# Patient Record
Sex: MALE | Race: WHITE | Employment: OTHER | URBAN - METROPOLITAN AREA
[De-identification: names, ages, dates, MRNs, and addresses within clinical notes are randomized per-mention and may not be internally consistent; named-entity substitution may affect disease eponyms.]

---

## 2019-05-28 ENCOUNTER — HOSPITAL ENCOUNTER (OUTPATIENT)
Age: 74
Setting detail: SPECIMEN
Discharge: HOME | End: 2019-05-28

## 2019-05-28 LAB
INR BLD: 1.9
PROTHROMBIN TIME: 21.5 SEC (ref 11.8–14.6)

## 2019-05-28 PROCEDURE — 85610 PROTHROMBIN TIME: CPT

## 2019-06-04 ENCOUNTER — HOSPITAL ENCOUNTER (OUTPATIENT)
Age: 74
Setting detail: SPECIMEN
Discharge: HOME | End: 2019-06-04

## 2019-06-04 LAB
HCT VFR BLD CALC: 27.8 % (ref 41–53)
HEMOGLOBIN: 9.2 G/DL (ref 13.5–17.5)
INR BLD: 2.2
PROTHROMBIN TIME: 24.4 SEC (ref 11.8–14.6)

## 2019-06-04 PROCEDURE — 85018 HEMOGLOBIN: CPT

## 2019-06-04 PROCEDURE — 85014 HEMATOCRIT: CPT

## 2019-06-04 PROCEDURE — 85610 PROTHROMBIN TIME: CPT

## 2019-07-30 ENCOUNTER — HOSPITAL ENCOUNTER (OUTPATIENT)
Age: 74
Setting detail: SPECIMEN
Discharge: HOME | End: 2019-07-30

## 2019-07-30 LAB
INR BLD: 1.5
PROTHROMBIN TIME: 18.3 SEC (ref 11.8–14.6)

## 2019-07-30 PROCEDURE — 85610 PROTHROMBIN TIME: CPT

## 2019-09-05 ENCOUNTER — HOSPITAL ENCOUNTER (OUTPATIENT)
Age: 74
Setting detail: SPECIMEN
Discharge: HOME | End: 2019-09-05
Payer: MEDICARE

## 2019-09-05 LAB
INR BLD: 1.8
PROTHROMBIN TIME: 18.4 SEC (ref 9.4–12.6)

## 2019-09-05 PROCEDURE — 85610 PROTHROMBIN TIME: CPT

## 2019-09-11 ENCOUNTER — HOSPITAL ENCOUNTER (OUTPATIENT)
Age: 74
Setting detail: SPECIMEN
Discharge: HOME | End: 2019-09-11
Payer: MEDICARE

## 2019-09-11 LAB
INR BLD: 3.1
PROTHROMBIN TIME: 32.5 SEC (ref 11.8–14.6)

## 2019-09-11 PROCEDURE — 85610 PROTHROMBIN TIME: CPT

## 2019-09-18 ENCOUNTER — HOSPITAL ENCOUNTER (OUTPATIENT)
Age: 74
Setting detail: SPECIMEN
Discharge: HOME | End: 2019-09-18
Payer: MEDICARE

## 2019-09-18 LAB
INR BLD: 2.1
PROTHROMBIN TIME: 20.6 SEC (ref 9.4–12.6)

## 2019-09-18 PROCEDURE — 85610 PROTHROMBIN TIME: CPT

## 2019-09-25 ENCOUNTER — HOSPITAL ENCOUNTER (OUTPATIENT)
Age: 74
Setting detail: SPECIMEN
Discharge: HOME | End: 2019-09-25
Payer: MEDICARE

## 2019-09-25 LAB
INR BLD: 2.4
PROTHROMBIN TIME: 24.3 SEC (ref 9.4–12.6)

## 2019-09-25 PROCEDURE — 85610 PROTHROMBIN TIME: CPT

## 2019-10-02 ENCOUNTER — HOSPITAL ENCOUNTER (OUTPATIENT)
Age: 74
Setting detail: SPECIMEN
Discharge: HOME | End: 2019-10-02
Payer: MEDICARE

## 2019-10-02 LAB
INR BLD: 2.7
PROTHROMBIN TIME: 26.5 SEC (ref 9.4–12.6)

## 2019-10-02 PROCEDURE — 85610 PROTHROMBIN TIME: CPT

## 2019-10-09 ENCOUNTER — HOSPITAL ENCOUNTER (OUTPATIENT)
Age: 74
Setting detail: SPECIMEN
Discharge: HOME | End: 2019-10-09
Payer: MEDICARE

## 2019-10-09 LAB
INR BLD: 2.8
PROTHROMBIN TIME: 30 SEC (ref 11.8–14.6)

## 2019-10-09 PROCEDURE — 85610 PROTHROMBIN TIME: CPT

## 2020-01-09 ENCOUNTER — HOSPITAL ENCOUNTER (EMERGENCY)
Facility: CLINIC | Age: 75
Discharge: ANOTHER HOSPITAL | End: 2020-01-09
Attending: EMERGENCY MEDICINE
Payer: MEDICARE

## 2020-01-09 ENCOUNTER — APPOINTMENT (OUTPATIENT)
Dept: GENERAL RADIOLOGY | Facility: CLINIC | Age: 75
End: 2020-01-09
Payer: MEDICARE

## 2020-01-09 VITALS
SYSTOLIC BLOOD PRESSURE: 112 MMHG | TEMPERATURE: 97.6 F | WEIGHT: 120 LBS | HEART RATE: 60 BPM | RESPIRATION RATE: 16 BRPM | DIASTOLIC BLOOD PRESSURE: 54 MMHG | BODY MASS INDEX: 16.25 KG/M2 | HEIGHT: 72 IN | OXYGEN SATURATION: 98 %

## 2020-01-09 LAB
ANION GAP SERPL CALCULATED.3IONS-SCNC: 11 MMOL/L (ref 9–17)
BUN BLDV-MCNC: 11 MG/DL (ref 8–23)
BUN/CREAT BLD: ABNORMAL (ref 9–20)
CALCIUM SERPL-MCNC: 9.6 MG/DL (ref 8.6–10.4)
CHLORIDE BLD-SCNC: 104 MMOL/L (ref 98–107)
CO2: 26 MMOL/L (ref 20–31)
CREAT SERPL-MCNC: 0.9 MG/DL (ref 0.7–1.2)
GFR AFRICAN AMERICAN: >60 ML/MIN
GFR NON-AFRICAN AMERICAN: >60 ML/MIN
GFR SERPL CREATININE-BSD FRML MDRD: ABNORMAL ML/MIN/{1.73_M2}
GFR SERPL CREATININE-BSD FRML MDRD: ABNORMAL ML/MIN/{1.73_M2}
GLUCOSE BLD-MCNC: 117 MG/DL (ref 70–99)
HCT VFR BLD CALC: 30.2 % (ref 41–53)
HEMOGLOBIN: 9.6 G/DL (ref 13.5–17.5)
INR BLD: 2.9
MCH RBC QN AUTO: 30.2 PG (ref 26–34)
MCHC RBC AUTO-ENTMCNC: 31.7 G/DL (ref 31–37)
MCV RBC AUTO: 95.3 FL (ref 80–100)
NRBC AUTOMATED: ABNORMAL PER 100 WBC
PARTIAL THROMBOPLASTIN TIME: 36.8 SEC (ref 21.3–31.3)
PDW BLD-RTO: 18.4 % (ref 12.5–15.4)
PLATELET # BLD: 268 K/UL (ref 140–450)
PMV BLD AUTO: 7.3 FL (ref 6–12)
POTASSIUM SERPL-SCNC: 4.4 MMOL/L (ref 3.7–5.3)
PROTHROMBIN TIME: 28.6 SEC (ref 9.4–12.6)
RBC # BLD: 3.17 M/UL (ref 4.5–5.9)
SODIUM BLD-SCNC: 141 MMOL/L (ref 135–144)
WBC # BLD: 4.3 K/UL (ref 3.5–11)

## 2020-01-09 PROCEDURE — 6360000002 HC RX W HCPCS: Performed by: EMERGENCY MEDICINE

## 2020-01-09 PROCEDURE — 85027 COMPLETE CBC AUTOMATED: CPT

## 2020-01-09 PROCEDURE — 73030 X-RAY EXAM OF SHOULDER: CPT

## 2020-01-09 PROCEDURE — 80048 BASIC METABOLIC PNL TOTAL CA: CPT

## 2020-01-09 PROCEDURE — 36415 COLL VENOUS BLD VENIPUNCTURE: CPT

## 2020-01-09 PROCEDURE — 96374 THER/PROPH/DIAG INJ IV PUSH: CPT

## 2020-01-09 PROCEDURE — 85610 PROTHROMBIN TIME: CPT

## 2020-01-09 PROCEDURE — 85730 THROMBOPLASTIN TIME PARTIAL: CPT

## 2020-01-09 PROCEDURE — 96375 TX/PRO/DX INJ NEW DRUG ADDON: CPT

## 2020-01-09 PROCEDURE — 99285 EMERGENCY DEPT VISIT HI MDM: CPT

## 2020-01-09 RX ORDER — ONDANSETRON 2 MG/ML
4 INJECTION INTRAMUSCULAR; INTRAVENOUS ONCE
Status: COMPLETED | OUTPATIENT
Start: 2020-01-09 | End: 2020-01-09

## 2020-01-09 RX ORDER — WARFARIN SODIUM 5 MG/1
TABLET ORAL
COMMUNITY

## 2020-01-09 RX ORDER — TAMSULOSIN HYDROCHLORIDE 0.4 MG/1
CAPSULE ORAL
COMMUNITY

## 2020-01-09 RX ORDER — ALBUTEROL SULFATE 90 UG/1
AEROSOL, METERED RESPIRATORY (INHALATION)
COMMUNITY

## 2020-01-09 RX ORDER — CLOPIDOGREL BISULFATE 75 MG/1
TABLET ORAL
COMMUNITY

## 2020-01-09 RX ORDER — ALBUTEROL SULFATE 1.25 MG/3ML
1 SOLUTION RESPIRATORY (INHALATION)
COMMUNITY

## 2020-01-09 RX ORDER — MORPHINE SULFATE 4 MG/ML
4 INJECTION, SOLUTION INTRAMUSCULAR; INTRAVENOUS ONCE
Status: COMPLETED | OUTPATIENT
Start: 2020-01-09 | End: 2020-01-09

## 2020-01-09 RX ORDER — ATORVASTATIN CALCIUM 40 MG/1
TABLET, FILM COATED ORAL
COMMUNITY

## 2020-01-09 RX ORDER — METOPROLOL TARTRATE 50 MG/1
50 TABLET, FILM COATED ORAL
COMMUNITY
Start: 2019-09-04

## 2020-01-09 RX ORDER — OXYCODONE HYDROCHLORIDE 10 MG/1
TABLET ORAL
COMMUNITY

## 2020-01-09 RX ADMIN — HYDROMORPHONE HYDROCHLORIDE 0.5 MG: 1 INJECTION, SOLUTION INTRAMUSCULAR; INTRAVENOUS; SUBCUTANEOUS at 10:29

## 2020-01-09 RX ADMIN — ONDANSETRON 4 MG: 2 INJECTION INTRAMUSCULAR; INTRAVENOUS at 08:41

## 2020-01-09 RX ADMIN — MORPHINE SULFATE 4 MG: 4 INJECTION INTRAVENOUS at 08:41

## 2020-01-09 ASSESSMENT — PAIN SCALES - GENERAL
PAINLEVEL_OUTOF10: 10
PAINLEVEL_OUTOF10: 9
PAINLEVEL_OUTOF10: 10
PAINLEVEL_OUTOF10: 8
PAINLEVEL_OUTOF10: 8

## 2020-01-09 ASSESSMENT — PAIN DESCRIPTION - ONSET: ONSET: SUDDEN

## 2020-01-09 ASSESSMENT — PAIN DESCRIPTION - PAIN TYPE
TYPE: ACUTE PAIN

## 2020-01-09 ASSESSMENT — PAIN DESCRIPTION - PROGRESSION
CLINICAL_PROGRESSION: RAPIDLY WORSENING
CLINICAL_PROGRESSION: GRADUALLY IMPROVING
CLINICAL_PROGRESSION: NOT CHANGED

## 2020-01-09 ASSESSMENT — PAIN DESCRIPTION - FREQUENCY: FREQUENCY: CONTINUOUS

## 2020-01-09 ASSESSMENT — PAIN - FUNCTIONAL ASSESSMENT
PAIN_FUNCTIONAL_ASSESSMENT: PREVENTS OR INTERFERES WITH MANY ACTIVE NOT PASSIVE ACTIVITIES

## 2020-01-09 ASSESSMENT — PAIN DESCRIPTION - LOCATION: LOCATION: SHOULDER

## 2020-01-09 ASSESSMENT — PAIN DESCRIPTION - ORIENTATION: ORIENTATION: LEFT

## 2020-01-09 ASSESSMENT — PAIN DESCRIPTION - DESCRIPTORS: DESCRIPTORS: ACHING;SHARP

## 2020-01-09 NOTE — ED PROVIDER NOTES
his fingers. Skin:     General: Skin is warm and dry. Capillary Refill: Capillary refill takes less than 2 seconds. Findings: Bruising present. No rash. Comments: Left anterior shoulder   Neurological:      General: No focal deficit present. Mental Status: He is alert. Mental status is at baseline. Sensory: No sensory deficit. Motor: No weakness. Comments: Speaking normally. No facial asymmetry. Moving all 4 extremities. No gait testing   Psychiatric:         Mood and Affect: Mood normal.         EMERGENCY DEPARTMENT COURSE and DIFFERENTIAL DIAGNOSIS/MDM:   Vitals:    Vitals:    01/09/20 0833 01/09/20 0935 01/09/20 1030 01/09/20 1044   BP: (!) 163/68 (!) 130/55 103/60 (!) 117/50   Pulse: 66 58 60    Resp: 18 15 16    Temp: 97.6 °F (36.4 °C)      TempSrc: Oral      SpO2: 98% 97% 95% 96%   Weight: 54.4 kg (120 lb)      Height: 6' (1.829 m)          Patient presents to the emergency department with a complaint described above. Vitals show hypertension but are otherwise unremarkable. He is neurovascularly intact in left upper extremity but I am concerned for humeral fracture. I have ordered pain medication and basic labs as he is on Coumadin and will need coagulation studies as well, I also ordered a left shoulder x-ray.       DIAGNOSTIC RESULTS     LABS:  Labs Reviewed   CBC - Abnormal; Notable for the following components:       Result Value    RBC 3.17 (*)     Hemoglobin 9.6 (*)     Hematocrit 30.2 (*)     RDW 18.4 (*)     All other components within normal limits   BASIC METABOLIC PANEL - Abnormal; Notable for the following components:    Glucose 117 (*)     All other components within normal limits   APTT - Abnormal; Notable for the following components:    PTT 36.8 (*)     All other components within normal limits   PROTIME-INR - Abnormal; Notable for the following components:    Protime 28.6 (*)     All other components within normal limits       All other labs were within normal range or not returned as of this dictation. RADIOLOGY:  XR SHOULDER LEFT (MIN 2 VIEWS)   Final Result   Comminuted fracture of the proximal humerus involving the humeral neck and   head. No dislocation. Small effusion is noted. ED Course as of Jan 09 1103   Thu Jan 09, 2020   8094 Patient found to have a comminuted fracture of the left humeral neck and head. No displacement or dislocation. I informed the patient of the results, he will clearly need to be admitted for evaluation by orthopedics, possible surgical fixation and because he only has 1 leg and now has 1 broken shoulder he is going to have severe difficulty in ambulation. I recommended admission to [facility], however, patient requested [facility].  We contacted paramedic [facility] and [facility] not currently accepting any admissions. Family then requesting [facility].  We will contact orthopedics and hospitalist at [facility] as access center states they are accepting admissions    [TS]      ED Course User Index  [TS] Sailaja Mix DO     Spoke to orthopedic surgeon and hospitalist who is accepting patient for admission. PROCEDURES:  Unless otherwise noted below, none     Procedures    FINAL IMPRESSION      1. Closed fracture of proximal end of left humerus, unspecified fracture morphology, initial encounter    2. Unable to ambulate          DISPOSITION/PLAN   DISPOSITION Decision To Transfer 01/09/2020 09:33:30 AM      PATIENT REFERRED TO:  No follow-up provider specified.     DISCHARGE MEDICATIONS:  New Prescriptions    No medications on file          (Please note that portions of this note were completed with a voice recognition program.  Efforts were made to edit the dictations but occasionally words are mis-transcribed.)    Sailaja Mix DO (electronically signed)  Board Certified Emergency Physician          Sailaja Mix DO  01/09/20 9327

## 2020-01-09 NOTE — ED NOTES
Pt wishes to go to [facility] p[facility] through [facility].      Osmani Ortiz RN  01/09/20 0486

## 2020-01-09 NOTE — ED NOTES
Pt presents to the ED via squad for a c/c of a fall and left shoulder pain and injury. Pt states that he had a right AKA surgery approx one month ago and he was getting up this morning to use the restroom when he states he \"got tangled up in his walker\" and fell onto his left shoulder. Pt states he felt immediate pain. Pt states he is unable to move the shoulder without pain. Some bruising and mild deformity noted to left front of shoulder. No open areas. Pt denies hitting his head or any LOC. Pt denies chest pain, SOB, nausea, vomiting, diarrhea, fevers, or chills. Pt pulled over to cot via sheet lift. Pt resting in bed in NAD, skin pink warm and dry, respirations even and unlabored and call light within reach.      Skyla Chan RN  01/09/20 3659

## 2020-02-12 ENCOUNTER — HOSPITAL ENCOUNTER (OUTPATIENT)
Age: 75
Setting detail: SPECIMEN
Discharge: HOME | End: 2020-02-12
Payer: MEDICARE

## 2020-02-12 LAB
INR BLD: 3.2
PROTHROMBIN TIME: 32.6 SEC (ref 9.4–12.6)

## 2020-02-12 PROCEDURE — 85610 PROTHROMBIN TIME: CPT

## 2024-03-14 ENCOUNTER — TELEPHONE (OUTPATIENT)
Dept: PHARMACY | Age: 79
End: 2024-03-14

## 2024-03-14 NOTE — TELEPHONE ENCOUNTER
[facility] Medication Management Clinic Note  Spoke with Kelsey Velarde, RN care manager with [facility] (calling on behalf of Lilia Mendoza CNP). She sent and we received a new referral for anticoagulation management (warfarin) for this patient. Patient previously managed by [facility], looks like discharged from their practice due to non-compliance. Patient's insurance now prefers [email or web address] and patient is in process of switching providers - primary care, cardio, etc.     Attempted to reach patient to schedule initial INR check appointment. (Of note patient's INR was 1.4 yesterday, per RN patient had stopped taking it after having an elevated INR recently) Of note patient also hospitalized 3/5-3/7 for cellulitis. Voicemail full, could not leave message. Will attempt again or await callback from patient.    Terese Duarte, PharmD  [facility]  3/14/2024 2:16 PM

## 2024-03-18 ENCOUNTER — TELEPHONE (OUTPATIENT)
Dept: PHARMACY | Age: 79
End: 2024-03-18

## 2024-03-18 NOTE — TELEPHONE ENCOUNTER
[facility] Medication Management Clinic Note  Again tried to call patient in regard to referral for anticoagulation management/warfarin. No answer, voicemail full, could not leave message. Also called Kelsey Velarde, RN care manager with [facility], who I spoke with 3/14/24, to let her know I have not yet successfully gotten ahold of this patient to schedule anticoagulation visit.    Terese Duarte, PharmD  [facility]  3/18/2024 11:09 AM

## 2024-03-22 ENCOUNTER — TELEPHONE (OUTPATIENT)
Dept: PHARMACY | Age: 79
End: 2024-03-22

## 2024-03-22 NOTE — TELEPHONE ENCOUNTER
[facility] Medication Management Clinic Note  Again tried to call patient in regard to referral for anticoagulation management/warfarin. No answer, voicemail full, could not leave message. 3rd attempt.    Terese Duarte, PharmD  [facility]  3/22/2024 2:46 PM

## 2024-03-25 ENCOUNTER — TELEPHONE (OUTPATIENT)
Dept: PHARMACY | Age: 79
End: 2024-03-25

## 2024-03-25 NOTE — TELEPHONE ENCOUNTER
[facility] Medication Management Clinic Note  Again tried to call patient in regard to referral for anticoagulation management/warfarin. No answer, voicemail full, could not leave message. 4th attempt.    Terese Duarte, PharmD  [facility]  3/25/2024 10:42 AM

## 2024-04-03 ENCOUNTER — APPOINTMENT (OUTPATIENT)
Dept: GENERAL RADIOLOGY | Age: 79
DRG: 377 | End: 2024-04-03
Payer: MEDICARE

## 2024-04-03 ENCOUNTER — HOSPITAL ENCOUNTER (INPATIENT)
Age: 79
LOS: 38 days | Discharge: ANOTHER HOSPITAL | DRG: 377 | End: 2024-05-11
Attending: EMERGENCY MEDICINE | Admitting: INTERNAL MEDICINE
Payer: MEDICARE

## 2024-04-03 ENCOUNTER — HOSPITAL ENCOUNTER (EMERGENCY)
Dept: VASCULAR LAB | Age: 79
Discharge: HOME | DRG: 377 | End: 2024-04-05
Attending: EMERGENCY MEDICINE
Payer: MEDICARE

## 2024-04-03 DIAGNOSIS — J98.11 ATELECTASIS OF LEFT LUNG: ICD-10-CM

## 2024-04-03 DIAGNOSIS — K92.2 GASTROINTESTINAL HEMORRHAGE, UNSPECIFIED GASTROINTESTINAL HEMORRHAGE TYPE: ICD-10-CM

## 2024-04-03 DIAGNOSIS — I73.9 CLAUDICATION (HCC): ICD-10-CM

## 2024-04-03 DIAGNOSIS — N17.9 AKI (ACUTE KIDNEY INJURY) (HCC): ICD-10-CM

## 2024-04-03 DIAGNOSIS — I48.91 ATRIAL FIB/FLUTTER, TRANSIENT (HCC): ICD-10-CM

## 2024-04-03 DIAGNOSIS — I74.9 EMBOLISM AND THROMBOSIS OF ARTERY (HCC): ICD-10-CM

## 2024-04-03 DIAGNOSIS — R10.11 RIGHT UPPER QUADRANT ABDOMINAL PAIN: ICD-10-CM

## 2024-04-03 DIAGNOSIS — K92.1 MELENA: ICD-10-CM

## 2024-04-03 DIAGNOSIS — K81.0 ACUTE CHOLECYSTITIS: ICD-10-CM

## 2024-04-03 DIAGNOSIS — J98.11 ATELECTASIS: ICD-10-CM

## 2024-04-03 DIAGNOSIS — L03.116 LEFT LEG CELLULITIS: Primary | ICD-10-CM

## 2024-04-03 DIAGNOSIS — R31.0 GROSS HEMATURIA: ICD-10-CM

## 2024-04-03 DIAGNOSIS — D64.9 ANEMIA, UNSPECIFIED TYPE: ICD-10-CM

## 2024-04-03 DIAGNOSIS — I48.92 ATRIAL FIB/FLUTTER, TRANSIENT (HCC): ICD-10-CM

## 2024-04-03 PROBLEM — R23.4 SKIN ESCHAR: Status: ACTIVE | Noted: 2024-03-06

## 2024-04-03 PROBLEM — J44.1 ACUTE EXACERBATION OF CHRONIC OBSTRUCTIVE AIRWAYS DISEASE (HCC): Status: ACTIVE | Noted: 2024-03-05

## 2024-04-03 PROBLEM — Z89.612 HISTORY OF ABOVE-KNEE AMPUTATION OF BOTH LOWER EXTREMITIES (HCC): Status: ACTIVE | Noted: 2024-03-05

## 2024-04-03 PROBLEM — I70.212 INTERMITTENT CLAUDICATION OF LEFT LOWER EXTREMITY DUE TO ATHEROSCLEROSIS (HCC): Status: ACTIVE | Noted: 2019-02-14

## 2024-04-03 PROBLEM — Z89.611 HISTORY OF ABOVE-KNEE AMPUTATION OF BOTH LOWER EXTREMITIES (HCC): Status: ACTIVE | Noted: 2024-03-05

## 2024-04-03 PROBLEM — I10 PRIMARY HYPERTENSION: Chronic | Status: ACTIVE | Noted: 2019-02-14

## 2024-04-03 PROBLEM — M81.0 AGE-RELATED OSTEOPOROSIS WITHOUT CURRENT PATHOLOGICAL FRACTURE: Status: ACTIVE | Noted: 2020-01-09

## 2024-04-03 PROBLEM — I25.10 ARTERIOSCLEROSIS OF CORONARY ARTERY: Status: ACTIVE | Noted: 2024-03-05

## 2024-04-03 PROBLEM — S42.302A CLOSED FRACTURE OF LEFT HUMERUS: Status: ACTIVE | Noted: 2020-01-09

## 2024-04-03 PROBLEM — Z95.828 HISTORY OF ARTERIAL BYPASS OF LOWER EXTREMITY: Status: ACTIVE | Noted: 2019-11-08

## 2024-04-03 PROBLEM — G62.9 NEUROPATHY: Status: ACTIVE | Noted: 2019-01-17

## 2024-04-03 PROBLEM — T14.8XXA WOUND INFECTION: Status: ACTIVE | Noted: 2024-03-05

## 2024-04-03 PROBLEM — Z87.891 FORMER SMOKER: Status: ACTIVE | Noted: 2020-10-12

## 2024-04-03 PROBLEM — R35.0 FREQUENCY OF URINATION: Status: ACTIVE | Noted: 2019-01-17

## 2024-04-03 PROBLEM — C34.90 ADENOCARCINOMA OF LUNG (HCC): Status: ACTIVE | Noted: 2024-04-03

## 2024-04-03 PROBLEM — L08.9 WOUND INFECTION: Status: ACTIVE | Noted: 2024-03-05

## 2024-04-03 PROBLEM — R52 PERSISTENT WOUND PAIN: Status: ACTIVE | Noted: 2024-04-03

## 2024-04-03 PROBLEM — E78.5 DYSLIPIDEMIA: Status: ACTIVE | Noted: 2019-03-26

## 2024-04-03 PROBLEM — S42.213A CLOSED FRACTURE OF SURGICAL NECK OF HUMERUS: Status: ACTIVE | Noted: 2020-01-09

## 2024-04-03 PROBLEM — Z89.611 S/P AKA (ABOVE KNEE AMPUTATION) UNILATERAL, RIGHT (HCC): Status: ACTIVE | Noted: 2024-03-05

## 2024-04-03 PROBLEM — I65.22 STENOSIS OF LEFT CAROTID ARTERY: Chronic | Status: ACTIVE | Noted: 2019-02-12

## 2024-04-03 PROBLEM — C34.90 MALIGNANT NEOPLASM OF LUNG (HCC): Status: ACTIVE | Noted: 2019-04-24

## 2024-04-03 PROBLEM — L97.529 ULCER OF LEFT FOOT (HCC): Status: ACTIVE | Noted: 2024-03-06

## 2024-04-03 LAB
ALBUMIN SERPL-MCNC: 3.6 G/DL (ref 3.5–5.2)
ALP SERPL-CCNC: 115 U/L (ref 40–129)
ALT SERPL-CCNC: 9 U/L (ref 5–41)
ANION GAP SERPL CALCULATED.3IONS-SCNC: 14 MMOL/L (ref 9–17)
AST SERPL-CCNC: 19 U/L
BASOPHILS # BLD: 0.05 K/UL (ref 0–0.2)
BASOPHILS NFR BLD: 1 % (ref 0–2)
BILIRUB SERPL-MCNC: 0.1 MG/DL (ref 0.3–1.2)
BUN SERPL-MCNC: 25 MG/DL (ref 8–23)
BUN/CREAT SERPL: 14 (ref 9–20)
CALCIUM SERPL-MCNC: 8.3 MG/DL (ref 8.6–10.4)
CHLORIDE SERPL-SCNC: 98 MMOL/L (ref 98–107)
CO2 SERPL-SCNC: 16 MMOL/L (ref 20–31)
CREAT SERPL-MCNC: 1.8 MG/DL (ref 0.7–1.2)
CRP SERPL HS-MCNC: 7.8 MG/L (ref 0–5)
DATE, STOOL #1: ABNORMAL
EOSINOPHIL # BLD: 0.09 K/UL (ref 0–0.44)
EOSINOPHILS RELATIVE PERCENT: 2 % (ref 1–4)
ERYTHROCYTE [DISTWIDTH] IN BLOOD BY AUTOMATED COUNT: 20.3 % (ref 11.8–14.4)
GFR SERPL CREATININE-BSD FRML MDRD: 38 ML/MIN/1.73M2
GLUCOSE SERPL-MCNC: 83 MG/DL (ref 70–99)
HCT VFR BLD AUTO: 20.6 % (ref 40.7–50.3)
HCT VFR BLD AUTO: 22 % (ref 40.7–50.3)
HEMOCCULT SP1 STL QL: POSITIVE
HGB BLD-MCNC: 6.2 G/DL (ref 13–17)
HGB BLD-MCNC: 6.6 G/DL (ref 13–17)
IMM GRANULOCYTES # BLD AUTO: 0 K/UL (ref 0–0.3)
IMM GRANULOCYTES NFR BLD: 0 %
IRON SATN MFR SERPL: 18 % (ref 20–55)
IRON SERPL-MCNC: 58 UG/DL (ref 61–157)
LYMPHOCYTES NFR BLD: 0.72 K/UL (ref 1.1–3.7)
LYMPHOCYTES RELATIVE PERCENT: 16 % (ref 24–43)
MCH RBC QN AUTO: 30.3 PG (ref 25.2–33.5)
MCHC RBC AUTO-ENTMCNC: 30 G/DL (ref 28.4–34.8)
MCV RBC AUTO: 100.9 FL (ref 82.6–102.9)
MONOCYTES NFR BLD: 0.5 K/UL (ref 0.1–1.2)
MONOCYTES NFR BLD: 11 % (ref 3–12)
MORPHOLOGY: ABNORMAL
NEUTROPHILS NFR BLD: 70 % (ref 36–65)
NEUTS SEG NFR BLD: 3.14 K/UL (ref 1.5–8.1)
NRBC BLD-RTO: 0 PER 100 WBC
PLATELET # BLD AUTO: 267 K/UL (ref 138–453)
PMV BLD AUTO: 9.4 FL (ref 8.1–13.5)
POTASSIUM SERPL-SCNC: 4 MMOL/L (ref 3.7–5.3)
PROT SERPL-MCNC: 6.6 G/DL (ref 6.4–8.3)
RBC # BLD AUTO: 2.18 M/UL (ref 4.21–5.77)
SODIUM SERPL-SCNC: 128 MMOL/L (ref 135–144)
TIBC SERPL-MCNC: 326 UG/DL (ref 250–450)
TIME, STOOL #1: 1742
UNSATURATED IRON BINDING CAPACITY: 268 UG/DL (ref 112–347)
WBC OTHER # BLD: 4.5 K/UL (ref 3.5–11.3)

## 2024-04-03 PROCEDURE — 99222 1ST HOSP IP/OBS MODERATE 55: CPT | Performed by: INTERNAL MEDICINE

## 2024-04-03 PROCEDURE — 2580000003 HC RX 258: Performed by: EMERGENCY MEDICINE

## 2024-04-03 PROCEDURE — 83550 IRON BINDING TEST: CPT

## 2024-04-03 PROCEDURE — 93971 EXTREMITY STUDY: CPT

## 2024-04-03 PROCEDURE — 86920 COMPATIBILITY TEST SPIN: CPT

## 2024-04-03 PROCEDURE — 96366 THER/PROPH/DIAG IV INF ADDON: CPT

## 2024-04-03 PROCEDURE — 2060000000 HC ICU INTERMEDIATE R&B

## 2024-04-03 PROCEDURE — 6360000002 HC RX W HCPCS: Performed by: EMERGENCY MEDICINE

## 2024-04-03 PROCEDURE — 85014 HEMATOCRIT: CPT

## 2024-04-03 PROCEDURE — 73630 X-RAY EXAM OF FOOT: CPT

## 2024-04-03 PROCEDURE — 99285 EMERGENCY DEPT VISIT HI MDM: CPT

## 2024-04-03 PROCEDURE — 87040 BLOOD CULTURE FOR BACTERIA: CPT

## 2024-04-03 PROCEDURE — 86850 RBC ANTIBODY SCREEN: CPT

## 2024-04-03 PROCEDURE — C9113 INJ PANTOPRAZOLE SODIUM, VIA: HCPCS | Performed by: NURSE PRACTITIONER

## 2024-04-03 PROCEDURE — C9113 INJ PANTOPRAZOLE SODIUM, VIA: HCPCS | Performed by: EMERGENCY MEDICINE

## 2024-04-03 PROCEDURE — 80053 COMPREHEN METABOLIC PANEL: CPT

## 2024-04-03 PROCEDURE — 6370000000 HC RX 637 (ALT 250 FOR IP): Performed by: NURSE PRACTITIONER

## 2024-04-03 PROCEDURE — 86140 C-REACTIVE PROTEIN: CPT

## 2024-04-03 PROCEDURE — 86900 BLOOD TYPING SEROLOGIC ABO: CPT

## 2024-04-03 PROCEDURE — 82272 OCCULT BLD FECES 1-3 TESTS: CPT

## 2024-04-03 PROCEDURE — 86901 BLOOD TYPING SEROLOGIC RH(D): CPT

## 2024-04-03 PROCEDURE — 2580000003 HC RX 258: Performed by: NURSE PRACTITIONER

## 2024-04-03 PROCEDURE — 85025 COMPLETE CBC W/AUTO DIFF WBC: CPT

## 2024-04-03 PROCEDURE — 6370000000 HC RX 637 (ALT 250 FOR IP)

## 2024-04-03 PROCEDURE — 6360000002 HC RX W HCPCS: Performed by: NURSE PRACTITIONER

## 2024-04-03 PROCEDURE — 36415 COLL VENOUS BLD VENIPUNCTURE: CPT

## 2024-04-03 PROCEDURE — 85018 HEMOGLOBIN: CPT

## 2024-04-03 PROCEDURE — 96365 THER/PROPH/DIAG IV INF INIT: CPT

## 2024-04-03 PROCEDURE — 83605 ASSAY OF LACTIC ACID: CPT

## 2024-04-03 PROCEDURE — 96375 TX/PRO/DX INJ NEW DRUG ADDON: CPT

## 2024-04-03 PROCEDURE — 83540 ASSAY OF IRON: CPT

## 2024-04-03 RX ORDER — ONDANSETRON 4 MG/1
4 TABLET, ORALLY DISINTEGRATING ORAL EVERY 8 HOURS PRN
Status: DISCONTINUED | OUTPATIENT
Start: 2024-04-03 | End: 2024-05-11 | Stop reason: HOSPADM

## 2024-04-03 RX ORDER — DEXTROSE MONOHYDRATE 100 MG/ML
INJECTION, SOLUTION INTRAVENOUS CONTINUOUS PRN
Status: DISCONTINUED | OUTPATIENT
Start: 2024-04-03 | End: 2024-05-11 | Stop reason: HOSPADM

## 2024-04-03 RX ORDER — 0.9 % SODIUM CHLORIDE 0.9 %
500 INTRAVENOUS SOLUTION INTRAVENOUS ONCE
Status: COMPLETED | OUTPATIENT
Start: 2024-04-03 | End: 2024-04-03

## 2024-04-03 RX ORDER — OXYCODONE HYDROCHLORIDE 5 MG/1
10 TABLET ORAL DAILY
Status: DISCONTINUED | OUTPATIENT
Start: 2024-04-04 | End: 2024-04-03

## 2024-04-03 RX ORDER — ATORVASTATIN CALCIUM 40 MG/1
40 TABLET, FILM COATED ORAL NIGHTLY
Status: DISCONTINUED | OUTPATIENT
Start: 2024-04-03 | End: 2024-05-11 | Stop reason: HOSPADM

## 2024-04-03 RX ORDER — SODIUM CHLORIDE 0.9 % (FLUSH) 0.9 %
5-40 SYRINGE (ML) INJECTION EVERY 12 HOURS SCHEDULED
Status: DISCONTINUED | OUTPATIENT
Start: 2024-04-03 | End: 2024-05-11 | Stop reason: HOSPADM

## 2024-04-03 RX ORDER — HYDROMORPHONE HYDROCHLORIDE 1 MG/ML
1 INJECTION, SOLUTION INTRAMUSCULAR; INTRAVENOUS; SUBCUTANEOUS EVERY 4 HOURS PRN
Status: DISCONTINUED | OUTPATIENT
Start: 2024-04-03 | End: 2024-04-05

## 2024-04-03 RX ORDER — ONDANSETRON 2 MG/ML
4 INJECTION INTRAMUSCULAR; INTRAVENOUS EVERY 6 HOURS PRN
Status: DISCONTINUED | OUTPATIENT
Start: 2024-04-03 | End: 2024-05-11 | Stop reason: HOSPADM

## 2024-04-03 RX ORDER — METOPROLOL TARTRATE 50 MG/1
50 TABLET, FILM COATED ORAL 2 TIMES DAILY
Status: DISCONTINUED | OUTPATIENT
Start: 2024-04-03 | End: 2024-04-13

## 2024-04-03 RX ORDER — SODIUM CHLORIDE 0.9 % (FLUSH) 0.9 %
5-40 SYRINGE (ML) INJECTION PRN
Status: DISCONTINUED | OUTPATIENT
Start: 2024-04-03 | End: 2024-05-11 | Stop reason: HOSPADM

## 2024-04-03 RX ORDER — ALBUTEROL SULFATE 1.25 MG/3ML
1 SOLUTION RESPIRATORY (INHALATION) 4 TIMES DAILY PRN
Status: DISCONTINUED | OUTPATIENT
Start: 2024-04-03 | End: 2024-04-04

## 2024-04-03 RX ORDER — SODIUM CHLORIDE 9 MG/ML
INJECTION, SOLUTION INTRAVENOUS PRN
Status: DISCONTINUED | OUTPATIENT
Start: 2024-04-03 | End: 2024-05-11 | Stop reason: HOSPADM

## 2024-04-03 RX ORDER — LANOLIN ALCOHOL/MO/W.PET/CERES
1000 CREAM (GRAM) TOPICAL DAILY
Status: DISCONTINUED | OUTPATIENT
Start: 2024-04-04 | End: 2024-05-11 | Stop reason: HOSPADM

## 2024-04-03 RX ORDER — FENTANYL CITRATE 0.05 MG/ML
50 INJECTION, SOLUTION INTRAMUSCULAR; INTRAVENOUS ONCE
Status: COMPLETED | OUTPATIENT
Start: 2024-04-03 | End: 2024-04-03

## 2024-04-03 RX ORDER — SODIUM CHLORIDE 9 MG/ML
INJECTION, SOLUTION INTRAVENOUS CONTINUOUS
Status: DISCONTINUED | OUTPATIENT
Start: 2024-04-03 | End: 2024-04-04

## 2024-04-03 RX ORDER — ACETAMINOPHEN 650 MG/1
650 SUPPOSITORY RECTAL EVERY 6 HOURS PRN
Status: DISCONTINUED | OUTPATIENT
Start: 2024-04-03 | End: 2024-05-11 | Stop reason: HOSPADM

## 2024-04-03 RX ORDER — GABAPENTIN 300 MG/1
300 CAPSULE ORAL 3 TIMES DAILY
Status: ON HOLD | COMMUNITY
End: 2024-04-22 | Stop reason: HOSPADM

## 2024-04-03 RX ORDER — TAMSULOSIN HYDROCHLORIDE 0.4 MG/1
0.4 CAPSULE ORAL DAILY
Status: DISCONTINUED | OUTPATIENT
Start: 2024-04-04 | End: 2024-05-11 | Stop reason: HOSPADM

## 2024-04-03 RX ORDER — FENTANYL CITRATE 0.05 MG/ML
25 INJECTION, SOLUTION INTRAMUSCULAR; INTRAVENOUS ONCE
Status: COMPLETED | OUTPATIENT
Start: 2024-04-03 | End: 2024-04-03

## 2024-04-03 RX ORDER — ACETAMINOPHEN 325 MG/1
650 TABLET ORAL EVERY 6 HOURS PRN
Status: DISCONTINUED | OUTPATIENT
Start: 2024-04-03 | End: 2024-05-11 | Stop reason: HOSPADM

## 2024-04-03 RX ADMIN — METOPROLOL TARTRATE 50 MG: 50 TABLET, FILM COATED ORAL at 21:48

## 2024-04-03 RX ADMIN — PANTOPRAZOLE SODIUM 8 MG/HR: 40 INJECTION, POWDER, FOR SOLUTION INTRAVENOUS at 21:49

## 2024-04-03 RX ADMIN — SODIUM CHLORIDE 500 ML: 9 INJECTION, SOLUTION INTRAVENOUS at 16:30

## 2024-04-03 RX ADMIN — VANCOMYCIN HYDROCHLORIDE 2000 MG: 5 INJECTION, POWDER, LYOPHILIZED, FOR SOLUTION INTRAVENOUS at 16:29

## 2024-04-03 RX ADMIN — FENTANYL CITRATE 50 MCG: 0.05 INJECTION, SOLUTION INTRAMUSCULAR; INTRAVENOUS at 16:31

## 2024-04-03 RX ADMIN — SODIUM CHLORIDE 500 ML: 0.9 INJECTION, SOLUTION INTRAVENOUS at 18:46

## 2024-04-03 RX ADMIN — SODIUM CHLORIDE, PRESERVATIVE FREE 10 ML: 5 INJECTION INTRAVENOUS at 21:49

## 2024-04-03 RX ADMIN — ATORVASTATIN CALCIUM 40 MG: 40 TABLET, FILM COATED ORAL at 21:48

## 2024-04-03 RX ADMIN — SODIUM CHLORIDE: 9 INJECTION, SOLUTION INTRAVENOUS at 21:49

## 2024-04-03 RX ADMIN — FENTANYL CITRATE 25 MCG: 0.05 INJECTION, SOLUTION INTRAMUSCULAR; INTRAVENOUS at 22:15

## 2024-04-03 RX ADMIN — SODIUM CHLORIDE, PRESERVATIVE FREE 80 MG: 5 INJECTION INTRAVENOUS at 19:01

## 2024-04-03 ASSESSMENT — PAIN SCALES - GENERAL
PAINLEVEL_OUTOF10: 8
PAINLEVEL_OUTOF10: 4
PAINLEVEL_OUTOF10: 8
PAINLEVEL_OUTOF10: 7

## 2024-04-03 ASSESSMENT — PAIN - FUNCTIONAL ASSESSMENT: PAIN_FUNCTIONAL_ASSESSMENT: ACTIVITIES ARE NOT PREVENTED

## 2024-04-03 ASSESSMENT — PAIN DESCRIPTION - LOCATION: LOCATION: FOOT

## 2024-04-03 ASSESSMENT — PAIN DESCRIPTION - DESCRIPTORS: DESCRIPTORS: ACHING;BURNING

## 2024-04-03 ASSESSMENT — PAIN DESCRIPTION - ORIENTATION: ORIENTATION: LEFT

## 2024-04-03 NOTE — CONSULTS
Pharmacy dosing of initial vancomycin ordered by ED provider.    Wt Readings from Last 1 Encounters:   04/03/24 68 kg (150 lb)       2000 mg ordered x 1.    Pharmacy is waiting to see if vancomycin therapy is continued or stopped/changed by the admitting physician.

## 2024-04-03 NOTE — ED NOTES
ED to inpatient nurses report     Chief Complaint   Patient presents with    Leg Pain     Left leg, open wounds and swollen        Present to ED from home. Pt has above the knee right leg amputation and noticed two wounds on his left foot and was concerned. Pt reports he has also noticed darker stools. Pt reports he sometimes gets \"lightening bolts of pain\" in left foot.   LOC: alert and orientated to name, place, date  Vital signs   Vitals:    04/03/24 1605 04/03/24 1610 04/03/24 1715 04/03/24 1720   BP:   (!) 120/54    Pulse: 97  91 90   Resp: 19  15 14   Temp:  98.8 °F (37.1 °C)     SpO2: 100%  98% 99%   Weight:  68 kg (150 lb)        Oxygen Baseline room air    Current needs required n/a   SEPSIS: no  [no] Lactate X 2 ordered (Yes or No)  [yes Antibiotics given (Yes or No)  [yes] IV Fluids ordered (Yes or No)             [no] 2nd IV completed (Yes or No)  [yes Hourly Vital Signs (Validated)  [no] Outstanding Orders:     LDAs:   Peripheral IV 04/03/24 Distal;Right;Anterior Cephalic (Active)     Mobility: Requires assistance * 2  Fall Risk:    Pending ED orders: see orders  Present condition: stable  Code Status: full  Consults: PHARMACY TO DOSE VANCOMYCIN  IP CONSULT TO INTERNAL MEDICINE  IP CONSULT TO GI  [x]  Hospitalist  Completed  [] yes [] no Who:   []  Medicine  Completed  [] yes [] No Who:   []  Cardiology  Completed  [] yes [] No Who:   [x]  GI   Completed  [] yes [] No Who:   []  Neurology  Completed  [] yes [] No Who:   []  Nephrology Completed  [] yes [] No Who:    []  Vascular  Completed  [] yes [] No Who:   []  Ortho  Completed  [] yes [] No Who:     []  Surgery  Completed  [] yes [] No Who:    []  Urology  Completed  [] yes [] No Who:    []  CT Surgery Completed  [] yes [] No Who:   []  Podiatry  Completed  [] yes [] No Who:    []  Other    Completed  [] yes [] No Who:  Interventions: IV, labs  Important Events: see notes        Electronically signed by Juany Sheppard RN on 4/3/2024 at 6:34 PM

## 2024-04-03 NOTE — ED NOTES
Pt presenting to the ED with complaints of left leg pain. Pt reports that this pain has been going on for the last month. Pt reports he has had sores on his left leg from an injury from his bed for around 1 month as well. Pt si A&Ox4.

## 2024-04-04 ENCOUNTER — APPOINTMENT (OUTPATIENT)
Age: 79
DRG: 377 | End: 2024-04-04
Payer: MEDICARE

## 2024-04-04 ENCOUNTER — APPOINTMENT (OUTPATIENT)
Dept: GENERAL RADIOLOGY | Age: 79
DRG: 377 | End: 2024-04-04
Payer: MEDICARE

## 2024-04-04 ENCOUNTER — TELEPHONE (OUTPATIENT)
Dept: PHARMACY | Age: 79
End: 2024-04-04

## 2024-04-04 ENCOUNTER — APPOINTMENT (OUTPATIENT)
Dept: VASCULAR LAB | Age: 79
DRG: 377 | End: 2024-04-04
Payer: MEDICARE

## 2024-04-04 LAB
ALBUMIN SERPL-MCNC: 3.2 G/DL (ref 3.5–5.2)
ALP SERPL-CCNC: 111 U/L (ref 40–129)
ALT SERPL-CCNC: 8 U/L (ref 5–41)
ANION GAP SERPL CALCULATED.3IONS-SCNC: 9 MMOL/L (ref 9–17)
AST SERPL-CCNC: 27 U/L
BILIRUB SERPL-MCNC: 0.4 MG/DL (ref 0.3–1.2)
BUN SERPL-MCNC: 23 MG/DL (ref 8–23)
BUN/CREAT SERPL: 15 (ref 9–20)
CALCIUM SERPL-MCNC: 8.2 MG/DL (ref 8.6–10.4)
CHLORIDE SERPL-SCNC: 111 MMOL/L (ref 98–107)
CO2 SERPL-SCNC: 20 MMOL/L (ref 20–31)
CREAT SERPL-MCNC: 1.5 MG/DL (ref 0.7–1.2)
GFR SERPL CREATININE-BSD FRML MDRD: 47 ML/MIN/1.73M2
GLUCOSE SERPL-MCNC: 86 MG/DL (ref 70–99)
HCT VFR BLD AUTO: 21.8 % (ref 40.7–50.3)
HCT VFR BLD AUTO: 25.7 % (ref 40.7–50.3)
HCT VFR BLD AUTO: 25.9 % (ref 40.7–50.3)
HCT VFR BLD AUTO: 27.1 % (ref 40.7–50.3)
HGB BLD-MCNC: 6.7 G/DL (ref 13–17)
HGB BLD-MCNC: 8 G/DL (ref 13–17)
HGB BLD-MCNC: 8.1 G/DL (ref 13–17)
HGB BLD-MCNC: 8.4 G/DL (ref 13–17)
INR PPP: 4.8
LACTATE BLDV-SCNC: 0.8 MMOL/L (ref 0.5–1.9)
PARTIAL THROMBOPLASTIN TIME: 66.9 SEC (ref 23.9–33.8)
POTASSIUM SERPL-SCNC: 4.8 MMOL/L (ref 3.7–5.3)
PROT SERPL-MCNC: 5.9 G/DL (ref 6.4–8.3)
PROTHROMBIN TIME: 43.7 SEC (ref 11.5–14.2)
SODIUM SERPL-SCNC: 140 MMOL/L (ref 135–144)

## 2024-04-04 PROCEDURE — 6370000000 HC RX 637 (ALT 250 FOR IP): Performed by: NURSE PRACTITIONER

## 2024-04-04 PROCEDURE — C9113 INJ PANTOPRAZOLE SODIUM, VIA: HCPCS | Performed by: NURSE PRACTITIONER

## 2024-04-04 PROCEDURE — 6370000000 HC RX 637 (ALT 250 FOR IP)

## 2024-04-04 PROCEDURE — 30233N1 TRANSFUSION OF NONAUTOLOGOUS RED BLOOD CELLS INTO PERIPHERAL VEIN, PERCUTANEOUS APPROACH: ICD-10-PCS | Performed by: INTERNAL MEDICINE

## 2024-04-04 PROCEDURE — 85018 HEMOGLOBIN: CPT

## 2024-04-04 PROCEDURE — 36415 COLL VENOUS BLD VENIPUNCTURE: CPT

## 2024-04-04 PROCEDURE — 2060000000 HC ICU INTERMEDIATE R&B

## 2024-04-04 PROCEDURE — 2580000003 HC RX 258: Performed by: INTERNAL MEDICINE

## 2024-04-04 PROCEDURE — 73552 X-RAY EXAM OF FEMUR 2/>: CPT

## 2024-04-04 PROCEDURE — 99232 SBSQ HOSP IP/OBS MODERATE 35: CPT | Performed by: STUDENT IN AN ORGANIZED HEALTH CARE EDUCATION/TRAINING PROGRAM

## 2024-04-04 PROCEDURE — P9016 RBC LEUKOCYTES REDUCED: HCPCS

## 2024-04-04 PROCEDURE — 6360000002 HC RX W HCPCS: Performed by: NURSE PRACTITIONER

## 2024-04-04 PROCEDURE — 36430 TRANSFUSION BLD/BLD COMPNT: CPT

## 2024-04-04 PROCEDURE — 6370000000 HC RX 637 (ALT 250 FOR IP): Performed by: INTERNAL MEDICINE

## 2024-04-04 PROCEDURE — 85730 THROMBOPLASTIN TIME PARTIAL: CPT

## 2024-04-04 PROCEDURE — 2580000003 HC RX 258: Performed by: NURSE PRACTITIONER

## 2024-04-04 PROCEDURE — 85014 HEMATOCRIT: CPT

## 2024-04-04 PROCEDURE — 6360000002 HC RX W HCPCS: Performed by: INTERNAL MEDICINE

## 2024-04-04 PROCEDURE — 80053 COMPREHEN METABOLIC PANEL: CPT

## 2024-04-04 PROCEDURE — 93971 EXTREMITY STUDY: CPT | Performed by: STUDENT IN AN ORGANIZED HEALTH CARE EDUCATION/TRAINING PROGRAM

## 2024-04-04 PROCEDURE — 85610 PROTHROMBIN TIME: CPT

## 2024-04-04 PROCEDURE — 0W993ZZ DRAINAGE OF RIGHT PLEURAL CAVITY, PERCUTANEOUS APPROACH: ICD-10-PCS | Performed by: INTERNAL MEDICINE

## 2024-04-04 PROCEDURE — 93306 TTE W/DOPPLER COMPLETE: CPT

## 2024-04-04 RX ORDER — SODIUM CHLORIDE 9 MG/ML
INJECTION, SOLUTION INTRAVENOUS PRN
Status: DISCONTINUED | OUTPATIENT
Start: 2024-04-04 | End: 2024-04-06

## 2024-04-04 RX ORDER — ASPIRIN 81 MG/1
81 TABLET ORAL DAILY
Status: ON HOLD | COMMUNITY

## 2024-04-04 RX ORDER — SODIUM CHLORIDE 9 MG/ML
INJECTION, SOLUTION INTRAVENOUS ONCE
Status: COMPLETED | OUTPATIENT
Start: 2024-04-04 | End: 2024-04-04

## 2024-04-04 RX ORDER — PREGABALIN 100 MG/1
100 CAPSULE ORAL 2 TIMES DAILY
Status: DISCONTINUED | OUTPATIENT
Start: 2024-04-04 | End: 2024-04-07

## 2024-04-04 RX ORDER — ALBUTEROL SULFATE 90 UG/1
2 AEROSOL, METERED RESPIRATORY (INHALATION) EVERY 4 HOURS PRN
Status: DISCONTINUED | OUTPATIENT
Start: 2024-04-04 | End: 2024-04-15

## 2024-04-04 RX ORDER — METOPROLOL SUCCINATE 100 MG/1
100 TABLET, EXTENDED RELEASE ORAL DAILY
Status: ON HOLD | COMMUNITY
End: 2024-04-22

## 2024-04-04 RX ORDER — SODIUM CHLORIDE 9 MG/ML
INJECTION, SOLUTION INTRAVENOUS CONTINUOUS
Status: DISCONTINUED | OUTPATIENT
Start: 2024-04-04 | End: 2024-04-05

## 2024-04-04 RX ORDER — FLUOXETINE 10 MG/1
10 CAPSULE ORAL DAILY
Status: ON HOLD | COMMUNITY
End: 2024-04-05

## 2024-04-04 RX ADMIN — SODIUM CHLORIDE 3000 MG: 900 INJECTION INTRAVENOUS at 08:32

## 2024-04-04 RX ADMIN — SODIUM CHLORIDE, PRESERVATIVE FREE 10 ML: 5 INJECTION INTRAVENOUS at 20:54

## 2024-04-04 RX ADMIN — SODIUM CHLORIDE: 9 INJECTION, SOLUTION INTRAVENOUS at 01:27

## 2024-04-04 RX ADMIN — PREGABALIN 100 MG: 100 CAPSULE ORAL at 08:24

## 2024-04-04 RX ADMIN — CHOLECALCIFEROL TAB 125 MCG (5000 UNIT) 5000 UNITS: 125 TAB at 08:24

## 2024-04-04 RX ADMIN — HYDROMORPHONE HYDROCHLORIDE 1 MG: 1 INJECTION, SOLUTION INTRAMUSCULAR; INTRAVENOUS; SUBCUTANEOUS at 20:53

## 2024-04-04 RX ADMIN — HYDROMORPHONE HYDROCHLORIDE 1 MG: 1 INJECTION, SOLUTION INTRAMUSCULAR; INTRAVENOUS; SUBCUTANEOUS at 06:10

## 2024-04-04 RX ADMIN — METOPROLOL TARTRATE 50 MG: 50 TABLET, FILM COATED ORAL at 08:24

## 2024-04-04 RX ADMIN — HYDROMORPHONE HYDROCHLORIDE 1 MG: 1 INJECTION, SOLUTION INTRAMUSCULAR; INTRAVENOUS; SUBCUTANEOUS at 15:32

## 2024-04-04 RX ADMIN — POLYETHYLENE GLYCOL-3350 AND ELECTROLYTES 4000 ML: 236; 6.74; 5.86; 2.97; 22.74 POWDER, FOR SOLUTION ORAL at 18:02

## 2024-04-04 RX ADMIN — PANTOPRAZOLE SODIUM 8 MG/HR: 40 INJECTION, POWDER, FOR SOLUTION INTRAVENOUS at 14:54

## 2024-04-04 RX ADMIN — PREGABALIN 100 MG: 100 CAPSULE ORAL at 20:54

## 2024-04-04 RX ADMIN — SODIUM CHLORIDE: 9 INJECTION, SOLUTION INTRAVENOUS at 05:24

## 2024-04-04 RX ADMIN — ATORVASTATIN CALCIUM 40 MG: 40 TABLET, FILM COATED ORAL at 20:54

## 2024-04-04 RX ADMIN — SODIUM CHLORIDE 3000 MG: 900 INJECTION INTRAVENOUS at 14:53

## 2024-04-04 RX ADMIN — CYANOCOBALAMIN TAB 1000 MCG 1000 MCG: 1000 TAB at 08:24

## 2024-04-04 RX ADMIN — PANTOPRAZOLE SODIUM 8 MG/HR: 40 INJECTION, POWDER, FOR SOLUTION INTRAVENOUS at 04:58

## 2024-04-04 RX ADMIN — HYDROMORPHONE HYDROCHLORIDE 1 MG: 1 INJECTION, SOLUTION INTRAMUSCULAR; INTRAVENOUS; SUBCUTANEOUS at 01:23

## 2024-04-04 RX ADMIN — SODIUM CHLORIDE 3000 MG: 900 INJECTION INTRAVENOUS at 01:44

## 2024-04-04 RX ADMIN — SODIUM CHLORIDE, PRESERVATIVE FREE 10 ML: 5 INJECTION INTRAVENOUS at 08:25

## 2024-04-04 RX ADMIN — SODIUM CHLORIDE 3000 MG: 900 INJECTION INTRAVENOUS at 20:58

## 2024-04-04 ASSESSMENT — PAIN SCALES - GENERAL
PAINLEVEL_OUTOF10: 10
PAINLEVEL_OUTOF10: 10
PAINLEVEL_OUTOF10: 2
PAINLEVEL_OUTOF10: 8
PAINLEVEL_OUTOF10: 5
PAINLEVEL_OUTOF10: 8
PAINLEVEL_OUTOF10: 7
PAINLEVEL_OUTOF10: 10

## 2024-04-04 ASSESSMENT — PAIN DESCRIPTION - DESCRIPTORS
DESCRIPTORS: ACHING;BURNING
DESCRIPTORS: ACHING
DESCRIPTORS: ACHING
DESCRIPTORS: ACHING;BURNING
DESCRIPTORS: ACHING;PINS AND NEEDLES

## 2024-04-04 ASSESSMENT — PAIN - FUNCTIONAL ASSESSMENT
PAIN_FUNCTIONAL_ASSESSMENT: PREVENTS OR INTERFERES SOME ACTIVE ACTIVITIES AND ADLS
PAIN_FUNCTIONAL_ASSESSMENT: ACTIVITIES ARE NOT PREVENTED

## 2024-04-04 ASSESSMENT — PAIN DESCRIPTION - LOCATION
LOCATION: FOOT

## 2024-04-04 ASSESSMENT — PAIN DESCRIPTION - ORIENTATION
ORIENTATION: LEFT

## 2024-04-04 ASSESSMENT — PAIN DESCRIPTION - FREQUENCY: FREQUENCY: CONTINUOUS

## 2024-04-04 ASSESSMENT — PAIN DESCRIPTION - ONSET: ONSET: ON-GOING

## 2024-04-04 NOTE — ACP (ADVANCE CARE PLANNING)
Advance Care Planning     Advance Care Planning Activator (Inpatient)  Conversation Note      Date of ACP Conversation: 4/4/2024     Conversation Conducted with: Patient with Decision Making Capacity    ACP Activator: GONZALES ARAUZ RN          Health Care Decision Maker:     Current Designated Health Care Decision Maker:     Primary Decision Maker: Rita Villagran - [facility] - [phone]  Click here to complete Healthcare Decision Makers including section of the Healthcare Decision Maker Relationship (ie \"Primary\")  Today we discussed ACP. Needs medical POA paperwork brought in as his long time girlfriend is his POA.     Care Preferences    Ventilation:  \"If you were in your present state of health and suddenly became very ill and were unable to breathe on your own, what would your preference be about the use of a ventilator (breathing machine) if it were available to you?\"      Would the patient desire the use of ventilator (breathing machine)?: yes    \"If your health worsens and it becomes clear that your chance of recovery is unlikely, what would your preference be about the use of a ventilator (breathing machine) if it were available to you?\"     Would the patient desire the use of ventilator (breathing machine)?: No      Resuscitation  \"CPR works best to restart the heart when there is a sudden event, like a heart attack, in someone who is otherwise healthy. Unfortunately, CPR does not typically restart the heart for people who have serious health conditions or who are very sick.\"    \"In the event your heart stopped as a result of an underlying serious health condition, would you want attempts to be made to restart your heart (answer \"yes\" for attempt to resuscitate) or would you prefer a natural death (answer \"no\" for do not attempt to resuscitate)?\" yes       [] Yes   [] No   Educated Patient / Decision Maker regarding differences between Advance Directives and portable DNR orders.    Length of ACP

## 2024-04-04 NOTE — PROGRESS NOTES
Pt admitted to room 1010 from ED. Admission documentation complete, vitals taken and telemetry placed. Pt oriented to room and unit routine, including hourly rounding. Call light in reach and safety maintained. Will continue to provide support and education.

## 2024-04-04 NOTE — CONSENT

## 2024-04-04 NOTE — PROGRESS NOTES
Pt's hgb was 6.2. Writer received an order for 2 units PRBCs to be transfused.  Consent verified.  Vitals taken within 30 minutes prior to transfusion.  Blood reached vein at 0120  Writer stayed with pt for first 15 mins of transfusion.  Vitals taken after first 15 mins of transfusion.  No reactions noted.

## 2024-04-04 NOTE — CARE COORDINATION
Case Management Assessment  Initial Evaluation    Date/Time of Evaluation: 4/4/2024 12:39 PM  Assessment Completed by: GONZALES ARAUZ RN    If patient is discharged prior to next notation, then this note serves as note for discharge by case management.    Patient Name: Jorge Luis Banda                   YOB: 1945  Diagnosis: Melena [K92.1]  GI bleed [K92.2]  UMM (acute kidney injury) (HCC) [N17.9]  Left leg cellulitis [L03.116]  Anemia, unspecified type [D64.9]                   Date / Time: 4/3/2024  3:27 PM    Patient Admission Status: Inpatient   Readmission Risk (Low < 19, Mod (19-27), High > 27): Readmission Risk Score: 20.9    Current PCP: No primary care provider on file.  PCP verified by CM? Yes    Chart Reviewed: Yes      History Provided by: Patient  Patient Orientation: Alert and Oriented    Patient Cognition: Alert    Hospitalization in the last 30 days (Readmission):  No    If yes, Readmission Assessment in  Navigator will be completed.    Advance Directives:      Code Status: Full Code   Patient's Primary Decision Maker is: Legal Next of Kin    Primary Decision Maker: Rita Villagran - Other - [phone]    Discharge Planning:    Patient lives with: Spouse/Significant Other Type of Home: Apartment ([address])  Primary Care Giver: Self  Patient Support Systems include: Spouse/Significant Other   Current Financial resources: Medicare, Medicaid  Current community resources: None  Current services prior to admission: Durable Medical Equipment            Current DME: Shower Chair, Walker, Wheelchair, Home Aerosol            Type of Home Care services:  None    ADLS  Prior functional level: Assistance with the following:, Mobility, Shopping, Housework, Cooking  Current functional level: Assistance with the following:, Housework, Shopping, Mobility, Cooking    PT AM-PAC:   /24  OT AM-PAC:   /24    Family can provide assistance at DC: Yes  Would you like Case Management to discuss the  with dr castaneda for vascular surgery on 4/23 1115    Patient admitted with GI bleed and INR of 4.8. . He also has wound to left lower leg and vascular consulted for PAD.  Will need to watch for possible revascularization surgery.    The Plan for Transition of Care is related to the following treatment goals of Melena [K92.1]  GI bleed [K92.2]  UMM (acute kidney injury) (HCC) [N17.9]  Left leg cellulitis [L03.116]  Anemia, unspecified type [D64.9]    IF APPLICABLE: The Patient and/or patient representative Jorge Luis and his family were provided with a choice of provider and agrees with the discharge plan. Freedom of choice list with basic dialogue that supports the patient's individualized plan of care/goals and shares the quality data associated with the providers was provided to: Patient   Patient Representative Name:       The Patient and/or Patient Representative Agree with the Discharge Plan? Yes    GONZALES ARAUZ RN  Case Management Department

## 2024-04-04 NOTE — CONSULTS
VASCULAR SURGERY   CONSULT        Name: Jorge Luis Banda  MRN: [id number]     Acct: [id number]  Room: [address] [place] [address]    Admit Date: 4/3/2024  PCP: Lucila Machado APRN - [facility]    Physician Requesting Consult:  Dr. Pascual    Reason for Consult: PAD/left foot ulcer/scab    Chief Complaint:     Chief Complaint   Patient presents with    Leg Pain     Left leg, open wounds and swollen           History Obtained From:     patient, electronic medical record and nursing    History of Present Illness:      Jorge Luis Banda is a  78 y.o.  male who presents with Leg Pain (Left leg, open wounds and swollen/)  Patient presents with a GI bleed.  Patient states that he has been having dark tarry stools and is currently on a Protonix drip.  Per the patient he previously underwent partial gastrectomy.  Hemoglobin on admission was 6.7.  Patient is on Plavix and Coumadin and is being followed by Dr. Ledbetter.  He previously underwent an axillobifemoral bypass and previous above-knee amputation on the right.  Currently has a dry scab on the dorsum of the left foot.  He denies amaurosis fugax or lateralizing symptoms.  He denies any history of aortic aneurysm.    Past Medical History:     No past medical history on file.     Past Surgical History:     Past Surgical History:   Procedure Laterality Date    ABOVE KNEE AMPUTATION Right 01/01/2020        Medications Prior to Admission:       Prior to Admission medications    Medication Sig Start Date End Date Taking? Authorizing Provider   gabapentin (NEURONTIN) 300 MG capsule Take 1 capsule by mouth 3 times daily.   Yes Ada Morris MD   Cholecalciferol 1.25 MG (04555 UT) TABS Take 5,000 Units by mouth    Ada Morris MD   metoprolol tartrate (LOPRESSOR) 50 MG tablet Take 50 mg by mouth 9/4/19   Ada Morris MD   tamsulosin (FLOMAX) 0.4 MG capsule tamsulosin 0.4 mg capsule    Ada Morris MD   Cyanocobalamin (VITAMIN B12) 1000 MCG TBCR

## 2024-04-04 NOTE — TELEPHONE ENCOUNTER
[facility] Medication Management Clinic Note  Spoke with Linsey, care coordinator at [facility]. Patient admitted for bleed and has supratherapeutic INR. Discussed that ever since I received referral for warfarin management I have called patient and gotten a voicemail that is full. She verified I am calling the correct number. She set up appointment for initial visit tentatively for 4/9 if patient is discharged from the hospital at this point. She also questioned if patient could use Eliquis instead of warfarin - I have no history with this patient, he was referred to me by NP Lilia Kelley with [facility] for diagnosis of a fib.    Terese Duarte, PharmD  [facility]  4/4/2024 1:17 PM     Patient Seen in: [facility]      History   Patient presents with:  Cough    Stated Complaint: cough    HPI    Patient is a healthy 1year-old female who presents to immediate care with a nonproductive cough for the last several weeks.   Denies Ear: Tympanic membrane normal.      Left Ear: Tympanic membrane normal.      Nose: Nose normal.      Mouth/Throat:      Mouth: Mucous membranes are moist.   Eyes:      Extraocular Movements: Extraocular movements intact.       Pupils: Pupils are equal, Verneta Baptise

## 2024-04-04 NOTE — PLAN OF CARE
Pt is A&O x4 and has been resting in bed. Pt is admitted for GI bleed. Pt's hgb was 6.2. 2 units PRBCs ordered. 1 unit PRBCs transfused. Writer spoke with blood bank. Pt will have his post transfusion H&H at 0600. Per lab, pt will need to have H&H checked before second unit is transfused. Pt has Q6 H&H blood draws. Pt has a protonix gtt running and NS running at 75 mL/hr. Pt has an above the knee RLE amputation. Pt has a wound on his left foot. Pt is receiving Unasyn Q6 hours. Pt is NPO. Pt will have vascular duplex studies and echo today. Pt will also have xray of femur today. Pt can have Dilaudid Q4 hours PRN for pain. Bed is locked in the lowest position and call light is in reach.    Problem: Safety - Adult  Goal: Free from fall injury  Outcome: Progressing     Problem: Discharge Planning  Goal: Discharge to home or other facility with appropriate resources  Outcome: Progressing     Problem: Pain  Goal: Verbalizes/displays adequate comfort level or baseline comfort level  Outcome: Progressing     Problem: Skin/Tissue Integrity - Adult  Goal: Incisions, wounds, or drain sites healing without S/S of infection  Outcome: Progressing     Problem: Gastrointestinal - Adult  Goal: Minimal or absence of nausea and vomiting  Outcome: Progressing     Problem: Gastrointestinal - Adult  Goal: Maintains or returns to baseline bowel function  Outcome: Progressing     Problem: Infection - Adult  Goal: Absence of infection at discharge  Outcome: Progressing     Problem: Hematologic - Adult  Goal: Maintains hematologic stability  Outcome: Progressing

## 2024-04-04 NOTE — PROGRESS NOTES
[facility]  Office: [phone]  Chinmay Ramos DO, Jose Atkinson DO, Guevara Ramirez DO, Pranay Cooper DO, Sumeet Eller MD, Karen Carvajal MD, Sony Crawford MD, Poonam Platt MD,  Stephan San MD, Marcello Daniels MD, Theodore Pascual MD,  Juve Mahmood DO, Matias Mckeon MD, Hero Tavarez MD, Valentin Ramos DO, Jazzy Spence MD,  Trino Handley DO, Flavia Leonardo MD, Vanessa Soriano MD, Pricilla Akers MD, Casimiro Knight MD,  Zeke Maciel MD, Guera Cervantes MD, Kianna Martin MD, Mirlande Worrell MD, Ney Garcia MD, Savannah Dominguez MD, Ed Emerson DO, Jaylen West DO, Comfort Carcamo MD,  Siva Ashley MD, Shirley Waterhouse, CNP,  Carolyn Pelaez CNP, Scotty Ge, CNP,  Wandy Trivedi, DNP, Malissa Gaston, CNP, Lana Penaloza, CNP, Susan Haro, CNP, Sherrell Mendoza, CNP, Suzy Morales, PA-C, Dalila Lange PA-C, Sada Matt, CNP, Crystal Womack, CNP, Sheng Reddy, CNP, Arielle Guy, CNP, Linsey Perez, CNP, Anju Cho, CNS, Hoa Donahue, CNP, Cassidy Ferrer CNP, Tracy Schwab, CNP         [facility]   IN-PATIENT SERVICE   [facility]    Progress Note    4/4/2024    3:10 PM    Name:   Jorge Luis Banda  MRN:     [id number]     Acct:      [id number]   Room:   [facility]/1010-02   Day:  1  Admit Date:  4/3/2024  3:27 PM    PCP:   Lucila Machado APRN - CNP  Code Status:  Full Code    Subjective:     C/C:   Chief Complaint   Patient presents with    Leg Pain     Left leg, open wounds and swollen       Interval History Status: not changed.     Patient seen and examined at bedside.  Still having some intermittent leg pain.  Otherwise feeling okay.  Hemoglobin overall stable now at 8.1.    Brief History:     78-year-old male past medical history of adenocarcinoma of the lung, hypertension, peripheral vascular disease status post right AKA, left lower extremity cellulitis and peripheral vascular disease presents with Lower extremity pain also found to be  recommended.       Physical Examination:        Physical Exam  Constitutional:       Appearance: He is not ill-appearing or diaphoretic.   HENT:      Head: Normocephalic.      Right Ear: External ear normal.      Left Ear: External ear normal.      Nose: No congestion.      Mouth/Throat:      Mouth: Mucous membranes are moist.   Eyes:      Pupils: Pupils are equal, round, and reactive to light.   Cardiovascular:      Rate and Rhythm: Normal rate and regular rhythm.      Heart sounds: Murmur heard.   Pulmonary:      Comments: Diminished on the right side  Abdominal:      General: There is no distension.      Palpations: Abdomen is soft.      Tenderness: There is no abdominal tenderness.   Musculoskeletal:         General: Tenderness present.      Cervical back: Neck supple.      Left lower leg: Edema present.      Comments: Right AKA, left foot cellulitis, multiple lesions noted   Skin:     General: Skin is dry.      Capillary Refill: Capillary refill takes 2 to 3 seconds.      Coloration: Skin is pale.      Findings: Erythema present.   Neurological:      Mental Status: He is alert and oriented to person, place, and time.   Psychiatric:         Mood and Affect: Mood normal.         Behavior: Behavior normal.           Assessment:        Hospital Problems             Last Modified POA    * (Principal) GI bleed 4/3/2024 Yes    History of arterial bypass of lower extremity 4/3/2024 Yes    S/P AKA (above knee amputation) unilateral, right (HCC) 4/3/2024 Yes    Primary hypertension (Chronic) 4/3/2024 Yes    Peripheral arterial disease (HCC) 4/3/2024 Yes    Neuropathy 4/3/2024 Yes    Malignant neoplasm of lung (HCC) 4/4/2024 Yes    Overview Signed 4/3/2024  6:44 PM by Malissa Gaston APRN - NP     Added automatically from request for surgery [id number]         Intermittent claudication of left lower extremity due to atherosclerosis (HCC) 4/4/2024 Yes    Embolism and thrombosis of artery (HCC) 4/4/2024 Yes       Plan:

## 2024-04-04 NOTE — H&P
[facility]  Office: [phone]  Chinmay Ramos DO, Jose Atkinson DO, Guevara Ramirez DO, Pranay Cooper DO, Sumeet Eller MD, Karen Carvajal MD, Sony Crawford MD, Poonam Platt MD,  Stephan San MD, Marcello Daniels MD, Theodore Pascual MD,  Juve Mahmood DO, Matias Mckeon MD, Hero Tavarez MD, Valentin Ramos DO, Jazzy Spence MD,  Trino Handley DO, Flavia Leonardo MD, Vanessa Soriano MD, Pricilla Akers MD, Casimiro Knight MD,  Zeke Maciel MD, Guera Cervantes MD, Kianna Martin MD, Mirlande Worrell MD, Ney Garcia MD, Savannah Dominguez MD, Ed Emerson DO, Jaylen West DO, Comfort Carcamo MD,  Siva Ashley MD, Shirley Waterhouse, CNP,  Carolyn Pelaez CNP, Scotty Ge, CNP,  Wandy Trivedi, DNP, Malissa Gaston, CNP, Lana Penaloza, CNP, Susan Haro, CNP, Sherrell Mendoza, CNP, Suzy Morales, PA-C, Dalila Lange PA-C, Sada Matt, CNP, Crystal Womack, CNP, Sheng Reddy, CNP, Arielle Guy, CNP, Linsey Perez, CNP, Anju Cho, CNS, [place] Don[place], CNP, Cassidy Ferrer CNP, Tracy Schwab, CNP         [facility]   IN-PATIENT SERVICE   [facility]    HISTORY AND PHYSICAL EXAMINATION            Date:   4/3/2024  Patient name:  Jorge Luis Banda  Date of admission:  4/3/2024  3:27 PM  MRN:   [id number]  Account:  [id number]  YOB: 1945  PCP:    No primary care provider on file.  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 He reports forfoot has had necrotic regions unchanged but past month, noticing erythemia, pain, worse with  Comment: Consider spironolactone if not improving. Pt opts to begin with topicals Render Risk Assessment In Note?: no Detail Level: Simple

## 2024-04-04 NOTE — PROGRESS NOTES
[x] There are one or more home medications that need clarification before Medication Reconciliation can be completed. The Med List Status has been marked as In Progress.     To assist with Home Medication Reconciliation the following actions have been taken:    [x] Pharmacy medication reconciliation service requested. (Note: This can be done by sending a Perfect Serve message to The [facility] Rec Pharmacist or by phoning [phone].)

## 2024-04-04 NOTE — PLAN OF CARE
Blood transfusion completed, pt appeared to tolerate well. Pt asking for wheel chair. Explained PT/OT would evaluate. Explained precautions d/t high PT/INR and low Hgb. Pt voices understanding but states he can not use a bed pan. Bedside commode placed in pt room. Pt one assist onto BSC and had large BM, but became very SOB with exertion, O2 sat remaining 92-94%. Pt able to recover well. Pt educated on colon prep and prep started. Bed locked and in low position with alarm on. Call light in reach. Safety maintained.     Problem: Safety - Adult  Goal: Free from fall injury  4/4/2024 1036 by Janine Garcia, RN  Outcome: Progressing     Problem: Pain  Goal: Verbalizes/displays adequate comfort level or baseline comfort level  4/4/2024 1036 by Janine Garcia, RN  Outcome: Progressing     Problem: Gastrointestinal - Adult  Goal: Minimal or absence of nausea and vomiting  4/4/2024 1036 by Janine Garcia, RN  Outcome: Progressing     Problem: Hematologic - Adult  Goal: Maintains hematologic stability  4/4/2024 1036 by Janine Garcia, RN  Outcome: Progressing

## 2024-04-04 NOTE — PROGRESS NOTES
Pt's hgb after 1 unit PRBCs was 6.7. Unit 2 of 2 units of PRBCs to be transfused.  Consent verified.  Vitals taken within 30 minutes prior to transfusion.  Blood reached vein at 0621.  Writer stayed with pt for first 15 mins of transfusion.  Vitals taken after first 15 mins of transfusion.  No reactions noted.

## 2024-04-04 NOTE — PROGRESS NOTES
Transitions of Care Pharmacy Service   Medication Review    The patient's list of current home medications has been reviewed.     Source(s) of information: Patient, [employer], [facility]    Based on information provided by the above source(s), I have updated the patient's home med list as described below.     Please review the ACTION REQUESTED section of this note below for any discrepancies on current hospital orders.      I changed or updated the following medications on the patient's home medication list:  Removed Albuterol nebulizer and HFA  Vit B12  Vit D  Lopressor  Oxy IR  Flomax  Plavix     Added Aspirin  Gabapentin  Prozac  Toprol XL     Adjusted   Warfarin       PROVIDER ACTION REQUESTED  Medications that need to be addressed by a physician/nurse practitioner:    Action Requested     Pt was not taking Vit B12 or Vit D    Flomax - pt wasn't using, but probably should be    Lopressor - home med is Toprol     Prozac - Please review home medication and order as appropriate.            Please feel free to call me with any questions about this encounter. Thank you.    Kvng Vargas RPH   Transitions of Care Pharmacy Service  Phone:  [phone]  Fax: [phone]      Electronically signed by Kvng Vargas RP on 4/4/2024 at 2:54 PM         Medications Prior to Admission:   Aspirin 81mg EC, take 1 tablet by mouth once daily  FLUoxetine (PROZAC) 10 MG capsule, Take 1 capsule by mouth daily  metoprolol succinate (TOPROL XL) 100 MG extended release tablet, Take 1 tablet by mouth daily  gabapentin (NEURONTIN) 300 MG capsule, Take 1 capsule by mouth 3 times daily.  warfarin (COUMADIN) 5 MG tablet, Take 0.5-1 tablets by mouth See Admin Instructions Indications: 5mg on Mon and Fri, 2.5mg all other days  atorvastatin (LIPITOR) 40 MG tablet, atorvastatin 40 mg tablet

## 2024-04-04 NOTE — ED PROVIDER NOTES
EMERGENCY DEPARTMENT ENCOUNTER    Pt Name: Jorge Luis Banda  MRN: [id number]  Birthdate 1945  Date of evaluation: 4/3/24  CHIEF COMPLAINT       Chief Complaint   Patient presents with    Leg Pain     Left leg, open wounds and swollen       HISTORY OF PRESENT ILLNESS   HPI  78-year-old male with a history of peripheral vascular disease, right above-the-knee amputation presents to the ED for multiple wounds on his left foot that have been poorly healing for a month, several days of worsening left lower leg redness, pain, swelling.  Patient states that about a month ago he had 2 minor skin tears on his foot after scraping on something, 1 was dorsal and one was lateral.  Since then these wounds have not been healing well, he has had them evaluated at wound care.  Over the past several days his left foot is getting more swollen, painful, red, there is occasional drainage from the wounds.  Patient also notes some dark stools for the past several days, he has been taking ibuprofen for the foot pain.  Patient does have a remote history of ulcers that required open abdominal surgery.  Patient denies fever/chills, chest pain, shortness of breath or any other acute complaints.    REVIEW OF SYSTEMS     Review of Systems   All other systems reviewed and are negative.    PASTMEDICAL HISTORY   No past medical history on file.  SURGICAL HISTORY       Past Surgical History:   Procedure Laterality Date    ABOVE KNEE AMPUTATION Right 01/01/2020     CURRENT MEDICATIONS       Current Discharge Medication List        CONTINUE these medications which have NOT CHANGED    Details   Cholecalciferol 1.25 MG (92444 UT) TABS Take 5,000 Units by mouth      metoprolol tartrate (LOPRESSOR) 50 MG tablet Take 50 mg by mouth      tamsulosin (FLOMAX) 0.4 MG capsule tamsulosin 0.4 mg capsule      Cyanocobalamin (VITAMIN B12) 1000 MCG TBCR Vitamin B-12  1,000 mcg tablet   Take by oral route.      clopidogrel (PLAVIX) 75 MG tablet clopidogrel 75 mg

## 2024-04-04 NOTE — CONSULTS
GASTROENTEROLOGY CONSULT       REASON FOR CONSULT:  gi bleed    REQUESTING PHYSICIAN:  Theodore Pascual MD    HISTORY OF PRESENT ILLNESS:    Mr. 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 He reports forfoot has had necrotic regions unchanged but past month, noticing erythemia, pain, worse with leg elevation and better when he hangs foot over bed or sits. He noticed small red skin breaks, non itchy but weeps occasionally. He reports intermittent swleling of left leg, calf pain, he says today his leg is warm but often freezing cold. He reports lung Ca hx in remission after lung lobe resection, hasn't had any check up since but says more SOB with exertion. No longer smoking, denies worsening baseline cough. Denies cp. He reports noticing dark stools while on blood thinners. Denies cp, pleuritic cp. Fever, chills. Says he's never been evaluated for arterial occlusion of lower limb on left. On arrival hgb low, requiring transfusion for which he consented for and received 2 units.   Has been seeing black/dark stools x one month, no past EGD or colonoscopy.   Denies chronic upper or lower GI symptoms .  INR 4.8, coumadin held.     MEDICAL HISTORY:   No past medical history on file.    SURGICAL HISTORY:  Past Surgical History:   Procedure Laterality Date    ABOVE KNEE AMPUTATION Right 01/01/2020       MEDS:  Prior to Admission medications    Medication Sig Start Date End Date Taking? Authorizing Provider   gabapentin (NEURONTIN) 300 MG capsule Take 1 capsule by mouth 3 times daily.   Yes Ada Morris MD   Cholecalciferol 1.25 MG (50002 UT) TABS Take 5,000 Units by mouth    Ada Morris MD   metoprolol tartrate (LOPRESSOR) 50 MG tablet Take 50 mg by mouth 9/4/19   Ada Morris MD   tamsulosin (FLOMAX) 0.4 MG capsule tamsulosin 0.4 mg capsule    Provider

## 2024-04-05 ENCOUNTER — APPOINTMENT (OUTPATIENT)
Dept: VASCULAR LAB | Age: 79
DRG: 377 | End: 2024-04-05
Payer: MEDICARE

## 2024-04-05 ENCOUNTER — APPOINTMENT (OUTPATIENT)
Dept: GENERAL RADIOLOGY | Age: 79
DRG: 377 | End: 2024-04-05
Payer: MEDICARE

## 2024-04-05 LAB
ANION GAP SERPL CALCULATED.3IONS-SCNC: 12 MMOL/L (ref 9–17)
BASOPHILS # BLD: 0 K/UL (ref 0–0.2)
BASOPHILS NFR BLD: 0 %
BUN SERPL-MCNC: 19 MG/DL (ref 8–23)
BUN/CREAT SERPL: 14 (ref 9–20)
CALCIUM SERPL-MCNC: 8.2 MG/DL (ref 8.6–10.4)
CHLORIDE SERPL-SCNC: 112 MMOL/L (ref 98–107)
CO2 SERPL-SCNC: 19 MMOL/L (ref 20–31)
CREAT SERPL-MCNC: 1.4 MG/DL (ref 0.7–1.2)
ECHO AO ROOT DIAM: 3.6 CM
ECHO AV PEAK GRADIENT: 4 MMHG
ECHO AV PEAK VELOCITY: 1 M/S
ECHO EST RA PRESSURE: 3 MMHG
ECHO LA AREA 4C: 16 CM2
ECHO LA DIAMETER: 3.7 CM
ECHO LA MAJOR AXIS: 5.1 CM
ECHO LA TO AORTIC ROOT RATIO: 1.03
ECHO LA VOL MOD A4C: 40 ML (ref 18–58)
ECHO LV E' LATERAL VELOCITY: 6 CM/S
ECHO LV E' SEPTAL VELOCITY: 4 CM/S
ECHO LV FRACTIONAL SHORTENING: 29 % (ref 28–44)
ECHO LV INTERNAL DIMENSION DIASTOLIC: 4.5 CM (ref 4.2–5.9)
ECHO LV INTERNAL DIMENSION SYSTOLIC: 3.2 CM
ECHO LV IVSD: 0.8 CM (ref 0.6–1)
ECHO LV MASS 2D: 123.1 G (ref 88–224)
ECHO LV POSTERIOR WALL DIASTOLIC: 0.9 CM (ref 0.6–1)
ECHO LV RELATIVE WALL THICKNESS RATIO: 0.4
ECHO MV A VELOCITY: 0.69 M/S
ECHO MV E DECELERATION TIME (DT): 130 MS
ECHO MV E VELOCITY: 0.79 M/S
ECHO MV E/A RATIO: 1.14
ECHO MV E/E' LATERAL: 13.17
ECHO MV E/E' RATIO (AVERAGED): 16.46
ECHO RIGHT VENTRICULAR SYSTOLIC PRESSURE (RVSP): 57 MMHG
ECHO TV REGURGITANT MAX VELOCITY: 3.69 M/S
ECHO TV REGURGITANT PEAK GRADIENT: 54 MMHG
EOSINOPHIL # BLD: 0.19 K/UL (ref 0–0.4)
EOSINOPHILS RELATIVE PERCENT: 4 % (ref 1–4)
ERYTHROCYTE [DISTWIDTH] IN BLOOD BY AUTOMATED COUNT: 21.5 % (ref 11.8–14.4)
GFR SERPL CREATININE-BSD FRML MDRD: 51 ML/MIN/1.73M2
GLUCOSE SERPL-MCNC: 91 MG/DL (ref 70–99)
HCT VFR BLD AUTO: 24.6 % (ref 40.7–50.3)
HCT VFR BLD AUTO: 26.2 % (ref 40.7–50.3)
HCT VFR BLD AUTO: 26.4 % (ref 40.7–50.3)
HGB BLD-MCNC: 7.6 G/DL (ref 13–17)
HGB BLD-MCNC: 8.1 G/DL (ref 13–17)
HGB BLD-MCNC: 8.1 G/DL (ref 13–17)
IMM GRANULOCYTES # BLD AUTO: 0 K/UL (ref 0–0.3)
IMM GRANULOCYTES NFR BLD: 0 %
INR PPP: 5.2
LYMPHOCYTES NFR BLD: 0.56 K/UL (ref 1–4.8)
LYMPHOCYTES RELATIVE PERCENT: 12 % (ref 24–44)
MCH RBC QN AUTO: 29.3 PG (ref 25.2–33.5)
MCHC RBC AUTO-ENTMCNC: 30.7 G/DL (ref 28.4–34.8)
MCV RBC AUTO: 95.7 FL (ref 82.6–102.9)
MONOCYTES NFR BLD: 0.56 K/UL (ref 0.2–0.8)
MONOCYTES NFR BLD: 12 % (ref 1–7)
MORPHOLOGY: ABNORMAL
MORPHOLOGY: ABNORMAL
NEUTROPHILS NFR BLD: 72 % (ref 36–66)
NEUTS SEG NFR BLD: 3.39 K/UL (ref 1.8–7.7)
NRBC BLD-RTO: 0 PER 100 WBC
PLATELET # BLD AUTO: 187 K/UL (ref 138–453)
PMV BLD AUTO: 9.4 FL (ref 8.1–13.5)
POTASSIUM SERPL-SCNC: 4.3 MMOL/L (ref 3.7–5.3)
PROTHROMBIN TIME: 46.7 SEC (ref 11.5–14.2)
RBC # BLD AUTO: 2.76 M/UL (ref 4.21–5.77)
SODIUM SERPL-SCNC: 143 MMOL/L (ref 135–144)
VAS LEFT ABI: 0.54
VAS LEFT ANKLE BP: 68 MMHG
VAS LEFT CALF PRESSURE: 66 MMHG
VAS LEFT DORSALIS PEDIS BP: 68 MMHG
VAS LEFT HIGH THIGH PRESSURE: 113 MMHG
VAS LEFT LOW THIGH PRESSURE: 65 MMHG
VAS LEFT PTA BP: 54 MMHG
VAS RIGHT ARM BP: 125 MMHG
WBC OTHER # BLD: 4.7 K/UL (ref 3.5–11.3)

## 2024-04-05 PROCEDURE — C9113 INJ PANTOPRAZOLE SODIUM, VIA: HCPCS | Performed by: NURSE PRACTITIONER

## 2024-04-05 PROCEDURE — 6370000000 HC RX 637 (ALT 250 FOR IP): Performed by: NURSE PRACTITIONER

## 2024-04-05 PROCEDURE — 2580000003 HC RX 258: Performed by: INTERNAL MEDICINE

## 2024-04-05 PROCEDURE — 94761 N-INVAS EAR/PLS OXIMETRY MLT: CPT

## 2024-04-05 PROCEDURE — 2580000003 HC RX 258: Performed by: NURSE PRACTITIONER

## 2024-04-05 PROCEDURE — 85025 COMPLETE CBC W/AUTO DIFF WBC: CPT

## 2024-04-05 PROCEDURE — 6360000002 HC RX W HCPCS: Performed by: INTERNAL MEDICINE

## 2024-04-05 PROCEDURE — 85610 PROTHROMBIN TIME: CPT

## 2024-04-05 PROCEDURE — 85018 HEMOGLOBIN: CPT

## 2024-04-05 PROCEDURE — 80048 BASIC METABOLIC PNL TOTAL CA: CPT

## 2024-04-05 PROCEDURE — 6360000002 HC RX W HCPCS: Performed by: STUDENT IN AN ORGANIZED HEALTH CARE EDUCATION/TRAINING PROGRAM

## 2024-04-05 PROCEDURE — 97535 SELF CARE MNGMENT TRAINING: CPT

## 2024-04-05 PROCEDURE — 36415 COLL VENOUS BLD VENIPUNCTURE: CPT

## 2024-04-05 PROCEDURE — 94640 AIRWAY INHALATION TREATMENT: CPT

## 2024-04-05 PROCEDURE — 2060000000 HC ICU INTERMEDIATE R&B

## 2024-04-05 PROCEDURE — 99232 SBSQ HOSP IP/OBS MODERATE 35: CPT | Performed by: STUDENT IN AN ORGANIZED HEALTH CARE EDUCATION/TRAINING PROGRAM

## 2024-04-05 PROCEDURE — 97530 THERAPEUTIC ACTIVITIES: CPT

## 2024-04-05 PROCEDURE — 71045 X-RAY EXAM CHEST 1 VIEW: CPT

## 2024-04-05 PROCEDURE — A4216 STERILE WATER/SALINE, 10 ML: HCPCS | Performed by: STUDENT IN AN ORGANIZED HEALTH CARE EDUCATION/TRAINING PROGRAM

## 2024-04-05 PROCEDURE — 6370000000 HC RX 637 (ALT 250 FOR IP): Performed by: INTERNAL MEDICINE

## 2024-04-05 PROCEDURE — C9113 INJ PANTOPRAZOLE SODIUM, VIA: HCPCS | Performed by: STUDENT IN AN ORGANIZED HEALTH CARE EDUCATION/TRAINING PROGRAM

## 2024-04-05 PROCEDURE — 6370000000 HC RX 637 (ALT 250 FOR IP)

## 2024-04-05 PROCEDURE — 97166 OT EVAL MOD COMPLEX 45 MIN: CPT

## 2024-04-05 PROCEDURE — 2700000000 HC OXYGEN THERAPY PER DAY

## 2024-04-05 PROCEDURE — 51798 US URINE CAPACITY MEASURE: CPT

## 2024-04-05 PROCEDURE — 6360000002 HC RX W HCPCS: Performed by: NURSE PRACTITIONER

## 2024-04-05 PROCEDURE — 93923 UPR/LXTR ART STDY 3+ LVLS: CPT | Performed by: SURGERY

## 2024-04-05 PROCEDURE — 2580000003 HC RX 258: Performed by: STUDENT IN AN ORGANIZED HEALTH CARE EDUCATION/TRAINING PROGRAM

## 2024-04-05 PROCEDURE — 85014 HEMATOCRIT: CPT

## 2024-04-05 PROCEDURE — 93923 UPR/LXTR ART STDY 3+ LVLS: CPT

## 2024-04-05 RX ORDER — ALBUTEROL SULFATE 90 UG/1
2 AEROSOL, METERED RESPIRATORY (INHALATION) 3 TIMES DAILY
Status: DISCONTINUED | OUTPATIENT
Start: 2024-04-05 | End: 2024-04-06

## 2024-04-05 RX ORDER — FENTANYL CITRATE 0.05 MG/ML
50 INJECTION, SOLUTION INTRAMUSCULAR; INTRAVENOUS
Status: DISCONTINUED | OUTPATIENT
Start: 2024-04-05 | End: 2024-04-07

## 2024-04-05 RX ADMIN — HYDROMORPHONE HYDROCHLORIDE 1 MG: 1 INJECTION, SOLUTION INTRAMUSCULAR; INTRAVENOUS; SUBCUTANEOUS at 05:28

## 2024-04-05 RX ADMIN — HYDROMORPHONE HYDROCHLORIDE 1 MG: 1 INJECTION, SOLUTION INTRAMUSCULAR; INTRAVENOUS; SUBCUTANEOUS at 10:53

## 2024-04-05 RX ADMIN — SODIUM CHLORIDE, PRESERVATIVE FREE 40 MG: 5 INJECTION INTRAVENOUS at 20:30

## 2024-04-05 RX ADMIN — SODIUM CHLORIDE 3000 MG: 900 INJECTION INTRAVENOUS at 20:30

## 2024-04-05 RX ADMIN — PREGABALIN 100 MG: 100 CAPSULE ORAL at 08:16

## 2024-04-05 RX ADMIN — TAMSULOSIN HYDROCHLORIDE 0.4 MG: 0.4 CAPSULE ORAL at 08:16

## 2024-04-05 RX ADMIN — ATORVASTATIN CALCIUM 40 MG: 40 TABLET, FILM COATED ORAL at 20:26

## 2024-04-05 RX ADMIN — SODIUM CHLORIDE 3000 MG: 900 INJECTION INTRAVENOUS at 01:34

## 2024-04-05 RX ADMIN — PANTOPRAZOLE SODIUM 8 MG/HR: 40 INJECTION, POWDER, FOR SOLUTION INTRAVENOUS at 01:35

## 2024-04-05 RX ADMIN — METOPROLOL TARTRATE 50 MG: 50 TABLET, FILM COATED ORAL at 08:16

## 2024-04-05 RX ADMIN — CHOLECALCIFEROL TAB 125 MCG (5000 UNIT) 5000 UNITS: 125 TAB at 08:16

## 2024-04-05 RX ADMIN — SODIUM CHLORIDE 3000 MG: 900 INJECTION INTRAVENOUS at 08:19

## 2024-04-05 RX ADMIN — SODIUM CHLORIDE 3000 MG: 900 INJECTION INTRAVENOUS at 14:07

## 2024-04-05 RX ADMIN — SODIUM CHLORIDE, PRESERVATIVE FREE 10 ML: 5 INJECTION INTRAVENOUS at 08:16

## 2024-04-05 RX ADMIN — PANTOPRAZOLE SODIUM 8 MG/HR: 40 INJECTION, POWDER, FOR SOLUTION INTRAVENOUS at 11:15

## 2024-04-05 RX ADMIN — SODIUM CHLORIDE: 9 INJECTION, SOLUTION INTRAVENOUS at 10:52

## 2024-04-05 RX ADMIN — FENTANYL CITRATE 50 MCG: 0.05 INJECTION, SOLUTION INTRAMUSCULAR; INTRAVENOUS at 21:24

## 2024-04-05 RX ADMIN — PREGABALIN 100 MG: 100 CAPSULE ORAL at 20:30

## 2024-04-05 RX ADMIN — ALBUTEROL SULFATE 2 PUFF: 90 AEROSOL, METERED RESPIRATORY (INHALATION) at 14:36

## 2024-04-05 RX ADMIN — CYANOCOBALAMIN TAB 1000 MCG 1000 MCG: 1000 TAB at 08:16

## 2024-04-05 RX ADMIN — ONDANSETRON 4 MG: 2 INJECTION INTRAMUSCULAR; INTRAVENOUS at 01:13

## 2024-04-05 ASSESSMENT — PAIN SCALES - GENERAL
PAINLEVEL_OUTOF10: 9
PAINLEVEL_OUTOF10: 7
PAINLEVEL_OUTOF10: 7
PAINLEVEL_OUTOF10: 3
PAINLEVEL_OUTOF10: 7
PAINLEVEL_OUTOF10: 0
PAINLEVEL_OUTOF10: 4

## 2024-04-05 ASSESSMENT — PAIN DESCRIPTION - LOCATION
LOCATION: FOOT
LOCATION: LEG
LOCATION: FOOT
LOCATION: LEG

## 2024-04-05 ASSESSMENT — PAIN DESCRIPTION - DESCRIPTORS
DESCRIPTORS: ACHING;DISCOMFORT;THROBBING
DESCRIPTORS: ACHING
DESCRIPTORS: STABBING;THROBBING

## 2024-04-05 ASSESSMENT — PAIN DESCRIPTION - ORIENTATION
ORIENTATION: LEFT

## 2024-04-05 ASSESSMENT — PAIN SCALES - WONG BAKER: WONGBAKER_NUMERICALRESPONSE: NO HURT

## 2024-04-05 ASSESSMENT — PAIN - FUNCTIONAL ASSESSMENT: PAIN_FUNCTIONAL_ASSESSMENT: PREVENTS OR INTERFERES SOME ACTIVE ACTIVITIES AND ADLS

## 2024-04-05 NOTE — PROGRESS NOTES
NED Salinas at bedside.  Reviewed home meds.  Clarified that patient has run out of his Metoprolol 2 weeks ago d/t no having a PCP.  He does not take Vitamin B or D, Flomax or Plavix . Reviewed with Dr. Pascual.

## 2024-04-05 NOTE — PROGRESS NOTES
MARIIA Trujillo from Dr. Alcantar .  Updated on day's events, labs.  Pt will f/u with Dr. Ledbetter and may resume Plavix and Coumadin when approved by GI.

## 2024-04-05 NOTE — PROGRESS NOTES
VASCULAR SURGERY  PROGRESS NOTE      4/5/2024 11:34 AM  Subjective:   Admit Date: 4/3/2024  PCP: Lucila Machado APRN - CNP    Chief Complaint   Patient presents with    Leg Pain     Left leg, open wounds and swollen       Interval History: No complaints.  INR elevated at 5.2 and patient refused bowel prep.    Diet: ADULT DIET; Easy to Chew    Medications:   Scheduled Meds:   ampicillin-sulbactam  3,000 mg IntraVENous Q6H    pregabalin  100 mg Oral BID    atorvastatin  40 mg Oral Nightly    vitamin D3  5,000 Units Oral Daily    vitamin B-12  1,000 mcg Oral Daily    tamsulosin  0.4 mg Oral Daily    sodium chloride flush  5-40 mL IntraVENous 2 times per day    metoprolol tartrate  50 mg Oral BID     Continuous Infusions:   sodium chloride      sodium chloride 75 mL/hr at 04/05/24 1052    pantoprazole 8 mg/hr (04/05/24 1115)    sodium chloride      dextrose           Labs:   CBC:   Recent Labs     04/03/24  1540 04/03/24  2226 04/05/24  0458 04/05/24  0720 04/05/24  1048   WBC 4.5  --   --  4.7  --    HGB 6.6*   < > 7.6* 8.1* 8.1*     --   --  187  --     < > = values in this interval not displayed.     BMP:    Recent Labs     04/03/24  1540 04/04/24  0454 04/05/24  0720   * 140 143   K 4.0 4.8 4.3   CL 98 111* 112*   CO2 16* 20 19*   BUN 25* 23 19   CREATININE 1.8* 1.5* 1.4*   GLUCOSE 83 86 91     Hepatic:   Recent Labs     04/03/24  1540 04/04/24  0454   AST 19 27   ALT 9 8   BILITOT 0.1* 0.4   ALKPHOS 115 111     Troponin: Invalid input(s): \"TROPONIN\"  BNP: No results for input(s): \"BNP\" in the last 72 hours.  Lipids: No results for input(s): \"CHOL\", \"HDL\" in the last 72 hours.    Invalid input(s): \"LDLCALCU\"  INR:   Recent Labs     04/04/24  0454 04/05/24  0720   INR 4.8* 5.2*       Objective:   Vitals: BP (!) 116/57   Pulse 88   Temp 97.5 °F (36.4 °C) (Oral)   Resp 16   Ht 1.829 m (6')   Wt 68 kg (150 lb)   SpO2 90%   BMI 20.34 kg/m²   General appearance: alert, cooperative and no

## 2024-04-05 NOTE — PROGRESS NOTES
Physical Therapy  DATE: 2024    NAME: Jorge Luis Banda  MRN: [id number]   : 1945    Patient not seen this date for Physical Therapy due to:      [] Cancel by RN or physician due to:    [] Hemodialysis    [x] Critical Lab Value Level     INR 5.2    [] Blood transfusion in progress    [] Acute or unstable cardiovascular status   _MAP < 55 or more than >115  _HR < 40 or > 130    [] Acute or unstable pulmonary status   -FiO2 > 60%   _RR < 5 or >40    _O2 sats < 85%    [] Strict Bedrest    [] Off Unit for surgery or procedure    [] Off Unit for testing       [] Pending imaging to R/O fracture    [] Refusal by Patient      [] Other      [] PT being discontinued at this time. Patient independent. No further needs.     [] PT being discontinued at this time as the patient has been transferred to hospice care. No further needs.      Giselle Barrios, PT

## 2024-04-05 NOTE — PLAN OF CARE
Problem: Hematologic - Adult  Goal: Maintains hematologic stability  4/5/2024 1029 by Anita Cheatham, RN  Outcome: Progressing    Pt's INR at 5.2 today and he also had refused to complete bowel prep.  Hgb this am 8.1, improved from previous 7.6.  Has had no BMs today. GI to follow. Will continue to monitor.     Pt also states that he no longer has a PCP d/t change in insurance.  Unsure of his medications but states partner Rita will be in today and can clarify history.   4/5/2024 0537 by Julia Go, RN  Outcome: Progressing

## 2024-04-05 NOTE — RT PROTOCOL NOTE
RT Inhaler-Nebulizer Bronchodilator Protocol Note    There is a bronchodilator order in the chart from a provider indicating to follow the RT Bronchodilator Protocol and there is an “Initiate RT Inhaler-Nebulizer Bronchodilator Protocol” order as well (see protocol at bottom of note).    CXR Findings:  No results found.    The findings from the last RT Protocol Assessment were as follows:   History Pulmonary Disease: Smoker 15 pack years or more  Respiratory Pattern: Regular pattern and RR 12-20 bpm  Breath Sounds: Inspiratory and expiratory or bilateral wheezing and/or rhonchi  Cough: Strong, productive  Indication for Bronchodilator Therapy: On home bronchodilators  Bronchodilator Assessment Score: 8    Aerosolized bronchodilator medication orders have been revised according to the RT Inhaler-Nebulizer Bronchodilator Protocol below.    Respiratory Therapist to perform RT Therapy Protocol Assessment initially then follow the protocol.  Repeat RT Therapy Protocol Assessment PRN for score 0-3 or on second treatment, BID, and PRN for scores above 3.    No Indications - adjust the frequency to every 6 hours PRN wheezing or bronchospasm, if no treatments needed after 48 hours then discontinue using Per Protocol order mode.     If indication present, adjust the RT bronchodilator orders based on the Bronchodilator Assessment Score as indicated below.  Use Inhaler orders unless patient has one or more of the following: on home nebulizer, not able to hold breath for 10 seconds, is not alert and oriented, cannot activate and use MDI correctly, or respiratory rate 25 breaths per minute or more, then use the equivalent nebulizer order(s) with same Frequency and PRN reasons based on the score.  If a patient is on this medication at home then do not decrease Frequency below that used at home.    0-3 - enter or revise RT bronchodilator order(s) to equivalent RT Bronchodilator order with Frequency of every 4 hours PRN for wheezing  or increased work of breathing using Per Protocol order mode.        4-6 - enter or revise RT Bronchodilator order(s) to two equivalent RT bronchodilator orders with one order with BID Frequency and one order with Frequency of every 4 hours PRN wheezing or increased work of breathing using Per Protocol order mode.        7-10 - enter or revise RT Bronchodilator order(s) to two equivalent RT bronchodilator orders with one order with TID Frequency and one order with Frequency of every 4 hours PRN wheezing or increased work of breathing using Per Protocol order mode.       11-13 - enter or revise RT Bronchodilator order(s) to one equivalent RT bronchodilator order with QID Frequency and an Albuterol order with Frequency of every 4 hours PRN wheezing or increased work of breathing using Per Protocol order mode.      Greater than 13 - enter or revise RT Bronchodilator order(s) to one equivalent RT bronchodilator order with every 4 hours Frequency and an Albuterol order with Frequency of every 2 hours PRN wheezing or increased work of breathing using Per Protocol order mode.     RT to enter RT Home Evaluation for COPD & MDI Assessment order using Per Protocol order mode.    Electronically signed by BLAZE KYLE RCP on 4/5/2024 at 2:38 PM

## 2024-04-05 NOTE — PROGRESS NOTES
Occupational Therapy  Facility/Department: [facility] PROGRESSIVE CARE  Occupational Therapy Initial Assessment    Name: Jorge Luis Banda  : 1945  MRN: [id number]  Date of Service: 2024    ANIL Vail reports patient is medically stable for therapy treatment this date.    Chart reviewed prior to treatment and patient is agreeable for therapy.  All lines intact and patient positioned comfortably at end of treatment.  All patient needs addressed prior to ending therapy session.       Pt currently functioning below baseline.  Recommend daily inpatient skilled therapy at time of discharge to maximize long term outcomes and prevent re-admission. Please refer to AM-PAC score for current level of function.     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 He reports forfoot has had necrotic regions unchanged but past month, noticing erythemia, pain, worse with leg elevation and better when he hangs foot over bed or sits. He noticed small red skin breaks, non itchy but weeps occasionally. He reports intermittent swleling of left leg, calf pain, he says today his leg is warm but often freezing cold. He reports lung Ca hx in remission after lung lobe resection, hasn't had any check up since but says more SOB with exertion. No longer smoking, denies worsening baseline cough. Denies cp. He reports noticing dark stools while on blood thinners. Denies cp, pleuritic cp. Fever, chills. Says he's never been evaluated for arterial occlusion of lower limb on left. On arrival hgb low, requiring transfusion for which he consented for. Denies abdominal pain, etoh use.     Patient Diagnosis(es): The primary encounter diagnosis was Left leg cellulitis.  High guard (SBA to sit at EOB; pt tolerated ~60 seconds before needing to return to supine)  Standing:  (N/T)    Transfer Training  Transfer Training: No (N/T; not safe or appropriate to attempt this date)     AROM: Within functional limits (BUE AROM observed functionally during session)  Strength: Generally decreased, functional (MMT not formally assessed this session; BUE strength observed functionally and appeared to be generally decreased)  Coordination: Generally decreased, functional  Tone: Normal  Sensation:  (Sensation intact to light touch at time of eval)    ADL  Feeding: Setup  Grooming: Minimal assistance;Setup (Longsitting in bed or supported seated position)  UE Bathing: Moderate assistance;Setup  LE Bathing: Maximum assistance;Dependent/Total;Setup  UE Dressing: Moderate assistance  LE Dressing: Maximum assistance;Dependent/Total  LE Dressing Skilled Clinical Factors: To doff soiled brief & to don clean brief while supine in bed  Toileting: Dependent/Total  Toileting Skilled Clinical Factors: Pt incontinent of bowels during session; Pt required total assist for clothing mgnt & for personal hygiene from supine position; Able to roll L<>R in bed w/ SBA    Functional Mobility: Unable to assess (Pt uses a w/c at baseline)    Additional Comments: Pt's ADL performance limited this session by Poor tolerance for functional activity, generalized weakness, pain in LLE, fatigue, and respiratory status. SpO2 near end of session, w/ pt saturating at ~86% while on RA - RN present in room at this time/aware.              Vision  Vision: Within Functional Limits  Hearing  Hearing: Exceptions to WFL  Hearing Exceptions: Hard of hearing/hearing concerns    Cognition  Overall Cognitive Status: Exceptions  Arousal/Alertness: Delayed responses to stimuli  Following Commands: Follows one step commands with repetition;Follows one step commands with increased time  Attention Span: Attends with cues to redirect;Appears

## 2024-04-05 NOTE — PLAN OF CARE
Pt resting in bed comfortably this shift. Refusing bowel prep, GI notified, stated we will do nothing and they will see him this morning. Orders for NPO starting at 0800. Complaints of foot pain, see MAR. Bed locked in lowest position with alarm on, call light in reach, safety maintained.     Problem: Safety - Adult  Goal: Free from fall injury  Outcome: Progressing     Problem: Discharge Planning  Goal: Discharge to home or other facility with appropriate resources  Outcome: Progressing     Problem: Pain  Goal: Verbalizes/displays adequate comfort level or baseline comfort level  Outcome: Progressing     Problem: Skin/Tissue Integrity - Adult  Goal: Incisions, wounds, or drain sites healing without S/S of infection  Outcome: Progressing     Problem: Gastrointestinal - Adult  Goal: Minimal or absence of nausea and vomiting  Outcome: Progressing     Problem: Gastrointestinal - Adult  Goal: Maintains or returns to baseline bowel function  Outcome: Progressing     Problem: Infection - Adult  Goal: Absence of infection at discharge  Outcome: Progressing     Problem: Hematologic - Adult  Goal: Maintains hematologic stability  Outcome: Progressing     Problem: Respiratory - Adult  Goal: Achieves optimal ventilation and oxygenation  Outcome: Progressing     Problem: Skin/Tissue Integrity  Goal: Absence of new skin breakdown  Description: 1.  Monitor for areas of redness and/or skin breakdown  2.  Assess vascular access sites hourly  3.  Every 4-6 hours minimum:  Change oxygen saturation probe site  4.  Every 4-6 hours:  If on nasal continuous positive airway pressure, respiratory therapy assess nares and determine need for appliance change or resting period.  Outcome: Progressing

## 2024-04-05 NOTE — PROGRESS NOTES
Pt had increased congestion and coughed up several small amts blood tinged phlegm. Upper congestion continues and spirometer obtained and reviewed with patient.  He does understand use but very weak performance.  Physical therapy to bedside and pt became very SOB with minimal activity in bed.  Sp02 dropped to low 80's with upper exp. wheezes although lungs sound very diminished.  Placed on 2L  with improvement to 90 Sp02.  Respiratory notified. Very difficult to obtain consistent Sp02 readings.

## 2024-04-05 NOTE — PROGRESS NOTES
[facility]  Office: [phone]  Chinmay Ramos DO, Jose Atkinson DO, Guevara Ramirez DO, Pranay Cooper DO, Suemet Eller MD, Karen Carvajal MD, Sony Crawford MD, Poonam Platt MD,  Stephan San MD, Marcello Daniels MD, Theodore Pascual MD,  Juve Mahmood DO, Matias Mckeon MD, Hero Tavarez MD, Valentin Ramos DO, Jazzy Spence MD,  Trino Handley DO, Flavia Leonardo MD, Vanessa Soriano MD, Pricilla Akers MD, Casimiro Knight MD,  Zeke Maciel MD, Guera Cervantes MD, Kianna Martin MD, Mirlande Worrell MD, Ney Garcia MD, Savannah Dominguez MD, Ed Emerson DO, Jaylen West DO, Comfort Carcamo MD,  Siva Ashley MD, Shirley Waterhouse, CNP,  Carolyn Pelaez CNP, Scotty Ge, CNP,  Wandy Trivedi, DNP, Malissa Gaston, CNP, Lana Penaloza, CNP, Susan Haro, CNP, Sherrell Mendoza, CNP, Suzy Morales, PA-C, Dalila Lange PA-C, Sada Matt, CNP, Crystal Womack, CNP, Sheng Reddy, CNP, Arielle Guy, CNP, Linsey Perez, CNP, Anju Cho, CNS, Hoa Donahue, CNP, Cassidy Ferrer CNP, Tracy Schwab, CNP         [facility]   IN-PATIENT SERVICE   [facility]    Progress Note    4/5/2024    4:38 PM    Name:   Jorge Luis Banda  MRN:     [id number]     Acct:      [id number]   Room:   [facility] Day:  2  Admit Date:  4/3/2024  3:27 PM    PCP:   Lucila Machado APRN - CNP  Code Status:  Full Code    Subjective:     C/C:   Chief Complaint   Patient presents with    Leg Pain     Left leg, open wounds and swollen       Interval History Status: not changed.     Patient seen and examined at bedside.  Sdoing well. Endoscopy was cancelled. INR remains elevated despite not taking filiberto comadin in 2 days.    Brief History:     78-year-old male past medical history of adenocarcinoma of the lung, hypertension, peripheral vascular disease status post right AKA, left lower extremity cellulitis and peripheral vascular disease presents with Lower extremity pain also found to be

## 2024-04-05 NOTE — PROGRESS NOTES
Gastroenterology Progress Note      Patient:   Jorge Luis Banda   :    1945   Facility:   [facility]   Date:     2024  Consultant:   MARIA DE JESUS NUNES      Subjective:     Patient seen and examined, minimal attempt to drink prep, especially this am.   No abdominal pain, no acute issues.   INR over 5  Vascular input noted.       Objective:   Vital Signs:  /73   Pulse 82   Temp 98.1 °F (36.7 °C) (Oral)   Resp 16   Ht 1.829 m (6')   Wt 68 kg (150 lb)   SpO2 95%   BMI 20.34 kg/m²      Physical Exam:   General appearance: Alert, NAD  Lungs: CTA bilaterally    Heart:S1S2 RRR  Abdomen: Soft, NT, ND +BS  Skin:  No jaundice, no stigmata of chronic liver disease.    Lab and Imaging Review   CBC:   Lab Results   Component Value Date/Time    WBC 4.7 2024 07:20 AM    RBC 2.76 2024 07:20 AM    HGB 8.1 2024 10:48 AM    HCT 26.2 2024 10:48 AM    MCV 95.7 2024 07:20 AM    MCH 29.3 2024 07:20 AM    MCHC 30.7 2024 07:20 AM    RDW 21.5 2024 07:20 AM     2024 07:20 AM    MPV 9.4 2024 07:20 AM     Platelets:    Lab Results   Component Value Date/Time     2024 07:20 AM     Hemoglobin/Hematocrit:    Lab Results   Component Value Date/Time    HGB 8.1 2024 10:48 AM    HCT 26.2 2024 10:48 AM     CMP:    Lab Results   Component Value Date/Time     2024 07:20 AM    K 4.3 2024 07:20 AM     2024 07:20 AM    CO2 19 2024 07:20 AM    BUN 19 2024 07:20 AM    CREATININE 1.4 2024 07:20 AM    GFRAA >60 2020 09:00 AM    LABGLOM 51 2024 07:20 AM    GLUCOSE 91 2024 07:20 AM    PROT 5.9 2024 04:54 AM    LABALBU 3.2 2024 04:54 AM    CALCIUM 8.2 2024 07:20 AM    BILITOT 0.4 2024 04:54 AM    ALKPHOS 111 2024 04:54 AM    AST 27 2024 04:54 AM    ALT 8 2024 04:54 AM     BMP:    Lab Results   Component Value Date/Time      04/05/2024 07:20 AM    K 4.3 04/05/2024 07:20 AM     04/05/2024 07:20 AM    CO2 19 04/05/2024 07:20 AM    BUN 19 04/05/2024 07:20 AM    LABALBU 3.2 04/04/2024 04:54 AM    CREATININE 1.4 04/05/2024 07:20 AM    CALCIUM 8.2 04/05/2024 07:20 AM    GFRAA >60 01/09/2020 09:00 AM    LABGLOM 51 04/05/2024 07:20 AM    GLUCOSE 91 04/05/2024 07:20 AM     Sodium:    Lab Results   Component Value Date/Time     04/05/2024 07:20 AM     Potassium:    Lab Results   Component Value Date/Time    K 4.3 04/05/2024 07:20 AM     BUN/Creatinine:    Lab Results   Component Value Date/Time    BUN 19 04/05/2024 07:20 AM    CREATININE 1.4 04/05/2024 07:20 AM     Hepatic Function Panel:    Lab Results   Component Value Date/Time    ALKPHOS 111 04/04/2024 04:54 AM    ALT 8 04/04/2024 04:54 AM    AST 27 04/04/2024 04:54 AM    PROT 5.9 04/04/2024 04:54 AM    BILITOT 0.4 04/04/2024 04:54 AM    LABALBU 3.2 04/04/2024 04:54 AM     PT/INR:    Lab Results   Component Value Date/Time    PROTIME 46.7 04/05/2024 07:20 AM    INR 5.2 04/05/2024 07:20 AM         Assessment:   77 yo male with macrocytic anemia requiring blood transfusion this admission. Dark stools x one month on Coumadin, INR suprahepatic.   No past EGD/colonoscopy.   About 50 years ago had partial gastric resection due to ulcer.      R/o benign, vascular, malignant etiologies of gi blood loss of both the upper and lower gi tract.   Upper source more favored.   He is refusing prep.   INR over 5 today.   Azotemia improving.     Plan:   Continue on PPI gtt, ok for full liquid diet.  EGD once INR favorable, prefer 2.0 or less.      This plan was formulated in collaboration with MARIA DE JESUS Devi  1:37 PM  4/5/2024

## 2024-04-05 NOTE — PROGRESS NOTES
AM assessment completed.  Reviewed plan of care with patient.  He states willing to comply with testing but unable to tolerate drinking the prep.  GI informed.  TC from ZORAIDA Gregory NP, GI. INR noted.  No plan for prep at this time. To resume diet. Pt informed.,   Reviewed need for repositioning with patient; verb understanding.  Left foot propped with pillow. No breakdown noted.

## 2024-04-06 LAB
ANION GAP SERPL CALCULATED.3IONS-SCNC: 8 MMOL/L (ref 9–17)
BASOPHILS # BLD: 0 K/UL (ref 0–0.2)
BASOPHILS NFR BLD: 0 % (ref 0–2)
BUN SERPL-MCNC: 19 MG/DL (ref 8–23)
BUN/CREAT SERPL: 13 (ref 9–20)
CALCIUM SERPL-MCNC: 8.2 MG/DL (ref 8.6–10.4)
CHLORIDE SERPL-SCNC: 111 MMOL/L (ref 98–107)
CO2 SERPL-SCNC: 21 MMOL/L (ref 20–31)
CREAT SERPL-MCNC: 1.5 MG/DL (ref 0.7–1.2)
EOSINOPHIL # BLD: 0 K/UL (ref 0–0.44)
EOSINOPHILS RELATIVE PERCENT: 0 % (ref 1–4)
ERYTHROCYTE [DISTWIDTH] IN BLOOD BY AUTOMATED COUNT: 20.9 % (ref 11.8–14.4)
GFR SERPL CREATININE-BSD FRML MDRD: 47 ML/MIN/1.73M2
GLUCOSE SERPL-MCNC: 107 MG/DL (ref 70–99)
HCT VFR BLD AUTO: 22.7 % (ref 40.7–50.3)
HCT VFR BLD AUTO: 24.3 % (ref 40.7–50.3)
HCT VFR BLD AUTO: 24.7 % (ref 40.7–50.3)
HGB BLD-MCNC: 7.1 G/DL (ref 13–17)
HGB BLD-MCNC: 7.6 G/DL (ref 13–17)
HGB BLD-MCNC: 7.7 G/DL (ref 13–17)
IMM GRANULOCYTES # BLD AUTO: 0.06 K/UL (ref 0–0.3)
IMM GRANULOCYTES NFR BLD: 1 %
INR PPP: 6.9
LYMPHOCYTES NFR BLD: 0.5 K/UL (ref 1.1–3.7)
LYMPHOCYTES RELATIVE PERCENT: 8 % (ref 24–43)
MCH RBC QN AUTO: 29.7 PG (ref 25.2–33.5)
MCHC RBC AUTO-ENTMCNC: 31.3 G/DL (ref 28.4–34.8)
MCV RBC AUTO: 95 FL (ref 82.6–102.9)
MONOCYTES NFR BLD: 0.82 K/UL (ref 0.1–1.2)
MONOCYTES NFR BLD: 13 % (ref 3–12)
MORPHOLOGY: ABNORMAL
NEUTROPHILS NFR BLD: 78 % (ref 36–65)
NEUTS SEG NFR BLD: 4.92 K/UL (ref 1.5–8.1)
NRBC BLD-RTO: 0 PER 100 WBC
PLATELET # BLD AUTO: 164 K/UL (ref 138–453)
PMV BLD AUTO: 9.7 FL (ref 8.1–13.5)
POTASSIUM SERPL-SCNC: 4.3 MMOL/L (ref 3.7–5.3)
PROTHROMBIN TIME: 57.8 SEC (ref 11.5–14.2)
RBC # BLD AUTO: 2.39 M/UL (ref 4.21–5.77)
SODIUM SERPL-SCNC: 140 MMOL/L (ref 135–144)
WBC OTHER # BLD: 6.3 K/UL (ref 3.5–11.3)

## 2024-04-06 PROCEDURE — 2700000000 HC OXYGEN THERAPY PER DAY

## 2024-04-06 PROCEDURE — 97530 THERAPEUTIC ACTIVITIES: CPT

## 2024-04-06 PROCEDURE — 97162 PT EVAL MOD COMPLEX 30 MIN: CPT

## 2024-04-06 PROCEDURE — 6360000002 HC RX W HCPCS: Performed by: NURSE PRACTITIONER

## 2024-04-06 PROCEDURE — 99232 SBSQ HOSP IP/OBS MODERATE 35: CPT | Performed by: INTERNAL MEDICINE

## 2024-04-06 PROCEDURE — 2060000000 HC ICU INTERMEDIATE R&B

## 2024-04-06 PROCEDURE — 6370000000 HC RX 637 (ALT 250 FOR IP): Performed by: INTERNAL MEDICINE

## 2024-04-06 PROCEDURE — 2580000003 HC RX 258: Performed by: STUDENT IN AN ORGANIZED HEALTH CARE EDUCATION/TRAINING PROGRAM

## 2024-04-06 PROCEDURE — A4216 STERILE WATER/SALINE, 10 ML: HCPCS | Performed by: STUDENT IN AN ORGANIZED HEALTH CARE EDUCATION/TRAINING PROGRAM

## 2024-04-06 PROCEDURE — 2580000003 HC RX 258: Performed by: NURSE PRACTITIONER

## 2024-04-06 PROCEDURE — 6370000000 HC RX 637 (ALT 250 FOR IP): Performed by: NURSE PRACTITIONER

## 2024-04-06 PROCEDURE — 36415 COLL VENOUS BLD VENIPUNCTURE: CPT

## 2024-04-06 PROCEDURE — 94640 AIRWAY INHALATION TREATMENT: CPT

## 2024-04-06 PROCEDURE — 85014 HEMATOCRIT: CPT

## 2024-04-06 PROCEDURE — C9113 INJ PANTOPRAZOLE SODIUM, VIA: HCPCS | Performed by: STUDENT IN AN ORGANIZED HEALTH CARE EDUCATION/TRAINING PROGRAM

## 2024-04-06 PROCEDURE — 2580000003 HC RX 258: Performed by: INTERNAL MEDICINE

## 2024-04-06 PROCEDURE — 51702 INSERT TEMP BLADDER CATH: CPT

## 2024-04-06 PROCEDURE — 6360000002 HC RX W HCPCS: Performed by: INTERNAL MEDICINE

## 2024-04-06 PROCEDURE — 6360000002 HC RX W HCPCS: Performed by: STUDENT IN AN ORGANIZED HEALTH CARE EDUCATION/TRAINING PROGRAM

## 2024-04-06 PROCEDURE — 85025 COMPLETE CBC W/AUTO DIFF WBC: CPT

## 2024-04-06 PROCEDURE — 85610 PROTHROMBIN TIME: CPT

## 2024-04-06 PROCEDURE — 85018 HEMOGLOBIN: CPT

## 2024-04-06 PROCEDURE — 80048 BASIC METABOLIC PNL TOTAL CA: CPT

## 2024-04-06 PROCEDURE — 94761 N-INVAS EAR/PLS OXIMETRY MLT: CPT

## 2024-04-06 RX ORDER — ALBUTEROL SULFATE 90 UG/1
2 AEROSOL, METERED RESPIRATORY (INHALATION) 2 TIMES DAILY
Status: DISCONTINUED | OUTPATIENT
Start: 2024-04-06 | End: 2024-04-07

## 2024-04-06 RX ADMIN — FENTANYL CITRATE 50 MCG: 0.05 INJECTION, SOLUTION INTRAMUSCULAR; INTRAVENOUS at 20:38

## 2024-04-06 RX ADMIN — ALBUTEROL SULFATE 2 PUFF: 90 AEROSOL, METERED RESPIRATORY (INHALATION) at 20:30

## 2024-04-06 RX ADMIN — SODIUM CHLORIDE 3000 MG: 900 INJECTION INTRAVENOUS at 20:18

## 2024-04-06 RX ADMIN — SODIUM CHLORIDE, PRESERVATIVE FREE 10 ML: 5 INJECTION INTRAVENOUS at 09:46

## 2024-04-06 RX ADMIN — SODIUM CHLORIDE 3000 MG: 900 INJECTION INTRAVENOUS at 02:57

## 2024-04-06 RX ADMIN — CHOLECALCIFEROL TAB 125 MCG (5000 UNIT) 5000 UNITS: 125 TAB at 09:46

## 2024-04-06 RX ADMIN — PHYTONADIONE 10 MG: 10 INJECTION, EMULSION INTRAMUSCULAR; INTRAVENOUS; SUBCUTANEOUS at 09:52

## 2024-04-06 RX ADMIN — PREGABALIN 100 MG: 100 CAPSULE ORAL at 07:26

## 2024-04-06 RX ADMIN — SODIUM CHLORIDE, PRESERVATIVE FREE 40 MG: 5 INJECTION INTRAVENOUS at 09:47

## 2024-04-06 RX ADMIN — FENTANYL CITRATE 50 MCG: 0.05 INJECTION, SOLUTION INTRAMUSCULAR; INTRAVENOUS at 03:01

## 2024-04-06 RX ADMIN — ALBUTEROL SULFATE 2 PUFF: 90 AEROSOL, METERED RESPIRATORY (INHALATION) at 08:30

## 2024-04-06 RX ADMIN — ATORVASTATIN CALCIUM 40 MG: 40 TABLET, FILM COATED ORAL at 20:18

## 2024-04-06 RX ADMIN — FENTANYL CITRATE 50 MCG: 0.05 INJECTION, SOLUTION INTRAMUSCULAR; INTRAVENOUS at 09:57

## 2024-04-06 RX ADMIN — SODIUM CHLORIDE, PRESERVATIVE FREE 40 MG: 5 INJECTION INTRAVENOUS at 20:20

## 2024-04-06 RX ADMIN — TAMSULOSIN HYDROCHLORIDE 0.4 MG: 0.4 CAPSULE ORAL at 15:00

## 2024-04-06 RX ADMIN — CYANOCOBALAMIN TAB 1000 MCG 1000 MCG: 1000 TAB at 09:46

## 2024-04-06 RX ADMIN — SODIUM CHLORIDE 3000 MG: 900 INJECTION INTRAVENOUS at 09:49

## 2024-04-06 RX ADMIN — SODIUM CHLORIDE 3000 MG: 900 INJECTION INTRAVENOUS at 13:38

## 2024-04-06 RX ADMIN — PREGABALIN 100 MG: 100 CAPSULE ORAL at 20:18

## 2024-04-06 ASSESSMENT — PAIN - FUNCTIONAL ASSESSMENT
PAIN_FUNCTIONAL_ASSESSMENT: PREVENTS OR INTERFERES WITH MANY ACTIVE NOT PASSIVE ACTIVITIES
PAIN_FUNCTIONAL_ASSESSMENT: PREVENTS OR INTERFERES WITH MANY ACTIVE NOT PASSIVE ACTIVITIES
PAIN_FUNCTIONAL_ASSESSMENT: PREVENTS OR INTERFERES SOME ACTIVE ACTIVITIES AND ADLS

## 2024-04-06 ASSESSMENT — PAIN DESCRIPTION - LOCATION
LOCATION: LEG

## 2024-04-06 ASSESSMENT — PAIN DESCRIPTION - ORIENTATION
ORIENTATION: LEFT

## 2024-04-06 ASSESSMENT — PAIN SCALES - GENERAL
PAINLEVEL_OUTOF10: 9
PAINLEVEL_OUTOF10: 10
PAINLEVEL_OUTOF10: 0
PAINLEVEL_OUTOF10: 4
PAINLEVEL_OUTOF10: 10
PAINLEVEL_OUTOF10: 7

## 2024-04-06 ASSESSMENT — PAIN DESCRIPTION - DESCRIPTORS
DESCRIPTORS: ACHING;THROBBING

## 2024-04-06 ASSESSMENT — PAIN DESCRIPTION - FREQUENCY: FREQUENCY: CONTINUOUS

## 2024-04-06 ASSESSMENT — PAIN DESCRIPTION - ONSET: ONSET: ON-GOING

## 2024-04-06 NOTE — PLAN OF CARE
Pt currently lying in bed A&OX4. Productive cough ongoing with small amounts of blood tinged phlegm. Encouraged pt to continue using IS at bed side. Pt performed poorly. Pt currently on 2L NC. SPO2 in upper 90s. Vitals have remained stable. Pt had one unmeasurable urine occurrence and saturated his brief. Bladder scan this morning showed 291 but pt was unable to void. Writer notified on call NP. On call NP stated if unable to void in next hour, bladder scan needs to be redone and assessed. Pt INR resulted as critical value: 6.9. Writer notified on call NP. New order for H&H placed.    Standard safety precautions in place. Call light within reach. Bed in locked and lowest position. Care ongoing.     Problem: Safety - Adult  Goal: Free from fall injury  Outcome: Progressing  Flowsheets (Taken 4/5/2024 2239)  Free From Fall Injury: Instruct family/caregiver on patient safety     Problem: Discharge Planning  Goal: Discharge to home or other facility with appropriate resources  Outcome: Progressing     Problem: Pain  Goal: Verbalizes/displays adequate comfort level or baseline comfort level  Outcome: Progressing     Problem: Skin/Tissue Integrity - Adult  Goal: Incisions, wounds, or drain sites healing without S/S of infection  Outcome: Progressing  Flowsheets (Taken 4/5/2024 2000)  Incisions, Wounds, or Drain Sites Healing Without Sign and Symptoms of Infection: Implement wound care per orders     Problem: Gastrointestinal - Adult  Goal: Minimal or absence of nausea and vomiting  Outcome: Progressing     Problem: Respiratory - Adult  Goal: Achieves optimal ventilation and oxygenation  4/6/2024 0424 by Tone Perales RN  Outcome: Progressing     Problem: Skin/Tissue Integrity  Goal: Absence of new skin breakdown  Description: 1.  Monitor for areas of redness and/or skin breakdown  2.  Assess vascular access sites hourly  3.  Every 4-6 hours minimum:  Change oxygen saturation probe site  4.  Every 4-6 hours:  If on nasal

## 2024-04-06 NOTE — PLAN OF CARE
Problem: Respiratory - Adult  Goal: Achieves optimal ventilation and oxygenation  4/6/2024 0537 by Danilo Castañeda, NADJA  Outcome: Progressing     Problem: Respiratory - Adult  Goal: Able to breathe comfortably  Description: Able to breathe comfortably  Outcome: Progressing     Problem: Respiratory - Adult  Goal: Clear lung sounds  Outcome: Progressing

## 2024-04-06 NOTE — PLAN OF CARE
Problem: Genitourinary - Adult  Goal: Urinary catheter remains patent  Outcome: Progressing     Problem: Genitourinary - Adult  Goal: Absence of urinary retention  Outcome: Progressing     Problem: Skin/Tissue Integrity  Goal: Absence of new skin breakdown  Description: 1.  Monitor for areas of redness and/or skin breakdown  2.  Assess vascular access sites hourly  3.  Every 4-6 hours minimum:  Change oxygen saturation probe site  4.  Every 4-6 hours:  If on nasal continuous positive airway pressure, respiratory therapy assess nares and determine need for appliance change or resting period.  4/6/2024 1806 by Dia Donaldson, RN  Outcome: Progressing     Problem: Respiratory - Adult  Goal: Achieves optimal ventilation and oxygenation  4/6/2024 1806 by Dia Donaldson, RN  Outcome: Progressing

## 2024-04-06 NOTE — RT PROTOCOL NOTE
RT Inhaler-Nebulizer Bronchodilator Protocol Note    There is a bronchodilator order in the chart from a provider indicating to follow the RT Bronchodilator Protocol and there is an “Initiate RT Inhaler-Nebulizer Bronchodilator Protocol” order as well (see protocol at bottom of note).    CXR Findings:  XR CHEST PORTABLE    Result Date: 4/5/2024  Airspace consolidation at the left lung base which could represent atelectasis, pneumonia or aspiration.       The findings from the last RT Protocol Assessment were as follows:   History Pulmonary Disease: Smoker 15 pack years or more  Respiratory Pattern: Regular pattern and RR 12-20 bpm  Breath Sounds: Intermittent or unilateral wheezes  Cough: Strong, productive  Indication for Bronchodilator Therapy: On home bronchodilators  Bronchodilator Assessment Score: 6    Aerosolized bronchodilator medication orders have been revised according to the RT Inhaler-Nebulizer Bronchodilator Protocol below.    Respiratory Therapist to perform RT Therapy Protocol Assessment initially then follow the protocol.  Repeat RT Therapy Protocol Assessment PRN for score 0-3 or on second treatment, BID, and PRN for scores above 3.    No Indications - adjust the frequency to every 6 hours PRN wheezing or bronchospasm, if no treatments needed after 48 hours then discontinue using Per Protocol order mode.     If indication present, adjust the RT bronchodilator orders based on the Bronchodilator Assessment Score as indicated below.  Use Inhaler orders unless patient has one or more of the following: on home nebulizer, not able to hold breath for 10 seconds, is not alert and oriented, cannot activate and use MDI correctly, or respiratory rate 25 breaths per minute or more, then use the equivalent nebulizer order(s) with same Frequency and PRN reasons based on the score.  If a patient is on this medication at home then do not decrease Frequency below that used at home.    0-3 - enter or revise RT

## 2024-04-06 NOTE — PROGRESS NOTES
Pt has noted voided this shift, bladder scanned, 406 ml noted in bladder, Dr Mahmood notified, requested that patient be assisted to commode to help facilitate emptying baldder

## 2024-04-06 NOTE — PLAN OF CARE
Problem: Respiratory - Adult  Goal: Achieves optimal ventilation and oxygenation  4/6/2024 0834 by Clifton Thomas RCP  Outcome: Progressing     Problem: Respiratory - Adult  Goal: Able to breathe comfortably  Description: Able to breathe comfortably  4/6/2024 0834 by Clifton Thomas, NADJA  Outcome: Progressing     Problem: Respiratory - Adult  Goal: Clear lung sounds  4/6/2024 0834 by Clifton Thomas, AYANP  Outcome: Progressing

## 2024-04-06 NOTE — PROGRESS NOTES
Physical Therapy  Facility/Department: [facility] CARE  Physical Therapy Initial Assessment    Name: Jorge Luis Banda  : 1945  MRN: [id number]  Date of Service: 2024    Discharge Recommendations:  Patient would benefit from continued therapy after discharge. Due to recent hospitalization and medical condition, pt would benefit from additional intermittent skilled therapy at time of discharge.  Please refer to the AM-PAC score for current functional status.        HPI (per chart): 78-year-old male past medical history of adenocarcinoma of the lung, hypertension, peripheral vascular disease status post right AKA, left lower extremity cellulitis and peripheral vascular disease presents with Lower extremity pain also found to be severely anemic with concerns for GI bleeding.  Patient transfused 2 units RBCs.  Normal on Coumadin and Plavix at home for peripheral vascular disease.  Patient being followed by GI and vascular surgery.  Gastroenterology recommended EGD however this was delayed due to elevated INR.       Patient Diagnosis(es): The primary encounter diagnosis was Left leg cellulitis. Diagnoses of Anemia, unspecified type, Melena, UMM (acute kidney injury) (HCC), Gastrointestinal hemorrhage, unspecified gastrointestinal hemorrhage type, Atrial fib/flutter, transient (HCC), Embolism and thrombosis of artery (HCC), and Claudication (HCC) were also pertinent to this visit.  Past Medical History:  has no past medical history on file.  Past Surgical History:  has a past surgical history that includes above knee amputation (Right, 2020).    Assessment   Body Structures, Functions, Activity Limitations Requiring Skilled Therapeutic Intervention: Decreased functional mobility ;Decreased strength;Decreased balance;Decreased posture  Assessment: Patient typically indep in his home from w/c level. Patient currently CGA for transfers. Patient will benefit from skilled PT to improve transfers and

## 2024-04-06 NOTE — PROGRESS NOTES
GASTROENTEROLOGY NOTE       Patient:   Jorge Luis Banda   :    1945   Facility:   [facility]  Date:     2024  Consultant:   Vladimir Hoffmann MD, DO      SUBJECTIVE:    78 y.o. male admitted 4/3/2024 with Melena [K92.1]  GI bleed [K92.2]  UMM (acute kidney injury) (HCC) [N17.9]  Left leg cellulitis [L03.116]  Anemia, unspecified type [D64.9].      Patient reports loose/liquid stool yesterday, black in color.  Appetite poor.  No abdominal pain.  Noted elevated INR (worse) off coumadin.          OBJECTIVE:   Vital Signs:  /65   Pulse 78   Temp 98.1 °F (36.7 °C) (Oral)   Resp 19   Ht 1.829 m (6')   Wt 70.1 kg (154 lb 9.6 oz)   SpO2 100%   BMI 20.97 kg/m²      Physical Exam:   General appearance: Alert, NAD  Lungs: CTA bilaterally, unlabored pattern  Heart: S1S2, RRR without murmur, clicks, gallops.  Abdomen: Soft, NT, ND +BS, no masses  Skin/Musculoskeletal:  No jaundice. No clubbing, cyanosis, or edema.    Rt BKA.      Lab and Imaging Review   Recent Labs     24  1540 24  2226 24  0454 24  1425 24  1644 24  2308 24  0458 24  0720 24  1048 24  0458   WBC 4.5  --   --   --   --   --   --  4.7  --  6.3   HGB 6.6*   < > 6.7*   < > 8.4* 8.0* 7.6* 8.1* 8.1* 7.1*   .9  --   --   --   --   --   --  95.7  --  95.0     --   --   --   --   --   --  187  --  164   INR  --   --  4.8*  --   --   --   --  5.2*  --  6.9*   *  --  140  --   --   --   --  143  --  140   K 4.0  --  4.8  --   --   --   --  4.3  --  4.3   CL 98  --  111*  --   --   --   --  112*  --  111*   CO2 16*  --  20  --   --   --   --  19*  --  21   BUN 25*  --  23  --   --   --   --  19  --  19   CREATININE 1.8*  --  1.5*  --   --   --   --  1.4*  --  1.5*   GLUCOSE 83  --  86  --   --   --   --  91  --  107*   CALCIUM 8.3*  --  8.2*  --   --   --   --  8.2*  --  8.2*   PROT 6.6  --  5.9*  --   --   --   --   --   --   --    LABALBU 3.6  --  3.2*

## 2024-04-06 NOTE — PROGRESS NOTES
Patient was retaining urine again today, bladder scan 445 ml. Urology was consulted, orders received and 16F maldonado catheter placed. Good urine return, patient tolerated well

## 2024-04-06 NOTE — PROGRESS NOTES
[facility]  Office: [phone]  Chinmay Ramos DO, Jose Atkinson DO, Guevara Ramirez DO, Pranay Cooper DO, Sumeet Eller MD, Karen Carvajal MD, Sony Crawford MD, Poonam Platt MD,  Stephan San MD, Marcello Daniels MD, Theodore Pascual MD,  Juve Mahmood DO, Matias Mckeon MD, Hero Tavarez MD, Valentin Ramos DO, Jazzy Spence MD,  Trino Handley DO, Flavia Leonardo MD, Vanessa Soriano MD, Pricilla Akers MD, Casimiro Knight MD,  Zeke Maciel MD, Guera Cervantes MD, Kianna Martin MD, Mirlande Worrell MD, Ney Garcia MD, Savannah Dominguez MD, Ed Emerson DO, Jaylen West DO, Comfort Carcamo MD,  Siva Ashley MD, Shirley Waterhouse, CNP,  Carolyn Pelaez CNP, Scotty Ge, CNP,  Wandy Trivedi, AMY, Malissa Gaston, CNP, Lana Penaloza, CNP, Susan Haro CNP, Sherrell Mendoza CNP, Suzy Morales, PA-C, Dalila Lange PA-C, Sada Matt, CNP, Crystal Womack, CNP, Sheng Reddy, CNP, Arielle Guy, CNP, Linsey Perez CNP, Anju Cho, CNS, Hoa Donahue, CNP, Cassidy Ferrer CNP, Tracy Schwab, CNP         [facility]   IN-PATIENT SERVICE   [facility]    Progress Note    4/6/2024    7:50 AM    Name:   Jorge Luis Banda  MRN:     [id number]     Acct:      [id number]   Room:   1010/1010-02   Day:  3  Admit Date:  4/3/2024  3:27 PM    PCP:   Lucila Machado APRN - CNP  Code Status:  Full Code    Subjective:     Patient had 1 episode of loose/liquid stool yesterday described as black.  He has noticed decreased appetite.  Denies any abdominal pain.  His INR has been progressively getting worse.  He also had 40 cc of urinary retention per RN.  He denies any distention of his bladder      Brief History:     78-year-old male past medical history of adenocarcinoma of the lung, hypertension, peripheral vascular disease status post right AKA, left lower extremity cellulitis and peripheral vascular disease presents with Lower extremity pain also found to be severely  place and time and normal affect  Lungs: Clear to auscultation on the left, slightly diminished on the right, normal effort  Heart:  regular rate and rhythm, no murmur  Abdomen:  soft, nontender, nondistended, normal bowel sounds, no masses, hepatomegaly, splenomegaly  Extremities: Right AKA noted.  Left foot cellulitis with lesions noted.  Skin:  no gross lesions, rashes, induration    Assessment:     Hospital Problems             Last Modified POA    * (Principal) GI bleed 4/3/2024 Yes    History of arterial bypass of lower extremity 4/3/2024 Yes    S/P AKA (above knee amputation) unilateral, right (HCC) 4/3/2024 Yes    Primary hypertension (Chronic) 4/3/2024 Yes    Peripheral arterial disease (HCC) 4/3/2024 Yes    Neuropathy 4/3/2024 Yes    Malignant neoplasm of lung (HCC) 4/4/2024 Yes    Overview Signed 4/3/2024  6:44 PM by Malissa Gaston APRN - NP     Added automatically from request for surgery [id number]         Intermittent claudication of left lower extremity due to atherosclerosis (HCC) 4/4/2024 Yes    Embolism and thrombosis of artery (HCC) 4/4/2024 Yes       Plan:     Microcytic anemia, dark stools concern for GIB  Gi consulted, recommend continuing PPI drip.  Planning for EGD once INR more favorable.  Transfuse as needed to keep hgb >7  Supratherapeutic INR  4.8->5.2->6.9.  Will give 1 dose of IV vitamin K  Dietary consult  Urinary retention  Get patient up to void to commode if possible.  She constipation.  Try to avoid Dotson catheter/straight cathing unless needed given elevated INR and patient's risk of bleeding.  Restart Flomax.  Lower extremity cellulitis: Continue Unasyn  Peripheral artery disease, right AKA, dry left foot eschar: Was seen by vascular surgery recommend outpatient follow-up with Dr. Ledbetter  Continue Flomax-blood pressure close to baseline.  Also urinary tension.  Primary hypertension: Hold home BB due to soft pressures/concern for GIB    Medical Decision Making: Medium      Mohammad

## 2024-04-07 PROBLEM — K92.1 MELENA: Status: ACTIVE | Noted: 2024-04-03

## 2024-04-07 LAB
ABO/RH: NORMAL
ANION GAP SERPL CALCULATED.3IONS-SCNC: 10 MMOL/L (ref 9–17)
ANTIBODY SCREEN: NEGATIVE
ARM BAND NUMBER: NORMAL
BLOOD BANK DISPENSE STATUS: NORMAL
BLOOD BANK SAMPLE EXPIRATION: NORMAL
BPU ID: NORMAL
BUN SERPL-MCNC: 14 MG/DL (ref 8–23)
BUN/CREAT SERPL: 13 (ref 9–20)
CALCIUM SERPL-MCNC: 8.4 MG/DL (ref 8.6–10.4)
CHLORIDE SERPL-SCNC: 107 MMOL/L (ref 98–107)
CO2 SERPL-SCNC: 21 MMOL/L (ref 20–31)
COMPONENT: NORMAL
CREAT SERPL-MCNC: 1.1 MG/DL (ref 0.7–1.2)
CROSSMATCH RESULT: NORMAL
GFR SERPL CREATININE-BSD FRML MDRD: 68 ML/MIN/1.73M2
GLUCOSE SERPL-MCNC: 95 MG/DL (ref 70–99)
HCT VFR BLD AUTO: 22.6 % (ref 40.7–50.3)
HCT VFR BLD AUTO: 22.9 % (ref 40.7–50.3)
HCT VFR BLD AUTO: 25.6 % (ref 40.7–50.3)
HGB BLD-MCNC: 7.2 G/DL (ref 13–17)
HGB BLD-MCNC: 7.2 G/DL (ref 13–17)
HGB BLD-MCNC: 7.8 G/DL (ref 13–17)
INR PPP: 1.3
MAGNESIUM SERPL-MCNC: 1.7 MG/DL (ref 1.6–2.6)
POTASSIUM SERPL-SCNC: 3.3 MMOL/L (ref 3.7–5.3)
PROTHROMBIN TIME: 16.1 SEC (ref 11.5–14.2)
SODIUM SERPL-SCNC: 138 MMOL/L (ref 135–144)
TRANSFUSION STATUS: NORMAL
UNIT DIVISION: 0

## 2024-04-07 PROCEDURE — C9113 INJ PANTOPRAZOLE SODIUM, VIA: HCPCS | Performed by: STUDENT IN AN ORGANIZED HEALTH CARE EDUCATION/TRAINING PROGRAM

## 2024-04-07 PROCEDURE — 6360000002 HC RX W HCPCS: Performed by: INTERNAL MEDICINE

## 2024-04-07 PROCEDURE — 36415 COLL VENOUS BLD VENIPUNCTURE: CPT

## 2024-04-07 PROCEDURE — 2700000000 HC OXYGEN THERAPY PER DAY

## 2024-04-07 PROCEDURE — 6370000000 HC RX 637 (ALT 250 FOR IP): Performed by: INTERNAL MEDICINE

## 2024-04-07 PROCEDURE — 2580000003 HC RX 258: Performed by: INTERNAL MEDICINE

## 2024-04-07 PROCEDURE — 99232 SBSQ HOSP IP/OBS MODERATE 35: CPT | Performed by: INTERNAL MEDICINE

## 2024-04-07 PROCEDURE — 83735 ASSAY OF MAGNESIUM: CPT

## 2024-04-07 PROCEDURE — 94761 N-INVAS EAR/PLS OXIMETRY MLT: CPT

## 2024-04-07 PROCEDURE — 6360000002 HC RX W HCPCS: Performed by: STUDENT IN AN ORGANIZED HEALTH CARE EDUCATION/TRAINING PROGRAM

## 2024-04-07 PROCEDURE — 93005 ELECTROCARDIOGRAM TRACING: CPT | Performed by: INTERNAL MEDICINE

## 2024-04-07 PROCEDURE — 2060000000 HC ICU INTERMEDIATE R&B

## 2024-04-07 PROCEDURE — 80048 BASIC METABOLIC PNL TOTAL CA: CPT

## 2024-04-07 PROCEDURE — 6370000000 HC RX 637 (ALT 250 FOR IP): Performed by: NURSE PRACTITIONER

## 2024-04-07 PROCEDURE — A4216 STERILE WATER/SALINE, 10 ML: HCPCS | Performed by: STUDENT IN AN ORGANIZED HEALTH CARE EDUCATION/TRAINING PROGRAM

## 2024-04-07 PROCEDURE — 2580000003 HC RX 258: Performed by: NURSE PRACTITIONER

## 2024-04-07 PROCEDURE — 6370000000 HC RX 637 (ALT 250 FOR IP)

## 2024-04-07 PROCEDURE — 85014 HEMATOCRIT: CPT

## 2024-04-07 PROCEDURE — 85018 HEMOGLOBIN: CPT

## 2024-04-07 PROCEDURE — 85610 PROTHROMBIN TIME: CPT

## 2024-04-07 PROCEDURE — 94640 AIRWAY INHALATION TREATMENT: CPT

## 2024-04-07 PROCEDURE — 2580000003 HC RX 258: Performed by: STUDENT IN AN ORGANIZED HEALTH CARE EDUCATION/TRAINING PROGRAM

## 2024-04-07 RX ORDER — PREGABALIN 100 MG/1
300 CAPSULE ORAL 3 TIMES DAILY
Status: DISCONTINUED | OUTPATIENT
Start: 2024-04-07 | End: 2024-04-07

## 2024-04-07 RX ORDER — GUAIFENESIN 600 MG/1
600 TABLET, EXTENDED RELEASE ORAL 2 TIMES DAILY
Status: DISCONTINUED | OUTPATIENT
Start: 2024-04-07 | End: 2024-05-04

## 2024-04-07 RX ORDER — GABAPENTIN 300 MG/1
300 CAPSULE ORAL 3 TIMES DAILY
Status: DISCONTINUED | OUTPATIENT
Start: 2024-04-07 | End: 2024-04-15

## 2024-04-07 RX ORDER — POTASSIUM CHLORIDE 20 MEQ/1
40 TABLET, EXTENDED RELEASE ORAL PRN
Status: DISCONTINUED | OUTPATIENT
Start: 2024-04-07 | End: 2024-05-11 | Stop reason: HOSPADM

## 2024-04-07 RX ORDER — SODIUM CHLORIDE 9 MG/ML
INJECTION, SOLUTION INTRAVENOUS CONTINUOUS
Status: ACTIVE | OUTPATIENT
Start: 2024-04-07 | End: 2024-04-07

## 2024-04-07 RX ORDER — POTASSIUM CHLORIDE 7.45 MG/ML
10 INJECTION INTRAVENOUS PRN
Status: DISCONTINUED | OUTPATIENT
Start: 2024-04-07 | End: 2024-05-11 | Stop reason: HOSPADM

## 2024-04-07 RX ORDER — ALBUTEROL SULFATE 90 UG/1
2 AEROSOL, METERED RESPIRATORY (INHALATION) 3 TIMES DAILY
Status: DISCONTINUED | OUTPATIENT
Start: 2024-04-07 | End: 2024-04-13

## 2024-04-07 RX ORDER — UREA 10 %
325 LOTION (ML) TOPICAL
Status: DISCONTINUED | OUTPATIENT
Start: 2024-04-08 | End: 2024-05-11 | Stop reason: HOSPADM

## 2024-04-07 RX ORDER — SODIUM CHLORIDE, SODIUM LACTATE, POTASSIUM CHLORIDE, AND CALCIUM CHLORIDE .6; .31; .03; .02 G/100ML; G/100ML; G/100ML; G/100ML
250 INJECTION, SOLUTION INTRAVENOUS ONCE
Status: COMPLETED | OUTPATIENT
Start: 2024-04-07 | End: 2024-04-07

## 2024-04-07 RX ADMIN — SODIUM CHLORIDE: 9 INJECTION, SOLUTION INTRAVENOUS at 08:56

## 2024-04-07 RX ADMIN — SODIUM CHLORIDE, PRESERVATIVE FREE 10 ML: 5 INJECTION INTRAVENOUS at 09:13

## 2024-04-07 RX ADMIN — GABAPENTIN 300 MG: 300 CAPSULE ORAL at 21:11

## 2024-04-07 RX ADMIN — SODIUM CHLORIDE 3000 MG: 900 INJECTION INTRAVENOUS at 21:06

## 2024-04-07 RX ADMIN — CYANOCOBALAMIN TAB 1000 MCG 1000 MCG: 1000 TAB at 08:55

## 2024-04-07 RX ADMIN — ATORVASTATIN CALCIUM 40 MG: 40 TABLET, FILM COATED ORAL at 21:11

## 2024-04-07 RX ADMIN — ALBUTEROL SULFATE 2 PUFF: 90 AEROSOL, METERED RESPIRATORY (INHALATION) at 08:18

## 2024-04-07 RX ADMIN — SODIUM CHLORIDE, PRESERVATIVE FREE 40 MG: 5 INJECTION INTRAVENOUS at 21:10

## 2024-04-07 RX ADMIN — TAMSULOSIN HYDROCHLORIDE 0.4 MG: 0.4 CAPSULE ORAL at 08:55

## 2024-04-07 RX ADMIN — SODIUM CHLORIDE, PRESERVATIVE FREE 40 MG: 5 INJECTION INTRAVENOUS at 09:00

## 2024-04-07 RX ADMIN — POTASSIUM CHLORIDE 40 MEQ: 1500 TABLET, EXTENDED RELEASE ORAL at 10:19

## 2024-04-07 RX ADMIN — SODIUM CHLORIDE 3000 MG: 900 INJECTION INTRAVENOUS at 09:10

## 2024-04-07 RX ADMIN — SODIUM CHLORIDE, POTASSIUM CHLORIDE, SODIUM LACTATE AND CALCIUM CHLORIDE 250 ML: 600; 310; 30; 20 INJECTION, SOLUTION INTRAVENOUS at 15:48

## 2024-04-07 RX ADMIN — CHOLECALCIFEROL TAB 125 MCG (5000 UNIT) 5000 UNITS: 125 TAB at 08:55

## 2024-04-07 RX ADMIN — METOPROLOL TARTRATE 50 MG: 50 TABLET, FILM COATED ORAL at 08:55

## 2024-04-07 RX ADMIN — PREGABALIN 100 MG: 100 CAPSULE ORAL at 08:55

## 2024-04-07 RX ADMIN — METOPROLOL TARTRATE 50 MG: 50 TABLET, FILM COATED ORAL at 21:11

## 2024-04-07 RX ADMIN — GUAIFENESIN 600 MG: 600 TABLET ORAL at 10:40

## 2024-04-07 RX ADMIN — GABAPENTIN 300 MG: 300 CAPSULE ORAL at 14:45

## 2024-04-07 RX ADMIN — SODIUM CHLORIDE 3000 MG: 900 INJECTION INTRAVENOUS at 14:44

## 2024-04-07 RX ADMIN — GUAIFENESIN 600 MG: 600 TABLET ORAL at 21:11

## 2024-04-07 RX ADMIN — SODIUM CHLORIDE 3000 MG: 900 INJECTION INTRAVENOUS at 02:06

## 2024-04-07 RX ADMIN — ALBUTEROL SULFATE 2 PUFF: 90 AEROSOL, METERED RESPIRATORY (INHALATION) at 14:05

## 2024-04-07 RX ADMIN — ALBUTEROL SULFATE 2 PUFF: 90 AEROSOL, METERED RESPIRATORY (INHALATION) at 21:24

## 2024-04-07 ASSESSMENT — PAIN SCALES - WONG BAKER: WONGBAKER_NUMERICALRESPONSE: HURTS A LITTLE BIT

## 2024-04-07 ASSESSMENT — PAIN DESCRIPTION - ORIENTATION: ORIENTATION: LEFT

## 2024-04-07 ASSESSMENT — PAIN DESCRIPTION - ONSET: ONSET: ON-GOING

## 2024-04-07 ASSESSMENT — PAIN DESCRIPTION - DESCRIPTORS: DESCRIPTORS: ACHING;TENDER

## 2024-04-07 ASSESSMENT — PAIN - FUNCTIONAL ASSESSMENT: PAIN_FUNCTIONAL_ASSESSMENT: PREVENTS OR INTERFERES SOME ACTIVE ACTIVITIES AND ADLS

## 2024-04-07 ASSESSMENT — PAIN DESCRIPTION - LOCATION: LOCATION: LEG;FOOT

## 2024-04-07 ASSESSMENT — PAIN DESCRIPTION - FREQUENCY: FREQUENCY: CONTINUOUS

## 2024-04-07 NOTE — RT PROTOCOL NOTE
RT Inhaler-Nebulizer Bronchodilator Protocol Note    There is a bronchodilator order in the chart from a provider indicating to follow the RT Bronchodilator Protocol and there is an “Initiate RT Inhaler-Nebulizer Bronchodilator Protocol” order as well (see protocol at bottom of note).    CXR Findings:  XR CHEST PORTABLE    Result Date: 4/5/2024  Airspace consolidation at the left lung base which could represent atelectasis, pneumonia or aspiration.       The findings from the last RT Protocol Assessment were as follows:   History Pulmonary Disease: Smoker 15 pack years or more  Respiratory Pattern: Dyspnea on exertion or RR 21-25 bpm  Breath Sounds: Inspiratory and expiratory or bilateral wheezing and/or rhonchi  Cough: Strong, productive  Indication for Bronchodilator Therapy: On home bronchodilators  Bronchodilator Assessment Score: 10    Aerosolized bronchodilator medication orders have been revised according to the RT Inhaler-Nebulizer Bronchodilator Protocol below.    Respiratory Therapist to perform RT Therapy Protocol Assessment initially then follow the protocol.  Repeat RT Therapy Protocol Assessment PRN for score 0-3 or on second treatment, BID, and PRN for scores above 3.    No Indications - adjust the frequency to every 6 hours PRN wheezing or bronchospasm, if no treatments needed after 48 hours then discontinue using Per Protocol order mode.     If indication present, adjust the RT bronchodilator orders based on the Bronchodilator Assessment Score as indicated below.  Use Inhaler orders unless patient has one or more of the following: on home nebulizer, not able to hold breath for 10 seconds, is not alert and oriented, cannot activate and use MDI correctly, or respiratory rate 25 breaths per minute or more, then use the equivalent nebulizer order(s) with same Frequency and PRN reasons based on the score.  If a patient is on this medication at home then do not decrease Frequency below that used at  home.    0-3 - enter or revise RT bronchodilator order(s) to equivalent RT Bronchodilator order with Frequency of every 4 hours PRN for wheezing or increased work of breathing using Per Protocol order mode.        4-6 - enter or revise RT Bronchodilator order(s) to two equivalent RT bronchodilator orders with one order with BID Frequency and one order with Frequency of every 4 hours PRN wheezing or increased work of breathing using Per Protocol order mode.        7-10 - enter or revise RT Bronchodilator order(s) to two equivalent RT bronchodilator orders with one order with TID Frequency and one order with Frequency of every 4 hours PRN wheezing or increased work of breathing using Per Protocol order mode.       11-13 - enter or revise RT Bronchodilator order(s) to one equivalent RT bronchodilator order with QID Frequency and an Albuterol order with Frequency of every 4 hours PRN wheezing or increased work of breathing using Per Protocol order mode.      Greater than 13 - enter or revise RT Bronchodilator order(s) to one equivalent RT bronchodilator order with every 4 hours Frequency and an Albuterol order with Frequency of every 2 hours PRN wheezing or increased work of breathing using Per Protocol order mode.     RT to enter RT Home Evaluation for COPD & MDI Assessment order using Per Protocol order mode.    Electronically signed by Shana Wood RCP on 4/7/2024 at 8:21 AM

## 2024-04-07 NOTE — PROGRESS NOTES
GASTROENTEROLOGY NOTE       Patient:   Jorge Luis Banda   :    1945   Facility:   [facility]  Date:     2024  Consultant:   Vladimir Hoffmann MD, DO      SUBJECTIVE:    78 y.o. male admitted 4/3/2024 with Melena [K92.1]  GI bleed [K92.2]  UMM (acute kidney injury) (HCC) [N17.9]  Left leg cellulitis [L03.116]  Anemia, unspecified type [D64.9].      Patient still with melena, loose stools last night and middle of night.  None this morning.  INR reversed as noted.  Received vitamin K.  No abdominal pain and wishes for some food.  No nausea.  Hb stable.          OBJECTIVE:   Vital Signs:  /77   Pulse (!) 125   Temp 97.5 °F (36.4 °C) (Oral)   Resp 15   Ht 1.829 m (6')   Wt 71 kg (156 lb 8 oz)   SpO2 99%   BMI 21.23 kg/m²      Physical Exam:   General appearance: Alert, NAD  Lungs: CTA bilaterally, unlabored pattern  Heart: S1S2, tachy without murmur, clicks, gallops.  Abdomen: Soft, NT, ND +BS, no masses  Skin/Musculoskeletal:  No jaundice. No clubbing, cyanosis, or edema.  ROM normal.      Lab and Imaging Review   Recent Labs     24  0720 24  1048 24  0458 24  1514 24  2107 24  0103 24  0706 24  0819   WBC 4.7  --  6.3  --   --   --   --   --    HGB 8.1* 8.1* 7.1* 7.7* 7.6* 7.2* 7.8*  --    MCV 95.7  --  95.0  --   --   --   --   --      --  164  --   --   --   --   --    INR 5.2*  --  6.9*  --   --   --   --  1.3     --  140  --   --   --  138  --    K 4.3  --  4.3  --   --   --  3.3*  --    *  --  111*  --   --   --  107  --    CO2 19*  --  21  --   --   --  21  --    BUN 19  --  19  --   --   --  14  --    CREATININE 1.4*  --  1.5*  --   --   --  1.1  --    GLUCOSE 91  --  107*  --   --   --  95  --    CALCIUM 8.2*  --  8.2*  --   --   --  8.4*  --    MG  --   --   --   --   --   --  1.7  --      Recent Labs     24  0720 24  0458 24  0819   INR 5.2* 6.9* 1.3   PROTIME 46.7* 57.8* 16.1*

## 2024-04-07 NOTE — PROGRESS NOTES
194.44 ml       Labs:  Hematology:  Recent Labs     04/05/24  0720 04/05/24  1048 04/06/24  0458 04/06/24  1514 04/06/24  2107 04/07/24  0103 04/07/24  0706 04/07/24  0819   WBC 4.7  --  6.3  --   --   --   --   --    RBC 2.76*  --  2.39*  --   --   --   --   --    HGB 8.1*   < > 7.1*   < > 7.6* 7.2* 7.8*  --    HCT 26.4*   < > 22.7*   < > 24.3* 22.6* 25.6*  --    MCV 95.7  --  95.0  --   --   --   --   --    MCH 29.3  --  29.7  --   --   --   --   --    MCHC 30.7  --  31.3  --   --   --   --   --    RDW 21.5*  --  20.9*  --   --   --   --   --      --  164  --   --   --   --   --    MPV 9.4  --  9.7  --   --   --   --   --    INR 5.2*  --  6.9*  --   --   --   --  1.3    < > = values in this interval not displayed.     Chemistry:  Recent Labs     04/05/24  0720 04/06/24  0458 04/07/24  0706    140 138   K 4.3 4.3 3.3*   * 111* 107   CO2 19* 21 21   GLUCOSE 91 107* 95   BUN 19 19 14   CREATININE 1.4* 1.5* 1.1   MG  --   --  1.7   ANIONGAP 12 8* 10   LABGLOM 51* 47* 68   CALCIUM 8.2* 8.2* 8.4*     No results for input(s): \"PROT\", \"LABALBU\", \"LABA1C\", \"W1JVDVV\", \"T7QLEIV\", \"FT4\", \"TSH\", \"AST\", \"ALT\", \"LDH\", \"GGT\", \"ALKPHOS\", \"LABGGT\", \"BILITOT\", \"BILIDIR\", \"AMMONIA\", \"AMYLASE\", \"LIPASE\", \"LACTATE\", \"CHOL\", \"HDL\", \"LDLCHOLESTEROL\", \"CHOLHDLRATIO\", \"TRIG\", \"VLDL\", \"OSB71PC\", \"PHENYTOIN\", \"PHENYF\", \"URICACID\", \"POCGLU\" in the last 72 hours.    ABG:No results found for: \"POCPH\", \"PHART\", \"PH\", \"POCPCO2\", \"YQC6HIV\", \"PCO2\", \"POCPO2\", \"PO2ART\", \"PO2\", \"POCHCO3\", \"AJU1YHT\", \"HCO3\", \"NBEA\", \"PBEA\", \"BEART\", \"BE\", \"THGBART\", \"THB\", \"REH5NEU\", \"WXYH2UOB\", \"C2KHNGWL\", \"O2SAT\", \"FIO2\"  Lab Results   Component Value Date/Time    SPECIAL LAC 10 ML 04/03/2024 04:09 PM     Lab Results   Component Value Date/Time    CULTURE NO GROWTH 3 DAYS 04/03/2024 04:09 PM       Radiology:  XR CHEST PORTABLE    Result Date: 4/5/2024  Airspace consolidation at the left lung base which could represent atelectasis, pneumonia or  supplementation  Give 250 cc bolus and reevaluate   Atrial fib with rvr  Coumadin on hold due to concern for GIB  Restart home BB  Supra therapeutic INR  4.8->5.2->6.9.    Received 1 dose of vitamin K on 4/6/2024, repeat INR from this morning pending  Dietary consult  Has poor nutrition, will try Eliquis on d/c if covered . May not be a great candidate for coumadin   Hypokalemia  Replace potassium  Urinary retention  Continue home Flomax.  Dotson catheter inserted yesterday evening.  Urology consulted  Lower extremity cellulitis: Continue Unasyn for 5 days  Peripheral artery disease, right AKA, neuropathy, dry left foot eschar: Was seen by vascular surgery recommend outpatient follow-up with Dr. Ledbetter  Restart home gabapentin.   Primary hypertension: continue bb given afib    Medical Decision Making: Nicolás Mahmood DO  4/7/2024  1:38 PM

## 2024-04-07 NOTE — PLAN OF CARE
Problem: Respiratory - Adult  Goal: Achieves optimal ventilation and oxygenation  4/7/2024 0818 by Shana Wood RCP  Outcome: Progressing     Problem: Respiratory - Adult  Goal: Able to breathe comfortably  Description: Able to breathe comfortably  4/7/2024 0818 by Shana Wood RCP  Outcome: Progressing     Problem: Respiratory - Adult  Goal: Clear lung sounds  4/7/2024 0818 by Shana Wood RCP  Outcome: Progressing

## 2024-04-07 NOTE — FLOWSHEET NOTE
04/07/24 1312   Treatment Team Notification   Reason for Communication   (eliquis)   Name of Team Member Notified Kaylen discharge planner   Treatment Team Role Other (Comment)  (discharge planner)   Method of Communication Call     Spoke with discharge planner, informed of consultation for Eliquis

## 2024-04-07 NOTE — PLAN OF CARE
Jorge Luis is resting well at this time with no s/s of pain. Gave PRN Fentanyl earlier in shift to good effect. He has no s/s of bleeding and Q6 H&H remains >7 at this time. He has Dotson draining clear, yellow urine. He has supplemental O2 at 1L/min via nc effective to maintain saturation in mid to high 90s. Pt is afebrile on ABT therapy for wound infection with no s/s of adverse reaction noted. He had visitor at bedside at beginning of shift and pt seemed to enjoy the visit. Pt has call light in reach and is using appropriately; safety maintained.    Problem: Pain  Goal: Verbalizes/displays adequate comfort level or baseline comfort level  Outcome: Progressing  Flowsheets (Taken 4/6/2024 1956)  Verbalizes/displays adequate comfort level or baseline comfort level:   Encourage patient to monitor pain and request assistance   Assess pain using appropriate pain scale   Administer analgesics based on type and severity of pain and evaluate response     Problem: Gastrointestinal - Adult  Goal: Minimal or absence of nausea and vomiting  Outcome: Progressing  Flowsheets (Taken 4/6/2024 2020)  Minimal or absence of nausea and vomiting: Administer ordered antiemetic medications as needed     Problem: Infection - Adult  Goal: Absence of infection at discharge  Outcome: Progressing  Flowsheets (Taken 4/6/2024 2020)  Absence of infection at discharge:   Assess and monitor for signs and symptoms of infection   Monitor lab/diagnostic results   Monitor all insertion sites i.e., indwelling lines, tubes and drains   Administer medications as ordered     Problem: Hematologic - Adult  Goal: Maintains hematologic stability  4/7/2024 0325 by Melissa Parrish RN  Flowsheets (Taken 4/7/2024 0325)  Maintains hematologic stability:   Monitor labs for bleeding or clotting disorders   Assess for signs and symptoms of bleeding or hemorrhage     Problem: Respiratory - Adult  Goal: Achieves optimal ventilation and oxygenation  Outcome:  Progressing  Flowsheets (Taken 4/6/2024 2020 by Melissa Parrish, RN)  Achieves optimal ventilation and oxygenation:   Assess for changes in respiratory status   Assess for changes in mentation and behavior   Oxygen supplementation based on oxygen saturation or arterial blood gases   Position to facilitate oxygenation and minimize respiratory effort   Respiratory therapy support as indicated     Problem: Genitourinary - Adult  Goal: Urinary catheter remains patent  4/7/2024 0323 by Melissa Parrish, RN  Outcome: Progressing  Flowsheets (Taken 4/6/2024 2020)  Urinary catheter remains patent: Assess patency of urinary catheter

## 2024-04-07 NOTE — PLAN OF CARE
Problem: Respiratory - Adult  Goal: Achieves optimal ventilation and oxygenation  4/6/2024 2033 by Mindy Holly RCP  Outcome: Progressing     Problem: Respiratory - Adult  Goal: Able to breathe comfortably  Description: Able to breathe comfortably  4/6/2024 2033 by Mindy Holly RCP  Outcome: Progressing     Problem: Respiratory - Adult  Goal: Clear lung sounds  4/6/2024 2033 by Mindy Holly RCP  Outcome: Progressing

## 2024-04-08 ENCOUNTER — ANESTHESIA (OUTPATIENT)
Dept: OPERATING ROOM | Age: 79
End: 2024-04-08
Payer: MEDICARE

## 2024-04-08 ENCOUNTER — ANESTHESIA EVENT (OUTPATIENT)
Dept: OPERATING ROOM | Age: 79
End: 2024-04-08
Payer: MEDICARE

## 2024-04-08 LAB
ANION GAP SERPL CALCULATED.3IONS-SCNC: 8 MMOL/L (ref 9–17)
BUN SERPL-MCNC: 13 MG/DL (ref 8–23)
BUN/CREAT SERPL: 11 (ref 9–20)
CALCIUM SERPL-MCNC: 8.2 MG/DL (ref 8.6–10.4)
CHLORIDE SERPL-SCNC: 111 MMOL/L (ref 98–107)
CO2 SERPL-SCNC: 23 MMOL/L (ref 20–31)
CREAT SERPL-MCNC: 1.2 MG/DL (ref 0.7–1.2)
EKG Q-T INTERVAL: 338 MS
EKG QRS DURATION: 84 MS
EKG QTC CALCULATION (BAZETT): 479 MS
EKG R AXIS: 35 DEGREES
EKG T AXIS: 60 DEGREES
EKG VENTRICULAR RATE: 121 BPM
GFR SERPL CREATININE-BSD FRML MDRD: 61 ML/MIN/1.73M2
GLUCOSE SERPL-MCNC: 100 MG/DL (ref 70–99)
HCT VFR BLD AUTO: 22.8 % (ref 40.7–50.3)
HCT VFR BLD AUTO: 24.2 % (ref 40.7–50.3)
HGB BLD-MCNC: 7.1 G/DL (ref 13–17)
HGB BLD-MCNC: 7.3 G/DL (ref 13–17)
INR PPP: 1.3
MICROORGANISM SPEC CULT: NORMAL
MICROORGANISM SPEC CULT: NORMAL
POTASSIUM SERPL-SCNC: 3.9 MMOL/L (ref 3.7–5.3)
PROTHROMBIN TIME: 16.1 SEC (ref 11.5–14.2)
SERVICE CMNT-IMP: NORMAL
SERVICE CMNT-IMP: NORMAL
SODIUM SERPL-SCNC: 142 MMOL/L (ref 135–144)
SPECIMEN DESCRIPTION: NORMAL
SPECIMEN DESCRIPTION: NORMAL

## 2024-04-08 PROCEDURE — 6360000002 HC RX W HCPCS: Performed by: NURSE ANESTHETIST, CERTIFIED REGISTERED

## 2024-04-08 PROCEDURE — 85018 HEMOGLOBIN: CPT

## 2024-04-08 PROCEDURE — C9113 INJ PANTOPRAZOLE SODIUM, VIA: HCPCS | Performed by: STUDENT IN AN ORGANIZED HEALTH CARE EDUCATION/TRAINING PROGRAM

## 2024-04-08 PROCEDURE — 6370000000 HC RX 637 (ALT 250 FOR IP): Performed by: INTERNAL MEDICINE

## 2024-04-08 PROCEDURE — 6370000000 HC RX 637 (ALT 250 FOR IP): Performed by: NURSE PRACTITIONER

## 2024-04-08 PROCEDURE — 6360000002 HC RX W HCPCS: Performed by: STUDENT IN AN ORGANIZED HEALTH CARE EDUCATION/TRAINING PROGRAM

## 2024-04-08 PROCEDURE — 0DB68ZX EXCISION OF STOMACH, VIA NATURAL OR ARTIFICIAL OPENING ENDOSCOPIC, DIAGNOSTIC: ICD-10-PCS | Performed by: INTERNAL MEDICINE

## 2024-04-08 PROCEDURE — 2580000003 HC RX 258: Performed by: INTERNAL MEDICINE

## 2024-04-08 PROCEDURE — 0DB88ZX EXCISION OF SMALL INTESTINE, VIA NATURAL OR ARTIFICIAL OPENING ENDOSCOPIC, DIAGNOSTIC: ICD-10-PCS | Performed by: INTERNAL MEDICINE

## 2024-04-08 PROCEDURE — 2580000003 HC RX 258: Performed by: STUDENT IN AN ORGANIZED HEALTH CARE EDUCATION/TRAINING PROGRAM

## 2024-04-08 PROCEDURE — 7100000000 HC PACU RECOVERY - FIRST 15 MIN: Performed by: INTERNAL MEDICINE

## 2024-04-08 PROCEDURE — 7100000001 HC PACU RECOVERY - ADDTL 15 MIN: Performed by: INTERNAL MEDICINE

## 2024-04-08 PROCEDURE — A4216 STERILE WATER/SALINE, 10 ML: HCPCS | Performed by: INTERNAL MEDICINE

## 2024-04-08 PROCEDURE — 85014 HEMATOCRIT: CPT

## 2024-04-08 PROCEDURE — 2580000003 HC RX 258: Performed by: NURSE PRACTITIONER

## 2024-04-08 PROCEDURE — 6360000002 HC RX W HCPCS: Performed by: INTERNAL MEDICINE

## 2024-04-08 PROCEDURE — A4216 STERILE WATER/SALINE, 10 ML: HCPCS | Performed by: STUDENT IN AN ORGANIZED HEALTH CARE EDUCATION/TRAINING PROGRAM

## 2024-04-08 PROCEDURE — 3700000001 HC ADD 15 MINUTES (ANESTHESIA): Performed by: INTERNAL MEDICINE

## 2024-04-08 PROCEDURE — 85610 PROTHROMBIN TIME: CPT

## 2024-04-08 PROCEDURE — C9113 INJ PANTOPRAZOLE SODIUM, VIA: HCPCS | Performed by: INTERNAL MEDICINE

## 2024-04-08 PROCEDURE — 3700000000 HC ANESTHESIA ATTENDED CARE: Performed by: INTERNAL MEDICINE

## 2024-04-08 PROCEDURE — 2709999900 HC NON-CHARGEABLE SUPPLY: Performed by: INTERNAL MEDICINE

## 2024-04-08 PROCEDURE — 93010 ELECTROCARDIOGRAM REPORT: CPT | Performed by: INTERNAL MEDICINE

## 2024-04-08 PROCEDURE — 88305 TISSUE EXAM BY PATHOLOGIST: CPT

## 2024-04-08 PROCEDURE — 94640 AIRWAY INHALATION TREATMENT: CPT

## 2024-04-08 PROCEDURE — 80048 BASIC METABOLIC PNL TOTAL CA: CPT

## 2024-04-08 PROCEDURE — 94761 N-INVAS EAR/PLS OXIMETRY MLT: CPT

## 2024-04-08 PROCEDURE — 36415 COLL VENOUS BLD VENIPUNCTURE: CPT

## 2024-04-08 PROCEDURE — 3609012400 HC EGD TRANSORAL BIOPSY SINGLE/MULTIPLE: Performed by: INTERNAL MEDICINE

## 2024-04-08 PROCEDURE — 6370000000 HC RX 637 (ALT 250 FOR IP)

## 2024-04-08 PROCEDURE — 1200000000 HC SEMI PRIVATE

## 2024-04-08 PROCEDURE — 88342 IMHCHEM/IMCYTCHM 1ST ANTB: CPT

## 2024-04-08 PROCEDURE — 2500000003 HC RX 250 WO HCPCS: Performed by: NURSE ANESTHETIST, CERTIFIED REGISTERED

## 2024-04-08 PROCEDURE — 99232 SBSQ HOSP IP/OBS MODERATE 35: CPT | Performed by: INTERNAL MEDICINE

## 2024-04-08 PROCEDURE — 2700000000 HC OXYGEN THERAPY PER DAY

## 2024-04-08 RX ORDER — IPRATROPIUM BROMIDE AND ALBUTEROL SULFATE 2.5; .5 MG/3ML; MG/3ML
SOLUTION RESPIRATORY (INHALATION)
Status: DISPENSED
Start: 2024-04-08 | End: 2024-04-09

## 2024-04-08 RX ORDER — IPRATROPIUM BROMIDE AND ALBUTEROL SULFATE 2.5; .5 MG/3ML; MG/3ML
1 SOLUTION RESPIRATORY (INHALATION)
Status: DISCONTINUED | OUTPATIENT
Start: 2024-04-08 | End: 2024-04-08

## 2024-04-08 RX ORDER — LIDOCAINE HYDROCHLORIDE 20 MG/ML
INJECTION, SOLUTION INFILTRATION; PERINEURAL PRN
Status: DISCONTINUED | OUTPATIENT
Start: 2024-04-08 | End: 2024-04-08 | Stop reason: SDUPTHER

## 2024-04-08 RX ORDER — SODIUM CHLORIDE 0.9 % (FLUSH) 0.9 %
5-40 SYRINGE (ML) INJECTION PRN
Status: DISCONTINUED | OUTPATIENT
Start: 2024-04-08 | End: 2024-04-08 | Stop reason: HOSPADM

## 2024-04-08 RX ORDER — HYDROCODONE BITARTRATE AND ACETAMINOPHEN 5; 325 MG/1; MG/1
1 TABLET ORAL ONCE
Status: COMPLETED | OUTPATIENT
Start: 2024-04-08 | End: 2024-04-08

## 2024-04-08 RX ORDER — ONDANSETRON 2 MG/ML
4 INJECTION INTRAMUSCULAR; INTRAVENOUS
Status: DISCONTINUED | OUTPATIENT
Start: 2024-04-08 | End: 2024-04-08 | Stop reason: HOSPADM

## 2024-04-08 RX ORDER — SODIUM CHLORIDE 0.9 % (FLUSH) 0.9 %
5-40 SYRINGE (ML) INJECTION EVERY 12 HOURS SCHEDULED
Status: DISCONTINUED | OUTPATIENT
Start: 2024-04-08 | End: 2024-04-08 | Stop reason: HOSPADM

## 2024-04-08 RX ORDER — NALOXONE HYDROCHLORIDE 0.4 MG/ML
INJECTION, SOLUTION INTRAMUSCULAR; INTRAVENOUS; SUBCUTANEOUS PRN
Status: DISCONTINUED | OUTPATIENT
Start: 2024-04-08 | End: 2024-04-08 | Stop reason: HOSPADM

## 2024-04-08 RX ORDER — SODIUM CHLORIDE 9 MG/ML
INJECTION, SOLUTION INTRAVENOUS PRN
Status: DISCONTINUED | OUTPATIENT
Start: 2024-04-08 | End: 2024-04-08 | Stop reason: HOSPADM

## 2024-04-08 RX ORDER — PROPOFOL 10 MG/ML
INJECTION, EMULSION INTRAVENOUS PRN
Status: DISCONTINUED | OUTPATIENT
Start: 2024-04-08 | End: 2024-04-08 | Stop reason: SDUPTHER

## 2024-04-08 RX ADMIN — ALBUTEROL SULFATE 2 PUFF: 90 AEROSOL, METERED RESPIRATORY (INHALATION) at 14:10

## 2024-04-08 RX ADMIN — LIDOCAINE HYDROCHLORIDE 50 MG: 20 INJECTION, SOLUTION INFILTRATION; PERINEURAL at 12:37

## 2024-04-08 RX ADMIN — TAMSULOSIN HYDROCHLORIDE 0.4 MG: 0.4 CAPSULE ORAL at 08:36

## 2024-04-08 RX ADMIN — PROPOFOL 30 MG: 10 INJECTION, EMULSION INTRAVENOUS at 12:37

## 2024-04-08 RX ADMIN — CHOLECALCIFEROL TAB 125 MCG (5000 UNIT) 5000 UNITS: 125 TAB at 08:36

## 2024-04-08 RX ADMIN — ATORVASTATIN CALCIUM 40 MG: 40 TABLET, FILM COATED ORAL at 20:30

## 2024-04-08 RX ADMIN — GABAPENTIN 300 MG: 300 CAPSULE ORAL at 08:36

## 2024-04-08 RX ADMIN — METOPROLOL TARTRATE 50 MG: 50 TABLET, FILM COATED ORAL at 08:44

## 2024-04-08 RX ADMIN — SODIUM CHLORIDE, PRESERVATIVE FREE 40 MG: 5 INJECTION INTRAVENOUS at 20:30

## 2024-04-08 RX ADMIN — SODIUM CHLORIDE, PRESERVATIVE FREE 40 MG: 5 INJECTION INTRAVENOUS at 08:36

## 2024-04-08 RX ADMIN — GABAPENTIN 300 MG: 300 CAPSULE ORAL at 20:30

## 2024-04-08 RX ADMIN — SODIUM CHLORIDE 3000 MG: 900 INJECTION INTRAVENOUS at 08:51

## 2024-04-08 RX ADMIN — CYANOCOBALAMIN TAB 1000 MCG 1000 MCG: 1000 TAB at 08:36

## 2024-04-08 RX ADMIN — ALBUTEROL SULFATE 2 PUFF: 90 AEROSOL, METERED RESPIRATORY (INHALATION) at 08:04

## 2024-04-08 RX ADMIN — PROPOFOL 30 MG: 10 INJECTION, EMULSION INTRAVENOUS at 12:43

## 2024-04-08 RX ADMIN — HYDROCODONE BITARTRATE AND ACETAMINOPHEN 1 TABLET: 5; 325 TABLET ORAL at 22:44

## 2024-04-08 RX ADMIN — GUAIFENESIN 600 MG: 600 TABLET ORAL at 20:30

## 2024-04-08 RX ADMIN — SODIUM CHLORIDE, PRESERVATIVE FREE 10 ML: 5 INJECTION INTRAVENOUS at 20:31

## 2024-04-08 RX ADMIN — ALBUTEROL SULFATE 2 PUFF: 90 AEROSOL, METERED RESPIRATORY (INHALATION) at 03:24

## 2024-04-08 RX ADMIN — SODIUM CHLORIDE: 9 INJECTION, SOLUTION INTRAVENOUS at 08:49

## 2024-04-08 RX ADMIN — SODIUM CHLORIDE 3000 MG: 900 INJECTION INTRAVENOUS at 02:53

## 2024-04-08 RX ADMIN — ALBUTEROL SULFATE 2 PUFF: 90 AEROSOL, METERED RESPIRATORY (INHALATION) at 20:42

## 2024-04-08 RX ADMIN — GUAIFENESIN 600 MG: 600 TABLET ORAL at 08:36

## 2024-04-08 RX ADMIN — FERROUS SULFATE TAB EC 325 MG (65 MG FE EQUIVALENT) 325 MG: 325 (65 FE) TABLET DELAYED RESPONSE at 08:36

## 2024-04-08 RX ADMIN — METOPROLOL TARTRATE 50 MG: 50 TABLET, FILM COATED ORAL at 20:31

## 2024-04-08 RX ADMIN — GABAPENTIN 300 MG: 300 CAPSULE ORAL at 13:58

## 2024-04-08 ASSESSMENT — PAIN DESCRIPTION - LOCATION: LOCATION: FOOT

## 2024-04-08 ASSESSMENT — PAIN SCALES - GENERAL
PAINLEVEL_OUTOF10: 2
PAINLEVEL_OUTOF10: 9

## 2024-04-08 ASSESSMENT — PAIN SCALES - WONG BAKER
WONGBAKER_NUMERICALRESPONSE: NO HURT
WONGBAKER_NUMERICALRESPONSE: NO HURT

## 2024-04-08 ASSESSMENT — PAIN DESCRIPTION - ORIENTATION: ORIENTATION: LEFT

## 2024-04-08 ASSESSMENT — PAIN - FUNCTIONAL ASSESSMENT: PAIN_FUNCTIONAL_ASSESSMENT: NONE - DENIES PAIN

## 2024-04-08 ASSESSMENT — PAIN DESCRIPTION - DESCRIPTORS: DESCRIPTORS: ACHING

## 2024-04-08 NOTE — CARE COORDINATION
Pt had EGD today - gastric and small bowel polyp.    Requesting [facility] for [facility].  Referral called to [facility] and they can accept.  DAISY initiated.

## 2024-04-08 NOTE — ANESTHESIA POSTPROCEDURE EVALUATION
Department of Anesthesiology  Postprocedure Note    Patient: Jorge Luis Banda  MRN: [id number]  YOB: 1945  Date of evaluation: 4/8/2024    Procedure Summary       Date: 04/08/24 Room / Location: [address]    Anesthesia Start: 1230 Anesthesia Stop: 1254    Procedure: ESOPHAGOGASTRODUODENOSCOPY BIOPSY Diagnosis:       Anemia, unspecified type      (Anemia, unspecified type [D64.9])    Surgeons: Vladimir Hoffmann MD Responsible Provider: Juan Jacinto MD    Anesthesia Type: general, MAC, TIVA ASA Status: 4            Anesthesia Type: No value filed.    Galindo Phase I: Galindo Score: 10    Galindo Phase II:      Anesthesia Post Evaluation    Patient location during evaluation: PACU  Patient participation: complete - patient participated  Level of consciousness: awake  Airway patency: patent  Nausea & Vomiting: no nausea  Cardiovascular status: blood pressure returned to baseline  Respiratory status: acceptable  Hydration status: euvolemic  Comments: Multimodal analgesia pain management as indicated by procedure  Multimodal analgesia pain management approach  Pain management: adequate    No notable events documented.

## 2024-04-08 NOTE — OP NOTE
EGD NOTE      Patient:   Jorge Luis Banda    :    1945    Facility:   [facility]  Referring/PCP: Lucila Machado APRN - CNP    Procedure:   Esophagogastroduodenoscopy   Date:     2024   Endoscopist:  Vladimir Hoffmann D.O.  Assistant:                  None    Preoperative Diagnosis:     Melena  Anemia    Postoperative Diagnosis:    Gastric and small bowel polyp    Anesthesia:  MAC    Complications: None    Description of Procedure:  Informed consent was obtained after explanation of the procedure including indications, description of the procedure,  benefits and possible risks and complications of the procedure, and alternatives. Questions were answered.  The patient's history was reviewed and a directed physical examination was performed prior to the procedure.    Patient was monitored throughout the procedure with pulse oximetry and periodic assessment of vital signs. Patient was sedated as noted above. With the patient in the left lateral decubitus position, the Olympus videoendoscope was placed in the patient's mouth and under direct visualization passed into the esophagus.  Visualization of the esophagus, stomach, and duodenum was performed during both introduction and withdrawal of the endoscope and retroflexed view of the proximal stomach was obtained. The scope was passed to the 2nd portion of the duodenum.  The patient tolerated the procedure well and was taken to the recovery area in good condition.    EBL: none  Specimen:  Polyps  Implants: None      Findings::   Esophagus: normal.   Stomach: Small sessile polyp in body, biopsied.  Surgically altered anatomy.  No bleeding stigmata.  Moderate amount of clumped up food in proximal body.  Small bowel: Medium sized polyp proximal small bowel, biopsied.  Questionable prominent fold.    Recommendations:   -Patient needs colonoscopy but refusing still as he cannot and will not pursue prep again.  VCE can be offered in the outpatient setting.  Will

## 2024-04-08 NOTE — PLAN OF CARE
Problem: Respiratory - Adult  Goal: Achieves optimal ventilation and oxygenation  4/8/2024 0906 by Makayla Bonilla RCP  Outcome: Progressing  4/8/2024 0440 by Melissa Parrish RN  Outcome: Progressing  Flowsheets (Taken 4/7/2024 2000)  Achieves optimal ventilation and oxygenation:   Assess for changes in respiratory status   Assess for changes in mentation and behavior   Position to facilitate oxygenation and minimize respiratory effort   Oxygen supplementation based on oxygen saturation or arterial blood gases   Respiratory therapy support as indicated  Goal: Able to breathe comfortably  Description: Able to breathe comfortably  Outcome: Progressing  Goal: Clear lung sounds  Outcome: Progressing

## 2024-04-08 NOTE — PROGRESS NOTES
Comprehensive Nutrition Assessment    Type and Reason for Visit:  Consult (Oral nutrition supplements)    Nutrition Recommendations/Plan:   Regular diet  Start Ensure Plus High Protein 2x/day  Monitor p.o intakes, diet tolerance, GI function and labs     Malnutrition Assessment:  Malnutrition Status:  At risk for malnutrition (Comment) (04/08/24 0188)        Nutrition Assessment:    Nutrition consult received for oral nutrition supplements. Patient admission is related to melena, GI bleed and UMM. Patient had dark stools for several days prior to admission. Patient has been transfused two units of PRBC for low hemoglobin. Current hemoglobin is 7.1 (L). Patient was NPO this morning for EGD which found gastric and small bowel polyps (4/8). Diet advanced to Regular. Ensure Plus High Protein 2x/day started. Monitor p.o intakes, GI function and labs.    Nutrition Related Findings:    Edema: +1 pitting LLE. Active bowel sounds, dark stools. Transfused 2 units PRBC since admission. Hgb: 7.1 (L) (4/8) Right AKA. Wound Type: Deep Tissue Injury (left foot traumatic)       Current Nutrition Intake & Therapies:    Average Meal Intake: 51-75%  Average Supplements Intake: Unable to assess  ADULT DIET; Regular  ADULT ORAL NUTRITION SUPPLEMENT; Breakfast, Dinner; Standard High Calorie/High Protein Oral Supplement    Anthropometric Measures:  Height: 182.9 cm (6')  Ideal Body Weight (IBW): 178 lbs (81 kg)       Current Body Weight: 74.2 kg (163 lb 9.3 oz), 91.9 % IBW. Weight Source: Bed Scale  Current BMI (kg/m2): 22.2        Weight Adjustment For: Amputation  Total Adjusted Percentage (Calculated): 10.1  Adjusted Ideal Body Weight (lbs) (Calculated): 160 lbs  Adjusted Ideal Body Weight (kg) (Calculated): 72.73 kg  Adjusted % Ideal Body Weight (Calculated): 102.2  Adjusted BMI (kg/m2) (Calculated): 24.4  BMI Categories: Normal Weight (BMI 22.0 to 24.9) age over 65    Estimated Daily Nutrient Needs:  Energy Requirements Based On:

## 2024-04-08 NOTE — PROGRESS NOTES
Ry Traylor MD   Urology Progress Note            Subjective: Follow-up urinary retention bladder outlet obstruction    Patient Vitals for the past 24 hrs:   BP Temp Temp src Pulse Resp SpO2 Weight   04/08/24 0434 -- -- -- -- -- -- 74.2 kg (163 lb 9.6 oz)   04/08/24 0420 108/66 97.7 °F (36.5 °C) Oral 84 20 99 % --   04/08/24 0324 -- -- -- -- -- 96 % --   04/08/24 0012 115/72 98.6 °F (37 °C) Oral 83 18 95 % --   04/07/24 2124 -- -- -- -- -- 100 % --   04/07/24 2016 114/66 98.1 °F (36.7 °C) Oral 86 18 100 % --   04/07/24 1613 106/60 98.6 °F (37 °C) Oral 74 17 100 % --   04/07/24 1407 -- -- -- -- -- 98 % --   04/07/24 1157 (!) 97/58 98.1 °F (36.7 °C) Oral 89 14 100 % --   04/07/24 0820 -- -- -- -- -- 99 % --   04/07/24 0742 125/77 97.5 °F (36.4 °C) Oral (!) 125 15 100 % --   04/07/24 0538 -- -- -- -- -- -- 71 kg (156 lb 8 oz)       Intake/Output Summary (Last 24 hours) at 4/8/2024 0449  Last data filed at 4/7/2024 1932  Gross per 24 hour   Intake 1544.44 ml   Output 350 ml   Net 1194.44 ml       Recent Labs     04/05/24  0720 04/05/24  1048 04/06/24  0458 04/06/24  1514 04/07/24  0103 04/07/24  0706 04/07/24  1954   WBC 4.7  --  6.3  --   --   --   --    HGB 8.1*   < > 7.1*   < > 7.2* 7.8* 7.2*   HCT 26.4*   < > 22.7*   < > 22.6* 25.6* 22.9*   MCV 95.7  --  95.0  --   --   --   --      --  164  --   --   --   --     < > = values in this interval not displayed.     Recent Labs     04/05/24  0720 04/06/24  0458 04/07/24  0706    140 138   K 4.3 4.3 3.3*   * 111* 107   CO2 19* 21 21   BUN 19 19 14   CREATININE 1.4* 1.5* 1.1       No results for input(s): \"COLORU\", \"PHUR\", \"LABCAST\", \"WBCUA\", \"RBCUA\", \"MUCUS\", \"TRICHOMONAS\", \"YEAST\", \"BACTERIA\", \"CLARITYU\", \"SPECGRAV\", \"LEUKOCYTESUR\", \"UROBILINOGEN\", \"BILIRUBINUR\", \"BLOODU\" in the last 72 hours.    Invalid input(s): \"NITRATE\", \"GLUCOSEUKETONESUAMORPHOUS\"    Additional Lab/culture results:    Physical Exam: Patient alert  not in acute distress, he had a Dotson catheter inserted yesterday for a postvoid residual of 450 mL, patient tolerating the catheter  The patient was admitted on April 3 with left leg pain open wounds in the leg and swelling GI consulted for dark stools, and we were consulted for management of urinary retention presently with indwelling Dotson catheter.  Patient in bed not in acute distress, tolerating catheter well urine dark kyara  The patient has had intermittent episodes of bladder outlet obstructive symptoms  He has not been evaluated by urology in the past  Interval Imaging Findings:  Renal ultrasound pending  Impression:    Patient Active Problem List   Diagnosis    Skin eschar    Stenosis of left carotid artery    History of arterial bypass of lower extremity    S/P AKA (above knee amputation) unilateral, right (HCC)    Primary hypertension    Persistent wound pain    Peripheral arterial disease (HCC)    Ulcer of left foot (HCC)    Neuropathy    Malignant neoplasm of lung (HCC)    Intermittent claudication of left lower extremity due to atherosclerosis (HCC)    Frequency of urination    Former smoker    Embolism and thrombosis of artery (HCC)    Dyslipidemia    Closed fracture of left humerus    Closed fracture of surgical neck of humerus    Acute exacerbation of chronic obstructive airways disease (HCC)    Cellulitis of left foot    Arteriosclerosis of coronary artery    Age-related osteoporosis without current pathological fracture    Adenocarcinoma of lung (HCC)    Wound infection    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)    Melena       Plan: Maintain indwelling Dotson    Ry Traylor MD  4:49 AM 4/8/2024

## 2024-04-08 NOTE — PLAN OF CARE
Jorge Luis has been resting well this shift with no overt s/s or c/o increased pain and has not requested PRN intervention when discussing pain. He has wound to L foot ZINA with no drainage noted; some swelling noted to extremity and skin is pink. Pt is afebrile on ABT therapy for wound infection with no s/s of adverse reaction noted. He has supplemental O2 at 2L/min; he woke with c/o SOB at 0320, and PRN breathing tx given to good effect. Pt has call light in reach and is using appropriately; safety maintained.    Problem: Pain  Goal: Verbalizes/displays adequate comfort level or baseline comfort level  Outcome: Progressing  Flowsheets (Taken 4/7/2024 2016)  Verbalizes/displays adequate comfort level or baseline comfort level:   Encourage patient to monitor pain and request assistance   Assess pain using appropriate pain scale   Administer analgesics based on type and severity of pain and evaluate response     Problem: Skin/Tissue Integrity - Adult  Goal: Incisions, wounds, or drain sites healing without S/S of infection  Outcome: Progressing  Flowsheets (Taken 4/7/2024 2000)  Incisions, Wounds, or Drain Sites Healing Without Sign and Symptoms of Infection: TWICE DAILY: Assess and document skin integrity     Problem: Infection - Adult  Goal: Absence of infection at discharge  Outcome: Progressing  Flowsheets (Taken 4/7/2024 2000)  Absence of infection at discharge:   Assess and monitor for signs and symptoms of infection   Monitor lab/diagnostic results   Monitor all insertion sites i.e., indwelling lines, tubes and drains   Administer medications as ordered     Problem: Respiratory - Adult  Goal: Achieves optimal ventilation and oxygenation  Outcome: Progressing  Flowsheets (Taken 4/7/2024 2000)  Achieves optimal ventilation and oxygenation:   Assess for changes in respiratory status   Assess for changes in mentation and behavior   Position to facilitate oxygenation and minimize respiratory effort   Oxygen

## 2024-04-08 NOTE — RT PROTOCOL NOTE
RT Inhaler-Nebulizer Bronchodilator Protocol Note    There is a bronchodilator order in the chart from a provider indicating to follow the RT Bronchodilator Protocol and there is an “Initiate RT Inhaler-Nebulizer Bronchodilator Protocol” order as well (see protocol at bottom of note).    CXR Findings:  No results found.    The findings from the last RT Protocol Assessment were as follows:   History Pulmonary Disease: Smoker 15 pack years or more  Respiratory Pattern: Dyspnea on exertion or RR 21-25 bpm  Breath Sounds: Inspiratory and expiratory or bilateral wheezing and/or rhonchi  Cough: Strong, productive  Indication for Bronchodilator Therapy: On home bronchodilators  Bronchodilator Assessment Score: 10    Aerosolized bronchodilator medication orders have been revised according to the RT Inhaler-Nebulizer Bronchodilator Protocol below.    Respiratory Therapist to perform RT Therapy Protocol Assessment initially then follow the protocol.  Repeat RT Therapy Protocol Assessment PRN for score 0-3 or on second treatment, BID, and PRN for scores above 3.    No Indications - adjust the frequency to every 6 hours PRN wheezing or bronchospasm, if no treatments needed after 48 hours then discontinue using Per Protocol order mode.     If indication present, adjust the RT bronchodilator orders based on the Bronchodilator Assessment Score as indicated below.  Use Inhaler orders unless patient has one or more of the following: on home nebulizer, not able to hold breath for 10 seconds, is not alert and oriented, cannot activate and use MDI correctly, or respiratory rate 25 breaths per minute or more, then use the equivalent nebulizer order(s) with same Frequency and PRN reasons based on the score.  If a patient is on this medication at home then do not decrease Frequency below that used at home.    0-3 - enter or revise RT bronchodilator order(s) to equivalent RT Bronchodilator order with Frequency of every 4 hours PRN for  wheezing or increased work of breathing using Per Protocol order mode.        4-6 - enter or revise RT Bronchodilator order(s) to two equivalent RT bronchodilator orders with one order with BID Frequency and one order with Frequency of every 4 hours PRN wheezing or increased work of breathing using Per Protocol order mode.        7-10 - enter or revise RT Bronchodilator order(s) to two equivalent RT bronchodilator orders with one order with TID Frequency and one order with Frequency of every 4 hours PRN wheezing or increased work of breathing using Per Protocol order mode.       11-13 - enter or revise RT Bronchodilator order(s) to one equivalent RT bronchodilator order with QID Frequency and an Albuterol order with Frequency of every 4 hours PRN wheezing or increased work of breathing using Per Protocol order mode.      Greater than 13 - enter or revise RT Bronchodilator order(s) to one equivalent RT bronchodilator order with every 4 hours Frequency and an Albuterol order with Frequency of every 2 hours PRN wheezing or increased work of breathing using Per Protocol order mode.     RT to enter RT Home Evaluation for COPD & MDI Assessment order using Per Protocol order mode.    Electronically signed by eddie lou RCP on 4/8/2024 at 9:10 AM

## 2024-04-08 NOTE — ANESTHESIA PRE PROCEDURE
Department of Anesthesiology  Preprocedure Note       Name:  Jorge Luis Banda   Age:  78 y.o.  :  1945                                          MRN:  [id number]         Date:  2024      Surgeon: Surgeon(s):  Vladimir Hoffmann MD    Procedure: Procedure(s):  ESOPHAGOGASTRODUODENOSCOPY    Medications prior to admission:   Prior to Admission medications    Medication Sig Start Date End Date Taking? Authorizing Provider   metoprolol succinate (TOPROL XL) 100 MG extended release tablet Take 1 tablet by mouth daily   Yes ProviderAda MD   aspirin 81 MG EC tablet Take 1 tablet by mouth daily   Yes Provider, MD Ada   gabapentin (NEURONTIN) 300 MG capsule Take 1 capsule by mouth 3 times daily.   Yes ProviderAda MD   warfarin (COUMADIN) 5 MG tablet Take 0.5-1 tablets by mouth See Admin Instructions Indications: 5mg on Mon and Fri, 2.5mg all other days    Ada Morris MD   atorvastatin (LIPITOR) 40 MG tablet atorvastatin 40 mg tablet    ProviderAda MD       Current medications:    Current Facility-Administered Medications   Medication Dose Route Frequency Provider Last Rate Last Admin   • albuterol sulfate HFA (PROVENTIL;VENTOLIN;PROAIR) 108 (90 Base) MCG/ACT inhaler 2 puff  2 puff Inhalation TID Pranay Cooper, DO   2 puff at 24 0804   • potassium chloride (KLOR-CON M) extended release tablet 40 mEq  40 mEq Oral PRN Raquel Mahmoodd I, DO   40 mEq at 24 1019    Or   • potassium bicarb-citric acid (EFFER-K) effervescent tablet 40 mEq  40 mEq Oral PRN Héctoreh, Mohammad I, DO        Or   • potassium chloride 10 mEq/100 mL IVPB (Peripheral Line)  10 mEq IntraVENous PRN Héctoreh, Mohammad I, DO       • guaiFENesin (MUCINEX) extended release tablet 600 mg  600 mg Oral BID Ela, Raqueld I, DO   600 mg at 24 0836   • ampicillin-sulbactam (UNASYN) 3,000 mg in sodium chloride 0.9 % 100 mL IVPB (mini-bag)  3,000 mg IntraVENous Q6H Raquel Mahmoodd I,

## 2024-04-08 NOTE — PLAN OF CARE
Problem: Respiratory - Adult  Goal: Achieves optimal ventilation and oxygenation  4/8/2024 0909 by Makayla Bonilla RCP  Outcome: Progressing  4/8/2024 0906 by Makayla Bonilla RCP  Outcome: Progressing  4/8/2024 0440 by Melissa Parrish, RN  Outcome: Progressing  Flowsheets (Taken 4/7/2024 2000)  Achieves optimal ventilation and oxygenation:   Assess for changes in respiratory status   Assess for changes in mentation and behavior   Position to facilitate oxygenation and minimize respiratory effort   Oxygen supplementation based on oxygen saturation or arterial blood gases   Respiratory therapy support as indicated  Goal: Able to breathe comfortably  Description: Able to breathe comfortably  4/8/2024 0909 by Makayla Bonilla RCP  Outcome: Progressing  4/8/2024 0906 by Makayla Bonilla RCP  Outcome: Progressing  Goal: Clear lung sounds  4/8/2024 0909 by Makayla Bonilla RCP  Outcome: Progressing  4/8/2024 0906 by Makayla Bonilla RCP  Outcome: Progressing

## 2024-04-08 NOTE — CONSULTS
in sodium chloride (PF) 0.9 % 10 mL injection, 40 mg, IntraVENous, Q12H, Theodore Pascual MD, 40 mg at 04/07/24 2110    albuterol sulfate HFA (PROVENTIL;VENTOLIN;PROAIR) 108 (90 Base) MCG/ACT inhaler 2 puff, 2 puff, Inhalation, Q4H PRN, Blood, Pranay P, DO, 2 puff at 04/05/24 1436    atorvastatin (LIPITOR) tablet 40 mg, 40 mg, Oral, Nightly, Malissa Gaston, POONAM - NP, 40 mg at 04/07/24 2111    vitamin D3 (CHOLECALCIFEROL) tablet 5,000 Units, 5,000 Units, Oral, Daily, Malissa Gaston APRN - NP, 5,000 Units at 04/07/24 0855    vitamin B-12 (CYANOCOBALAMIN) tablet 1,000 mcg, 1,000 mcg, Oral, Daily, Malissa Gaston APRN - NP, 1,000 mcg at 04/07/24 0855    tamsulosin (FLOMAX) capsule 0.4 mg, 0.4 mg, Oral, Daily, Malissa Gaston APRN - NP, 0.4 mg at 04/07/24 0855    sodium chloride flush 0.9 % injection 5-40 mL, 5-40 mL, IntraVENous, 2 times per day, Malissa Gaston APRN - NP, 10 mL at 04/07/24 0913    sodium chloride flush 0.9 % injection 5-40 mL, 5-40 mL, IntraVENous, PRN, Malissa Gaston APRN - NP    0.9 % sodium chloride infusion, , IntraVENous, PRN, Malissa Gaston APRN - NP    ondansetron (ZOFRAN-ODT) disintegrating tablet 4 mg, 4 mg, Oral, Q8H PRN **OR** ondansetron (ZOFRAN) injection 4 mg, 4 mg, IntraVENous, Q6H PRN, Malissa Gaston APRN - NP, 4 mg at 04/05/24 0113    acetaminophen (TYLENOL) tablet 650 mg, 650 mg, Oral, Q6H PRN **OR** acetaminophen (TYLENOL) suppository 650 mg, 650 mg, Rectal, Q6H PRN, Malissa Gaston APRN - NP    metoprolol tartrate (LOPRESSOR) tablet 50 mg, 50 mg, Oral, BID, Suzy Morales PA, 50 mg at 04/07/24 2111    glucose chewable tablet 16 g, 4 tablet, Oral, PRN, Pallavi Lopez MD    dextrose bolus 10% 125 mL, 125 mL, IntraVENous, PRN **OR** dextrose bolus 10% 250 mL, 250 mL, IntraVENous, PRN, Pallavi Lopez MD    glucagon injection 1 mg, 1 mg, SubCUTAneous, PRN, Pallavi Lopez MD    dextrose 10 % infusion, , IntraVENous, Continuous PRN, Pallavi Lopez MD    Allergies:  No Known  diastolic dysfunction with increased LAP.   Aortic Valve: Trileaflet valve. Mildly calcified cusp.   Mitral Valve: Mildly calcified leaflet. Mild regurgitation.   Tricuspid Valve: Moderate regurgitation. Moderately elevated RVSP, consistent with moderate pulmonary hypertension.   Image quality is good.     XR FEMUR RIGHT (MIN 2 VIEWS)    Result Date: 4/4/2024  EXAMINATION: 2 XRAY VIEWS OF THE RIGHT FEMUR 4/4/2024 8:39 am COMPARISON: None. HISTORY: ORDERING SYSTEM PROVIDED HISTORY: pain ,bleeding, concern for soft tissue pathology TECHNOLOGIST PROVIDED HISTORY: pain ,bleeding, concern for soft tissue pathology Reason for Exam: Pain, bleeding, concern for soft tissue pathology FINDINGS: Status post above the knee amputation of the right lower extremity at the level of the distal femoral diaphysis.  Evidence of remote ORIF of the right femoral neck.  Right femoral artery stent is noted.  Atherosclerotic calcifications of the right femoral artery. No acute fracture or dislocation.  Postsurgical changes of the soft tissue stump.  No definite evidence of soft tissue gas.     Status post above the knee amputation of the right lower extremity to the level of the distal femoral diaphysis.  No soft tissue gas. No cortical destruction or acute osseous abnormalities.     Vascular duplex lower extremity venous left    Result Date: 4/4/2024    No evidence of deep vein or superficial vein thrombosis in the left lower extremity. Vessels demonstrate normal compressibility, color filling, and phasic and spontaneous flow.     XR FOOT LEFT (MIN 3 VIEWS)    Result Date: 4/3/2024  EXAMINATION: THREE XRAY VIEWS OF THE LEFT FOOT 4/3/2024 2:40 pm COMPARISON: None. HISTORY: ORDERING SYSTEM PROVIDED HISTORY: c/f L foot infection TECHNOLOGIST PROVIDED HISTORY: c/f L foot infection Reason for Exam: Anterior and lateral foot wounds with swelling FINDINGS: The joints are normal in alignment.  There are no fractures.  No lytic or blastic lesions

## 2024-04-08 NOTE — PROGRESS NOTES
Physical Therapy  DATE: 2024    NAME: Jorge Luis Banda  MRN: [id number]   : 1945    Patient not seen this date for Physical Therapy due to:      [] Cancel by RN or physician due to:    [] Hemodialysis    [] Critical Lab Value Level     [] Blood transfusion in progress    [] Acute or unstable cardiovascular status   _MAP < 55 or more than >115  _HR < 40 or > 130    [] Acute or unstable pulmonary status   -FiO2 > 60%   _RR < 5 or >40    _O2 sats < 85%    [] Strict Bedrest    [x] Off Unit for surgery or procedure: EGD    [] Off Unit for testing       [] Pending imaging to R/O fracture    [] Refusal by Patient      [] Other      [] PT being discontinued at this time. Patient independent. No further needs.     [] PT being discontinued at this time as the patient has been transferred to hospice care. No further needs.      Brianna Briceno, PTA

## 2024-04-08 NOTE — DISCHARGE INSTR - COC
Continuity of Care Form    Patient Name: Jorge Luis Banda   :  1945  MRN:  [id number]    Admit date:  4/3/2024  Discharge date:      Code Status Order: Full Code   Advance Directives:     Admitting Physician:  Juve HUDSON DO  PCP: Lucila Machado, APRN - CNP    Discharging Nurse: NATALEE  Discharging Hospital Unit/Room#: 1010/1010-02  Discharging Unit Phone Number: [phone]    Emergency Contact:   Extended Emergency Contact Information  Primary Emergency Contact: Rita Villagran  Home Phone: [phone]  Mobile Phone: [phone]  Relation: Other  Preferred language: English   needed? No    Past Surgical History:  Past Surgical History:   Procedure Laterality Date    ABOVE KNEE AMPUTATION Right 2020       Immunization History:     There is no immunization history on file for this patient.    Active Problems:  Patient Active Problem List   Diagnosis Code    Skin eschar R23.4    Stenosis of left carotid artery I65.22    History of arterial bypass of lower extremity Z95.828    S/P AKA (above knee amputation) unilateral, right ([facility]) Z89.611    Primary hypertension I10    Persistent wound pain R52    Peripheral arterial disease ([facility]) I73.9    Ulcer of left foot ([facility]) L97.529    Neuropathy G62.9    Malignant neoplasm of lung ([facility]) C34.90    Intermittent claudication of left lower extremity due to atherosclerosis ([facility]) I70.212    Frequency of urination R35.0    Former smoker Z87.891    Embolism and thrombosis of artery ([facility]) I74.9    Dyslipidemia E78.5    Closed fracture of left humerus S42.302A    Closed fracture of surgical neck of humerus S42.213A    Acute exacerbation of chronic obstructive airways disease ([facility]) J44.1    Cellulitis of left foot L03.116    Arteriosclerosis of coronary artery I25.10    Age-related osteoporosis without current pathological fracture M81.0    Adenocarcinoma of lung ([facility]) C34.90    Wound infection T14.8XXA, L08.9    History of above-knee amputation of

## 2024-04-08 NOTE — PLAN OF CARE
Patient Aox4, sleeping most of the day  EGD performed, see GI note  CT chest ordered d/t red sputum, will be tested this evening  Dotson remains in place, 800mL clear yellow output this shift  Receiving IV abx  Wound to left foot open to air. Remains dry, pink, swollen, and warm to the touch. No pain meds needed this shift  VSS, call light within reach, safety maintained        Problem: Safety - Adult  Goal: Free from fall injury  4/8/2024 1819 by Reyes, Brittnie, RN  Outcome: Progressing     Problem: Discharge Planning  Goal: Discharge to home or other facility with appropriate resources  Outcome: Progressing     Problem: Pain  Goal: Verbalizes/displays adequate comfort level or baseline comfort level  4/8/2024 1819 by Reyes, Brittnie, RN  Outcome: Progressing     Problem: Skin/Tissue Integrity - Adult  Goal: Incisions, wounds, or drain sites healing without S/S of infection  4/8/2024 1819 by Reyes, Brittnie, RN  Outcome: Progressing     Problem: Gastrointestinal - Adult  Goal: Minimal or absence of nausea and vomiting  Outcome: Progressing     Problem: Gastrointestinal - Adult  Goal: Maintains or returns to baseline bowel function  Outcome: Progressing     Problem: Infection - Adult  Goal: Absence of infection at discharge  4/8/2024 1819 by Reyes, Brittnie, RN  Outcome: Progressing     Problem: Hematologic - Adult  Goal: Maintains hematologic stability  Outcome: Progressing     Problem: Genitourinary - Adult  Goal: Absence of urinary retention  Outcome: Progressing     Problem: Genitourinary - Adult  Goal: Urinary catheter remains patent  4/8/2024 1819 by Reyes, Brittnie, RN  Outcome: Progressing     Problem: Respiratory - Adult  Goal: Achieves optimal ventilation and oxygenation  4/8/2024 1819 by Reyes, Brittnie, RN  Outcome: Progressing     Problem: Skin/Tissue Integrity  Goal: Absence of new skin breakdown  Description: 1.  Monitor for areas of redness and/or skin breakdown  2.  Assess vascular access sites  hourly  3.  Every 4-6 hours minimum:  Change oxygen saturation probe site  4.  Every 4-6 hours:  If on nasal continuous positive airway pressure, respiratory therapy assess nares and determine need for appliance change or resting period.  Outcome: Progressing     Problem: ABCDS Injury Assessment  Goal: Absence of physical injury  Outcome: Progressing     Problem: Nutrition Deficit:  Goal: Optimize nutritional status  Outcome: Progressing

## 2024-04-09 ENCOUNTER — TELEPHONE (OUTPATIENT)
Dept: PHARMACY | Age: 79
End: 2024-04-09

## 2024-04-09 ENCOUNTER — APPOINTMENT (OUTPATIENT)
Dept: CT IMAGING | Age: 79
DRG: 377 | End: 2024-04-09
Payer: MEDICARE

## 2024-04-09 LAB
ANION GAP SERPL CALCULATED.3IONS-SCNC: 7 MMOL/L (ref 9–17)
BUN SERPL-MCNC: 12 MG/DL (ref 8–23)
BUN/CREAT SERPL: 11 (ref 9–20)
CALCIUM SERPL-MCNC: 8.1 MG/DL (ref 8.6–10.4)
CHLORIDE SERPL-SCNC: 111 MMOL/L (ref 98–107)
CO2 SERPL-SCNC: 23 MMOL/L (ref 20–31)
CREAT SERPL-MCNC: 1.1 MG/DL (ref 0.7–1.2)
GFR SERPL CREATININE-BSD FRML MDRD: 68 ML/MIN/1.73M2
GLUCOSE SERPL-MCNC: 115 MG/DL (ref 70–99)
HCT VFR BLD AUTO: 22.9 % (ref 40.7–50.3)
HCT VFR BLD AUTO: 24.5 % (ref 40.7–50.3)
HGB BLD-MCNC: 7.1 G/DL (ref 13–17)
HGB BLD-MCNC: 7.6 G/DL (ref 13–17)
INR PPP: 1.2
POTASSIUM SERPL-SCNC: 3.7 MMOL/L (ref 3.7–5.3)
PROTHROMBIN TIME: 15.2 SEC (ref 11.5–14.2)
SODIUM SERPL-SCNC: 141 MMOL/L (ref 135–144)

## 2024-04-09 PROCEDURE — 6370000000 HC RX 637 (ALT 250 FOR IP): Performed by: INTERNAL MEDICINE

## 2024-04-09 PROCEDURE — 70450 CT HEAD/BRAIN W/O DYE: CPT

## 2024-04-09 PROCEDURE — 94761 N-INVAS EAR/PLS OXIMETRY MLT: CPT

## 2024-04-09 PROCEDURE — 6360000002 HC RX W HCPCS: Performed by: INTERNAL MEDICINE

## 2024-04-09 PROCEDURE — 94640 AIRWAY INHALATION TREATMENT: CPT

## 2024-04-09 PROCEDURE — 85014 HEMATOCRIT: CPT

## 2024-04-09 PROCEDURE — 99232 SBSQ HOSP IP/OBS MODERATE 35: CPT | Performed by: INTERNAL MEDICINE

## 2024-04-09 PROCEDURE — 1200000000 HC SEMI PRIVATE

## 2024-04-09 PROCEDURE — 85610 PROTHROMBIN TIME: CPT

## 2024-04-09 PROCEDURE — 80048 BASIC METABOLIC PNL TOTAL CA: CPT

## 2024-04-09 PROCEDURE — 2580000003 HC RX 258: Performed by: INTERNAL MEDICINE

## 2024-04-09 PROCEDURE — C9113 INJ PANTOPRAZOLE SODIUM, VIA: HCPCS | Performed by: INTERNAL MEDICINE

## 2024-04-09 PROCEDURE — 71260 CT THORAX DX C+: CPT

## 2024-04-09 PROCEDURE — A4216 STERILE WATER/SALINE, 10 ML: HCPCS | Performed by: INTERNAL MEDICINE

## 2024-04-09 PROCEDURE — 85018 HEMOGLOBIN: CPT

## 2024-04-09 PROCEDURE — 6360000004 HC RX CONTRAST MEDICATION: Performed by: INTERNAL MEDICINE

## 2024-04-09 PROCEDURE — 2700000000 HC OXYGEN THERAPY PER DAY

## 2024-04-09 PROCEDURE — 36415 COLL VENOUS BLD VENIPUNCTURE: CPT

## 2024-04-09 RX ORDER — SODIUM CHLORIDE 0.9 % (FLUSH) 0.9 %
10 SYRINGE (ML) INJECTION PRN
Status: DISCONTINUED | OUTPATIENT
Start: 2024-04-09 | End: 2024-04-26

## 2024-04-09 RX ORDER — 0.9 % SODIUM CHLORIDE 0.9 %
100 INTRAVENOUS SOLUTION INTRAVENOUS ONCE
Status: DISCONTINUED | OUTPATIENT
Start: 2024-04-09 | End: 2024-05-11 | Stop reason: HOSPADM

## 2024-04-09 RX ADMIN — SODIUM CHLORIDE, PRESERVATIVE FREE 10 ML: 5 INJECTION INTRAVENOUS at 10:12

## 2024-04-09 RX ADMIN — ALBUTEROL SULFATE 2 PUFF: 90 AEROSOL, METERED RESPIRATORY (INHALATION) at 20:05

## 2024-04-09 RX ADMIN — CYANOCOBALAMIN TAB 1000 MCG 1000 MCG: 1000 TAB at 08:23

## 2024-04-09 RX ADMIN — METOPROLOL TARTRATE 50 MG: 50 TABLET, FILM COATED ORAL at 08:23

## 2024-04-09 RX ADMIN — FERROUS SULFATE TAB EC 325 MG (65 MG FE EQUIVALENT) 325 MG: 325 (65 FE) TABLET DELAYED RESPONSE at 08:23

## 2024-04-09 RX ADMIN — ALBUTEROL SULFATE 2 PUFF: 90 AEROSOL, METERED RESPIRATORY (INHALATION) at 14:34

## 2024-04-09 RX ADMIN — GUAIFENESIN 600 MG: 600 TABLET ORAL at 08:23

## 2024-04-09 RX ADMIN — ATORVASTATIN CALCIUM 40 MG: 40 TABLET, FILM COATED ORAL at 20:12

## 2024-04-09 RX ADMIN — GUAIFENESIN 600 MG: 600 TABLET ORAL at 20:12

## 2024-04-09 RX ADMIN — GABAPENTIN 300 MG: 300 CAPSULE ORAL at 14:39

## 2024-04-09 RX ADMIN — TAMSULOSIN HYDROCHLORIDE 0.4 MG: 0.4 CAPSULE ORAL at 08:22

## 2024-04-09 RX ADMIN — GABAPENTIN 300 MG: 300 CAPSULE ORAL at 20:12

## 2024-04-09 RX ADMIN — ALBUTEROL SULFATE 2 PUFF: 90 AEROSOL, METERED RESPIRATORY (INHALATION) at 08:07

## 2024-04-09 RX ADMIN — SODIUM CHLORIDE, PRESERVATIVE FREE 40 MG: 5 INJECTION INTRAVENOUS at 20:12

## 2024-04-09 RX ADMIN — GABAPENTIN 300 MG: 300 CAPSULE ORAL at 08:22

## 2024-04-09 RX ADMIN — SODIUM CHLORIDE, PRESERVATIVE FREE 10 ML: 5 INJECTION INTRAVENOUS at 20:14

## 2024-04-09 RX ADMIN — SODIUM CHLORIDE, PRESERVATIVE FREE 40 MG: 5 INJECTION INTRAVENOUS at 08:22

## 2024-04-09 RX ADMIN — IOPAMIDOL 75 ML: 755 INJECTION, SOLUTION INTRAVENOUS at 10:11

## 2024-04-09 RX ADMIN — CHOLECALCIFEROL TAB 125 MCG (5000 UNIT) 5000 UNITS: 125 TAB at 08:22

## 2024-04-09 RX ADMIN — Medication 100 ML: at 10:12

## 2024-04-09 ASSESSMENT — PAIN SCALES - GENERAL
PAINLEVEL_OUTOF10: 0
PAINLEVEL_OUTOF10: 4
PAINLEVEL_OUTOF10: 7

## 2024-04-09 NOTE — PLAN OF CARE
Pt continues to have red sputum, CT moved to morning.   Pt incontinent in brief X1, BM looked dark brown.   Pt asked for pain medication for Left foot pain, Norco given.  Pt maldonado in place for retention and draining well.   Bed locked and in lowest position, call light within reach, safety maintained     Problem: Safety - Adult  Goal: Free from fall injury  4/9/2024 0245 by Cuca Infante RN  Outcome: Progressing     Problem: Discharge Planning  Goal: Discharge to home or other facility with appropriate resources  4/9/2024 0245 by Cuca Infante RN  Outcome: Progressing  Flowsheets (Taken 4/8/2024 2000)  Discharge to home or other facility with appropriate resources: Identify barriers to discharge with patient and caregiver     Problem: Pain  Goal: Verbalizes/displays adequate comfort level or baseline comfort level  4/9/2024 0245 by Cuca Infante RN  Outcome: Progressing     Problem: Skin/Tissue Integrity - Adult  Goal: Incisions, wounds, or drain sites healing without S/S of infection  4/9/2024 0245 by Cuca Infante RN  Outcome: Progressing  Flowsheets (Taken 4/8/2024 2000)  Incisions, Wounds, or Drain Sites Healing Without Sign and Symptoms of Infection: ADMISSION and DAILY: Assess and document risk factors for pressure ulcer development     Problem: Gastrointestinal - Adult  Goal: Minimal or absence of nausea and vomiting  4/9/2024 0245 by Cuca Infante RN  Outcome: Progressing  Flowsheets (Taken 4/8/2024 2000)  Minimal or absence of nausea and vomiting: Administer IV fluids as ordered to ensure adequate hydration     Problem: Gastrointestinal - Adult  Goal: Maintains or returns to baseline bowel function  4/9/2024 0245 by Cuca Infante RN  Outcome: Progressing  Flowsheets (Taken 4/8/2024 2000)  Maintains or returns to baseline bowel function: Assess bowel function     Problem: Infection - Adult  Goal: Absence of infection at discharge  4/9/2024 0245 by Naresh

## 2024-04-09 NOTE — PROGRESS NOTES
Physical Therapy  DATE: 2024    NAME: Jorge Luis Banda  MRN: [id number]   : 1945    Patient not seen this date for Physical Therapy due to:      [] Cancel by RN or physician due to:    [] Hemodialysis    [] Critical Lab Value Level     [] Blood transfusion in progress    [] Acute or unstable cardiovascular status   _MAP < 55 or more than >115  _HR < 40 or > 130    [] Acute or unstable pulmonary status   -FiO2 > 60%   _RR < 5 or >40    _O2 sats < 85%    [] Strict Bedrest    [] Off Unit for surgery or procedure    [x] Off Unit for testing       CT this am.  Will check back as able. ANIL Benson notified.      [] Pending imaging to R/O fracture    [] Refusal by Patient      [] Other      [] PT being discontinued at this time. Patient independent. No further needs.     [] PT being discontinued at this time as the patient has been transferred to hospice care. No further needs.      Giselle Barrios, PT

## 2024-04-09 NOTE — RT PROTOCOL NOTE
wheezing or increased work of breathing using Per Protocol order mode.        4-6 - enter or revise RT Bronchodilator order(s) to two equivalent RT bronchodilator orders with one order with BID Frequency and one order with Frequency of every 4 hours PRN wheezing or increased work of breathing using Per Protocol order mode.        7-10 - enter or revise RT Bronchodilator order(s) to two equivalent RT bronchodilator orders with one order with TID Frequency and one order with Frequency of every 4 hours PRN wheezing or increased work of breathing using Per Protocol order mode.       11-13 - enter or revise RT Bronchodilator order(s) to one equivalent RT bronchodilator order with QID Frequency and an Albuterol order with Frequency of every 4 hours PRN wheezing or increased work of breathing using Per Protocol order mode.      Greater than 13 - enter or revise RT Bronchodilator order(s) to one equivalent RT bronchodilator order with every 4 hours Frequency and an Albuterol order with Frequency of every 2 hours PRN wheezing or increased work of breathing using Per Protocol order mode.     RT to enter RT Home Evaluation for COPD & MDI Assessment order using Per Protocol order mode.    Electronically signed by SHIRAZ VERAS RCP on 4/8/2024 at 8:46 PM

## 2024-04-09 NOTE — PROGRESS NOTES
Patient up to the bedside commode for a bowel movement. While assisting patient back to bed patient became very tachypnic with audible wheezes. Oxygen dropped to 88% on 3L but recovered to mid 90s after several minutes with the bed raised to 90 degrees. Patient also complained of a \"crushing\" sensation in his chest which he states is not new for him but worsening. Patient then complained that his left arm was numb and weak and he was unable to lift it. Attending notified. After several minutes sensation began to return to left arm but patient still complained of tingling. Left hand  are weak compared to right. CT head ordered, patient taken down to CT. Awaiting return

## 2024-04-09 NOTE — TELEPHONE ENCOUNTER
[facility] Note  Spoke with patient's significant other, Rita, regarding warfarin management. Patient currently admitted to [facility], I had tried to call Rita multiple times in the past and never got ahold of her, spoke with a care coordinator at [facility] last week and patient scheduled for anticoagulation visit today however patient still admitted to [facility]. Rita to call clinic when patient gets discharged and a f/u INR appointment will be scheduled. I have not yet met this patient.    Terese Duarte, PharmD  [facility]  4/9/2024 11:55 AM

## 2024-04-09 NOTE — PROGRESS NOTES
Ry Traylor MD   Urology Progress Note            Subjective: Follow-up urinary retention bladder outlet obstruction enlarged prostate    Patient Vitals for the past 24 hrs:   BP Temp Temp src Pulse Resp SpO2 Height Weight   04/09/24 0548 -- -- -- -- -- -- -- 74.5 kg (164 lb 4.8 oz)   04/09/24 0424 -- -- -- 76 18 99 % -- --   04/09/24 0404 120/67 99.1 °F (37.3 °C) Oral 78 18 100 % -- --   04/09/24 0054 112/63 98.2 °F (36.8 °C) Oral 75 18 100 % -- --   04/08/24 2314 -- -- -- -- 18 -- -- --   04/08/24 2042 -- -- -- -- -- 99 % -- --   04/08/24 2017 124/66 97.7 °F (36.5 °C) Oral 86 18 100 % -- --   04/08/24 1802 -- -- -- -- -- -- 1.829 m (6') --   04/08/24 1523 (!) 100/57 97.5 °F (36.4 °C) Oral 86 18 100 % -- --   04/08/24 1412 -- -- -- -- -- 97 % -- --   04/08/24 1352 (!) 113/59 97.7 °F (36.5 °C) Oral 82 18 98 % -- --   04/08/24 1330 -- 97.7 °F (36.5 °C) Temporal 79 15 -- -- --   04/08/24 1315 (!) 120/58 -- -- 77 16 95 % -- --   04/08/24 1300 (!) 116/51 -- -- 76 19 93 % -- --   04/08/24 1251 122/68 97.9 °F (36.6 °C) Temporal 77 (!) 37 97 % -- --   04/08/24 1143 116/68 98.1 °F (36.7 °C) Oral 78 16 100 % -- --   04/08/24 0844 116/69 -- -- 77 -- -- -- --   04/08/24 0805 -- -- -- -- -- 97 % -- --   04/08/24 0742 111/66 97.9 °F (36.6 °C) Oral 81 16 97 % -- --       Intake/Output Summary (Last 24 hours) at 4/9/2024 0712  Last data filed at 4/8/2024 2314  Gross per 24 hour   Intake 15.11 ml   Output 1550 ml   Net -1534.89 ml       Recent Labs     04/08/24  0507 04/08/24  1825 04/09/24  0456   HGB 7.1* 7.3* 7.1*   HCT 22.8* 24.2* 22.9*     Recent Labs     04/07/24  0706 04/08/24  0507 04/09/24  0456    142 141   K 3.3* 3.9 3.7    111* 111*   CO2 21 23 23   BUN 14 13 12   CREATININE 1.1 1.2 1.1       No results for input(s): \"COLORU\", \"PHUR\", \"LABCAST\", \"WBCUA\", \"RBCUA\", \"MUCUS\", \"TRICHOMONAS\", \"YEAST\", \"BACTERIA\", \"CLARITYU\", \"SPECGRAV\", \"LEUKOCYTESUR\", \"UROBILINOGEN\",

## 2024-04-09 NOTE — PLAN OF CARE
Problem: Respiratory - Adult  Goal: Able to breathe comfortably  Description: Able to breathe comfortably  4/8/2024 2048 by Hoa Thornton RCP  Outcome: Progressing     Problem: Respiratory - Adult  Goal: Clear lung sounds  4/8/2024 2048 by Hoa Thornton RCP  Outcome: Progressing     Problem: Respiratory - Adult  Goal: Adequate oxygenation  Description: Adequate oxygenation  Outcome: Progressing      PROVIDE ADEQUATE OXYGENATION WITH ACCEPTABLE SP02/ABG'S    [x]  IDENTIFY APPROPRIATE OXYGEN THERAPY  [x]   MONITOR SP02/ABG'S AS NEEDED   [x]   PATIENT EDUCATION AS NEEDED        BRONCHOSPASM/BRONCHOCONSTRICTION    IMPROVE  AERATION/BREATHSOUNDS  ADMINISTER BRONCHODILATOR THERAPY AS APPROPRIATE  ASSESS BREATH SOUNDS  INITIATE AEROSOL PROTOCOL IF ORDERED TO DO SO  PATIENT EDUCATION AS NEEDED

## 2024-04-09 NOTE — PROGRESS NOTES
Occupational Therapy  DATE: 2024    NAME: Jorge Luis Banda  MRN: [id number]   : 1945    Patient not seen this date for Occupational Therapy due to:      [] Cancel by RN or physician due to:    [] Hemodialysis    [] Critical Lab Value Level     [] Blood transfusion in progress    [] Acute or unstable cardiovascular status   _MAP < 55 or more than >115  _HR < 40 or > 130    [] Acute or unstable pulmonary status   -FiO2 > 60%   _RR < 5 or >40    _O2 sats < 85%    [] Strict Bedrest    [x] Off Unit for imaging: RN reporting pt leaving unit shortly for CT scan. OT will continue to follow/ck back today as time allows.     [] Off Unit for testing       [] Pending imaging to R/O fracture    [] Refusal by Patient      [] Other      [] OT being discontinued at this time. Patient independent. No further needs.     [] OT being discontinued at this time as the patient has been transferred to hospice care. No further needs.      Abbi Mathur, OT

## 2024-04-09 NOTE — CARE COORDINATION
DC Planning    Spoke with pt at bedside, appears frail, 3l nc-does not wear at home-may need snf. Pt has been to [facility] in past. Pt does have a prosthetic but relays does not really much any more and gets self around per wc.

## 2024-04-09 NOTE — RT PROTOCOL NOTE
RT Inhaler-Nebulizer Bronchodilator Protocol Note    There is a bronchodilator order in the chart from a provider indicating to follow the RT Bronchodilator Protocol and there is an “Initiate RT Inhaler-Nebulizer Bronchodilator Protocol” order as well (see protocol at bottom of note).    CXR Findings:  No results found.    The findings from the last RT Protocol Assessment were as follows:   History Pulmonary Disease: Chronic pulmonary disease  Respiratory Pattern: Mild dyspnea at rest, irregular pattern, or RR 21-25 bpm  Breath Sounds: Slightly diminished and/or crackles  Cough: Strong, spontaneous, non-productive  Indication for Bronchodilator Therapy: Decreased or absent breath sounds  Bronchodilator Assessment Score: 8    Aerosolized bronchodilator medication orders have been revised according to the RT Inhaler-Nebulizer Bronchodilator Protocol below.    Respiratory Therapist to perform RT Therapy Protocol Assessment initially then follow the protocol.  Repeat RT Therapy Protocol Assessment PRN for score 0-3 or on second treatment, BID, and PRN for scores above 3.    No Indications - adjust the frequency to every 6 hours PRN wheezing or bronchospasm, if no treatments needed after 48 hours then discontinue using Per Protocol order mode.     If indication present, adjust the RT bronchodilator orders based on the Bronchodilator Assessment Score as indicated below.  Use Inhaler orders unless patient has one or more of the following: on home nebulizer, not able to hold breath for 10 seconds, is not alert and oriented, cannot activate and use MDI correctly, or respiratory rate 25 breaths per minute or more, then use the equivalent nebulizer order(s) with same Frequency and PRN reasons based on the score.  If a patient is on this medication at home then do not decrease Frequency below that used at home.    0-3 - enter or revise RT bronchodilator order(s) to equivalent RT Bronchodilator order with Frequency of every 4

## 2024-04-09 NOTE — PROGRESS NOTES
[facility]  Office: [phone]  Chinmay Ramos DO, Jose Atkinson DO, Guevara Ramirez DO, Pranay Cooper DO, Sumeet Eller MD, Karen Carvajal MD, Sony Crawford MD, Poonam Platt MD,  Stephan San MD, Marcello Daniels MD, Theodore Pascual MD,  Juve Mahmood DO, Matias Mckeon MD, Hero Tavarez MD, Valentin Ramos DO, Jazzy Spence MD,  Trino Handley DO, Flavia Leonardo MD, Vanessa Soriano MD, Pricilla Akers MD, Casimiro Knight MD,  Zeke Maciel MD, Guera Cervantes MD, Kianna Martin MD, Mirlande Worrell MD, Ney Garcia MD, Savannah Dominguez MD, Ed Emerson DO, Jaylen West DO, Comfort Carcamo MD,  Siva Ashley MD, Shirley Waterhouse, CNP,  Carolyn Pelaez CNP, Scotty Ge, CNP,  Wandy Trivedi, DNP, Malissa Gaston, CNP, Lana Penaloza, CNP, Susan Haro CNP, Sherrell Mendoza, CNP, Suzy Morales, PA-C, Dalila Lange PA-C, Sada Matt, CNP, Crystal Womack, CNP, Sheng Reddy, CNP, Arielle Guy, CNP, Linsey Perez, CNP, Anju Cho, CNS, Hoa Donahue, CNP, Cassidy Ferrer CNP, Tracy Schwab, CNP         [facility]   IN-PATIENT SERVICE   [facility]    Progress Note    4/9/2024    9:45 AM    Name:   Jorge Luis Banda  MRN:     [id number]     Acct:      [id number]   Room:   [facility] Day:  6  Admit Date:  4/3/2024  3:27 PM    PCP:   Lucila Machado APRN - CNP  Code Status:  Full Code    Subjective:     C/C:   Chief Complaint   Patient presents with    Leg Pain     Left leg, open wounds and swollen       Interval History Status: improved.     Patient is resting in bed, denies any further leg pain.  Denies any chest pain, shortness of breath, nausea or vomiting, fevers chills or acute complaints.  Had episode of left lower extremity numbness swelling transition back to bed.    Brief History:     Per prior documentation  \"78-year-old male past medical history of adenocarcinoma of the lung, hypertension, peripheral vascular disease status post right AKA,  SPECIAL LAC 10 ML 04/03/2024 04:09 PM     Lab Results   Component Value Date/Time    CULTURE NO GROWTH 5 DAYS 04/03/2024 04:09 PM       Radiology:  XR CHEST PORTABLE    Result Date: 4/5/2024  Airspace consolidation at the left lung base which could represent atelectasis, pneumonia or aspiration.     XR FEMUR RIGHT (MIN 2 VIEWS)    Result Date: 4/4/2024  Status post above the knee amputation of the right lower extremity to the level of the distal femoral diaphysis.  No soft tissue gas. No cortical destruction or acute osseous abnormalities.     XR FOOT LEFT (MIN 3 VIEWS)    Result Date: 4/3/2024  No radiographic evidence for osteomyelitis. If there is further clinical concern, MRI is recommended.       Physical Examination:        General appearance:  alert, cooperative and no distress  Mental Status:  oriented to person, place and time and normal affect  Lungs:  clear to auscultation bilaterally, normal effort  Heart:  regular rate and rhythm, no murmur  Abdomen:  soft, nontender, nondistended, normal bowel sounds, no masses, hepatomegaly, splenomegaly  Extremities:  no edema, redness, tenderness in the calves, right AKA  Skin:  no gross lesions, rashes, induration    Assessment:        Hospital Problems             Last Modified POA    * (Principal) Melena 4/7/2024 Yes    History of arterial bypass of lower extremity 4/3/2024 Yes    S/P AKA (above knee amputation) unilateral, right (HCC) 4/3/2024 Yes    Primary hypertension (Chronic) 4/3/2024 Yes    Peripheral arterial disease (HCC) 4/3/2024 Yes    Neuropathy 4/3/2024 Yes    Malignant neoplasm of lung (HCC) 4/4/2024 Yes    Overview Signed 4/3/2024  6:44 PM by Malissa Gaston APRN - NP     Added automatically from request for surgery [id number]         Intermittent claudication of left lower extremity due to atherosclerosis (HCC) 4/4/2024 Yes    Embolism and thrombosis of artery (HCC) 4/4/2024 Yes       Plan:        EGD findings noted, refusing colonoscopy.  Outpatient

## 2024-04-09 NOTE — PLAN OF CARE
Patient remains on 3L nasal cannula. Continues to wheeze audibally with minimal to no exertion. CT head and chest performed today, see chart for results. Patient had two large dark brown bowel movements today. No s/s bleeding in stool. Patient has coughed up some dark red sputum. Dotson removed this morning, pt urinated 400mL into urinal and had one unmeasured occurrence in his brief. Call light within reach, safety maintained        Problem: Safety - Adult  Goal: Free from fall injury  Outcome: Progressing     Problem: Pain  Goal: Verbalizes/displays adequate comfort level or baseline comfort level  Outcome: Progressing     Problem: Skin/Tissue Integrity - Adult  Goal: Incisions, wounds, or drain sites healing without S/S of infection  Outcome: Progressing     Problem: Gastrointestinal - Adult  Goal: Minimal or absence of nausea and vomiting  Outcome: Progressing  Goal: Maintains or returns to baseline bowel function  Outcome: Progressing     Problem: Infection - Adult  Goal: Absence of infection at discharge  Outcome: Progressing     Problem: Hematologic - Adult  Goal: Maintains hematologic stability  Outcome: Progressing     Problem: Genitourinary - Adult  Goal: Absence of urinary retention  Outcome: Progressing  Goal: Urinary catheter remains patent  Outcome: Progressing     Problem: Respiratory - Adult  Goal: Able to breathe comfortably  Description: Able to breathe comfortably  4/9/2024 0843 by Makayla Bonilla RCP  Outcome: Progressing  Goal: Clear lung sounds  4/9/2024 0843 by Makayla Bonilla RCP  Outcome: Progressing  Goal: Adequate oxygenation  Description: Adequate oxygenation  4/9/2024 0843 by Makayla Bonilla RCP  Outcome: Progressing     Problem: Skin/Tissue Integrity  Goal: Absence of new skin breakdown  Description: 1.  Monitor for areas of redness and/or skin breakdown  2.  Assess vascular access sites hourly  3.  Every 4-6 hours minimum:  Change oxygen saturation probe site  4.

## 2024-04-09 NOTE — PLAN OF CARE
Problem: Respiratory - Adult  Goal: Able to breathe comfortably  Description: Able to breathe comfortably  4/9/2024 0843 by Makayla Bonilla RCP  Outcome: Progressing  4/8/2024 2048 by Hoa Thornton RCP  Outcome: Progressing  Goal: Clear lung sounds  4/9/2024 0843 by Makayla Bonilla RCP  Outcome: Progressing  4/8/2024 2048 by Hoa Thornton RCP  Outcome: Progressing  Goal: Adequate oxygenation  Description: Adequate oxygenation  4/9/2024 0843 by Makayla Bonilla RCP  Outcome: Progressing  4/8/2024 2048 by Hoa Thornton RCP  Outcome: Progressing

## 2024-04-10 ENCOUNTER — APPOINTMENT (OUTPATIENT)
Dept: CT IMAGING | Age: 79
DRG: 377 | End: 2024-04-10
Payer: MEDICARE

## 2024-04-10 ENCOUNTER — TELEPHONE (OUTPATIENT)
Dept: FAMILY MEDICINE CLINIC | Age: 79
End: 2024-04-10

## 2024-04-10 PROBLEM — Z85.118 HX OF CANCER OF LUNG: Status: ACTIVE | Noted: 2024-04-10

## 2024-04-10 PROBLEM — D50.0 IRON DEFICIENCY ANEMIA DUE TO CHRONIC BLOOD LOSS: Status: ACTIVE | Noted: 2024-04-10

## 2024-04-10 PROBLEM — E27.8 ADRENAL MASS (HCC): Status: ACTIVE | Noted: 2024-04-10

## 2024-04-10 PROBLEM — I73.9 CLAUDICATION (HCC): Status: ACTIVE | Noted: 2024-04-10

## 2024-04-10 LAB
HCT VFR BLD AUTO: 23.8 % (ref 40.7–50.3)
HGB BLD-MCNC: 7.3 G/DL (ref 13–17)
INR PPP: 1.2
PROTHROMBIN TIME: 15.5 SEC (ref 11.5–14.2)
PSA SERPL-MCNC: 3 NG/ML (ref 0–4)

## 2024-04-10 PROCEDURE — 97530 THERAPEUTIC ACTIVITIES: CPT

## 2024-04-10 PROCEDURE — 85014 HEMATOCRIT: CPT

## 2024-04-10 PROCEDURE — 51798 US URINE CAPACITY MEASURE: CPT

## 2024-04-10 PROCEDURE — 2580000003 HC RX 258: Performed by: INTERNAL MEDICINE

## 2024-04-10 PROCEDURE — 85610 PROTHROMBIN TIME: CPT

## 2024-04-10 PROCEDURE — 94761 N-INVAS EAR/PLS OXIMETRY MLT: CPT

## 2024-04-10 PROCEDURE — 6360000004 HC RX CONTRAST MEDICATION: Performed by: INTERNAL MEDICINE

## 2024-04-10 PROCEDURE — 85018 HEMOGLOBIN: CPT

## 2024-04-10 PROCEDURE — 74170 CT ABD WO CNTRST FLWD CNTRST: CPT

## 2024-04-10 PROCEDURE — A4216 STERILE WATER/SALINE, 10 ML: HCPCS | Performed by: INTERNAL MEDICINE

## 2024-04-10 PROCEDURE — 99232 SBSQ HOSP IP/OBS MODERATE 35: CPT | Performed by: INTERNAL MEDICINE

## 2024-04-10 PROCEDURE — 84153 ASSAY OF PSA TOTAL: CPT

## 2024-04-10 PROCEDURE — 6360000002 HC RX W HCPCS: Performed by: INTERNAL MEDICINE

## 2024-04-10 PROCEDURE — 2700000000 HC OXYGEN THERAPY PER DAY

## 2024-04-10 PROCEDURE — 36415 COLL VENOUS BLD VENIPUNCTURE: CPT

## 2024-04-10 PROCEDURE — 1200000000 HC SEMI PRIVATE

## 2024-04-10 PROCEDURE — 6370000000 HC RX 637 (ALT 250 FOR IP): Performed by: INTERNAL MEDICINE

## 2024-04-10 PROCEDURE — 94640 AIRWAY INHALATION TREATMENT: CPT

## 2024-04-10 PROCEDURE — 99223 1ST HOSP IP/OBS HIGH 75: CPT | Performed by: INTERNAL MEDICINE

## 2024-04-10 PROCEDURE — C9113 INJ PANTOPRAZOLE SODIUM, VIA: HCPCS | Performed by: INTERNAL MEDICINE

## 2024-04-10 RX ORDER — SODIUM CHLORIDE 0.9 % (FLUSH) 0.9 %
10 SYRINGE (ML) INJECTION PRN
Status: DISCONTINUED | OUTPATIENT
Start: 2024-04-10 | End: 2024-04-26

## 2024-04-10 RX ORDER — 0.9 % SODIUM CHLORIDE 0.9 %
80 INTRAVENOUS SOLUTION INTRAVENOUS ONCE
Status: COMPLETED | OUTPATIENT
Start: 2024-04-10 | End: 2024-04-10

## 2024-04-10 RX ADMIN — SODIUM CHLORIDE 80 ML: 9 INJECTION, SOLUTION INTRAVENOUS at 17:53

## 2024-04-10 RX ADMIN — GUAIFENESIN 600 MG: 600 TABLET ORAL at 09:14

## 2024-04-10 RX ADMIN — ALBUTEROL SULFATE 2 PUFF: 90 AEROSOL, METERED RESPIRATORY (INHALATION) at 08:04

## 2024-04-10 RX ADMIN — FERROUS SULFATE TAB EC 325 MG (65 MG FE EQUIVALENT) 325 MG: 325 (65 FE) TABLET DELAYED RESPONSE at 09:14

## 2024-04-10 RX ADMIN — GABAPENTIN 300 MG: 300 CAPSULE ORAL at 13:49

## 2024-04-10 RX ADMIN — GUAIFENESIN 600 MG: 600 TABLET ORAL at 20:15

## 2024-04-10 RX ADMIN — GABAPENTIN 300 MG: 300 CAPSULE ORAL at 09:14

## 2024-04-10 RX ADMIN — SODIUM CHLORIDE, PRESERVATIVE FREE 10 ML: 5 INJECTION INTRAVENOUS at 20:16

## 2024-04-10 RX ADMIN — CYANOCOBALAMIN TAB 1000 MCG 1000 MCG: 1000 TAB at 09:14

## 2024-04-10 RX ADMIN — ALBUTEROL SULFATE 2 PUFF: 90 AEROSOL, METERED RESPIRATORY (INHALATION) at 21:19

## 2024-04-10 RX ADMIN — METOPROLOL TARTRATE 50 MG: 50 TABLET, FILM COATED ORAL at 20:15

## 2024-04-10 RX ADMIN — METOPROLOL TARTRATE 50 MG: 50 TABLET, FILM COATED ORAL at 09:14

## 2024-04-10 RX ADMIN — IOPAMIDOL 75 ML: 755 INJECTION, SOLUTION INTRAVENOUS at 17:53

## 2024-04-10 RX ADMIN — SODIUM CHLORIDE, PRESERVATIVE FREE 40 MG: 5 INJECTION INTRAVENOUS at 20:16

## 2024-04-10 RX ADMIN — IRON SUCROSE 200 MG: 20 INJECTION, SOLUTION INTRAVENOUS at 20:18

## 2024-04-10 RX ADMIN — SODIUM CHLORIDE, PRESERVATIVE FREE 10 ML: 5 INJECTION INTRAVENOUS at 17:53

## 2024-04-10 RX ADMIN — ATORVASTATIN CALCIUM 40 MG: 40 TABLET, FILM COATED ORAL at 20:15

## 2024-04-10 RX ADMIN — GABAPENTIN 300 MG: 300 CAPSULE ORAL at 20:15

## 2024-04-10 RX ADMIN — TAMSULOSIN HYDROCHLORIDE 0.4 MG: 0.4 CAPSULE ORAL at 09:14

## 2024-04-10 RX ADMIN — CHOLECALCIFEROL TAB 125 MCG (5000 UNIT) 5000 UNITS: 125 TAB at 09:14

## 2024-04-10 RX ADMIN — ALBUTEROL SULFATE 2 PUFF: 90 AEROSOL, METERED RESPIRATORY (INHALATION) at 14:39

## 2024-04-10 RX ADMIN — SODIUM CHLORIDE, PRESERVATIVE FREE 40 MG: 5 INJECTION INTRAVENOUS at 09:15

## 2024-04-10 RX ADMIN — SODIUM CHLORIDE, PRESERVATIVE FREE 10 ML: 5 INJECTION INTRAVENOUS at 09:15

## 2024-04-10 NOTE — CONSULTS
Today's Date: 4/10/2024  Patient Name: Jorge Luis Banda  Date of admission: 4/3/2024  3:27 PM  Patient's age: 78 y.o., 1945  Admission Dx: Melena [K92.1]  GI bleed [K92.2]  UMM (acute kidney injury) (HCC) [N17.9]  Left leg cellulitis [L03.116]  Anemia, unspecified type [D64.9]    Reason for Consult: management recommendations  Requesting Physician: Guevara Ramirez DO    CHIEF COMPLAINT: GI bleeding    History Obtained From:  patient    HISTORY OF PRESENT ILLNESS:      The patient is a 78 y.o.   male who is admitted to the hospital for anemia and suspicion for GI bleeding.  His hemoglobin was under 7 and received blood transfusion.  He has severe peripheral vascular disease on Plavix and Coumadin.  He underwent an EGD that showed gastritis and he refused colonoscopy.  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  From oncology perspective, the patient was diagnosed with stage I lung cancer in 2019 and underwent lung resection.  Never received any chemotherapy or radiation.  He has been in remission since then.  CT scan of the chest showed concerning changes in the liver and adrenal gland for metastatic disease.  Dedicated CT of the abdomen and pelvis is ordered and pending.    Past Medical History:   has a past medical history of Paroxysmal atrial fibrillation (HCC), Peripheral artery disease (HCC), and Primary hypertension.    Past Surgical History:   has a past surgical history that includes above knee amputation (Right, 01/01/2020) and Upper gastrointestinal endoscopy (N/A, 4/8/2024).     Medications:    Reviewed in Epic     Allergies:  Patient has no known allergies.    Social History:   reports that he quit smoking about 4 years ago. He has never used smokeless tobacco. He reports current alcohol use of about 3.0 standard drinks of alcohol per week. He reports that he does not use drugs.

## 2024-04-10 NOTE — CARE COORDINATION
EM Planning    Spoke with Leslie from [facility] pt has a new pcp appt on 4/22 with Lucila Machado CNP-they will not follow for c orders shakira pt is seen in office.

## 2024-04-10 NOTE — CARE COORDINATION
Social work:  [facility] is [facility].   Next choices are [facility], [facility] or [facility]. Referrals sent.   HENS started.

## 2024-04-10 NOTE — PLAN OF CARE
Pt remained free of falls as well as acute events throughout the shift.  Vital signs stable.   Pt on 2L O2 nasal cannula.   Vital signs stable.   Safety measures in place, call light within reach.   Problem: Safety - Adult  Goal: Free from fall injury  4/10/2024 1454 by Adiel Bryant RN  Outcome: Progressing     Problem: Discharge Planning  Goal: Discharge to home or other facility with appropriate resources  4/10/2024 1454 by Adiel Bryant RN  Outcome: Progressing     Problem: Pain  Goal: Verbalizes/displays adequate comfort level or baseline comfort level  4/10/2024 1454 by Adiel Bryant RN  Outcome: Progressing     Problem: Skin/Tissue Integrity - Adult  Goal: Incisions, wounds, or drain sites healing without S/S of infection  4/10/2024 1454 by Adiel Bryant RN  Outcome: Progressing     Problem: Gastrointestinal - Adult  Goal: Minimal or absence of nausea and vomiting  4/10/2024 1454 by Adiel Bryant RN  Outcome: Progressing     Problem: Gastrointestinal - Adult  Goal: Maintains or returns to baseline bowel function  4/10/2024 1454 by Adiel Bryant RN  Outcome: Progressing     Problem: Infection - Adult  Goal: Absence of infection at discharge  4/10/2024 1454 by Adiel Bryant RN  Outcome: Progressing     Problem: Hematologic - Adult  Goal: Maintains hematologic stability  4/10/2024 1454 by Adiel Bryant RN  Outcome: Progressing     Problem: Skin/Tissue Integrity  Goal: Absence of new skin breakdown  Description: 1.  Monitor for areas of redness and/or skin breakdown  2.  Assess vascular access sites hourly  3.  Every 4-6 hours minimum:  Change oxygen saturation probe site  4.  Every 4-6 hours:  If on nasal continuous positive airway pressure, respiratory therapy assess nares and determine need for appliance change or resting period.  4/10/2024 1454 by Adiel Bryant RN  Outcome: Progressing     Problem: ABCDS Injury Assessment  Goal: Absence of physical injury  4/10/2024 1454 by Adiel Bryant  RN  Outcome: Progressing     Problem: Genitourinary - Adult  Goal: Absence of urinary retention  4/10/2024 1454 by Adiel Bryant RN  Outcome: Progressing     Problem: Genitourinary - Adult  Goal: Urinary catheter remains patent  4/10/2024 1454 by Adiel Bryant RN  Outcome: Progressing     Problem: Nutrition Deficit:  Goal: Optimize nutritional status  4/10/2024 1454 by Adiel Bryant RN  Outcome: Progressing

## 2024-04-10 NOTE — PROGRESS NOTES
Primary hypertension.    Social History:   reports that he quit smoking about 4 years ago. He has never used smokeless tobacco. He reports current alcohol use of about 3.0 standard drinks of alcohol per week. He reports that he does not use drugs.     Family History: No family history on file.    Vitals:  /67   Pulse (!) 102   Temp 98.4 °F (36.9 °C) (Oral)   Resp 17   Ht 1.829 m (6')   Wt 73.3 kg (161 lb 9.6 oz)   SpO2 97%   BMI 21.92 kg/m²   Temp (24hrs), Av.5 °F (36.9 °C), Min:98.2 °F (36.8 °C), Max:99 °F (37.2 °C)    No results for input(s): \"POCGLU\" in the last 72 hours.    I/O (24Hr):    Intake/Output Summary (Last 24 hours) at 4/10/2024 0934  Last data filed at 4/10/2024 0514  Gross per 24 hour   Intake --   Output 600 ml   Net -600 ml       Labs:  Hematology:  Recent Labs     24  0507 24  1825 24  0456 24  1846 04/10/24  0521   HGB 7.1*   < > 7.1* 7.6* 7.3*   HCT 22.8*   < > 22.9* 24.5* 23.8*   INR 1.3  --  1.2  --  1.2    < > = values in this interval not displayed.     Chemistry:  Recent Labs     24  0507 24  0456    141   K 3.9 3.7   * 111*   CO2 23 23   GLUCOSE 100* 115*   BUN 13 12   CREATININE 1.2 1.1   ANIONGAP 8* 7*   LABGLOM 61 68   CALCIUM 8.2* 8.1*   No results for input(s): \"PROT\", \"LABALBU\", \"LABA1C\", \"C5XXHSE\", \"N5ENAAD\", \"FT4\", \"TSH\", \"AST\", \"ALT\", \"LDH\", \"GGT\", \"ALKPHOS\", \"LABGGT\", \"BILITOT\", \"BILIDIR\", \"AMMONIA\", \"AMYLASE\", \"LIPASE\", \"LACTATE\", \"CHOL\", \"HDL\", \"LDLCHOLESTEROL\", \"CHOLHDLRATIO\", \"TRIG\", \"VLDL\", \"SIS06MX\", \"PHENYTOIN\", \"PHENYF\", \"URICACID\", \"POCGLU\" in the last 72 hours.  ABG:No results found for: \"POCPH\", \"PHART\", \"PH\", \"POCPCO2\", \"ZEI7HGA\", \"PCO2\", \"POCPO2\", \"PO2ART\", \"PO2\", \"POCHCO3\", \"IOP7PJB\", \"HCO3\", \"NBEA\", \"PBEA\", \"BEART\", \"BE\", \"THGBART\", \"THB\", \"OEC5UCY\", \"ZVIO3DBZ\", \"T8EXXXBL\", \"O2SAT\", \"FIO2\"  Lab Results   Component Value Date/Time    SPECIAL LAC 10 ML 2024 04:09 PM     Lab Results   Component  GI  Discussed CT scan chest findings of new adrenal mass.  Will obtain CT scan abdomen.  He is followed with per medical oncology in the past with no longer in network, oncology consultation here  May need outpatient PET scan  Monitoring control blood pressure  PT and OT  GI and DVT prophylaxis  Resume Coumadin, pharmacy to dose  Discharge planning pending CT scan results and oncology recommendations    Guevara Ramirez DO  4/10/2024  9:34 AM

## 2024-04-10 NOTE — PROGRESS NOTES
Physical Therapy  Facility/Department: [facility] CARE  Physical Therapy Daily Treatment Note    Name: Jorge Luis Banda  : 1945  MRN: [id number]  Date of Service: 4/10/2024    Discharge Recommendations:  Patient would benefit from continued therapy after discharge    Pt currently functioning below baseline.  Recommend daily inpatient skilled therapy at time of discharge to maximize long term outcomes and prevent re-admission. Please refer to AM-PAC score for current level of function.        Patient Diagnosis(es): The primary encounter diagnosis was Left leg cellulitis. Diagnoses of Anemia, unspecified type, Melena, UMM (acute kidney injury) (HCC), Gastrointestinal hemorrhage, unspecified gastrointestinal hemorrhage type, Atrial fib/flutter, transient (HCC), Embolism and thrombosis of artery (HCC), and Claudication (HCC) were also pertinent to this visit.  Past Medical History:  has a past medical history of Paroxysmal atrial fibrillation (HCC), Peripheral artery disease (HCC), and Primary hypertension.  Past Surgical History:  has a past surgical history that includes above knee amputation (Right, 2020) and Upper gastrointestinal endoscopy (N/A, 2024).    Assessment   Body Structures, Functions, Activity Limitations Requiring Skilled Therapeutic Intervention: Decreased functional mobility ;Decreased strength;Decreased balance;Decreased posture  Assessment: Limited treatment due to patient refusal due to abdominal pain, increased assist needed with bed mobility. Recommend continued therapy following discharge.  Specific Instructions for Next Treatment: transfers, HEP  Activity Tolerance  Activity Tolerance: Patient limited by pain  Activity Tolerance Comments: patient  SOB and wheezing throughout session, SpO2 97-98% on 2L     Plan   Physical Therapy Plan  General Plan: 5-7 times per week  Specific Instructions for Next Treatment: transfers, HEP  Current Treatment Recommendations:  IADLs)  Meal Prep: Total  Laundry: Total  Vacuuming: Total  Cleaning: Total  Gardening: Total  Yard Work: Total  Driving: Total  Shopping: Total  : Total  Ambulation Assistance: Independent (Patient amb only short distances in house with prosthesis and RW, primarily functions from w/c level. Reports he does not get out much except for doctor appointment, diffiucult for his wife to push w/c up karely to get into apartment.)  Transfer Assistance: Independent (can stand with RW and prosthesis or sit pivot from w/c)  Active : No  Patient's  Info: Wife drives  Occupation: Retired  Type of Occupation: Drywall  Leisure & Hobbies: Watching TV  Additional Comments: Patient remains in his w/c most of the day when at home.  Vision/Hearing       Cognition         Objective   Temp: 98.4 °F (36.9 °C)  Pulse: (!) 102  Heart Rate Source: Monitor  Respirations: 17  SpO2: 97 %  O2 Device: Nasal cannula  BP: 116/67  MAP (Calculated): 83  BP Location: Right upper arm  BP Method: Automatic     Observation/Palpation  Observation: 10/10 abdominal pain, 7/10 L foot pain                       Bed mobility  Rolling to Left: Modified independent  Rolling to Right: Minimal assistance  Supine to Sit: Moderate assistance  Sit to Supine: Moderate assistance  Scooting: Minimal assistance  Bed Mobility Comments: assisted patient in long sitting in bed so RN could listen to patient's lungs, increased assist needed due to abdominal pain, patient could only tolerate long sitting for 15 seconds  Transfers  Comment: patient refused due to pain           Exercise Treatment: R Hip A/AROM ther ex x10, L ankle AP, heel slides, Hip abd, SLR, 1/2 bridge x10        OutComes Score                                                  AM-PAC - Mobility    AM-PAC Basic Mobility - Inpatient   How much help is needed turning from your back to your side while in a flat bed without using bedrails?: A Lot  How much help is needed moving from lying on

## 2024-04-10 NOTE — PLAN OF CARE
Problem: Respiratory - Adult  Goal: Able to breathe comfortably  Description: Able to breathe comfortably  4/9/2024 2008 by Danilo Castañeda RCP  Outcome: Progressing     Problem: Respiratory - Adult  Goal: Clear lung sounds  4/9/2024 2008 by Danilo Castañeda RCP  Outcome: Progressing     Problem: Respiratory - Adult  Goal: Adequate oxygenation  Description: Adequate oxygenation  4/9/2024 2008 by Danilo Castañeda RCP  Outcome: Progressing

## 2024-04-10 NOTE — PLAN OF CARE
Pt alert and oriented. VSS. Afib on tele. SpO2 high 90s on 2L NC; wheezing/dyspnea at rest present. Afebrile. No c/o pain. Pt receiving breathing tx/inhaler. Hgb stable. D/c planning in progress; HHC vs. SNF. Bed alarm on, call light within reach. Will continue to monitor.       Problem: Safety - Adult  Goal: Free from fall injury  4/10/2024 0357 by Aida Vuong RN  Outcome: Progressing  4/9/2024 1729 by Reyes, Brittnie, RN  Outcome: Progressing     Problem: Discharge Planning  Goal: Discharge to home or other facility with appropriate resources  4/10/2024 0357 by Aida Vuong RN  Outcome: Progressing  4/9/2024 1729 by Reyes, Brittnie, RN  Outcome: Progressing     Problem: Pain  Goal: Verbalizes/displays adequate comfort level or baseline comfort level  4/10/2024 0357 by Aida Vuong RN  Outcome: Progressing  4/9/2024 1729 by Reyes, Brittnie, RN  Outcome: Progressing     Problem: Skin/Tissue Integrity - Adult  Goal: Incisions, wounds, or drain sites healing without S/S of infection  4/10/2024 0357 by Aida Vuong RN  Outcome: Progressing  Flowsheets (Taken 4/10/2024 0130)  Incisions, Wounds, or Drain Sites Healing Without Sign and Symptoms of Infection: ADMISSION and DAILY: Assess and document risk factors for pressure ulcer development  4/9/2024 1729 by Reyes, Brittnie, RN  Outcome: Progressing     Problem: Gastrointestinal - Adult  Goal: Minimal or absence of nausea and vomiting  4/10/2024 0357 by Aida Vuong RN  Outcome: Progressing  4/9/2024 1729 by Reyes, Brittnie, RN  Outcome: Progressing  Goal: Maintains or returns to baseline bowel function  4/10/2024 0357 by Aida Vuong RN  Outcome: Progressing  4/9/2024 1729 by Reyes, Brittnie, RN  Outcome: Progressing     Problem: Infection - Adult  Goal: Absence of infection at discharge  4/10/2024 0357 by Aida Vuong RN  Outcome: Progressing  4/9/2024 1729 by Reyes, Brittnie, RN  Outcome: Progressing     Problem: Hematologic - Adult  Goal:

## 2024-04-10 NOTE — PROGRESS NOTES
Ry Traylor MD   Urology Progress Note            Subjective: Follow-up BPH urinary retention    Patient Vitals for the past 24 hrs:   BP Temp Temp src Pulse Resp SpO2   04/10/24 0508 -- -- -- 95 18 98 %   04/10/24 0445 111/70 99 °F (37.2 °C) Oral (!) 101 18 100 %   04/10/24 0300 -- -- -- -- -- 100 %   04/10/24 0027 115/66 98.6 °F (37 °C) Axillary 93 18 100 %   04/09/24 2008 -- -- -- 100 18 100 %   04/09/24 1958 107/69 98.2 °F (36.8 °C) Oral 96 18 100 %   04/09/24 1526 105/66 98.6 °F (37 °C) Oral 100 18 96 %   04/09/24 1205 115/78 98.2 °F (36.8 °C) Oral (!) 102 18 97 %   04/09/24 0826 121/68 98.6 °F (37 °C) Oral 89 16 97 %   04/09/24 0755 -- -- -- -- -- 99 %       Intake/Output Summary (Last 24 hours) at 4/10/2024 0620  Last data filed at 4/10/2024 0514  Gross per 24 hour   Intake --   Output 750 ml   Net -750 ml       Recent Labs     04/09/24  0456 04/09/24  1846 04/10/24  0521   HGB 7.1* 7.6* 7.3*   HCT 22.9* 24.5* 23.8*     Recent Labs     04/07/24  0706 04/08/24  0507 04/09/24  0456    142 141   K 3.3* 3.9 3.7    111* 111*   CO2 21 23 23   BUN 14 13 12   CREATININE 1.1 1.2 1.1       No results for input(s): \"COLORU\", \"PHUR\", \"LABCAST\", \"WBCUA\", \"RBCUA\", \"MUCUS\", \"TRICHOMONAS\", \"YEAST\", \"BACTERIA\", \"CLARITYU\", \"SPECGRAV\", \"LEUKOCYTESUR\", \"UROBILINOGEN\", \"BILIRUBINUR\", \"BLOODU\" in the last 72 hours.    Invalid input(s): \"NITRATE\", \"GLUCOSEUKETONESUAMORPHOUS\"    Additional Lab/culture results:    Physical Exam: Patient alert oriented not in acute distress oncology evaluation pending, the patient is voiding spontaneously, he needs urologic follow-up in 2 weeks at the office, discussed status with primary service     Interval Imaging Findings:    Impression:    Patient Active Problem List   Diagnosis    Skin eschar    Stenosis of left carotid artery    History of arterial bypass of lower extremity    S/P AKA (above knee amputation) unilateral, right (HCC)

## 2024-04-10 NOTE — TELEPHONE ENCOUNTER
Writer received a call from [facility] requesting that Lucila follow patient for home care.  Unfortunately, he has not yet established care with Lucila, therefore Lucila declined to follow until the patient is established as he patient on 4/22/24.  Caller verbalized understanding and asked for writer name and ended call.

## 2024-04-11 ENCOUNTER — APPOINTMENT (OUTPATIENT)
Dept: MRI IMAGING | Age: 79
DRG: 377 | End: 2024-04-11
Payer: MEDICARE

## 2024-04-11 PROBLEM — I48.11 LONGSTANDING PERSISTENT ATRIAL FIBRILLATION (HCC): Status: ACTIVE | Noted: 2024-04-11

## 2024-04-11 PROBLEM — J90 PLEURAL EFFUSION, BILATERAL: Status: ACTIVE | Noted: 2024-04-11

## 2024-04-11 LAB
ANION GAP SERPL CALCULATED.3IONS-SCNC: 10 MMOL/L (ref 9–17)
BASOPHILS # BLD: 0 K/UL (ref 0–0.2)
BASOPHILS NFR BLD: 0 % (ref 0–2)
BUN SERPL-MCNC: 16 MG/DL (ref 8–23)
BUN/CREAT SERPL: 15 (ref 9–20)
CALCIUM SERPL-MCNC: 8.3 MG/DL (ref 8.6–10.4)
CHLORIDE SERPL-SCNC: 104 MMOL/L (ref 98–107)
CO2 SERPL-SCNC: 23 MMOL/L (ref 20–31)
CREAT SERPL-MCNC: 1.1 MG/DL (ref 0.7–1.2)
EOSINOPHIL # BLD: 0 K/UL (ref 0–0.44)
EOSINOPHILS RELATIVE PERCENT: 0 % (ref 1–4)
ERYTHROCYTE [DISTWIDTH] IN BLOOD BY AUTOMATED COUNT: 18.5 % (ref 11.8–14.4)
GFR SERPL CREATININE-BSD FRML MDRD: 68 ML/MIN/1.73M2
GLUCOSE SERPL-MCNC: 103 MG/DL (ref 70–99)
HCT VFR BLD AUTO: 22.2 % (ref 40.7–50.3)
HGB BLD-MCNC: 7.1 G/DL (ref 13–17)
IMM GRANULOCYTES # BLD AUTO: 0.11 K/UL (ref 0–0.3)
IMM GRANULOCYTES NFR BLD: 1 %
LYMPHOCYTES NFR BLD: 0.32 K/UL (ref 1.1–3.7)
LYMPHOCYTES RELATIVE PERCENT: 3 % (ref 24–43)
MAGNESIUM SERPL-MCNC: 1.7 MG/DL (ref 1.6–2.6)
MCH RBC QN AUTO: 29.8 PG (ref 25.2–33.5)
MCHC RBC AUTO-ENTMCNC: 32 G/DL (ref 28.4–34.8)
MCV RBC AUTO: 93.3 FL (ref 82.6–102.9)
MONOCYTES NFR BLD: 1.06 K/UL (ref 0.1–1.2)
MONOCYTES NFR BLD: 10 % (ref 3–12)
NEUTROPHILS NFR BLD: 86 % (ref 36–65)
NEUTS SEG NFR BLD: 9.11 K/UL (ref 1.5–8.1)
NRBC BLD-RTO: 0 PER 100 WBC
PLATELET # BLD AUTO: 182 K/UL (ref 138–453)
PMV BLD AUTO: 11 FL (ref 8.1–13.5)
POTASSIUM SERPL-SCNC: 3.4 MMOL/L (ref 3.7–5.3)
PROCALCITONIN SERPL-MCNC: 1.95 NG/ML (ref 0–0.09)
RBC # BLD AUTO: 2.38 M/UL (ref 4.21–5.77)
SODIUM SERPL-SCNC: 137 MMOL/L (ref 135–144)
SURGICAL PATHOLOGY REPORT: NORMAL
WBC OTHER # BLD: 10.6 K/UL (ref 3.5–11.3)

## 2024-04-11 PROCEDURE — 6360000002 HC RX W HCPCS: Performed by: NURSE PRACTITIONER

## 2024-04-11 PROCEDURE — A4216 STERILE WATER/SALINE, 10 ML: HCPCS | Performed by: INTERNAL MEDICINE

## 2024-04-11 PROCEDURE — 87075 CULTR BACTERIA EXCEPT BLOOD: CPT

## 2024-04-11 PROCEDURE — 6370000000 HC RX 637 (ALT 250 FOR IP): Performed by: INTERNAL MEDICINE

## 2024-04-11 PROCEDURE — 2580000003 HC RX 258: Performed by: FAMILY MEDICINE

## 2024-04-11 PROCEDURE — 6360000002 HC RX W HCPCS: Performed by: FAMILY MEDICINE

## 2024-04-11 PROCEDURE — 2580000003 HC RX 258: Performed by: INTERNAL MEDICINE

## 2024-04-11 PROCEDURE — 2700000000 HC OXYGEN THERAPY PER DAY

## 2024-04-11 PROCEDURE — 83735 ASSAY OF MAGNESIUM: CPT

## 2024-04-11 PROCEDURE — 6360000002 HC RX W HCPCS: Performed by: INTERNAL MEDICINE

## 2024-04-11 PROCEDURE — 84145 PROCALCITONIN (PCT): CPT

## 2024-04-11 PROCEDURE — 85025 COMPLETE CBC W/AUTO DIFF WBC: CPT

## 2024-04-11 PROCEDURE — 1200000000 HC SEMI PRIVATE

## 2024-04-11 PROCEDURE — C9113 INJ PANTOPRAZOLE SODIUM, VIA: HCPCS | Performed by: INTERNAL MEDICINE

## 2024-04-11 PROCEDURE — 87205 SMEAR GRAM STAIN: CPT

## 2024-04-11 PROCEDURE — 94761 N-INVAS EAR/PLS OXIMETRY MLT: CPT

## 2024-04-11 PROCEDURE — 36415 COLL VENOUS BLD VENIPUNCTURE: CPT

## 2024-04-11 PROCEDURE — 99232 SBSQ HOSP IP/OBS MODERATE 35: CPT | Performed by: INTERNAL MEDICINE

## 2024-04-11 PROCEDURE — 6370000000 HC RX 637 (ALT 250 FOR IP): Performed by: NURSE PRACTITIONER

## 2024-04-11 PROCEDURE — 87070 CULTURE OTHR SPECIMN AEROBIC: CPT

## 2024-04-11 PROCEDURE — 99233 SBSQ HOSP IP/OBS HIGH 50: CPT | Performed by: FAMILY MEDICINE

## 2024-04-11 PROCEDURE — 70551 MRI BRAIN STEM W/O DYE: CPT

## 2024-04-11 PROCEDURE — 94640 AIRWAY INHALATION TREATMENT: CPT

## 2024-04-11 PROCEDURE — 80048 BASIC METABOLIC PNL TOTAL CA: CPT

## 2024-04-11 RX ORDER — FUROSEMIDE 10 MG/ML
20 INJECTION INTRAMUSCULAR; INTRAVENOUS 2 TIMES DAILY
Status: DISCONTINUED | OUTPATIENT
Start: 2024-04-11 | End: 2024-04-20

## 2024-04-11 RX ORDER — LEVOFLOXACIN 5 MG/ML
750 INJECTION, SOLUTION INTRAVENOUS EVERY 24 HOURS
Status: DISCONTINUED | OUTPATIENT
Start: 2024-04-11 | End: 2024-04-11

## 2024-04-11 RX ORDER — FUROSEMIDE 10 MG/ML
20 INJECTION INTRAMUSCULAR; INTRAVENOUS ONCE
Status: COMPLETED | OUTPATIENT
Start: 2024-04-11 | End: 2024-04-11

## 2024-04-11 RX ORDER — FUROSEMIDE 10 MG/ML
20 INJECTION INTRAMUSCULAR; INTRAVENOUS 2 TIMES DAILY
Status: CANCELLED | OUTPATIENT
Start: 2024-04-11

## 2024-04-11 RX ORDER — LANOLIN ALCOHOL/MO/W.PET/CERES
3 CREAM (GRAM) TOPICAL NIGHTLY PRN
Status: DISCONTINUED | OUTPATIENT
Start: 2024-04-11 | End: 2024-05-11 | Stop reason: HOSPADM

## 2024-04-11 RX ADMIN — CHOLECALCIFEROL TAB 125 MCG (5000 UNIT) 5000 UNITS: 125 TAB at 09:23

## 2024-04-11 RX ADMIN — GUAIFENESIN 600 MG: 600 TABLET ORAL at 21:01

## 2024-04-11 RX ADMIN — SODIUM CHLORIDE: 9 INJECTION, SOLUTION INTRAVENOUS at 15:51

## 2024-04-11 RX ADMIN — SODIUM CHLORIDE: 9 INJECTION, SOLUTION INTRAVENOUS at 13:38

## 2024-04-11 RX ADMIN — ALBUTEROL SULFATE 2 PUFF: 90 AEROSOL, METERED RESPIRATORY (INHALATION) at 08:43

## 2024-04-11 RX ADMIN — TAMSULOSIN HYDROCHLORIDE 0.4 MG: 0.4 CAPSULE ORAL at 09:23

## 2024-04-11 RX ADMIN — ALBUTEROL SULFATE 2 PUFF: 90 AEROSOL, METERED RESPIRATORY (INHALATION) at 20:53

## 2024-04-11 RX ADMIN — PIPERACILLIN AND TAZOBACTAM 4500 MG: 4; .5 INJECTION, POWDER, FOR SOLUTION INTRAVENOUS at 13:49

## 2024-04-11 RX ADMIN — GABAPENTIN 300 MG: 300 CAPSULE ORAL at 13:50

## 2024-04-11 RX ADMIN — CYANOCOBALAMIN TAB 1000 MCG 1000 MCG: 1000 TAB at 09:24

## 2024-04-11 RX ADMIN — GUAIFENESIN 600 MG: 600 TABLET ORAL at 09:23

## 2024-04-11 RX ADMIN — LEVOFLOXACIN 750 MG: 5 INJECTION, SOLUTION INTRAVENOUS at 13:41

## 2024-04-11 RX ADMIN — SODIUM CHLORIDE, PRESERVATIVE FREE 40 MG: 5 INJECTION INTRAVENOUS at 21:01

## 2024-04-11 RX ADMIN — SODIUM CHLORIDE: 9 INJECTION, SOLUTION INTRAVENOUS at 18:05

## 2024-04-11 RX ADMIN — METOPROLOL TARTRATE 50 MG: 50 TABLET, FILM COATED ORAL at 09:23

## 2024-04-11 RX ADMIN — PIPERACILLIN AND TAZOBACTAM 3375 MG: 3; .375 INJECTION, POWDER, LYOPHILIZED, FOR SOLUTION INTRAVENOUS at 18:06

## 2024-04-11 RX ADMIN — AZITHROMYCIN MONOHYDRATE 500 MG: 500 INJECTION, POWDER, LYOPHILIZED, FOR SOLUTION INTRAVENOUS at 16:34

## 2024-04-11 RX ADMIN — SODIUM CHLORIDE: 9 INJECTION, SOLUTION INTRAVENOUS at 13:44

## 2024-04-11 RX ADMIN — ALBUTEROL SULFATE 2 PUFF: 90 AEROSOL, METERED RESPIRATORY (INHALATION) at 14:27

## 2024-04-11 RX ADMIN — FUROSEMIDE 20 MG: 10 INJECTION, SOLUTION INTRAMUSCULAR; INTRAVENOUS at 17:56

## 2024-04-11 RX ADMIN — FERROUS SULFATE TAB EC 325 MG (65 MG FE EQUIVALENT) 325 MG: 325 (65 FE) TABLET DELAYED RESPONSE at 09:23

## 2024-04-11 RX ADMIN — SODIUM CHLORIDE: 9 INJECTION, SOLUTION INTRAVENOUS at 16:07

## 2024-04-11 RX ADMIN — GABAPENTIN 300 MG: 300 CAPSULE ORAL at 21:00

## 2024-04-11 RX ADMIN — GABAPENTIN 300 MG: 300 CAPSULE ORAL at 09:23

## 2024-04-11 RX ADMIN — FUROSEMIDE 20 MG: 10 INJECTION, SOLUTION INTRAMUSCULAR; INTRAVENOUS at 13:33

## 2024-04-11 RX ADMIN — SODIUM CHLORIDE, PRESERVATIVE FREE 10 ML: 5 INJECTION INTRAVENOUS at 09:24

## 2024-04-11 RX ADMIN — ATORVASTATIN CALCIUM 40 MG: 40 TABLET, FILM COATED ORAL at 21:01

## 2024-04-11 RX ADMIN — POTASSIUM CHLORIDE 40 MEQ: 1500 TABLET, EXTENDED RELEASE ORAL at 06:09

## 2024-04-11 RX ADMIN — IRON SUCROSE 200 MG: 20 INJECTION, SOLUTION INTRAVENOUS at 20:56

## 2024-04-11 RX ADMIN — Medication 3 MG: at 23:32

## 2024-04-11 RX ADMIN — SODIUM CHLORIDE, PRESERVATIVE FREE 40 MG: 5 INJECTION INTRAVENOUS at 09:24

## 2024-04-11 NOTE — PROGRESS NOTES
Today's Date: 4/11/2024  Patient Name: Jorge Luis Banda  Date of admission: 4/3/2024  3:27 PM  Patient's age: 78 y.o., 1945  Admission Dx: Melena [K92.1]  GI bleed [K92.2]  UMM (acute kidney injury) (HCC) [N17.9]  Left leg cellulitis [L03.116]  Anemia, unspecified type [D64.9]    Reason for Consult: management recommendations  Requesting Physician: Guevara Ramirez DO    CHIEF COMPLAINT: GI bleeding    History Obtained From:  patient  INTERVAL HISTORY:    Patient seen and examined  SOB stable  NO NV    HISTORY OF PRESENT ILLNESS:    The patient is a 78 y.o.   male who is admitted to the hospital for anemia and suspicion for GI bleeding.  His hemoglobin was under 7 and received blood transfusion.  He has severe peripheral vascular disease on Plavix and Coumadin.  He underwent an EGD that showed gastritis and he refused colonoscopy.  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  From oncology perspective, the patient was diagnosed with stage I lung cancer in 2019 and underwent lung resection.  Never received any chemotherapy or radiation.  He has been in remission since then.  CT scan of the chest showed concerning changes in the liver and adrenal gland for metastatic disease.  Dedicated CT of the abdomen and pelvis is ordered and pending.    Past Medical History:   has a past medical history of Paroxysmal atrial fibrillation (HCC), Peripheral artery disease (HCC), and Primary hypertension.    Past Surgical History:   has a past surgical history that includes above knee amputation (Right, 01/01/2020) and Upper gastrointestinal endoscopy (N/A, 4/8/2024).     Medications:    Reviewed in Epic     Allergies:  Patient has no known allergies.    Social History:   reports that he quit smoking about 4 years ago. He has never used smokeless tobacco. He reports current alcohol use of about 3.0 standard drinks of

## 2024-04-11 NOTE — PLAN OF CARE
Problem: Respiratory - Adult  Goal: Able to breathe comfortably  Description: Able to breathe comfortably  4/11/2024 0848 by Shana sIaac RCP  Outcome: Progressing     Problem: Respiratory - Adult  Goal: Clear lung sounds  4/11/2024 0848 by Shana Isaac RCP  Outcome: Progressing     Problem: Respiratory - Adult  Goal: Adequate oxygenation  Description: Adequate oxygenation  4/11/2024 0848 by Shana Isaac RCP  Outcome: Progressing

## 2024-04-11 NOTE — CARE COORDINATION
Social work: [facility] can accept.   Will confirm choice with family, patient not ready for dc yet.

## 2024-04-11 NOTE — PROGRESS NOTES
Occupational Therapy    DATE: 2024    NAME: Jorge Luis Banda  MRN: [id number]   : 1945    Patient not seen this date for Occupational Therapy due to:      [x] Cancel by RN or physician due to: Per RN Adiel, pt is not medically appropriate for OT re-assess at this time. Pt also going to MRI this afternoon.    [] Hemodialysis    [] Critical Lab Value Level     [] Blood transfusion in progress    [] Acute or unstable cardiovascular status   _MAP < 55 or more than >115  _HR < 40 or > 130    [] Acute or unstable pulmonary status   -FiO2 > 60%   _RR < 5 or >40    _O2 sats < 85%    [] Strict Bedrest    [] Off Unit for surgery or procedure    [] Off Unit for testing       [] Pending imaging to R/O fracture    [] Refusal by Patient      [] Intubated    [] Other      [] OT being discontinued at this time. Patient independent. No further needs.     [] OT being discontinued at this time as the patient has been transferred to hospice care. No further needs.    Laly Adams OTR/L

## 2024-04-11 NOTE — CARE COORDINATION
Discharge planning    Patient chart reviewed.  Appreciate CM notes.  assessment. Patient lives with significant other in an apt.. DME in the home: sc,rw, wc and neb.      Social work notes that patient has referrals to [facility] and [facility]. [facility] started.     Admitted with GI bleed and severe vascular disease. Oncology consulted for concerning findings on Ct scan with adrenal and liver mass. Patient has history of lung cancer. EGD done declines colonoscopy prefers capsule study as outpatient.     Will need to have follow up with [facility] medical management done after snf stay and placed on theresa.

## 2024-04-11 NOTE — PROGRESS NOTES
Physical Therapy  DATE: 2024    NAME: Jorge Luis Banda  MRN: [id number]   : 1945    Patient not seen this date for Physical Therapy due to:      [x] Cancel by Per ANIL Chun pt is not medically appropriate for PT re-assess at this time. Pt also going to MRI this afternoon.  PT continue to follow.    [] Hemodialysis    [] Critical Lab Value Level     [] Blood transfusion in progress    [] Acute or unstable cardiovascular status   _MAP < 55 or more than >115  _HR < 40 or > 130    [] Acute or unstable pulmonary status   -FiO2 > 60%   _RR < 5 or >40    _O2 sats < 85%    [] Strict Bedrest    [] Off Unit for surgery or procedure    [] Off Unit for testing       [] Pending imaging to R/O fracture    [] Refusal by Patient      [] Other      [] PT being discontinued at this time. Patient independent. No further needs.     [] PT being discontinued at this time as the patient has been transferred to hospice care. No further needs.      Giselle Barrios, PT

## 2024-04-11 NOTE — PROGRESS NOTES
Ry Traylor MD   Urology Progress Note            Subjective: Follow-up urinary retention indwelling Dotson, was removed    Patient Vitals for the past 24 hrs:   BP Temp Temp src Pulse Resp SpO2 Weight   04/11/24 0529 -- -- -- -- -- -- 73 kg (161 lb)   04/11/24 0448 (!) 120/59 98.4 °F (36.9 °C) Axillary 92 18 97 % --   04/11/24 0011 115/66 99 °F (37.2 °C) Oral 90 20 98 % --   04/10/24 2121 -- -- -- 95 18 97 % --   04/10/24 1957 128/68 99 °F (37.2 °C) Oral 100 22 98 % --   04/10/24 1620 111/63 98.2 °F (36.8 °C) Oral 92 16 100 % --   04/10/24 1209 133/64 98.4 °F (36.9 °C) Oral 90 15 100 % --   04/10/24 0824 116/67 -- -- (!) 102 -- -- --   04/10/24 0804 -- -- -- 90 17 97 % --   04/10/24 0716 109/67 98.4 °F (36.9 °C) Oral (!) 105 16 97 % --       Intake/Output Summary (Last 24 hours) at 4/11/2024 0658  Last data filed at 4/11/2024 0529  Gross per 24 hour   Intake 720 ml   Output 875 ml   Net -155 ml       Recent Labs     04/09/24  1846 04/10/24  0521 04/11/24  0456   WBC  --   --  10.6   HGB 7.6* 7.3* 7.1*   HCT 24.5* 23.8* 22.2*   MCV  --   --  93.3   PLT  --   --  182     Recent Labs     04/09/24  0456 04/11/24  0456    137   K 3.7 3.4*   * 104   CO2 23 23   BUN 12 16   CREATININE 1.1 1.1       No results for input(s): \"COLORU\", \"PHUR\", \"LABCAST\", \"WBCUA\", \"RBCUA\", \"MUCUS\", \"TRICHOMONAS\", \"YEAST\", \"BACTERIA\", \"CLARITYU\", \"SPECGRAV\", \"LEUKOCYTESUR\", \"UROBILINOGEN\", \"BILIRUBINUR\", \"BLOODU\" in the last 72 hours.    Invalid input(s): \"NITRATE\", \"GLUCOSEUKETONESUAMORPHOUS\"    Additional Lab/culture results:    Physical Exam: Patient had couple of bladder scans yesterday eventually was able to void spontaneously with postvoid residuals about 200 mL the patient was not feeling any abdominal discomfort or distention consequently we did not reinsert the catheter    Interval Imaging Findings:    Impression:    Patient Active Problem List   Diagnosis    Skin eschar    Stenosis of left

## 2024-04-11 NOTE — PLAN OF CARE
Pt remains free from falls this shift.   Pt received IV iron last night.  Hgb this morning 7.1.  K 3.4   :       40mEq of potassium administered.   Pt had 1 small BM overnight.  Problem: Safety - Adult  Goal: Free from fall injury  Outcome: Progressing     Problem: Genitourinary - Adult  Goal: Absence of urinary retention  Outcome: Progressing     Problem: Skin/Tissue Integrity  Goal: Absence of new skin breakdown  Description: 1.  Monitor for areas of redness and/or skin breakdown  2.  Assess vascular access sites hourly  3.  Every 4-6 hours minimum:  Change oxygen saturation probe site  4.  Every 4-6 hours:  If on nasal continuous positive airway pressure, respiratory therapy assess nares and determine need for appliance change or resting period.  Outcome: Progressing     Problem: Discharge Planning  Goal: Discharge to home or other facility with appropriate resources  Outcome: Progressing

## 2024-04-11 NOTE — CONSULTS
Pulmonary Medicine and Critical Care Consult        Patient - Jorge Luis Banda   MRN -  [id number]   [facility] # - [id number]   - 1945      Date of Admission -  4/3/2024  3:27 PM  Date of evaluation -  2024  Room - [facility] [address]   Hospital Day - 8  Consulting - Sony Crawford MD Primary Care Physician - Lucila Machado APRN - CNP     Reason for Consult    Bilateral loculated pleural effusions, hemoptysis    Assessment & Recommendations   Acute hypoxic respiratory insufficiency  Supplemental oxygen as needed to maintain oxygen saturation >90%  Incentive spirometer q1h while awake     Bilateral loculated pleural effusions/atelectasis  Lasix 20mg IVP x1  Repeat x-ray chest in a.m.  Does not require thoracentesis at this time    Acute anemia/Hemoptysis  Hemoptysis improving    History of stage 1 lung cancer   5 years ago with lung resection  No adjuvant therapy    Adrenal mass  Oncology on consult  Possible biopsy    Urinary retention  Urology following    Peripheral vascular disease  Off plavix  Status post R AKA    Discussed with Dr. Crawford  DVT prophylaxis with EPC cuffs  Will follow with you    HPI     Jorge Luis Banda is 78 y.o.,  male, admitted because of left leg pain. Patient has a past medical history of lung cancer, peripheral artery disease, hypertension, right AKA.     PMHx   Past Medical History      Diagnosis Date    Paroxysmal atrial fibrillation (HCC)     Peripheral artery disease (HCC)     Primary hypertension       Past Surgical History        Procedure Laterality Date    ABOVE KNEE AMPUTATION Right 2020    UPPER GASTROINTESTINAL ENDOSCOPY N/A 2024    ESOPHAGOGASTRODUODENOSCOPY BIOPSY performed by Vladimir Hoffmann MD at [facility] OR       [facility]    Current Medications    levofloxacin  750 mg IntraVENous Q24H    piperacillin-tazobactam  4,500 mg IntraVENous Once    And    piperacillin-tazobactam  3,375 mg IntraVENous Q8H    iron sucrose  200 mg IntraVENous Q24H    sodium chloride

## 2024-04-11 NOTE — PROGRESS NOTES
[facility]  Office: [phone]  Chinmay Ramos DO, Jose Atkinson DO, Guevara Ramirez DO, Pranay Cooper, DO, Sumeet Eller MD, Karen Carvajal MD, Sony Crawford MD, Poonam Platt MD,  Stephan San MD, Marcello Daniels MD, Theodore Pascual MD,  Juve Mahmood DO, Matias Mckeon MD, Hero Tavarez MD, Valentin Ramos DO, Jazzy Spence MD,  Trino Handley DO, Flavia Leonardo MD, Vanessa Soriano MD, Pricilla Akers MD, Casimiro Knight MD,  Zeke Maciel MD, Guera Cervantes MD, Kianna Martin MD, Mirlande Worrell MD, Ney Garcia MD, Savannah Dominguez MD, Ed Emerson DO, Jaylen West DO, Comfort Carcamo MD,  Siva Ashley MD, Shirley Waterhouse, CNP,  Carolyn Pelaez CNP, Scotty Ge, CNP,  Wandy Trivedi, DNP, Malissa Gaston, CNP, Lana Penaloza, CNP, Susan Haro, CNP, Sherrell Mendoza, CNP, Suzy Morales, PA-C, Dalila Lange PA-C, Sada Matt, CNP, Crystal Womack, CNP, Sheng Reddy, CNP, Arielle Guy, CNP, Linsey Perez, CNP, Anju Cho, CNS, Hoa Donahue, CNP, Cassidy Ferrer CNP, Tracy Schwab, CNP       [facility]      Daily Progress Note     Admit Date: 4/3/2024  Bed/Room No.  1010/1010-02  Admitting Physician : Sony Crawford MD  Code Status :Full Code  Hospital Day:  LOS: 8 days   Chief Complaint:     Chief Complaint   Patient presents with    Leg Pain     Left leg, open wounds and swollen       Principal Problem:    Melena  Active Problems:    History of arterial bypass of lower extremity    S/P AKA (above knee amputation) unilateral, right (HCC)    Primary hypertension    Peripheral arterial disease (HCC)    Neuropathy    Malignant neoplasm of lung (HCC)    Intermittent claudication of left lower extremity due to atherosclerosis (HCC)    Embolism and thrombosis of artery (HCC)    Left leg cellulitis    Iron deficiency anemia due to chronic blood loss    Hx of cancer of lung    Adrenal mass (HCC)    Claudication (HCC)  Resolved Problems:    * No resolved      No results found for: \"SPECGRAV\", \"PROTEINU\", \"RBCUA\", \"BLOODU\", \"BACTERIA\", \"NITRU\", \"WBCUA\", \"LEUKOCYTESUR\"    Imaging / Clinical Data :-   CT ABDOMEN W WO CONTRAST Additional Contrast? Radiologist Recommendation   Final Result   1. Highly suspicious appearing heterogeneous, lobulated mass primarily   centered at the right adrenal gland measuring 6.5 cm with indeterminate   washout characteristics.  Given history this is almost certainly a metastasis   though primary adrenal malignancy is in the differential.  The lesion appears   to invade the upper pole of the right kidney and liver.  Comparison with   prior imaging would be useful.   2. Suspected changes of acute calculus cholecystitis.   3. Few mildly enlarged upper abdominal and retroperitoneal lymph nodes,   indeterminate.         CT CHEST W CONTRAST   Final Result   1. Mild-to-moderate bilateral pleural effusions greater on the right with   areas of loculation. Underlying bibasilar atelectasis.   2. Biapical and bibasal fibrosis.   3. Partially visualize low-density mass seen involving the right adrenal   gland and portion of the right hepatic lobe most likely representing   malignancy.      RECOMMENDATIONS:   CT abdomen and pelvis for further evaluation         CT HEAD WO CONTRAST   Final Result   No acute intracranial abnormality within constraints of CT acquisition.         XR CHEST PORTABLE   Final Result   Airspace consolidation at the left lung base which could represent   atelectasis, pneumonia or aspiration.         Vascular lower arterial complete physiologic Doppler at rest   Final Result      XR FEMUR RIGHT (MIN 2 VIEWS)   Final Result   Status post above the knee amputation of the right lower extremity to the   level of the distal femoral diaphysis.  No soft tissue gas.      No cortical destruction or acute osseous abnormalities.         Vascular duplex lower extremity venous left   Final Result      XR FOOT LEFT (MIN 3 VIEWS)   Final Result

## 2024-04-11 NOTE — RT PROTOCOL NOTE
RT Inhaler-Nebulizer Bronchodilator Protocol Note    There is a bronchodilator order in the chart from a provider indicating to follow the RT Bronchodilator Protocol and there is an “Initiate RT Inhaler-Nebulizer Bronchodilator Protocol” order as well (see protocol at bottom of note).    CXR Findings:  No results found.    The findings from the last RT Protocol Assessment were as follows:   History Pulmonary Disease: Chronic pulmonary disease  Respiratory Pattern: Mild dyspnea at rest, irregular pattern, or RR 21-25 bpm  Breath Sounds: Slightly diminished and/or crackles  Cough: Strong, spontaneous, non-productive  Indication for Bronchodilator Therapy: Decreased or absent breath sounds  Bronchodilator Assessment Score: 8    Aerosolized bronchodilator medication orders have been revised according to the RT Inhaler-Nebulizer Bronchodilator Protocol below.    Respiratory Therapist to perform RT Therapy Protocol Assessment initially then follow the protocol.  Repeat RT Therapy Protocol Assessment PRN for score 0-3 or on second treatment, BID, and PRN for scores above 3.    No Indications - adjust the frequency to every 6 hours PRN wheezing or bronchospasm, if no treatments needed after 48 hours then discontinue using Per Protocol order mode.     If indication present, adjust the RT bronchodilator orders based on the Bronchodilator Assessment Score as indicated below.  Use Inhaler orders unless patient has one or more of the following: on home nebulizer, not able to hold breath for 10 seconds, is not alert and oriented, cannot activate and use MDI correctly, or respiratory rate 25 breaths per minute or more, then use the equivalent nebulizer order(s) with same Frequency and PRN reasons based on the score.  If a patient is on this medication at home then do not decrease Frequency below that used at home.    0-3 - enter or revise RT bronchodilator order(s) to equivalent RT Bronchodilator order with Frequency of every 4  hours PRN for wheezing or increased work of breathing using Per Protocol order mode.        4-6 - enter or revise RT Bronchodilator order(s) to two equivalent RT bronchodilator orders with one order with BID Frequency and one order with Frequency of every 4 hours PRN wheezing or increased work of breathing using Per Protocol order mode.        7-10 - enter or revise RT Bronchodilator order(s) to two equivalent RT bronchodilator orders with one order with TID Frequency and one order with Frequency of every 4 hours PRN wheezing or increased work of breathing using Per Protocol order mode.       11-13 - enter or revise RT Bronchodilator order(s) to one equivalent RT bronchodilator order with QID Frequency and an Albuterol order with Frequency of every 4 hours PRN wheezing or increased work of breathing using Per Protocol order mode.      Greater than 13 - enter or revise RT Bronchodilator order(s) to one equivalent RT bronchodilator order with every 4 hours Frequency and an Albuterol order with Frequency of every 2 hours PRN wheezing or increased work of breathing using Per Protocol order mode.     RT to enter RT Home Evaluation for COPD & MDI Assessment order using Per Protocol order mode.    Electronically signed by Shana Isaac RCP on 4/11/2024 at 8:48 AM

## 2024-04-11 NOTE — PLAN OF CARE
Problem: Respiratory - Adult  Goal: Able to breathe comfortably  Description: Able to breathe comfortably  Outcome: Progressing     Problem: Respiratory - Adult  Goal: Clear lung sounds  Outcome: Progressing     Problem: Respiratory - Adult  Goal: Adequate oxygenation  Description: Adequate oxygenation  Outcome: Progressing

## 2024-04-12 ENCOUNTER — APPOINTMENT (OUTPATIENT)
Dept: GENERAL RADIOLOGY | Age: 79
DRG: 377 | End: 2024-04-12
Payer: MEDICARE

## 2024-04-12 LAB
ANION GAP SERPL CALCULATED.3IONS-SCNC: 16 MMOL/L (ref 9–17)
ANTI-XA UNFRAC HEPARIN: 0.24 IU/L (ref 0.3–0.7)
ANTI-XA UNFRAC HEPARIN: <0.1 IU/L (ref 0.3–0.7)
BUN SERPL-MCNC: 20 MG/DL (ref 8–23)
BUN/CREAT SERPL: 15 (ref 9–20)
CALCIUM SERPL-MCNC: 8 MG/DL (ref 8.6–10.4)
CHLORIDE SERPL-SCNC: 100 MMOL/L (ref 98–107)
CO2 SERPL-SCNC: 21 MMOL/L (ref 20–31)
CREAT SERPL-MCNC: 1.3 MG/DL (ref 0.7–1.2)
ERYTHROCYTE [DISTWIDTH] IN BLOOD BY AUTOMATED COUNT: 18.4 % (ref 11.8–14.4)
ERYTHROCYTE [DISTWIDTH] IN BLOOD BY AUTOMATED COUNT: 18.4 % (ref 11.8–14.4)
GFR SERPL CREATININE-BSD FRML MDRD: 56 ML/MIN/1.73M2
GLUCOSE SERPL-MCNC: 94 MG/DL (ref 70–99)
HCT VFR BLD AUTO: 21.5 % (ref 40.7–50.3)
HCT VFR BLD AUTO: 21.9 % (ref 40.7–50.3)
HCT VFR BLD AUTO: 25.4 % (ref 40.7–50.3)
HGB BLD-MCNC: 6.8 G/DL (ref 13–17)
HGB BLD-MCNC: 6.9 G/DL (ref 13–17)
HGB BLD-MCNC: 8.2 G/DL (ref 13–17)
INR PPP: 1.4
MAGNESIUM SERPL-MCNC: 1.7 MG/DL (ref 1.6–2.6)
MCH RBC QN AUTO: 29.4 PG (ref 25.2–33.5)
MCH RBC QN AUTO: 29.6 PG (ref 25.2–33.5)
MCHC RBC AUTO-ENTMCNC: 31.5 G/DL (ref 28.4–34.8)
MCHC RBC AUTO-ENTMCNC: 31.6 G/DL (ref 28.4–34.8)
MCV RBC AUTO: 93.2 FL (ref 82.6–102.9)
MCV RBC AUTO: 93.5 FL (ref 82.6–102.9)
NRBC BLD-RTO: 0 PER 100 WBC
NRBC BLD-RTO: 0 PER 100 WBC
PARTIAL THROMBOPLASTIN TIME: 47.5 SEC (ref 23.9–33.8)
PLATELET # BLD AUTO: 187 K/UL (ref 138–453)
PLATELET # BLD AUTO: 195 K/UL (ref 138–453)
PMV BLD AUTO: 10.6 FL (ref 8.1–13.5)
PMV BLD AUTO: 11.3 FL (ref 8.1–13.5)
POTASSIUM SERPL-SCNC: 3.2 MMOL/L (ref 3.7–5.3)
POTASSIUM SERPL-SCNC: 3.3 MMOL/L (ref 3.7–5.3)
PROTHROMBIN TIME: 17.3 SEC (ref 11.5–14.2)
RBC # BLD AUTO: 2.3 M/UL (ref 4.21–5.77)
RBC # BLD AUTO: 2.35 M/UL (ref 4.21–5.77)
SODIUM SERPL-SCNC: 137 MMOL/L (ref 135–144)
WBC OTHER # BLD: 8.4 K/UL (ref 3.5–11.3)
WBC OTHER # BLD: 9.3 K/UL (ref 3.5–11.3)

## 2024-04-12 PROCEDURE — 86920 COMPATIBILITY TEST SPIN: CPT

## 2024-04-12 PROCEDURE — 99232 SBSQ HOSP IP/OBS MODERATE 35: CPT | Performed by: INTERNAL MEDICINE

## 2024-04-12 PROCEDURE — 2700000000 HC OXYGEN THERAPY PER DAY

## 2024-04-12 PROCEDURE — 80048 BASIC METABOLIC PNL TOTAL CA: CPT

## 2024-04-12 PROCEDURE — 85730 THROMBOPLASTIN TIME PARTIAL: CPT

## 2024-04-12 PROCEDURE — 2580000003 HC RX 258: Performed by: INTERNAL MEDICINE

## 2024-04-12 PROCEDURE — 86900 BLOOD TYPING SEROLOGIC ABO: CPT

## 2024-04-12 PROCEDURE — 6370000000 HC RX 637 (ALT 250 FOR IP): Performed by: FAMILY MEDICINE

## 2024-04-12 PROCEDURE — P9016 RBC LEUKOCYTES REDUCED: HCPCS

## 2024-04-12 PROCEDURE — 1200000000 HC SEMI PRIVATE

## 2024-04-12 PROCEDURE — 86850 RBC ANTIBODY SCREEN: CPT

## 2024-04-12 PROCEDURE — 94761 N-INVAS EAR/PLS OXIMETRY MLT: CPT

## 2024-04-12 PROCEDURE — 6370000000 HC RX 637 (ALT 250 FOR IP): Performed by: INTERNAL MEDICINE

## 2024-04-12 PROCEDURE — 2580000003 HC RX 258: Performed by: FAMILY MEDICINE

## 2024-04-12 PROCEDURE — 83735 ASSAY OF MAGNESIUM: CPT

## 2024-04-12 PROCEDURE — 85014 HEMATOCRIT: CPT

## 2024-04-12 PROCEDURE — 85610 PROTHROMBIN TIME: CPT

## 2024-04-12 PROCEDURE — 85027 COMPLETE CBC AUTOMATED: CPT

## 2024-04-12 PROCEDURE — 36430 TRANSFUSION BLD/BLD COMPNT: CPT

## 2024-04-12 PROCEDURE — 85018 HEMOGLOBIN: CPT

## 2024-04-12 PROCEDURE — 99213 OFFICE O/P EST LOW 20 MIN: CPT

## 2024-04-12 PROCEDURE — 86901 BLOOD TYPING SEROLOGIC RH(D): CPT

## 2024-04-12 PROCEDURE — 94640 AIRWAY INHALATION TREATMENT: CPT

## 2024-04-12 PROCEDURE — A4216 STERILE WATER/SALINE, 10 ML: HCPCS | Performed by: INTERNAL MEDICINE

## 2024-04-12 PROCEDURE — 71045 X-RAY EXAM CHEST 1 VIEW: CPT

## 2024-04-12 PROCEDURE — 6360000002 HC RX W HCPCS: Performed by: INTERNAL MEDICINE

## 2024-04-12 PROCEDURE — 85520 HEPARIN ASSAY: CPT

## 2024-04-12 PROCEDURE — C9113 INJ PANTOPRAZOLE SODIUM, VIA: HCPCS | Performed by: INTERNAL MEDICINE

## 2024-04-12 PROCEDURE — 99232 SBSQ HOSP IP/OBS MODERATE 35: CPT | Performed by: FAMILY MEDICINE

## 2024-04-12 PROCEDURE — 6360000002 HC RX W HCPCS: Performed by: FAMILY MEDICINE

## 2024-04-12 PROCEDURE — 36415 COLL VENOUS BLD VENIPUNCTURE: CPT

## 2024-04-12 RX ORDER — HEPARIN SODIUM 1000 [USP'U]/ML
4000 INJECTION, SOLUTION INTRAVENOUS; SUBCUTANEOUS ONCE
Status: COMPLETED | OUTPATIENT
Start: 2024-04-12 | End: 2024-04-12

## 2024-04-12 RX ORDER — SODIUM CHLORIDE 9 MG/ML
INJECTION, SOLUTION INTRAVENOUS PRN
Status: DISCONTINUED | OUTPATIENT
Start: 2024-04-12 | End: 2024-05-04

## 2024-04-12 RX ORDER — HEPARIN SODIUM 1000 [USP'U]/ML
2000 INJECTION, SOLUTION INTRAVENOUS; SUBCUTANEOUS PRN
Status: DISCONTINUED | OUTPATIENT
Start: 2024-04-12 | End: 2024-04-16

## 2024-04-12 RX ORDER — HEPARIN SODIUM 10000 [USP'U]/100ML
5-30 INJECTION, SOLUTION INTRAVENOUS CONTINUOUS
Status: DISCONTINUED | OUTPATIENT
Start: 2024-04-12 | End: 2024-04-16

## 2024-04-12 RX ORDER — HEPARIN SODIUM 1000 [USP'U]/ML
4000 INJECTION, SOLUTION INTRAVENOUS; SUBCUTANEOUS PRN
Status: DISCONTINUED | OUTPATIENT
Start: 2024-04-12 | End: 2024-04-16

## 2024-04-12 RX ORDER — ASPIRIN 81 MG/1
81 TABLET ORAL DAILY
Status: DISCONTINUED | OUTPATIENT
Start: 2024-04-12 | End: 2024-05-06

## 2024-04-12 RX ADMIN — FUROSEMIDE 20 MG: 10 INJECTION, SOLUTION INTRAMUSCULAR; INTRAVENOUS at 10:16

## 2024-04-12 RX ADMIN — GABAPENTIN 300 MG: 300 CAPSULE ORAL at 14:06

## 2024-04-12 RX ADMIN — PIPERACILLIN AND TAZOBACTAM 3375 MG: 3; .375 INJECTION, POWDER, LYOPHILIZED, FOR SOLUTION INTRAVENOUS at 10:35

## 2024-04-12 RX ADMIN — CYANOCOBALAMIN TAB 1000 MCG 1000 MCG: 1000 TAB at 10:17

## 2024-04-12 RX ADMIN — ATORVASTATIN CALCIUM 40 MG: 40 TABLET, FILM COATED ORAL at 21:12

## 2024-04-12 RX ADMIN — GUAIFENESIN 600 MG: 600 TABLET ORAL at 10:17

## 2024-04-12 RX ADMIN — METOPROLOL TARTRATE 50 MG: 50 TABLET, FILM COATED ORAL at 10:00

## 2024-04-12 RX ADMIN — ALBUTEROL SULFATE 2 PUFF: 90 AEROSOL, METERED RESPIRATORY (INHALATION) at 08:28

## 2024-04-12 RX ADMIN — HEPARIN SODIUM 12 UNITS/KG/HR: 10000 INJECTION, SOLUTION INTRAVENOUS at 14:12

## 2024-04-12 RX ADMIN — FUROSEMIDE 20 MG: 10 INJECTION, SOLUTION INTRAMUSCULAR; INTRAVENOUS at 17:29

## 2024-04-12 RX ADMIN — GABAPENTIN 300 MG: 300 CAPSULE ORAL at 21:12

## 2024-04-12 RX ADMIN — ASPIRIN 81 MG: 81 TABLET, COATED ORAL at 14:09

## 2024-04-12 RX ADMIN — HEPARIN SODIUM 4000 UNITS: 1000 INJECTION INTRAVENOUS; SUBCUTANEOUS at 14:05

## 2024-04-12 RX ADMIN — PIPERACILLIN AND TAZOBACTAM 3375 MG: 3; .375 INJECTION, POWDER, LYOPHILIZED, FOR SOLUTION INTRAVENOUS at 02:31

## 2024-04-12 RX ADMIN — HEPARIN SODIUM 2000 UNITS: 1000 INJECTION INTRAVENOUS; SUBCUTANEOUS at 21:22

## 2024-04-12 RX ADMIN — SODIUM CHLORIDE, PRESERVATIVE FREE 40 MG: 5 INJECTION INTRAVENOUS at 21:11

## 2024-04-12 RX ADMIN — SODIUM CHLORIDE, PRESERVATIVE FREE 40 MG: 5 INJECTION INTRAVENOUS at 10:17

## 2024-04-12 RX ADMIN — ALBUTEROL SULFATE 2 PUFF: 90 AEROSOL, METERED RESPIRATORY (INHALATION) at 14:29

## 2024-04-12 RX ADMIN — ALBUTEROL SULFATE 2 PUFF: 90 AEROSOL, METERED RESPIRATORY (INHALATION) at 20:50

## 2024-04-12 RX ADMIN — GUAIFENESIN 600 MG: 600 TABLET ORAL at 21:12

## 2024-04-12 RX ADMIN — SODIUM CHLORIDE, PRESERVATIVE FREE 10 ML: 5 INJECTION INTRAVENOUS at 10:18

## 2024-04-12 RX ADMIN — CHOLECALCIFEROL TAB 125 MCG (5000 UNIT) 5000 UNITS: 125 TAB at 10:17

## 2024-04-12 RX ADMIN — GABAPENTIN 300 MG: 300 CAPSULE ORAL at 10:17

## 2024-04-12 RX ADMIN — AZITHROMYCIN MONOHYDRATE 500 MG: 500 INJECTION, POWDER, LYOPHILIZED, FOR SOLUTION INTRAVENOUS at 17:47

## 2024-04-12 RX ADMIN — FERROUS SULFATE TAB EC 325 MG (65 MG FE EQUIVALENT) 325 MG: 325 (65 FE) TABLET DELAYED RESPONSE at 10:18

## 2024-04-12 RX ADMIN — POTASSIUM BICARBONATE 40 MEQ: 782 TABLET, EFFERVESCENT ORAL at 06:05

## 2024-04-12 RX ADMIN — PIPERACILLIN AND TAZOBACTAM 3375 MG: 3; .375 INJECTION, POWDER, LYOPHILIZED, FOR SOLUTION INTRAVENOUS at 17:35

## 2024-04-12 RX ADMIN — TAMSULOSIN HYDROCHLORIDE 0.4 MG: 0.4 CAPSULE ORAL at 10:17

## 2024-04-12 NOTE — PROGRESS NOTES
[facility]     DAILY PROGRESS NOTE      Patient:   Jorge Luis Banda   :    1945   Date:     2024  Consultant:   Sonny Read DO FACG    Subjective:     78 y.o. male admitted 4/3/2024 with Melena [K92.1]  GI bleed [K92.2]  UMM (acute kidney injury) (HCC) [N17.9]  Left leg cellulitis [L03.116]  Anemia, unspecified type [D64.9] and seen for anemia and melena.    Chart reviewed.  Patient presented with coagulopathy with melena.  Coagulopathy reversed.  He underwent EGD on Monday without clear etiology of bleeding.  Colonoscopy was recommended.  Patient could only take 1 to 2 glasses of prep and therefore procedure was aborted.  No further evaluation was completed.  The day after his upper endoscopy, Tuesday there was reported decreased use of left upper extremity and left lower extremity.  Patient continues to have some dark stool along with anemia requiring blood transfusion.  Heparin drip started today.  Patient continues to have no use of left upper extremity    Current Medications include:   Scheduled Meds:   heparin (porcine)  4,000 Units IntraVENous Once    aspirin  81 mg Oral Daily    piperacillin-tazobactam  3,375 mg IntraVENous Q8H    furosemide  20 mg IntraVENous BID    azithromycin  500 mg IntraVENous Q24H    sodium chloride  100 mL IntraVENous Once    albuterol sulfate HFA  2 puff Inhalation TID    guaiFENesin  600 mg Oral BID    gabapentin  300 mg Oral TID    ferrous sulfate  325 mg Oral Daily with breakfast    pantoprazole (PROTONIX) 40 mg in sodium chloride (PF) 0.9 % 10 mL injection  40 mg IntraVENous Q12H    atorvastatin  40 mg Oral Nightly    vitamin D3  5,000 Units Oral Daily    vitamin B-12  1,000 mcg Oral Daily    tamsulosin  0.4 mg Oral Daily    sodium chloride flush  5-40 mL IntraVENous 2 times per day    metoprolol tartrate  50 mg Oral BID     Continuous Infusions:   sodium chloride      heparin (PORCINE) Infusion      sodium chloride 5

## 2024-04-12 NOTE — PROGRESS NOTES
Occupational Therapy    DATE: 2024    NAME: Jorge Luis Banda  MRN: [id number]   : 1945    Patient not seen this date for Occupational Therapy due to:      [] Cancel by RN or physician:     [] Hemodialysis    [] Critical Lab Value Level     [x] Blood transfusion in progress, will check back as able    [] Acute or unstable cardiovascular status   _MAP < 55 or more than >115  _HR < 40 or > 130    [] Acute or unstable pulmonary status   -FiO2 > 60%   _RR < 5 or >40    _O2 sats < 85%    [] Strict Bedrest    [] Off Unit for surgery or procedure    [] Off Unit for testing       [] Pending imaging to R/O fracture    [] Refusal by Patient      [] Intubated    [] Other      [] OT being discontinued at this time. Patient independent. No further needs.     [] OT being discontinued at this time as the patient has been transferred to hospice care. No further needs.    Laly Adams OTR/L

## 2024-04-12 NOTE — PROGRESS NOTES
Pt is resting in bed comfortably with no complaints of pain. Left sided weakness is still present and pt is unable to utilize left extremity upon assessment. Full bed, and brief change. Stool is dark green in color and loose consistency. IV infiltrated on L forearm. New IV placed. IV venofer given. See MAR. Lopressor held d/t order parameters not being met from hypotension. DI score increased. On call NP notified. Pt complaining of not being able to get any sleep for past few nights. Writer reached out to on call NP. New order placed for melatonin and given. See MAR. K+ this morning resulted as 3.3, replaced with 40 mEq effer K. See Mar. Writer notified on call NP d/t no lab orders to recheck hgb. New orders placed, awaiting lab results.     Standard safety measures in place. Call light within reach. Bed in locked and lowest position. Safety maintained and care ongoing.

## 2024-04-12 NOTE — PLAN OF CARE
Problem: Respiratory - Adult  Goal: Able to breathe comfortably  Description: Able to breathe comfortably  4/11/2024 2056 by Madhav Waite RCP  Outcome: Progressing  4/11/2024 0848 by Shana Isaac RCP  Outcome: Progressing     Problem: Respiratory - Adult  Goal: Clear lung sounds  4/11/2024 2056 by Madhav Waite RCP  Outcome: Progressing  4/11/2024 0848 by Shana Isaac RCP  Outcome: Progressing     Problem: Respiratory - Adult  Goal: Adequate oxygenation  Description: Adequate oxygenation  4/11/2024 2056 by Madhav Waite RCP  Outcome: Progressing  4/11/2024 0848 by Shana Isaac RCP  Outcome: Progressing

## 2024-04-12 NOTE — PROGRESS NOTES
Physical Therapy  DATE: 2024    NAME: Jorge Luis Banda  MRN: [id number]   : 1945    Patient not seen this date for Physical Therapy due to:      [x] Cancel by ANIL Polk due to patient not medically appropriate this morning.  HgB 6.8 and RN reporting patient is having a blood transfusion this morning.  MRI complete  secondary to patient presenting with L side weakness. MRI indicating Acute infarcts within the anterior right CLARKE MCA watershed distribution extending peripherally towards the right posterior frontal lobe.  PT continue to follow.    [] Hemodialysis    [] Critical Lab Value Level     [] Blood transfusion in progress    [] Acute or unstable cardiovascular status   _MAP < 55 or more than >115  _HR < 40 or > 130    [] Acute or unstable pulmonary status   -FiO2 > 60%   _RR < 5 or >40    _O2 sats < 85%    [] Strict Bedrest    [] Off Unit for surgery or procedure    [] Off Unit for testing       [] Pending imaging to R/O fracture    [] Refusal by Patient      [] Other      [] PT being discontinued at this time. Patient independent. No further needs.     [] PT being discontinued at this time as the patient has been transferred to hospice care. No further needs.      Giselle Barrios, PT

## 2024-04-12 NOTE — PROGRESS NOTES
Wife Rita and brother at bedside, updated and questions answered, states she received a call from Dr Mcneil and is aware of patient's condition and plan

## 2024-04-12 NOTE — PROGRESS NOTES
Spoke to Dr Crawford regarding heparin drip order and planned biopsy for today. He said the heparin drip could be started after the patient gets back from the biopsy.

## 2024-04-12 NOTE — PLAN OF CARE
Problem: Respiratory - Adult  Goal: Able to breathe comfortably  Description: Able to breathe comfortably  4/12/2024 0313 by Mindy Holly RCP  Outcome: Progressing     Problem: Respiratory - Adult  Goal: Clear lung sounds  4/12/2024 0313 by Mindy Holly RCP  Outcome: Progressing     Problem: Respiratory - Adult  Goal: Adequate oxygenation  Description: Adequate oxygenation  4/12/2024 0313 by Mindy Holly RCP  Outcome: Progressing

## 2024-04-12 NOTE — PROGRESS NOTES
Comprehensive Nutrition Assessment    Type and Reason for Visit:  Reassess    Nutrition Recommendations/Plan:   Provide diet as ordered   Provide supplements as ordered   Monitor labs as they become available   Monitor skin integrity/weights     Malnutrition Assessment:  Malnutrition Status:  At risk for malnutrition (Comment) (04/08/24 1814)    Context:        Findings of the 6 clinical characteristics of malnutrition:  Energy Intake:     Weight Loss:        Body Fat Loss:        Muscle Mass Loss:       Fluid Accumulation:        Strength:       Nutrition Assessment:    Patient is resting in bed, has some confusion.  His nurse tells me that he had a CVA yesterday.  He denies chest pain. Mild occasional cough, mostly dry. Shortness of breath not much changed.  Plans for adrenal mass biopsy today. Patient admission related to melena, GI bleed and UMM. Had dark stools for several days prior to admission. Conitnue with a poor appetite 25% roughly, he does drink his supplement at times. Discharge planning to a SNF when he is stable. 4/12 weight was 158#, 4/7 weight was 156#. Continue supplement to aid in weight maintenance, monitor po intakes, weigths, labs, skin integrity.    Nutrition Related Findings:    BM 4/9 active sounds, noted right AKA, dark stools. Wound Type: Venous Stasis (to left foot, arterial wound to left foot.)       Current Nutrition Intake & Therapies:    Average Meal Intake: 1-25%  Average Supplements Intake: 26-50%  ADULT DIET; Regular  ADULT ORAL NUTRITION SUPPLEMENT; Breakfast, Dinner; Standard High Calorie/High Protein Oral Supplement    Anthropometric Measures:  Height: 182.9 cm (6')  Ideal Body Weight (IBW): 178 lbs (81 kg)       Current Body Weight: 74.2 kg (163 lb 9.3 oz), 91.9 % IBW. Weight Source: Bed Scale  Current BMI (kg/m2): 22.2        Weight Adjustment For: Amputation  Total Adjusted Percentage (Calculated): 10.1  Adjusted Ideal Body Weight (lbs) (Calculated): 160 lbs  Adjusted

## 2024-04-12 NOTE — CARE COORDINATION
Social work: Continue to follow for dc needs when medically appropriate. Monica Chavis and Zainab Novoa are following.

## 2024-04-12 NOTE — PROGRESS NOTES
[facility]  Office: [phone]  Chinmay Ramos DO, Jose Atkinson DO, Guevara Ramirez DO, Pranay Cooper, DO, Sumeet Eller MD, Karen Carvajal MD, Sony Crawford MD, Poonam Platt MD,  Stephan San MD, Marcello Daniels MD, Theodore Pascual MD,  Juve Mahmood DO, Matias Mckeon MD, Hero Tavarez MD, Valentin Ramos DO, Jazzy Spence MD,  Trino Handley DO, Flavia Leonardo MD, Vanessa Soriano MD, Pricilla Akers MD, Casimiro Knight MD,  Zeke Maciel MD, Guera Cervantes MD, Kianna Martin MD, Mirlande Worrell MD, Ney Garcia MD, Savannah Dominguez MD, Ed Emerson DO, Jaylen West DO, Comfort Carcamo MD,  Siva Ashley MD, Shirley Waterhouse, CNP,  Carolyn Pelaez CNP, Scotty eG, CNP,  Wandy Trivedi, DNP, Malissa Gaston, CNP, Lana Penaloza, CNP, Susan Haro, CNP, Sherrell Mendoza, CNP, Suzy Morales, PA-C, Dalila Lange PA-C, Sada Matt, CNP, Crystal Womack, CNP, Sheng Reddy, CNP, Arielle Guy, CNP, Linsey Perez, CNP, Anju Cho, CNS, Hoa Donahue, CNP, Cassidy Ferrer CNP, Tracy Schwab, CNP       [facility]      Daily Progress Note     Admit Date: 4/3/2024  Bed/Room No.  1010/1010-02  Admitting Physician : Sony Crawford MD  Code Status :Full Code  Hospital Day:  LOS: 9 days   Chief Complaint:     Chief Complaint   Patient presents with    Leg Pain     Left leg, open wounds and swollen       Principal Problem:    Melena  Active Problems:    History of arterial bypass of lower extremity    S/P AKA (above knee amputation) unilateral, right (HCC)    Primary hypertension    Peripheral arterial disease (HCC)    Neuropathy    Malignant neoplasm of lung (HCC)    Intermittent claudication of left lower extremity due to atherosclerosis (HCC)    Embolism and thrombosis of artery (HCC)    Left leg cellulitis    Iron deficiency anemia due to chronic blood loss    Hx of cancer of lung    Adrenal mass (HCC)    Claudication (HCC)    Pleural effusion, bilateral     history of stage I lung cancer in 2019  s/p resection. Will need biopsy to confirm .  PAF -   Anticoagulation was on  hold due to severe anemia and biopsy.  Start heparin infusion.  Loculated pleural effusion and bilateral pneumonia   IV antibiotics and pulmonary following.  Sputum culture pending..   Primary Hypertension  -well-controlled.  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  Aspirin 81 mg daily.  Neurology consulted.  Former Smoker   Quit in 2019.  Acute on chronic combined systolic and diastolic CHF  IV Lasix -   Monitor kidney function.  Moderate pulmonary hypertension   O2   Lasix     Overall prognosis poor.    I Called  primary contact listed Rita , she informed me that she is the wife .  I updated Feliberto about patient's condition and answered all questions.  I explained poor prognosis given low anemia, requirement for blood transfusions, new stroke, liver and suprarenal lesions, CHF.     Continue to monitor vitals , Intake / output ,  Cell count , HGB , Kidney function, oxygenation  as indicated .   Plan and updates discussed with patient ,  answers  explained to satisfaction.   Plan discussed with Staff  Felicitas MA     (Please note that portions of this note were completed with a voice recognition program. Efforts were made to edit the dictations but occasionally words are mis-transcribed.)      Sony Crawford MD  4/12/2024

## 2024-04-12 NOTE — RT PROTOCOL NOTE
RT Inhaler-Nebulizer Bronchodilator Protocol Note    There is a bronchodilator order in the chart from a provider indicating to follow the RT Bronchodilator Protocol and there is an “Initiate RT Inhaler-Nebulizer Bronchodilator Protocol” order as well (see protocol at bottom of note).    CXR Findings:  XR CHEST PORTABLE    Result Date: 4/12/2024  Mildly worsened bilateral airspace disease. Small bilateral pleural effusions.       The findings from the last RT Protocol Assessment were as follows:   History Pulmonary Disease: Chronic pulmonary disease  Respiratory Pattern: Mild dyspnea at rest, irregular pattern, or RR 21-25 bpm  Breath Sounds: Slightly diminished and/or crackles  Cough: Strong, spontaneous, non-productive  Indication for Bronchodilator Therapy: Decreased or absent breath sounds  Bronchodilator Assessment Score: 8    Aerosolized bronchodilator medication orders have been revised according to the RT Inhaler-Nebulizer Bronchodilator Protocol below.    Respiratory Therapist to perform RT Therapy Protocol Assessment initially then follow the protocol.  Repeat RT Therapy Protocol Assessment PRN for score 0-3 or on second treatment, BID, and PRN for scores above 3.    No Indications - adjust the frequency to every 6 hours PRN wheezing or bronchospasm, if no treatments needed after 48 hours then discontinue using Per Protocol order mode.     If indication present, adjust the RT bronchodilator orders based on the Bronchodilator Assessment Score as indicated below.  Use Inhaler orders unless patient has one or more of the following: on home nebulizer, not able to hold breath for 10 seconds, is not alert and oriented, cannot activate and use MDI correctly, or respiratory rate 25 breaths per minute or more, then use the equivalent nebulizer order(s) with same Frequency and PRN reasons based on the score.  If a patient is on this medication at home then do not decrease Frequency below that used at home.    0-3 -

## 2024-04-12 NOTE — PLAN OF CARE
Problem: Safety - Adult  Goal: Free from fall injury  4/12/2024 1835 by Felicitas Guardado, ANIL  Outcome: Adequate for Discharge  Alert/oriented, rt AKA and new CVA with lt sided residual, Patient remains free from falls and injuries, freq.Rounding, call light at reach, bed alarm armed, personal items within reach, bed in lowest position with wheels locked, no attempts to get out of bed unassisted, falling star program in place and patients safety maintained    Problem: Discharge Planning  Goal: Discharge to home or other facility with appropriate resources  4/12/2024 1835 by Felicitas Guardado, RN  Outcome: Progressing  Plan is for home with ome care when medically stable    Problem: Pain  Goal: Verbalizes/displays adequate comfort level or baseline comfort level  4/12/2024 1835 by Felicitas Guardado RN  Outcome: Progressing  Pain scale reviewed with patient, verbalized understanding, denies pain unless lt foot is \"touched\"will continue to monitor, educate and encourage patient to report pain at the earliest onset    Problem: Gastrointestinal - Adult  Goal: Minimal or absence of nausea and vomiting  4/12/2024 1835 by Felicitas Guardado, RN  Outcome: Progressing  Received 1 unit PRBCs for hbg for 6.8 hgb, 8.2 on post transfusion check, G.I re-consulted to offer input on anti-coags secondary to CVA    Problem: Infection - Adult  Goal: Absence of infection at discharge  4/12/2024 1835 by Felicitas Guardado, RN  Outcome: Progressing   IV antibiotics continue

## 2024-04-12 NOTE — PLAN OF CARE
Problem: Respiratory - Adult  Goal: Able to breathe comfortably  Description: Able to breathe comfortably  4/12/2024 1950 by Giselle Salas RCP  Outcome: Progressing     Problem: Respiratory - Adult  Goal: Clear lung sounds  4/12/2024 1950 by Giselle aSlas RCP  Outcome: Progressing     Problem: Respiratory - Adult  Goal: Adequate oxygenation  Description: Adequate oxygenation  4/12/2024 1950 by Giselle Salas RCP  Outcome: Progressing

## 2024-04-12 NOTE — PROGRESS NOTES
[facility]  Consult Note       NAME:  Jorge Luis Banda  MEDICAL RECORD NUMBER:  [id number]  AGE: 78 y.o.   GENDER: male  : 1945  TODAY'S DATE:  2024    Subjective:      Jorge Luis Banda is a 78 y.o. male with inpatient referral to [facility] for:  \"R foot wound\"      Wound Identification:  Wound Type: venous and arterial  Contributing Factors: venous stasis and arterial insufficiency    Wound History: consult received for right foot wound, patient denies formal wound care prior to hospital admission  Current Wound Care Treatment:  foam dressing     Patient Goal of Care:  [x] Wound Healing  [] Odor Control  [] Palliative Care  [] Pain Control   [] Other:         PAST MEDICAL HISTORY        Diagnosis Date    Paroxysmal atrial fibrillation (HCC)     Peripheral artery disease (HCC)     Primary hypertension        PAST SURGICAL HISTORY    Past Surgical History:   Procedure Laterality Date    ABOVE KNEE AMPUTATION Right 2020    UPPER GASTROINTESTINAL ENDOSCOPY N/A 2024    ESOPHAGOGASTRODUODENOSCOPY BIOPSY performed by Vladimir Hoffmann MD at [facility] OR       FAMILY HISTORY    No family history on file.    SOCIAL HISTORY    Social History     Tobacco Use    Smoking status: Former     Current packs/day: 0.00     Types: Cigarettes     Quit date: 2019     Years since quittin.9    Smokeless tobacco: Never   Vaping Use    Vaping Use: Never used   Substance Use Topics    Alcohol use: Yes     Alcohol/week: 3.0 standard drinks of alcohol     Types: 3 Cans of beer per week     Comment: every once in a while    Drug use: Never         ALLERGIES    No Known Allergies    HOME MEDICATIONS  Prior to Admission medications    Medication Sig Start Date End Date Taking? Authorizing Provider   metoprolol succinate (TOPROL XL) 100 MG extended release tablet Take 1 tablet by mouth daily   Yes Provider, MD Ada   aspirin 81 MG EC tablet Take 1 tablet by mouth  humerus S42.302A    Closed fracture of surgical neck of humerus S42.213A    Acute exacerbation of chronic obstructive airways disease (formerly [facility]) J44.1    Left leg cellulitis L03.116    Arteriosclerosis of coronary artery I25.10    Age-related osteoporosis without current pathological fracture M81.0    Adenocarcinoma of lung (formerly [facility]) C34.90    Wound infection T14.8XXA, L08.9    History of above-knee amputation of both lower extremities (formerly [facility]) Z89.611, Z89.612    Melena K92.1    Iron deficiency anemia due to chronic blood loss D50.0    Hx of cancer of lung Z85.118    Adrenal mass (formerly [facility]) E27.8    Claudication (formerly [facility]) I73.9    Pleural effusion, bilateral J90    Longstanding persistent atrial fibrillation (formerly [facility]) I48.11         Measurements:  Wound 04/04/24 Foot Left;Dorsal dry, scabbed (Active)   Wound Image   04/12/24 0925   Wound Etiology Arterial 04/12/24 0925   Dressing Status Other (Comment) 04/12/24 0925   Wound Cleansed Cleansed with saline 04/12/24 0925   Dressing/Treatment Open to air 04/12/24 0925   Wound Length (cm) 2.4 cm 04/12/24 0925   Wound Width (cm) 2.3 cm 04/12/24 0925   Wound Depth (cm) 0.1 cm 04/12/24 0925   Wound Surface Area (cm^2) 5.52 cm^2 04/12/24 0925   Wound Volume (cm^3) 0.552 cm^3 04/12/24 0925   Wound Assessment Devitalized tissue;Eschar dry 04/12/24 0925   Drainage Amount Scant (moist but unmeasurable) 04/12/24 0925   Drainage Description Serosanguinous 04/12/24 0925   Odor None 04/12/24 0925   Manasa-wound Assessment Intact 04/12/24 0925   Margins Attached edges 04/12/24 0925   Number of days: 8       Wound Foot Left;Lateral (Active)   Wound Image   04/12/24 0925   Wound Etiology Venous 04/12/24 0925   Dressing Status New dressing applied 04/12/24 0925   Wound Cleansed Cleansed with saline 04/12/24 0925   Dressing/Treatment Alginate;Foam 04/12/24 0925   Dressing Change Due 04/14/24 04/12/24 0925   Wound Length (cm) 2.1 cm 04/12/24 0925   Wound Width (cm) 1.8 cm 04/12/24 0925   Wound Depth (cm) 0.2 cm

## 2024-04-12 NOTE — PROGRESS NOTES
Unit #1 of 1 PRBCs checked  with RN x2 per protocol and initiated, no complaints voiced at this time

## 2024-04-12 NOTE — PLAN OF CARE
Problem: Respiratory - Adult  Goal: Able to breathe comfortably  Description: Able to breathe comfortably  4/12/2024 0831 by Jackie Cruz, NADJA  Outcome: Progressing  4/12/2024 0313 by Mindy Holly RCP  Outcome: Progressing  4/11/2024 2056 by Madhav Waite RCP  Outcome: Progressing  Goal: Clear lung sounds  4/12/2024 0831 by Jackie Cruz, RCP  Outcome: Progressing  4/12/2024 0313 by Mindy Holly RCP  Outcome: Progressing  4/11/2024 2056 by Madhav Waite RCP  Outcome: Progressing  Goal: Adequate oxygenation  Description: Adequate oxygenation  4/12/2024 0831 by Jackie Cruz, NADJA  Outcome: Progressing  4/12/2024 0313 by Mindy Holly RCP  Outcome: Progressing  4/11/2024 2056 by Madhav Waite RCP  Outcome: Progressing

## 2024-04-12 NOTE — PROGRESS NOTES
Ry Traylor MD   Urology Progress Note            Subjective: Follow-up right adrenal mass enlarged prostate    Patient Vitals for the past 24 hrs:   BP Temp Temp src Pulse Resp SpO2 Weight   04/12/24 0505 (!) 89/52 98.6 °F (37 °C) Oral (!) 114 24 90 % 72 kg (158 lb 11.7 oz)   04/11/24 2339 96/60 98.6 °F (37 °C) Oral (!) 109 20 96 % --   04/11/24 2055 -- -- -- 85 20 97 % --   04/11/24 2005 (!) 106/58 98.1 °F (36.7 °C) Oral 88 24 98 % --   04/11/24 1628 (!) 108/53 99 °F (37.2 °C) Oral 90 18 99 % --   04/11/24 1210 (!) 107/57 99 °F (37.2 °C) Oral 93 18 96 % --   04/11/24 0843 -- -- -- -- -- 95 % --   04/11/24 0740 (!) 111/55 97.7 °F (36.5 °C) Oral 91 18 96 % --       Intake/Output Summary (Last 24 hours) at 4/12/2024 0558  Last data filed at 4/12/2024 0505  Gross per 24 hour   Intake 480 ml   Output 1700 ml   Net -1220 ml       Recent Labs     04/09/24  1846 04/10/24  0521 04/11/24  0456   WBC  --   --  10.6   HGB 7.6* 7.3* 7.1*   HCT 24.5* 23.8* 22.2*   MCV  --   --  93.3   PLT  --   --  182     Recent Labs     04/11/24  0456 04/12/24  0500     --    K 3.4* 3.3*     --    CO2 23  --    BUN 16  --    CREATININE 1.1  --        No results for input(s): \"COLORU\", \"PHUR\", \"LABCAST\", \"WBCUA\", \"RBCUA\", \"MUCUS\", \"TRICHOMONAS\", \"YEAST\", \"BACTERIA\", \"CLARITYU\", \"SPECGRAV\", \"LEUKOCYTESUR\", \"UROBILINOGEN\", \"BILIRUBINUR\", \"BLOODU\" in the last 72 hours.    Invalid input(s): \"NITRATE\", \"GLUCOSEUKETONESUAMORPHOUS\"    Additional Lab/culture results:    Physical Exam: I have reviewed the most recent sequence of the events, MRI brain findings  At the present time no change from urology standpoint,  Interval Imaging Findings:    Impression:    Patient Active Problem List   Diagnosis    Skin eschar    Stenosis of left carotid artery    History of arterial bypass of lower extremity    S/P AKA (above knee amputation) unilateral, right (HCC)    Primary hypertension    Persistent wound pain     Peripheral arterial disease (HCC)    Ulcer of left foot (HCC)    Neuropathy    Malignant neoplasm of lung (HCC)    Intermittent claudication of left lower extremity due to atherosclerosis (HCC)    Frequency of urination    Former smoker    Embolism and thrombosis of artery (HCC)    Dyslipidemia    Closed fracture of left humerus    Closed fracture of surgical neck of humerus    Acute exacerbation of chronic obstructive airways disease (HCC)    Left leg cellulitis    Arteriosclerosis of coronary artery    Age-related osteoporosis without current pathological fracture    Adenocarcinoma of lung (HCC)    Wound infection    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)    Melena    Iron deficiency anemia due to chronic blood loss    Hx of cancer of lung    Adrenal mass (HCC)    Claudication (HCC)    Pleural effusion, bilateral    Longstanding persistent atrial fibrillation (HCC)       Plan: We will continue to monitor voiding symptoms and urinary retention we will await biopsy results if this is scheduled for the adrenal mass    Ry Traylor MD  5:58 AM 4/12/2024

## 2024-04-12 NOTE — PROGRESS NOTES
Pulmonary Critical Care Progress Note       Patient seen for the follow up of bilateral pleural effusions, hemoptysis     Subjective:  Patient is resting in bed, has some confusion.  His nurse tells me that he had a CVA yesterday.  He denies chest pain. Mild occasional cough, mostly dry. Shortness of breath not much changed.  Plans for adrenal mass biopsy today.    Examination:  Vitals: BP (!) 110/58   Pulse (!) 123   Temp 98.4 °F (36.9 °C) (Oral)   Resp 16   Ht 1.829 m (6')   Wt 72 kg (158 lb 11.7 oz)   SpO2 98%   BMI 21.53 kg/m²   General appearance: In no acute distress, alert and cooperative with exam  Neck: No JVD  Lungs: Bilateral rhonchi, moderate air exchange  Heart: regular rate and rhythm, S1, S2 normal, no gallop  Abdomen: Soft, non tender, + BS  Extremities: no cyanosis or clubbing. No significant edema, left upper and lower extremity weakness    LABs:  CBC:   Recent Labs     04/09/24  1846 04/10/24  0521 04/11/24  0456 04/12/24  0500 04/12/24  1017   WBC  --   --  10.6 8.4 9.3   HGB 7.6* 7.3* 7.1* 6.8* 6.9*   HCT 24.5* 23.8* 22.2* 21.5* 21.9*   PLT  --   --  182 187 195     BMP:   Recent Labs     04/11/24  0456 04/12/24  0500    137   K 3.4* 3.2*  3.3*   CO2 23 21   BUN 16 20   CREATININE 1.1 1.3*   LABGLOM 68 56*   GLUCOSE 103* 94     PT/INR:   Recent Labs     04/10/24  0521 04/12/24  1017   PROTIME 15.5* 17.3*   INR 1.2 1.4     APTT:  Recent Labs     04/12/24  1017   APTT 47.5*     Radiology:  X-ray chest 4/12/2024      Impression & Recommendations:  Acute hypoxic respiratory insufficiency  Supplemental oxygen as needed to maintain oxygen saturation >90%  Incentive spirometer q1h while awake      Bilateral loculated pleural effusions/atelectasis  Lasix 20mg IVP x1 4/12/24  Repeat x-ray chest in a.m.    Acute anemia/Hemoptysis  Hemoptysis improving  1 unit of packed red blood cells today for hemoglobin 6.9     History of stage 1 lung cancer   5 years ago with lung resection  No adjuvant

## 2024-04-12 NOTE — PLAN OF CARE
Problem: Safety - Adult  Goal: Free from fall injury  4/12/2024 0715 by Tone Perales RN  Outcome: Progressing     Problem: Discharge Planning  Goal: Discharge to home or other facility with appropriate resources  4/12/2024 0715 by Tone Perales RN  Outcome: Progressing     Problem: Pain  Goal: Verbalizes/displays adequate comfort level or baseline comfort level  4/12/2024 0715 by Tone Perales RN  Outcome: Progressing     Problem: Skin/Tissue Integrity - Adult  Goal: Incisions, wounds, or drain sites healing without S/S of infection  4/12/2024 0715 by Tone Perales RN  Outcome: Progressing  Flowsheets (Taken 4/11/2024 2128)  Incisions, Wounds, or Drain Sites Healing Without Sign and Symptoms of Infection: Implement wound care per orders     Problem: Gastrointestinal - Adult  Goal: Maintains or returns to baseline bowel function  4/12/2024 0715 by Tone Perales RN  Outcome: Progressing     Problem: ABCDS Injury Assessment  Goal: Absence of physical injury  4/12/2024 0715 by Tone Perales RN  Outcome: Progressing     Problem: Skin/Tissue Integrity  Goal: Absence of new skin breakdown  Description: 1.  Monitor for areas of redness and/or skin breakdown  2.  Assess vascular access sites hourly  3.  Every 4-6 hours minimum:  Change oxygen saturation probe site  4.  Every 4-6 hours:  If on nasal continuous positive airway pressure, respiratory therapy assess nares and determine need for appliance change or resting period.  4/12/2024 0715 by Tone Perales RN  Outcome: Progressing     Problem: Genitourinary - Adult  Goal: Urinary catheter remains patent  4/12/2024 0715 by Tone Perales RN  Outcome: Progressing

## 2024-04-12 NOTE — PROGRESS NOTES
Today's Date: 4/12/2024  Patient Name: Jorge Luis Banda  Date of admission: 4/3/2024  3:27 PM  Patient's age: 78 y.o., 1945  Admission Dx: Melena [K92.1]  GI bleed [K92.2]  UMM (acute kidney injury) (HCC) [N17.9]  Left leg cellulitis [L03.116]  Anemia, unspecified type [D64.9]    Reason for Consult: management recommendations  Requesting Physician: Guevara Ramirez DO    CHIEF COMPLAINT: GI bleeding    History Obtained From:  patient  INTERVAL HISTORY:    Patient seen and examined  Pt did not get the biopsy  Denies any bleeding symptoms  No nausea vomiting  HISTORY OF PRESENT ILLNESS:    The patient is a 78 y.o.   male who is admitted to the hospital for anemia and suspicion for GI bleeding.  His hemoglobin was under 7 and received blood transfusion.  He has severe peripheral vascular disease on Plavix and Coumadin.  He underwent an EGD that showed gastritis and he refused colonoscopy.  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  From oncology perspective, the patient was diagnosed with stage I lung cancer in 2019 and underwent lung resection.  Never received any chemotherapy or radiation.  He has been in remission since then.  CT scan of the chest showed concerning changes in the liver and adrenal gland for metastatic disease.  Dedicated CT of the abdomen and pelvis is ordered and pending.    Past Medical History:   has a past medical history of Paroxysmal atrial fibrillation (HCC), Peripheral artery disease (HCC), and Primary hypertension.    Past Surgical History:   has a past surgical history that includes above knee amputation (Right, 01/01/2020) and Upper gastrointestinal endoscopy (N/A, 4/8/2024).     Medications:    Reviewed in Epic     Allergies:  Patient has no known allergies.    Social History:   reports that he quit smoking about 4 years ago. He has never used smokeless tobacco. He  nodes, negative for tumor (0/2).     CANCER CASE SUMMARY         Procedure: Lobectomy   Specimen laterality: Left   Tumor site: Lower lobe   Tumor size: 2.8 cm   Tumor focality: Unifocal   Histologic type: Adenocarcinoma   Histologic grade: Moderately differentiated   Visceral pleura invasion: Not identified   Lymphovascular invasion: Not identified   Direct invasion of adjacent structures: No adjacent structures present   Margins: All margins are uninvolved by tumor. Margins examining include bronchial, vascular and parenchymal   Treatment effect: No known presurgical therapy   Regional Lymph Nodes --     Number of lymph nodes involved: 0      Specify anitra stations involved: NA     Number of lymph nodes examined: 29      Specify anitra stations examined: Stations 10, 9 and 7     TNM descriptors:   Primary Tumor (pT): pT1c   Regional Lymph Nodes (pN): pN0   IMAGING DATA:  CT chest   IMPRESSION:  1. Mild-to-moderate bilateral pleural effusions greater on the right with  areas of loculation. Underlying bibasilar atelectasis.  2. Biapical and bibasal fibrosis.  3. Partially visualize low-density mass seen involving the right adrenal  gland and portion of the right hepatic lobe most likely representing  malignancy.    Primary Problem  Melena    Active Hospital Problems    Diagnosis Date Noted    Pleural effusion, bilateral [J90] 04/11/2024    Longstanding persistent atrial fibrillation (HCC) [I48.11] 04/11/2024    Iron deficiency anemia due to chronic blood loss [D50.0] 04/10/2024    Hx of cancer of lung [Z85.118] 04/10/2024    Adrenal mass (HCC) [E27.8] 04/10/2024    Claudication (HCC) [I73.9] 04/10/2024    Melena [K92.1] 04/03/2024    Peripheral arterial disease (HCC) [I73.9] 04/03/2024    S/P AKA (above knee amputation) unilateral, right (HCC) [Z89.611] 03/05/2024    Embolism and thrombosis of artery (HCC) [I74.9] 03/05/2024    Left leg cellulitis [L03.116] 03/05/2024    History of arterial bypass of lower extremity

## 2024-04-12 NOTE — PROGRESS NOTES
Spoke with Sonia MA in IR, informed that patient would not received biopsy \"today\", family updated and heparin gtt initiated as ordered

## 2024-04-13 ENCOUNTER — APPOINTMENT (OUTPATIENT)
Dept: GENERAL RADIOLOGY | Age: 79
DRG: 377 | End: 2024-04-13
Payer: MEDICARE

## 2024-04-13 ENCOUNTER — APPOINTMENT (OUTPATIENT)
Dept: CT IMAGING | Age: 79
DRG: 377 | End: 2024-04-13
Payer: MEDICARE

## 2024-04-13 PROBLEM — D64.9 ANEMIA: Status: ACTIVE | Noted: 2024-04-13

## 2024-04-13 PROBLEM — I65.23 INTERNAL CAROTID ARTERY STENOSIS, BILATERAL: Status: ACTIVE | Noted: 2024-04-13

## 2024-04-13 PROBLEM — K92.2 GASTROINTESTINAL HEMORRHAGE: Status: ACTIVE | Noted: 2024-04-13

## 2024-04-13 PROBLEM — I63.231 ACUTE ISCHEMIC RIGHT INTERNAL CAROTID ARTERY (ICA) STROKE (HCC): Status: ACTIVE | Noted: 2024-04-13

## 2024-04-13 LAB
ABO/RH: NORMAL
ALBUMIN SERPL-MCNC: 2.8 G/DL (ref 3.5–5.2)
ALP SERPL-CCNC: 172 U/L (ref 40–129)
ALT SERPL-CCNC: 20 U/L (ref 5–41)
AMYLASE SERPL-CCNC: 173 U/L (ref 28–100)
ANION GAP SERPL CALCULATED.3IONS-SCNC: 14 MMOL/L (ref 9–17)
ANTI-XA UNFRAC HEPARIN: 0.28 IU/L (ref 0.3–0.7)
ANTI-XA UNFRAC HEPARIN: 0.45 IU/L (ref 0.3–0.7)
ANTI-XA UNFRAC HEPARIN: 0.54 IU/L (ref 0.3–0.7)
ANTIBODY SCREEN: NEGATIVE
ARM BAND NUMBER: NORMAL
AST SERPL-CCNC: 57 U/L
BASOPHILS # BLD: 0.16 K/UL (ref 0–0.2)
BASOPHILS NFR BLD: 1 %
BILIRUB DIRECT SERPL-MCNC: 0.3 MG/DL
BILIRUB INDIRECT SERPL-MCNC: 0.3 MG/DL (ref 0–1)
BILIRUB SERPL-MCNC: 0.6 MG/DL (ref 0.3–1.2)
BLOOD BANK DISPENSE STATUS: NORMAL
BLOOD BANK SAMPLE EXPIRATION: NORMAL
BPU ID: NORMAL
BUN SERPL-MCNC: 14 MG/DL (ref 8–23)
CALCIUM SERPL-MCNC: 8.1 MG/DL (ref 8.6–10.4)
CHLORIDE SERPL-SCNC: 93 MMOL/L (ref 98–107)
CHOLEST SERPL-MCNC: 83 MG/DL (ref 0–199)
CHOLESTEROL/HDL RATIO: 3
CO2 SERPL-SCNC: 27 MMOL/L (ref 20–31)
COMPONENT: NORMAL
CREAT SERPL-MCNC: 1.4 MG/DL (ref 0.7–1.2)
CROSSMATCH RESULT: NORMAL
EOSINOPHIL # BLD: 0 K/UL (ref 0–0.4)
EOSINOPHILS RELATIVE PERCENT: 0 % (ref 1–4)
ERYTHROCYTE [DISTWIDTH] IN BLOOD BY AUTOMATED COUNT: 17.6 % (ref 11.8–14.4)
EST. AVERAGE GLUCOSE BLD GHB EST-MCNC: 105 MG/DL
GFR SERPL CREATININE-BSD FRML MDRD: 51 ML/MIN/1.73M2
GLUCOSE SERPL-MCNC: 128 MG/DL (ref 70–99)
HBA1C MFR BLD: 5.3 % (ref 4–6)
HCT VFR BLD AUTO: 27.6 % (ref 40.7–50.3)
HCT VFR BLD AUTO: 27.6 % (ref 40.7–50.3)
HDLC SERPL-MCNC: 30 MG/DL
HGB BLD-MCNC: 8.9 G/DL (ref 13–17)
HGB BLD-MCNC: 8.9 G/DL (ref 13–17)
IMM GRANULOCYTES # BLD AUTO: 0 K/UL (ref 0–0.3)
IMM GRANULOCYTES NFR BLD: 0 %
LACTATE BLDV-SCNC: 1.5 MMOL/L (ref 0.5–2.2)
LDLC SERPL CALC-MCNC: 29 MG/DL (ref 0–100)
LIPASE SERPL-CCNC: 231 U/L (ref 13–60)
LYMPHOCYTES NFR BLD: 0.48 K/UL (ref 1–4.8)
LYMPHOCYTES RELATIVE PERCENT: 3 % (ref 24–44)
MAGNESIUM SERPL-MCNC: 1.6 MG/DL (ref 1.6–2.6)
MCH RBC QN AUTO: 29.6 PG (ref 25.2–33.5)
MCHC RBC AUTO-ENTMCNC: 32.2 G/DL (ref 28.4–34.8)
MCV RBC AUTO: 91.7 FL (ref 82.6–102.9)
MONOCYTES NFR BLD: 0.64 K/UL (ref 0.2–0.8)
MONOCYTES NFR BLD: 4 % (ref 1–7)
NEUTROPHILS NFR BLD: 92 % (ref 36–66)
NEUTS SEG NFR BLD: 14.72 K/UL (ref 1.8–7.7)
NRBC BLD-RTO: 0 PER 100 WBC
PLATELET # BLD AUTO: 284 K/UL (ref 138–453)
PMV BLD AUTO: 10.4 FL (ref 8.1–13.5)
POTASSIUM SERPL-SCNC: 2.6 MMOL/L (ref 3.7–5.3)
POTASSIUM SERPL-SCNC: 2.6 MMOL/L (ref 3.7–5.3)
PROT SERPL-MCNC: 6.4 G/DL (ref 6.4–8.3)
RBC # BLD AUTO: 3.01 M/UL (ref 4.21–5.77)
SODIUM SERPL-SCNC: 134 MMOL/L (ref 135–144)
TRANSFUSION STATUS: NORMAL
TRIGL SERPL-MCNC: 120 MG/DL
TROPONIN I SERPL HS-MCNC: 210 NG/L (ref 0–22)
UNIT DIVISION: 0
VLDLC SERPL CALC-MCNC: 24 MG/DL
WBC OTHER # BLD: 16 K/UL (ref 3.5–11.3)

## 2024-04-13 PROCEDURE — 99233 SBSQ HOSP IP/OBS HIGH 50: CPT | Performed by: PSYCHIATRY & NEUROLOGY

## 2024-04-13 PROCEDURE — 84484 ASSAY OF TROPONIN QUANT: CPT

## 2024-04-13 PROCEDURE — C9113 INJ PANTOPRAZOLE SODIUM, VIA: HCPCS | Performed by: INTERNAL MEDICINE

## 2024-04-13 PROCEDURE — 97530 THERAPEUTIC ACTIVITIES: CPT

## 2024-04-13 PROCEDURE — 6360000002 HC RX W HCPCS: Performed by: FAMILY MEDICINE

## 2024-04-13 PROCEDURE — 6370000000 HC RX 637 (ALT 250 FOR IP)

## 2024-04-13 PROCEDURE — 6370000000 HC RX 637 (ALT 250 FOR IP): Performed by: INTERNAL MEDICINE

## 2024-04-13 PROCEDURE — 99223 1ST HOSP IP/OBS HIGH 75: CPT | Performed by: STUDENT IN AN ORGANIZED HEALTH CARE EDUCATION/TRAINING PROGRAM

## 2024-04-13 PROCEDURE — 82248 BILIRUBIN DIRECT: CPT

## 2024-04-13 PROCEDURE — 83735 ASSAY OF MAGNESIUM: CPT

## 2024-04-13 PROCEDURE — 2580000003 HC RX 258: Performed by: FAMILY MEDICINE

## 2024-04-13 PROCEDURE — 99233 SBSQ HOSP IP/OBS HIGH 50: CPT | Performed by: FAMILY MEDICINE

## 2024-04-13 PROCEDURE — 6360000002 HC RX W HCPCS

## 2024-04-13 PROCEDURE — 82150 ASSAY OF AMYLASE: CPT

## 2024-04-13 PROCEDURE — 94761 N-INVAS EAR/PLS OXIMETRY MLT: CPT

## 2024-04-13 PROCEDURE — 84145 PROCALCITONIN (PCT): CPT

## 2024-04-13 PROCEDURE — 97168 OT RE-EVAL EST PLAN CARE: CPT

## 2024-04-13 PROCEDURE — 92523 SPEECH SOUND LANG COMPREHEN: CPT

## 2024-04-13 PROCEDURE — 71045 X-RAY EXAM CHEST 1 VIEW: CPT

## 2024-04-13 PROCEDURE — 6360000002 HC RX W HCPCS: Performed by: INTERNAL MEDICINE

## 2024-04-13 PROCEDURE — 85018 HEMOGLOBIN: CPT

## 2024-04-13 PROCEDURE — 2580000003 HC RX 258: Performed by: STUDENT IN AN ORGANIZED HEALTH CARE EDUCATION/TRAINING PROGRAM

## 2024-04-13 PROCEDURE — 97164 PT RE-EVAL EST PLAN CARE: CPT

## 2024-04-13 PROCEDURE — 6360000004 HC RX CONTRAST MEDICATION: Performed by: STUDENT IN AN ORGANIZED HEALTH CARE EDUCATION/TRAINING PROGRAM

## 2024-04-13 PROCEDURE — 6370000000 HC RX 637 (ALT 250 FOR IP): Performed by: NURSE PRACTITIONER

## 2024-04-13 PROCEDURE — 97167 OT EVAL HIGH COMPLEX 60 MIN: CPT

## 2024-04-13 PROCEDURE — 93005 ELECTROCARDIOGRAM TRACING: CPT | Performed by: EMERGENCY MEDICINE

## 2024-04-13 PROCEDURE — 2580000003 HC RX 258: Performed by: INTERNAL MEDICINE

## 2024-04-13 PROCEDURE — 2000000000 HC ICU R&B

## 2024-04-13 PROCEDURE — 93005 ELECTROCARDIOGRAM TRACING: CPT

## 2024-04-13 PROCEDURE — 85014 HEMATOCRIT: CPT

## 2024-04-13 PROCEDURE — 80053 COMPREHEN METABOLIC PANEL: CPT

## 2024-04-13 PROCEDURE — 99291 CRITICAL CARE FIRST HOUR: CPT

## 2024-04-13 PROCEDURE — 85520 HEPARIN ASSAY: CPT

## 2024-04-13 PROCEDURE — A4216 STERILE WATER/SALINE, 10 ML: HCPCS | Performed by: INTERNAL MEDICINE

## 2024-04-13 PROCEDURE — 70498 CT ANGIOGRAPHY NECK: CPT

## 2024-04-13 PROCEDURE — 6360000002 HC RX W HCPCS: Performed by: NURSE PRACTITIONER

## 2024-04-13 PROCEDURE — 84132 ASSAY OF SERUM POTASSIUM: CPT

## 2024-04-13 PROCEDURE — 80061 LIPID PANEL: CPT

## 2024-04-13 PROCEDURE — 93005 ELECTROCARDIOGRAM TRACING: CPT | Performed by: NURSE PRACTITIONER

## 2024-04-13 PROCEDURE — 97112 NEUROMUSCULAR REEDUCATION: CPT

## 2024-04-13 PROCEDURE — 94640 AIRWAY INHALATION TREATMENT: CPT

## 2024-04-13 PROCEDURE — 83605 ASSAY OF LACTIC ACID: CPT

## 2024-04-13 PROCEDURE — 83690 ASSAY OF LIPASE: CPT

## 2024-04-13 PROCEDURE — 99232 SBSQ HOSP IP/OBS MODERATE 35: CPT | Performed by: INTERNAL MEDICINE

## 2024-04-13 PROCEDURE — 85025 COMPLETE CBC W/AUTO DIFF WBC: CPT

## 2024-04-13 PROCEDURE — 2500000003 HC RX 250 WO HCPCS: Performed by: FAMILY MEDICINE

## 2024-04-13 PROCEDURE — 83036 HEMOGLOBIN GLYCOSYLATED A1C: CPT

## 2024-04-13 PROCEDURE — 6370000000 HC RX 637 (ALT 250 FOR IP): Performed by: FAMILY MEDICINE

## 2024-04-13 PROCEDURE — 2700000000 HC OXYGEN THERAPY PER DAY

## 2024-04-13 PROCEDURE — 36415 COLL VENOUS BLD VENIPUNCTURE: CPT

## 2024-04-13 RX ORDER — NOREPINEPHRINE BITARTRATE 0.06 MG/ML
1-100 INJECTION, SOLUTION INTRAVENOUS CONTINUOUS
Status: DISCONTINUED | OUTPATIENT
Start: 2024-04-13 | End: 2024-04-13

## 2024-04-13 RX ORDER — SODIUM CHLORIDE 0.9 % (FLUSH) 0.9 %
10 SYRINGE (ML) INJECTION PRN
Status: DISCONTINUED | OUTPATIENT
Start: 2024-04-13 | End: 2024-04-26

## 2024-04-13 RX ORDER — MORPHINE SULFATE 2 MG/ML
2 INJECTION, SOLUTION INTRAMUSCULAR; INTRAVENOUS ONCE
Status: COMPLETED | OUTPATIENT
Start: 2024-04-13 | End: 2024-04-13

## 2024-04-13 RX ORDER — MAGNESIUM SULFATE 1 G/100ML
1000 INJECTION INTRAVENOUS PRN
Status: DISCONTINUED | OUTPATIENT
Start: 2024-04-13 | End: 2024-05-11 | Stop reason: HOSPADM

## 2024-04-13 RX ORDER — ALBUTEROL SULFATE 90 UG/1
2 AEROSOL, METERED RESPIRATORY (INHALATION)
Status: DISCONTINUED | OUTPATIENT
Start: 2024-04-13 | End: 2024-04-13

## 2024-04-13 RX ORDER — MAGNESIUM SULFATE 1 G/100ML
1000 INJECTION INTRAVENOUS
Status: COMPLETED | OUTPATIENT
Start: 2024-04-14 | End: 2024-04-14

## 2024-04-13 RX ORDER — DIGOXIN 0.25 MG/ML
125 INJECTION INTRAMUSCULAR; INTRAVENOUS ONCE
Status: COMPLETED | OUTPATIENT
Start: 2024-04-13 | End: 2024-04-13

## 2024-04-13 RX ORDER — POTASSIUM CHLORIDE 20 MEQ/1
40 TABLET, EXTENDED RELEASE ORAL ONCE
Status: COMPLETED | OUTPATIENT
Start: 2024-04-13 | End: 2024-04-13

## 2024-04-13 RX ORDER — 0.9 % SODIUM CHLORIDE 0.9 %
80 INTRAVENOUS SOLUTION INTRAVENOUS ONCE
Status: DISCONTINUED | OUTPATIENT
Start: 2024-04-13 | End: 2024-05-11 | Stop reason: HOSPADM

## 2024-04-13 RX ORDER — ALBUTEROL SULFATE 90 UG/1
2 AEROSOL, METERED RESPIRATORY (INHALATION)
Status: DISCONTINUED | OUTPATIENT
Start: 2024-04-13 | End: 2024-04-15

## 2024-04-13 RX ADMIN — Medication 5 MCG/MIN: at 15:52

## 2024-04-13 RX ADMIN — SODIUM CHLORIDE, PRESERVATIVE FREE 40 MG: 5 INJECTION INTRAVENOUS at 21:47

## 2024-04-13 RX ADMIN — MAGNESIUM SULFATE HEPTAHYDRATE 1000 MG: 1 INJECTION, SOLUTION INTRAVENOUS at 23:33

## 2024-04-13 RX ADMIN — AZITHROMYCIN MONOHYDRATE 500 MG: 500 INJECTION, POWDER, LYOPHILIZED, FOR SOLUTION INTRAVENOUS at 16:16

## 2024-04-13 RX ADMIN — MORPHINE SULFATE 2 MG: 2 INJECTION, SOLUTION INTRAMUSCULAR; INTRAVENOUS at 23:34

## 2024-04-13 RX ADMIN — SODIUM CHLORIDE: 9 INJECTION, SOLUTION INTRAVENOUS at 23:23

## 2024-04-13 RX ADMIN — HEPARIN SODIUM 2000 UNITS: 1000 INJECTION INTRAVENOUS; SUBCUTANEOUS at 05:33

## 2024-04-13 RX ADMIN — SODIUM CHLORIDE, PRESERVATIVE FREE 10 ML: 5 INJECTION INTRAVENOUS at 21:49

## 2024-04-13 RX ADMIN — GUAIFENESIN 600 MG: 600 TABLET ORAL at 09:58

## 2024-04-13 RX ADMIN — FUROSEMIDE 20 MG: 10 INJECTION, SOLUTION INTRAMUSCULAR; INTRAVENOUS at 09:46

## 2024-04-13 RX ADMIN — HEPARIN SODIUM 16 UNITS/KG/HR: 10000 INJECTION, SOLUTION INTRAVENOUS at 15:22

## 2024-04-13 RX ADMIN — GABAPENTIN 300 MG: 300 CAPSULE ORAL at 09:58

## 2024-04-13 RX ADMIN — FUROSEMIDE 20 MG: 10 INJECTION, SOLUTION INTRAMUSCULAR; INTRAVENOUS at 18:48

## 2024-04-13 RX ADMIN — TAMSULOSIN HYDROCHLORIDE 0.4 MG: 0.4 CAPSULE ORAL at 09:59

## 2024-04-13 RX ADMIN — POTASSIUM CHLORIDE 40 MEQ: 1500 TABLET, EXTENDED RELEASE ORAL at 23:37

## 2024-04-13 RX ADMIN — SODIUM CHLORIDE, PRESERVATIVE FREE 10 ML: 5 INJECTION INTRAVENOUS at 10:05

## 2024-04-13 RX ADMIN — CHOLECALCIFEROL TAB 125 MCG (5000 UNIT) 5000 UNITS: 125 TAB at 09:58

## 2024-04-13 RX ADMIN — GABAPENTIN 300 MG: 300 CAPSULE ORAL at 15:19

## 2024-04-13 RX ADMIN — PIPERACILLIN AND TAZOBACTAM 3375 MG: 3; .375 INJECTION, POWDER, LYOPHILIZED, FOR SOLUTION INTRAVENOUS at 18:53

## 2024-04-13 RX ADMIN — FERROUS SULFATE TAB EC 325 MG (65 MG FE EQUIVALENT) 325 MG: 325 (65 FE) TABLET DELAYED RESPONSE at 09:59

## 2024-04-13 RX ADMIN — SODIUM CHLORIDE, PRESERVATIVE FREE 10 ML: 5 INJECTION INTRAVENOUS at 11:05

## 2024-04-13 RX ADMIN — ONDANSETRON 4 MG: 2 INJECTION INTRAMUSCULAR; INTRAVENOUS at 11:28

## 2024-04-13 RX ADMIN — ALBUTEROL SULFATE 2 PUFF: 90 AEROSOL, METERED RESPIRATORY (INHALATION) at 07:58

## 2024-04-13 RX ADMIN — SODIUM CHLORIDE, PRESERVATIVE FREE 40 MG: 5 INJECTION INTRAVENOUS at 09:47

## 2024-04-13 RX ADMIN — CYANOCOBALAMIN TAB 1000 MCG 1000 MCG: 1000 TAB at 09:58

## 2024-04-13 RX ADMIN — ATORVASTATIN CALCIUM 40 MG: 40 TABLET, FILM COATED ORAL at 21:47

## 2024-04-13 RX ADMIN — GUAIFENESIN 600 MG: 600 TABLET ORAL at 21:47

## 2024-04-13 RX ADMIN — PHENYLEPHRINE HYDROCHLORIDE 15 MCG/MIN: 50 INJECTION INTRAVENOUS at 21:25

## 2024-04-13 RX ADMIN — Medication 7 MCG/MIN: at 16:58

## 2024-04-13 RX ADMIN — POTASSIUM CHLORIDE 10 MEQ: 7.46 INJECTION, SOLUTION INTRAVENOUS at 23:30

## 2024-04-13 RX ADMIN — GABAPENTIN 300 MG: 300 CAPSULE ORAL at 21:47

## 2024-04-13 RX ADMIN — ALBUTEROL SULFATE 2 PUFF: 90 AEROSOL, METERED RESPIRATORY (INHALATION) at 20:00

## 2024-04-13 RX ADMIN — IOPAMIDOL 75 ML: 755 INJECTION, SOLUTION INTRAVENOUS at 11:05

## 2024-04-13 RX ADMIN — Medication 3 MG: at 21:47

## 2024-04-13 RX ADMIN — ASPIRIN 81 MG: 81 TABLET, COATED ORAL at 09:59

## 2024-04-13 RX ADMIN — Medication 80 ML: at 11:05

## 2024-04-13 RX ADMIN — DIGOXIN 125 MCG: 0.25 INJECTION INTRAMUSCULAR; INTRAVENOUS at 22:46

## 2024-04-13 RX ADMIN — PIPERACILLIN AND TAZOBACTAM 3375 MG: 3; .375 INJECTION, POWDER, LYOPHILIZED, FOR SOLUTION INTRAVENOUS at 10:01

## 2024-04-13 RX ADMIN — PIPERACILLIN AND TAZOBACTAM 3375 MG: 3; .375 INJECTION, POWDER, LYOPHILIZED, FOR SOLUTION INTRAVENOUS at 02:53

## 2024-04-13 ASSESSMENT — PAIN DESCRIPTION - DESCRIPTORS: DESCRIPTORS: ACHING;SHARP

## 2024-04-13 ASSESSMENT — PAIN DESCRIPTION - LOCATION: LOCATION: ABDOMEN;CHEST

## 2024-04-13 ASSESSMENT — PAIN DESCRIPTION - ORIENTATION: ORIENTATION: MID;LOWER

## 2024-04-13 ASSESSMENT — PAIN SCALES - GENERAL: PAINLEVEL_OUTOF10: 10

## 2024-04-13 NOTE — PROGRESS NOTES
Occupational Therapy  Facility/Department: [facility] PROGRESSIVE CARE  Occupational Therapy Re-assessment    Name: Jorge Luis Banda  : 1945  MRN: [id number]  Date of Service: 2024    RN reports patient is medically stable for therapy treatment this date.    Chart reviewed prior to treatment and patient is agreeable for therapy.  All lines intact and patient positioned comfortably at end of treatment.  All patient needs addressed prior to ending therapy session.      Discharge Recommendations:  Patient would benefit from continued therapy after discharge  Pt currently functioning below baseline.  Recommend daily inpatient skilled therapy at time of discharge to maximize long term outcomes and prevent re-admission. Please refer to AM-PAC score for current level of function.  OT Equipment Recommendations  Equipment Needed:  (CTA)    PER HPI: The patient is a 78 y.o.  Non- / non  male who Initially presented to [facility] 4/3/24 with Left leg pain and complicated wound healing with chronic Cellulitis for a month now. PMH significant for Paroxysmal A-fib, Peripheral artery disease, HTN. Patient is known vasculopath with poor circulation and complicated wound healing related this in fact s/p Right BKA already. [facility]. Patient is s/p bilateral common femoral endarterectomies, profundal plasty, right profunda stent placement and angioplasty and femorofemoral bypass and subsequently right femoral-popliteal bypass due to occlusion of SFA. Patient noted to have critical low hgb 6.6-->6.2 4/3/24 22:26 and s/p 2 units PRBC at that time improving to 8.4. Patient again dropping hgb 6.8 24 and received an additional unit PRBC for total of 3 units PRBC this admission. Patient is supposed to be on plavix for is Peripheral vascular disease and coumadin for Afib however these have had to be on hold given his critical bleeding and GI complications. Patient

## 2024-04-13 NOTE — PROGRESS NOTES
Pulmonary Critical Care Progress Note       Patient seen for the follow up of bilateral pleural effusions, hemoptysis     Subjective:  Patient is resting in bed, has some confusion.  Neurology is at the bedside. He denies chest pain. Mild occasional cough, mostly dry. Shortness of breath not much changed.     Examination:  Vitals: /64   Pulse 93   Temp 99.1 °F (37.3 °C) (Oral)   Resp 20   Ht 1.829 m (6')   Wt 72 kg (158 lb 11.7 oz)   SpO2 92%   BMI 21.53 kg/m²   General appearance: In no acute distress, alert and cooperative with exam  Neck: No JVD  Lungs: Bilateral rhonchi, moderate air exchange  Heart: regular rate and rhythm, S1, S2 normal, no gallop  Abdomen: Soft, non tender, + BS  Extremities: no cyanosis or clubbing. No significant edema, left upper and lower extremity weakness    LABs:  CBC:   Recent Labs     04/11/24  0456 04/12/24  0500 04/12/24  1017 04/12/24  1435   WBC 10.6 8.4 9.3  --    HGB 7.1* 6.8* 6.9* 8.2*   HCT 22.2* 21.5* 21.9* 25.4*    187 195  --        BMP:   Recent Labs     04/11/24  0456 04/12/24  0500    137   K 3.4* 3.2*  3.3*   CO2 23 21   BUN 16 20   CREATININE 1.1 1.3*   LABGLOM 68 56*   GLUCOSE 103* 94       PT/INR:   Recent Labs     04/12/24  1017   PROTIME 17.3*   INR 1.4       APTT:  Recent Labs     04/12/24  1017   APTT 47.5*       Radiology:  X-ray chest 4/13/24    X-ray chest 4/12/2024      Impression & Recommendations:  Acute hypoxic respiratory insufficiency  Supplemental oxygen as needed to maintain oxygen saturation >90%  Incentive spirometer q1h while awake      Bilateral loculated pleural effusions/atelectasis  Lasix 20mg IVP x1 4/12/24    Acute anemia/Hemoptysis  Hemoptysis improving  1 unit of packed red blood cells 4/12/24 for hemoglobin 6.9  Status post EGD with negative findings  Pending colonoscopy 4/15/2024     History of stage 1 lung cancer   5 years ago with lung resection  No adjuvant therapy     Acute CVA with left-sided

## 2024-04-13 NOTE — PROGRESS NOTES
Gastroenterology Progress Note      Patient:   Jorge Luis Banda   :    1945   Facility:   [facility]  Date:     2024  Consultant:   MARIA DE JESUS NUNES      Subjective:     Patient seen and examined.   No acute issues overnight, remains on heparin gtt, eating ok, still cannot use left arm.   SBP running 105-117, HR 90's no fevers.   No stools yet today. But stools previously continue to be described as dark.   Unit of blood was given into 24, hgb did respond appropriately.   Reports last 2 days some RLQ tenderness  CXR improved    Objective:   Vital Signs:  /64   Pulse 93   Temp 99.1 °F (37.3 °C) (Oral)   Resp 20   Ht 1.829 m (6')   Wt 72 kg (158 lb 11.7 oz)   SpO2 92%   BMI 21.53 kg/m²      Physical Exam:   General appearance: Alert, NAD  Lungs: CTA bilaterally    Heart:S1S2 RRR  Abdomen: Soft, ttp rlq, no peritoneal signs, ND +BS  Skin:  No jaundice, no stigmata of chronic liver disease.    Lab and Imaging Review   CBC:   Lab Results   Component Value Date/Time    WBC 9.3 2024 10:17 AM    RBC 2.35 2024 10:17 AM    HGB 8.2 2024 02:35 PM    HCT 25.4 2024 02:35 PM    MCV 93.2 2024 10:17 AM    MCH 29.4 2024 10:17 AM    MCHC 31.5 2024 10:17 AM    RDW 18.4 2024 10:17 AM     2024 10:17 AM    MPV 10.6 2024 10:17 AM     Platelets:    Lab Results   Component Value Date/Time     2024 10:17 AM     Hemoglobin/Hematocrit:    Lab Results   Component Value Date/Time    HGB 8.2 2024 02:35 PM    HCT 25.4 2024 02:35 PM     CMP:    Lab Results   Component Value Date/Time     2024 05:00 AM    K 3.3 2024 05:00 AM    K 3.2 2024 05:00 AM     2024 05:00 AM    CO2 21 2024 05:00 AM    BUN 20 2024 05:00 AM    CREATININE 1.3 2024 05:00 AM    GFRAA >60 2020 09:00 AM    LABGLOM 56 2024 05:00 AM    GLUCOSE 94 2024 05:00 AM    PROT

## 2024-04-13 NOTE — PROGRESS NOTES
[facility]  Office: [phone]  Chinmay Ramos DO, Jose Atkinson DO, Guevara Ramirez DO, Pranay Cooper, DO, Sumeet Eller MD, Karen Carvajal MD, Sony Crawford MD, Poonam Platt MD,  Stephan San MD, Marcello Daniels MD, Theodore Pascual MD,  Juve Mahmood DO, Matias Mckeon MD, Hero Tavarez MD, Valentin Ramos DO, Jazzy Spence MD,  Trino Handley DO, Flavia Leonardo MD, Vanessa Soriano MD, Pricilla Akers MD, Casimiro Knight MD,  Zeke Maciel MD, Guera Cervantes MD, Kianna Martin MD, Mirlande Worrell MD, Ney Garcia MD, Savannah Dominguez MD, Ed Emerson DO, Jaylen West DO, Comfort Carcamo MD,  Siva Ashley MD, Shirley Waterhouse, CNP,  Carolyn Pelaez CNP, Scotty Ge, CNP,  Wandy Trivedi, DNP, Malissa Gaston, CNP, Lana Penaloza, CNP, Susan Haro, CNP, Sherrell Mendoza, CNP, Suzy Morales, PA-C, Dalila Lange PA-C, Sada Matt, CNP, Crystal Womack, CNP, Sheng Reddy, CNP, Arielle Guy, CNP, Linsey Perez, CNP, Anju Cho, CNS, Hoa Donahue, CNP, Cassidy Ferrer CNP, Tracy Schwab, CNP       [facility]      Daily Progress Note     Admit Date: 4/3/2024  Bed/Room No.  1010/1010-02  Admitting Physician : Sony Crawford MD  Code Status :Full Code  Hospital Day:  LOS: 10 days   Chief Complaint:     Chief Complaint   Patient presents with    Leg Pain     Left leg, open wounds and swollen       Principal Problem:    Melena  Active Problems:    History of arterial bypass of lower extremity    S/P AKA (above knee amputation) unilateral, right (HCC)    Primary hypertension    Peripheral arterial disease (HCC)    Neuropathy    Malignant neoplasm of lung (HCC)    Intermittent claudication of left lower extremity due to atherosclerosis (HCC)    Embolism and thrombosis of artery (HCC)    Left leg cellulitis    Iron deficiency anemia due to chronic blood loss    Hx of cancer of lung    Adrenal mass (HCC)    Claudication (HCC)    Pleural effusion, bilateral     Conjunctivae normal.      Pupils: Pupils are equal, round, and reactive to light.   Neck:      Thyroid: No thyromegaly.      Vascular: No JVD.   Cardiovascular:      Rate and Rhythm: Normal rate and regular rhythm.      Pulses:           Dorsalis pedis pulses are 2+ on the right side and 2+ on the left side.      Heart sounds: Normal heart sounds. No murmur heard.  Pulmonary:      Effort: Pulmonary effort is normal.      Breath sounds: Normal breath sounds. No wheezing or rales.   Abdominal:      Palpations: Abdomen is soft. There is no mass.      Tenderness: There is no abdominal tenderness.   Musculoskeletal:      Cervical back: Full passive range of motion without pain and neck supple.      Right Lower Extremity: Right leg is amputated above knee.   Feet:      Comments: Left foot wound  Lymphadenopathy:      Head:      Right side of head: No submandibular adenopathy.      Left side of head: No submandibular adenopathy.      Cervical: No cervical adenopathy.   Skin:     General: Skin is warm.   Neurological:      Mental Status: He is alert and oriented to person, place, and time.      Motor: No tremor.      Comments: Left-sided weakness upper and lower extremity.  Not able to handgrip or move toes   Psychiatric:         Behavior: Behavior is cooperative.           Laboratory findings:    Recent Labs     04/11/24  0456 04/12/24  0500 04/12/24  1017 04/12/24  1435   WBC 10.6 8.4 9.3  --    HGB 7.1* 6.8* 6.9* 8.2*   HCT 22.2* 21.5* 21.9* 25.4*    187 195  --    INR  --   --  1.4  --        Recent Labs     04/10/24  1156 04/11/24  0456 04/12/24  0500   NA  --  137 137   K  --  3.4* 3.2*  3.3*   CL  --  104 100   CO2  --  23 21   GLUCOSE  --  103* 94   BUN  --  16 20   CREATININE  --  1.1 1.3*   MG  --  1.7 1.7   CALCIUM  --  8.3* 8.0*   PSA 3.00  --   --        No results for input(s): \"PROT\", \"LABALBU\", \"LABA1C\", \"P6UNINA\", \"G8MXGOT\", \"FT4\", \"TSH\", \"AST\", \"ALT\", \"LDH\", \"GGT\", \"ALKPHOS\", \"BILITOT\", \"BILIDIR\",

## 2024-04-13 NOTE — RT PROTOCOL NOTE

## 2024-04-13 NOTE — PROGRESS NOTES
End Of Shift Note  [facility]  Summary of shift: Patient arrived to unit around 3:45 PM and levophed was initiated. Per neuro patient needs to have a SBP of greater than 140 for adequate perfusion of the brain. We are still titrating up on the levophed to meet these parameters per ordered titration metrics. Patient is currently on 13 mcg/min of heparin and last BP was 128/74. Patient alarming for a-fib but p-waves were present so 12 lead EKG performed and it resulted with a sinus rhythm and PAC's. Patient currently on heparin and the last two anti Xa's were therapeutic so he will remain on the 16 units/kg/hr.     Vitals:    Vitals:    04/13/24 1820 04/13/24 1825 04/13/24 1830 04/13/24 1835   BP: 109/85 129/76 139/61 128/74   Pulse: 97 100 95 94   Resp: 23 24 21 19   Temp:       TempSrc:       SpO2: 99% 98% 96% 97%   Weight:       Height:            I&O:   Intake/Output Summary (Last 24 hours) at 4/13/2024 1854  Last data filed at 4/13/2024 1844  Gross per 24 hour   Intake 1306.78 ml   Output 800 ml   Net 506.78 ml       Resp Status: 3L NC     Ventilator Settings:     / / /FiO2 : 94 %    Critical Care IV infusions:   norepinephrine 11 mcg/min (04/13/24 1844)    sodium chloride      heparin (PORCINE) Infusion 16 Units/kg/hr (04/13/24 1844)    sodium chloride 5 mL/hr at 04/11/24 1805    dextrose          LDA:   Peripheral IV 04/11/24 Right Forearm (Active)   Number of days: 2       Peripheral IV 04/13/24 Left;Posterior;Proximal Forearm (Active)   Number of days: 0       Peripheral IV 04/13/24 Left;Posterior Forearm (Active)   Number of days: 0       Wound 04/04/24 Foot Left;Dorsal dry, scabbed (Active)   Number of days: 9       Wound Foot Left;Lateral (Active)   Number of days:

## 2024-04-13 NOTE — CONSULTS
Cardiovascular  Positive for claudication and PND    Gastrointestinal  Negative for abdominal pain, diarrhea, Positive for blood in stool    Musculoskeletal  Negative for joint pain, negative for myalgia    Neurology Negative for seizures, loss of consciousness   Skin  Multiple chronic wounds including left leg     Endo/heme/allergies  Negative for polydipsia, environmental allergy    Psychiatric/behavioral  Negative for suicidal ideation. Patient is not anxious        Physical Exam:   /64   Pulse 93   Temp 99.1 °F (37.3 °C) (Oral)   Resp 20   Ht 1.829 m (6')   Wt 72 kg (158 lb 11.7 oz)   SpO2 92%   BMI 21.53 kg/m²   Temp (24hrs), Av.3 °F (36.8 °C), Min:97.3 °F (36.3 °C), Max:99.1 °F (37.3 °C)    No results for input(s): \"POCGLU\" in the last 72 hours.    Intake/Output Summary (Last 24 hours) at 2024 1212  Last data filed at 2024 2357  Gross per 24 hour   Intake --   Output 1060 ml   Net -1060 ml         NEUROLOGIC EXAMINATION  GENERAL  Appears comfortable and in no distress   HEENT  NC/ AT   NECK  Supple and no bruits heard   MENTAL STATUS:  Alert, oriented, mild decreased short term memory, no confusion, subtle dysarthric speech, normal language, no hallucination or delusion   CRANIAL NERVES: II     -      Visual fields intact to confrontation  III,IV,VI -  EOMs full, no afferent defect, no OZ, no ptosis  V     -     Normal facial sensation  VII    -     mild decreased left NL fold   VIII   -     Intact hearing  IX,X -     Symmetrical palate  XI    -     Symmetrical shoulder shrug  XII   -     Midline tongue, no atrophy    MOTOR FUNCTION: RUE 5/5 RLE 4/5  LUE 0/5 LLE 4-/5    normal bulk, normal tone and no involuntary movements, no tremor   SENSORY FUNCTION:  Normal touch, normal pin, normal vibration, normal proprioception   CEREBELLAR FUNCTION:  Intact fine motor control over upper limbs   REFLEX FUNCTION:  Symmetric, no perverted reflex, no Babinski sign   STATION and GAIT  Unable  recommending Colonoscopy however patient refusing prep  -management per primary medicine and GI   -given concern for symptomatic hypoperfusion of right MCA/CLARKE recommend HGB strictly >7.5     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   -SBP goal 140-180  -Home plavix currently held  -Aspirin 81mg resumed 4/12/24   -home coumadin held this admission for acute GI bleeding   -Started on heparin gtt 4/12/24 monitor h&h closely with this patient does have A-fib and needs long term anticoagulation however with active GI bleeding this will be delicate balance     Peripheral vascular risk modification  -Lipid panel 4/13/24-total chol-83, HDL-30, LDL-29, Trig-120 on Lipitor 40 at home  -HBA1C pending  -Tobacco cessation counseling     History of lung cancer s/p resection concern for Liver and adrenal mass   -Heme/onc consulted and following  -management per primary medicine and oncology team       PT/OT/ST  PM&R         Consultations:   PHARMACY TO DOSE VANCOMYCIN  IP CONSULT TO INTERNAL MEDICINE  IP CONSULT TO GI  IP CONSULT TO VASCULAR SURGERY  IP CONSULT TO DIETITIAN  IP CONSULT TO UROLOGY  IP CONSULT TO CASE MANAGEMENT  IP CONSULT TO ONCOLOGY  IP CONSULT TO PULMONOLOGY  IP CONSULT TO NEUROLOGY  IP CONSULT TO GI      The plan was discussed with the patient, patient's family and the medical staff.     Patient is admitted as inpatient status because of co-morbidities listed above, severity of signs and symptoms as outlined, requirement for current medical therapies and most importantly because of direct risk to patient if care not provided in a hospital setting.    Edgar Angelo,

## 2024-04-13 NOTE — PLAN OF CARE
Problem: Safety - Adult  Goal: Free from fall injury  4/12/2024 2350 by Bindu Holly RN  Outcome: Progressing     Problem: Discharge Planning  Goal: Discharge to home or other facility with appropriate resources  4/12/2024 2350 by Bindu Holly RN  Outcome: Progressing  Flowsheets (Taken 4/12/2024 2000)  Discharge to home or other facility with appropriate resources:   Identify barriers to discharge with patient and caregiver   Arrange for needed discharge resources and transportation as appropriate   Identify discharge learning needs (meds, wound care, etc)     Problem: Pain  Goal: Verbalizes/displays adequate comfort level or baseline comfort level  4/12/2024 2350 by Bindu Holly RN  Outcome: Progressing     Problem: Skin/Tissue Integrity - Adult  Goal: Incisions, wounds, or drain sites healing without S/S of infection  Outcome: Progressing  Flowsheets (Taken 4/12/2024 2000)  Incisions, Wounds, or Drain Sites Healing Without Sign and Symptoms of Infection: Implement wound care per orders     Problem: Gastrointestinal - Adult  Goal: Minimal or absence of nausea and vomiting  4/12/2024 2350 by Bindu Holly RN  Outcome: Progressing  Flowsheets (Taken 4/12/2024 2000)  Minimal or absence of nausea and vomiting: Provide nonpharmacologic comfort measures as appropriate     Problem: Gastrointestinal - Adult  Goal: Maintains or returns to baseline bowel function  Outcome: Progressing  Flowsheets (Taken 4/12/2024 2000)  Maintains or returns to baseline bowel function: Assess bowel function     Problem: Infection - Adult  Goal: Absence of infection at discharge  4/12/2024 2350 by Bindu Holly RN  Outcome: Progressing  Flowsheets (Taken 4/12/2024 2000)  Absence of infection at discharge:   Assess and monitor for signs and symptoms of infection   Monitor lab/diagnostic results     Problem: Hematologic - Adult  Goal: Maintains hematologic stability  Outcome: Progressing  Flowsheets (Taken 4/12/2024 2000)  Maintains

## 2024-04-13 NOTE — PLAN OF CARE
Problem: Respiratory - Adult  Goal: Able to breathe comfortably  Description: Able to breathe comfortably  4/13/2024 0822 by Makayla Bonilla RCP  Outcome: Progressing  4/12/2024 1950 by Giselle Salas RCP  Outcome: Progressing  Goal: Clear lung sounds  4/13/2024 0822 by Makayla Bonilla RCP  Outcome: Progressing  4/12/2024 1950 by Giselle Salas RCP  Outcome: Progressing  Goal: Adequate oxygenation  Description: Adequate oxygenation  4/13/2024 0822 by Makayla Bonilla RCP  Outcome: Progressing  4/12/2024 1950 by Giselle Salas RCP  Outcome: Progressing

## 2024-04-13 NOTE — PROGRESS NOTES
Today's Date: 4/13/2024  Patient Name: Jorge Luis Banda  Date of admission: 4/3/2024  3:27 PM  Patient's age: 78 y.o., 1945  Admission Dx: Melena [K92.1]  GI bleed [K92.2]  UMM (acute kidney injury) (HCC) [N17.9]  Left leg cellulitis [L03.116]  Anemia, unspecified type [D64.9]    Reason for Consult: management recommendations  Requesting Physician: Sony Crawford MD    CHIEF COMPLAINT: GI bleeding    History Obtained From:  patient  INTERVAL HISTORY:    Patient seen and examined.  Currently in the cardiac ICU.  Currently stable.  No chest pain.  No respiratory distress.  Labs were reviewed.  CBC from yesterday showed white blood cells of 9.3.  Hemoglobin 6.9.  Platelets 195.  Status post blood transfusion.  Will monitor labs.  Denies any active bleeding.  No nausea or vomiting.  Plan for adrenal mass biopsy possibly on Monday.    HISTORY OF PRESENT ILLNESS:    The patient is a 78 y.o.   male who is admitted to the hospital for anemia and suspicion for GI bleeding.  His hemoglobin was under 7 and received blood transfusion.  He has severe peripheral vascular disease on Plavix and Coumadin.  He underwent an EGD that showed gastritis and he refused colonoscopy.  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  From oncology perspective, the patient was diagnosed with stage I lung cancer in 2019 and underwent lung resection.  Never received any chemotherapy or radiation.  He has been in remission since then.  CT scan of the chest showed concerning changes in the liver and adrenal gland for metastatic disease.  Dedicated CT of the abdomen and pelvis is ordered and pending.    Past Medical History:   has a past medical history of Paroxysmal atrial fibrillation (HCC), Peripheral artery disease (HCC), and Primary hypertension.    Past Surgical History:   has a past surgical history that includes above knee

## 2024-04-13 NOTE — PROGRESS NOTES
SLP ALL NOTES  Facility/Department: [facility]  Initial Speech/Language/Cognitive Assessment    NAME: Jorge Luis Banda  : 1945   MRN: [id number]  ADMISSION DATE: 4/3/2024      ADMITTING DIAGNOSIS: has Skin eschar; Stenosis of left carotid artery; History of arterial bypass of lower extremity; S/P AKA (above knee amputation) unilateral, right (HCC); Primary hypertension; Persistent wound pain; Peripheral arterial disease (HCC); Ulcer of left foot (HCC); Neuropathy; Malignant neoplasm of lung (HCC); Intermittent claudication of left lower extremity due to atherosclerosis (HCC); Frequency of urination; Former smoker; Embolism and thrombosis of artery (HCC); Dyslipidemia; Closed fracture of left humerus; Closed fracture of surgical neck of humerus; Acute exacerbation of chronic obstructive airways disease (HCC); Left leg cellulitis; Arteriosclerosis of coronary artery; Age-related osteoporosis without current pathological fracture; Adenocarcinoma of lung (HCC); Wound infection; 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      DATE ONSET: 4/3/24      Date of Eval: 2024   Evaluating Therapist: Emiliana Terry SLP      RECENT RESULTS  CT OF HEAD/MRI:   Initial CT 2024  No acute intracranial abnormality       MRI Brain 2024  Acute infarcts within the anterior right CLARKE MCA watershed distribution  extending peripherally towards the right posterior frontal lobe.  No  hemorrhagic transformation      Primary Complaint:   Jorge Luis Banda is an 79 yo man admitted 4/3/2024 with multiple poorly healing wounds on his left foot.     Pt initially dx

## 2024-04-13 NOTE — PROGRESS NOTES
Physical Therapy  Facility/Department: [facility] CARE  Physical Therapy Re-Assessment    Name: Jorge Luis Banda  : 1945  MRN: [id number]  Date of Service: 2024    Discharge Recommendations:  Patient would benefit from continued therapy after discharge    Pt currently functioning below baseline.  Recommend daily inpatient skilled therapy at time of discharge to maximize long term outcomes and prevent re-admission. Please refer to AM-PAC score for current level of function.        Patient Diagnosis(es): The primary encounter diagnosis was Left leg cellulitis. Diagnoses of Anemia, unspecified type, Melena, UMM (acute kidney injury) (HCC), Gastrointestinal hemorrhage, unspecified gastrointestinal hemorrhage type, Atrial fib/flutter, transient (HCC), Embolism and thrombosis of artery (HCC), and Claudication (HCC) were also pertinent to this visit.  Past Medical History:  has a past medical history of Paroxysmal atrial fibrillation (HCC), Peripheral artery disease (HCC), and Primary hypertension.  Past Surgical History:  has a past surgical history that includes above knee amputation (Right, 2020) and Upper gastrointestinal endoscopy (N/A, 2024).    Assessment   Body Structures, Functions, Activity Limitations Requiring Skilled Therapeutic Intervention: Decreased functional mobility ;Decreased strength;Decreased balance;Decreased posture  Assessment: Re-assess performed due to acute R CVA with L UE>LE weakness. Patient required Mod A for bed mobility and was educated on self ROM of L UE as well as ex for L LE. Unable to assess standing due to patient refusal but patient also does not have prosthesis at hospital. Patient will require further therapy following discharge.  Specific Instructions for Next Treatment: transfers, HEP  Activity Tolerance  Activity Tolerance: Patient limited by fatigue;Patient limited by endurance;Patient limited by pain;Other (comment)  Activity Tolerance Comments:  118/69  MAP (Calculated): 85  BP Location: Right upper arm     Observation/Palpation  Observation: complains of abdominal pain and L foot pain but did not give rating        AROM LLE (degrees)  LLE General AROM: Hip and knee WFL, ankle with limited ROM and pain  Strength LLE  Comment: able to move against gravity but increased effort needed, Hip 3-/5, knee 3/5, ankle 3-/5  Strength LUE  Comment: refer to OT assessment, not active movement observed, shoulder subluxation present           Bed mobility  Supine to Sit: Moderate assistance;2 Person assistance  Sit to Supine: Moderate assistance;2 Person assistance  Scooting: Moderate assistance  Bed Mobility Comments: sat EOB for 10 minutes, initially needed min A to maintain upright but eventually able to sit unsupported. Had patient practice repeated WB on L UE and needed Mod A to perform  Transfers  Comment: patient did not want to attempt and states his R LE prosthesis is not here  Ambulation  Comments: Not appropriate due to no prosthesis at hospital     Balance  Sitting - Static: Fair  Sitting - Dynamic: Fair;-  Exercise Treatment: weight shifting onto L UE, instructed in self ROM of L UE, ROM of L LE        OutComes Score                                                  AM-PAC - Mobility    AM-PAC Basic Mobility - Inpatient   How much help is needed turning from your back to your side while in a flat bed without using bedrails?: A Lot  How much help is needed moving from lying on your back to sitting on the side of a flat bed without using bedrails?: A Lot  How much help is needed moving to and from a bed to a chair?: A Lot  How much help is needed standing up from a chair using your arms?: Total  How much help is needed walking in hospital room?: Total  How much help is needed climbing 3-5 steps with a railing?: Total  AM-PAC Inpatient Mobility Raw Score : 9  AM-PAC Inpatient T-Scale Score : 30.55  Mobility Inpatient CMS 0-100% Score: 81.38  Mobility Inpatient

## 2024-04-13 NOTE — PLAN OF CARE
NeuroEndovascular Surgery Plan of Care:     The patient is a 78 y.o.  Non- / non  male who Initially presented to [facility] ER 4/3/24 with Left leg pain and complicated wound healing with chronic Cellulitis for a month now. PMH significant for Paroxysmal A-fib, Peripheral artery disease, HTN. Patient is known vasculopath with poor circulation and complicated wound healing related this in fact s/p Right BKA already. [facility]s [facility]. Patient is s/p bilateral common femoral endarterectomies, profundal plasty, right profunda stent placement and angioplasty and femorofemoral bypass and subsequently right femoral-popliteal bypass due to occlusion of SFA . Currently anemic with unclear source of bleeding. Patient underwent MRI brain 4/11/24 for concerns of left arm and leg weakness and did show flow failure right MCA/CLARKE ischemic stroke.   Got consulted for ICA stenosis evaluation.         MRI brain WO 4/11/24 15:21  IMPRESSION:  Acute infarcts within the anterior right CLARKE MCA watershed distribution  extending peripherally towards the right posterior frontal lobe.  No  hemorrhagic transformation.       CTA head and neck 4/13/24  IMPRESSION:  1. Redemonstration of known subacute infarct involving the cortex,  subcortical and deep white matter of the right frontal lobe are noted, better  appreciated on the recent MRI. There is no acute intracranial hemorrhage.  2. Severe stenoses at the origins of the internal carotid arteries  bilaterally measuring 90% on the right and 80% on the left based on NASCET  criteria.  3. Severe stenosis at the origin of the left vertebral artery with multifocal  moderate to severe stenoses of the V2 and V3 segments of the left vertebral  artery. The V4 segment of the left vertebral artery is occluded.  4. Moderate stenosis of the V4 segment of the right vertebral artery.  5. Moderate stenosis of the proximal left subclavian artery.  6. No

## 2024-04-13 NOTE — PROGRESS NOTES
Pt remained calm and cooperative throughout shift. Pt used urinal at bedside. Lopressor held tonight d/t soft BP 96/61. 105/60 on recheck. Pt remained on 3L NC tonight. Heparin gtt continues running, but titrated several times tonight. Rate changed from 12 to 14 units after bolus per protocol at 2125 d/t anti-xa of 0.24. Heparin gtt titrated to 16 units after another bolus at 0530. Anti-xa came back 0.28. Will continue with care plan

## 2024-04-13 NOTE — CARE COORDINATION
Social work: will send updates to Monica Chavis and Hector pt does not appear ready today for snf. Will need theresa and Rx when ready for snf.  Will need pT/OT NOTES if snf needed for precert. Wandy dowd

## 2024-04-13 NOTE — PROGRESS NOTES
Bedside swallow study completed by nurse, patient swallowed water and pudding with no complications.

## 2024-04-13 NOTE — PLAN OF CARE
Problem: Respiratory - Adult  Goal: Able to breathe comfortably  Description: Able to breathe comfortably  4/13/2024 1927 by Hoa Thornton RCP  Outcome: Progressing     Problem: Respiratory - Adult  Goal: Clear lung sounds  4/13/2024 1927 by Hoa Thornton RCP  Outcome: Progressing     Problem: Respiratory - Adult  Goal: Adequate oxygenation  Description: Adequate oxygenation  4/13/2024 1927 by Hoa Thornton RCP  Outcome: Progressing       BRONCHOSPASM/BRONCHOCONSTRICTION    IMPROVE  AERATION/BREATHSOUNDS  ADMINISTER BRONCHODILATOR THERAPY AS APPROPRIATE  ASSESS BREATH SOUNDS  INITIATE AEROSOL PROTOCOL IF ORDERED TO DO SO  PATIENT EDUCATION AS NEEDED      PROVIDE ADEQUATE OXYGENATION WITH ACCEPTABLE SP02/ABG'S    [x]  IDENTIFY APPROPRIATE OXYGEN THERAPY  [x]   MONITOR SP02/ABG'S AS NEEDED   [x]   PATIENT EDUCATION AS NEEDED

## 2024-04-14 ENCOUNTER — APPOINTMENT (OUTPATIENT)
Dept: GENERAL RADIOLOGY | Age: 79
DRG: 377 | End: 2024-04-14
Payer: MEDICARE

## 2024-04-14 ENCOUNTER — APPOINTMENT (OUTPATIENT)
Dept: CT IMAGING | Age: 79
DRG: 377 | End: 2024-04-14
Payer: MEDICARE

## 2024-04-14 PROBLEM — I21.4 NSTEMI (NON-ST ELEVATED MYOCARDIAL INFARCTION) (HCC): Status: ACTIVE | Noted: 2024-04-14

## 2024-04-14 PROBLEM — I20.0 UNSTABLE ANGINA (HCC): Status: ACTIVE | Noted: 2024-04-14

## 2024-04-14 PROBLEM — I48.91 ATRIAL FIBRILLATION WITH RAPID VENTRICULAR RESPONSE (HCC): Status: ACTIVE | Noted: 2024-04-14

## 2024-04-14 PROBLEM — I95.9 HYPOTENSION: Status: ACTIVE | Noted: 2024-04-14

## 2024-04-14 LAB
ANION GAP SERPL CALCULATED.3IONS-SCNC: 12 MMOL/L (ref 9–17)
ANTI-XA UNFRAC HEPARIN: 0.44 IU/L (ref 0.3–0.7)
BUN SERPL-MCNC: 13 MG/DL (ref 8–23)
BUN/CREAT SERPL: 11 (ref 9–20)
CALCIUM SERPL-MCNC: 8 MG/DL (ref 8.6–10.4)
CHLORIDE SERPL-SCNC: 94 MMOL/L (ref 98–107)
CO2 SERPL-SCNC: 26 MMOL/L (ref 20–31)
CREAT SERPL-MCNC: 1.2 MG/DL (ref 0.7–1.2)
ERYTHROCYTE [DISTWIDTH] IN BLOOD BY AUTOMATED COUNT: 17.3 % (ref 11.8–14.4)
GFR SERPL CREATININE-BSD FRML MDRD: 61 ML/MIN/1.73M2
GLUCOSE SERPL-MCNC: 118 MG/DL (ref 70–99)
HCT VFR BLD AUTO: 26.3 % (ref 40.7–50.3)
HGB BLD-MCNC: 8.4 G/DL (ref 13–17)
MCH RBC QN AUTO: 29.2 PG (ref 25.2–33.5)
MCHC RBC AUTO-ENTMCNC: 31.9 G/DL (ref 28.4–34.8)
MCV RBC AUTO: 91.3 FL (ref 82.6–102.9)
MYOGLOBIN SERPL-MCNC: 57 NG/ML (ref 28–72)
MYOGLOBIN SERPL-MCNC: 60 NG/ML (ref 28–72)
MYOGLOBIN SERPL-MCNC: 64 NG/ML (ref 28–72)
NRBC BLD-RTO: 0 PER 100 WBC
PLATELET # BLD AUTO: 309 K/UL (ref 138–453)
PMV BLD AUTO: 10.4 FL (ref 8.1–13.5)
POTASSIUM SERPL-SCNC: 3.7 MMOL/L (ref 3.7–5.3)
PROCALCITONIN SERPL-MCNC: 1.02 NG/ML (ref 0–0.09)
RBC # BLD AUTO: 2.88 M/UL (ref 4.21–5.77)
SODIUM SERPL-SCNC: 132 MMOL/L (ref 135–144)
TROPONIN I SERPL HS-MCNC: 206 NG/L (ref 0–22)
TROPONIN I SERPL HS-MCNC: 222 NG/L (ref 0–22)
TROPONIN I SERPL HS-MCNC: 224 NG/L (ref 0–22)
WBC OTHER # BLD: 15.7 K/UL (ref 3.5–11.3)

## 2024-04-14 PROCEDURE — 99232 SBSQ HOSP IP/OBS MODERATE 35: CPT | Performed by: FAMILY MEDICINE

## 2024-04-14 PROCEDURE — 2000000000 HC ICU R&B

## 2024-04-14 PROCEDURE — 6360000002 HC RX W HCPCS: Performed by: INTERNAL MEDICINE

## 2024-04-14 PROCEDURE — 2580000003 HC RX 258: Performed by: INTERNAL MEDICINE

## 2024-04-14 PROCEDURE — 03HY32Z INSERTION OF MONITORING DEVICE INTO UPPER ARTERY, PERCUTANEOUS APPROACH: ICD-10-PCS | Performed by: INTERNAL MEDICINE

## 2024-04-14 PROCEDURE — 6370000000 HC RX 637 (ALT 250 FOR IP): Performed by: INTERNAL MEDICINE

## 2024-04-14 PROCEDURE — 70450 CT HEAD/BRAIN W/O DYE: CPT

## 2024-04-14 PROCEDURE — C9113 INJ PANTOPRAZOLE SODIUM, VIA: HCPCS | Performed by: INTERNAL MEDICINE

## 2024-04-14 PROCEDURE — 36415 COLL VENOUS BLD VENIPUNCTURE: CPT

## 2024-04-14 PROCEDURE — 80048 BASIC METABOLIC PNL TOTAL CA: CPT

## 2024-04-14 PROCEDURE — 85027 COMPLETE CBC AUTOMATED: CPT

## 2024-04-14 PROCEDURE — 2700000000 HC OXYGEN THERAPY PER DAY

## 2024-04-14 PROCEDURE — 84484 ASSAY OF TROPONIN QUANT: CPT

## 2024-04-14 PROCEDURE — 99232 SBSQ HOSP IP/OBS MODERATE 35: CPT | Performed by: INTERNAL MEDICINE

## 2024-04-14 PROCEDURE — 6360000002 HC RX W HCPCS: Performed by: FAMILY MEDICINE

## 2024-04-14 PROCEDURE — 83874 ASSAY OF MYOGLOBIN: CPT

## 2024-04-14 PROCEDURE — 85520 HEPARIN ASSAY: CPT

## 2024-04-14 PROCEDURE — 6370000000 HC RX 637 (ALT 250 FOR IP): Performed by: FAMILY MEDICINE

## 2024-04-14 PROCEDURE — 71045 X-RAY EXAM CHEST 1 VIEW: CPT

## 2024-04-14 PROCEDURE — A4216 STERILE WATER/SALINE, 10 ML: HCPCS | Performed by: INTERNAL MEDICINE

## 2024-04-14 PROCEDURE — 2580000003 HC RX 258: Performed by: FAMILY MEDICINE

## 2024-04-14 PROCEDURE — 99232 SBSQ HOSP IP/OBS MODERATE 35: CPT | Performed by: STUDENT IN AN ORGANIZED HEALTH CARE EDUCATION/TRAINING PROGRAM

## 2024-04-14 PROCEDURE — 94640 AIRWAY INHALATION TREATMENT: CPT

## 2024-04-14 PROCEDURE — 6360000002 HC RX W HCPCS

## 2024-04-14 PROCEDURE — 94761 N-INVAS EAR/PLS OXIMETRY MLT: CPT

## 2024-04-14 PROCEDURE — 02HV33Z INSERTION OF INFUSION DEVICE INTO SUPERIOR VENA CAVA, PERCUTANEOUS APPROACH: ICD-10-PCS | Performed by: INTERNAL MEDICINE

## 2024-04-14 RX ORDER — MORPHINE SULFATE 2 MG/ML
2 INJECTION, SOLUTION INTRAMUSCULAR; INTRAVENOUS
Status: DISCONTINUED | OUTPATIENT
Start: 2024-04-14 | End: 2024-04-19

## 2024-04-14 RX ADMIN — SODIUM CHLORIDE, PRESERVATIVE FREE 10 ML: 5 INJECTION INTRAVENOUS at 19:55

## 2024-04-14 RX ADMIN — FUROSEMIDE 20 MG: 10 INJECTION, SOLUTION INTRAMUSCULAR; INTRAVENOUS at 18:20

## 2024-04-14 RX ADMIN — ALBUTEROL SULFATE 2 PUFF: 90 AEROSOL, METERED RESPIRATORY (INHALATION) at 18:18

## 2024-04-14 RX ADMIN — POTASSIUM CHLORIDE 10 MEQ: 7.46 INJECTION, SOLUTION INTRAVENOUS at 02:49

## 2024-04-14 RX ADMIN — CHOLECALCIFEROL TAB 125 MCG (5000 UNIT) 5000 UNITS: 125 TAB at 09:18

## 2024-04-14 RX ADMIN — FERROUS SULFATE TAB EC 325 MG (65 MG FE EQUIVALENT) 325 MG: 325 (65 FE) TABLET DELAYED RESPONSE at 09:18

## 2024-04-14 RX ADMIN — ALBUTEROL SULFATE 2 PUFF: 90 AEROSOL, METERED RESPIRATORY (INHALATION) at 15:10

## 2024-04-14 RX ADMIN — PIPERACILLIN AND TAZOBACTAM 3375 MG: 3; .375 INJECTION, POWDER, LYOPHILIZED, FOR SOLUTION INTRAVENOUS at 09:36

## 2024-04-14 RX ADMIN — TAMSULOSIN HYDROCHLORIDE 0.4 MG: 0.4 CAPSULE ORAL at 09:18

## 2024-04-14 RX ADMIN — GUAIFENESIN 600 MG: 600 TABLET ORAL at 09:18

## 2024-04-14 RX ADMIN — POTASSIUM CHLORIDE 10 MEQ: 7.46 INJECTION, SOLUTION INTRAVENOUS at 01:45

## 2024-04-14 RX ADMIN — EMPAGLIFLOZIN 10 MG: 10 TABLET, FILM COATED ORAL at 12:58

## 2024-04-14 RX ADMIN — PHENYLEPHRINE HYDROCHLORIDE 300 MCG/MIN: 50 INJECTION INTRAVENOUS at 07:33

## 2024-04-14 RX ADMIN — GUAIFENESIN 600 MG: 600 TABLET ORAL at 19:50

## 2024-04-14 RX ADMIN — PHENYLEPHRINE HYDROCHLORIDE 300 MCG/MIN: 50 INJECTION INTRAVENOUS at 01:56

## 2024-04-14 RX ADMIN — PHENYLEPHRINE HYDROCHLORIDE 300 MCG/MIN: 50 INJECTION INTRAVENOUS at 04:27

## 2024-04-14 RX ADMIN — POTASSIUM CHLORIDE 10 MEQ: 7.46 INJECTION, SOLUTION INTRAVENOUS at 04:56

## 2024-04-14 RX ADMIN — HEPARIN SODIUM 16 UNITS/KG/HR: 10000 INJECTION, SOLUTION INTRAVENOUS at 12:15

## 2024-04-14 RX ADMIN — CYANOCOBALAMIN TAB 1000 MCG 1000 MCG: 1000 TAB at 09:18

## 2024-04-14 RX ADMIN — GABAPENTIN 300 MG: 300 CAPSULE ORAL at 21:11

## 2024-04-14 RX ADMIN — PIPERACILLIN AND TAZOBACTAM 3375 MG: 3; .375 INJECTION, POWDER, LYOPHILIZED, FOR SOLUTION INTRAVENOUS at 02:27

## 2024-04-14 RX ADMIN — ALBUTEROL SULFATE 2 PUFF: 90 AEROSOL, METERED RESPIRATORY (INHALATION) at 07:27

## 2024-04-14 RX ADMIN — MAGNESIUM SULFATE HEPTAHYDRATE 1000 MG: 1 INJECTION, SOLUTION INTRAVENOUS at 01:43

## 2024-04-14 RX ADMIN — GABAPENTIN 300 MG: 300 CAPSULE ORAL at 14:38

## 2024-04-14 RX ADMIN — MORPHINE SULFATE 2 MG: 2 INJECTION, SOLUTION INTRAMUSCULAR; INTRAVENOUS at 02:42

## 2024-04-14 RX ADMIN — PIPERACILLIN AND TAZOBACTAM 3375 MG: 3; .375 INJECTION, POWDER, LYOPHILIZED, FOR SOLUTION INTRAVENOUS at 18:17

## 2024-04-14 RX ADMIN — MAGNESIUM SULFATE HEPTAHYDRATE 1000 MG: 1 INJECTION, SOLUTION INTRAVENOUS at 00:39

## 2024-04-14 RX ADMIN — PHENYLEPHRINE HYDROCHLORIDE 300 MCG/MIN: 50 INJECTION INTRAVENOUS at 10:35

## 2024-04-14 RX ADMIN — FUROSEMIDE 20 MG: 10 INJECTION, SOLUTION INTRAMUSCULAR; INTRAVENOUS at 09:18

## 2024-04-14 RX ADMIN — SODIUM CHLORIDE, PRESERVATIVE FREE 40 MG: 5 INJECTION INTRAVENOUS at 09:23

## 2024-04-14 RX ADMIN — PHENYLEPHRINE HYDROCHLORIDE 250 MCG/MIN: 50 INJECTION INTRAVENOUS at 13:47

## 2024-04-14 RX ADMIN — HEPARIN SODIUM 16 UNITS/KG/HR: 10000 INJECTION, SOLUTION INTRAVENOUS at 16:17

## 2024-04-14 RX ADMIN — POTASSIUM CHLORIDE 10 MEQ: 7.46 INJECTION, SOLUTION INTRAVENOUS at 03:51

## 2024-04-14 RX ADMIN — SODIUM CHLORIDE, PRESERVATIVE FREE 40 MG: 5 INJECTION INTRAVENOUS at 19:50

## 2024-04-14 RX ADMIN — PHENYLEPHRINE HYDROCHLORIDE 245 MCG/MIN: 50 INJECTION INTRAVENOUS at 20:59

## 2024-04-14 RX ADMIN — ASPIRIN 81 MG: 81 TABLET, COATED ORAL at 09:18

## 2024-04-14 RX ADMIN — ATORVASTATIN CALCIUM 40 MG: 40 TABLET, FILM COATED ORAL at 19:50

## 2024-04-14 RX ADMIN — PHENYLEPHRINE HYDROCHLORIDE 225 MCG/MIN: 50 INJECTION INTRAVENOUS at 17:07

## 2024-04-14 RX ADMIN — MAGNESIUM SULFATE HEPTAHYDRATE 1000 MG: 1 INJECTION, SOLUTION INTRAVENOUS at 02:45

## 2024-04-14 RX ADMIN — POTASSIUM CHLORIDE 10 MEQ: 7.46 INJECTION, SOLUTION INTRAVENOUS at 00:42

## 2024-04-14 RX ADMIN — GABAPENTIN 300 MG: 300 CAPSULE ORAL at 09:18

## 2024-04-14 ASSESSMENT — PAIN DESCRIPTION - DESCRIPTORS: DESCRIPTORS: SHARP

## 2024-04-14 ASSESSMENT — PAIN SCALES - GENERAL
PAINLEVEL_OUTOF10: 0
PAINLEVEL_OUTOF10: 8

## 2024-04-14 ASSESSMENT — PAIN DESCRIPTION - LOCATION: LOCATION: CHEST

## 2024-04-14 ASSESSMENT — PAIN DESCRIPTION - ORIENTATION: ORIENTATION: MID

## 2024-04-14 NOTE — PROGRESS NOTES
End Of Shift Note  [facility]  Summary of shift: Patient had eventful shift. Remains on Hermila gtt. Able to titrate down throughout shift. Arterial line placed. PICC placed. Heparin gtt continues. Full liquid diet started. Per GI, holding off on colonoscopy for now, with potential for procedure prior to discharge. See progress note for presentation of unequal pupils; stat CT done. No major change noted. Cardiology added Jardiance. Vascular will reevaluate tomorrow for potential endarterectomy.     Vitals:    Vitals:    04/14/24 1500 04/14/24 1512 04/14/24 1530 04/14/24 1600   BP:       Pulse: (!) 101 (!) 103 (!) 105 (!) 103   Resp: 18 19 21 22   Temp:    97.8 °F (36.6 °C)   TempSrc:    Temporal   SpO2: 100% 100% 95% 99%   Weight:       Height:            I&O:   Intake/Output Summary (Last 24 hours) at 4/14/2024 1737  Last data filed at 4/14/2024 1711  Gross per 24 hour   Intake 4364.37 ml   Output 2210 ml   Net 2154.37 ml       Resp Status: 1L    Ventilator Settings:     / / /FiO2 : 94 %    Critical Care IV infusions:   phenylephrine (HERMILA-SYNEPHRINE) 50 mg in sodium chloride 0.9 % 250 mL infusion 225 mcg/min (04/14/24 1707)    sodium chloride      heparin (PORCINE) Infusion 16 Units/kg/hr (04/14/24 1701)    sodium chloride Stopped (04/14/24 0350)    dextrose          LDA:   PICC Triple Lumen 04/14/24 Right Brachial (Active)   Number of days: 0       Peripheral IV 04/11/24 Right Forearm (Active)   Number of days: 3       Peripheral IV 04/13/24 Left;Posterior;Proximal Forearm (Active)   Number of days: 1       Wound 04/04/24 Foot Left;Dorsal dry, scabbed (Active)   Number of days: 10       Wound Foot Left;Lateral (Active)   Number of days:

## 2024-04-14 NOTE — PROGRESS NOTES
Pulmonary Critical Care Progress Note       Patient seen for the follow up of bilateral pleural effusions, hemoptysis     Subjective:  Patient transferred to ICU yesterday for pressors to maintain SBP >140. Patient went in to a-fib with RVR and levophed was changed to neosynephrine. He is going for a stat CT head. Patient is resting in bed, has some confusion.  Neurology is at the bedside. He denies chest pain. Mild occasional cough, mostly dry. Shortness of breath not much changed.     Examination:  Vitals: /71   Pulse (!) 106   Temp 98.2 °F (36.8 °C) (Temporal)   Resp 18   Ht 1.829 m (6')   Wt 70.4 kg (155 lb 3.3 oz)   SpO2 94%   BMI 21.05 kg/m²   General appearance: In no acute distress, alert and cooperative with exam  Neck: No JVD  Lungs: Bilateral rhonchi, moderate air exchange  Heart: irregular rate and rhythm, S1, S2 normal, no gallop  Abdomen: Soft, non tender, + BS  Extremities: no cyanosis or clubbing. No significant edema, left upper and lower extremity weakness    LABs:  CBC:   Recent Labs     04/12/24  0500 04/12/24  1017 04/12/24  1435 04/13/24 2231 04/14/24  0542   WBC 8.4 9.3  --  16.0* 15.7*   HGB 6.8* 6.9* 8.2* 8.9*  8.9* 8.4*   HCT 21.5* 21.9* 25.4* 27.6*  27.6* 26.3*    195  --  284 309       BMP:   Recent Labs     04/12/24  0500 04/13/24  2231 04/14/24  0542    134* 132*   K 3.2*  3.3* 2.6*  2.6* 3.7   CO2 21 27 26   BUN 20 14 13   CREATININE 1.3* 1.4* 1.2   LABGLOM 56* 51* 61   GLUCOSE 94 128* 118*       PT/INR:   Recent Labs     04/12/24  1017   PROTIME 17.3*   INR 1.4       APTT:  Recent Labs     04/12/24  1017   APTT 47.5*       Radiology:  X-ray chest 4/13/24    X-ray chest 4/12/2024      Impression & Recommendations:  Acute hypoxic respiratory insufficiency  Supplemental oxygen as needed to maintain oxygen saturation >90%  Incentive spirometer q1h while awake      Bilateral loculated pleural effusions/atelectasis  Lasix 20mg IVP x1 4/12/24    Acute  anemia/Hemoptysis  Hemoptysis improving  1 unit of packed red blood cells 4/12/24 for hemoglobin 6.9  Status post EGD with negative findings  Pending colonoscopy 4/15/2024     History of stage 1 lung cancer   5 years ago with lung resection  No adjuvant therapy     Acute CVA with left-sided weakness/hypotension  Heparin drip per primary team initiated  Neurology consult pending  Continue neosynephrine drip    Adrenal mass  Oncology on consult  Biopsy Monday     Urinary retention  Urology following     Peripheral vascular disease  Off plavix  Status post R AKA    DVT prophylaxis with EPC cuffs  Discussed with neurology.  Patient had repeat CT scan of the brain which shows no new changes.  At this time patient is requiring Mat-Synephrine drip at 300 mics.  Secondary to patient's significant vasculopathy I discussed the case with Dr. Carrera, vascular surgeon.  He recommended for left radial side for A-line access if needed.  Will get PICC line catheter placed for vasopressor infusion.  Patient lactic acid this morning was 1.5.  Will follow with you    Electronically signed by     Joe Mcneil MD on 4/14/2024 at 12:18 PM  Pulmonary Critical Care and Sleep Medicine,  [facility]  Office: [phone]

## 2024-04-14 NOTE — CONSULTS
VASCULAR SURGERY   CONSULT        Name: Jorge Luis Banda  MRN: [id number]     Acct: [id number]  Room: 2031/2031-01    Admit Date: 4/3/2024  PCP: Lucila Machado APRN - [id number]    Physician Requesting Consult:  Dr. Crawford    Reason for Consult: Severe bilateral carotid stenosis/right hemisphere CVA    Chief Complaint:     Chief Complaint   Patient presents with    Leg Pain     Left leg, open wounds and swollen           History Obtained From:     patient, electronic medical record and nursing    History of Present Illness:      Jorge Luis Banda is a  78 y.o.  male who presents with Leg Pain (Left leg, open wounds and swollen/)  Patient was previously seen for left foot ulcer.  He has an extensive vascular history with previous right axillobifemoral bypass by Dr. Ledbetter while in the hospital and he developed left-sided weakness with left hand paralysis.  MRI of the brain shows a right hemisphere CVA with a watershed distribution.  CTA of the neck demonstrates a severe 90% right internal carotid artery stenosis and 80% left internal carotid artery stenosis with a long segment of plaque involving the left common carotid.  He is currently admitted with a GI bleed and Plavix and Coumadin have been held.  Current hemoglobin is 8.4.  On questioning he is unable to move his left arm and has motor function present in the left leg however is weak.  Neurology input noted.    Patient has a history of lung carcinoma with previous resection.  Currently has a 6.5 cm right adrenal mass with an associated liver mass concerning for metastases.    Past Medical History:     Past Medical History:   Diagnosis Date    Paroxysmal atrial fibrillation (HCC)     Peripheral artery disease (HCC)     Primary hypertension         Past Surgical History:     Past Surgical History:   Procedure Laterality Date    ABOVE KNEE AMPUTATION Right 01/01/2020    UPPER GASTROINTESTINAL ENDOSCOPY N/A 4/8/2024    ESOPHAGOGASTRODUODENOSCOPY BIOPSY  137 134*  --   --   --  132*  --    K 3.2*  3.3* 2.6*  2.6*  --   --   --  3.7  --     93*  --   --   --  94*  --    CO2 21 27  --   --   --  26  --    GLUCOSE 94 128*  --   --   --  118*  --    BUN 20 14  --   --   --  13  --    CREATININE 1.3* 1.4*  --   --   --  1.2  --    MG 1.7 1.6  --   --   --   --   --    ANIONGAP 16 14  --   --   --  12  --    LABGLOM 56* 51*  --   --   --  61  --    CALCIUM 8.0* 8.1*  --   --   --  8.0*  --    TROPHS  --  210*   < >  --  224* 222* 206*   MYOGLOBIN  --   --   --   --  64 57 60   LACTA  --   --   --  1.5  --   --   --    PROCAL  --   --   --  1.02*  --   --   --     < > = values in this interval not displayed.     Recent Labs     04/13/24 0333 04/13/24  2231   PROT  --  6.4   LABALBU  --  2.8*     --    AST  --  57*   ALT  --  20   ALKPHOS  --  172*   BILITOT  --  0.6   BILIDIR  --  0.3*   AMYLASE  --  173*   LIPASE  --  231*   CHOL 83  --    HDL 30*  --    LDLCHOLESTEROL 29  --    CHOLHDLRATIO 3.0  --    TRIG 120  --    VLDL 24  --      Glucose:  Recent Labs     04/13/24  0333   LABA1C 5.3         Assessment:     Primary Problem  Melena  78-year-old male with 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 weakness  Severe 90% right internal carotid artery stenosis  Severe 80% left internal carotid artery stenosis with long segment of plaque involving the mid to distal common carotid  Severe peripheral arterial disease with patent right axillobifemoral bypass  Atrial fibrillation  Right AKA  6.5 cm right adrenal mass with possible liver mets    Active Hospital Problems    Diagnosis Date Noted    NSTEMI (non-ST elevated myocardial infarction) (HCC) [I21.4] 04/14/2024    Hypotension [I95.9] 04/14/2024    Atrial fibrillation with rapid ventricular response (HCC) [I48.91] 04/14/2024    Unstable angina ([facility]) [I20.0] 04/14/2024    Acute ischemic right internal carotid artery (ICA) stroke ([facility]) [I63.231] 04/13/2024    Internal carotid artery stenosis,

## 2024-04-14 NOTE — RT PROTOCOL NOTE
RT Inhaler-Nebulizer Bronchodilator Protocol Note    There is a bronchodilator order in the chart from a provider indicating to follow the RT Bronchodilator Protocol and there is an “Initiate RT Inhaler-Nebulizer Bronchodilator Protocol” order as well (see protocol at bottom of note).    CXR Findings:  XR CHEST PORTABLE    Result Date: 4/13/2024  Improved aeration of the lungs.  Bilateral pleuroparenchymal disease persists.       The findings from the last RT Protocol Assessment were as follows:   History Pulmonary Disease: Chronic pulmonary disease  Respiratory Pattern: Dyspnea on exertion or RR 21-25 bpm  Breath Sounds: Inspiratory and expiratory or bilateral wheezing and/or rhonchi  Cough: Strong, spontaneous, non-productive  Indication for Bronchodilator Therapy: Decreased or absent breath sounds  Bronchodilator Assessment Score: 10    Aerosolized bronchodilator medication orders have been revised according to the RT Inhaler-Nebulizer Bronchodilator Protocol below.    Respiratory Therapist to perform RT Therapy Protocol Assessment initially then follow the protocol.  Repeat RT Therapy Protocol Assessment PRN for score 0-3 or on second treatment, BID, and PRN for scores above 3.    No Indications - adjust the frequency to every 6 hours PRN wheezing or bronchospasm, if no treatments needed after 48 hours then discontinue using Per Protocol order mode.     If indication present, adjust the RT bronchodilator orders based on the Bronchodilator Assessment Score as indicated below.  Use Inhaler orders unless patient has one or more of the following: on home nebulizer, not able to hold breath for 10 seconds, is not alert and oriented, cannot activate and use MDI correctly, or respiratory rate 25 breaths per minute or more, then use the equivalent nebulizer order(s) with same Frequency and PRN reasons based on the score.  If a patient is on this medication at home then do not decrease Frequency below that used at  home.    0-3 - enter or revise RT bronchodilator order(s) to equivalent RT Bronchodilator order with Frequency of every 4 hours PRN for wheezing or increased work of breathing using Per Protocol order mode.        4-6 - enter or revise RT Bronchodilator order(s) to two equivalent RT bronchodilator orders with one order with BID Frequency and one order with Frequency of every 4 hours PRN wheezing or increased work of breathing using Per Protocol order mode.        7-10 - enter or revise RT Bronchodilator order(s) to two equivalent RT bronchodilator orders with one order with TID Frequency and one order with Frequency of every 4 hours PRN wheezing or increased work of breathing using Per Protocol order mode.       11-13 - enter or revise RT Bronchodilator order(s) to one equivalent RT bronchodilator order with QID Frequency and an Albuterol order with Frequency of every 4 hours PRN wheezing or increased work of breathing using Per Protocol order mode.      Greater than 13 - enter or revise RT Bronchodilator order(s) to one equivalent RT bronchodilator order with every 4 hours Frequency and an Albuterol order with Frequency of every 2 hours PRN wheezing or increased work of breathing using Per Protocol order mode.     RT to enter RT Home Evaluation for COPD & MDI Assessment order using Per Protocol order mode.    Electronically signed by ELZBIETA CAMPOS RCP on 4/14/2024 at 7:30 AM

## 2024-04-14 NOTE — PROGRESS NOTES
End Of Shift Note  [facility]    Patient transferred from progressive unit room 1010 after being started on levophed for pressure support to maintain adequate perfusion s/p CVA. Goal -180 per neurology. Levophed switched to neosynephrine due to afib RVR in 130s. Hermila maxed out at 300mcg and BP maintaining 100-120s. Heart rate and rhythm improved after switching to neosynephrine. Patient complained of intermittent, mid sternal chest pain described as sharp and lower, right sided abdominal pain. He is wearing 2LNC, alert and oriented x4, appetite slowly returning. Patient prefers vanilla pudding. Elevated troponins 210-224. K 2.6, replaced with 60mEq and given 4g magnesium. Fast patches applied.     See nurse handoff report in summary tab for approximate timeline of events during admission.     Plan:  Colonoscopy 4/15  TCAR procedure for right carotid stenosis  Re-consult to [facility]'s vascular team or transfer since Dr. Ledbetter does not come here  Adrenal mass biopsy 4/15  SBP goal 140-180 s/p CVA  Monitor electrolytes    Vitals:    Vitals:    04/14/24 0430 04/14/24 0445 04/14/24 0500 04/14/24 0700   BP: 114/73 122/82 123/78 117/71   Pulse: (!) 104 (!) 107 (!) 102 (!) 106   Resp: 16 17 17 18   Temp:       TempSrc:       SpO2: 98% 99% 98% 94%   Weight:       Height:            I&O:   Intake/Output Summary (Last 24 hours) at 4/14/2024 0749  Last data filed at 4/14/2024 0642  Gross per 24 hour   Intake 3339.88 ml   Output 1325 ml   Net 2014.88 ml       Resp Status: 1LNC    Critical Care IV infusions:   phenylephrine (HERMILA-SYNEPHRINE) 50 mg in sodium chloride 0.9 % 250 mL infusion 300 mcg/min (04/14/24 0733)    sodium chloride      heparin (PORCINE) Infusion 16 Units/kg/hr (04/14/24 0642)    sodium chloride Stopped (04/14/24 0350)    dextrose          LDA:   Peripheral IV 04/11/24 Right Forearm (Active)   Number of days: 2       Peripheral IV 04/13/24 Left;Posterior;Proximal Forearm (Active)   Number of days: 0

## 2024-04-14 NOTE — PROGRESS NOTES
Writer reached out to Dr. Angelo with Neurology per request of critical care and vascular. Verbal order from Neurology given for a SBP goal with a range of 120-160. Critical care and Vascular requested a tighter range be provided for SBP. See Misc Nursing order from neurology. Manage pressors with goal of MAP > 70.

## 2024-04-14 NOTE — PROCEDURES
Picc placement note:  Dynamic Access RN procedure    Consent signed and obtained by proceduralist, from patient and wife at bedside. See consent form in paper chart.    Prescribed IV Therapy = ICU care including vasopressors  Peripheral ultrasound assessment done. Plan for right brachial vein insertion.   CVR measurement = 20 % (Linear CVR is preferred to be less than 45%).  Product type: Bard 5 fr TL lumen Power PICC.  History/Labs/Allergies Reviewed  Placed By: Kaveh Beltran - RN (Dynamic Access)  Time out Performed using Two Identifiers  Lot # [id number]  Expiration date = 01/31/2025  Trimmed at 34 cm total  External catheter length 0 cm  Number of attempts 1  Special equipment used- Bard 3cg tip confirmation system, ultrasound, and micro-introducer (MST) technique   Catheter securement = adhesive 3M securement device  Dressing applied= Tegaderm CHG  Lidocaine administered intradermally conc.1%, approx 1 ml (Lidocaine Lot# - [id number] and Exp date - 01/01/2026 )        RN aware CXR needed prior to using picc. CXR ordered and awaiting report. Rn aware new iv tubing required.     PICC education:     [ x ] Discussed with patient/Family or POA prior to procedure.  Risks and Benefits along with reason for procedure were discussed and teaching was reinforced with an education handout on line  insertion. [facility] FAQ Catheter Associated Blood Stream Infections and [facility] [id number] REV. 7/13 Nursing and Booklet left at bedside or in chart. Patient (Family or POA) acknowledged understanding of information taught and agreed to procedure.      [  ] Was not discussed with patient/family or POA due to pts medical status at time of procedure. pts family or POA not available to discuss line education. [facility] FAQ Catheter Associated Blood Stream Infections and [facility] [phone] REV. 7/13 Nursing and Booklet left at bedside or in chart.

## 2024-04-14 NOTE — PROGRESS NOTES
Dr. Mcneil at bedside to place arterial line. Writer and 1 RN present. Patient tolerated procedure well.

## 2024-04-14 NOTE — CONSULTS
Reason for consult: \"Non-ST elevation myocardial infarction    History of present illness: The patient is an 78-year-old gentleman.  He was admitted to the hospital on April 3, 2024.  He presented with complaints primarily of leg pain on the left with an open wound.  Since he has been hospitalized he has been diagnosed with anemia, felt secondary to a GI bleed.  He underwent an EGD on April 8 that showed a small polyp.  At that time he was advised colonoscopy; however, that has not yet been performed.  In reviewing the notes, it appears that initially he had declined this as well as the necessary prep.  Additionally since he has been hospitalized he has been diagnosed with a stroke.  Currently he is resting in bed comfortably.  He denies any rest dyspnea, chest discomfort.  He does not report palpitations currently    Allergies: No known drug allergies    Medications: Aspirin 81 mg daily, atorvastatin 40 mg daily, ferrous sulfate, furosemide 20 mg IV twice daily, gabapentin 300 mg 3 times daily, Mucinex, pantoprazole 40 mg IV every 12 hours, Zosyn, tamsulosin 0.4 mg daily and heparin infusion.  He is also on a phenylephrine infusion    Prior to hospitalization the patient was also on fluoxetine, metoprolol succinate 100 mg daily, warfarin, and clopidogrel 75 mg daily.    Past medical history: Significant for longstanding persistent atrial fibrillation, peripheral arterial disease with prior right above knee amputation, hypertension, dyslipidemia, lung cancer    Physical examination  Vitals: Blood pressure 110/67, heart rate 94, respiratory to 18  Cardiovascular: S1, S2. Irregularly irregular.  Respiratory: Vesicular breath sound  Abdomen: Soft    Labs: Sodium 132, potassium 3.7, chloride 94, bicarb 26, BUN 11, creatinine 1.2.    Albumin 2.8, amylase 173, AST 57, ALT 20, lipase 231.    High-sensitivity cardiac troponin I has been 210, 224, 222 and 206 on serial measurements.    Total cholesterol 83, atrial

## 2024-04-14 NOTE — PLAN OF CARE
Problem: Safety - Adult  Goal: Free from fall injury  Outcome: Progressing  Flowsheets (Taken 4/14/2024 0235)  Free From Fall Injury:   Instruct family/caregiver on patient safety   Based on caregiver fall risk screen, instruct family/caregiver to ask for assistance with transferring infant if caregiver noted to have fall risk factors     Problem: Discharge Planning  Goal: Discharge to home or other facility with appropriate resources  Outcome: Progressing  Flowsheets (Taken 4/14/2024 0235)  Discharge to home or other facility with appropriate resources:   Identify barriers to discharge with patient and caregiver   Arrange for needed discharge resources and transportation as appropriate   Identify discharge learning needs (meds, wound care, etc)   Refer to discharge planning if patient needs post-hospital services based on physician order or complex needs related to functional status, cognitive ability or social support system     Problem: Pain  Goal: Verbalizes/displays adequate comfort level or baseline comfort level  Outcome: Progressing  Flowsheets (Taken 4/14/2024 0235)  Verbalizes/displays adequate comfort level or baseline comfort level:   Encourage patient to monitor pain and request assistance   Assess pain using appropriate pain scale   Administer analgesics based on type and severity of pain and evaluate response   Implement non-pharmacological measures as appropriate and evaluate response   Consider cultural and social influences on pain and pain management   Notify Licensed Independent Practitioner if interventions unsuccessful or patient reports new pain     Problem: Skin/Tissue Integrity - Adult  Goal: Incisions, wounds, or drain sites healing without S/S of infection  Outcome: Progressing  Flowsheets (Taken 4/14/2024 0235)  Incisions, Wounds, or Drain Sites Healing Without Sign and Symptoms of Infection: ADMISSION and DAILY: Assess and document risk factors for pressure ulcer development      Problem: Gastrointestinal - Adult  Goal: Minimal or absence of nausea and vomiting  Outcome: Progressing  Flowsheets (Taken 4/14/2024 0235)  Minimal or absence of nausea and vomiting:   Administer IV fluids as ordered to ensure adequate hydration   Administer ordered antiemetic medications as needed  Goal: Maintains or returns to baseline bowel function  Outcome: Progressing  Flowsheets (Taken 4/14/2024 0235)  Maintains or returns to baseline bowel function:   Assess bowel function   Encourage oral fluids to ensure adequate hydration     Problem: Infection - Adult  Goal: Absence of infection at discharge  Outcome: Progressing  Flowsheets (Taken 4/14/2024 0235)  Absence of infection at discharge:   Assess and monitor for signs and symptoms of infection   Monitor lab/diagnostic results   Monitor all insertion sites i.e., indwelling lines, tubes and drains   Administer medications as ordered     Problem: Hematologic - Adult  Goal: Maintains hematologic stability  Outcome: Progressing  Flowsheets (Taken 4/14/2024 0235)  Maintains hematologic stability: Assess for signs and symptoms of bleeding or hemorrhage     Problem: Respiratory - Adult  Goal: Able to breathe comfortably  Description: Able to breathe comfortably  4/13/2024 1927 by Hoa Thornton RCP  Outcome: Progressing  Goal: Clear lung sounds  4/13/2024 1927 by Hoa Thornton RCP  Outcome: Progressing  Goal: Adequate oxygenation  Description: Adequate oxygenation  4/13/2024 1927 by Hoa Thornton RCP  Outcome: Progressing     Problem: Skin/Tissue Integrity  Goal: Absence of new skin breakdown  Description: 1.  Monitor for areas of redness and/or skin breakdown  2.  Assess vascular access sites hourly  3.  Every 4-6 hours minimum:  Change oxygen saturation probe site  4.  Every 4-6 hours:  If on nasal continuous positive airway pressure, respiratory therapy assess nares and determine need for appliance change or resting period.  Outcome: Progressing

## 2024-04-14 NOTE — PROGRESS NOTES
[facility]  Office: [phone]  Chinmay Ramos DO, Jose Atkinson DO, Guevara Ramirez DO, Pranay Cooper, DO, Sumeet Eller MD, Karen Carvajal MD, Sony Crawford MD, Poonam Platt MD,  Stephan San MD, Marcello Daniels MD, Theodore Pascual MD,  Juve Mahmood DO, Matias Mckeon MD, Hero Tavarez MD, Valentin Ramos DO, Jazzy Spence MD,  Trino Handley DO, Flavia Leonardo MD, Vanessa Soriano MD, Pricilla Akers MD, Casimiro Knight MD,  Zeke Maciel MD, Guera Cervantes MD, Kianna Martin MD, Mirlande Worrell MD, Ney Garcia MD, Savannah Dominguez MD, Ed Emerson DO, Jaylen West DO, Comfort Carcamo MD,  Siva Ashley MD, Shirley Waterhouse, CNP,  Carolyn Pelaez CNP, Scotty Ge, CNP,  Wandy Trivedi, DNP, Malissa Gaston, CNP, Lana Penaloza, CNP, Susan Haro, CNP, Sherrell Mendoza, CNP, Suzy Morales, PA-C, Dalila Lange PA-C, Sada Matt, CNP, Crystal Womack, CNP, Sheng Reddy, CNP, Arielle Guy, CNP, Linsey Perez, CNP, Anju Cho, CNS, Hoa Donahue, CNP, Cassidy Ferrer CNP, Tracy Schwab, CNP       [facility]      Daily Progress Note     Admit Date: 4/3/2024  Bed/Room No.  2031/2031-01  Admitting Physician : Sony Crawford MD  Code Status :Full Code  Hospital Day:  LOS: 11 days   Chief Complaint:     Chief Complaint   Patient presents with    Leg Pain     Left leg, open wounds and swollen       Principal Problem:    Melena  Active Problems:    History of arterial bypass of lower extremity    S/P AKA (above knee amputation) unilateral, right (HCC)    Primary hypertension    Peripheral arterial disease (HCC)    Neuropathy    Malignant neoplasm of lung (HCC)    Intermittent claudication of left lower extremity due to atherosclerosis (HCC)    Embolism and thrombosis of artery (HCC)    Left leg cellulitis    Iron deficiency anemia due to chronic blood loss    Hx of cancer of lung    Adrenal mass (HCC)    Claudication (HCC)    Pleural effusion, bilateral     extremity to the   level of the distal femoral diaphysis.  No soft tissue gas.      No cortical destruction or acute osseous abnormalities.         Vascular duplex lower extremity venous left   Final Result      XR FOOT LEFT (MIN 3 VIEWS)   Final Result   No radiographic evidence for osteomyelitis. If there is further clinical   concern, MRI is recommended.         CT GUIDED NEEDLE PLACEMENT    (Results Pending)   IR BIOPSY ABDOMINAL/RETROPERITONEAL MASS PERCUTANEOUS    (Results Pending)      Echo 4/5/24    Left Ventricle: Mildly reduced left ventricular systolic function with a visually estimated EF of 45 - 50%. Left ventricle size is normal. Normal wall thickness. Severe hypokinesis of the apex. Grade II diastolic dysfunction with increased LAP.    Aortic Valve: Trileaflet valve. Mildly calcified cusp.    Mitral Valve: Mildly calcified leaflet. Mild regurgitation.    Tricuspid Valve: Moderate regurgitation. Moderately elevated RVSP, consistent with moderate pulmonary hypertension.      Clinical Course : unchanged  Assessment and Plan  :        Severe anemia  S/p PRBC transfusion -2 units on 4/3/2024 and 1 unit on 4/12/2024.  Coumadin and Plavix stopped  No source of bleeding identified on EGD.  Unable to tolerate bowel prep for colonoscopy.  Plan for nasogastric tube placement for bowel prep and colonoscopy when clinical condition stable..  Heparin infusion for now, patient needs long-term anticoagulation, plan to start NOAC after biopsy.  Oncology on board.  Severe peripheral vascular disease  S/p multiple bypass surgeries.  Follows Dr. Ledbetter at [facility].  Anticoagulation and antiplatelet medication were held due to severe anemia.  Outpatient follow with primary vascular surgery.  Right adrenal and liver lesions  Concerning for metastasis.  Known history of stage I lung cancer in 2019  s/p resection. Will need biopsy to confirm .  Oncology following.  PAF -   Oral Anticoagulation was on  hold due to

## 2024-04-14 NOTE — PROGRESS NOTES
[facility]     DAILY PROGRESS NOTE      Patient:   Jorge Luis Banda   :    1945   Date:     2024  Consultant:   Sonny Read DO FACG    Subjective:     78 y.o. male admitted 4/3/2024 with Melena [K92.1]  GI bleed [K92.2]  UMM (acute kidney injury) (HCC) [N17.9]  Left leg cellulitis [L03.116]  Anemia, unspecified type [D64.9] and seen for anemia and bleeding.  Events noted overnight.  Patient developed tachycardia and chest pain.  He was transferred to ICU.  Levophed discontinued.  He is currently maxed out on Mat-Synephrine.  He denies any further chest pain.  No shortness of breath.  He remains on heparin drip.  No blood per rectum.  Last bowel movement yesterday.  Hemoglobin remained stable despite all of this.  He is currently on a full liquid diet.  He continues to have no function of left upper extremity.  He does not have much of an appetite.  He is tolerating full liquids.  He does complain occasional abdominal discomfort, nothing serious    Current Medications include:   Scheduled Meds:   sodium chloride  80 mL IntraVENous Once    [Held by provider] metoprolol tartrate  25 mg Oral BID    albuterol sulfate HFA  2 puff Inhalation TID RT    aspirin  81 mg Oral Daily    piperacillin-tazobactam  3,375 mg IntraVENous Q8H    furosemide  20 mg IntraVENous BID    sodium chloride  100 mL IntraVENous Once    guaiFENesin  600 mg Oral BID    gabapentin  300 mg Oral TID    ferrous sulfate  325 mg Oral Daily with breakfast    pantoprazole (PROTONIX) 40 mg in sodium chloride (PF) 0.9 % 10 mL injection  40 mg IntraVENous Q12H    atorvastatin  40 mg Oral Nightly    vitamin D3  5,000 Units Oral Daily    vitamin B-12  1,000 mcg Oral Daily    tamsulosin  0.4 mg Oral Daily    sodium chloride flush  5-40 mL IntraVENous 2 times per day     Continuous Infusions:   phenylephrine (MAT-SYNEPHRINE) 50 mg in sodium chloride 0.9 % 250 mL infusion 300 mcg/min (24 0733)

## 2024-04-14 NOTE — PROGRESS NOTES
[facility] Neurology   IN-PATIENT SERVICE   [facility]    Progress note             Date:   4/14/2024  Patient name:  Jorge Luis Banda  Date of admission:  4/3/2024  3:27 PM  MRN:   [id number]  Account:  [id number]  YOB: 1945  PCP:    Lucila Machado APRN - CNP  Room:   2031/2031-01  Code Status:    Full Code    Chief Complaint:     Chief Complaint   Patient presents with    Leg Pain     Left leg, open wounds and swollen         Interval hx:   The Patient was seen and examined at bedside  Patient on max dose Mat-synephrine overnight with cuff pressures of , HR  afebrile tmax 98.2. HR limiting factor and given multiple other comorbid issues decision was not to escalate Pressor support any further to not provoke STEMI or renal failure overnight. Today discussed with primary and pulm critical care our concerns for patients ability to tolerate augmented hypertension and probable decline if placed on multiple pressers and agree with this. Plan for ART and central line by Pulm critical care and will aim for SBP goal of 110-160 with MAP >70 to perfuse his critical right carotid. Repeat CT head confirms evolving right ICA territory watershed stroke. Left pupil asymmetric this AM 4mm not responsive to light and right pupil 2mm reactive and hence stat CT head WO was completed.     CT head WO 4/14/24-->surprisingly right ICA territory watershed not much progressed patient does have bilateral watershed hypodensity overall however mostly stable CT head WO as compared to recent MRI   IMPRESSION:  No new acute intracranial abnormality.  Unchanged right frontal infarcts.      Brief History of Present Illness:   The patient is a 78 y.o.  Non- / non  male who Initially presented to [facility] ER 4/3/24 with Left leg pain and complicated wound healing with chronic Cellulitis for a month now. PMH significant for Paroxysmal A-fib, Peripheral artery disease, HTN. Patient  4/12/24 s/p 1 unit PRBC  Subacute progressive left sided hemiparesis with flow limiting critical right ICA stenosis 90%  Bilateral proximal ICA atherosclerosis with flow limitation Left 80% and right 90%  Severe peripheral vascular disease s/p multiple  lower extremity interventions and right BKA  History of lung cancer s/p resection with new adrenal and liver masses  Long standing A-fib on coumadin at home   Primary Problem  Melena    Active Hospital Problems    Diagnosis Date Noted    NSTEMI (non-ST elevated myocardial infarction) (HCC) [I21.4] 04/14/2024    Hypotension [I95.9] 04/14/2024    Atrial fibrillation with rapid ventricular response (HCC) [I48.91] 04/14/2024    Unstable angina (HCC) [I20.0] 04/14/2024    Acute ischemic right internal carotid artery (ICA) stroke (HCC) [I63.231] 04/13/2024    Internal carotid artery stenosis, bilateral [I65.23] 04/13/2024    Anemia [D64.9] 04/13/2024    Gastrointestinal hemorrhage [K92.2] 04/13/2024    Pleural effusion, bilateral [J90] 04/11/2024    Longstanding persistent atrial fibrillation (HCC) [I48.11] 04/11/2024    Iron deficiency anemia due to chronic blood loss [D50.0] 04/10/2024    Hx of cancer of lung [Z85.118] 04/10/2024    Adrenal mass (HCC) [E27.8] 04/10/2024    Claudication (HCC) [I73.9] 04/10/2024    Melena [K92.1] 04/03/2024    Peripheral arterial disease (HCC) [I73.9] 04/03/2024    S/P AKA (above knee amputation) unilateral, right (HCC) [Z89.611] 03/05/2024    Embolism and thrombosis of artery (HCC) [I74.9] 03/05/2024    Left leg cellulitis [L03.116] 03/05/2024    History of arterial bypass of lower extremity [Z95.828] 11/08/2019    Malignant neoplasm of lung (HCC) [C34.90] 04/24/2019    Primary hypertension [I10] 02/14/2019    Intermittent claudication of left lower extremity due to atherosclerosis (HCC) [I70.212] 02/14/2019    Neuropathy [G62.9] 01/17/2019       Plan:   GI bleeding   -critical anemia both 4/3/24 6.2 S/P 2 units PRBC and 4/12 6.8 s/p 1

## 2024-04-14 NOTE — PROGRESS NOTES
Today's Date: 4/14/2024  Patient Name: Jorge Luis Banda  Date of admission: 4/3/2024  3:27 PM  Patient's age: 78 y.o., 1945  Admission Dx: Melena [K92.1]  GI bleed [K92.2]  UMM (acute kidney injury) (HCC) [N17.9]  Left leg cellulitis [L03.116]  Anemia, unspecified type [D64.9]    Reason for Consult: management recommendations  Requesting Physician: Sony Crawford MD    CHIEF COMPLAINT: GI bleeding    History Obtained From:  patient  INTERVAL HISTORY:    Patient seen and examined.  Currently in the cardiac ICU.  Currently stable.  No chest pain.  No respiratory distress.  Labs were reviewed.  CBC from yesterday showed white blood cells of 9.3.  Hemoglobin 6.9.  Platelets 195.  Status post blood transfusion.  Will monitor labs.  Denies any active bleeding.  No nausea or vomiting.  Plan for adrenal mass biopsy possibly on Monday.    HISTORY OF PRESENT ILLNESS:    The patient is a 78 y.o.   male who is admitted to the hospital for anemia and suspicion for GI bleeding.  His hemoglobin was under 7 and received blood transfusion.  He has severe peripheral vascular disease on Plavix and Coumadin.  He underwent an EGD that showed gastritis and he refused colonoscopy.  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  From oncology perspective, the patient was diagnosed with stage I lung cancer in 2019 and underwent lung resection.  Never received any chemotherapy or radiation.  He has been in remission since then.  CT scan of the chest showed concerning changes in the liver and adrenal gland for metastatic disease.  Dedicated CT of the abdomen and pelvis is ordered and pending.    Past Medical History:   has a past medical history of Paroxysmal atrial fibrillation (HCC), Peripheral artery disease (HCC), and Primary hypertension.    Past Surgical History:   has a past surgical history that includes above knee

## 2024-04-14 NOTE — PROGRESS NOTES
R sided pupil pinpoint, L side pupil 4. Dr. Crawford and Dr. Angelo assessed patient at bedside. Taking patient for stat CT

## 2024-04-14 NOTE — PLAN OF CARE
Problem: Safety - Adult  Goal: Free from fall injury  Outcome: Progressing  Flowsheets (Taken 4/14/2024 0800)  Free From Fall Injury:   Instruct family/caregiver on patient safety   Based on caregiver fall risk screen, instruct family/caregiver to ask for assistance with transferring infant if caregiver noted to have fall risk factors     Problem: Discharge Planning  Goal: Discharge to home or other facility with appropriate resources  Outcome: Progressing     Problem: Pain  Goal: Verbalizes/displays adequate comfort level or baseline comfort level  Outcome: Progressing     Problem: Skin/Tissue Integrity - Adult  Goal: Incisions, wounds, or drain sites healing without S/S of infection  Outcome: Progressing  Flowsheets (Taken 4/14/2024 0800)  Incisions, Wounds, or Drain Sites Healing Without Sign and Symptoms of Infection: Implement wound care per orders     Problem: Gastrointestinal - Adult  Goal: Minimal or absence of nausea and vomiting  Outcome: Progressing     Problem: Gastrointestinal - Adult  Goal: Maintains or returns to baseline bowel function  Outcome: Progressing     Problem: Infection - Adult  Goal: Absence of infection at discharge  Outcome: Progressing     Problem: Hematologic - Adult  Goal: Maintains hematologic stability  Outcome: Progressing     Problem: Skin/Tissue Integrity  Goal: Absence of new skin breakdown  Description: 1.  Monitor for areas of redness and/or skin breakdown  2.  Assess vascular access sites hourly  3.  Every 4-6 hours minimum:  Change oxygen saturation probe site  4.  Every 4-6 hours:  If on nasal continuous positive airway pressure, respiratory therapy assess nares and determine need for appliance change or resting period.  Outcome: Progressing     Problem: ABCDS Injury Assessment  Goal: Absence of physical injury  Outcome: Progressing     Problem: Genitourinary - Adult  Goal: Absence of urinary retention  Outcome: Progressing     Problem: Genitourinary - Adult  Goal:

## 2024-04-14 NOTE — PROCEDURES
Procedure note-arterial line catheter Placement  Joe Mcneil MD       Procedure: Insertion of Arterial Catheter    Indications: Hypotension/shock    Procedure Details   Under sterile conditions the skin above the right wrist was prepped with betadine and covered with a sterile drape. Local anesthesia was applied to the skin and subcutaneous tissues. A 20-gauge needle was inserted into the artery. A guide wire was then passed easily through the catheter which was then advanced with no resistance. Good pulsatile blood returned. The catheter was sutured into place. Good waveform noted on the monitor.    Findings:  There were no changes to vital signs. Patient did tolerate procedure well.    Total time of procedure 20 minutes.    Electronically signed by     Joe Mcneil MD on 4/14/2024 at 12:45 PM  Pulmonary Critical Care and Sleep Medicine,  [facility]  Cell: [phone]  Office: [phone]

## 2024-04-14 NOTE — SIGNIFICANT EVENT
[facility]  Office: [phone]  Chinmay Ramos DO, Jose Atkinsno DO, Guevara Ramirez DO, Pranay Cooper, DO, Sumeet Eller MD, Karen Carvajal MD, Sony Crawford MD, Poonam Platt MD,  Stephan San MD, Marcello Daniels MD, Theodore Pascual MD,  Juve Mahmood DO, Matias Mckeon MD, Hero Tavarez MD, Valentin Ramos DO, Jazzy Spence MD,  Trino Handley DO, Flavia Leonardo MD, Vanessa Soriano MD, Pricilla Akers MD, Casimiro Knight MD,  Zeke Maciel MD, Guera Cervantes MD, Kianna Martin MD, Mirlande Worrell MD, Ney Garcia MD, Savannah Dominguez MD, Ed Emerson DO, Jaylen West DO, Comfort Carcamo MD,  Siva Ashley MD, Shirley Waterhouse, CNP,  Carolyn Pelaez CNP, Scotty Ge, CNP,  Wandy Trivedi, DNP, Malissa Gaston, CNP, Lana Penaloza, CNP, Linsey Perez CNP, Susan Haro, CNP, Sherrell Mendoza, CNP, Suzy Morales, PA-C, Dalila Lange PA-C, Sada Matt, CNP, Crystal Womack, CNP, Arielle Guy, CNP, Anju Cho, CNS, Hoa Donahue, CNP, Cassidy Ferrer, CNP, Tracy Schwab, CNP         [facility]   IN-PATIENT SERVICE   [facility]    Second Visit Note  For more detailed information please refer to the progress note of the day      4/13/2024    11:22 PM    Name:   Jorge Luis Banda  MRN:     [id number]     Acct:      [id number]   Room:   2031/2031-01  IP Day:  10  Admit Date:  4/3/2024  3:27 PM    PCP:   Lucila Machado APRN - CNP  Code Status:  Full Code      Pt vitals were reviewed   New labs were reviewed   Patient was seen    I was called to bedside to assess the patient for concerns of hypotension and tachycardia while on Levophed drip. Upon arrival to bedside, noted patient was in atrial fibrillation with elevated rates as high as 140's and mutually complained of chest pain and intermittent abdominal pain. An EKG was obtained consistent with atrial fibrillation with elevated rates, troponin was also obtained which returned at 210. He was provided 2 mg of

## 2024-04-14 NOTE — PLAN OF CARE
Problem: Respiratory - Adult  Goal: Able to breathe comfortably  Description: Able to breathe comfortably  4/14/2024 0729 by Shana Motley RCP  Outcome: Progressing  4/13/2024 1927 by Hoa Thornton RCP  Outcome: Progressing  Goal: Clear lung sounds  4/14/2024 0729 by Shana Motley RCP  Outcome: Progressing  4/13/2024 1927 by Hoa Thornton RCP  Outcome: Progressing  Goal: Adequate oxygenation  Description: Adequate oxygenation  4/14/2024 0729 by Shana Motley RCP  Outcome: Progressing  4/13/2024 1927 by Hoa Thornton RCP  Outcome: Progressing

## 2024-04-15 LAB
ANION GAP SERPL CALCULATED.3IONS-SCNC: 11 MMOL/L (ref 9–17)
ANTI-XA UNFRAC HEPARIN: 0.29 IU/L (ref 0.3–0.7)
ANTI-XA UNFRAC HEPARIN: 0.29 IU/L (ref 0.3–0.7)
ANTI-XA UNFRAC HEPARIN: 0.43 IU/L (ref 0.3–0.7)
BNP SERPL-MCNC: ABNORMAL PG/ML
BUN SERPL-MCNC: 9 MG/DL (ref 8–23)
BUN/CREAT SERPL: 8 (ref 9–20)
CALCIUM SERPL-MCNC: 7.8 MG/DL (ref 8.6–10.4)
CHLORIDE SERPL-SCNC: 95 MMOL/L (ref 98–107)
CO2 SERPL-SCNC: 28 MMOL/L (ref 20–31)
CREAT SERPL-MCNC: 1.2 MG/DL (ref 0.7–1.2)
EKG ATRIAL RATE: 97 BPM
EKG P AXIS: 97 DEGREES
EKG P-R INTERVAL: 192 MS
EKG Q-T INTERVAL: 324 MS
EKG Q-T INTERVAL: 332 MS
EKG Q-T INTERVAL: 368 MS
EKG QRS DURATION: 74 MS
EKG QRS DURATION: 76 MS
EKG QRS DURATION: 80 MS
EKG QTC CALCULATION (BAZETT): 460 MS
EKG QTC CALCULATION (BAZETT): 467 MS
EKG QTC CALCULATION (BAZETT): 488 MS
EKG R AXIS: 14 DEGREES
EKG R AXIS: 30 DEGREES
EKG R AXIS: 63 DEGREES
EKG T AXIS: 51 DEGREES
EKG T AXIS: 9 DEGREES
EKG VENTRICULAR RATE: 121 BPM
EKG VENTRICULAR RATE: 130 BPM
EKG VENTRICULAR RATE: 97 BPM
ERYTHROCYTE [DISTWIDTH] IN BLOOD BY AUTOMATED COUNT: 17.7 % (ref 11.8–14.4)
GFR SERPL CREATININE-BSD FRML MDRD: 61 ML/MIN/1.73M2
GLUCOSE SERPL-MCNC: 104 MG/DL (ref 70–99)
HCT VFR BLD AUTO: 25.2 % (ref 40.7–50.3)
HGB BLD-MCNC: 8.2 G/DL (ref 13–17)
MAGNESIUM SERPL-MCNC: 2.1 MG/DL (ref 1.6–2.6)
MCH RBC QN AUTO: 29.7 PG (ref 25.2–33.5)
MCHC RBC AUTO-ENTMCNC: 32.5 G/DL (ref 28.4–34.8)
MCV RBC AUTO: 91.3 FL (ref 82.6–102.9)
NRBC BLD-RTO: 0 PER 100 WBC
PLATELET # BLD AUTO: 288 K/UL (ref 138–453)
PMV BLD AUTO: 10.2 FL (ref 8.1–13.5)
POTASSIUM SERPL-SCNC: 3.2 MMOL/L (ref 3.7–5.3)
RBC # BLD AUTO: 2.76 M/UL (ref 4.21–5.77)
SODIUM SERPL-SCNC: 134 MMOL/L (ref 135–144)
WBC OTHER # BLD: 16 K/UL (ref 3.5–11.3)

## 2024-04-15 PROCEDURE — 85520 HEPARIN ASSAY: CPT

## 2024-04-15 PROCEDURE — 2700000000 HC OXYGEN THERAPY PER DAY

## 2024-04-15 PROCEDURE — 83735 ASSAY OF MAGNESIUM: CPT

## 2024-04-15 PROCEDURE — 2580000003 HC RX 258: Performed by: INTERNAL MEDICINE

## 2024-04-15 PROCEDURE — 6360000002 HC RX W HCPCS: Performed by: INTERNAL MEDICINE

## 2024-04-15 PROCEDURE — 94761 N-INVAS EAR/PLS OXIMETRY MLT: CPT

## 2024-04-15 PROCEDURE — 85027 COMPLETE CBC AUTOMATED: CPT

## 2024-04-15 PROCEDURE — 6370000000 HC RX 637 (ALT 250 FOR IP): Performed by: INTERNAL MEDICINE

## 2024-04-15 PROCEDURE — 6370000000 HC RX 637 (ALT 250 FOR IP): Performed by: FAMILY MEDICINE

## 2024-04-15 PROCEDURE — 6370000000 HC RX 637 (ALT 250 FOR IP): Performed by: NURSE PRACTITIONER

## 2024-04-15 PROCEDURE — 6360000002 HC RX W HCPCS: Performed by: FAMILY MEDICINE

## 2024-04-15 PROCEDURE — 80048 BASIC METABOLIC PNL TOTAL CA: CPT

## 2024-04-15 PROCEDURE — 83880 ASSAY OF NATRIURETIC PEPTIDE: CPT

## 2024-04-15 PROCEDURE — 99232 SBSQ HOSP IP/OBS MODERATE 35: CPT | Performed by: FAMILY MEDICINE

## 2024-04-15 PROCEDURE — C9113 INJ PANTOPRAZOLE SODIUM, VIA: HCPCS | Performed by: INTERNAL MEDICINE

## 2024-04-15 PROCEDURE — 2580000003 HC RX 258: Performed by: FAMILY MEDICINE

## 2024-04-15 PROCEDURE — 6360000002 HC RX W HCPCS

## 2024-04-15 PROCEDURE — 99232 SBSQ HOSP IP/OBS MODERATE 35: CPT | Performed by: INTERNAL MEDICINE

## 2024-04-15 PROCEDURE — 94640 AIRWAY INHALATION TREATMENT: CPT

## 2024-04-15 PROCEDURE — A4216 STERILE WATER/SALINE, 10 ML: HCPCS | Performed by: INTERNAL MEDICINE

## 2024-04-15 PROCEDURE — 99232 SBSQ HOSP IP/OBS MODERATE 35: CPT | Performed by: PSYCHIATRY & NEUROLOGY

## 2024-04-15 PROCEDURE — 2000000000 HC ICU R&B

## 2024-04-15 RX ORDER — MIDODRINE HYDROCHLORIDE 2.5 MG/1
2.5 TABLET ORAL
Status: DISCONTINUED | OUTPATIENT
Start: 2024-04-15 | End: 2024-04-16

## 2024-04-15 RX ORDER — PANTOPRAZOLE SODIUM 40 MG/1
40 TABLET, DELAYED RELEASE ORAL
Status: DISCONTINUED | OUTPATIENT
Start: 2024-04-15 | End: 2024-05-04

## 2024-04-15 RX ORDER — ALBUTEROL SULFATE 90 UG/1
2 AEROSOL, METERED RESPIRATORY (INHALATION) EVERY 4 HOURS PRN
Status: DISCONTINUED | OUTPATIENT
Start: 2024-04-15 | End: 2024-04-22

## 2024-04-15 RX ORDER — GABAPENTIN 100 MG/1
100 CAPSULE ORAL 3 TIMES DAILY
Status: DISCONTINUED | OUTPATIENT
Start: 2024-04-15 | End: 2024-04-21

## 2024-04-15 RX ORDER — NOREPINEPHRINE BITARTRATE 0.06 MG/ML
1-100 INJECTION, SOLUTION INTRAVENOUS CONTINUOUS
Status: DISCONTINUED | OUTPATIENT
Start: 2024-04-15 | End: 2024-04-15

## 2024-04-15 RX ADMIN — TAMSULOSIN HYDROCHLORIDE 0.4 MG: 0.4 CAPSULE ORAL at 08:35

## 2024-04-15 RX ADMIN — GABAPENTIN 100 MG: 100 CAPSULE ORAL at 20:38

## 2024-04-15 RX ADMIN — PIPERACILLIN AND TAZOBACTAM 3375 MG: 3; .375 INJECTION, POWDER, LYOPHILIZED, FOR SOLUTION INTRAVENOUS at 08:38

## 2024-04-15 RX ADMIN — HEPARIN SODIUM 2000 UNITS: 1000 INJECTION INTRAVENOUS; SUBCUTANEOUS at 20:38

## 2024-04-15 RX ADMIN — GABAPENTIN 100 MG: 100 CAPSULE ORAL at 16:43

## 2024-04-15 RX ADMIN — ONDANSETRON 4 MG: 2 INJECTION INTRAMUSCULAR; INTRAVENOUS at 17:14

## 2024-04-15 RX ADMIN — POTASSIUM BICARBONATE 40 MEQ: 782 TABLET, EFFERVESCENT ORAL at 06:25

## 2024-04-15 RX ADMIN — GUAIFENESIN 600 MG: 600 TABLET ORAL at 20:38

## 2024-04-15 RX ADMIN — SODIUM CHLORIDE, PRESERVATIVE FREE 10 ML: 5 INJECTION INTRAVENOUS at 20:38

## 2024-04-15 RX ADMIN — ALBUTEROL SULFATE 2 PUFF: 90 AEROSOL, METERED RESPIRATORY (INHALATION) at 07:31

## 2024-04-15 RX ADMIN — MIDODRINE HYDROCHLORIDE 2.5 MG: 2.5 TABLET ORAL at 12:14

## 2024-04-15 RX ADMIN — EMPAGLIFLOZIN 10 MG: 10 TABLET, FILM COATED ORAL at 08:35

## 2024-04-15 RX ADMIN — SODIUM CHLORIDE, PRESERVATIVE FREE 10 ML: 5 INJECTION INTRAVENOUS at 08:36

## 2024-04-15 RX ADMIN — HEPARIN SODIUM 18 UNITS/KG/HR: 10000 INJECTION, SOLUTION INTRAVENOUS at 09:32

## 2024-04-15 RX ADMIN — PHENYLEPHRINE HYDROCHLORIDE 245 MCG/MIN: 50 INJECTION INTRAVENOUS at 00:53

## 2024-04-15 RX ADMIN — MORPHINE SULFATE 2 MG: 2 INJECTION, SOLUTION INTRAMUSCULAR; INTRAVENOUS at 04:44

## 2024-04-15 RX ADMIN — PHENYLEPHRINE HYDROCHLORIDE 290 MCG/MIN: 50 INJECTION INTRAVENOUS at 17:11

## 2024-04-15 RX ADMIN — ATORVASTATIN CALCIUM 40 MG: 40 TABLET, FILM COATED ORAL at 20:38

## 2024-04-15 RX ADMIN — GABAPENTIN 300 MG: 300 CAPSULE ORAL at 08:35

## 2024-04-15 RX ADMIN — PIPERACILLIN AND TAZOBACTAM 3375 MG: 3; .375 INJECTION, POWDER, LYOPHILIZED, FOR SOLUTION INTRAVENOUS at 17:55

## 2024-04-15 RX ADMIN — MIDODRINE HYDROCHLORIDE 2.5 MG: 2.5 TABLET ORAL at 16:43

## 2024-04-15 RX ADMIN — PHENYLEPHRINE HYDROCHLORIDE 300 MCG/MIN: 50 INJECTION INTRAVENOUS at 10:38

## 2024-04-15 RX ADMIN — CYANOCOBALAMIN TAB 1000 MCG 1000 MCG: 1000 TAB at 08:35

## 2024-04-15 RX ADMIN — CHOLECALCIFEROL TAB 125 MCG (5000 UNIT) 5000 UNITS: 125 TAB at 08:35

## 2024-04-15 RX ADMIN — SODIUM CHLORIDE, PRESERVATIVE FREE 40 MG: 5 INJECTION INTRAVENOUS at 08:36

## 2024-04-15 RX ADMIN — GUAIFENESIN 600 MG: 600 TABLET ORAL at 08:34

## 2024-04-15 RX ADMIN — HEPARIN SODIUM 2000 UNITS: 1000 INJECTION INTRAVENOUS; SUBCUTANEOUS at 07:02

## 2024-04-15 RX ADMIN — Medication 3 MG: at 20:37

## 2024-04-15 RX ADMIN — FERROUS SULFATE TAB EC 325 MG (65 MG FE EQUIVALENT) 325 MG: 325 (65 FE) TABLET DELAYED RESPONSE at 08:35

## 2024-04-15 RX ADMIN — ACETAMINOPHEN 650 MG: 325 TABLET ORAL at 20:37

## 2024-04-15 RX ADMIN — PHENYLEPHRINE HYDROCHLORIDE 245 MCG/MIN: 50 INJECTION INTRAVENOUS at 04:42

## 2024-04-15 RX ADMIN — PHENYLEPHRINE HYDROCHLORIDE 290 MCG/MIN: 50 INJECTION INTRAVENOUS at 22:50

## 2024-04-15 RX ADMIN — MORPHINE SULFATE 2 MG: 2 INJECTION, SOLUTION INTRAMUSCULAR; INTRAVENOUS at 20:42

## 2024-04-15 RX ADMIN — PIPERACILLIN AND TAZOBACTAM 3375 MG: 3; .375 INJECTION, POWDER, LYOPHILIZED, FOR SOLUTION INTRAVENOUS at 02:23

## 2024-04-15 RX ADMIN — ASPIRIN 81 MG: 81 TABLET, COATED ORAL at 08:35

## 2024-04-15 ASSESSMENT — PAIN DESCRIPTION - ONSET: ONSET: ON-GOING

## 2024-04-15 ASSESSMENT — PAIN SCALES - GENERAL
PAINLEVEL_OUTOF10: 4
PAINLEVEL_OUTOF10: 7
PAINLEVEL_OUTOF10: 4

## 2024-04-15 ASSESSMENT — PAIN DESCRIPTION - LOCATION
LOCATION: CHEST

## 2024-04-15 ASSESSMENT — PAIN DESCRIPTION - DESCRIPTORS
DESCRIPTORS: DISCOMFORT
DESCRIPTORS: HEAVINESS;PRESSURE

## 2024-04-15 ASSESSMENT — PAIN SCALES - WONG BAKER
WONGBAKER_NUMERICALRESPONSE: HURTS A LITTLE BIT
WONGBAKER_NUMERICALRESPONSE: HURTS A LITTLE BIT

## 2024-04-15 ASSESSMENT — PAIN DESCRIPTION - PAIN TYPE
TYPE: ACUTE PAIN
TYPE: ACUTE PAIN

## 2024-04-15 ASSESSMENT — PAIN DESCRIPTION - FREQUENCY
FREQUENCY: INTERMITTENT
FREQUENCY: CONTINUOUS

## 2024-04-15 ASSESSMENT — PAIN DESCRIPTION - ORIENTATION: ORIENTATION: MID;RIGHT;LEFT

## 2024-04-15 NOTE — PROGRESS NOTES
Physical Therapy  DATE: 4/15/2024    NAME: Jorge Luis Banda  MRN: [id number]   : 1945    Patient not seen this date for Physical Therapy due to:      [x] Cancel by ANIL Silva secondary to patient \"not medically appropriate\".  PT continue to follow.    [] Hemodialysis    [] Critical Lab Value Level     [] Blood transfusion in progress    [] Acute or unstable cardiovascular status   _MAP < 55 or more than >115  _HR < 40 or > 130    [] Acute or unstable pulmonary status   -FiO2 > 60%   _RR < 5 or >40    _O2 sats < 85%    [] Strict Bedrest    [] Off Unit for surgery or procedure    [] Off Unit for testing       [] Pending imaging to R/O fracture    [] Refusal by Patient      [] Other      [] PT being discontinued at this time. Patient independent. No further needs.     [] PT being discontinued at this time as the patient has been transferred to hospice care. No further needs.      Giselle Barrios, PT

## 2024-04-15 NOTE — PROGRESS NOTES
Pulmonary Critical Care Progress Note       Patient seen for the follow up of bilateral pleural effusions, hemoptysis     Subjective:  Patient has been on Mat-Synephrine at 300 mcg's per minute.  He is also on heparin drip.  He had decreased blood pressure but Levophed did not have to be started.  He went in to a-fib with RVR before and levophed was changed to neosynephrine.  He is on 2 L oxygen.  No shortness of breath he denies chest pain. Mild occasional cough, mostly dry.     Examination:  Vitals: BP (!) 117/52   Pulse 99   Temp 97.7 °F (36.5 °C) (Temporal)   Resp 26   Ht 1.829 m (6')   Wt 72.9 kg (160 lb 11.5 oz)   SpO2 98%   BMI 21.80 kg/m²   General appearance: In no acute distress, alert and cooperative with exam  Neck: No JVD  Lungs: Decreased breath sound no crackles or wheeze  Heart: irregular rate and rhythm, S1, S2 normal, no gallop  Abdomen: Soft, non tender, + BS  Extremities: no cyanosis or clubbing. No significant edema, left upper and lower extremity weakness    LABs:  CBC:   Recent Labs     04/12/24  1435 04/13/24  2231 04/14/24  0542 04/15/24  0434   WBC  --  16.0* 15.7* 16.0*   HGB 8.2* 8.9*  8.9* 8.4* 8.2*   HCT 25.4* 27.6*  27.6* 26.3* 25.2*   PLT  --  284 309 288       BMP:   Recent Labs     04/13/24  2231 04/14/24  0542 04/15/24  0434   * 132* 134*   K 2.6*  2.6* 3.7 3.2*   CO2 27 26 28   BUN 14 13 9   CREATININE 1.4* 1.2 1.2   LABGLOM 51* 61 61   GLUCOSE 128* 118* 104*           Radiology:  Chest x-ray 4/14  Mildly progressive bilateral infiltrates when compared to the prior exam           X-ray chest 4/13/24    X-ray chest 4/12/2024      Impression & Recommendations:  Acute hypoxic respiratory insufficiency  Supplemental oxygen as needed to maintain oxygen saturation >90%  Incentive spirometer q1h while awake      Bilateral loculated pleural effusions/atelectasis  Lasix 20mg IVP x1 4/12/24  Check BNP  On Zosyn/follow-up chest Xray    Hypotension requiring pressor   Wean off

## 2024-04-15 NOTE — CARE COORDINATION
Discharge planning    Monica Chavis and Zainab Novoa following. Patient not ready. Needs to be off aspirin before he can have a biopsy for adrenal mass. GI, Vascular, hemoc are all following.  Lives with sig other. ZAIDA. Has w/c, sc, neb, 4WW.

## 2024-04-15 NOTE — PROGRESS NOTES
Progress Note    Patient Name:  Jorge Luis Banda    :  1945  4/15/2024 10:19 AM      SUBJECTIVE       Mr. Banda  has chest pain on inspiration and coughing, shortness of breath, palpitations, nausea or vomiting.    No acute events overnight.       OBJECTIVE     Vital signs:    /60   Pulse (!) 108   Temp 97.7 °F (36.5 °C) (Temporal)   Resp 20   Ht 1.829 m (6')   Wt 72.9 kg (160 lb 11.5 oz)   SpO2 98%   BMI 21.80 kg/m²  2 L/min      Admit Weight:  68 kg (150 lb)    Last 3 weights:  Wt Readings from Last 3 Encounters:   04/15/24 72.9 kg (160 lb 11.5 oz)   20 54.4 kg (120 lb)       BMI: Body mass index is 21.8 kg/m².    Input/Output:       Intake/Output Summary (Last 24 hours) at 4/15/2024 1019  Last data filed at 4/15/2024 0626  Gross per 24 hour   Intake 2071.91 ml   Output 3350 ml   Net -1278.09 ml         Exam:     General appearance: awake and alert moves all ext   Lungs: no rhonchi, no wheezes, no rales  Heart: S1 and S2 no murmur  Abdomen: positive bowel sounds, no bruits, no masses  Extremities: warm and dry, no cyanosis, no clubbing        Laboratory Studies:     CBC:   Recent Labs     2442 04/15/24  0434   WBC 16.0* 15.7* 16.0*   HGB 8.9*  8.9* 8.4* 8.2*   HCT 27.6*  27.6* 26.3* 25.2*   MCV 91.7 91.3 91.3    309 288     BMP:   Recent Labs     24  0542 04/15/24  0434   * 132* 134*   K 2.6*  2.6* 3.7 3.2*   CL 93* 94* 95*   CO2 27 26 28   BUN 14 13 9   CREATININE 1.4* 1.2 1.2     PT/INR: No results for input(s): \"PROTIME\", \"INR\" in the last 72 hours.  APTT: No results for input(s): \"APTT\" in the last 72 hours.  MAG:   Recent Labs     24  2231 04/15/24  0434   MG 1.6 2.1     D Dimer: No results for input(s): \"DDIMER\" in the last 72 hours.  Troponin  No results for input(s): \"TROPONINI\" in the last 72 hours.      No results for input(s): \"TROPONINT\" in the last 72 hours.   BNP No results for input(s): \"BNP\" in  the last 72 hours.          No results for input(s): \"PROBNP\" in the last 72 hours.      Pulse Ox: SpO2  Av.7 %  Min: 85 %  Max: 100 %  Supplemental O2: O2 Flow Rate (L/min): 2 L/min     Current Meds:    empagliflozin  10 mg Oral Daily    sodium chloride  80 mL IntraVENous Once    [Held by provider] metoprolol tartrate  25 mg Oral BID    albuterol sulfate HFA  2 puff Inhalation TID RT    aspirin  81 mg Oral Daily    piperacillin-tazobactam  3,375 mg IntraVENous Q8H    [Held by provider] furosemide  20 mg IntraVENous BID    sodium chloride  100 mL IntraVENous Once    guaiFENesin  600 mg Oral BID    gabapentin  300 mg Oral TID    ferrous sulfate  325 mg Oral Daily with breakfast    pantoprazole (PROTONIX) 40 mg in sodium chloride (PF) 0.9 % 10 mL injection  40 mg IntraVENous Q12H    atorvastatin  40 mg Oral Nightly    vitamin D3  5,000 Units Oral Daily    vitamin B-12  1,000 mcg Oral Daily    tamsulosin  0.4 mg Oral Daily    sodium chloride flush  5-40 mL IntraVENous 2 times per day     Continuous Infusions:    phenylephrine 290 mcg/min (04/15/24 3076)    norepinephrine      sodium chloride      heparin (PORCINE) Infusion 18 Units/kg/hr (04/15/24 9090)    sodium chloride Stopped (24 5622)    dextrose              ASSESSMENT       NSTEMI (non-ST elevated myocardial infarction)   -chest pain with inspiration and cough  -most likely type 2 event d/t acute anemia  -trending down    Longstanding persistent atrial fibrillation  -rate controlled   -on heparin gtt  -will need transition to oral when OK with all services    Acute diastolic congestive heart failure  -compensated    CVA    Anemia  -Hgb 8.2 today    PAD    Melena    Hyponatremia  -potassium 3.2 today  -replace per sliding scale      PLAN     Continue supportive care.  Continue to optimize electrolytes keeping potassium greater than 4 and magnesium greater than 2.    Troponin trending down.  Continues on heparin gtt, will need transition to oral AC when

## 2024-04-15 NOTE — RT PROTOCOL NOTE
RT Inhaler-Nebulizer Bronchodilator Protocol Note    There is a bronchodilator order in the chart from a provider indicating to follow the RT Bronchodilator Protocol and there is an “Initiate RT Inhaler-Nebulizer Bronchodilator Protocol” order as well (see protocol at bottom of note).    CXR Findings:  XR CHEST PORTABLE    Result Date: 4/14/2024  Mildly progressive bilateral infiltrates when compared to the prior exam       The findings from the last RT Protocol Assessment were as follows:   History Pulmonary Disease: Chronic pulmonary disease  Respiratory Pattern: Dyspnea on exertion or RR 21-25 bpm  Breath Sounds: Intermittent or unilateral wheezes  Cough: Strong, spontaneous, non-productive  Indication for Bronchodilator Therapy: Decreased or absent breath sounds  Bronchodilator Assessment Score: 8    Aerosolized bronchodilator medication orders have been revised according to the RT Inhaler-Nebulizer Bronchodilator Protocol below.    Respiratory Therapist to perform RT Therapy Protocol Assessment initially then follow the protocol.  Repeat RT Therapy Protocol Assessment PRN for score 0-3 or on second treatment, BID, and PRN for scores above 3.    No Indications - adjust the frequency to every 6 hours PRN wheezing or bronchospasm, if no treatments needed after 48 hours then discontinue using Per Protocol order mode.     If indication present, adjust the RT bronchodilator orders based on the Bronchodilator Assessment Score as indicated below.  Use Inhaler orders unless patient has one or more of the following: on home nebulizer, not able to hold breath for 10 seconds, is not alert and oriented, cannot activate and use MDI correctly, or respiratory rate 25 breaths per minute or more, then use the equivalent nebulizer order(s) with same Frequency and PRN reasons based on the score.  If a patient is on this medication at home then do not decrease Frequency below that used at home.    0-3 - enter or revise RT

## 2024-04-15 NOTE — FLOWSHEET NOTE
Patient on heparin gtt and aspirin. IR recommendation is to hold aspirin for 5 days and if heparin gtt still needed should be off 4- 6 hours prior. To biopsy.

## 2024-04-15 NOTE — PROGRESS NOTES
90%  Bilateral proximal ICA atherosclerosis with flow limitation Left 80% and right 90%  Severe peripheral vascular disease s/p multiple  lower extremity interventions and right BKA  History of lung cancer s/p resection with new adrenal and liver masses  Long standing A-fib on coumadin at home   Primary Problem  Melena    Active Hospital Problems    Diagnosis Date Noted    NSTEMI (non-ST elevated myocardial infarction) (HCC) [I21.4] 04/14/2024    Hypotension [I95.9] 04/14/2024    Atrial fibrillation with rapid ventricular response (HCC) [I48.91] 04/14/2024    Unstable angina (HCC) [I20.0] 04/14/2024    Acute ischemic right internal carotid artery (ICA) stroke (HCC) [I63.231] 04/13/2024    Internal carotid artery stenosis, bilateral [I65.23] 04/13/2024    Anemia [D64.9] 04/13/2024    Gastrointestinal hemorrhage [K92.2] 04/13/2024    Pleural effusion, bilateral [J90] 04/11/2024    Longstanding persistent atrial fibrillation (HCC) [I48.11] 04/11/2024    Iron deficiency anemia due to chronic blood loss [D50.0] 04/10/2024    Hx of cancer of lung [Z85.118] 04/10/2024    Adrenal mass (HCC) [E27.8] 04/10/2024    Claudication (HCC) [I73.9] 04/10/2024    Melena [K92.1] 04/03/2024    Peripheral arterial disease (HCC) [I73.9] 04/03/2024    S/P AKA (above knee amputation) unilateral, right (HCC) [Z89.611] 03/05/2024    Embolism and thrombosis of artery (HCC) [I74.9] 03/05/2024    Left leg cellulitis [L03.116] 03/05/2024    History of arterial bypass of lower extremity [Z95.828] 11/08/2019    Malignant neoplasm of lung (HCC) [C34.90] 04/24/2019    Primary hypertension [I10] 02/14/2019    Intermittent claudication of left lower extremity due to atherosclerosis (HCC) [I70.212] 02/14/2019    Neuropathy [G62.9] 01/17/2019       Plan:   GI bleeding   -critical anemia both 4/3/24 6.2 S/P 2 units PRBC and 4/12 6.8 s/p 1 unit PRBC  -GI following closely underwent EGD 4/8 however no source of bleeding found  -GI recommending  patient if care not provided in a hospital setting.    Tabby Lynn DO  4/15/2024  11:19 AM    Copy sent to Lucila Montenegro APRN - CNP

## 2024-04-15 NOTE — PLAN OF CARE
Problem: Safety - Adult  Goal: Free from fall injury  4/14/2024 2109 by Tami Ascencio RN  Outcome: Progressing  Flowsheets (Taken 4/14/2024 2109)  Free From Fall Injury: Instruct family/caregiver on patient safety     Problem: Pain  Goal: Verbalizes/displays adequate comfort level or baseline comfort level  4/14/2024 2109 by Tami Ascencio RN  Outcome: Progressing  Flowsheets (Taken 4/14/2024 2109)  Verbalizes/displays adequate comfort level or baseline comfort level: Assess pain using appropriate pain scale     Problem: Gastrointestinal - Adult  Goal: Minimal or absence of nausea and vomiting  4/14/2024 2109 by Tami Ascencio RN  Outcome: Progressing  Flowsheets (Taken 4/14/2024 2109)  Minimal or absence of nausea and vomiting: Advance diet as tolerated, if ordered     Problem: Hematologic - Adult  Goal: Maintains hematologic stability  4/14/2024 2109 by Tami Ascencio RN  Outcome: Progressing  Flowsheets (Taken 4/14/2024 2109)  Maintains hematologic stability: Assess for signs and symptoms of bleeding or hemorrhage     Problem: Genitourinary - Adult  Goal: Absence of urinary retention  4/14/2024 2109 by Tami Ascencio RN  Outcome: Progressing  Flowsheets (Taken 4/14/2024 2109)  Absence of urinary retention:   Assess patient’s ability to void and empty bladder   Monitor intake/output and perform bladder scan as needed     Problem: Chronic Conditions and Co-morbidities  Goal: Patient's chronic conditions and co-morbidity symptoms are monitored and maintained or improved  4/14/2024 2109 by Tami Ascencio RN  Outcome: Progressing  Flowsheets (Taken 4/14/2024 2109)  Care Plan - Patient's Chronic Conditions and Co-Morbidity Symptoms are Monitored and Maintained or Improved:   Monitor and assess patient's chronic conditions and comorbid symptoms for stability, deterioration, or improvement   Collaborate with multidisciplinary team to address chronic and comorbid conditions and prevent exacerbation or deterioration   Update

## 2024-04-15 NOTE — PROGRESS NOTES
[facility]  Office: [phone]  Chinmay Ramos DO, Jose Atkinson DO, Guevara Ramirez DO, Pranay Cooper, DO, Sumeet Eller MD, Karen Carvajal MD, Sony Crawford MD, Poonam Platt MD,  Stephan San MD, Marcello Daniels MD, Theodore Pascual MD,  Juve Mahmood DO, Matias Mckeon MD, Hero Tavarez MD, Valentin Ramos DO, Jazzy Spence MD,  Trino Handley DO, Flavia Leonardo MD, Vanessa Soriano MD, Pricilla Akers MD, Casimiro Knight MD,  Zeke Maciel MD, Guera Cervantes MD, Kianna Martin MD, Mirlande Worrell MD, Ney Garcia MD, Savannah Dominguez MD, Ed Emerson DO, Jaylen West DO, Comfort Carcamo MD,  Siva Ashley MD, Shirley Waterhouse, CNP,  Carolyn Pelaez CNP, Scotty Ge, CNP,  Wandy Trivedi, DNP, Malissa Gaston, CNP, Lana Penaloza, CNP, Susan Haro, CNP, Sherrell Mendoza, CNP, Suzy Morales, PA-C, Dalila Lange PA-C, Sada Matt, CNP, Crystal Womack, CNP, Sheng Reddy, CNP, Arielle Guy, CNP, Linsey Perez, CNP, Anju Cho, CNS, Hoa Donahue, CNP, Cassidy Ferrer CNP, Tracy Schwab, CNP       [facility]      Daily Progress Note     Admit Date: 4/3/2024  Bed/Room No.  2031/2031-01  Admitting Physician : Sony Crawford MD  Code Status :Full Code  Hospital Day:  LOS: 12 days   Chief Complaint:     Chief Complaint   Patient presents with    Leg Pain     Left leg, open wounds and swollen       Principal Problem:    Melena  Active Problems:    History of arterial bypass of lower extremity    S/P AKA (above knee amputation) unilateral, right (HCC)    Primary hypertension    Peripheral arterial disease (HCC)    Neuropathy    Malignant neoplasm of lung (HCC)    Intermittent claudication of left lower extremity due to atherosclerosis (HCC)    Embolism and thrombosis of artery (HCC)    Left leg cellulitis    Iron deficiency anemia due to chronic blood loss    Hx of cancer of lung    Adrenal mass (HCC)    Claudication (HCC)    Pleural effusion, bilateral

## 2024-04-15 NOTE — PROGRESS NOTES
Today's Date: 4/15/2024  Patient Name: Jorge Luis Banda  Date of admission: 4/3/2024  3:27 PM  Patient's age: 78 y.o., 1945  Admission Dx: Melena [K92.1]  GI bleed [K92.2]  UMM (acute kidney injury) (HCC) [N17.9]  Left leg cellulitis [L03.116]  Anemia, unspecified type [D64.9]    Reason for Consult: management recommendations  Requesting Physician: Sony Crawford MD    CHIEF COMPLAINT: GI bleeding    History Obtained From:  patient  INTERVAL HISTORY:    Patient seen and examined.  Currently in the cardiac ICU.  Currently stable.  No chest pain.  No respiratory distress.  Labs were reviewed.  CBC from yesterday showed white blood cells of 9.3.  Hemoglobin 6.9.  Platelets 195.  Status post blood transfusion.  Will monitor labs.  Denies any active bleeding.  No nausea or vomiting.  Plan for adrenal mass biopsy.    HISTORY OF PRESENT ILLNESS:    The patient is a 78 y.o.   male who is admitted to the hospital for anemia and suspicion for GI bleeding.  His hemoglobin was under 7 and received blood transfusion.  He has severe peripheral vascular disease on Plavix and Coumadin.  He underwent an EGD that showed gastritis and he refused colonoscopy.  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  From oncology perspective, the patient was diagnosed with stage I lung cancer in 2019 and underwent lung resection.  Never received any chemotherapy or radiation.  He has been in remission since then.  CT scan of the chest showed concerning changes in the liver and adrenal gland for metastatic disease.  Dedicated CT of the abdomen and pelvis is ordered and pending.    Past Medical History:   has a past medical history of Paroxysmal atrial fibrillation (HCC), Peripheral artery disease (HCC), and Primary hypertension.    Past Surgical History:   has a past surgical history that includes above knee amputation (Right,         Sections of 1 lymph node, negative for tumor (0/1).     3. #7 nodes, excision:        Sections of 2 lymph nodes, negative for tumor (0/2).     4. Parabronchial lymph node, excision:        Sections of 2 lymph nodes, negative for tumor (0/2).     CANCER CASE SUMMARY         Procedure: Lobectomy   Specimen laterality: Left   Tumor site: Lower lobe   Tumor size: 2.8 cm   Tumor focality: Unifocal   Histologic type: Adenocarcinoma   Histologic grade: Moderately differentiated   Visceral pleura invasion: Not identified   Lymphovascular invasion: Not identified   Direct invasion of adjacent structures: No adjacent structures present   Margins: All margins are uninvolved by tumor. Margins examining include bronchial, vascular and parenchymal   Treatment effect: No known presurgical therapy   Regional Lymph Nodes --     Number of lymph nodes involved: 0      Specify anitra stations involved: NA     Number of lymph nodes examined: 29      Specify anitra stations examined: Stations 10, 9 and 7     TNM descriptors:   Primary Tumor (pT): pT1c   Regional Lymph Nodes (pN): pN0   IMAGING DATA:  CT chest   IMPRESSION:  1. Mild-to-moderate bilateral pleural effusions greater on the right with  areas of loculation. Underlying bibasilar atelectasis.  2. Biapical and bibasal fibrosis.  3. Partially visualize low-density mass seen involving the right adrenal  gland and portion of the right hepatic lobe most likely representing  malignancy.    Primary Problem  Melena    Active Hospital Problems    Diagnosis Date Noted    NSTEMI (non-ST elevated myocardial infarction) (HCC) [I21.4] 04/14/2024    Hypotension [I95.9] 04/14/2024    Atrial fibrillation with rapid ventricular response (HCC) [I48.91] 04/14/2024    Unstable angina (HCC) [I20.0] 04/14/2024    Acute ischemic right internal carotid artery (ICA) stroke (HCC) [I63.231] 04/13/2024    Internal carotid artery stenosis, bilateral [I65.23] 04/13/2024    Anemia [D64.9] 04/13/2024

## 2024-04-15 NOTE — PROGRESS NOTES
Occupational Therapy  DATE: 4/15/2024    NAME: Jorge Luis Banda  MRN: [id number]   : 1945    Patient not seen this date for Occupational Therapy due to:      [x] Cancel by RN or physician due to: Cancel per nursing d/t not medically appropriate. OT will cont to follow.    [] Hemodialysis    [] Critical Lab Value Level     [] Blood transfusion in progress    [] Acute or unstable cardiovascular status   _MAP < 55 or more than >115  _HR < 40 or > 130    [] Acute or unstable pulmonary status   -FiO2 > 60%   _RR < 5 or >40    _O2 sats < 85%    [] Strict Bedrest    [] Off Unit for surgery or procedure    [] Off Unit for testing       [] Pending imaging to R/O fracture    [] Refusal by Patient      [] Other      [] OT being discontinued at this time. Patient independent. No further needs.     [] OT being discontinued at this time as the patient has been transferred to hospice care. No further needs.      BRUCE FINE, ZINA

## 2024-04-15 NOTE — PROGRESS NOTES
Ry Traylor MD   Urology Progress Note            Subjective: Follow-up urinary retention enlarged prostate    Patient Vitals for the past 24 hrs:   BP Temp Temp src Pulse Resp SpO2 Weight   04/15/24 0545 -- -- -- (!) 113 21 96 % --   04/15/24 0530 -- -- -- (!) 113 19 95 % --   04/15/24 0515 -- -- -- 100 27 98 % --   04/15/24 0514 -- -- -- -- 20 -- --   04/15/24 0500 -- -- -- (!) 114 22 97 % --   04/15/24 0456 -- -- -- (!) 115 22 97 % --   04/15/24 0453 -- -- -- (!) 106 23 96 % --   04/15/24 0445 -- -- -- (!) 114 25 96 % --   04/15/24 0444 -- -- -- -- 22 -- --   04/15/24 0430 -- -- -- (!) 110 21 98 % --   04/15/24 0415 -- -- -- (!) 109 22 97 % --   04/15/24 0400 -- 98 °F (36.7 °C) Temporal (!) 105 28 98 % 72.9 kg (160 lb 11.5 oz)   04/15/24 0345 -- -- -- (!) 108 20 98 % --   04/15/24 0330 -- -- -- (!) 106 21 98 % --   04/15/24 0315 -- -- -- (!) 105 18 98 % --   04/15/24 0300 -- -- -- (!) 105 17 98 % --   04/15/24 0245 -- -- -- (!) 102 22 99 % --   04/15/24 0230 -- -- -- 100 20 98 % --   04/15/24 0215 -- -- -- 98 21 100 % --   04/15/24 0200 -- -- -- (!) 107 20 99 % --   04/15/24 0145 -- -- -- (!) 108 20 97 % --   04/15/24 0130 -- -- -- (!) 104 20 97 % --   04/15/24 0115 -- -- -- (!) 113 20 95 % --   04/15/24 0100 -- -- -- (!) 112 21 97 % --   04/15/24 0045 -- -- -- (!) 112 23 99 % --   04/15/24 0030 -- -- -- 100 17 98 % --   04/15/24 0015 -- -- -- (!) 110 20 96 % --   04/15/24 0000 -- 97.1 °F (36.2 °C) Temporal 99 20 98 % --   04/14/24 2345 -- -- -- 97 21 98 % --   04/14/24 2330 -- -- -- (!) 103 20 98 % --   04/14/24 2315 -- -- -- (!) 101 21 99 % --   04/14/24 2300 -- -- -- (!) 104 20 98 % --   04/14/24 2245 -- -- -- (!) 109 19 100 % --   04/14/24 2230 -- -- -- (!) 103 20 98 % --   04/14/24 2215 -- -- -- (!) 112 19 95 % --   04/14/24 2200 -- -- -- (!) 102 20 99 % --   04/14/24 2145 -- -- -- (!) 110 19 99 % --   04/14/24 2130 -- -- -- (!) 110 20 99 % --   04/14/24 2115 -- -- -- (!) 104  20 99 % --   04/14/24 2100 -- -- -- (!) 104 20 98 % --   04/14/24 2045 -- -- -- (!) 105 19 99 % --   04/14/24 2030 -- -- -- (!) 107 20 97 % --   04/14/24 2015 -- -- -- (!) 111 20 97 % --   04/14/24 2000 -- 98.4 °F (36.9 °C) Temporal (!) 104 20 97 % --   04/14/24 1954 -- -- -- (!) 101 19 -- --   04/14/24 1823 -- -- -- (!) 104 23 -- --   04/14/24 1820 (!) 147/54 -- -- (!) 106 22 (!) 85 % --   04/14/24 1800 -- -- -- (!) 115 22 98 % --   04/14/24 1600 -- 97.8 °F (36.6 °C) Temporal (!) 103 22 99 % --   04/14/24 1530 -- -- -- (!) 105 21 95 % --   04/14/24 1512 -- -- -- (!) 103 19 100 % --   04/14/24 1500 -- -- -- (!) 101 18 100 % --   04/14/24 1400 -- -- -- (!) 102 19 100 % --   04/14/24 1300 109/75 -- -- 98 15 -- --   04/14/24 1230 -- -- -- 97 19 99 % --   04/14/24 1200 120/70 97.9 °F (36.6 °C) Temporal 95 18 100 % --   04/14/24 1145 119/69 -- -- 94 19 100 % --   04/14/24 1130 110/67 -- -- 96 22 100 % --   04/14/24 1045 126/68 -- -- 95 20 97 % --   04/14/24 1030 131/70 -- -- 91 19 97 % --   04/14/24 1015 (!) 114/55 -- -- 98 19 95 % --   04/14/24 1000 111/60 -- -- 98 23 96 % --   04/14/24 0945 (!) 125/59 -- -- 96 20 96 % --   04/14/24 0940 -- -- -- 99 19 95 % --   04/14/24 0930 (!) 118/57 -- -- 98 25 96 % --   04/14/24 0918 123/67 -- -- 95 19 95 % --   04/14/24 0915 123/67 -- -- 100 26 96 % --   04/14/24 0900 126/63 -- -- 99 17 96 % --   04/14/24 0845 109/66 -- -- 97 20 96 % --   04/14/24 0830 115/67 -- -- (!) 102 23 94 % --   04/14/24 0815 127/64 -- -- 97 19 95 % --   04/14/24 0800 128/76 98.2 °F (36.8 °C) Temporal 99 19 96 % --   04/14/24 0745 127/63 -- -- 99 18 95 % --   04/14/24 0730 118/72 -- -- (!) 103 25 93 % --   04/14/24 0715 102/65 -- -- 99 21 92 % --   04/14/24 0700 117/71 -- -- (!) 106 18 94 % --   04/14/24 0645 113/75 -- -- (!) 105 17 92 % --   04/14/24 0630 113/69 -- -- (!) 107 18 95 % --   04/14/24 0615 116/77 -- -- 100 17 95 % --       Intake/Output Summary (Last 24 hours) at 4/15/2024 0610  Last data

## 2024-04-15 NOTE — PROGRESS NOTES
VASCULAR SURGERY  PROGRESS NOTE      4/15/2024 6:16 PM  Subjective:   Admit Date: 4/3/2024  PCP: Lucila Machado APRN - CNP    Chief Complaint   Patient presents with    Leg Pain     Left leg, open wounds and swollen       Interval History: No complaints.  Continues to have left arm paraplegia with decreased function in the left leg.  Aspirin being held for adrenal biopsy.    Diet: ADULT ORAL NUTRITION SUPPLEMENT; Breakfast, Lunch, Dinner; Standard High Calorie/High Protein Oral Supplement  ADULT DIET; Regular; Low Fat/Low Chol/High Fiber/BRIDGET    Medications:   Scheduled Meds:   midodrine  2.5 mg Oral TID WC    pantoprazole  40 mg Oral BID AC    gabapentin  100 mg Oral TID    empagliflozin  10 mg Oral Daily    sodium chloride  80 mL IntraVENous Once    [Held by provider] metoprolol tartrate  25 mg Oral BID    [Held by provider] aspirin  81 mg Oral Daily    piperacillin-tazobactam  3,375 mg IntraVENous Q8H    [Held by provider] furosemide  20 mg IntraVENous BID    sodium chloride  100 mL IntraVENous Once    guaiFENesin  600 mg Oral BID    ferrous sulfate  325 mg Oral Daily with breakfast    atorvastatin  40 mg Oral Nightly    vitamin D3  5,000 Units Oral Daily    vitamin B-12  1,000 mcg Oral Daily    tamsulosin  0.4 mg Oral Daily    sodium chloride flush  5-40 mL IntraVENous 2 times per day     Continuous Infusions:   phenylephrine 280 mcg/min (04/15/24 1812)    sodium chloride      heparin (PORCINE) Infusion 18 Units/kg/hr (04/15/24 1756)    sodium chloride Stopped (04/14/24 0350)    dextrose           Labs:   CBC:   Recent Labs     04/13/24 2231 04/14/24  0542 04/15/24  0434   WBC 16.0* 15.7* 16.0*   HGB 8.9*  8.9* 8.4* 8.2*    309 288     BMP:    Recent Labs     04/13/24 2231 04/14/24  0542 04/15/24  0434   * 132* 134*   K 2.6*  2.6* 3.7 3.2*   CL 93* 94* 95*   CO2 27 26 28   BUN 14 13 9   CREATININE 1.4* 1.2 1.2   GLUCOSE 128* 118* 104*     Hepatic:   Recent Labs     04/13/24 2231   AST

## 2024-04-15 NOTE — PROGRESS NOTES
End Of Shift Note  [facility]  Summary of shift: pt had episode of sudden hypotension, map in 50's. Slowly started to climb back up. Receieved order for levophed, did not start, map is now at goal. Gave 1 prn dose of morphine for chest discomfort. K this am 3.2, giving replacement K effervescent. Pt slept intermitently throughout shift. Oncology, neuro and vasc to see pt today. Colonoscopy remains on hold until pt is of heparin. Plan is for pt to undergo endonectomy, vascular and neuro in communication for plan.      Vitals:    Vitals:    04/15/24 0500 04/15/24 0515 04/15/24 0530 04/15/24 0545   BP:       Pulse: (!) 114 100 (!) 113 (!) 113   Resp: 22 27 19 21   Temp:       TempSrc:       SpO2: 97% 98% 95% 96%   Weight:       Height:            I&O:   Intake/Output Summary (Last 24 hours) at 4/15/2024 0551  Last data filed at 4/15/2024 0437  Gross per 24 hour   Intake 2904.28 ml   Output 2960 ml   Net -55.72 ml       Resp Status: 1L nc    Ventilator Settings:     / / /FiO2 : 94 %    Critical Care IV infusions:   phenylephrine 290 mcg/min (04/15/24 0456)    norepinephrine      sodium chloride      heparin (PORCINE) Infusion 16 Units/kg/hr (04/15/24 0437)    sodium chloride Stopped (04/14/24 0350)    dextrose          LDA:   PICC Triple Lumen 04/14/24 Right Brachial (Active)   Number of days: 0       Peripheral IV 04/11/24 Right Forearm (Active)   Number of days: 3       Peripheral IV 04/13/24 Left;Posterior;Proximal Forearm (Active)   Number of days: 1       Wound 04/04/24 Foot Left;Dorsal dry, scabbed (Active)   Number of days: 10       Wound Foot Left;Lateral (Active)   Number of days:

## 2024-04-15 NOTE — PROGRESS NOTES
End Of Shift Note  [facility]  Summary of shift: Patient had eventful day. Patient HR remained in the low 100's. Patients BP did get slightly low this morning but came back up on it's own. Mat drip was weaned down to 285mcg/min and tolerating well with the added scheduled midodrine. Neurology would like to maintain permissive hypertension parameters to achieve MAP > 70 and SBP between 110-160. Patients heparin drip remains on but will need to be held 4-6 hours prior to biopsy and colonoscopy planned for Monday 4/22/24 per IR. Patients aspirin is also held until then, but patient did receive dose this morning at 0835 on 4/15/24. Patients appetite is very decreased, RN educated on importance of eating. Diet was advanced to regular diet. Patient did get slightly nauseous later in the day, patient think it is from pepsi, water was given and PRN zofran  which seemed to help manage. Patient was given CHG bath and left foot dressing was changed. Patient expressing no needs at this time.     Vitals:    Vitals:    04/15/24 0731 04/15/24 0800 04/15/24 1200 04/15/24 1600   BP:  133/60 (!) 117/52 (!) 132/56   Pulse: (!) 109 (!) 108 99 (!) 106   Resp: 20 20 26 21   Temp:  97.7 °F (36.5 °C) 97.7 °F (36.5 °C) 98 °F (36.7 °C)   TempSrc:  Temporal Temporal Temporal   SpO2: 97% 98% 98% 97%   Weight:       Height:            I&O:   Intake/Output Summary (Last 24 hours) at 4/15/2024 1801  Last data filed at 4/15/2024 1756  Gross per 24 hour   Intake 1879.18 ml   Output 3200 ml   Net -1320.82 ml       Resp Status: Patient on 2L NC and tolerating it well. Patient having occasional productive cough with thick dark yellow sputum.     Ventilator Settings:     / / /FiO2 : 94 %    Critical Care IV infusions:   phenylephrine 285 mcg/min (04/15/24 1757)    sodium chloride      heparin (PORCINE) Infusion 18 Units/kg/hr (04/15/24 1756)    sodium chloride Stopped (04/14/24 0350)    dextrose          LDA:   PICC Triple Lumen 04/14/24 Right  Brachial (Active)   Number of days: 1       Peripheral IV 04/11/24 Right Forearm (Active)   Number of days: 4       Peripheral IV 04/13/24 Left;Posterior;Proximal Forearm (Active)   Number of days: 2       Wound 04/04/24 Foot Left;Dorsal dry, scabbed (Active)   Number of days: 11       Wound Foot Left;Lateral (Active)   Number of days:

## 2024-04-15 NOTE — FLOWSHEET NOTE
04/15/24 0944   Treatment Team Notification   Reason for Communication Review case   Name of Team Member Notified Dr Lynn   Treatment Team Role Consulting Provider   Method of Communication Secure Message   Response Other (Comment)   Notification Time 0945     Asked MD if they still want the laine on for permissive hypertension. Patients BP was slightly low at 94/38 (57) as well. Notified that the attending MD is starting scheduled midodrine for patient. Asked if needed to meet parameter requirements if they wanted the levo started although patients HR was elevated at 115.     MD stated to give the midodrine and hold the levo. Continue with the same permissive hypertension parameters of -160 and MAP greater than 70.     RN also notified MD that patient is needing adrenal biopsy completed and per IR they said patient will need aspirin to be held for 3-5 days prior to the procedure as well as heparin being held 4-6hours prior.     Neurologist stated okay to holding the aspirin for now.

## 2024-04-15 NOTE — PROGRESS NOTES
GASTROENTEROLOGY NOTE       Patient:   Jorge Luis Banda   :    1945   Facility:   [facility]  Date:     4/15/2024  Consultant:   Vladimir Hoffmann MD, DO      SUBJECTIVE:    78 y.o. male admitted 4/3/2024 with Melena [K92.1]  GI bleed [K92.2]  UMM (acute kidney injury) (HCC) [N17.9]  Left leg cellulitis [L03.116]  Anemia, unspecified type [D64.9].      Patient has not had a BM this morning or over the night.    He states it was black yesterday still but less black in comparison; a bit grey.  No abdominal pain.  No N/V.  Tolerating full liquid diet without problem.          OBJECTIVE:   Vital Signs:  BP (!) 117/52   Pulse 99   Temp 97.7 °F (36.5 °C) (Temporal)   Resp 26   Ht 1.829 m (6')   Wt 72.9 kg (160 lb 11.5 oz)   SpO2 98%   BMI 21.80 kg/m²      Physical Exam:   General appearance: Alert, NAD  Lungs: CTA bilaterally, unlabored pattern  Heart: S1S2, RRR without murmur, clicks, gallops.  Abdomen: Soft, NT, ND +BS, no masses  Skin/Musculoskeletal:  No jaundice. No clubbing, cyanosis, or edema.  ROM normal.  Right BKA.  On phenylephrine IV.      Lab and Imaging Review   Recent Labs     24  1435 24  2231 24  0542 04/15/24  0434   WBC  --  16.0* 15.7* 16.0*   HGB 8.2* 8.9*  8.9* 8.4* 8.2*   MCV  --  91.7 91.3 91.3   PLT  --  284 309 288   NA  --  134* 132* 134*   K  --  2.6*  2.6* 3.7 3.2*   CL  --  93* 94* 95*   CO2  --  27 26 28   BUN  --  14 13 9   CREATININE  --  1.4* 1.2 1.2   GLUCOSE  --  128* 118* 104*   CALCIUM  --  8.1* 8.0* 7.8*   PROT  --  6.4  --   --    LABALBU  --  2.8*  --   --    AST  --  57*  --   --    ALT  --  20  --   --    ALKPHOS  --  172*  --   --    BILITOT  --  0.6  --   --    BILIDIR  --  0.3*  --   --    AMYLASE  --  173*  --   --    LIPASE  --  231*  --   --    MG  --  1.6  --  2.1     No results for input(s): \"INR\", \"PROTIME\" in the last 72 hours.      Impression:    Anemia / melena with unrevealing EGD.    CVA during interruption of

## 2024-04-15 NOTE — PROGRESS NOTES
Pt bp dropped suddenly on arterial line, titrated laine and messaged crit care Dr. Bui for second pressor order. Maxed Pt out on laine and awaited response. Messaged NP Carolyn suggs to come to bedside and in that time received order for levophed. NP at bedside, placed lasix on hold and Pt's bp slowly started to climb back up, held off on levophed d/t pt getting tachycardic on the levo the day previously. Levo is still in order as back up.

## 2024-04-15 NOTE — PLAN OF CARE
Problem: Safety - Adult  Goal: Free from fall injury  4/15/2024 0927 by Shira Watkins RN  Outcome: Progressing  Flowsheets (Taken 4/15/2024 0800)  Free From Fall Injury:   Instruct family/caregiver on patient safety   Based on caregiver fall risk screen, instruct family/caregiver to ask for assistance with transferring infant if caregiver noted to have fall risk factors  4/14/2024 2109 by Tami Ascencio RN  Outcome: Progressing  Flowsheets (Taken 4/14/2024 2109)  Free From Fall Injury: Instruct family/caregiver on patient safety     Problem: Discharge Planning  Goal: Discharge to home or other facility with appropriate resources  Outcome: Progressing  Flowsheets (Taken 4/15/2024 0800)  Discharge to home or other facility with appropriate resources: Identify barriers to discharge with patient and caregiver     Problem: Pain  Goal: Verbalizes/displays adequate comfort level or baseline comfort level  4/15/2024 0927 by Shira Watkins RN  Outcome: Progressing  4/14/2024 2109 by Tami Ascencio RN  Outcome: Progressing  Flowsheets (Taken 4/14/2024 2109)  Verbalizes/displays adequate comfort level or baseline comfort level: Assess pain using appropriate pain scale     Problem: Skin/Tissue Integrity - Adult  Goal: Incisions, wounds, or drain sites healing without S/S of infection  Outcome: Progressing  Flowsheets (Taken 4/15/2024 0800)  Incisions, Wounds, or Drain Sites Healing Without Sign and Symptoms of Infection: ADMISSION and DAILY: Assess and document risk factors for pressure ulcer development     Problem: Gastrointestinal - Adult  Goal: Minimal or absence of nausea and vomiting  4/15/2024 0927 by Shira Watkins RN  Outcome: Progressing  Flowsheets (Taken 4/15/2024 0800)  Minimal or absence of nausea and vomiting: Administer IV fluids as ordered to ensure adequate hydration  4/14/2024 2109 by Tami Ascencio RN  Outcome: Progressing  Flowsheets (Taken 4/14/2024 2109)  Minimal or absence of nausea and  (Taken 4/15/2024 0800)  Absence of urinary retention: Assess patient’s ability to void and empty bladder  4/14/2024 2109 by Tami Ascencio RN  Outcome: Progressing  Flowsheets (Taken 4/14/2024 2109)  Absence of urinary retention:   Assess patient’s ability to void and empty bladder   Monitor intake/output and perform bladder scan as needed  Goal: Urinary catheter remains patent  Outcome: Progressing     Problem: Nutrition Deficit:  Goal: Optimize nutritional status  Outcome: Progressing     Problem: Chronic Conditions and Co-morbidities  Goal: Patient's chronic conditions and co-morbidity symptoms are monitored and maintained or improved  4/15/2024 0927 by Shira Watkins RN  Outcome: Progressing  Flowsheets (Taken 4/15/2024 0800)  Care Plan - Patient's Chronic Conditions and Co-Morbidity Symptoms are Monitored and Maintained or Improved: Monitor and assess patient's chronic conditions and comorbid symptoms for stability, deterioration, or improvement  4/14/2024 2109 by Tami Ascencio RN  Outcome: Progressing  Flowsheets (Taken 4/14/2024 2109)  Care Plan - Patient's Chronic Conditions and Co-Morbidity Symptoms are Monitored and Maintained or Improved:   Monitor and assess patient's chronic conditions and comorbid symptoms for stability, deterioration, or improvement   Collaborate with multidisciplinary team to address chronic and comorbid conditions and prevent exacerbation or deterioration   Update acute care plan with appropriate goals if chronic or comorbid symptoms are exacerbated and prevent overall improvement and discharge

## 2024-04-15 NOTE — PLAN OF CARE
Problem: Respiratory - Adult  Goal: Able to breathe comfortably  Description: Able to breathe comfortably  Outcome: Progressing  Goal: Clear lung sounds  Outcome: Progressing  Goal: Adequate oxygenation  Description: Adequate oxygenation  Outcome: Progressing

## 2024-04-16 ENCOUNTER — APPOINTMENT (OUTPATIENT)
Dept: GENERAL RADIOLOGY | Age: 79
DRG: 377 | End: 2024-04-16
Payer: MEDICARE

## 2024-04-16 ENCOUNTER — APPOINTMENT (OUTPATIENT)
Dept: CT IMAGING | Age: 79
DRG: 377 | End: 2024-04-16
Payer: MEDICARE

## 2024-04-16 LAB
ANION GAP SERPL CALCULATED.3IONS-SCNC: 10 MMOL/L (ref 9–17)
ANTI-XA UNFRAC HEPARIN: 0.39 IU/L (ref 0.3–0.7)
ANTI-XA UNFRAC HEPARIN: 0.42 IU/L (ref 0.3–0.7)
BUN SERPL-MCNC: 9 MG/DL (ref 8–23)
BUN/CREAT SERPL: 8 (ref 9–20)
CALCIUM SERPL-MCNC: 7.8 MG/DL (ref 8.6–10.4)
CHLORIDE SERPL-SCNC: 97 MMOL/L (ref 98–107)
CO2 SERPL-SCNC: 26 MMOL/L (ref 20–31)
CREAT SERPL-MCNC: 1.1 MG/DL (ref 0.7–1.2)
ERYTHROCYTE [DISTWIDTH] IN BLOOD BY AUTOMATED COUNT: 17.5 % (ref 11.8–14.4)
GFR SERPL CREATININE-BSD FRML MDRD: 68 ML/MIN/1.73M2
GLUCOSE SERPL-MCNC: 89 MG/DL (ref 70–99)
HCT VFR BLD AUTO: 25.6 % (ref 40.7–50.3)
HGB BLD-MCNC: 8.1 G/DL (ref 13–17)
MCH RBC QN AUTO: 29.1 PG (ref 25.2–33.5)
MCHC RBC AUTO-ENTMCNC: 31.6 G/DL (ref 28.4–34.8)
MCV RBC AUTO: 92.1 FL (ref 82.6–102.9)
MICROORGANISM SPEC CULT: NORMAL
MICROORGANISM SPEC CULT: NORMAL
MICROORGANISM/AGENT SPEC: NORMAL
MICROORGANISM/AGENT SPEC: NORMAL
NRBC BLD-RTO: 0 PER 100 WBC
PLATELET # BLD AUTO: 289 K/UL (ref 138–453)
PMV BLD AUTO: 9.9 FL (ref 8.1–13.5)
POTASSIUM SERPL-SCNC: 3.7 MMOL/L (ref 3.7–5.3)
PROCALCITONIN SERPL-MCNC: 0.72 NG/ML (ref 0–0.09)
RBC # BLD AUTO: 2.78 M/UL (ref 4.21–5.77)
SODIUM SERPL-SCNC: 133 MMOL/L (ref 135–144)
SPECIMEN DESCRIPTION: NORMAL
WBC OTHER # BLD: 13.4 K/UL (ref 3.5–11.3)

## 2024-04-16 PROCEDURE — 97110 THERAPEUTIC EXERCISES: CPT

## 2024-04-16 PROCEDURE — 99232 SBSQ HOSP IP/OBS MODERATE 35: CPT | Performed by: FAMILY MEDICINE

## 2024-04-16 PROCEDURE — 2580000003 HC RX 258: Performed by: FAMILY MEDICINE

## 2024-04-16 PROCEDURE — 74018 RADEX ABDOMEN 1 VIEW: CPT

## 2024-04-16 PROCEDURE — 99232 SBSQ HOSP IP/OBS MODERATE 35: CPT | Performed by: INTERNAL MEDICINE

## 2024-04-16 PROCEDURE — 6360000002 HC RX W HCPCS: Performed by: INTERNAL MEDICINE

## 2024-04-16 PROCEDURE — 80048 BASIC METABOLIC PNL TOTAL CA: CPT

## 2024-04-16 PROCEDURE — 84145 PROCALCITONIN (PCT): CPT

## 2024-04-16 PROCEDURE — 97164 PT RE-EVAL EST PLAN CARE: CPT

## 2024-04-16 PROCEDURE — 2580000003 HC RX 258: Performed by: INTERNAL MEDICINE

## 2024-04-16 PROCEDURE — 74177 CT ABD & PELVIS W/CONTRAST: CPT

## 2024-04-16 PROCEDURE — 6360000004 HC RX CONTRAST MEDICATION: Performed by: FAMILY MEDICINE

## 2024-04-16 PROCEDURE — 71260 CT THORAX DX C+: CPT

## 2024-04-16 PROCEDURE — 97530 THERAPEUTIC ACTIVITIES: CPT

## 2024-04-16 PROCEDURE — 99232 SBSQ HOSP IP/OBS MODERATE 35: CPT | Performed by: PSYCHIATRY & NEUROLOGY

## 2024-04-16 PROCEDURE — 2000000000 HC ICU R&B

## 2024-04-16 PROCEDURE — 6370000000 HC RX 637 (ALT 250 FOR IP): Performed by: FAMILY MEDICINE

## 2024-04-16 PROCEDURE — 94761 N-INVAS EAR/PLS OXIMETRY MLT: CPT

## 2024-04-16 PROCEDURE — 6360000002 HC RX W HCPCS: Performed by: FAMILY MEDICINE

## 2024-04-16 PROCEDURE — 85027 COMPLETE CBC AUTOMATED: CPT

## 2024-04-16 PROCEDURE — 71045 X-RAY EXAM CHEST 1 VIEW: CPT

## 2024-04-16 PROCEDURE — 6370000000 HC RX 637 (ALT 250 FOR IP): Performed by: INTERNAL MEDICINE

## 2024-04-16 PROCEDURE — 51798 US URINE CAPACITY MEASURE: CPT

## 2024-04-16 PROCEDURE — 2700000000 HC OXYGEN THERAPY PER DAY

## 2024-04-16 PROCEDURE — 6360000002 HC RX W HCPCS

## 2024-04-16 PROCEDURE — 85520 HEPARIN ASSAY: CPT

## 2024-04-16 RX ORDER — 0.9 % SODIUM CHLORIDE 0.9 %
80 INTRAVENOUS SOLUTION INTRAVENOUS ONCE
Status: DISCONTINUED | OUTPATIENT
Start: 2024-04-16 | End: 2024-05-11 | Stop reason: HOSPADM

## 2024-04-16 RX ORDER — SODIUM CHLORIDE 0.9 % (FLUSH) 0.9 %
10 SYRINGE (ML) INJECTION PRN
Status: DISCONTINUED | OUTPATIENT
Start: 2024-04-16 | End: 2024-04-26

## 2024-04-16 RX ORDER — MIDODRINE HYDROCHLORIDE 5 MG/1
5 TABLET ORAL
Status: DISCONTINUED | OUTPATIENT
Start: 2024-04-16 | End: 2024-04-17

## 2024-04-16 RX ORDER — SODIUM CHLORIDE 9 MG/ML
INJECTION, SOLUTION INTRAVENOUS CONTINUOUS
Status: ACTIVE | OUTPATIENT
Start: 2024-04-16 | End: 2024-04-16

## 2024-04-16 RX ADMIN — PIPERACILLIN AND TAZOBACTAM 3375 MG: 3; .375 INJECTION, POWDER, LYOPHILIZED, FOR SOLUTION INTRAVENOUS at 18:59

## 2024-04-16 RX ADMIN — HEPARIN SODIUM 20 UNITS/KG/HR: 10000 INJECTION, SOLUTION INTRAVENOUS at 03:41

## 2024-04-16 RX ADMIN — MORPHINE SULFATE 2 MG: 2 INJECTION, SOLUTION INTRAMUSCULAR; INTRAVENOUS at 10:39

## 2024-04-16 RX ADMIN — CYANOCOBALAMIN TAB 1000 MCG 1000 MCG: 1000 TAB at 08:06

## 2024-04-16 RX ADMIN — EMPAGLIFLOZIN 10 MG: 10 TABLET, FILM COATED ORAL at 08:06

## 2024-04-16 RX ADMIN — ONDANSETRON 4 MG: 2 INJECTION INTRAMUSCULAR; INTRAVENOUS at 19:57

## 2024-04-16 RX ADMIN — GUAIFENESIN 600 MG: 600 TABLET ORAL at 19:42

## 2024-04-16 RX ADMIN — GUAIFENESIN 600 MG: 600 TABLET ORAL at 08:06

## 2024-04-16 RX ADMIN — ONDANSETRON 4 MG: 2 INJECTION INTRAMUSCULAR; INTRAVENOUS at 16:16

## 2024-04-16 RX ADMIN — MORPHINE SULFATE 2 MG: 2 INJECTION, SOLUTION INTRAMUSCULAR; INTRAVENOUS at 06:03

## 2024-04-16 RX ADMIN — CHOLECALCIFEROL TAB 125 MCG (5000 UNIT) 5000 UNITS: 125 TAB at 08:06

## 2024-04-16 RX ADMIN — SODIUM CHLORIDE: 9 INJECTION, SOLUTION INTRAVENOUS at 12:09

## 2024-04-16 RX ADMIN — MIDODRINE HYDROCHLORIDE 5 MG: 5 TABLET ORAL at 16:14

## 2024-04-16 RX ADMIN — FERROUS SULFATE TAB EC 325 MG (65 MG FE EQUIVALENT) 325 MG: 325 (65 FE) TABLET DELAYED RESPONSE at 08:06

## 2024-04-16 RX ADMIN — Medication 100 ML: at 14:42

## 2024-04-16 RX ADMIN — IOPAMIDOL 75 ML: 755 INJECTION, SOLUTION INTRAVENOUS at 14:41

## 2024-04-16 RX ADMIN — MORPHINE SULFATE 2 MG: 2 INJECTION, SOLUTION INTRAMUSCULAR; INTRAVENOUS at 00:20

## 2024-04-16 RX ADMIN — MIDODRINE HYDROCHLORIDE 5 MG: 5 TABLET ORAL at 12:15

## 2024-04-16 RX ADMIN — GABAPENTIN 100 MG: 100 CAPSULE ORAL at 08:06

## 2024-04-16 RX ADMIN — MORPHINE SULFATE 2 MG: 2 INJECTION, SOLUTION INTRAMUSCULAR; INTRAVENOUS at 14:03

## 2024-04-16 RX ADMIN — APIXABAN 5 MG: 5 TABLET, FILM COATED ORAL at 16:14

## 2024-04-16 RX ADMIN — PHENYLEPHRINE HYDROCHLORIDE 285 MCG/MIN: 50 INJECTION INTRAVENOUS at 05:02

## 2024-04-16 RX ADMIN — PHENYLEPHRINE HYDROCHLORIDE 235 MCG/MIN: 50 INJECTION INTRAVENOUS at 11:45

## 2024-04-16 RX ADMIN — ONDANSETRON 4 MG: 2 INJECTION INTRAMUSCULAR; INTRAVENOUS at 06:04

## 2024-04-16 RX ADMIN — MIDODRINE HYDROCHLORIDE 2.5 MG: 2.5 TABLET ORAL at 08:06

## 2024-04-16 RX ADMIN — PIPERACILLIN AND TAZOBACTAM 3375 MG: 3; .375 INJECTION, POWDER, LYOPHILIZED, FOR SOLUTION INTRAVENOUS at 11:11

## 2024-04-16 RX ADMIN — SODIUM CHLORIDE, PRESERVATIVE FREE 10 ML: 5 INJECTION INTRAVENOUS at 08:07

## 2024-04-16 RX ADMIN — TAMSULOSIN HYDROCHLORIDE 0.4 MG: 0.4 CAPSULE ORAL at 08:06

## 2024-04-16 RX ADMIN — PIPERACILLIN AND TAZOBACTAM 3375 MG: 3; .375 INJECTION, POWDER, LYOPHILIZED, FOR SOLUTION INTRAVENOUS at 02:42

## 2024-04-16 RX ADMIN — ATORVASTATIN CALCIUM 40 MG: 40 TABLET, FILM COATED ORAL at 19:42

## 2024-04-16 RX ADMIN — MORPHINE SULFATE 2 MG: 2 INJECTION, SOLUTION INTRAMUSCULAR; INTRAVENOUS at 03:43

## 2024-04-16 RX ADMIN — ONDANSETRON 4 MG: 2 INJECTION INTRAMUSCULAR; INTRAVENOUS at 10:39

## 2024-04-16 RX ADMIN — PANTOPRAZOLE SODIUM 40 MG: 40 TABLET, DELAYED RELEASE ORAL at 16:14

## 2024-04-16 RX ADMIN — SODIUM CHLORIDE, PRESERVATIVE FREE 10 ML: 5 INJECTION INTRAVENOUS at 14:41

## 2024-04-16 RX ADMIN — HYDROCORTISONE SODIUM SUCCINATE 100 MG: 100 INJECTION, POWDER, FOR SOLUTION INTRAMUSCULAR; INTRAVENOUS at 18:59

## 2024-04-16 RX ADMIN — GABAPENTIN 100 MG: 100 CAPSULE ORAL at 16:14

## 2024-04-16 ASSESSMENT — PAIN DESCRIPTION - ORIENTATION
ORIENTATION: LOWER;RIGHT
ORIENTATION: OTHER (COMMENT)
ORIENTATION: RIGHT;LEFT
ORIENTATION: LOWER;RIGHT
ORIENTATION: RIGHT;LOWER

## 2024-04-16 ASSESSMENT — PAIN SCALES - GENERAL
PAINLEVEL_OUTOF10: 7
PAINLEVEL_OUTOF10: 0
PAINLEVEL_OUTOF10: 3
PAINLEVEL_OUTOF10: 7
PAINLEVEL_OUTOF10: 10
PAINLEVEL_OUTOF10: 7
PAINLEVEL_OUTOF10: 6
PAINLEVEL_OUTOF10: 5
PAINLEVEL_OUTOF10: 10

## 2024-04-16 ASSESSMENT — PAIN DESCRIPTION - DESCRIPTORS
DESCRIPTORS: SHARP
DESCRIPTORS: HEAVINESS;PRESSURE
DESCRIPTORS: SHARP
DESCRIPTORS: HEAVINESS;PRESSURE
DESCRIPTORS: PRESSURE;SHARP
DESCRIPTORS: SHARP

## 2024-04-16 ASSESSMENT — PAIN DESCRIPTION - LOCATION
LOCATION: ABDOMEN
LOCATION: ABDOMEN
LOCATION: CHEST
LOCATION: CHEST
LOCATION: ABDOMEN
LOCATION: CHEST

## 2024-04-16 ASSESSMENT — PAIN DESCRIPTION - PAIN TYPE
TYPE: ACUTE PAIN
TYPE: ACUTE PAIN

## 2024-04-16 ASSESSMENT — PAIN DESCRIPTION - FREQUENCY: FREQUENCY: CONTINUOUS

## 2024-04-16 ASSESSMENT — PAIN DESCRIPTION - ONSET: ONSET: ON-GOING

## 2024-04-16 NOTE — PROGRESS NOTES
[facility]   IN-PATIENT SERVICE   [facility]    Progress note             Date:   4/16/2024  Patient name:  Jorge Luis Banda  Date of admission:  4/3/2024  3:27 PM  MRN:   [id number]  Account:  [id number]  YOB: 1945  PCP:    Lucila Machado APRN - CNP  Room:   2031/2031-01  Code Status:    Full Code    Chief Complaint:     Chief Complaint   Patient presents with    Leg Pain     Left leg, open wounds and swollen         Interval hx:   The Patient was seen and examined at bedside  Discussed with nursing staff. Blood pressure and HR are more stable. Doing well on midodrine. Weaning laine.   Has been having nausea and emesis.     CT head WO 4/14/24-->surprisingly right ICA territory watershed not much progressed patient does have bilateral watershed hypodensity overall however mostly stable CT head WO as compared to recent MRI   IMPRESSION:  No new acute intracranial abnormality.  Unchanged right frontal infarcts.      Brief History of Present Illness:   The patient is a 78 y.o.  Non- / non  male who Initially presented to [facility] ER 4/3/24 with Left leg pain and complicated wound healing with chronic Cellulitis for a month now. PMH significant for Paroxysmal A-fib, Peripheral artery disease, HTN. Patient is known vasculopath with poor circulation and complicated wound healing related this in fact s/p Right BKA already. [facility]s [facility]. Patient is s/p bilateral common femoral endarterectomies, profundal plasty, right profunda stent placement and angioplasty and femorofemoral bypass and subsequently right femoral-popliteal bypass due to occlusion of SFA. Patient noted to have critical low hgb 6.6-->6.2 4/3/24 22:26 and s/p 2 units PRBC at that time improving to 8.4. Patient again dropping hgb 6.8 4/12/24 and received an additional unit PRBC for total of 3 units PRBC this admission. Patient is supposed to be on plavix for is

## 2024-04-16 NOTE — PROGRESS NOTES
VASCULAR SURGERY  PROGRESS NOTE      4/16/2024 9:44 AM  Subjective:   Admit Date: 4/3/2024  PCP: Lucila Machado APRN - CNP    Chief Complaint   Patient presents with    Leg Pain     Left leg, open wounds and swollen       Interval History: No complaints.  Able to move the left leg better however remains paraplegic in the left arm.    Diet: ADULT ORAL NUTRITION SUPPLEMENT; Breakfast, Lunch, Dinner; Standard High Calorie/High Protein Oral Supplement  ADULT DIET; Regular; Low Fat/Low Chol/High Fiber/BRIDGET    Medications:   Scheduled Meds:   midodrine  2.5 mg Oral TID WC    pantoprazole  40 mg Oral BID AC    gabapentin  100 mg Oral TID    empagliflozin  10 mg Oral Daily    sodium chloride  80 mL IntraVENous Once    [Held by provider] metoprolol tartrate  25 mg Oral BID    [Held by provider] aspirin  81 mg Oral Daily    piperacillin-tazobactam  3,375 mg IntraVENous Q8H    [Held by provider] furosemide  20 mg IntraVENous BID    sodium chloride  100 mL IntraVENous Once    guaiFENesin  600 mg Oral BID    ferrous sulfate  325 mg Oral Daily with breakfast    atorvastatin  40 mg Oral Nightly    vitamin D3  5,000 Units Oral Daily    vitamin B-12  1,000 mcg Oral Daily    tamsulosin  0.4 mg Oral Daily    sodium chloride flush  5-40 mL IntraVENous 2 times per day     Continuous Infusions:   phenylephrine 285 mcg/min (04/16/24 0502)    sodium chloride      heparin (PORCINE) Infusion 20 Units/kg/hr (04/16/24 0341)    sodium chloride Stopped (04/14/24 0350)    dextrose           Labs:   CBC:   Recent Labs     04/14/24  0542 04/15/24  0434 04/16/24  0421   WBC 15.7* 16.0* 13.4*   HGB 8.4* 8.2* 8.1*    288 289       BMP:    Recent Labs     04/14/24  0542 04/15/24  0434 04/16/24  0421   * 134* 133*   K 3.7 3.2* 3.7   CL 94* 95* 97*   CO2 26 28 26   BUN 13 9 9   CREATININE 1.2 1.2 1.1   GLUCOSE 118* 104* 89       Hepatic:   Recent Labs     04/13/24  2231   AST 57*   ALT 20   BILITOT 0.6   ALKPHOS 172*       Troponin:  Embolism and thrombosis of artery (HCC)     Dyslipidemia     Closed fracture of left humerus     Closed fracture of surgical neck of humerus     Acute exacerbation of chronic obstructive airways disease (HCC)     Left leg cellulitis     Arteriosclerosis of coronary artery     Age-related osteoporosis without current pathological fracture     Adenocarcinoma of lung (HCC)     Wound infection     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)     Melena     Iron deficiency anemia due to chronic blood loss     Hx of cancer of lung     Adrenal mass (HCC)     Claudication (HCC)     Pleural effusion, bilateral     Longstanding persistent atrial fibrillation (HCC)     Acute ischemic right internal carotid artery (ICA) stroke (HCC)     Internal carotid artery stenosis, bilateral     Anemia     Gastrointestinal hemorrhage     NSTEMI (non-ST elevated myocardial infarction) (HCC)     Hypotension     Atrial fibrillation with rapid ventricular response (HCC)     Unstable angina (HCC)      Plan:   Continue IV heparin  Hold aspirin for adrenal biopsy  Continue GI workup  Permissive hypertension for CVA  With his multiple comorbidities and acute CVA, would favor waiting approximately 4-6 weeks to allow for recovery prior to CEA however if neurology feels he would benefit from early endarterectomy I am not opposed to that    Electronically signed by Sonny Alcantar MD on 4/16/2024 at 9:44 AM

## 2024-04-16 NOTE — PROGRESS NOTES
Pt has not urinated yet this shift, has been NPO d/t N/V. Notified Dr. Traylor during rounds, bladder scan ordered. Scan volume >750.Notifying Dr. Traylor for further orders at this time    Response: per Dr. Traylor will not intervene at this time as pt is not having any discomfort, pt states \"I will Pee when I have to Pee I dont feel like it right now\" Pt did void 100ml in urinal but there is >750ml on bladder scan. Will watch closely tomorrow per Dr. Traylor

## 2024-04-16 NOTE — PROGRESS NOTES
Progress Note    Patient Name:  Jorge Luis Banda    :  1945 9:26 AM      SUBJECTIVE       Mr. Banda    Continues to complain of bandlike chest pain that is worse with inspiration and cough.  Denies shortness of breath.      OBJECTIVE     Vital signs:    BP (!) 141/54   Pulse 100   Temp 97.8 °F (36.6 °C) (Temporal)   Resp 15   Ht 1.829 m (6')   Wt 75.7 kg (166 lb 14.2 oz)   SpO2 93%   BMI 22.63 kg/m²  2 L/min      Admit Weight:  68 kg (150 lb)    Last 3 weights:  Wt Readings from Last 3 Encounters:   24 75.7 kg (166 lb 14.2 oz)   20 54.4 kg (120 lb)       BMI: Body mass index is 22.63 kg/m².    Input/Output:       Intake/Output Summary (Last 24 hours) at 2024 0926  Last data filed at 2024 0600  Gross per 24 hour   Intake 1747.38 ml   Output 1425 ml   Net 322.38 ml         Exam:     General appearance: awake and alert moves all ext   Lungs: no rhonchi, no wheezes, no rales  Heart:   Irregularly irregular  Abdomen: positive bowel sounds, no bruits, no masses  Extremities: warm and dry, no cyanosis, no clubbing        Laboratory Studies:     CBC:   Recent Labs     24  0542 04/15/24  0434 24  0421   WBC 15.7* 16.0* 13.4*   HGB 8.4* 8.2* 8.1*   HCT 26.3* 25.2* 25.6*   MCV 91.3 91.3 92.1    288 289     BMP:   Recent Labs     24  0542 04/15/24  0434 24  042   * 134* 133*   K 3.7 3.2* 3.7   CL 94* 95* 97*   CO2 26 28 26   BUN 13 9 9   CREATININE 1.2 1.2 1.1     PT/INR: No results for input(s): \"PROTIME\", \"INR\" in the last 72 hours.  APTT: No results for input(s): \"APTT\" in the last 72 hours.  MAG:   Recent Labs     24  2231 04/15/24  0434   MG 1.6 2.1     D Dimer: No results for input(s): \"DDIMER\" in the last 72 hours.  Troponin  No results for input(s): \"TROPONINI\" in the last 72 hours.      No results for input(s): \"TROPONINT\" in the last 72 hours.   BNP No results for input(s): \"BNP\" in the last 72 hours.            Recent

## 2024-04-16 NOTE — PROGRESS NOTES
Pt taken down for CT Chest/Abd/Pelivis d/t abdominal pain and N/V. Vomit green bile appearance and stool like contents w pink frothy sputum. Pt tolerated scan well and transported back into room. Vitals stable.

## 2024-04-16 NOTE — PROGRESS NOTES
End Of Shift Note  [facility] [facility]  Summary of shift: Morphine administered every three hours for ongoing chest pain. Patient described the pain as across his chest, continuous, worsens with movement and breathing. He is also having pain in the lower abdomen. Neosynephrine at 285mcg/min for pressure support and increase perfusion to right brain after CVA. Patient is wanting to continue with treatment and may benefit from a palliative consult. Patient has been coughing up/vomiting white, thick sputum and solid pieces with green/bile liquid. Two emesis occurrences plus sputum production from coughing, approx 250ml output.    Urine output this shift: 550ml      Vitals:    Vitals:    04/16/24 0315 04/16/24 0343 04/16/24 0400 04/16/24 0413   BP:   (!) 133/56    Pulse: (!) 101  100    Resp: 16 16 16 18   Temp:   97.3 °F (36.3 °C)    TempSrc:   Temporal    SpO2: 96%  96%    Weight:       Height:            I&O:   Intake/Output Summary (Last 24 hours) at 4/16/2024 0729  Last data filed at 4/16/2024 0600  Gross per 24 hour   Intake 1747.38 ml   Output 1425 ml   Net 322.38 ml       Resp Status: 2LNC    Critical Care IV infusions:   phenylephrine 285 mcg/min (04/16/24 0502)    sodium chloride      heparin (PORCINE) Infusion 20 Units/kg/hr (04/16/24 0341)    sodium chloride Stopped (04/14/24 0350)    dextrose          LDA:   PICC Triple Lumen 04/14/24 Right Brachial (Active)   Number of days: 1       Peripheral IV 04/11/24 Right Forearm (Active)   Number of days: 4       Peripheral IV 04/13/24 Left;Posterior;Proximal Forearm (Active)   Number of days: 2       Arterial Line 04/14/24 Left Radial (Active)   Number of days: 2       Wound 04/04/24 Foot Left;Dorsal dry, scabbed (Active)   Number of days: 11       Wound Foot Left;Lateral (Active)   Number of days:

## 2024-04-16 NOTE — PLAN OF CARE
Problem: Safety - Adult  Goal: Free from fall injury  Outcome: Progressing     Problem: Discharge Planning  Goal: Discharge to home or other facility with appropriate resources  Outcome: Progressing     Problem: Pain  Goal: Verbalizes/displays adequate comfort level or baseline comfort level  Outcome: Progressing     Problem: Skin/Tissue Integrity - Adult  Goal: Incisions, wounds, or drain sites healing without S/S of infection  Outcome: Progressing     Problem: Gastrointestinal - Adult  Goal: Minimal or absence of nausea and vomiting  Outcome: Progressing  Goal: Maintains or returns to baseline bowel function  Outcome: Progressing     Problem: Infection - Adult  Goal: Absence of infection at discharge  Outcome: Progressing     Problem: Hematologic - Adult  Goal: Maintains hematologic stability  Outcome: Progressing     Problem: Skin/Tissue Integrity  Goal: Absence of new skin breakdown  Description: 1.  Monitor for areas of redness and/or skin breakdown  2.  Assess vascular access sites hourly  3.  Every 4-6 hours minimum:  Change oxygen saturation probe site  4.  Every 4-6 hours:  If on nasal continuous positive airway pressure, respiratory therapy assess nares and determine need for appliance change or resting period.  Outcome: Progressing     Problem: ABCDS Injury Assessment  Goal: Absence of physical injury  Outcome: Progressing     Problem: Genitourinary - Adult  Goal: Absence of urinary retention  Outcome: Progressing  Goal: Urinary catheter remains patent  Outcome: Progressing     Problem: Nutrition Deficit:  Goal: Optimize nutritional status  Outcome: Progressing     Problem: Chronic Conditions and Co-morbidities  Goal: Patient's chronic conditions and co-morbidity symptoms are monitored and maintained or improved  Outcome: Progressing

## 2024-04-16 NOTE — PLAN OF CARE
Problem: Safety - Adult  Goal: Free from fall injury  Outcome: Progressing  Flowsheets (Taken 4/15/2024 0800 by Shira Watkins, RN)  Free From Fall Injury:   Instruct family/caregiver on patient safety   Based on caregiver fall risk screen, instruct family/caregiver to ask for assistance with transferring infant if caregiver noted to have fall risk factors     Problem: Discharge Planning  Goal: Discharge to home or other facility with appropriate resources  Outcome: Progressing  Flowsheets (Taken 4/15/2024 0800 by Shira Watkins, RN)  Discharge to home or other facility with appropriate resources: Identify barriers to discharge with patient and caregiver     Problem: Pain  Goal: Verbalizes/displays adequate comfort level or baseline comfort level  Outcome: Progressing  Flowsheets (Taken 4/14/2024 2109 by Tami Ascencio RN)  Verbalizes/displays adequate comfort level or baseline comfort level: Assess pain using appropriate pain scale     Problem: Skin/Tissue Integrity - Adult  Goal: Incisions, wounds, or drain sites healing without S/S of infection  Outcome: Progressing  Flowsheets (Taken 4/15/2024 0800 by Shira Watkins, RN)  Incisions, Wounds, or Drain Sites Healing Without Sign and Symptoms of Infection: ADMISSION and DAILY: Assess and document risk factors for pressure ulcer development     Problem: Gastrointestinal - Adult  Goal: Minimal or absence of nausea and vomiting  Outcome: Progressing  Goal: Maintains or returns to baseline bowel function  Outcome: Progressing     Problem: Infection - Adult  Goal: Absence of infection at discharge  Outcome: Progressing     Problem: Hematologic - Adult  Goal: Maintains hematologic stability  Outcome: Progressing     Problem: Skin/Tissue Integrity  Goal: Absence of new skin breakdown  Description: 1.  Monitor for areas of redness and/or skin breakdown  2.  Assess vascular access sites hourly  3.  Every 4-6 hours minimum:  Change oxygen saturation probe site  4.

## 2024-04-16 NOTE — PROGRESS NOTES
UPDATE:  RN titrating Mat-synepherine down while maintaining BP >110 sys and MAP >70 per neurology. Currently running at 75mcg/min, was at 285mcg/min this morning. 100ml/hr NS infusing for 6hrs post CT scan. Heparin gtt will be turned off and switched to PO Eliquis and aspirin unheld per physician orders. Pt alert and oriented x4, no new neurological changes. LUE still completely Flaccid, LLE weak limited movement, pulses doppler, Lt facial droop. Lt pupil not round and is 4mm while Rt pupil round and 2mm, both sluggish to light upon assessment, provider aware. Pt states to have had an eye injury a long time ago and cataract surgery causing it to \"always be like that\". Pt also had loose BM that was green and black/dark brown in color. Pt had x3 episodes of Vomiting green bilious emesis w pink sputum, KUB and CT Chest/Abd/Pelvis completed. PRN Morphine and Zofran given when able, pt complains of sharp 10/10 lower Rt Abd pain when he moves, states if he doesn't move it does not hurt, also states to have continuous chest pain across, cardiology aware. Pt turned when requested, cream applied to redness in groin and coccyx. Will continue to monitor

## 2024-04-16 NOTE — PROGRESS NOTES
GASTROENTEROLOGY NOTE       Patient:   Jorge Luis Banda   :    1945   Facility:   [facility]  Date:     2024  Consultant:   Gertrudis Trevino, POONAM - CNP      SUBJECTIVE:    Patient complaining of diffuse abdominal pain, chest pain, and nausea.  Vomited x2 this morning.  Dark green in color with stool-like appearance.  No recent BMs.           OBJECTIVE:   Vital Signs:  BP (!) 141/54   Pulse 100   Temp 97.8 °F (36.6 °C) (Temporal)   Resp 15   Ht 1.829 m (6')   Wt 75.7 kg (166 lb 14.2 oz)   SpO2 93%   BMI 22.63 kg/m²      Physical Exam:   General appearance: Alert, NAD  Lungs: CTA bilaterally, unlabored pattern  Heart: S1S2, RRR without murmur, clicks, gallops.  Abdomen: Soft, diffuse tenderness, ND +BS, guarding noted  Skin/Musculoskeletal:  No jaundice. No clubbing, cyanosis, or edema.  ROM normal.      Lab and Imaging Review   Recent Labs     24  2231 24  0542 04/15/24  0434 24  0421   WBC 16.0* 15.7* 16.0* 13.4*   HGB 8.9*  8.9* 8.4* 8.2* 8.1*   MCV 91.7 91.3 91.3 92.1    309 288 289   * 132* 134* 133*   K 2.6*  2.6* 3.7 3.2* 3.7   CL 93* 94* 95* 97*   CO2 27 26 28 26   BUN 14 13 9 9   CREATININE 1.4* 1.2 1.2 1.1   GLUCOSE 128* 118* 104* 89   CALCIUM 8.1* 8.0* 7.8* 7.8*   PROT 6.4  --   --   --    AST 57*  --   --   --    ALT 20  --   --   --    ALKPHOS 172*  --   --   --    BILITOT 0.6  --   --   --    BILIDIR 0.3*  --   --   --    AMYLASE 173*  --   --   --    LIPASE 231*  --   --   --    MG 1.6  --  2.1  --      No results for input(s): \"INR\", \"PROTIME\" in the last 72 hours.      Impression:    Diffuse abdominal pain  Nausea, vomiting  Anemia  Melena  Unremarkable EGD this admission    Concern for obstruction.     PLAN:    Abdominal CT STAT  NPO for now.  Continue to monitor H&H and for signs of GI blood loss.  Eventual colonoscopy, off AC, to coincide with adrenal mass bx, if able.       Electronically signed by POONAM Alvarenga -  CNP on 4/16/2024 at 10:26 AM

## 2024-04-16 NOTE — PROGRESS NOTES
Today's Date: 4/16/2024  Patient Name: Jorge Luis Banda  Date of admission: 4/3/2024  3:27 PM  Patient's age: 78 y.o., 1945  Admission Dx: Melena [K92.1]  GI bleed [K92.2]  UMM (acute kidney injury) (HCC) [N17.9]  Left leg cellulitis [L03.116]  Anemia, unspecified type [D64.9]    Reason for Consult: management recommendations  Requesting Physician: Sony Crawford MD    CHIEF COMPLAINT: GI bleeding    History Obtained From:  patient  INTERVAL HISTORY:    Patient seen and examined.  Clinically no significant changes.  No chest pain.  No respiratory distress.  Labs were reviewed.    Denies any active bleeding.  No nausea or vomiting.      HISTORY OF PRESENT ILLNESS:    The patient is a 78 y.o.   male who is admitted to the hospital for anemia and suspicion for GI bleeding.  His hemoglobin was under 7 and received blood transfusion.  He has severe peripheral vascular disease on Plavix and Coumadin.  He underwent an EGD that showed gastritis and he refused colonoscopy.  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  From oncology perspective, the patient was diagnosed with stage I lung cancer in 2019 and underwent lung resection.  Never received any chemotherapy or radiation.  He has been in remission since then.  CT scan of the chest showed concerning changes in the liver and adrenal gland for metastatic disease.  Dedicated CT of the abdomen and pelvis is ordered and pending.    Past Medical History:   has a past medical history of Paroxysmal atrial fibrillation (HCC), Peripheral artery disease (HCC), and Primary hypertension.    Past Surgical History:   has a past surgical history that includes above knee amputation (Right, 01/01/2020) and Upper gastrointestinal endoscopy (N/A, 4/8/2024).     Medications:    Reviewed in Epic     Allergies:  Patient has no known allergies.    Social History:   reports that

## 2024-04-16 NOTE — PROGRESS NOTES
[facility]  Office: [phone]  Chinmay Ramos DO, Jose Atkinson DO, Guevara Ramirez DO, Pranay Cooper, DO, Sumeet Eller MD, Karen Carvajal MD, Sony Crawford MD, Poonam Platt MD,  Stephan San MD, Marcello Daniels MD, Theodore Pascual MD,  Juve Mahmood DO, Matias Mckeon MD, Hero Tavarez MD, Valentin Ramos DO, Jazzy Spence MD,  Trino Handley DO, Flavia Leonardo MD, Vanessa Soriano MD, Pricilla Akers MD, Casimiro Knight MD,  Zeke Maciel MD, Guera Cervantes MD, Kianna Martin MD, Mirlande Worrell MD, Ney Garcia MD, Savannah Dominguez MD, Ed Eemrson DO, Jaylen West DO, Comfort Carcamo MD,  Siva Ashley MD, Shirley Waterhouse, CNP,  Carolyn Pelaez CNP, Scotty Ge, CNP,  Wandy Trivedi, DNP, Malissa Gaston, CNP, Lana Penaloza, CNP, Susan Haro, CNP, Sherrell Mendoza, CNP, Suzy Morales, PA-C, Dalila Lange PA-C, Sada Matt, CNP, Crystal Womack, CNP, Sheng Reddy, CNP, Arielle Guy, CNP, Linsey Perez, CNP, Anju Cho, CNS, Hoa Donahue, CNP, Cassidy Ferrer CNP, Tracy Schwab, CNP       [facility]      Daily Progress Note     Admit Date: 4/3/2024  Bed/Room No.  2031/2031-01  Admitting Physician : Sony Crawford MD  Code Status :Full Code  Hospital Day:  LOS: 13 days   Chief Complaint:     Chief Complaint   Patient presents with    Leg Pain     Left leg, open wounds and swollen       Principal Problem:    Melena  Active Problems:    History of arterial bypass of lower extremity    S/P AKA (above knee amputation) unilateral, right (HCC)    Primary hypertension    Peripheral arterial disease (HCC)    Neuropathy    Malignant neoplasm of lung (HCC)    Intermittent claudication of left lower extremity due to atherosclerosis (HCC)    Embolism and thrombosis of artery (HCC)    Left leg cellulitis    Iron deficiency anemia due to chronic blood loss    Hx of cancer of lung    Adrenal mass (HCC)    Claudication (HCC)    Pleural effusion, bilateral       04/16/24 0900 -- -- -- 94 15 94 % --   04/16/24 0845 -- -- -- 100 15 93 % --   04/16/24 0830 -- -- -- (!) 105 16 93 % --   04/16/24 0815 -- -- -- (!) 105 20 95 % --   04/16/24 0800 -- -- -- 100 17 95 % --   04/16/24 0750 (!) 141/54 97.8 °F (36.6 °C) Temporal 95 16 97 % --   04/16/24 0745 -- -- -- 96 15 94 % --   04/16/24 0730 -- -- -- 99 14 97 % --   04/16/24 0715 -- -- -- 92 11 97 % --   04/16/24 0700 -- -- -- 89 15 96 % --   04/16/24 0645 -- -- -- 91 16 95 % --   04/16/24 0633 -- -- -- -- 18 -- --   04/16/24 0630 -- -- -- 97 17 96 % --   04/16/24 0615 -- -- -- 95 26 98 % --   04/16/24 0600 -- -- -- (!) 105 20 96 % --   04/16/24 0413 -- -- -- -- 18 -- --   04/16/24 0400 (!) 133/56 97.3 °F (36.3 °C) Temporal 100 16 96 % --   04/16/24 0343 -- -- -- -- 16 -- --   04/16/24 0315 -- -- -- (!) 101 16 96 % --   04/16/24 0300 -- -- -- (!) 103 20 96 % --   04/16/24 0248 -- -- -- (!) 108 18 95 % --   04/16/24 0245 -- -- -- (!) 105 18 95 % --   04/16/24 0230 -- -- -- (!) 105 20 95 % --   04/16/24 0215 -- -- -- 99 18 99 % --   04/16/24 0200 -- -- -- (!) 104 18 99 % --   04/16/24 0145 -- -- -- (!) 104 17 99 % --   04/16/24 0130 -- -- -- (!) 103 18 98 % --   04/16/24 0115 -- -- -- 99 17 99 % --   04/16/24 0100 -- -- -- (!) 104 20 100 % --   04/16/24 0050 -- -- -- -- 20 -- --   04/16/24 0045 -- -- -- 89 17 98 % --   04/16/24 0030 -- -- -- 97 21 97 % --   04/16/24 0020 -- -- -- -- 20 -- --   04/16/24 0015 -- -- -- 93 18 97 % --   04/16/24 0000 (!) 140/57 97.7 °F (36.5 °C) Temporal (!) 108 24 97 % 75.7 kg (166 lb 14.2 oz)   04/15/24 2345 -- -- -- (!) 109 20 97 % --   04/15/24 2330 -- -- -- (!) 107 21 97 % --   04/15/24 2315 -- -- -- (!) 106 23 97 % --   04/15/24 2300 -- -- -- (!) 105 19 97 % --   04/15/24 2245 -- -- -- (!) 101 19 97 % --   04/15/24 2230 -- -- -- (!) 104 21 97 % --   04/15/24 2215 -- -- -- (!) 109 18 96 % --   04/15/24 2200 -- -- -- (!) 107 21 96 % --   04/15/24 2145 -- -- -- (!) 103 23 97 % --   04/15/24 2130 --

## 2024-04-16 NOTE — PROGRESS NOTES
Ry Traylor MD   Urology Progress Note            Subjective: Follow-up enlarged prostate urinary retention    Patient Vitals for the past 24 hrs:   BP Temp Temp src Pulse Resp SpO2 Weight   04/16/24 1230 -- -- -- 99 23 -- --   04/16/24 1215 -- -- -- 92 15 -- --   04/16/24 1200 -- -- -- 92 13 95 % --   04/16/24 1151 109/85 97.7 °F (36.5 °C) Temporal 94 16 95 % --   04/16/24 1145 -- -- -- 98 24 94 % --   04/16/24 1130 -- -- -- 97 17 96 % --   04/16/24 1115 -- -- -- (!) 105 15 97 % --   04/16/24 1109 -- -- -- (!) 122 13 97 % --   04/16/24 1100 -- -- -- (!) 114 17 95 % --   04/16/24 1045 -- -- -- (!) 111 16 -- --   04/16/24 1030 -- -- -- (!) 109 15 -- --   04/16/24 1015 -- -- -- (!) 103 15 -- --   04/16/24 1000 -- -- -- (!) 112 16 -- --   04/16/24 0945 -- -- -- (!) 114 15 96 % --   04/16/24 0930 -- -- -- (!) 119 18 95 % --   04/16/24 0915 -- -- -- (!) 110 14 99 % --   04/16/24 0900 -- -- -- 94 15 94 % --   04/16/24 0845 -- -- -- 100 15 93 % --   04/16/24 0830 -- -- -- (!) 105 16 93 % --   04/16/24 0815 -- -- -- (!) 105 20 95 % --   04/16/24 0800 -- -- -- 100 17 95 % --   04/16/24 0750 (!) 141/54 97.8 °F (36.6 °C) Temporal 95 16 97 % --   04/16/24 0745 -- -- -- 96 15 94 % --   04/16/24 0730 -- -- -- 99 14 97 % --   04/16/24 0715 -- -- -- 92 11 97 % --   04/16/24 0700 -- -- -- 89 15 96 % --   04/16/24 0645 -- -- -- 91 16 95 % --   04/16/24 0633 -- -- -- -- 18 -- --   04/16/24 0630 -- -- -- 97 17 96 % --   04/16/24 0615 -- -- -- 95 26 98 % --   04/16/24 0600 -- -- -- (!) 105 20 96 % --   04/16/24 0413 -- -- -- -- 18 -- --   04/16/24 0400 (!) 133/56 97.3 °F (36.3 °C) Temporal 100 16 96 % --   04/16/24 0343 -- -- -- -- 16 -- --   04/16/24 0315 -- -- -- (!) 101 16 96 % --   04/16/24 0300 -- -- -- (!) 103 20 96 % --   04/16/24 0248 -- -- -- (!) 108 18 95 % --   04/16/24 0245 -- -- -- (!) 105 18 95 % --   04/16/24 0230 -- -- -- (!) 105 20 95 % --   04/16/24 0215 -- -- -- 99 18 99 % --  Summary (Last 24 hours) at 4/16/2024 1352  Last data filed at 4/16/2024 0600  Gross per 24 hour   Intake 1747.38 ml   Output 1425 ml   Net 322.38 ml       Recent Labs     04/14/24 0542 04/15/24  0434 04/16/24  0421   WBC 15.7* 16.0* 13.4*   HGB 8.4* 8.2* 8.1*   HCT 26.3* 25.2* 25.6*   MCV 91.3 91.3 92.1    288 289     Recent Labs     04/14/24 0542 04/15/24  0434 04/16/24  0421   * 134* 133*   K 3.7 3.2* 3.7   CL 94* 95* 97*   CO2 26 28 26   BUN 13 9 9   CREATININE 1.2 1.2 1.1       No results for input(s): \"COLORU\", \"PHUR\", \"LABCAST\", \"WBCUA\", \"RBCUA\", \"MUCUS\", \"TRICHOMONAS\", \"YEAST\", \"BACTERIA\", \"CLARITYU\", \"SPECGRAV\", \"LEUKOCYTESUR\", \"UROBILINOGEN\", \"BILIRUBINUR\", \"BLOODU\" in the last 72 hours.    Invalid input(s): \"NITRATE\", \"GLUCOSEUKETONESUAMORPHOUS\"    Additional Lab/culture results:    Physical Exam: We performed a bladder scan the patient has 700 mL residual, he is voiding 100 mL at a time, he has some incontinence, he is not complaining of pain or distention and the serum creatinine is at 1.1 patient is very hesitant to accept the catheter    Interval Imaging Findings:    Impression:    Patient Active Problem List   Diagnosis    Skin eschar    Stenosis of left carotid artery    History of arterial bypass of lower extremity    S/P AKA (above knee amputation) unilateral, right (HCC)    Primary hypertension    Persistent wound pain    Peripheral arterial disease (HCC)    Ulcer of left foot (HCC)    Neuropathy    Malignant neoplasm of lung (HCC)    Intermittent claudication of left lower extremity due to atherosclerosis (HCC)    Frequency of urination    Former smoker    Embolism and thrombosis of artery (HCC)    Dyslipidemia    Closed fracture of left humerus    Closed fracture of surgical neck of humerus    Acute exacerbation of chronic obstructive airways disease (HCC)    Left leg cellulitis    Arteriosclerosis of coronary artery    Age-related osteoporosis without current pathological

## 2024-04-16 NOTE — FLOWSHEET NOTE
04/16/24 0750   Pain Assessment   Pain Assessment 0-10  (Pt receives Q3hr Morphine PRN, currently not due to give, will speak to provider on further evaluation of pain control)   Pain Level 5   Pain Location Other (Comment);Abdomen;Chest  (pt states he \"always hurts all over\" but does state to have pressure pain across chest, and sharp pain in lower Abd.)   Pain Orientation Other (Comment)  (Across chest, Lower Abdomen)   Pain Descriptors Pressure;Sharp   Functional Pain Assessment Prevents or interferes with many active not passive activities   Pain Type Acute pain   Pain Frequency Continuous   Pain Onset On-going   Non-Pharmaceutical Pain Intervention(s) Repositioned;Rest

## 2024-04-16 NOTE — PROGRESS NOTES
Pulmonary Critical Care Progress Note       Patient seen for the follow up of bilateral pleural effusions, hemoptysis     Subjective:  Patient has diffuse body aches chest pain abdominal pain.  Receiving pain medication.  Still on oxygen at 2 L per he has been on Mat-Synephrine at 260 mcg's per minute.  He is also on heparin drip.  He previous went in to a-fib with RVR before and levophed was changed to neosynephrine.   Examination:  Vitals: /85   Pulse 100   Temp 97.7 °F (36.5 °C) (Temporal)   Resp 24   Ht 1.829 m (6')   Wt 75.7 kg (166 lb 14.2 oz)   SpO2 94%   BMI 22.63 kg/m²   General appearance: In no acute distress, alert and cooperative with exam cachectic  Neck: No JVD  Lungs: Decreased breath sound no crackles or wheeze  Heart: irregular rate and rhythm, S1, S2 normal, no gallop  Abdomen: Soft, non tender, + BS  Extremities: no cyanosis or clubbing. No significant edema, left upper and lower extremity weakness right above-knee amputation    LABs:  CBC:   Recent Labs     04/13/24 2231 04/14/24 0542 04/15/24  0434 04/16/24  0421   WBC 16.0* 15.7* 16.0* 13.4*   HGB 8.9*  8.9* 8.4* 8.2* 8.1*   HCT 27.6*  27.6* 26.3* 25.2* 25.6*    309 288 289       BMP:   Recent Labs     04/13/24 2231 04/14/24 0542 04/15/24  0434 04/16/24  0421   * 132* 134* 133*   K 2.6*  2.6* 3.7 3.2* 3.7   CO2 27 26 28 26   BUN 14 13 9 9   CREATININE 1.4* 1.2 1.2 1.1   LABGLOM 51* 61 61 68   GLUCOSE 128* 118* 104* 89       No results for input(s): \"APTT\" in the last 72 hours.     Latest Reference Range & Units 04/11/24 15:56 04/13/24 23:26 04/16/24 04:21   Procalcitonin 0.00 - 0.09 ng/mL 1.95 (H) 1.02 (H) 0.72 (H)   (H): Data is abnormally high   Latest Reference Range & Units 04/15/24 04:34   Pro-BNP <300 pg/mL 16,301 (H)   (H): Data is abnormally high  Radiology:  Chest x-ray 4/14  Mildly progressive bilateral infiltrates when compared to the prior exam           X-ray chest 4/13/24    X-ray chest

## 2024-04-16 NOTE — PROGRESS NOTES
End Of Shift Note  [facility]  Summary of shift: RN titrated Mat-synepherine down to stabilize BP 85mcg/min currently, BP maintains >110 systolic at this rate. KUB and CT completed (see results). Pt had 2 BM's this shift that were dark brown and green loose. Pt vomited x3 this shift green bile w pink sputum. PRN zofran and Morphine given as he is having extreme sharp Rt sided pain in abdomen on movement. Heparin stopped and Eliquis started, aspirin resumed. Plan to wean of Mat-synepherine and stabalize hypotension. Pt does not wish to have palliative on board as was discussed w Dr. Crawford. Discharge planning to [facility] pending. Pt did not void all shift, bladder scan showed >750, pt tried to void and only 100ml out, will monitor closely and re-evaluate tomorrow per Dr. Traylor    Vitals:    Vitals:    04/16/24 1715 04/16/24 1730 04/16/24 1745 04/16/24 1800   BP:       Pulse: (!) 114 (!) 116 (!) 123 (!) 118   Resp: 16 17 19 19   Temp:       TempSrc:       SpO2: 94% 95% 92% 95%   Weight:       Height:            I&O:   Intake/Output Summary (Last 24 hours) at 4/16/2024 1807  Last data filed at 4/16/2024 0600  Gross per 24 hour   Intake 915.62 ml   Output 800 ml   Net 115.62 ml       Resp Status: 2lpm NC    Ventilator Settings:     / / /FiO2 : 94 %    Critical Care IV infusions:   sodium chloride 100 mL/hr at 04/16/24 1209    phenylephrine 75 mcg/min (04/16/24 1413)    sodium chloride      sodium chloride Stopped (04/14/24 0350)    dextrose          LDA:   PICC Triple Lumen 04/14/24 Right Brachial (Active)   Number of days: 2       Peripheral IV 04/11/24 Right Forearm (Active)   Number of days: 5       Peripheral IV 04/13/24 Left;Posterior;Proximal Forearm (Active)   Number of days: 3       Arterial Line 04/14/24 Left Radial (Active)   Number of days: 2       Wound 04/04/24 Foot Left;Dorsal dry, scabbed (Active)   Number of days: 12       Wound Foot Left;Lateral (Active)   Number of days:

## 2024-04-16 NOTE — PROGRESS NOTES
Writer in room administering  meds with SRN. Patient vomiting. S Waterhouse MARTA paged for additional meds to be given to help control vomiting. At this time message read and awaiting response.     Update: OK to give IV Antiemetics early. See MAR for interventions

## 2024-04-16 NOTE — PROGRESS NOTES
Exercises:Supine ther ex including R LE hip flexion/ABD/ADD & L LE heel slides; ankle pumps; hip ABD; hip ADD; SLR and glute sets. Patient tolerate fair with intermittent complaints of abdominal pain requiring rest breaks. Patient required AAROM with L LE intermittently.   Pressure Relief Exercises: Pt is at risk for skin breakdown.  Pt educated on pressure relief measures. Pt reports being comfortable at end of treatment.              AM-PAC - Mobility  AM-PAC Basic Mobility - Inpatient   How much help is needed turning from your back to your side while in a flat bed without using bedrails?: A Lot  How much help is needed moving from lying on your back to sitting on the side of a flat bed without using bedrails?: A Lot  How much help is needed moving to and from a bed to a chair?: Total  How much help is needed standing up from a chair using your arms?: Total  How much help is needed walking in hospital room?: Total  How much help is needed climbing 3-5 steps with a railing?: Total  AM-PAC Inpatient Mobility Raw Score : 8  AM-PAC Inpatient T-Scale Score : 28.52  Mobility Inpatient CMS 0-100% Score: 86.62  Mobility Inpatient CMS G-Code Modifier : CM        Goals  Short Term Goals  Time Frame for Short Term Goals: 12 visits  Short Term Goal 1: Patient will be indep with bed mobility.  Short Term Goal 2: Patient will verbalize and demonstrate follow through of pressure relief techqniques to maintain good skin integrity  Short Term Goal 3: Patient will demo good- seated balance.  Short Term Goal 4: PT to complete Re-Eval for transfers/gait when appropriate.  Short Term Goal 5: Patient will tolerate 30 minutes of ther-ex and ther-act.  Additional Goals?: Yes  Short Term Goal 6: Patient will improve L LE strength to 4-/5 in order to increase indep with mobility tasks.  Patient Goals   Patient Goals : Return home       Education  Patient Education  Education Given To: Patient  Education Provided: Role of Therapy;Plan  of Care  Pt is at risk for skin breakdown.  Pt educated on pressure relief measures.  Pt reports being comfortable at end of treatment.        Education Method: Demonstration;Verbal  Barriers to Learning: None  Education Outcome: Continued education needed      Therapy Time   Individual Concurrent Group Co-treatment   Time In 0900         Time Out 0929         Minutes 29 + 10= 39 minutes         Additional 10 minutes for chart review.   Treatment Time: 23 minutes       Giselle Barrios, PT

## 2024-04-16 NOTE — PROGRESS NOTES
Occupational Therapy  [facility]  Occupational Therapy Not Seen    DATE: 2024    NAME: Jorge Luis Banda  MRN: [id number]   : 1945    Patient not seen this date for Occupational Therapy due to:      [] Cancel by RN or physician due to:    [] Hemodialysis    [] Critical Lab Value Level     [] Blood transfusion in progress    [] Acute or unstable cardiovascular status   _MAP < 55 or more than >115  _HR < 40 or > 130    [] Acute or unstable pulmonary status   -FiO2 > 60%   _RR < 5 or >40    _O2 sats < 85%    [] Strict Bedrest    [] Off Unit for surgery or procedure    [] Off Unit for testing       [] Pending imaging to R/O fracture    [] Refusal by Patient      [x] Other (Pt not appropriate for EOB activity and also with new PICC line in RUE. Pt has also been declining and might need a reassessment).       [] OT being discontinued at this time. Patient independent. No further needs.     [] OT being discontinued at this time as the patient has been transferred to hospice care. No further needs.      ZINA JOHNSON

## 2024-04-17 ENCOUNTER — APPOINTMENT (OUTPATIENT)
Dept: NUCLEAR MEDICINE | Age: 79
DRG: 377 | End: 2024-04-17
Payer: MEDICARE

## 2024-04-17 PROBLEM — R10.11 RIGHT UPPER QUADRANT ABDOMINAL PAIN: Status: ACTIVE | Noted: 2024-04-17

## 2024-04-17 PROBLEM — Z51.5 PALLIATIVE CARE ENCOUNTER: Status: ACTIVE | Noted: 2024-04-17

## 2024-04-17 PROBLEM — Z71.89 ACP (ADVANCE CARE PLANNING): Status: ACTIVE | Noted: 2024-04-17

## 2024-04-17 LAB
ALBUMIN SERPL-MCNC: 2.4 G/DL (ref 3.5–5.2)
ALP SERPL-CCNC: 295 U/L (ref 40–129)
ALT SERPL-CCNC: 15 U/L (ref 5–41)
ANION GAP SERPL CALCULATED.3IONS-SCNC: 18 MMOL/L (ref 9–17)
AST SERPL-CCNC: 35 U/L
BASOPHILS # BLD: 0 K/UL (ref 0–0.2)
BASOPHILS NFR BLD: 0 %
BILIRUB DIRECT SERPL-MCNC: 0.3 MG/DL
BILIRUB INDIRECT SERPL-MCNC: 0.2 MG/DL (ref 0–1)
BILIRUB SERPL-MCNC: 0.5 MG/DL (ref 0.3–1.2)
BUN SERPL-MCNC: 14 MG/DL (ref 8–23)
BUN/CREAT SERPL: 13 (ref 9–20)
CALCIUM SERPL-MCNC: 8 MG/DL (ref 8.6–10.4)
CHLORIDE SERPL-SCNC: 96 MMOL/L (ref 98–107)
CO2 SERPL-SCNC: 20 MMOL/L (ref 20–31)
CORTIS SERPL-MCNC: 171 UG/DL (ref 2.7–18.4)
CORTISOL COLLECTION INFO: ABNORMAL
CREAT SERPL-MCNC: 1.1 MG/DL (ref 0.7–1.2)
EOSINOPHIL # BLD: 0 K/UL (ref 0–0.4)
EOSINOPHILS RELATIVE PERCENT: 0 % (ref 1–4)
ERYTHROCYTE [DISTWIDTH] IN BLOOD BY AUTOMATED COUNT: 17.5 % (ref 11.8–14.4)
GFR SERPL CREATININE-BSD FRML MDRD: 68 ML/MIN/1.73M2
GLUCOSE SERPL-MCNC: 88 MG/DL (ref 70–99)
HCT VFR BLD AUTO: 26.8 % (ref 40.7–50.3)
HGB BLD-MCNC: 8.5 G/DL (ref 13–17)
IMM GRANULOCYTES # BLD AUTO: 0.38 K/UL (ref 0–0.3)
IMM GRANULOCYTES NFR BLD: 2 %
LYMPHOCYTES NFR BLD: 0.38 K/UL (ref 1–4.8)
LYMPHOCYTES RELATIVE PERCENT: 2 % (ref 24–44)
MAGNESIUM SERPL-MCNC: 2.2 MG/DL (ref 1.6–2.6)
MCH RBC QN AUTO: 29.8 PG (ref 25.2–33.5)
MCHC RBC AUTO-ENTMCNC: 31.7 G/DL (ref 28.4–34.8)
MCV RBC AUTO: 94 FL (ref 82.6–102.9)
MONOCYTES NFR BLD: 0.56 K/UL (ref 0.2–0.8)
MONOCYTES NFR BLD: 3 % (ref 1–7)
NEUTROPHILS NFR BLD: 93 % (ref 36–66)
NEUTS SEG NFR BLD: 17.48 K/UL (ref 1.8–7.7)
NRBC BLD-RTO: 0 PER 100 WBC
PLATELET # BLD AUTO: 383 K/UL (ref 138–453)
PMV BLD AUTO: 10.1 FL (ref 8.1–13.5)
POTASSIUM SERPL-SCNC: 3.7 MMOL/L (ref 3.7–5.3)
PROT SERPL-MCNC: 5.9 G/DL (ref 6.4–8.3)
RBC # BLD AUTO: 2.85 M/UL (ref 4.21–5.77)
SODIUM SERPL-SCNC: 134 MMOL/L (ref 135–144)
WBC OTHER # BLD: 18.8 K/UL (ref 3.5–11.3)

## 2024-04-17 PROCEDURE — A9537 TC99M MEBROFENIN: HCPCS | Performed by: INTERNAL MEDICINE

## 2024-04-17 PROCEDURE — 6360000002 HC RX W HCPCS: Performed by: INTERNAL MEDICINE

## 2024-04-17 PROCEDURE — 99232 SBSQ HOSP IP/OBS MODERATE 35: CPT | Performed by: FAMILY MEDICINE

## 2024-04-17 PROCEDURE — 2000000000 HC ICU R&B

## 2024-04-17 PROCEDURE — 6370000000 HC RX 637 (ALT 250 FOR IP): Performed by: INTERNAL MEDICINE

## 2024-04-17 PROCEDURE — 99223 1ST HOSP IP/OBS HIGH 75: CPT | Performed by: NURSE PRACTITIONER

## 2024-04-17 PROCEDURE — 83735 ASSAY OF MAGNESIUM: CPT

## 2024-04-17 PROCEDURE — 78226 HEPATOBILIARY SYSTEM IMAGING: CPT

## 2024-04-17 PROCEDURE — 80076 HEPATIC FUNCTION PANEL: CPT

## 2024-04-17 PROCEDURE — 2580000003 HC RX 258: Performed by: FAMILY MEDICINE

## 2024-04-17 PROCEDURE — 97110 THERAPEUTIC EXERCISES: CPT

## 2024-04-17 PROCEDURE — 2580000003 HC RX 258: Performed by: INTERNAL MEDICINE

## 2024-04-17 PROCEDURE — 85025 COMPLETE CBC W/AUTO DIFF WBC: CPT

## 2024-04-17 PROCEDURE — 2700000000 HC OXYGEN THERAPY PER DAY

## 2024-04-17 PROCEDURE — 94761 N-INVAS EAR/PLS OXIMETRY MLT: CPT

## 2024-04-17 PROCEDURE — 82533 TOTAL CORTISOL: CPT

## 2024-04-17 PROCEDURE — 97530 THERAPEUTIC ACTIVITIES: CPT

## 2024-04-17 PROCEDURE — 80048 BASIC METABOLIC PNL TOTAL CA: CPT

## 2024-04-17 PROCEDURE — 99232 SBSQ HOSP IP/OBS MODERATE 35: CPT | Performed by: PSYCHIATRY & NEUROLOGY

## 2024-04-17 PROCEDURE — 3430000000 HC RX DIAGNOSTIC RADIOPHARMACEUTICAL: Performed by: INTERNAL MEDICINE

## 2024-04-17 PROCEDURE — 6360000002 HC RX W HCPCS: Performed by: FAMILY MEDICINE

## 2024-04-17 PROCEDURE — 6360000002 HC RX W HCPCS

## 2024-04-17 PROCEDURE — 99232 SBSQ HOSP IP/OBS MODERATE 35: CPT | Performed by: INTERNAL MEDICINE

## 2024-04-17 PROCEDURE — 6370000000 HC RX 637 (ALT 250 FOR IP): Performed by: FAMILY MEDICINE

## 2024-04-17 RX ORDER — SODIUM CHLORIDE 9 MG/ML
INJECTION, SOLUTION INTRAVENOUS CONTINUOUS
Status: DISCONTINUED | OUTPATIENT
Start: 2024-04-17 | End: 2024-04-20

## 2024-04-17 RX ORDER — MIDODRINE HYDROCHLORIDE 10 MG/1
10 TABLET ORAL
Status: DISCONTINUED | OUTPATIENT
Start: 2024-04-17 | End: 2024-04-20

## 2024-04-17 RX ADMIN — ATORVASTATIN CALCIUM 40 MG: 40 TABLET, FILM COATED ORAL at 19:38

## 2024-04-17 RX ADMIN — GABAPENTIN 100 MG: 100 CAPSULE ORAL at 08:16

## 2024-04-17 RX ADMIN — CHOLECALCIFEROL TAB 125 MCG (5000 UNIT) 5000 UNITS: 125 TAB at 08:17

## 2024-04-17 RX ADMIN — HYDROCORTISONE SODIUM SUCCINATE 100 MG: 100 INJECTION, POWDER, FOR SOLUTION INTRAMUSCULAR; INTRAVENOUS at 01:12

## 2024-04-17 RX ADMIN — FERROUS SULFATE TAB EC 325 MG (65 MG FE EQUIVALENT) 325 MG: 325 (65 FE) TABLET DELAYED RESPONSE at 08:17

## 2024-04-17 RX ADMIN — SODIUM CHLORIDE: 9 INJECTION, SOLUTION INTRAVENOUS at 21:46

## 2024-04-17 RX ADMIN — TAMSULOSIN HYDROCHLORIDE 0.4 MG: 0.4 CAPSULE ORAL at 08:17

## 2024-04-17 RX ADMIN — PHENYLEPHRINE HYDROCHLORIDE 130 MCG/MIN: 50 INJECTION INTRAVENOUS at 20:04

## 2024-04-17 RX ADMIN — PIPERACILLIN AND TAZOBACTAM 3375 MG: 3; .375 INJECTION, POWDER, LYOPHILIZED, FOR SOLUTION INTRAVENOUS at 17:28

## 2024-04-17 RX ADMIN — PHENYLEPHRINE HYDROCHLORIDE 240 MCG/MIN: 50 INJECTION INTRAVENOUS at 11:11

## 2024-04-17 RX ADMIN — GUAIFENESIN 600 MG: 600 TABLET ORAL at 19:38

## 2024-04-17 RX ADMIN — ONDANSETRON 4 MG: 2 INJECTION INTRAMUSCULAR; INTRAVENOUS at 08:30

## 2024-04-17 RX ADMIN — PANTOPRAZOLE SODIUM 40 MG: 40 TABLET, DELAYED RELEASE ORAL at 08:16

## 2024-04-17 RX ADMIN — GABAPENTIN 100 MG: 100 CAPSULE ORAL at 19:38

## 2024-04-17 RX ADMIN — GUAIFENESIN 600 MG: 600 TABLET ORAL at 08:16

## 2024-04-17 RX ADMIN — MIDODRINE HYDROCHLORIDE 5 MG: 5 TABLET ORAL at 08:17

## 2024-04-17 RX ADMIN — HYDROCORTISONE SODIUM SUCCINATE 100 MG: 100 INJECTION, POWDER, FOR SOLUTION INTRAMUSCULAR; INTRAVENOUS at 11:07

## 2024-04-17 RX ADMIN — PHENYLEPHRINE HYDROCHLORIDE 100 MCG/MIN: 50 INJECTION INTRAVENOUS at 00:57

## 2024-04-17 RX ADMIN — ASPIRIN 81 MG: 81 TABLET, COATED ORAL at 08:17

## 2024-04-17 RX ADMIN — Medication 5.2 MILLICURIE: at 12:30

## 2024-04-17 RX ADMIN — PIPERACILLIN AND TAZOBACTAM 3375 MG: 3; .375 INJECTION, POWDER, LYOPHILIZED, FOR SOLUTION INTRAVENOUS at 11:09

## 2024-04-17 RX ADMIN — SODIUM CHLORIDE, PRESERVATIVE FREE 10 ML: 5 INJECTION INTRAVENOUS at 08:18

## 2024-04-17 RX ADMIN — SODIUM CHLORIDE: 9 INJECTION, SOLUTION INTRAVENOUS at 13:37

## 2024-04-17 RX ADMIN — APIXABAN 5 MG: 5 TABLET, FILM COATED ORAL at 08:17

## 2024-04-17 RX ADMIN — MIDODRINE HYDROCHLORIDE 10 MG: 10 TABLET ORAL at 17:24

## 2024-04-17 RX ADMIN — PIPERACILLIN AND TAZOBACTAM 3375 MG: 3; .375 INJECTION, POWDER, LYOPHILIZED, FOR SOLUTION INTRAVENOUS at 01:20

## 2024-04-17 RX ADMIN — CYANOCOBALAMIN TAB 1000 MCG 1000 MCG: 1000 TAB at 08:17

## 2024-04-17 RX ADMIN — PANTOPRAZOLE SODIUM 40 MG: 40 TABLET, DELAYED RELEASE ORAL at 17:24

## 2024-04-17 RX ADMIN — GABAPENTIN 100 MG: 100 CAPSULE ORAL at 13:33

## 2024-04-17 RX ADMIN — MORPHINE SULFATE 2 MG: 2 INJECTION, SOLUTION INTRAMUSCULAR; INTRAVENOUS at 14:42

## 2024-04-17 RX ADMIN — EMPAGLIFLOZIN 10 MG: 10 TABLET, FILM COATED ORAL at 08:16

## 2024-04-17 RX ADMIN — MORPHINE SULFATE 2 MG: 2 INJECTION, SOLUTION INTRAMUSCULAR; INTRAVENOUS at 19:44

## 2024-04-17 ASSESSMENT — PAIN DESCRIPTION - PAIN TYPE
TYPE: ACUTE PAIN
TYPE: ACUTE PAIN

## 2024-04-17 ASSESSMENT — PAIN SCALES - GENERAL
PAINLEVEL_OUTOF10: 0
PAINLEVEL_OUTOF10: 8
PAINLEVEL_OUTOF10: 0
PAINLEVEL_OUTOF10: 8

## 2024-04-17 ASSESSMENT — PAIN DESCRIPTION - ONSET
ONSET: GRADUAL
ONSET: GRADUAL

## 2024-04-17 ASSESSMENT — PAIN DESCRIPTION - DESCRIPTORS
DESCRIPTORS: ACHING;NUMBNESS;NAGGING
DESCRIPTORS: ACHING;NUMBNESS
DESCRIPTORS: SHARP

## 2024-04-17 ASSESSMENT — PAIN DESCRIPTION - ORIENTATION
ORIENTATION: RIGHT;UPPER
ORIENTATION: RIGHT;UPPER

## 2024-04-17 ASSESSMENT — PAIN DESCRIPTION - FREQUENCY
FREQUENCY: INTERMITTENT
FREQUENCY: INTERMITTENT

## 2024-04-17 ASSESSMENT — PAIN DESCRIPTION - LOCATION
LOCATION: ABDOMEN
LOCATION: GENERALIZED

## 2024-04-17 ASSESSMENT — PAIN - FUNCTIONAL ASSESSMENT
PAIN_FUNCTIONAL_ASSESSMENT: ACTIVITIES ARE NOT PREVENTED
PAIN_FUNCTIONAL_ASSESSMENT: ACTIVITIES ARE NOT PREVENTED

## 2024-04-17 NOTE — PROGRESS NOTES
[facility]  Office: [phone]  Chinmay Ramos DO, Jose Atkinson DO, Guevara Ramirez DO, Pranay Cooper, DO, Sumeet Eller MD, Karen Carvajal MD, Sony Crawford MD, Poonam Platt MD,  Stephan San MD, Marcello Daniels MD, Theodore Pascual MD,  Juve Mahmood DO, Matias Mckeon MD, Hero Tavarez MD, Valentin Ramos DO, Jazzy Spence MD,  Trino Handley DO, Flavia Leonardo MD, Vanessa Soriano MD, Pricilla Akers MD, Casimiro Knight MD,  Zeke Maciel MD, Guera Cervantes MD, Kianna Martin MD, Mirlande Worrell MD, eNy Garcia MD, Savannah Dominguez MD, Ed Emerson DO, Jaylen West DO, Comfort Carcamo MD,  Siva Ashley MD, Shirley Waterhouse, CNP,  Carolyn Pelaez CNP, Scotty Ge, CNP,  Wandy Trivedi, DNP, Malissa Gaston, CNP, Lana Penaloza, CNP, Susan Haro, CNP, Sherrell Mendoza, CNP, Suzy Morales, PA-C, Dalila Lange PA-C, Sada Matt, CNP, Crystal Womack, CNP, Sheng Reddy, CNP, Arielle Guy, CNP, Linsey Perez, CNP, Anju Cho, CNS, Hoa Donahue, CNP, Cassidy Ferrer CNP, Tracy Schwab, CNP       [facility]      Daily Progress Note     Admit Date: 4/3/2024  Bed/Room No.  2031/2031-01  Admitting Physician : Sony Crawford MD  Code Status :Full Code  Hospital Day:  LOS: 14 days   Chief Complaint:     Chief Complaint   Patient presents with    Leg Pain     Left leg, open wounds and swollen       Principal Problem:    Melena  Active Problems:    History of arterial bypass of lower extremity    S/P AKA (above knee amputation) unilateral, right (HCC)    Primary hypertension    Peripheral arterial disease (HCC)    Neuropathy    Malignant neoplasm of lung (HCC)    Intermittent claudication of left lower extremity due to atherosclerosis (HCC)    Embolism and thrombosis of artery (HCC)    Left leg cellulitis    Iron deficiency anemia due to chronic blood loss    Hx of cancer of lung    Adrenal mass (HCC)    Claudication (HCC)    Pleural effusion, bilateral     99 14 98 % --   04/17/24 0330 -- -- -- 97 15 97 % --   04/17/24 0324 -- -- -- (!) 115 14 96 % --   04/17/24 0315 -- -- -- (!) 106 16 96 % --   04/17/24 0300 -- -- -- 100 20 97 % --   04/17/24 0245 -- -- -- 98 15 97 % --   04/17/24 0230 -- -- -- (!) 103 22 96 % --   04/17/24 0215 -- -- -- 98 16 96 % --   04/17/24 0200 -- -- -- (!) 103 15 97 % --   04/17/24 0145 -- -- -- 100 21 95 % --   04/17/24 0130 -- -- -- 98 16 95 % --   04/17/24 0115 -- -- -- (!) 102 15 97 % --   04/17/24 0100 -- -- -- (!) 106 13 97 % --   04/17/24 0057 -- -- -- 97 14 97 % --   04/17/24 0045 -- -- -- (!) 105 15 96 % --   04/17/24 0030 -- -- -- 100 16 94 % --   04/17/24 0015 -- -- -- (!) 105 15 95 % --   04/17/24 0000 (!) 102/44 97.3 °F (36.3 °C) Temporal (!) 111 16 96 % 77.1 kg (169 lb 15.6 oz)   04/16/24 2345 -- -- -- (!) 105 14 96 % --   04/16/24 2330 -- -- -- 89 14 98 % --   04/16/24 2315 -- -- -- 85 15 98 % --   04/16/24 2300 -- -- -- 87 13 96 % --   04/16/24 2245 -- -- -- 93 18 95 % --   04/16/24 2230 -- -- -- (!) 102 16 96 % --   04/16/24 2215 -- -- -- (!) 101 14 95 % --   04/16/24 2200 -- -- -- 100 21 95 % --   04/16/24 2145 -- -- -- 88 14 96 % --   04/16/24 2130 -- -- -- 90 15 96 % --   04/16/24 2115 -- -- -- (!) 102 15 97 % --   04/16/24 2100 -- -- -- 100 14 98 % --   04/16/24 2045 -- -- -- (!) 104 16 95 % --   04/16/24 2040 -- -- -- (!) 110 17 95 % --   04/16/24 2030 -- -- -- (!) 106 16 96 % --   04/16/24 2015 -- -- -- (!) 129 25 95 % --   04/16/24 2000 (!) 150/67 97.3 °F (36.3 °C) Temporal (!) 129 16 96 % --   04/16/24 1945 -- -- -- 96 18 95 % --   04/16/24 1930 -- -- -- 96 17 94 % --   04/16/24 1915 -- -- -- (!) 105 17 94 % --   04/16/24 1911 -- -- -- (!) 106 18 94 % --   04/16/24 1900 -- -- -- (!) 110 20 96 % --   04/16/24 1800 -- -- -- (!) 118 19 95 % --   04/16/24 1745 -- -- -- (!) 123 19 92 % --   04/16/24 1730 -- -- -- (!) 116 17 95 % --   04/16/24 1715 -- -- -- (!) 114 16 94 % --   04/16/24 1704 -- -- -- 99 19 94 % --

## 2024-04-17 NOTE — PROGRESS NOTES
Dr CHELO Fierro paged to clarify if Eliquis should be administered give IR procedure tomorrow. At this time message unread and awaiting response. Care ongoing at this time.     Updated: Orders to hold Eliquis tonight.

## 2024-04-17 NOTE — PROGRESS NOTES
GASTROENTEROLOGY NOTE       Patient:   Jorge Luis Banda   :    1945   Facility:   [facility]  Date:     2024  Consultant:   Vladimir Hoffmann MD, DO      SUBJECTIVE:    78 y.o. male admitted 4/3/2024 with Melena [K92.1]  GI bleed [K92.2]  UMM (acute kidney injury) (HCC) [N17.9]  Left leg cellulitis [L03.116]  Anemia, unspecified type [D64.9].      Patient without any emesis today.    He has RUQ pain with multiple CT findings as copied below.  No BM today and Hb stable.  Noted small volume hemoptysis while I was at bedside.          OBJECTIVE:   Vital Signs:  BP (!) 135/54   Pulse 86   Temp 98.2 °F (36.8 °C) (Temporal)   Resp 15   Ht 1.829 m (6')   Wt 77.1 kg (169 lb 15.6 oz)   SpO2 98%   BMI 23.05 kg/m²      Physical Exam:   General appearance: Alert, NAD  Lungs: CTA bilaterally, unlabored pattern  Heart: S1S2, RRR without murmur, clicks, gallops.  Abdomen: Soft, tender at RUQ with minimal palpation, ND +BS, no masses  Skin/Musculoskeletal:  No jaundice. No clubbing, cyanosis, or edema.  Right BKA.      Lab and Imaging Review   Recent Labs     04/15/24  0434 24  0421 24  0300   WBC 16.0* 13.4* 18.8*   HGB 8.2* 8.1* 8.5*   MCV 91.3 92.1 94.0    289 383   * 133* 134*   K 3.2* 3.7 3.7   CL 95* 97* 96*   CO2 28 26 20   BUN 9 9 14   CREATININE 1.2 1.1 1.1   GLUCOSE 104* 89 88   CALCIUM 7.8* 7.8* 8.0*   MG 2.1  --  2.2     No results for input(s): \"INR\", \"PROTIME\" in the last 72 hours.      CT :    IMPRESSION:  1.  CT CHEST: Unchanged presumed bilateral pneumonia: Moderate volume right  pleural effusion and small volume left pleural effusion with areas of  consolidation posteriorly mid and lower lungs bilaterally.  2. Left basilar scarring.  3. 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  4.  Pulmonary sequela typical of that seen with smoking, including COPD.  5. Acute esophagitis.  6. Prominent esophageal ampulla versus small hiatal

## 2024-04-17 NOTE — PROGRESS NOTES
Physician Progress Note      PATIENT:               ADRIANA CAMPOS  CSN #:                  [id number]  :                       1945  ADMIT DATE:       4/3/2024 3:27 PM  DISCH DATE:  RESPONDING  PROVIDER #:        Sony Crawford MD          QUERY TEXT:    [facility]    Pt admitted with PVC, Melena, & cellulitis.   Pt noted to have Acute   hypoxic respiratory insufficiency. Pt utilizing 2-3 LNC to maintain SPO2 >96%.   Pt is not on home 02.  If possible, please document in the progress notes and   discharge summary if you are evaluating and/or treating any of the following:    The medical record reflects the following:  Risk Factors: AFIB, PNA, CHF  Clinical Indicators:   Pt noted to have Acute hypoxic respiratory   insufficiency. Pt utilizing 2-3 LNC to maintain SPO2 >96%. Pt is not on home   02.  Treatment: 2-3LNC continuous supplemental 02, IV Lasix    Tierra FREEMAN, RN  Options provided:  -- Acute respiratory failure with hypoxia  -- No Acute respiratory failure with hypoxia  -- Other - I will add my own diagnosis  -- Disagree - Not applicable / Not valid  -- Disagree - Clinically unable to determine / Unknown  -- Refer to Clinical Documentation Reviewer    PROVIDER RESPONSE TEXT:    This patient is in acute respiratory failure with hypoxia.    Query created by: Destiny Bocanegra on 2024 8:28 AM      QUERY TEXT:    [facility]    Patient admitted with GI bleeding & anemia. EGD: Gastric and small bowel   polyp. If possible, please document in progress notes and discharge summary   the cause of the GI bleeding:    The medical record reflects the following:  Risk Factors: Melena  Clinical Indicators: EGD: Gastric and small bowel polyp, Patient needs   colonoscopy but refusing  Treatment: Patient received 2 units PRBC transfusion in the emergency room.    Tierra FREEMAN, RN  Options provided:  -- GI bleeding due to colon polyps  -- GI bleeding due to-please specify.,

## 2024-04-17 NOTE — PROGRESS NOTES
Comprehensive Nutrition Assessment    Type and Reason for Visit:  Reassess    Nutrition Recommendations/Plan:   NPO   Advance diet if and when medically appropriate   Monitor labs as they become available   Monitor skin integrity/weights     Malnutrition Assessment:  Malnutrition Status:  No malnutrition (04/17/24 1140)    Context:        Findings of the 6 clinical characteristics of malnutrition:  Energy Intake:     Weight Loss:        Body Fat Loss:        Muscle Mass Loss:       Fluid Accumulation:        Strength:       Nutrition Assessment:    noted 4/16 Patient complaining of diffuse abdominal pain, chest pain, and nausea.  Vomited x2 this morning.  Dark green in color with stool-like appearance.  No recent BMs. He is currently NPO, multiple medical problems and currently will not be candidate for aggressive treatment for any cancer. Noted to consider biopsy if he improves. DIscharge planning is to a SNF. weight on 4/17 was 170# unsure accuracy, 4/15 weight is 160# which appears to be closer to his UBW. Monitor for diet advancement, weights, labs, discharge planning, and follow up PRN.    Nutrition Related Findings:    LUE and LLE non-pitting trace edema, active sounds. Wound Type: Venous Stasis (left foot arterial and venous ulcers.)       Current Nutrition Intake & Therapies:    Average Meal Intake: NPO  Average Supplements Intake: NPO  Diet NPO    Anthropometric Measures:  Height: 182.9 cm (6')  Ideal Body Weight (IBW): 178 lbs (81 kg)       Current Body Weight: 74.2 kg (163 lb 9.3 oz), 91.9 % IBW. Weight Source: Bed Scale  Current BMI (kg/m2): 22.2        Weight Adjustment For: Amputation  Total Adjusted Percentage (Calculated): 10.1  Adjusted Ideal Body Weight (lbs) (Calculated): 160 lbs  Adjusted Ideal Body Weight (kg) (Calculated): 72.73 kg  Adjusted % Ideal Body Weight (Calculated): 102.2  Adjusted BMI (kg/m2) (Calculated): 24.4  BMI Categories: Normal Weight (BMI 22.0 to 24.9) age over

## 2024-04-17 NOTE — CONSULTS
Palliative Care Inpatient Consult    NAME:  Jorge Luis Banda  MEDICAL RECORD NUMBER:  [id number]  AGE: 78 y.o.   GENDER: male  : 1945  TODAY'S DATE:  2024    Reasons for Consultation:    Symptom and/or pain management  Provision of information regarding PC and/or hospice philosophies  Complex, time-intensive communication and interdisciplinary psychosocial support  Clarification of goals of care and/or assistance with difficult decision-making  Guidance in regards to resources and transition(s)    Members of PC team contributing to this consultation are: LEXX May    Plan      Palliative Interaction:    I met with patient this afternoon on rounds.  After introductions, I explained my role in palliative care.  Patient tells me that he came to the emergency department for a wound on his foot.  He had the wound for about a month and was hoping that it would get better but ultimately came in to be evaluated.  He tells me that multiple things have happened since he has been in the hospital and the foot has been the lowest priority.  He tells me that he has had a very poor appetite and he has been having severe abdominal pain.  He additionally has intermittent shortness of breath despite wearing nasal cannula.  He has had chest pain he tells me quite often.  He had just returned from HIDA scan.    Patient tells me that he lives with his significant other and they have been together for 30 years.  He states that they are not  but he does call her his wife.  He has 2 kids that he has not spoken to in many years.  He tells me that he has never filled out healthcare power of  but he would want her to be the decision maker.  I explained that we can complete power of  paperwork for documentation to which she is agreeable to.    Patient is aware that he has multiple areas on scans that are concerning.  He tells me that he knows that he has cancer again.  I explained to him  fatigue, weight loss negative for fevers, chills, sweats  HEENT:  negative for vision, hearing changes, runny nose, throat pain  RESPIRATORY: + shortness of breath, cough negative for congestion, wheezing  CARDIOVASCULAR:  negative for chest pain, palpitations  GASTROINTESTINAL: + loss of appetite, abdominal pain, nausea negative for vomiting, diarrhea, constipation, change in bowel habits  GENITOURINARY:  negative for difficulty of urination, burning with urination, frequency   INTEGUMENT:  negative for rash, skin lesions, easy bruising   HEMATOLOGIC/LYMPHATIC:  negative for swelling/edema   ALLERGIC/IMMUNOLOGIC:  negative for urticaria , itching  ENDOCRINE:  negative increase in drinking, increase in urination, hot or cold intolerance  MUSCULOSKELETAL:  negative joint pains, muscle aches, swelling of joints  NEUROLOGICAL:  negative for headaches, dizziness, lightheadedness, numbness, pain, tingling extremities  BEHAVIOR/PSYCH:  negative for depression, anxiety    PHYSICAL ASSESSMENT:  General Appearance: alert, ill and cachectic appearing, and in no acute distress  Mental status: oriented to person, place, and time  Head: normocephalic, atraumatic  Eye: no icterus, redness, pupils equal and reactive, extraocular eye movements intact, conjunctiva clear  Ear: normal external ear, no discharge, hearing intact  Nose: no drainage noted  Mouth: mucous membranes dry  Neck: supple,  Lungs: Bilateral equal air entry, diminished to ausculation, no wheezing, rales or rhonchi, normal effort  Cardiovascular: normal rate, regular rhythm, no murmur, gallop, rub  Abdomen: Soft, tender to palpation, nondistended, normal bowel sounds  Neurologic: Awake and alert, spontaneously moving all four extremities  Skin: scattered bruises, left foot wound. No rashes or bleeding on exposed skin area  Extremities: right AKA peripheral pulses palpable, no pedal edema  Psych: normal affect    Palliative Performance Scale:  ___100% Full

## 2024-04-17 NOTE — PROGRESS NOTES
VASCULAR SURGERY  PROGRESS NOTE      4/17/2024 5:24 PM  Subjective:   Admit Date: 4/3/2024  PCP: Lucila Machado APRN - [facility]    Chief Complaint   Patient presents with    Leg Pain     Left leg, open wounds and swollen       Interval History: No complaints.  No change in neurostatus.  Palliative care noted.  IR biopsy is canceled.    Diet: Diet NPO Exceptions are: Ice Chips, Sips of Water with Meds    Medications:   Scheduled Meds:   midodrine  10 mg Oral TID WC    piperacillin-tazobactam  3,375 mg IntraVENous Q8H    sodium chloride  80 mL IntraVENous Once    apixaban  5 mg Oral BID    pantoprazole  40 mg Oral BID AC    gabapentin  100 mg Oral TID    empagliflozin  10 mg Oral Daily    sodium chloride  80 mL IntraVENous Once    [Held by provider] metoprolol tartrate  25 mg Oral BID    aspirin  81 mg Oral Daily    [Held by provider] furosemide  20 mg IntraVENous BID    sodium chloride  100 mL IntraVENous Once    guaiFENesin  600 mg Oral BID    ferrous sulfate  325 mg Oral Daily with breakfast    atorvastatin  40 mg Oral Nightly    vitamin D3  5,000 Units Oral Daily    vitamin B-12  1,000 mcg Oral Daily    tamsulosin  0.4 mg Oral Daily    sodium chloride flush  5-40 mL IntraVENous 2 times per day     Continuous Infusions:   sodium chloride 75 mL/hr at 04/17/24 1337    phenylephrine 190 mcg/min (04/17/24 1335)    sodium chloride      sodium chloride Stopped (04/14/24 0350)    dextrose           Labs:   CBC:   Recent Labs     04/15/24  0434 04/16/24  0421 04/17/24  0300   WBC 16.0* 13.4* 18.8*   HGB 8.2* 8.1* 8.5*    289 383       BMP:    Recent Labs     04/15/24  0434 04/16/24  0421 04/17/24  0300   * 133* 134*   K 3.2* 3.7 3.7   CL 95* 97* 96*   CO2 28 26 20   BUN 9 9 14   CREATININE 1.2 1.1 1.1   GLUCOSE 104* 89 88       Hepatic:   Recent Labs     04/17/24  0300   AST 35   ALT 15   BILITOT 0.5   ALKPHOS 295*       Troponin: Invalid input(s): \"TROPONIN\"  BNP: No results for input(s): \"BNP\" in the  last 72 hours.  Lipids:   No results for input(s): \"CHOL\", \"HDL\" in the last 72 hours.    Invalid input(s): \"LDLCALCU\"    INR: No results for input(s): \"INR\" in the last 72 hours.    Objective:   Vitals: BP (!) 134/53   Pulse 88   Temp 97.6 °F (36.4 °C) (Temporal)   Resp 21   Ht 1.829 m (6')   Wt 77.1 kg (169 lb 15.6 oz)   SpO2 99%   BMI 23.05 kg/m²   General appearance: alert, cooperative and no distress  Mental Status: oriented to person, place and time with normal affect  Neck: good carotid pulses, no JVD  Lungs: clear to auscultation bilaterally, normal effort  Heart: regular rate and rhythm, no murmur,  Abdomen: soft, non-tender, non-distended, bowel sounds present all four quadrants, no masses, hepatomegaly, splenomegaly or aortic enlargement  Extremities: Right AKA site healed, dry ulcer dorsal aspect of the left foot  Neurologic: Left arm paraplegia, left lower extremity weakness  Skin: no gross lesions, rashes, or induration    Assessment:   78-year-old male with 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 weakness  Severe 90% right internal carotid artery stenosis  Severe 80% left internal carotid artery stenosis with long segment of plaque involving the mid to distal common carotid  Severe peripheral arterial disease with patent right axillobifemoral bypass  GI bleed  Atrial fibrillation  Right AKA  6.5 cm right adrenal mass  Positive HIDA scan    Patient Active Problem List:     Skin eschar     Stenosis of left carotid artery     History of arterial bypass of lower extremity     S/P AKA (above knee amputation) unilateral, right (HCC)     Primary hypertension     Persistent wound pain     Peripheral arterial disease (HCC)     Ulcer of left foot (HCC)     Neuropathy     Malignant neoplasm of lung (HCC)     Intermittent claudication of left lower extremity due to atherosclerosis (HCC)     Frequency of urination     Former smoker     Embolism and thrombosis of artery (HCC)

## 2024-04-17 NOTE — CARE COORDINATION
Social Work-Contacted patient's wife regarding preference of SNF. Her [facility] choice is [facility]. Updated Cinthia Latham

## 2024-04-17 NOTE — PLAN OF CARE
Problem: Safety - Adult  Goal: Free from fall injury  Outcome: Progressing     Problem: Discharge Planning  Goal: Discharge to home or other facility with appropriate resources  Outcome: Progressing     Problem: Pain  Goal: Verbalizes/displays adequate comfort level or baseline comfort level  Outcome: Progressing     Problem: Skin/Tissue Integrity - Adult  Goal: Incisions, wounds, or drain sites healing without S/S of infection  Outcome: Progressing     Problem: Gastrointestinal - Adult  Goal: Minimal or absence of nausea and vomiting  Outcome: Progressing  Goal: Maintains or returns to baseline bowel function  Outcome: Progressing     Problem: Infection - Adult  Goal: Absence of infection at discharge  Outcome: Progressing     Problem: Hematologic - Adult  Goal: Maintains hematologic stability  Outcome: Progressing     Problem: Skin/Tissue Integrity  Goal: Absence of new skin breakdown  Description: 1.  Monitor for areas of redness and/or skin breakdown  2.  Assess vascular access sites hourly  3.  Every 4-6 hours minimum:  Change oxygen saturation probe site  4.  Every 4-6 hours:  If on nasal continuous positive airway pressure, respiratory therapy assess nares and determine need for appliance change or resting period.  Outcome: Progressing     Problem: ABCDS Injury Assessment  Goal: Absence of physical injury  Outcome: Progressing     Problem: Genitourinary - Adult  Goal: Absence of urinary retention  Outcome: Progressing  Goal: Urinary catheter remains patent  Outcome: Progressing     Problem: Nutrition Deficit:  Goal: Optimize nutritional status  Outcome: Progressing  Flowsheets (Taken 4/17/2024 1114 by Luís Hernandez RD)  Nutrient intake appropriate for improving, restoring, or maintaining nutritional needs: Monitor oral intake, labs, and treatment plans     Problem: Chronic Conditions and Co-morbidities  Goal: Patient's chronic conditions and co-morbidity symptoms are monitored and maintained or

## 2024-04-17 NOTE — PROGRESS NOTES
Pulmonary Critical Care Progress Note       Patient seen for the follow up of bilateral pleural effusions, hemoptysis     Subjective:  Patient has diffuse body aches chest pain abdominal pain.  Receiving pain medication.  Still on oxygen at 2 L per he has been on Mat-Synephrine at 260 mcg's per minute.  He is also on heparin drip.  He previous went in to a-fib with RVR    Examination:  Vitals: BP (!) 135/54   Pulse 86   Temp 98.2 °F (36.8 °C) (Temporal)   Resp 15   Ht 1.829 m (6')   Wt 77.1 kg (169 lb 15.6 oz)   SpO2 98%   BMI 23.05 kg/m²   General appearance: In no acute distress, alert and cooperative with exam cachectic  Neck: No JVD  Lungs: Decreased breath sound no crackles or wheeze  Heart: irregular rate and rhythm, S1, S2 normal, no gallop  Abdomen: Soft, non tender, + BS  Extremities: no cyanosis or clubbing. No significant edema, left upper and lower extremity weakness right above-knee amputation left foot dorsal ulcer    LABs:  CBC:   Recent Labs     04/15/24  0434 04/16/24  0421 04/17/24  0300   WBC 16.0* 13.4* 18.8*   HGB 8.2* 8.1* 8.5*   HCT 25.2* 25.6* 26.8*    289 383       BMP:   Recent Labs     04/15/24  0434 04/16/24  0421 04/17/24  0300   * 133* 134*   K 3.2* 3.7 3.7   CO2 28 26 20   BUN 9 9 14   CREATININE 1.2 1.1 1.1   LABGLOM 61 68 68   GLUCOSE 104* 89 88       No results for input(s): \"APTT\" in the last 72 hours.     Latest Reference Range & Units 04/11/24 15:56 04/13/24 23:26 04/16/24 04:21   Procalcitonin 0.00 - 0.09 ng/mL 1.95 (H) 1.02 (H) 0.72 (H)   (H): Data is abnormally high   Latest Reference Range & Units 04/15/24 04:34   Pro-BNP <300 pg/mL 16,301 (H)   (H): Data is abnormally high  Radiology:  CT chest abdomen pelvis  1.  CT CHEST: Unchanged presumed bilateral pneumonia: Moderate volume right  pleural effusion and small volume left pleural effusion with areas of  consolidation posteriorly mid and lower lungs bilaterally.  2. Left basilar scarring.  3. Bilateral

## 2024-04-17 NOTE — FLOWSHEET NOTE
IR biopsy cancelled pt restarted on blood thinners and per hem/onc note pt will be reevaluated after discharge and if biopsy still needed will be ordered as outpt once more medically stable

## 2024-04-17 NOTE — PROGRESS NOTES
[facility]   IN-PATIENT SERVICE   [facility]    Progress note             Date:   4/17/2024  Patient name:  Jorge Luis Banda  Date of admission:  4/3/2024  3:27 PM  MRN:   [id number]  Account:  [id number]  YOB: 1945  PCP:    Lucila Machado APRN - CNP  Room:   2031/2031-01  Code Status:    Full Code    Chief Complaint:     Chief Complaint   Patient presents with    Leg Pain     Left leg, open wounds and swollen         Interval hx:   The Patient was seen and examined at bedside  Discussed with nursing staff. IR biopsies are cancelled and will be done when patient is more hemodynamically/ medically stable upon DC.     CT head WO 4/14/24-->surprisingly right ICA territory watershed not much progressed patient does have bilateral watershed hypodensity overall however mostly stable CT head WO as compared to recent MRI   IMPRESSION:  No new acute intracranial abnormality.  Unchanged right frontal infarcts.      Brief History of Present Illness:   The patient is a 78 y.o.  Non- / non  male who Initially presented to [facility] ER 4/3/24 with Left leg pain and complicated wound healing with chronic Cellulitis for a month now. PMH significant for Paroxysmal A-fib, Peripheral artery disease, HTN. Patient is known vasculopath with poor circulation and complicated wound healing related this in fact s/p Right BKA already. [facility]s [facility]. Patient is s/p bilateral common femoral endarterectomies, profundal plasty, right profunda stent placement and angioplasty and femorofemoral bypass and subsequently right femoral-popliteal bypass due to occlusion of SFA. Patient noted to have critical low hgb 6.6-->6.2 4/3/24 22:26 and s/p 2 units PRBC at that time improving to 8.4. Patient again dropping hgb 6.8 4/12/24 and received an additional unit PRBC for total of 3 units PRBC this admission. Patient is supposed to be on plavix for is Peripheral  neoplasm of lung (HCC) [C34.90] 04/24/2019    Primary hypertension [I10] 02/14/2019    Intermittent claudication of left lower extremity due to atherosclerosis (HCC) [I70.212] 02/14/2019    Neuropathy [G62.9] 01/17/2019       Plan:   GI bleeding   -critical anemia both 4/3/24 6.2 S/P 2 units PRBC and 4/12 6.8 s/p 1 unit PRBC  -GI following closely underwent EGD 4/8 however no source of bleeding found  -GI recommending Colonoscopy  -management per primary medicine and GI   -given concern for symptomatic hypoperfusion of right MCA/CLARKE recommend HGB strictly >7.5      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   -SBP goal 110-160 MAP >70; recommend waiting 4-6 weeks until patient is stable before pursuing   -Home plavix currently held  -Aspirin 81mg resumed 4/12/24- Ok to hold if needed for IR guided biopsy. If do not pursue biopsy recommend continuation   -home coumadin held this admission for acute GI bleeding   -Started on heparin gtt 4/12/24 monitor h&h closely with this patient does have A-fib and needs long term anticoagulation however with active GI bleeding this will be delicate balance      Peripheral vascular risk modification  -Lipid panel 4/13/24-total chol-83, HDL-30, LDL-29, Trig-120 on Lipitor 40 at home  -HBA1C pending  -Tobacco cessation counseling      History of lung cancer s/p resection concern for Liver and adrenal mass   -Heme/onc consulted and following  -management per primary medicine and oncology team       Question need for palliative care consult to discuss continued goals of care in this medically complex case with multiple co-morbidities     PT/OT/ST  PM&R

## 2024-04-17 NOTE — PROGRESS NOTES
Occupational Therapy  [facility]  Occupational Therapy Not Seen    DATE: 2024    NAME: Jorge Luis Banda  MRN: [id number]   : 1945    Patient not seen this date for Occupational Therapy due to:      [] Cancel by RN or physician due to:    [] Hemodialysis    [] Critical Lab Value Level     [] Blood transfusion in progress    [] Acute or unstable cardiovascular status   _MAP < 55 or more than >115  _HR < 40 or > 130    [] Acute or unstable pulmonary status   -FiO2 > 60%   _RR < 5 or >40    _O2 sats < 85%    [] Strict Bedrest    [] Off Unit for surgery or procedure    [] Off Unit for testing       [] Pending imaging to R/O fracture    [x] Refusal by Patient (Pt reported he does not want to participate in OT at this time d/t being in pain all over and working with PT already. RN in room and aware of pain).      [] Other      [] OT being discontinued at this time. Patient independent. No further needs.     [] OT being discontinued at this time as the patient has been transferred to hospice care. No further needs.      ZINA JOHNSON    Procedure(s):  LEFT CAROTID ARTERY ENDARTERECTOMY.    regional, MAC, general - backup    Anesthesia Post Evaluation      Multimodal analgesia: multimodal analgesia used between 6 hours prior to anesthesia start to PACU discharge  Patient location during evaluation: PACU  Patient participation: complete - patient participated  Level of consciousness: awake  Pain management: adequate  Airway patency: patent  Anesthetic complications: no  Cardiovascular status: acceptable  Respiratory status: acceptable  Hydration status: acceptable  Post anesthesia nausea and vomiting:  controlled  Final Post Anesthesia Temperature Assessment:  Normothermia (36.0-37.5 degrees C)      INITIAL Post-op Vital signs:   Vitals Value Taken Time   /82 11/04/21 1415   Temp 36.3 °C (97.3 °F) 11/04/21 1400   Pulse 54 11/04/21 1422   Resp 13 11/04/21 1422   SpO2 100 % 11/04/21 1422   Vitals shown include unvalidated device data.

## 2024-04-17 NOTE — PROGRESS NOTES
End Of Shift Note  [facility]  Summary of shift: Parameters changed for Mat, titrating down. Palliative care consulted today see note. IVF started today. Hida scan completed, see result. IR thoracentesis ordered, needs done. Pt tolerating ice chips and oral pills with pudding today.    Vitals:    Vitals:    04/17/24 1512 04/17/24 1600 04/17/24 1700 04/17/24 1800   BP:  (!) 134/53     Pulse:  88 85 87   Resp: 20 21 14 21   Temp:  97.6 °F (36.4 °C)     TempSrc:  Temporal     SpO2:  99% 97% 99%   Weight:       Height:            I&O:   Intake/Output Summary (Last 24 hours) at 4/17/2024 1811  Last data filed at 4/17/2024 1809  Gross per 24 hour   Intake 2407.19 ml   Output 1245 ml   Net 1162.19 ml       Resp Status: 2L NC    Ventilator Settings:     / / /FiO2 : 94 %    Critical Care IV infusions:   sodium chloride Stopped (04/17/24 1728)    phenylephrine 190 mcg/min (04/17/24 1335)    sodium chloride      sodium chloride Stopped (04/14/24 0350)    dextrose          LDA:   PICC Triple Lumen 04/14/24 Right Brachial (Active)   Number of days: 3       Peripheral IV 04/11/24 Right Forearm (Active)   Number of days: 6       Peripheral IV 04/13/24 Left;Posterior;Proximal Forearm (Active)   Number of days: 4       Arterial Line 04/14/24 Left Radial (Active)   Number of days: 3       Wound 04/04/24 Foot Left;Dorsal dry, scabbed (Active)   Number of days: 13       Wound Foot Left;Lateral (Active)   Number of days:        Aida Matos RN

## 2024-04-17 NOTE — PROGRESS NOTES
Patient Name: Jorge Luis Banda  Date of admission: 4/3/2024  3:27 PM  Patient's age: 78 y.o., 1945  Admission Dx: Melena [K92.1]  GI bleed [K92.2]  UMM (acute kidney injury) (HCC) [N17.9]  Left leg cellulitis [L03.116]  Anemia, unspecified type [D64.9]    Reason for Consult: management recommendations  Requesting Physician: Sony Crawford MD    CHIEF COMPLAINT: GI bleeding    History Obtained From:  patient  INTERVAL HISTORY:    Patient seen and examined.  Clinically no significant changes.  No chest pain.    Labs were reviewed.    He has some episodes of hemoptysis.   Denies any other active bleeding.  No nausea or vomiting.      HISTORY OF PRESENT ILLNESS:    The patient is a 78 y.o.   male who is admitted to the hospital for anemia and suspicion for GI bleeding.  His hemoglobin was under 7 and received blood transfusion.  He has severe peripheral vascular disease on Plavix and Coumadin.  He underwent an EGD that showed gastritis and he refused colonoscopy.  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  From oncology perspective, the patient was diagnosed with stage I lung cancer in 2019 and underwent lung resection.  Never received any chemotherapy or radiation.  He has been in remission since then.  CT scan of the chest showed concerning changes in the liver and adrenal gland for metastatic disease.  Dedicated CT of the abdomen and pelvis is ordered and pending.    Past Medical History:   has a past medical history of Paroxysmal atrial fibrillation (HCC), Peripheral artery disease (HCC), and Primary hypertension.    Past Surgical History:   has a past surgical history that includes above knee amputation (Right, 01/01/2020) and Upper gastrointestinal endoscopy (N/A, 4/8/2024).     Medications:    Reviewed in Epic     Allergies:  Patient has no known allergies.    Social History:   reports that he

## 2024-04-17 NOTE — PROGRESS NOTES
Physical Therapy  Facility/Department: [facility]  Daily Treatment Note  NAME: Jorge Luis Banda  : 1945  MRN: [id number]    Date of Service: 2024    Discharge Recommendations:  Patient would benefit from continued therapy after discharge        Patient Diagnosis(es): The primary encounter diagnosis was Left leg cellulitis. Diagnoses of Anemia, unspecified type, Melena, UMM (acute kidney injury) (HCC), Gastrointestinal hemorrhage, unspecified gastrointestinal hemorrhage type, Atrial fib/flutter, transient (HCC), Embolism and thrombosis of artery (HCC), and Claudication (HCC) were also pertinent to this visit.    Assessment   Assessment: Pt limited by emesis     Plan    Physical Therapy Plan  General Plan: 6-7 times per week  Current Treatment Recommendations: Strengthening;Balance training;ROM;Functional mobility training;Endurance training;Neuromuscular re-education;Home exercise program;Safety education & training;Patient/Caregiver education & training;Equipment evaluation, education, & procurement;Therapeutic activities     Restrictions  Restrictions/Precautions  Restrictions/Precautions: Fall Risk, General Precautions, Bedrest with Bathroom Privileges  Required Braces or Orthoses?:  (Pt has RLE prosthetic, however per spouse, has not been using it for a while d/t L foot infection)  Position Activity Restriction  Other position/activity restrictions: Telemetry, LUE IV, L sided weakness (s/p R CVA); R AKA;  R UE IV; Arterial Line (L radial);  PICC Line (R brachial);  2L Supplemental O2 nasal cannula     Subjective    Subjective  Subjective: Pt states he is unable to tolerate any mobility as even minimal mobility produces emesis.  Pain: 10/10 globally  Orientation  Overall Orientation Status: Within Normal Limits  Orientation Level: Oriented X4  Cognition  Overall Cognitive Status: WNL     Objective   Vitals     Bed Mobility Training  Bed Mobility Training: No (Pt unable to perform mobility due to emesis

## 2024-04-18 ENCOUNTER — TELEPHONE (OUTPATIENT)
Age: 79
End: 2024-04-18

## 2024-04-18 PROBLEM — K81.0 ACUTE CHOLECYSTITIS: Status: ACTIVE | Noted: 2024-04-18

## 2024-04-18 LAB
ANION GAP SERPL CALCULATED.3IONS-SCNC: 15 MMOL/L (ref 9–17)
BUN SERPL-MCNC: 21 MG/DL (ref 8–23)
BUN/CREAT SERPL: 18 (ref 9–20)
CALCIUM SERPL-MCNC: 8.2 MG/DL (ref 8.6–10.4)
CHLORIDE SERPL-SCNC: 98 MMOL/L (ref 98–107)
CO2 SERPL-SCNC: 21 MMOL/L (ref 20–31)
CREAT SERPL-MCNC: 1.2 MG/DL (ref 0.7–1.2)
ERYTHROCYTE [DISTWIDTH] IN BLOOD BY AUTOMATED COUNT: 17.6 % (ref 11.8–14.4)
GFR SERPL CREATININE-BSD FRML MDRD: 61 ML/MIN/1.73M2
GLUCOSE BLD-MCNC: 124 MG/DL (ref 75–110)
GLUCOSE SERPL-MCNC: 110 MG/DL (ref 70–99)
HCT VFR BLD AUTO: 26.1 % (ref 40.7–50.3)
HGB BLD-MCNC: 8.1 G/DL (ref 13–17)
MAGNESIUM SERPL-MCNC: 2.3 MG/DL (ref 1.6–2.6)
MCH RBC QN AUTO: 28.6 PG (ref 25.2–33.5)
MCHC RBC AUTO-ENTMCNC: 31 G/DL (ref 28.4–34.8)
MCV RBC AUTO: 92.2 FL (ref 82.6–102.9)
NRBC BLD-RTO: 0 PER 100 WBC
PLATELET # BLD AUTO: 469 K/UL (ref 138–453)
PMV BLD AUTO: 9.9 FL (ref 8.1–13.5)
POTASSIUM SERPL-SCNC: 3.3 MMOL/L (ref 3.7–5.3)
RBC # BLD AUTO: 2.83 M/UL (ref 4.21–5.77)
SODIUM SERPL-SCNC: 134 MMOL/L (ref 135–144)
WBC OTHER # BLD: 16 K/UL (ref 3.5–11.3)

## 2024-04-18 PROCEDURE — 85027 COMPLETE CBC AUTOMATED: CPT

## 2024-04-18 PROCEDURE — 6360000002 HC RX W HCPCS: Performed by: INTERNAL MEDICINE

## 2024-04-18 PROCEDURE — 2580000003 HC RX 258: Performed by: INTERNAL MEDICINE

## 2024-04-18 PROCEDURE — 94761 N-INVAS EAR/PLS OXIMETRY MLT: CPT

## 2024-04-18 PROCEDURE — 97110 THERAPEUTIC EXERCISES: CPT

## 2024-04-18 PROCEDURE — 6370000000 HC RX 637 (ALT 250 FOR IP): Performed by: FAMILY MEDICINE

## 2024-04-18 PROCEDURE — 83735 ASSAY OF MAGNESIUM: CPT

## 2024-04-18 PROCEDURE — 6360000002 HC RX W HCPCS: Performed by: FAMILY MEDICINE

## 2024-04-18 PROCEDURE — 2700000000 HC OXYGEN THERAPY PER DAY

## 2024-04-18 PROCEDURE — 99232 SBSQ HOSP IP/OBS MODERATE 35: CPT | Performed by: NURSE PRACTITIONER

## 2024-04-18 PROCEDURE — 82947 ASSAY GLUCOSE BLOOD QUANT: CPT

## 2024-04-18 PROCEDURE — 6370000000 HC RX 637 (ALT 250 FOR IP): Performed by: INTERNAL MEDICINE

## 2024-04-18 PROCEDURE — 6360000002 HC RX W HCPCS

## 2024-04-18 PROCEDURE — 99232 SBSQ HOSP IP/OBS MODERATE 35: CPT | Performed by: INTERNAL MEDICINE

## 2024-04-18 PROCEDURE — 99232 SBSQ HOSP IP/OBS MODERATE 35: CPT | Performed by: PSYCHIATRY & NEUROLOGY

## 2024-04-18 PROCEDURE — 80048 BASIC METABOLIC PNL TOTAL CA: CPT

## 2024-04-18 PROCEDURE — 2000000000 HC ICU R&B

## 2024-04-18 PROCEDURE — 2580000003 HC RX 258: Performed by: FAMILY MEDICINE

## 2024-04-18 PROCEDURE — 99232 SBSQ HOSP IP/OBS MODERATE 35: CPT | Performed by: FAMILY MEDICINE

## 2024-04-18 PROCEDURE — 97530 THERAPEUTIC ACTIVITIES: CPT

## 2024-04-18 RX ORDER — POTASSIUM CHLORIDE 7.45 MG/ML
10 INJECTION INTRAVENOUS
Status: DISPENSED | OUTPATIENT
Start: 2024-04-18 | End: 2024-04-18

## 2024-04-18 RX ADMIN — PANTOPRAZOLE SODIUM 40 MG: 40 TABLET, DELAYED RELEASE ORAL at 16:10

## 2024-04-18 RX ADMIN — MORPHINE SULFATE 2 MG: 2 INJECTION, SOLUTION INTRAMUSCULAR; INTRAVENOUS at 08:46

## 2024-04-18 RX ADMIN — EMPAGLIFLOZIN 10 MG: 10 TABLET, FILM COATED ORAL at 08:46

## 2024-04-18 RX ADMIN — PIPERACILLIN AND TAZOBACTAM 3375 MG: 3; .375 INJECTION, POWDER, LYOPHILIZED, FOR SOLUTION INTRAVENOUS at 17:48

## 2024-04-18 RX ADMIN — ASPIRIN 81 MG: 81 TABLET, COATED ORAL at 08:46

## 2024-04-18 RX ADMIN — GABAPENTIN 100 MG: 100 CAPSULE ORAL at 14:36

## 2024-04-18 RX ADMIN — ATORVASTATIN CALCIUM 40 MG: 40 TABLET, FILM COATED ORAL at 21:32

## 2024-04-18 RX ADMIN — GUAIFENESIN 600 MG: 600 TABLET ORAL at 08:46

## 2024-04-18 RX ADMIN — PANTOPRAZOLE SODIUM 40 MG: 40 TABLET, DELAYED RELEASE ORAL at 05:46

## 2024-04-18 RX ADMIN — POTASSIUM CHLORIDE 10 MEQ: 7.46 INJECTION, SOLUTION INTRAVENOUS at 12:27

## 2024-04-18 RX ADMIN — SODIUM CHLORIDE: 9 INJECTION, SOLUTION INTRAVENOUS at 15:29

## 2024-04-18 RX ADMIN — POTASSIUM CHLORIDE 10 MEQ: 7.46 INJECTION, SOLUTION INTRAVENOUS at 10:07

## 2024-04-18 RX ADMIN — TAMSULOSIN HYDROCHLORIDE 0.4 MG: 0.4 CAPSULE ORAL at 08:46

## 2024-04-18 RX ADMIN — GABAPENTIN 100 MG: 100 CAPSULE ORAL at 08:46

## 2024-04-18 RX ADMIN — MORPHINE SULFATE 2 MG: 2 INJECTION, SOLUTION INTRAMUSCULAR; INTRAVENOUS at 05:26

## 2024-04-18 RX ADMIN — ONDANSETRON 4 MG: 2 INJECTION INTRAMUSCULAR; INTRAVENOUS at 06:02

## 2024-04-18 RX ADMIN — SODIUM CHLORIDE: 9 INJECTION, SOLUTION INTRAVENOUS at 01:32

## 2024-04-18 RX ADMIN — MIDODRINE HYDROCHLORIDE 10 MG: 10 TABLET ORAL at 08:46

## 2024-04-18 RX ADMIN — GABAPENTIN 100 MG: 100 CAPSULE ORAL at 21:32

## 2024-04-18 RX ADMIN — CYANOCOBALAMIN TAB 1000 MCG 1000 MCG: 1000 TAB at 08:46

## 2024-04-18 RX ADMIN — MIDODRINE HYDROCHLORIDE 10 MG: 10 TABLET ORAL at 16:10

## 2024-04-18 RX ADMIN — CHOLECALCIFEROL TAB 125 MCG (5000 UNIT) 5000 UNITS: 125 TAB at 08:46

## 2024-04-18 RX ADMIN — GUAIFENESIN 600 MG: 600 TABLET ORAL at 21:32

## 2024-04-18 RX ADMIN — PIPERACILLIN AND TAZOBACTAM 3375 MG: 3; .375 INJECTION, POWDER, LYOPHILIZED, FOR SOLUTION INTRAVENOUS at 10:11

## 2024-04-18 RX ADMIN — POTASSIUM CHLORIDE 10 MEQ: 7.46 INJECTION, SOLUTION INTRAVENOUS at 11:11

## 2024-04-18 RX ADMIN — FERROUS SULFATE TAB EC 325 MG (65 MG FE EQUIVALENT) 325 MG: 325 (65 FE) TABLET DELAYED RESPONSE at 08:46

## 2024-04-18 RX ADMIN — MIDODRINE HYDROCHLORIDE 10 MG: 10 TABLET ORAL at 12:07

## 2024-04-18 RX ADMIN — SODIUM CHLORIDE, PRESERVATIVE FREE 10 ML: 5 INJECTION INTRAVENOUS at 21:00

## 2024-04-18 RX ADMIN — PIPERACILLIN AND TAZOBACTAM 3375 MG: 3; .375 INJECTION, POWDER, LYOPHILIZED, FOR SOLUTION INTRAVENOUS at 01:38

## 2024-04-18 RX ADMIN — POTASSIUM CHLORIDE 10 MEQ: 7.46 INJECTION, SOLUTION INTRAVENOUS at 07:31

## 2024-04-18 RX ADMIN — SODIUM CHLORIDE, PRESERVATIVE FREE 10 ML: 5 INJECTION INTRAVENOUS at 07:42

## 2024-04-18 ASSESSMENT — PAIN SCALES - GENERAL
PAINLEVEL_OUTOF10: 7
PAINLEVEL_OUTOF10: 5
PAINLEVEL_OUTOF10: 5
PAINLEVEL_OUTOF10: 7

## 2024-04-18 ASSESSMENT — PAIN - FUNCTIONAL ASSESSMENT
PAIN_FUNCTIONAL_ASSESSMENT: PREVENTS OR INTERFERES WITH MANY ACTIVE NOT PASSIVE ACTIVITIES
PAIN_FUNCTIONAL_ASSESSMENT: ACTIVITIES ARE NOT PREVENTED

## 2024-04-18 ASSESSMENT — PAIN DESCRIPTION - DESCRIPTORS
DESCRIPTORS: ACHING
DESCRIPTORS: ACHING;DISCOMFORT

## 2024-04-18 ASSESSMENT — PAIN DESCRIPTION - ORIENTATION
ORIENTATION: RIGHT;UPPER
ORIENTATION: RIGHT;LOWER;UPPER;MID;LEFT

## 2024-04-18 ASSESSMENT — PAIN DESCRIPTION - PAIN TYPE: TYPE: ACUTE PAIN;CHRONIC PAIN

## 2024-04-18 ASSESSMENT — PAIN DESCRIPTION - FREQUENCY: FREQUENCY: CONTINUOUS

## 2024-04-18 ASSESSMENT — PAIN DESCRIPTION - LOCATION
LOCATION: ABDOMEN
LOCATION: ABDOMEN;CHEST;LEG

## 2024-04-18 ASSESSMENT — PAIN DESCRIPTION - ONSET: ONSET: GRADUAL

## 2024-04-18 NOTE — PLAN OF CARE
Problem: Safety - Adult  Goal: Free from fall injury  Outcome: Progressing  Flowsheets (Taken 4/18/2024 0800)  Free From Fall Injury:   Instruct family/caregiver on patient safety   Based on caregiver fall risk screen, instruct family/caregiver to ask for assistance with transferring infant if caregiver noted to have fall risk factors     Problem: Discharge Planning  Goal: Discharge to home or other facility with appropriate resources  Outcome: Progressing  Flowsheets (Taken 4/18/2024 0800)  Discharge to home or other facility with appropriate resources:   Identify barriers to discharge with patient and caregiver   Arrange for needed discharge resources and transportation as appropriate   Identify discharge learning needs (meds, wound care, etc)   Arrange for interpreters to assist at discharge as needed   Refer to discharge planning if patient needs post-hospital services based on physician order or complex needs related to functional status, cognitive ability or social support system     Problem: Pain  Goal: Verbalizes/displays adequate comfort level or baseline comfort level  Outcome: Progressing     Problem: Skin/Tissue Integrity - Adult  Goal: Incisions, wounds, or drain sites healing without S/S of infection  Outcome: Progressing  Flowsheets  Taken 4/18/2024 0800 by Svitlana Weeks, RN  Incisions, Wounds, or Drain Sites Healing Without Sign and Symptoms of Infection:   ADMISSION and DAILY: Assess and document risk factors for pressure ulcer development   TWICE DAILY: Assess and document skin integrity   TWICE DAILY: Assess and document dressing/incision, wound bed, drain sites and surrounding tissue   Implement wound care per orders  Taken 4/17/2024 2000 by Kiko Todd, RN  Incisions, Wounds, or Drain Sites Healing Without Sign and Symptoms of Infection:   ADMISSION and DAILY: Assess and document risk factors for pressure ulcer development   TWICE DAILY: Assess and document skin integrity   TWICE DAILY:  needed  Taken 4/17/2024 2000 by Kiko Todd RN  Absence of urinary retention:   Assess patient’s ability to void and empty bladder   Monitor intake/output and perform bladder scan as needed   Place urinary catheter per Licensed Independent Practitioner order if needed   Discuss with Licensed Independent Practitioner  medications to alleviate retention as needed   Discuss catheterization for long term situations as appropriate  Goal: Urinary catheter remains patent  Outcome: Progressing  Flowsheets (Taken 4/17/2024 2000 by Kiko Todd RN)  Urinary catheter remains patent:   Assess need for a larger catheter size or a 3-way catheter for continuous bladder irrigation   Irrigate catheter per Licensed Independent Practitioner order if indicated and notify Licensed Independent Practitioner if unable to irrigate   Assess patency of urinary catheter     Problem: Nutrition Deficit:  Goal: Optimize nutritional status  Outcome: Progressing     Problem: Chronic Conditions and Co-morbidities  Goal: Patient's chronic conditions and co-morbidity symptoms are monitored and maintained or improved  Outcome: Progressing  Flowsheets (Taken 4/18/2024 0800)  Care Plan - Patient's Chronic Conditions and Co-Morbidity Symptoms are Monitored and Maintained or Improved:   Monitor and assess patient's chronic conditions and comorbid symptoms for stability, deterioration, or improvement   Collaborate with multidisciplinary team to address chronic and comorbid conditions and prevent exacerbation or deterioration   Update acute care plan with appropriate goals if chronic or comorbid symptoms are exacerbated and prevent overall improvement and discharge

## 2024-04-18 NOTE — PROGRESS NOTES
Occupational Therapy  DATE: 2024    NAME: Jorge Luis Banda  MRN: [id number]   : 1945    Patient not seen this date for Occupational  Therapy due to:      [] Cancel by RN or physician due to:    [] Hemodialysis    [] Critical Lab Value Level     [] Blood transfusion in progress    [] Acute or unstable cardiovascular status   _MAP < 55 or more than >115  _HR < 40 or > 130    [] Acute or unstable pulmonary status   -FiO2 > 60%   _RR < 5 or >40    _O2 sats < 85%    [] Strict Bedrest    [] Off Unit for surgery or procedure    [] Off Unit for testing       [] Pending imaging to R/O fracture    [] Refusal by Patient      [x] Other: Nursing requested not to have pt. Sitting EOB d/t having several procedures today. P.T. already completed PROM with all limbs. OT will cont to follow.      [] OT being discontinued at this time. Patient independent. No further needs.     [] OT being discontinued at this time as the patient has been transferred to hospice care. No further needs.      ZINA LORENZO

## 2024-04-18 NOTE — H&P
100 mg Oral TID    empagliflozin  10 mg Oral Daily    sodium chloride  80 mL IntraVENous Once    [Held by provider] metoprolol tartrate  25 mg Oral BID    aspirin  81 mg Oral Daily    [Held by provider] furosemide  20 mg IntraVENous BID    sodium chloride  100 mL IntraVENous Once    guaiFENesin  600 mg Oral BID    ferrous sulfate  325 mg Oral Daily with breakfast    atorvastatin  40 mg Oral Nightly    vitamin D3  5,000 Units Oral Daily    vitamin B-12  1,000 mcg Oral Daily    tamsulosin  0.4 mg Oral Daily    sodium chloride flush  5-40 mL IntraVENous 2 times per day     Continuous Infusions:   sodium chloride 75 mL/hr at 04/18/24 1405    phenylephrine 20 mcg/min (04/18/24 1405)    sodium chloride      sodium chloride Stopped (04/18/24 0538)    dextrose       PRN Meds:sodium chloride flush, albuterol sulfate HFA, morphine, sodium chloride flush, magnesium sulfate, sodium chloride, melatonin, sodium chloride flush, sodium chloride flush, potassium chloride **OR** potassium alternative oral replacement **OR** potassium chloride, sodium chloride flush, sodium chloride, ondansetron **OR** ondansetron, acetaminophen **OR** acetaminophen, glucose, dextrose bolus **OR** dextrose bolus, glucagon (rDNA), dextrose      Labs :      CBC:   Recent Labs     04/16/24  0421 04/17/24  0300 04/18/24  0300   WBC 13.4* 18.8* 16.0*   HGB 8.1* 8.5* 8.1*    383 469*     BMP:    Recent Labs     04/16/24  0421 04/17/24  0300 04/18/24  0300   * 134* 134*   K 3.7 3.7 3.3*   CL 97* 96* 98   CO2 26 20 21   BUN 9 14 21   CREATININE 1.1 1.1 1.2   GLUCOSE 89 88 110*     Hepatic:   Recent Labs     04/17/24  0300   ALKPHOS 295*   ALT 15   AST 35   PROT 5.9*   BILITOT 0.5   BILIDIR 0.3*     CARDIAC ENZYMES:No results for input(s): \"CKTOTAL\", \"CKMB\", \"CKMBINDEX\", \"TROPONINI\" in the last 72 hours.  PT/INR: No results for input(s): \"PROTIME\", \"INR\" in the last 72 hours.  APTT:No results for input(s): \"APTT\" in the last 72 hours.  LIVER  cholecystitis   The patient is a 78 y.o. male that came in for GI bleeding.  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  He had his Eliquis held and unfortunately had a stroke where he has weakness of his left side.  He also has some epigastric pain and workup has revealed acute cholecystitis and cholelithiasis.  I do feel that with his recent stroke he is to sick to undergo a general anesthetic and would therefore recommend a percutaneous cholecystostomy tube.  This has been ordered and is planned for tomorrow.          Electronically signed by Peter Dowilng MD on 4/18/2024 at 2:43 PM

## 2024-04-18 NOTE — PROGRESS NOTES
Patient had a 8 beat run of VT. TY Haro paged with patient status update. At this time message unread and awaiting response. Care ongoing at this time.      1026pm Update: Message read at this time. Awaiting response.     1147pm Update: Per TY Haro MARTA, cont plan of care.

## 2024-04-18 NOTE — PROGRESS NOTES
Rounded with Dr. Crawford and Dr. Fierro at bedside. Continue to titrate off Mat, discussed thoracentesis and perc gb drain scheduled for later this afternoon.

## 2024-04-18 NOTE — PROGRESS NOTES
Physical Therapy  Facility/Department: [facility]  Rehabilitation Physical Therapy Treatment Note    NAME: Jorge Luis Banda  : 1945 (78 y.o.)  MRN: [id number]  CODE STATUS: Full Code    Date of Service: 24     Pt currently functioning below baseline.  Recommend daily inpatient skilled therapy at time of discharge to maximize long term outcomes and prevent re-admission. Please refer to AM-PAC score for current level of function.     Restrictions:  Restrictions/Precautions: Fall Risk, General Precautions, Bedrest with Bathroom Privileges  Position Activity Restriction  Other position/activity restrictions: Telemetry, LUE IV, L sided weakness (s/p R CVA); R AKA;  R UE IV; Arterial Line (L radial);  PICC Line (R brachial);  2L Supplemental O2 nasal cannula     SUBJECTIVE  Subjective  Subjective: Patient up supine in bed. RN reports patient is medically stable for therapy treatment this date.    Chart reviewed prior to treatment and patient is agreeable for therapy.  All lines intact and patient positioned comfortably at end of treatment. BED alarm on and tested for patient safety.    OBJECTIVE  Cognition  Overall Cognitive Status: WNL  Arousal/Alertness: Appropriate responses to stimuli  Following Commands: Follows one step commands consistently  Attention Span: Attends with cues to redirect;Appears intact  Memory: Appears intact  Safety Judgement: Decreased awareness of need for assistance;Decreased awareness of need for safety  Problem Solving: Decreased awareness of errors;Assistance required to identify errors made;Assistance required to correct errors made;Assistance required to generate solutions;Assistance required to implement solutions  Insights: Decreased awareness of deficits  Initiation: Requires cues for some  Sequencing: Requires cues for some  Cognition Comment: Pleasant, cooperative;  Orientation  Overall Orientation Status: Within Normal Limits  Orientation Level: Oriented X4    Functional

## 2024-04-18 NOTE — PROGRESS NOTES
Pulmonary Critical Care Progress Note       Patient seen for the follow up of bilateral pleural effusions, hemoptysis     Subjective:  Patient has persistent abdominal pain.  No chest pain.  Still on oxygen at 2 L per he has been on Mat-Synephrine at 60 mcg's per minute.  He is also on heparin drip.  He previously went in to a-fib with RVR    Examination:  Vitals: /60   Pulse 84   Temp 97.5 °F (36.4 °C) (Temporal)   Resp 17   Ht 1.829 m (6')   Wt 74.9 kg (165 lb 2 oz)   SpO2 100%   BMI 22.39 kg/m²   General appearance: In no acute distress, alert and cooperative with exam cachectic  Neck: No JVD  Lungs: Decreased breath sound no crackles or wheeze  Heart: irregular rate and rhythm, S1, S2 normal, no gallop  Abdomen: Soft, non tender, + BS  Extremities: no cyanosis or clubbing. No significant edema, left upper and lower extremity weakness right above-knee amputation left foot dorsal ulcer    LABs:  CBC:   Recent Labs     04/16/24 0421 04/17/24  0300 04/18/24  0300   WBC 13.4* 18.8* 16.0*   HGB 8.1* 8.5* 8.1*   HCT 25.6* 26.8* 26.1*    383 469*       BMP:   Recent Labs     04/16/24 0421 04/17/24  0300 04/18/24  0300   * 134* 134*   K 3.7 3.7 3.3*   CO2 26 20 21   BUN 9 14 21   CREATININE 1.1 1.1 1.2   LABGLOM 68 68 61   GLUCOSE 89 88 110*       No results for input(s): \"APTT\" in the last 72 hours.     Latest Reference Range & Units 04/11/24 15:56 04/13/24 23:26 04/16/24 04:21   Procalcitonin 0.00 - 0.09 ng/mL 1.95 (H) 1.02 (H) 0.72 (H)   (H): Data is abnormally high   Latest Reference Range & Units 04/15/24 04:34   Pro-BNP <300 pg/mL 16,301 (H)   (H): Data is abnormally high  Radiology:  HIDA scan  The gallbladder is not visualized after morphine administration, consistent  with acute cholecystitis.  CT chest abdomen pelvis  1.  CT CHEST: Unchanged presumed bilateral pneumonia: Moderate volume right  pleural effusion and small volume left pleural effusion with areas of  consolidation

## 2024-04-18 NOTE — PROGRESS NOTES
Patient Name: Jorge Luis Banda  Date of admission: 4/3/2024  3:27 PM  Patient's age: 78 y.o., 1945  Admission Dx: Melena [K92.1]  GI bleed [K92.2]  UMM (acute kidney injury) (HCC) [N17.9]  Left leg cellulitis [L03.116]  Anemia, unspecified type [D64.9]    Reason for Consult: management recommendations  Requesting Physician: Sony Crawford MD    CHIEF COMPLAINT: GI bleeding    History Obtained From:  patient  INTERVAL HISTORY:    Patient seen and examined.  Clinically no significant changes.  No chest pain.    Labs were reviewed.    He has some episodes of hemoptysis.   Denies any other active bleeding.  No nausea or vomiting.      HISTORY OF PRESENT ILLNESS:    The patient is a 78 y.o.   male who is admitted to the hospital for anemia and suspicion for GI bleeding.  His hemoglobin was under 7 and received blood transfusion.  He has severe peripheral vascular disease on Plavix and Coumadin.  He underwent an EGD that showed gastritis and he refused colonoscopy.  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  From oncology perspective, the patient was diagnosed with stage I lung cancer in 2019 and underwent lung resection.  Never received any chemotherapy or radiation.  He has been in remission since then.  CT scan of the chest showed concerning changes in the liver and adrenal gland for metastatic disease.  Dedicated CT of the abdomen and pelvis is ordered and pending.    Past Medical History:   has a past medical history of Paroxysmal atrial fibrillation (HCC), Peripheral artery disease (HCC), and Primary hypertension.    Past Surgical History:   has a past surgical history that includes above knee amputation (Right, 01/01/2020) and Upper gastrointestinal endoscopy (N/A, 4/8/2024).     Medications:    Reviewed in Epic     Allergies:  Patient has no known allergies.    Social History:   reports that he  quit smoking about 4 years ago. He has never used smokeless tobacco. He reports current alcohol use of about 3.0 standard drinks of alcohol per week. He reports that he does not use drugs.     Family History: family history is not on file.    REVIEW OF SYSTEMS:    Constitutional: No fever or chills. No night sweats, no weight loss, fatigue, progressive weakness.  Eyes: No eye discharge, double vision, or eye pain   HEENT: negative for sore mouth, sore throat, hoarseness and voice change   Respiratory: negative for cough , sputum, dyspnea, wheezing, hemoptysis, chest pain   Cardiovascular: negative for chest pain, dyspnea, palpitations, orthopnea, PND   Gastrointestinal: negative for nausea, vomiting, diarrhea, constipation, abdominal pain, Dysphagia, hematemesis and hematochezia, patient denies any active bleeding.  Genitourinary: negative for frequency, dysuria, nocturia, urinary incontinence, and hematuria   Integument: negative for rash, skin lesions, bruises.   Hematologic/Lymphatic: negative for easy bruising, bleeding, lymphadenopathy, or petechiae   Endocrine: negative for heat or cold intolerance,weight changes, change in bowel habits and hair loss   Musculoskeletal: Diffuse aches and pains especially in the pelvis and lower extremities  Neurological: negative for headaches, dizziness, seizures, weakness, numbness    PHYSICAL EXAM:      /64   Pulse (!) 106   Temp 97.3 °F (36.3 °C) (Temporal)   Resp 14   Ht 1.829 m (6')   Wt 74.9 kg (165 lb 2 oz)   SpO2 99%   BMI 22.39 kg/m²    Temp (24hrs), Av.5 °F (36.4 °C), Min:97.1 °F (36.2 °C), Max:98.2 °F (36.8 °C)    General appearance ill-appearing, appears her stated age.  Cachectic  Mental status - alert and cooperative   Eyes - pupils equal and reactive, extraocular eye movements intact   Ears - bilateral TM's and external ear canals normal   Mouth - mucous membranes moist, pharynx normal without lesions   Neck - supple, no significant adenopathy

## 2024-04-18 NOTE — PROGRESS NOTES
[facility] CARE DEPARTMENT [facility]  PROGRESS NOTE    Room # 2031/2031-01   Name: Jorge Luis Banda               Reason for visit: Advanced Directives Consult    I visited the patient.    Admit Date & Time: 4/3/2024  3:27 PM    Assessment:  Jorge Luis Banda is a 78 y.o. male in the hospital because of a GI Bleed. Upon entering the room the patient was laying in bed resting, alert, and oriented (spoke softly, made little eye contact; no family members present).      Intervention:  I introduced myself and my title as chaplain Loyd from the spiritual care department and explained the purpose of my visit (adv dir doc). The patient politely asked if I could return at a later time, when his spouse is present. The writer stated he would honor his request and return at a later time.    Outcome:  The patient is thankful.     Plan:  Chaplains will remain available to offer spiritual and emotional support as needed.    Electronically signed by Chaplain Carey Jr, on 4/18/2024 at 8:32 AM.  Spiritual Care Department  [facility]     04/18/24 0828   Encounter Summary   Service Provided For: Patient   Referral/Consult From: Palliative Care   Support System Significant other   Last Encounter  04/18/24   Complexity of Encounter Moderate   Begin Time 0815   End Time  0825   Total Time Calculated 10 min   Palliative Care   Type Palliative Care, Follow-up   Advance Care Planning   Type ACP conversation   Assessment/Intervention/Outcome   Assessment Calm;Coping   Intervention Other (Comment)  (requested writer to return when wife is present.)   Outcome Expressed Gratitude   Plan and Referrals   Plan/Referrals Continue to visit, (comment)

## 2024-04-18 NOTE — PROGRESS NOTES
Ry Traylor MD   Urology Progress Note            Subjective: Follow-up urinary retention overflow incontinence    Patient Vitals for the past 24 hrs:   BP Temp Temp src Pulse Resp SpO2 Weight   04/18/24 0645 -- -- -- 92 13 99 % --   04/18/24 0630 -- -- -- 94 13 100 % --   04/18/24 0615 -- -- -- (!) 109 14 98 % --   04/18/24 0600 -- -- -- 99 16 99 % --   04/18/24 0556 -- -- -- -- 16 -- --   04/18/24 0545 -- -- -- (!) 102 13 98 % --   04/18/24 0530 -- -- -- (!) 106 20 98 % --   04/18/24 0515 -- -- -- (!) 103 24 98 % --   04/18/24 0500 -- -- -- 98 13 99 % --   04/18/24 0445 -- -- -- (!) 105 18 98 % --   04/18/24 0430 -- -- -- (!) 101 13 99 % --   04/18/24 0415 -- -- -- 97 14 98 % --   04/18/24 0400 (!) 122/57 97.1 °F (36.2 °C) Temporal 95 12 99 % 74.9 kg (165 lb 2 oz)   04/18/24 0345 -- -- -- (!) 101 15 98 % --   04/18/24 0330 -- -- -- 98 15 98 % --   04/18/24 0315 -- -- -- (!) 106 20 98 % --   04/18/24 0300 -- -- -- (!) 108 16 98 % --   04/18/24 0245 -- -- -- (!) 111 17 97 % --   04/18/24 0230 -- -- -- 100 16 95 % --   04/18/24 0215 -- -- -- (!) 109 20 96 % --   04/18/24 0200 -- -- -- (!) 102 16 98 % --   04/18/24 0145 -- -- -- 83 16 97 % --   04/18/24 0130 -- -- -- 83 17 98 % --   04/18/24 0115 -- -- -- 88 14 94 % --   04/18/24 0100 -- -- -- 75 14 98 % --   04/18/24 0045 -- -- -- 74 14 99 % --   04/18/24 0030 -- -- -- 84 16 97 % --   04/18/24 0015 -- -- -- 85 18 96 % --   04/18/24 0000 (!) 124/52 97.1 °F (36.2 °C) Temporal 76 17 95 % --   04/17/24 2345 -- -- -- 80 15 95 % --   04/17/24 2330 -- -- -- 77 18 97 % --   04/17/24 2315 -- -- -- 82 16 96 % --   04/17/24 2300 -- -- -- 74 17 97 % --   04/17/24 2245 -- -- -- 78 15 98 % --   04/17/24 2230 -- -- -- 85 18 98 % --   04/17/24 2215 -- -- -- 79 20 99 % --   04/17/24 2200 -- -- -- 82 22 98 % --   04/17/24 2145 -- -- -- 79 19 97 % --   04/17/24 2130 -- -- -- 80 18 96 % --   04/17/24 2115 -- -- -- 78 16 97 % --   04/17/24 2100 -- -- --

## 2024-04-18 NOTE — PROGRESS NOTES
[facility] Neurology   IN-PATIENT SERVICE   [facility]    Progress note             Date:   4/18/2024  Patient name:  Jorge Luis Banda  Date of admission:  4/3/2024  3:27 PM  MRN:   [id number]  Account:  [id number]  YOB: 1945  PCP:    Lucila Machado APRN - CNP  Room:   2031/2031-01  Code Status:    Full Code    Chief Complaint:     Chief Complaint   Patient presents with    Leg Pain     Left leg, open wounds and swollen         Interval hx:   The Patient was seen and examined at bedside  Patient met with palliative care. Wants to remain FULL CODE.  S/p HIDA scan showing acute cholecystitis.   Discussed with nurse at bedside. Weaning laine. BP has been more stable.     CT head WO 4/14/24-->surprisingly right ICA territory watershed not much progressed patient does have bilateral watershed hypodensity overall however mostly stable CT head WO as compared to recent MRI   IMPRESSION:  No new acute intracranial abnormality.  Unchanged right frontal infarcts.      Brief History of Present Illness:   The patient is a 78 y.o.  Non- / non  male who Initially presented to [facility] ER 4/3/24 with Left leg pain and complicated wound healing with chronic Cellulitis for a month now. PMH significant for Paroxysmal A-fib, Peripheral artery disease, HTN. Patient is known vasculopath with poor circulation and complicated wound healing related this in fact s/p Right BKA already. [facility]. Patient is s/p bilateral common femoral endarterectomies, profundal plasty, right profunda stent placement and angioplasty and femorofemoral bypass and subsequently right femoral-popliteal bypass due to occlusion of SFA. Patient noted to have critical low hgb 6.6-->6.2 4/3/24 22:26 and s/p 2 units PRBC at that time improving to 8.4. Patient again dropping hgb 6.8 4/12/24 and received an additional unit PRBC for total of 3 units PRBC this admission. Patient is  PULMONOLOGY  IP CONSULT TO NEUROLOGY  IP CONSULT TO GI  IP CONSULT TO VASCULAR SURGERY  IP CONSULT TO CARDIOLOGY  IP CONSULT TO PALLIATIVE CARE  IP CONSULT TO SPIRITUAL SERVICES  IP CONSULT TO GENERAL SURGERY    Patient is admitted as inpatient status because of co-morbidities listed above, severity of signs and symptoms as outlined, requirement for current medical therapies and most importantly because of direct risk to patient if care not provided in a hospital setting.    Tabby Lynn DO  4/18/2024  10:31 AM    Copy sent to Lucila Montenegro, POONAM - CNP

## 2024-04-18 NOTE — FLOWSHEET NOTE
04/18/24 1754   Treatment Team Notification   Reason for Communication Review case   Name of Team Member Notified Dr. Crawford   Treatment Team Role Attending Provider   Method of Communication Secure Message  (Could you place an order for cathflo for PICC, as 1 lumen appears to be occluded, and 2nd is sluggish)   Response Waiting for response     Patient's white PICC lumen appears to be occluded, and the gray lumen is sluggish. Messaged Dr. Crawford requesting Cathflo. Waiting for response.

## 2024-04-18 NOTE — PLAN OF CARE
Problem: Safety - Adult  Goal: Free from fall injury  Recent Flowsheet Documentation  Taken 4/17/2024 1900 by Judi Tran  Free From Fall Injury: Instruct family/caregiver on patient safety  4/17/2024 1219 by Aida Matos RN  Outcome: Progressing     Problem: Discharge Planning  Goal: Discharge to home or other facility with appropriate resources  4/17/2024 1219 by Aida Matos RN  Outcome: Progressing     Problem: Pain  Goal: Verbalizes/displays adequate comfort level or baseline comfort level  4/17/2024 1219 by Aida Matos RN  Outcome: Progressing     Problem: Skin/Tissue Integrity - Adult  Goal: Incisions, wounds, or drain sites healing without S/S of infection  Recent Flowsheet Documentation  Taken 4/17/2024 1900 by Judi Tran  Incisions, Wounds, or Drain Sites Healing Without Sign and Symptoms of Infection:   TWICE DAILY: Assess and document skin integrity   TWICE DAILY: Assess and document dressing/incision, wound bed, drain sites and surrounding tissue   Implement wound care per orders  4/17/2024 1219 by Aida Matos RN  Outcome: Progressing     Problem: Gastrointestinal - Adult  Goal: Minimal or absence of nausea and vomiting  4/17/2024 1219 by Aida Matos RN  Outcome: Progressing     Problem: Hematologic - Adult  Goal: Maintains hematologic stability  4/17/2024 1219 by Aida Matos RN  Outcome: Progressing     Problem: ABCDS Injury Assessment  Goal: Absence of physical injury  Recent Flowsheet Documentation  Taken 4/17/2024 1900 by Judi Tran  Absence of Physical Injury: Implement safety measures based on patient assessment  4/17/2024 1219 by Aida Matos RN  Outcome: Progressing     Problem: Chronic Conditions and Co-morbidities  Goal: Patient's chronic conditions and co-morbidity symptoms are monitored and maintained or improved  4/17/2024 1219 by Aida Matos RN  Outcome: Progressing     Problem: Genitourinary - Adult  Goal: Absence of urinary

## 2024-04-18 NOTE — PROGRESS NOTES
VASCULAR SURGERY  PROGRESS NOTE      4/18/2024 1:17 PM  Subjective:   Admit Date: 4/3/2024  PCP: Lucila Machado APRN - VOLODYMYR    Chief Complaint   Patient presents with    Leg Pain     Left leg, open wounds and swollen       Interval History: No complaints.  Plans for thoracentesis and cholecystostomy tube noted.    Diet: Diet NPO Exceptions are: Ice Chips, Sips of Water with Meds    Medications:   Scheduled Meds:   piperacillin-tazobactam  3,375 mg IntraVENous Q8H    midodrine  10 mg Oral TID WC    sodium chloride  80 mL IntraVENous Once    apixaban  5 mg Oral BID    pantoprazole  40 mg Oral BID AC    gabapentin  100 mg Oral TID    empagliflozin  10 mg Oral Daily    sodium chloride  80 mL IntraVENous Once    [Held by provider] metoprolol tartrate  25 mg Oral BID    aspirin  81 mg Oral Daily    [Held by provider] furosemide  20 mg IntraVENous BID    sodium chloride  100 mL IntraVENous Once    guaiFENesin  600 mg Oral BID    ferrous sulfate  325 mg Oral Daily with breakfast    atorvastatin  40 mg Oral Nightly    vitamin D3  5,000 Units Oral Daily    vitamin B-12  1,000 mcg Oral Daily    tamsulosin  0.4 mg Oral Daily    sodium chloride flush  5-40 mL IntraVENous 2 times per day     Continuous Infusions:   sodium chloride Stopped (04/18/24 1227)    phenylephrine 30 mcg/min (04/18/24 1311)    sodium chloride      sodium chloride Stopped (04/18/24 0538)    dextrose           Labs:   CBC:   Recent Labs     04/16/24  0421 04/17/24  0300 04/18/24  0300   WBC 13.4* 18.8* 16.0*   HGB 8.1* 8.5* 8.1*    383 469*       BMP:    Recent Labs     04/16/24  0421 04/17/24  0300 04/18/24  0300   * 134* 134*   K 3.7 3.7 3.3*   CL 97* 96* 98   CO2 26 20 21   BUN 9 14 21   CREATININE 1.1 1.1 1.2   GLUCOSE 89 88 110*       Hepatic:   Recent Labs     04/17/24  0300   AST 35   ALT 15   BILITOT 0.5   ALKPHOS 295*       Troponin: Invalid input(s): \"TROPONIN\"  BNP: No results for input(s): \"BNP\" in the last 72 hours.  Lipids:    No results for input(s): \"CHOL\", \"HDL\" in the last 72 hours.    Invalid input(s): \"LDLCALCU\"    INR: No results for input(s): \"INR\" in the last 72 hours.    Objective:   Vitals: /60   Pulse 84   Temp 97.5 °F (36.4 °C) (Temporal)   Resp 17   Ht 1.829 m (6')   Wt 74.9 kg (165 lb 2 oz)   SpO2 100%   BMI 22.39 kg/m²   General appearance: alert, cooperative and no distress  Mental Status: oriented to person, place and time with normal affect  Neck: good carotid pulses, no JVD  Lungs: clear to auscultation bilaterally, normal effort  Heart: regular rate and rhythm, no murmur,  Abdomen: soft, non-tender, non-distended, bowel sounds present all four quadrants, no masses, hepatomegaly, splenomegaly or aortic enlargement  Extremities: Right AKA site healed, dry ulcer dorsal aspect of the left foot  Neurologic: Left arm paraplegia, left lower extremity weakness  Skin: no gross lesions, rashes, or induration    Assessment:   78-year-old male with 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 weakness  Severe 90% right internal carotid artery stenosis  Severe 80% left internal carotid artery stenosis with long segment of plaque involving the mid to distal common carotid  Severe peripheral arterial disease with patent right axillobifemoral bypass  GI bleed  Atrial fibrillation  Right AKA  6.5 cm right adrenal mass  Positive HIDA scan    Patient Active Problem List:     Skin eschar     Stenosis of left carotid artery     History of arterial bypass of lower extremity     S/P AKA (above knee amputation) unilateral, right (HCC)     Primary hypertension     Persistent wound pain     Peripheral arterial disease (HCC)     Ulcer of left foot (HCC)     Neuropathy     Malignant neoplasm of lung (HCC)     Intermittent claudication of left lower extremity due to atherosclerosis (HCC)     Frequency of urination     Former smoker     Embolism and thrombosis of artery (HCC)     Dyslipidemia     Closed fracture of

## 2024-04-18 NOTE — PROGRESS NOTES
End Of Shift Note  [facility]  Summary of shift: Patient had an uneventful day. Successfully titrated off Mat by 1500, BP/VS remained stable, see flowsheet. Procedures today (thoracentesis and perc gallbladder drain) rescheduled for tomorrow morning per IR- Eliquis and Aspirin auto held by Dr. Crawford until procedures complete- NPO at midnight tonight. Diet advanced today to clear liquids, patient tolerating well. Vascular, GenSurg, and Pulm waiting until procedures complete for further POC. Infectious Disease consult ordered today. Patient unwilling to discuss palliative care at this time. Updated wife, Rita, per patient request.     Vitals:    Vitals:    04/18/24 1430 04/18/24 1500 04/18/24 1600 04/18/24 1605   BP:   (!) 116/59 (!) 116/59   Pulse: 79 79 77 83   Resp: 13 19 14 17   Temp:   97.1 °F (36.2 °C)    TempSrc:   Temporal    SpO2: 100% 100% 100% 100%   Weight:       Height:            I&O:   Intake/Output Summary (Last 24 hours) at 4/18/2024 1641  Last data filed at 4/18/2024 1529  Gross per 24 hour   Intake 2769.01 ml   Output 1475 ml   Net 1294.01 ml       Resp Status: 2 L NC    Ventilator Settings:     / / /FiO2 : 94 %    Critical Care IV infusions:   sodium chloride 75 mL/hr at 04/18/24 1529    phenylephrine Stopped (04/18/24 1445)    sodium chloride      sodium chloride Stopped (04/18/24 0538)    dextrose          LDA:   PICC Triple Lumen 04/14/24 Right Brachial (Active)   Number of days: 4       Peripheral IV 04/11/24 Right Forearm (Active)   Number of days: 7       Peripheral IV 04/13/24 Left;Posterior;Proximal Forearm (Active)   Number of days: 5       Arterial Line 04/14/24 Left Radial (Active)   Number of days: 4       Wound 04/04/24 Foot Left;Dorsal dry, scabbed (Active)   Number of days: 14       Wound Foot Left;Lateral (Active)   Number of days:

## 2024-04-18 NOTE — PROGRESS NOTES
Latest Reference Range & Units 04/18/24 03:00   Potassium 3.7 - 5.3 mmol/L 3.3 (L)   TY Haro paged for IV K sup due to patient feeling nauseous. Orders to treat and then try PO sup.

## 2024-04-18 NOTE — PROGRESS NOTES
normal. Normal wall thickness. Severe hypokinesis of the apex. Grade II diastolic dysfunction with increased LAP.    Aortic Valve: Trileaflet valve. Mildly calcified cusp.    Mitral Valve: Mildly calcified leaflet. Mild regurgitation.    Tricuspid Valve: Moderate regurgitation. Moderately elevated RVSP, consistent with moderate pulmonary hypertension.            Vital Signs:  /60   Pulse 79   Temp 97.5 °F (36.4 °C) (Temporal)   Resp 13   Ht 1.829 m (6')   Wt 74.9 kg (165 lb 2 oz)   SpO2 100%   BMI 22.39 kg/m²     Pertinent Laboratory StudiesReviewed by Me Personally Today:  Lab Results   Component Value Date    WBC 16.0 (H) 2024    HGB 8.1 (L) 2024    HCT 26.1 (L) 2024    MCV 92.2 2024     (H) 2024     Lab Results   Component Value Date/Time     2024 03:00 AM    K 3.3 2024 03:00 AM    CL 98 2024 03:00 AM    CO2 21 2024 03:00 AM    BUN 21 2024 03:00 AM    CREATININE 1.2 2024 03:00 AM    GLUCOSE 110 2024 03:00 AM    CALCIUM 8.2 2024 03:00 AM         has a past medical history of Paroxysmal atrial fibrillation (HCC), Peripheral artery disease (HCC), and Primary hypertension.    No family history on file.    Social History     Tobacco Use    Smoking status: Former     Current packs/day: 0.00     Types: Cigarettes     Quit date: 2019     Years since quittin.9    Smokeless tobacco: Never   Vaping Use    Vaping Use: Never used   Substance Use Topics    Alcohol use: Yes     Alcohol/week: 3.0 standard drinks of alcohol     Types: 3 Cans of beer per week     Comment: every once in a while    Drug use: Never     REVIEW OF SYSTEMS  CONSTITUTIONAL: + fatigue, weight loss negative for fevers, chills, sweats  HEENT:  negative for vision, hearing changes, runny nose, throat pain  RESPIRATORY: + shortness of breath, cough negative for congestion, wheezing  CARDIOVASCULAR:  negative for chest pain,  time in talking with family, discussing with patient, chart review, and writing note: 32  Complexity: 2        Electronically signed by      LEXX May  Hospice/Palliative Care  [facility], [place], [place]  4/18/2024 2:53 PM  Palliative Care Office [phone]

## 2024-04-18 NOTE — PROGRESS NOTES
Dr SHAHIDA Dowling calls unit for patient update and to review HIDA scan results. Orders for IR to place Perc Gallbladder Drain.

## 2024-04-18 NOTE — PROGRESS NOTES
[facility]  Office: [phone]  Chinmay Ramos DO, Jose Atkinson DO, Guevara Ramirez DO, Pranay Cooper, DO, Sumeet Eller MD, Karen Carvajal MD, Sony Crawford MD, Poonam Platt MD,  Stephan San MD, Marcello Daniels MD, Theodore Pascual MD,  Juve Mahmood DO, Matias Mckeon MD, Hero Tavarez MD, Valentin Ramos DO, Jazzy Spence MD,  Trino Handley DO, Flavia Leonardo MD, Vanessa Soriano MD, Pricilla Akers MD, Casimiro Knight MD,  Zeke Maciel MD, Guera Cervantes MD, Kianna Martin MD, Mirlande Worrell MD, Ney Garcia MD, Savannah Dominguez MD, Ed Emerson DO, Jaylen West DO, Comfort Carcamo MD,  Siva Ashley MD, Shirley Waterhouse, CNP,  Carolyn Pelaez CNP, Scotty Ge, CNP,  Wandy Trivedi, DNP, Malissa Gaston, CNP, Lana Penaloza, CNP, Susan Haro, CNP, Sherrell Mendoza, CNP, Suzy Morales, PA-C, Dalila Lange PA-C, Sada Matt, CNP, Crystal Womack, CNP, Sheng Reddy, CNP, Arielle Guy, CNP, Linsey Perez, CNP, Anju Cho, CNS, Hoa Donahue, CNP, Cassidy Ferrer CNP, Tracy Schwab, CNP       [facility]      Daily Progress Note     Admit Date: 4/3/2024  Bed/Room No.  2031/2031-01  Admitting Physician : Sony Crawford MD  Code Status :Full Code  Hospital Day:  LOS: 15 days   Chief Complaint:     Chief Complaint   Patient presents with    Leg Pain     Left leg, open wounds and swollen       Principal Problem:    Melena  Active Problems:    History of arterial bypass of lower extremity    S/P AKA (above knee amputation) unilateral, right (HCC)    Primary hypertension    Peripheral arterial disease (HCC)    Neuropathy    Malignant neoplasm of lung (HCC)    Intermittent claudication of left lower extremity due to atherosclerosis (HCC)    Embolism and thrombosis of artery (HCC)    Left leg cellulitis    Iron deficiency anemia due to chronic blood loss    Hx of cancer of lung    Adrenal mass (HCC)    Claudication (HCC)    Pleural effusion, bilateral     physiologic Doppler at rest   Final Result      XR FEMUR RIGHT (MIN 2 VIEWS)   Final Result   Status post above the knee amputation of the right lower extremity to the   level of the distal femoral diaphysis.  No soft tissue gas.      No cortical destruction or acute osseous abnormalities.         Vascular duplex lower extremity venous left   Final Result      XR FOOT LEFT (MIN 3 VIEWS)   Final Result   No radiographic evidence for osteomyelitis. If there is further clinical   concern, MRI is recommended.         IR GUIDED THORACENTESIS PLEURAL    (Results Pending)   IR CHOLECYSTOSTOMY PERCUTANEOUS COMPLETE    (Results Pending)      Echo 4/5/24    Left Ventricle: Mildly reduced left ventricular systolic function with a visually estimated EF of 45 - 50%. Left ventricle size is normal. Normal wall thickness. Severe hypokinesis of the apex. Grade II diastolic dysfunction with increased LAP.    Aortic Valve: Trileaflet valve. Mildly calcified cusp.    Mitral Valve: Mildly calcified leaflet. Mild regurgitation.    Tricuspid Valve: Moderate regurgitation. Moderately elevated RVSP, consistent with moderate pulmonary hypertension.      Clinical Course : unchanged  Assessment and Plan  :        Severe anemia  S/p PRBC transfusion -2 units on 4/3/2024 and 1 unit on 4/12/2024.  Coumadin and Plavix stopped  No source of bleeding identified on EGD.  Unable to tolerate bowel prep for colonoscopy.  Given hemoglobin has been stable and patient has multiple comorbidities, will recommend deferring colonoscopy to outpatient.  Will stop heparin infusion and start patient on Eliquis and monitor hemoglobin close.   Vascular surgery on board for carotid stenosis and planning endarterectomy after 4 to 6 weeks..  Gastroenterology on board (following distantly) .  Severe peripheral vascular disease  S/p multiple bypass surgeries.  Follows Dr. Ledbetter at [facility].  Anticoagulation and antiplatelet medication were held due to severe

## 2024-04-18 NOTE — PROGRESS NOTES
GASTROENTEROLOGY NOTE       Patient:   Jorge Luis Banda   :    1945   Facility:   [facility]  Date:     2024  Consultant:   Vladimir Hoffmann MD, DO      SUBJECTIVE:    78 y.o. male admitted 4/3/2024 with Melena [K92.1]  GI bleed [K92.2]  UMM (acute kidney injury) (HCC) [N17.9]  Left leg cellulitis [L03.116]  Anemia, unspecified type [D64.9].      Patient still with largely unchanged abdominal pain.  HIDA yesterday positive.  I spoke with surgery with plans in progress for IR, drainage tube today.  Last BM 2 days ago.  No signs of overt GIB.          OBJECTIVE:   Vital Signs:  /60   Pulse (!) 101   Temp 97.5 °F (36.4 °C) (Temporal)   Resp 11   Ht 1.829 m (6')   Wt 74.9 kg (165 lb 2 oz)   SpO2 100%   BMI 22.39 kg/m²      Physical Exam:   General appearance: Alert, NAD  Abdomen: Soft, tender upper abdomen, ND +BS, no masses  Skin/Musculoskeletal:  No jaundice. No clubbing, cyanosis, or edema.  Right BKA.      Lab and Imaging Review   Recent Labs     24  0421 24  0300 24  0300   WBC 13.4* 18.8* 16.0*   HGB 8.1* 8.5* 8.1*   MCV 92.1 94.0 92.2    383 469*   * 134* 134*   K 3.7 3.7 3.3*   CL 97* 96* 98   CO2 26 20 21   BUN 9 14 21   CREATININE 1.1 1.1 1.2   GLUCOSE 89 88 110*   CALCIUM 7.8* 8.0* 8.2*   PROT  --  5.9*  --    AST  --  35  --    ALT  --  15  --    ALKPHOS  --  295*  --    BILITOT  --  0.5  --    BILIDIR  --  0.3*  --    MG  --  2.2 2.3     No results for input(s): \"INR\", \"PROTIME\" in the last 72 hours.      Impression:    RUQ pain with positive HIDA c/w cholecystitis.  2.   Anemia, stable. Colonoscopy on hold given inability to hold anticoagulation with recent CVA when it was held for EGD. No signs of further overt GIB.       PLAN:    Surgery consulted.  IR, percutenous dhiraj tube ordered while off anticoagulation.  Thoracentesis today as well.  Colonoscopy only if able to hold A/C. Risk likely outweighs benefit at this time given  respiratory issues and other medical problems.   Discussed with nurse.      Electronically signed by Vladimir Hoffmann MD, D.O on 4/18/2024 at 12:11 PM

## 2024-04-18 NOTE — PROGRESS NOTES
Dr TY Hoffmann calls the unit for patient update and to review results of HIDA scan with writer. Orders received for Gen Surg consult to be completed by Dr Hoffmann via perfect serve.

## 2024-04-19 ENCOUNTER — APPOINTMENT (OUTPATIENT)
Dept: GENERAL RADIOLOGY | Age: 79
DRG: 377 | End: 2024-04-19
Payer: MEDICARE

## 2024-04-19 ENCOUNTER — APPOINTMENT (OUTPATIENT)
Dept: INTERVENTIONAL RADIOLOGY/VASCULAR | Age: 79
DRG: 377 | End: 2024-04-19
Payer: MEDICARE

## 2024-04-19 LAB
ANION GAP SERPL CALCULATED.3IONS-SCNC: 9 MMOL/L (ref 9–17)
BNP SERPL-MCNC: 6249 PG/ML
BUN SERPL-MCNC: 23 MG/DL (ref 8–23)
BUN/CREAT SERPL: 23 (ref 9–20)
CALCIUM SERPL-MCNC: 8.2 MG/DL (ref 8.6–10.4)
CHLORIDE SERPL-SCNC: 106 MMOL/L (ref 98–107)
CO2 SERPL-SCNC: 24 MMOL/L (ref 20–31)
CREAT SERPL-MCNC: 1 MG/DL (ref 0.7–1.2)
GFR SERPL CREATININE-BSD FRML MDRD: 76 ML/MIN/1.73M2
GLUCOSE FLD-MCNC: 95 MG/DL
GLUCOSE SERPL-MCNC: 99 MG/DL (ref 70–99)
HCT VFR BLD AUTO: 29 % (ref 40.7–50.3)
HGB BLD-MCNC: 8.6 G/DL (ref 13–17)
LDH FLD L TO P-CCNC: 111 U/L
PH FLUID: 8
POTASSIUM SERPL-SCNC: 3.2 MMOL/L (ref 3.7–5.3)
POTASSIUM SERPL-SCNC: 3.5 MMOL/L (ref 3.7–5.3)
PROT FLD-MCNC: 1.8 G/DL
SODIUM SERPL-SCNC: 139 MMOL/L (ref 135–144)
SPECIMEN TYPE: NORMAL

## 2024-04-19 PROCEDURE — 99232 SBSQ HOSP IP/OBS MODERATE 35: CPT | Performed by: PSYCHIATRY & NEUROLOGY

## 2024-04-19 PROCEDURE — 99213 OFFICE O/P EST LOW 20 MIN: CPT

## 2024-04-19 PROCEDURE — 2700000000 HC OXYGEN THERAPY PER DAY

## 2024-04-19 PROCEDURE — 87206 SMEAR FLUORESCENT/ACID STAI: CPT

## 2024-04-19 PROCEDURE — 47490 INCISION OF GALLBLADDER: CPT

## 2024-04-19 PROCEDURE — 87070 CULTURE OTHR SPECIMN AEROBIC: CPT

## 2024-04-19 PROCEDURE — 80048 BASIC METABOLIC PNL TOTAL CA: CPT

## 2024-04-19 PROCEDURE — C1729 CATH, DRAINAGE: HCPCS

## 2024-04-19 PROCEDURE — 99153 MOD SED SAME PHYS/QHP EA: CPT

## 2024-04-19 PROCEDURE — 0F9430Z DRAINAGE OF GALLBLADDER WITH DRAINAGE DEVICE, PERCUTANEOUS APPROACH: ICD-10-PCS | Performed by: SURGERY

## 2024-04-19 PROCEDURE — 51798 US URINE CAPACITY MEASURE: CPT

## 2024-04-19 PROCEDURE — 99232 SBSQ HOSP IP/OBS MODERATE 35: CPT | Performed by: FAMILY MEDICINE

## 2024-04-19 PROCEDURE — 87116 MYCOBACTERIA CULTURE: CPT

## 2024-04-19 PROCEDURE — 6370000000 HC RX 637 (ALT 250 FOR IP): Performed by: FAMILY MEDICINE

## 2024-04-19 PROCEDURE — 87075 CULTR BACTERIA EXCEPT BLOOD: CPT

## 2024-04-19 PROCEDURE — 88305 TISSUE EXAM BY PATHOLOGIST: CPT

## 2024-04-19 PROCEDURE — 6360000002 HC RX W HCPCS

## 2024-04-19 PROCEDURE — 2000000000 HC ICU R&B

## 2024-04-19 PROCEDURE — 88112 CYTOPATH CELL ENHANCE TECH: CPT

## 2024-04-19 PROCEDURE — 84132 ASSAY OF SERUM POTASSIUM: CPT

## 2024-04-19 PROCEDURE — 71045 X-RAY EXAM CHEST 1 VIEW: CPT

## 2024-04-19 PROCEDURE — 6360000002 HC RX W HCPCS: Performed by: INTERNAL MEDICINE

## 2024-04-19 PROCEDURE — 6370000000 HC RX 637 (ALT 250 FOR IP): Performed by: INTERNAL MEDICINE

## 2024-04-19 PROCEDURE — 6360000002 HC RX W HCPCS: Performed by: NURSE PRACTITIONER

## 2024-04-19 PROCEDURE — 94761 N-INVAS EAR/PLS OXIMETRY MLT: CPT

## 2024-04-19 PROCEDURE — 88341 IMHCHEM/IMCYTCHM EA ADD ANTB: CPT

## 2024-04-19 PROCEDURE — 88342 IMHCHEM/IMCYTCHM 1ST ANTB: CPT

## 2024-04-19 PROCEDURE — 92611 MOTION FLUOROSCOPY/SWALLOW: CPT

## 2024-04-19 PROCEDURE — 36415 COLL VENOUS BLD VENIPUNCTURE: CPT

## 2024-04-19 PROCEDURE — 85014 HEMATOCRIT: CPT

## 2024-04-19 PROCEDURE — 83986 ASSAY PH BODY FLUID NOS: CPT

## 2024-04-19 PROCEDURE — 87015 SPECIMEN INFECT AGNT CONCNTJ: CPT

## 2024-04-19 PROCEDURE — 2580000003 HC RX 258: Performed by: INTERNAL MEDICINE

## 2024-04-19 PROCEDURE — 99232 SBSQ HOSP IP/OBS MODERATE 35: CPT | Performed by: INTERNAL MEDICINE

## 2024-04-19 PROCEDURE — 85018 HEMOGLOBIN: CPT

## 2024-04-19 PROCEDURE — 87205 SMEAR GRAM STAIN: CPT

## 2024-04-19 PROCEDURE — 87102 FUNGUS ISOLATION CULTURE: CPT

## 2024-04-19 PROCEDURE — 83615 LACTATE (LD) (LDH) ENZYME: CPT

## 2024-04-19 PROCEDURE — 2580000003 HC RX 258: Performed by: FAMILY MEDICINE

## 2024-04-19 PROCEDURE — 6360000002 HC RX W HCPCS: Performed by: FAMILY MEDICINE

## 2024-04-19 PROCEDURE — 82945 GLUCOSE OTHER FLUID: CPT

## 2024-04-19 PROCEDURE — 6360000002 HC RX W HCPCS: Performed by: RADIOLOGY

## 2024-04-19 PROCEDURE — 89051 BODY FLUID CELL COUNT: CPT

## 2024-04-19 PROCEDURE — 99152 MOD SED SAME PHYS/QHP 5/>YRS: CPT

## 2024-04-19 PROCEDURE — 83880 ASSAY OF NATRIURETIC PEPTIDE: CPT

## 2024-04-19 PROCEDURE — 32555 ASPIRATE PLEURA W/ IMAGING: CPT

## 2024-04-19 PROCEDURE — 6360000004 HC RX CONTRAST MEDICATION: Performed by: RADIOLOGY

## 2024-04-19 PROCEDURE — 84157 ASSAY OF PROTEIN OTHER: CPT

## 2024-04-19 PROCEDURE — 99223 1ST HOSP IP/OBS HIGH 75: CPT | Performed by: INTERNAL MEDICINE

## 2024-04-19 PROCEDURE — 74230 X-RAY XM SWLNG FUNCJ C+: CPT

## 2024-04-19 RX ORDER — FENTANYL CITRATE 0.05 MG/ML
INJECTION, SOLUTION INTRAMUSCULAR; INTRAVENOUS PRN
Status: COMPLETED | OUTPATIENT
Start: 2024-04-19 | End: 2024-04-19

## 2024-04-19 RX ORDER — MORPHINE SULFATE 2 MG/ML
2 INJECTION, SOLUTION INTRAMUSCULAR; INTRAVENOUS
Status: DISCONTINUED | OUTPATIENT
Start: 2024-04-19 | End: 2024-04-23

## 2024-04-19 RX ADMIN — MIDODRINE HYDROCHLORIDE 10 MG: 10 TABLET ORAL at 17:01

## 2024-04-19 RX ADMIN — POTASSIUM CHLORIDE 10 MEQ: 7.46 INJECTION, SOLUTION INTRAVENOUS at 09:48

## 2024-04-19 RX ADMIN — SODIUM CHLORIDE: 9 INJECTION, SOLUTION INTRAVENOUS at 09:51

## 2024-04-19 RX ADMIN — MORPHINE SULFATE 2 MG: 2 INJECTION, SOLUTION INTRAMUSCULAR; INTRAVENOUS at 05:00

## 2024-04-19 RX ADMIN — POTASSIUM CHLORIDE 10 MEQ: 7.46 INJECTION, SOLUTION INTRAVENOUS at 13:30

## 2024-04-19 RX ADMIN — GABAPENTIN 100 MG: 100 CAPSULE ORAL at 15:53

## 2024-04-19 RX ADMIN — FENTANYL CITRATE 25 MCG: 0.05 INJECTION, SOLUTION INTRAMUSCULAR; INTRAVENOUS at 13:07

## 2024-04-19 RX ADMIN — SODIUM CHLORIDE, PRESERVATIVE FREE 10 ML: 5 INJECTION INTRAVENOUS at 21:59

## 2024-04-19 RX ADMIN — ALTEPLASE 1 MG: 2.2 INJECTION, POWDER, LYOPHILIZED, FOR SOLUTION INTRAVENOUS at 18:21

## 2024-04-19 RX ADMIN — POTASSIUM CHLORIDE 10 MEQ: 7.46 INJECTION, SOLUTION INTRAVENOUS at 08:26

## 2024-04-19 RX ADMIN — FUROSEMIDE 20 MG: 10 INJECTION, SOLUTION INTRAMUSCULAR; INTRAVENOUS at 17:32

## 2024-04-19 RX ADMIN — MORPHINE SULFATE 2 MG: 2 INJECTION, SOLUTION INTRAMUSCULAR; INTRAVENOUS at 20:35

## 2024-04-19 RX ADMIN — POTASSIUM CHLORIDE 40 MEQ: 1500 TABLET, EXTENDED RELEASE ORAL at 20:44

## 2024-04-19 RX ADMIN — PIPERACILLIN AND TAZOBACTAM 3375 MG: 3; .375 INJECTION, POWDER, LYOPHILIZED, FOR SOLUTION INTRAVENOUS at 01:50

## 2024-04-19 RX ADMIN — TAMSULOSIN HYDROCHLORIDE 0.4 MG: 0.4 CAPSULE ORAL at 08:34

## 2024-04-19 RX ADMIN — PIPERACILLIN AND TAZOBACTAM 3375 MG: 3; .375 INJECTION, POWDER, LYOPHILIZED, FOR SOLUTION INTRAVENOUS at 17:37

## 2024-04-19 RX ADMIN — GABAPENTIN 100 MG: 100 CAPSULE ORAL at 20:35

## 2024-04-19 RX ADMIN — APIXABAN 5 MG: 5 TABLET, FILM COATED ORAL at 20:35

## 2024-04-19 RX ADMIN — PIPERACILLIN AND TAZOBACTAM 3375 MG: 3; .375 INJECTION, POWDER, LYOPHILIZED, FOR SOLUTION INTRAVENOUS at 09:58

## 2024-04-19 RX ADMIN — SODIUM CHLORIDE, PRESERVATIVE FREE 10 ML: 5 INJECTION INTRAVENOUS at 08:34

## 2024-04-19 RX ADMIN — GUAIFENESIN 600 MG: 600 TABLET ORAL at 20:35

## 2024-04-19 RX ADMIN — IOPAMIDOL 10 ML: 612 INJECTION, SOLUTION INTRAVENOUS at 12:40

## 2024-04-19 RX ADMIN — POTASSIUM CHLORIDE 10 MEQ: 7.46 INJECTION, SOLUTION INTRAVENOUS at 10:55

## 2024-04-19 RX ADMIN — ATORVASTATIN CALCIUM 40 MG: 40 TABLET, FILM COATED ORAL at 20:35

## 2024-04-19 RX ADMIN — SODIUM CHLORIDE: 9 INJECTION, SOLUTION INTRAVENOUS at 20:52

## 2024-04-19 RX ADMIN — SODIUM CHLORIDE: 9 INJECTION, SOLUTION INTRAVENOUS at 04:55

## 2024-04-19 RX ADMIN — ALTEPLASE 2 MG: 2.2 INJECTION, POWDER, LYOPHILIZED, FOR SOLUTION INTRAVENOUS at 15:58

## 2024-04-19 RX ADMIN — PANTOPRAZOLE SODIUM 40 MG: 40 TABLET, DELAYED RELEASE ORAL at 15:53

## 2024-04-19 ASSESSMENT — PAIN SCALES - GENERAL
PAINLEVEL_OUTOF10: 9
PAINLEVEL_OUTOF10: 8
PAINLEVEL_OUTOF10: 0
PAINLEVEL_OUTOF10: 7
PAINLEVEL_OUTOF10: 9

## 2024-04-19 ASSESSMENT — PAIN DESCRIPTION - DESCRIPTORS
DESCRIPTORS: SHARP;ACHING
DESCRIPTORS: SHARP
DESCRIPTORS: SHARP;ACHING
DESCRIPTORS: ACHING

## 2024-04-19 ASSESSMENT — PAIN DESCRIPTION - FREQUENCY
FREQUENCY: CONTINUOUS
FREQUENCY: CONTINUOUS

## 2024-04-19 ASSESSMENT — PAIN SCALES - WONG BAKER: WONGBAKER_NUMERICALRESPONSE: NO HURT

## 2024-04-19 ASSESSMENT — PAIN DESCRIPTION - LOCATION
LOCATION: ABDOMEN
LOCATION: ABDOMEN
LOCATION: BACK
LOCATION: ABDOMEN

## 2024-04-19 ASSESSMENT — PAIN DESCRIPTION - ORIENTATION
ORIENTATION: LEFT;LOWER
ORIENTATION: LOWER
ORIENTATION: LEFT;LOWER
ORIENTATION: LEFT;LOWER

## 2024-04-19 ASSESSMENT — PAIN DESCRIPTION - PAIN TYPE: TYPE: ACUTE PAIN

## 2024-04-19 ASSESSMENT — PAIN DESCRIPTION - ONSET
ONSET: ON-GOING
ONSET: ON-GOING

## 2024-04-19 NOTE — PROGRESS NOTES
[facility]  Progress Note      NAME:  Jorge Luis Banda  MEDICAL RECORD NUMBER:  [id number]  AGE: 78 y.o.   GENDER: male  : 1945  TODAY'S DATE:  2024      [facility] nurse in for planned follow-up. Patient reports discomfort to left foot. Two wounds appreciated, no new areas of breakdown. Continue alginate and foam dressing to lateral foot. Will add daily betadine paint to treatment plan.     Measurements:  Wound 24 Foot Left;Dorsal dry, scabbed (Active)   Wound Image   24 1141   Wound Etiology Arterial 24 1141   Dressing Status New dressing applied 24 1141   Wound Cleansed Betadine/povidone iodine 24 1141   Dressing/Treatment Betadine swabs/povidone iodine 24 1141   Dressing Change Due 24 1141   Wound Length (cm) 2.5 cm 24 1141   Wound Width (cm) 1.9 cm 24 1141   Wound Depth (cm) 0.1 cm 24 1141   Wound Surface Area (cm^2) 4.75 cm^2 24 1141   Change in Wound Size % (l*w) 13.95 24 1141   Wound Volume (cm^3) 0.475 cm^3 24 1141   Wound Healing % 14 24 1141   Wound Assessment Eschar dry 24 1141   Drainage Amount None (dry) 24 1141   Drainage Description Serosanguinous 24 1800   Odor None 24 1141   Manasa-wound Assessment Intact;Warm 24 1141   Margins Attached edges 24 1141   Number of days: 15       Wound Foot Left;Lateral (Active)   Wound Image   24 1141   Wound Etiology Venous 24 1141   Dressing Status New dressing applied 24 1141   Wound Cleansed Cleansed with saline 24 1141   Dressing/Treatment Alginate;Foam 24 1141   Dressing Change Due 24 1141   Wound Length (cm) 2.2 cm 24 1141   Wound Width (cm) 1.7 cm 24 1141   Wound Depth (cm) 0.1 cm 24 1141   Wound Surface Area (cm^2) 3.74 cm^2 24 1141   Change in Wound Size % (l*w) 1.06 24 1141   Wound Volume (cm^3) 0.374 cm^3 24 1141

## 2024-04-19 NOTE — CARE COORDINATION
Discharge planning    Plan is for percutaneous drain for GB, to be placed and  a thoracentesis today. 90% blockage in both carotids. In and out of A-fib. Patient has GI, vasc surg, cards, ID, palliative, neuro, and onc. following. Monica Chavis following when medically stable for d/c.

## 2024-04-19 NOTE — PROGRESS NOTES
Patient Name: Jorge Luis Banda  Date of admission: 4/3/2024  3:27 PM  Patient's age: 78 y.o., 1945  Admission Dx: Melena [K92.1]  GI bleed [K92.2]  UMM (acute kidney injury) (HCC) [N17.9]  Left leg cellulitis [L03.116]  Anemia, unspecified type [D64.9]    Reason for Consult: management recommendations  Requesting Physician: Sony Crawford MD    CHIEF COMPLAINT: GI bleeding    History Obtained From:  patient  INTERVAL HISTORY:    Patient seen and examined.  Clinically no significant changes.  No chest pain.    Labs were reviewed.    He has some episodes of hemoptysis.   Denies any other active bleeding.  No nausea or vomiting.      HISTORY OF PRESENT ILLNESS:    The patient is a 78 y.o.   male who is admitted to the hospital for anemia and suspicion for GI bleeding.  His hemoglobin was under 7 and received blood transfusion.  He has severe peripheral vascular disease on Plavix and Coumadin.  He underwent an EGD that showed gastritis and he refused colonoscopy.  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  From oncology perspective, the patient was diagnosed with stage I lung cancer in 2019 and underwent lung resection.  Never received any chemotherapy or radiation.  He has been in remission since then.  CT scan of the chest showed concerning changes in the liver and adrenal gland for metastatic disease.  Dedicated CT of the abdomen and pelvis is ordered and pending.    Past Medical History:   has a past medical history of Paroxysmal atrial fibrillation (HCC), Peripheral artery disease (HCC), and Primary hypertension.    Past Surgical History:   has a past surgical history that includes above knee amputation (Right, 01/01/2020); Upper gastrointestinal endoscopy (N/A, 4/8/2024); and IR CHOLECYSTOSTOMY PERCUTANEOUS COMPLETE (4/19/2024).     Medications:    Reviewed in Epic     Allergies:  Patient has  tumor (0/2).     4. Parabronchial lymph node, excision:        Sections of 2 lymph nodes, negative for tumor (0/2).     CANCER CASE SUMMARY         Procedure: Lobectomy   Specimen laterality: Left   Tumor site: Lower lobe   Tumor size: 2.8 cm   Tumor focality: Unifocal   Histologic type: Adenocarcinoma   Histologic grade: Moderately differentiated   Visceral pleura invasion: Not identified   Lymphovascular invasion: Not identified   Direct invasion of adjacent structures: No adjacent structures present   Margins: All margins are uninvolved by tumor. Margins examining include bronchial, vascular and parenchymal   Treatment effect: No known presurgical therapy   Regional Lymph Nodes --     Number of lymph nodes involved: 0      Specify anitra stations involved: NA     Number of lymph nodes examined: 29      Specify anitra stations examined: Stations 10, 9 and 7     TNM descriptors:   Primary Tumor (pT): pT1c   Regional Lymph Nodes (pN): pN0   IMAGING DATA:  CT chest   IMPRESSION:  1. Mild-to-moderate bilateral pleural effusions greater on the right with  areas of loculation. Underlying bibasilar atelectasis.  2. Biapical and bibasal fibrosis.  3. Partially visualize low-density mass seen involving the right adrenal  gland and portion of the right hepatic lobe most likely representing  malignancy.    Primary Problem  Melena    Active Hospital Problems    Diagnosis Date Noted    Acute cholecystitis [K81.0] 04/18/2024    Right upper quadrant abdominal pain [R10.11] 04/17/2024    ACP (advance care planning) [Z71.89] 04/17/2024    Palliative care encounter [Z51.5] 04/17/2024    NSTEMI (non-ST elevated myocardial infarction) (HCC) [I21.4] 04/14/2024    Hypotension [I95.9] 04/14/2024    Atrial fibrillation with rapid ventricular response (HCC) [I48.91] 04/14/2024    Unstable angina (HCC) [I20.0] 04/14/2024    Acute ischemic right internal carotid artery (ICA) stroke (HCC) [I63.231] 04/13/2024    Internal carotid artery

## 2024-04-19 NOTE — PROGRESS NOTES
Rounded with Dr Crawford at bedside. Discussed possible removal of PICC line and Art line. Ok to remove artline but keep PICC line. OK to use cathflo after procedures for grey and white port. PICC line inserted by Access RN, discussed with Kiah MA. Awaiting procedures.

## 2024-04-19 NOTE — PROGRESS NOTES
Dr Dowling phoned regarding update on patient and status of percutaneous drain. Ok to feed patient as tolerated. Continue to monitor.

## 2024-04-19 NOTE — PROGRESS NOTES
Lab poked patient twice without able to get blood sample for potassium level. Patient refused another poke. Lab will try again at 7pm when new phlebotomist comes in.

## 2024-04-19 NOTE — PROGRESS NOTES
Occupational Therapy  [facility]  Occupational Therapy Not Seen    DATE: 2024    NAME: Jorge Luis Banda  MRN: [id number]   : 1945    Patient not seen this date for Occupational Therapy due to:      [] Cancel by RN or physician due to:    [] Hemodialysis    [] Critical Lab Value Level     [] Blood transfusion in progress    [] Acute or unstable cardiovascular status   _MAP < 55 or more than >115  _HR < 40 or > 130    [] Acute or unstable pulmonary status   -FiO2 > 60%   _RR < 5 or >40    _O2 sats < 85%    [] Strict Bedrest    [] Off Unit for surgery or procedure    [] Off Unit for testing       [] Pending imaging to R/O fracture    [x] Refusal by Patient (Pt refused OT reporting \"I gotta get my gallbladder cleaned out.\" Informed pt that he could participate in therapy before the procedure but still declined).      [] Other      [] OT being discontinued at this time. Patient independent. No further needs.     [] OT being discontinued at this time as the patient has been transferred to hospice care. No further needs.      ZINA JOHNSON

## 2024-04-19 NOTE — ACP (ADVANCE CARE PLANNING)
..Advance Care Planning     Palliative Team Advance Care Planning (ACP) Conversation    Date of Conversation: 04/19/24    Key individuals present for the conversation:  Patient with decision making capacity    Other individuals present: None     ACP documents on file prior to discussion:    [] Advance Directive  [] Portable DNR  [] POLST  [] Kentshashi MOST  [x] None    Healthcare Decision Maker:    Primary Decision Maker: Rita Villagran - Other - [phone]     Conversation Summary:Pt states he wants to complete a DPOA naming his long-term girlfriend, RitaNICKOLAS. Pt alert and oriented. Paperwork complete in the presence of patient's nurse Hattie. Original given to patient and copy placed in patient's paper chart.     Resuscitation Status:   Code Status: Full Code     Completed Documentation:    [x] Advance Directive  [] Portable DNR  [] POLST  [] Kentshashi MOST  [] No new documents completed    I spent 15 minutes with the patient and/or surrogate decision maker discussing the patient's wishes and goals as detailed in the above note.     Gertrudis Bales RN

## 2024-04-19 NOTE — PROGRESS NOTES
..PALLIATIVE CARE NURSING ASSESSMENT    Patient: Jorge Luis Banda  Room: 2031/2031-01    Reason For Consult   Goals of care evaluation  Distress management  Guidance and support  Facilitate communications  Assistance in coordinating care    Code Status: Full Code    PLAN:  -Pt returned from thoracentesis and perc. dhiraj tube placement. Pt stable.  -Completed DPOA paperwork with patient, nurse witnessed our conversation  -Offered support to Rita (girlfriend) on the phone and let her know DPOA paperwork is being completed. She is agreeable  -Will continue to follow for goals of care discussion.   -Previous conversation with patient have rendered full treatment/full code status.      Impression: Jorge Luis Banda is a 78 y.o. year old male  has a past medical history of Paroxysmal atrial fibrillation (HCC), Peripheral artery disease (HCC), and Primary hypertension..  Currently hospitalized for the management of Melena.  The Palliative Care Team is following to assist with goals of care/support.     Vital Signs  Blood pressure 134/69, pulse 90, temperature 97.3 °F (36.3 °C), temperature source Temporal, resp. rate 18, height 1.829 m (6'), weight 75.6 kg (166 lb 10.7 oz), SpO2 100 %.    Patient Active Problem List   Diagnosis    Skin eschar    Stenosis of left carotid artery    History of arterial bypass of lower extremity    S/P AKA (above knee amputation) unilateral, right (HCC)    Primary hypertension    Persistent wound pain    Peripheral arterial disease (HCC)    Ulcer of left foot (HCC)    Neuropathy    Malignant neoplasm of lung (HCC)    Intermittent claudication of left lower extremity due to atherosclerosis (HCC)    Frequency of urination    Former smoker    Embolism and thrombosis of artery (HCC)    Dyslipidemia    Closed fracture of left humerus    Closed fracture of surgical neck of humerus    Acute exacerbation of chronic obstructive airways disease (HCC)    Left leg cellulitis    Arteriosclerosis of

## 2024-04-19 NOTE — CONSULTS
[facility]  Initial Consult Note  Date: 4/19/2024    Hospital day :16     Impression:   Acute cholecystitis status post percutaneous cholecystostomy tube placement 4/19/2024  History of lung cancer stage I s/p resection 2019 and concerning findings of liver and adrenal metastases  Acute CVA with left-sided weakness and has flow-limiting critical right ICA stenosis 90%, bilateral proximal ICA atherosclerosis with flow limitation left 80% and right 90%  Severe peripheral arterial disease  Longstanding persistent atrial fibrillation with rapid ventricular response  Acute GI bleed unclear source  Neurogenic bladder dysfunction with urinary retention and refusing indwelling Dotson catheter  Elevated troponin consistent with type II non-ST elevation myocardial infarction secondary to acute anemia  Acute diastolic congestive heart failure    Recommendations   The patient did receive Unasyn from 4/3/2024 4/18/2024 [5 days of therapy]  The patient did receive levofloxacin on 4/11 and got a 3-day course of azithromycin from 4/11/2024 through 4/13/2024  He is currently on antimicrobial therapy with Zosyn since 4/11/2024 and today is day # 9 of therapy  I was asked to advise about duration of antimicrobial therapy and given that the patient has a cholecystostomy tube placed today 7 days of antibiotic should be adequate to treat the cholecystitis through 4/26/2024    Chief complaint/reason for consultation:     Question Answer   Reason for Consult? Cholecystitis       History of Present Illness:   Jorge Luis Banda is a 78 y.o.-year-old male who was initially admitted on 4/3/2024.   The patient is being seen in consultation on HOSPITAL DAY #16 for duration of antibiotics given cholecystitis at the request of Dr. Fierro.    Jorge Luis has a history of stage I lung cancer diagnosed in 2019 status post partial lobe resection and is in remission did not require any chemotherapy, peripheral arterial disease leading  to right above-the-knee amputation and axillobifemoral bypass.    The patient reported multiple wounds in the left lower extremity for which she was seen at the wound care center and have been poorly healing for a month.  He was admitted with worsening left lower leg redness, pain and swelling and reported some drainage from the wounds.  The patient was admitted for left lower extremity cellulitis and was started on antimicrobial therapy with Unasyn    The patient was noted to have anemia with a hemoglobin of 6.7 and was reporting some dark stools for a month.  The patient has been seen by the GI service and has a history of partial gastrectomy.  He was started on a Protonix drip and did require 2 units of PRBC transfusion.  His INR was 4.8 on admission and Coumadin was held    The patient was seen by vascular surgery who recommended restarting Coumadin and Plavix once cleared by the GI service and outpatient follow-up with his vascular surgeon Dr. Ledbetter    The patient was seen by the urology service for urinary retention and did have some elevated creatinine.  A Dotson catheter was placed 4/6/2024    The patient had an episode of A-fib with RVR    The patient did undergo EGD 4/8/2024 and the esophagus was normal, stomach had small sessile polyp in the body which was biopsied, there was surgically altered anatomy, no bleeding stigmata noted, moderate amount of clumped food in the proximal body, shallow medium sized polyp in the proximal small bowel which was biopsied.  The patient was recommended to get a colonoscopy but the patient could only tolerate 1 to 2 glasses of prep and was refusing as he stated he cannot and will not do colon prep.  He was recommended for video capsule endoscopy.    The patient had a CT scan of the chest showing concerning changes in the liver and adrenal gland for metastatic disease and oncology service was consulted and a dedicated CT of the abdomen pelvis was ordered and the patient was

## 2024-04-19 NOTE — PROCEDURES
INSTRUMENTAL SWALLOW REPORT  MODIFIED BARIUM SWALLOW    ST recommends level 4 puree and thin liquids, NO straws, small sips and bites. Low threshold for NPO should pt demonstrate worsening respiratory status or overt s/s of aspiration. ST will continue to follow.       NAME: Jorge Lius Banda   : 1945  MRN: [id number]       Date of Eval: 2024        Referring Diagnosis(es):  dysphagia     Past Medical History:  has a past medical history of Paroxysmal atrial fibrillation (HCC), Peripheral artery disease (HCC), and Primary hypertension.  Past Surgical History:  has a past surgical history that includes above knee amputation (Right, 2020) and Upper gastrointestinal endoscopy (N/A, 2024).      Recent CXR/CT of Chest: Date IMPRESSION:  No significant interval change.       Patient Complaints/Reason for Referral:  Jorge Luis Banda was referred for a MBS to assess the efficiency of his/her swallow function, assess for aspiration, and to make recommendations regarding safe dietary consistencies, effective compensatory strategies, and safe eating environment.       Onset of problem:    Per HPI:The patient is a 78 y.o. male that came in for GI bleeding.  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  He had his Eliquis held and unfortunately had a stroke where he has weakness of his left side.  He also has some epigastric pain and workup has revealed acute cholecystitis and cholelithiasis.  I do feel that with his recent stroke he is to sick to undergo a general anesthetic and would therefore recommend a percutaneous cholecystostomy tube.  This has been ordered and is planned for tomorrow.     Behavior/Cognition/Vision/Hearing:  Behavior/Cognition: Alert;Cooperative    Impressions:  Patient presents with impaired oral and pharyngeal phase characterized by decreased oral control of bolus, decreased UES distention, and reduced pharyngeal constriction. There was penetration to the cords

## 2024-04-19 NOTE — PROGRESS NOTES
Subjective    He is alert and states that as long as he lays still and does not eat he does not really have any pain.  He states that his left arm is dead.  He can move his left leg a little.  He is status post a right above-knee amputation in the past for severe vascular disease.  He is getting a cholecystostomy tube and hopefully this will help improve his appetite.  He does have swallowing problems and has been told in the past that he should be on thickened liquids.  He does feel poorly overall and does have congestive heart failure with a BNP of 6249.  I do think that palliative care is a reasonable option.  He states that he just does not want to eat because it just makes him feel badly.  This could be mesenteric ischemia.  Could be related to his gallbladder and hopefully it will improve after his cholecystostomy tube.  He is too sick to have a general anesthetic and a abdominal operation.  Again feel palliative care is probably his best option.    Objective    Alert and appropriate  Chest symmetric excursion  Abdomen minimal soreness in the epigastrium  Extremities status post right above-knee amputation.  Left arm not moving after recent stroke    Assessment and plan  Vasculopath.   CVA this admission with left arm nonmobile  Has been told in the past that he should be on thickened liquids.    Cholecystitis cholelithiasis and hydrops.  For percutaneous cholecystostomy tube.  Hopefully this will improve his appetite.  He states that he just does not want to eat because it makes him feel bad.  Could be mesenteric ischemia as well but hopefully will improve with his cholecystostomy tube as he is too sick for any general anesthesia or abdominal operation.    Strongly feel that palliative care would be a very good option.  I would still put his cholecystostomy tube in his I think that this may give him some comfort.

## 2024-04-19 NOTE — PLAN OF CARE
comfort measures as appropriate   Advance diet as tolerated, if ordered   Nutrition consult to assist patient with adequate nutrition and appropriate food choices  Note: No n/v this shift.  Goal: Maintains or returns to baseline bowel function  4/19/2024 1256 by Hattie Loya RN  Outcome: Progressing  Flowsheets (Taken 4/19/2024 0800)  Maintains or returns to baseline bowel function:   Assess bowel function   Administer IV fluids as ordered to ensure adequate hydration  4/19/2024 0530 by Renetta Jc RN  Outcome: Progressing  Flowsheets (Taken 4/18/2024 2017)  Maintains or returns to baseline bowel function:   Assess bowel function   Encourage oral fluids to ensure adequate hydration   Administer IV fluids as ordered to ensure adequate hydration   Administer ordered medications as needed   Encourage mobilization and activity   Nutrition consult to assist patient with appropriate food choices  Note: No BM this shift, no c/o pain r/t need for BM. Ongoing assessment.     Problem: Infection - Adult  Goal: Absence of infection at discharge  4/19/2024 1256 by Hattie Loya RN  Outcome: Progressing  Flowsheets (Taken 4/19/2024 0800)  Absence of infection at discharge:   Assess and monitor for signs and symptoms of infection   Monitor lab/diagnostic results  4/19/2024 0530 by Renetta Jc RN  Outcome: Progressing  Flowsheets (Taken 4/18/2024 2017)  Absence of infection at discharge:   Assess and monitor for signs and symptoms of infection   Monitor lab/diagnostic results   Monitor all insertion sites i.e., indwelling lines, tubes and drains   Administer medications as ordered   Instruct and encourage patient and family to use good hand hygiene technique   Monitor endotracheal (as able) and nasal secretions for changes in amount and color  Note: Ongoing assessment. No infectious indicators.     Problem: Hematologic - Adult  Goal: Maintains hematologic stability  4/19/2024 1256 by Hattie Loya RN  Outcome:  Progressing  Flowsheets (Taken 4/19/2024 0800)  Maintains hematologic stability: Assess for signs and symptoms of bleeding or hemorrhage  4/19/2024 0530 by Renetta Jc RN  Outcome: Progressing  Flowsheets (Taken 4/18/2024 2017)  Maintains hematologic stability:   Assess for signs and symptoms of bleeding or hemorrhage   Monitor labs for bleeding or clotting disorders   Administer blood products/factors as ordered  Note: Hgb remains stable. Ongoing assessment w/labs qd.     Problem: Skin/Tissue Integrity  Goal: Absence of new skin breakdown  Description: 1.  Monitor for areas of redness and/or skin breakdown  2.  Assess vascular access sites hourly  3.  Every 4-6 hours minimum:  Change oxygen saturation probe site  4.  Every 4-6 hours:  If on nasal continuous positive airway pressure, respiratory therapy assess nares and determine need for appliance change or resting period.  4/19/2024 1256 by Hattie Loya RN  Outcome: Progressing  4/19/2024 0530 by Renetta Jc RN  Outcome: Progressing  Note: Turns, pillow support, patient able to move himself well. No new breakdown noted. LLE with documented wounds on admission as charted. Ongoing assessment.     Problem: ABCDS Injury Assessment  Goal: Absence of physical injury  4/19/2024 1256 by Hattie Loya RN  Outcome: Progressing  4/19/2024 0530 by Renetta Jc RN  Outcome: Progressing  Flowsheets (Taken 4/17/2024 1900 by Judi Tran)  Absence of Physical Injury: Implement safety measures based on patient assessment  Note: Safety measures in place. No falls/injuries. Ongoing assessment.     Problem: Genitourinary - Adult  Goal: Absence of urinary retention  4/19/2024 1256 by Hattie Loya RN  Outcome: Progressing  Flowsheets (Taken 4/19/2024 0800)  Absence of urinary retention:   Monitor intake/output and perform bladder scan as needed   Assess patient’s ability to void and empty bladder  4/19/2024 0530 by Renetta Jc RN  Outcome:

## 2024-04-19 NOTE — PROGRESS NOTES
[facility]   IN-PATIENT SERVICE   [facility]    Progress note             Date:   4/19/2024  Patient name:  Jorge Luis Banda  Date of admission:  4/3/2024  3:27 PM  MRN:   [id number]  Account:  [id number]  YOB: 1945  PCP:    Lucila Machado APRN - CNP  Room:   2031/2031-01  Code Status:    Full Code    Chief Complaint:     Chief Complaint   Patient presents with    Leg Pain     Left leg, open wounds and swollen         Interval hx:   The Patient was seen and examined at bedside  Patient met with palliative care. Wants to remain FULL CODE.   Undergoing IR guided thoracentesis and perc for cholecystitis   Discussed with nurse at bedside.   CT head WO 4/14/24-->surprisingly right ICA territory watershed not much progressed patient does have bilateral watershed hypodensity overall however mostly stable CT head WO as compared to recent MRI   IMPRESSION:  No new acute intracranial abnormality.  Unchanged right frontal infarcts.      Brief History of Present Illness:   The patient is a 78 y.o.  Non- / non  male who Initially presented to [facility] ER 4/3/24 with Left leg pain and complicated wound healing with chronic Cellulitis for a month now. PMH significant for Paroxysmal A-fib, Peripheral artery disease, HTN. Patient is known vasculopath with poor circulation and complicated wound healing related this in fact s/p Right BKA already. [facility]s [facility]. Patient is s/p bilateral common femoral endarterectomies, profundal plasty, right profunda stent placement and angioplasty and femorofemoral bypass and subsequently right femoral-popliteal bypass due to occlusion of SFA. Patient noted to have critical low hgb 6.6-->6.2 4/3/24 22:26 and s/p 2 units PRBC at that time improving to 8.4. Patient again dropping hgb 6.8 4/12/24 and received an additional unit PRBC for total of 3 units PRBC this admission. Patient is supposed to be on

## 2024-04-19 NOTE — PROGRESS NOTES
End Of Shift Note  [facility]  Summary of shift: Patient SR, no c/o CP. BP stable. NS @ 75 ml. O2 @ 2L NC. Plan is possible perc tube for cholecystitis per HIDA & thoracentesis. NPO since midnight. Lines & dressings changed. Patient refusing mepilex on coccyx. Renetta Jc RN     Vitals:    Vitals:    04/19/24 0200 04/19/24 0300 04/19/24 0400 04/19/24 0500   BP:    (!) 115/58   Pulse: 84 83 80 81   Resp: 15 22 19 19   Temp:       TempSrc:       SpO2: 99% 96% 98% 100%   Weight:       Height:            I&O:   Intake/Output Summary (Last 24 hours) at 4/19/2024 0546  Last data filed at 4/19/2024 0000  Gross per 24 hour   Intake 1815.21 ml   Output 1250 ml   Net 565.21 ml       Resp Status: 2L NC    Ventilator Settings:     / / /FiO2 : 94 %    Critical Care IV infusions:   sodium chloride 75 mL/hr at 04/19/24 0455    phenylephrine Stopped (04/18/24 1445)    sodium chloride      sodium chloride Stopped (04/18/24 0538)    dextrose          LDA:   PICC Triple Lumen 04/14/24 Right Brachial (Active)   Number of days: 4       Peripheral IV 04/11/24 Right Forearm (Active)   Number of days: 7       Peripheral IV 04/13/24 Left;Posterior;Proximal Forearm (Active)   Number of days: 5       Arterial Line 04/14/24 Left Radial (Active)   Number of days: 5       Wound 04/04/24 Foot Left;Dorsal dry, scabbed (Active)   Number of days: 14       Wound Foot Left;Lateral (Active)   Number of days:

## 2024-04-19 NOTE — PROGRESS NOTES
Advance Care Planning   Healthcare Decision Maker:    Primary Decision Maker: Rita Villagran - Other - [phone]      attempted to complete Medical Power of  docs and patient reports that he has just completed docs with Palliative RN and named significant other listed above as his Decision Maker.     04/19/24 1625   Encounter Summary   Service Provided For: Patient   Referral/Consult From: Palliative Care   Support System Significant other   Last Encounter  04/19/24   Complexity of Encounter Low   Begin Time 1515   End Time  1530   Total Time Calculated 15 min   Spiritual/Emotional needs   Type Spiritual Support   Palliative Care   Type Palliative Care, Follow-up   Advance Care Planning   Type ACP conversation   Assessment/Intervention/Outcome   Assessment Calm;Compromised coping   Intervention Active listening;Explored/Affirmed feelings, thoughts, concerns;Explored Coping Skills/Resources;Nurtured Hope;Sustaining Presence/Ministry of presence   Outcome Comfort;Connection/Belonging;Engaged in conversation;Expressed feelings, needs, and concerns

## 2024-04-19 NOTE — PROGRESS NOTES
End Of Shift Note  [facility]  Summary of shift: Right side thoracentesis 750ml out, right quad percutaneous drain inserted, draining brown liquid. Barium swallow performed, pureed diet, thin liquids, no straw, small sips. Meds whole in pudding. Resume Eiquis tonight and Aspirin in am. Poor appetite, lethargic. POA papers signed with janet.   Code status not changed, full code. Uses urinal, incont. Stool. Artline d/cd manual pressure held x1hour, then TR band applied with 12ml air at 1500. Alteplase given in Picc line x2 for white and gray port. Still dwelling. Wound care did dressing change on ankle.     Vitals:    Vitals:    04/19/24 1600 04/19/24 1700 04/19/24 1729 04/19/24 1800   BP: 129/71 (!) 123/56  (!) 120/50   Pulse: 99 (!) 102 99 89   Resp: 25 21 23 16   Temp: 97.1 °F (36.2 °C)      TempSrc: Temporal      SpO2: 100% 98% 99% 99%   Weight:       Height:            I&O:   Intake/Output Summary (Last 24 hours) at 4/19/2024 1811  Last data filed at 4/19/2024 1749  Gross per 24 hour   Intake 2479.45 ml   Output 1551 ml   Net 928.45 ml       Resp Status: 2L NC    Ventilator Settings:     / / /FiO2 : 94 %    Critical Care IV infusions:   sodium chloride 75 mL/hr at 04/19/24 0639    sodium chloride      sodium chloride 10 mL/hr at 04/19/24 0951    dextrose          LDA:   PICC Triple Lumen 04/14/24 Right Brachial (Active)   Number of days: 5       Peripheral IV 04/11/24 Right Forearm (Active)   Number of days: 8       Peripheral IV 04/13/24 Left;Posterior;Proximal Forearm (Active)   Number of days: 6       Biliary Tube 04/19/24 T-tube 8 fr RUQ (Active)   Number of days: 0       Wound 04/04/24 Foot Left;Dorsal dry, scabbed (Active)   Number of days: 15       Wound Foot Left;Lateral (Active)   Number of days:

## 2024-04-19 NOTE — PROGRESS NOTES
Pulmonary Critical Care Progress Note       Patient seen for the follow up of bilateral pleural effusions, hemoptysis     Subjective:  Patient had thoracentesis and cholecystotomy tube placed.  He had improved blood pressure when off Mat-Synephrine.  Art line was removed he started bleeding from left wrist.  No chest pain.  Still on oxygen at 2 L by nasal cannula he is also on heparin drip.  He previously went in to a-[facility] with [facility]    Examination:  Vitals: /71   Pulse 99   Temp 97.1 °F (36.2 °C) (Temporal)   Resp 25   Ht 1.829 m (6')   Wt 75.6 kg (166 lb 10.7 oz)   SpO2 100%   BMI 22.60 kg/m²   General appearance: In no acute distress, alert and cooperative with exam cachectic  Neck: No JVD  Lungs: Decreased breath sound no crackles or wheeze  Heart: irregular rate and rhythm, S1, S2 normal, no gallop  Abdomen: Soft, non tender, + BS  Extremities: no cyanosis or clubbing. No significant edema, left upper and lower extremity weakness right above-knee amputation left foot dorsal ulcer    LABs:  CBC:   Recent Labs     04/17/24  0300 04/18/24  0300   WBC 18.8* 16.0*   HGB 8.5* 8.1*   HCT 26.8* 26.1*    469*       BMP:   Recent Labs     04/17/24  0300 04/18/24  0300 04/19/24  0640   * 134* 139   K 3.7 3.3* 3.2*   CO2 20 21 24   BUN 14 21 23   CREATININE 1.1 1.2 1.0   LABGLOM 68 61 76   GLUCOSE 88 110* 99       No results for input(s): \"APTT\" in the last 72 hours.     Latest Reference Range & Units 04/11/24 15:56 04/13/24 23:26 04/16/24 04:21   Procalcitonin 0.00 - 0.09 ng/mL 1.95 (H) 1.02 (H) 0.72 (H)   (H): Data is abnormally high   Latest Reference Range & Units 04/15/24 04:34   Pro-BNP <300 pg/mL 16,301 (H)   (H): Data is abnormally high  Radiology:  HIDA scan  The gallbladder is not visualized after morphine administration, consistent  with acute cholecystitis.  CT chest abdomen pelvis  1.  CT CHEST: Unchanged presumed bilateral pneumonia: Moderate volume right  pleural effusion and small  volume left pleural effusion with areas of  consolidation posteriorly mid and lower lungs bilaterally.  2. Left basilar scarring.  3. 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  4.  Pulmonary sequela typical of that seen with smoking, including COPD.  5. Acute esophagitis.  6. Prominent esophageal ampulla versus small hiatal hernia.  7. Gastroesophageal reflux versus incomplete esophageal emptying.  8. Extensive calcific atherosclerosis coronary arteries.  9. CT ABDOMEN/PELVIS: Unchanged gallbladder hydrops and cholelithiasis with  findings of acute cholecystitis.  10. Stable right adrenal mass infiltrating into adjacent structures including  the right kidney, liver and inferior vena cava.  11. Probable reactive colitis at the hepatic flexure.    Chest x-ray 4/14  Mildly progressive bilateral infiltrates when compared to the prior exam           X-ray chest 4/13/24    X-ray chest 4/12/2024    Echocardiogram 4/3/24    Interpretation Summary         Left Ventricle: Mildly reduced left ventricular systolic function with a visually estimated EF of 45 - 50%. Left ventricle size is normal. Normal wall thickness. Severe hypokinesis of the apex. Grade II diastolic dysfunction with increased LAP.    Aortic Valve: Trileaflet valve. Mildly calcified cusp.    Mitral Valve: Mildly calcified leaflet. Mild regurgitation.    Tricuspid Valve: Moderate regurgitation. Moderately elevated RVSP, consistent with moderate pulmonary hypertension.    Image quality is good.      Impression & Recommendations:  Acute hypoxic respiratory insufficiency  Supplemental oxygen as needed to maintain oxygen saturation >90%  Incentive spirometer q1h while awake      Bilateral loculated pleural effusions/atelectasis status post thoracentesis  Off diuresis  Infectious disease on consult ; await input / off Zosyn  Check pleural fluid cytology and cultures    Hypotension requiring pressor/increased cortisol level suspect

## 2024-04-19 NOTE — PROGRESS NOTES
[facility] - GI Progress Note    Patient:   Jorge Luis Banda   :    1945   Med Rec#:                 [id number]   Date:     2024  Consultant:   ROBINSON KAISER MD        SUBJECTIVE:     Still with upper abdominal pain with eating and with movement.    CURRENT MEDICATIONS:  .  Scheduled Meds:   ALTEplase  2 mg IntraCATHeter Once    piperacillin-tazobactam  3,375 mg IntraVENous Q8H    midodrine  10 mg Oral TID WC    sodium chloride  80 mL IntraVENous Once    [Held by provider] apixaban  5 mg Oral BID    pantoprazole  40 mg Oral BID AC    gabapentin  100 mg Oral TID    empagliflozin  10 mg Oral Daily    sodium chloride  80 mL IntraVENous Once    [Held by provider] metoprolol tartrate  25 mg Oral BID    [Held by provider] aspirin  81 mg Oral Daily    [Held by provider] furosemide  20 mg IntraVENous BID    sodium chloride  100 mL IntraVENous Once    guaiFENesin  600 mg Oral BID    ferrous sulfate  325 mg Oral Daily with breakfast    atorvastatin  40 mg Oral Nightly    vitamin D3  5,000 Units Oral Daily    vitamin B-12  1,000 mcg Oral Daily    tamsulosin  0.4 mg Oral Daily    sodium chloride flush  5-40 mL IntraVENous 2 times per day     .  Continuous Infusions:   sodium chloride 75 mL/hr at 24 0639    phenylephrine Stopped (24 1445)    sodium chloride      sodium chloride 10 mL/hr at 24 0951    dextrose       .  PRN Meds:sodium chloride flush, albuterol sulfate HFA, morphine, sodium chloride flush, magnesium sulfate, sodium chloride, melatonin, sodium chloride flush, sodium chloride flush, potassium chloride **OR** potassium alternative oral replacement **OR** potassium chloride, sodium chloride flush, sodium chloride, ondansetron **OR** ondansetron, acetaminophen **OR** acetaminophen, glucose, dextrose bolus **OR** dextrose bolus, glucagon (rDNA), dextrose  .      PHYSICAL EXAM:   .    Temp (24hrs), Av.2 °F (36.2 °C), Min:97 °F (36.1 °C), Max:97.5 °F (36.4 °C)    BP (!)

## 2024-04-19 NOTE — PLAN OF CARE
Problem: Safety - Adult  Goal: Free from fall injury  Outcome: Progressing  Flowsheets (Taken 4/18/2024 0800 by Svitlana Weeks, RN)  Free From Fall Injury:   Instruct family/caregiver on patient safety   Based on caregiver fall risk screen, instruct family/caregiver to ask for assistance with transferring infant if caregiver noted to have fall risk factors  Note: Safety measures in place. No falls/injuries. Ongoing assessment.     Problem: Discharge Planning  Goal: Discharge to home or other facility with appropriate resources  Outcome: Progressing  Flowsheets (Taken 4/18/2024 2017)  Discharge to home or other facility with appropriate resources:   Identify barriers to discharge with patient and caregiver   Arrange for needed discharge resources and transportation as appropriate   Identify discharge learning needs (meds, wound care, etc)   Refer to discharge planning if patient needs post-hospital services based on physician order or complex needs related to functional status, cognitive ability or social support system  Note: Ongoing assessment.      Problem: Pain  Goal: Verbalizes/displays adequate comfort level or baseline comfort level  Outcome: Progressing  Flowsheets (Taken 4/14/2024 2109 by Tami Ascencio, RN)  Verbalizes/displays adequate comfort level or baseline comfort level: Assess pain using appropriate pain scale  Note: Ongoing assessment. Morphine prn for pain.      Problem: Skin/Tissue Integrity - Adult  Goal: Incisions, wounds, or drain sites healing without S/S of infection  Outcome: Progressing  Flowsheets (Taken 4/18/2024 2017)  Incisions, Wounds, or Drain Sites Healing Without Sign and Symptoms of Infection: TWICE DAILY: Assess and document skin integrity  Note: Turns, pillow support, patient able to move himself well. No new breakdown noted. LLE with documented wounds on admission as charted. Ongoing assessment.     Problem: Gastrointestinal - Adult  Goal: Minimal or absence of nausea and  Improved:   Monitor and assess patient's chronic conditions and comorbid symptoms for stability, deterioration, or improvement   Collaborate with multidisciplinary team to address chronic and comorbid conditions and prevent exacerbation or deterioration   Update acute care plan with appropriate goals if chronic or comorbid symptoms are exacerbated and prevent overall improvement and discharge  Note: Ongoing assessment. Continuous cardiac and pulse oximetry monitoring.

## 2024-04-19 NOTE — PROGRESS NOTES
[facility]  Office: [phone]  Chinmay Ramos DO, Jose Atkinson DO, Guevara Ramirez DO, Pranay Cooper, DO, Sumeet Eller MD, Karen Carvajal MD, Sony Crawford MD, Poonam Platt MD,  Stephan San MD, Marcello Daniels MD, Theodore Pascual MD,  Juve Mahmood DO, Matias Mckeon MD, Hero Tavarez MD, Valentin Ramos DO, Jazzy Spence MD,  Trino Handley DO, Flavia Leonardo MD, Vanessa Soriano MD, Pricilla Akers MD, Casimiro Knight MD,  Zeke Maciel MD, Guera Cervantes MD, Kinana Martin MD, Mirlande Worrell MD, Ney Garcia MD, Savannah Dominguez MD, Ed Emerson DO, Jaylen West DO, Comfort Carcamo MD,  Siva Ashley MD, Shirley Waterhouse, CNP,  Carolyn Pelaez CNP, Scotty Ge, CNP,  Wandy Trivedi, DNP, Malissa Gaston, CNP, Lana Penaloza, CNP, Susan Haro, CNP, Sherrell Mendoza, CNP, Suzy Morales, PA-C, Dalila Lange PA-C, Sada Matt, CNP, Crystal Womack, CNP, Sheng Reddy, CNP, Arielle Guy, CNP, Linsey Perez, CNP, Anju Cho, CNS, Hoa Donahue, CNP, Cassidy Ferrer CNP, Tracy Schwab, CNP       [facility]      Daily Progress Note     Admit Date: 4/3/2024  Bed/Room No.  2031/2031-01  Admitting Physician : Sony Crawford MD  Code Status :Full Code  Hospital Day:  LOS: 16 days   Chief Complaint:     Chief Complaint   Patient presents with    Leg Pain     Left leg, open wounds and swollen       Principal Problem:    Melena  Active Problems:    History of arterial bypass of lower extremity    S/P AKA (above knee amputation) unilateral, right (HCC)    Primary hypertension    Peripheral arterial disease (HCC)    Neuropathy    Malignant neoplasm of lung (HCC)    Intermittent claudication of left lower extremity due to atherosclerosis (HCC)    Embolism and thrombosis of artery (HCC)    Left leg cellulitis    Iron deficiency anemia due to chronic blood loss    Hx of cancer of lung    Adrenal mass (HCC)    Claudication (HCC)    Pleural effusion, bilateral     Mat-Synephrine for permissive hypertension, increase midodrine patient off , Mat-Synephrine.  Target MAP > 65..   Former Smoker   Quit in 2019.  Acute on chronic combined systolic and diastolic CHF  IV Lasix -   Monitor kidney function.  Moderate pulmonary hypertension   O2   Lasix  -   Severe ICA stenosis. 90 % R and 80 % Left .   No plan for inpatient endarterectomy.  Outpatient follow-up with primary vascular surgery..  Acute acalculous cholecystitis  Poor surgical candidate  Plan for cholecystotomy tube placement..    Overall prognosis poor.  Palliative care following, discussed with patients wife at bedside      RESUME ELIQUIS AND ASPIRIN AFTER PROCEDURE.               Continue to monitor vitals , Intake / output ,  Cell count , HGB , Kidney function, oxygenation  as indicated .   Plan and updates discussed with patient ,  answers  explained to satisfaction.  Wife at bedside  Plan discussed with Staff Hattie MA     (Please note that portions of this note were completed with a voice recognition program. Efforts were made to edit the dictations but occasionally words are mis-transcribed.)      Sony Crawford MD  4/19/2024

## 2024-04-19 NOTE — PROGRESS NOTES
Ry Tarylor MD   Urology Progress Note            Subjective: Follow-up urinary retention bladder outlet obstruction    Patient Vitals for the past 24 hrs:   BP Temp Temp src Pulse Resp SpO2 Weight   04/19/24 1000 (!) 135/48 -- -- 83 17 100 % --   04/19/24 0900 (!) 133/47 -- -- 89 17 97 % --   04/19/24 0800 (!) 147/54 97.3 °F (36.3 °C) Temporal 85 14 98 % --   04/19/24 0700 -- -- -- 86 18 95 % --   04/19/24 0600 -- -- -- 80 16 100 % --   04/19/24 0500 (!) 115/58 -- -- 81 19 100 % --   04/19/24 0400 -- -- -- 80 19 98 % --   04/19/24 0300 -- -- -- 83 22 96 % --   04/19/24 0200 -- -- -- 84 15 99 % --   04/19/24 0100 -- -- -- 74 20 99 % --   04/19/24 0050 -- -- -- 74 18 100 % --   04/19/24 0027 116/76 97 °F (36.1 °C) Temporal 78 19 100 % 75.6 kg (166 lb 10.7 oz)   04/19/24 0000 -- -- -- 73 16 100 % --   04/18/24 2300 -- -- -- 77 14 100 % --   04/18/24 2200 -- -- -- 75 20 99 % --   04/18/24 2100 -- -- -- 72 17 100 % --   04/18/24 2011 139/67 97.3 °F (36.3 °C) Temporal 81 18 100 % --   04/18/24 2000 -- -- -- 67 13 100 % --   04/18/24 1605 (!) 116/59 -- -- 83 17 100 % --   04/18/24 1600 (!) 116/59 97.1 °F (36.2 °C) Temporal 77 14 100 % --   04/18/24 1500 -- -- -- 79 19 100 % --   04/18/24 1430 -- -- -- 79 13 100 % --   04/18/24 1425 127/60 -- -- 82 17 100 % --   04/18/24 1345 -- -- -- 88 14 100 % --   04/18/24 1330 -- -- -- 81 20 100 % --   04/18/24 1315 -- -- -- 79 15 100 % --   04/18/24 1300 -- -- -- 84 16 100 % --   04/18/24 1245 -- -- -- 83 12 100 % --   04/18/24 1230 -- -- -- 89 16 100 % --   04/18/24 1200 134/60 97.5 °F (36.4 °C) Temporal 84 17 100 % --       Intake/Output Summary (Last 24 hours) at 4/19/2024 1100  Last data filed at 4/19/2024 0848  Gross per 24 hour   Intake 2012.24 ml   Output 1050 ml   Net 962.24 ml       Recent Labs     04/17/24  0300 04/18/24  0300   WBC 18.8* 16.0*   HGB 8.5* 8.1*   HCT 26.8* 26.1*   MCV 94.0 92.2    469*     Recent Labs      04/17/24  0300 04/18/24  0300 04/19/24  0640   * 134* 139   K 3.7 3.3* 3.2*   CL 96* 98 106   CO2 20 21 24   BUN 14 21 23   CREATININE 1.1 1.2 1.0       No results for input(s): \"COLORU\", \"PHUR\", \"LABCAST\", \"WBCUA\", \"RBCUA\", \"MUCUS\", \"TRICHOMONAS\", \"YEAST\", \"BACTERIA\", \"CLARITYU\", \"SPECGRAV\", \"LEUKOCYTESUR\", \"UROBILINOGEN\", \"BILIRUBINUR\", \"BLOODU\" in the last 72 hours.    Invalid input(s): \"NITRATE\", \"GLUCOSEUKETONESUAMORPHOUS\"    Additional Lab/culture results:    Physical Exam: Patient is alert, not in acute distress, patient seen in conjunction with nursing staff, bladder scan was recommended and performed less than 150 mL residual    Interval Imaging Findings:    Impression:    Patient Active Problem List   Diagnosis    Skin eschar    Stenosis of left carotid artery    History of arterial bypass of lower extremity    S/P AKA (above knee amputation) unilateral, right (HCC)    Primary hypertension    Persistent wound pain    Peripheral arterial disease (HCC)    Ulcer of left foot (HCC)    Neuropathy    Malignant neoplasm of lung (HCC)    Intermittent claudication of left lower extremity due to atherosclerosis (HCC)    Frequency of urination    Former smoker    Embolism and thrombosis of artery (HCC)    Dyslipidemia    Closed fracture of left humerus    Closed fracture of surgical neck of humerus    Acute exacerbation of chronic obstructive airways disease (HCC)    Left leg cellulitis    Arteriosclerosis of coronary artery    Age-related osteoporosis without current pathological fracture    Adenocarcinoma of lung (HCC)    Wound infection    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)    Melena    Iron deficiency anemia due to chronic blood loss    Hx of cancer of lung    Adrenal mass (HCC)    Claudication (HCC)    Pleural effusion, bilateral    Longstanding persistent atrial fibrillation (HCC)    Acute ischemic right internal carotid artery (ICA) stroke (HCC)    Internal carotid artery

## 2024-04-19 NOTE — CARE COORDINATION
Social Work-[facility] can admit on the weekend , if dc.The contact is Soha. Her phone is [phone]. The fax is [phone]. [employer] [employer] [place]

## 2024-04-19 NOTE — PROGRESS NOTES
Arterial line pulled and manual pressure held for 1 hour without hemostasis. TR band applied with 12 ml air. Dr Fierro notified of bleeding. Awaiting response.

## 2024-04-19 NOTE — PROGRESS NOTES
VASCULAR SURGERY  PROGRESS NOTE      4/19/2024 4:13 PM  Subjective:   Admit Date: 4/3/2024  PCP: Lucila Machado APRN - [facility]    Chief Complaint   Patient presents with    Leg Pain     Left leg, open wounds and swollen       Interval History: No complaints.  Stable status post cholecystostomy tube and thoracentesis.  Palliative care noted.    Diet: ADULT DIET; Dysphagia - Pureed; No Drinking Straws  ADULT ORAL NUTRITION SUPPLEMENT; Breakfast, Dinner; Standard High Calorie/High Protein Oral Supplement    Medications:   Scheduled Meds:   piperacillin-tazobactam  3,375 mg IntraVENous Q8H    midodrine  10 mg Oral TID WC    sodium chloride  80 mL IntraVENous Once    apixaban  5 mg Oral BID    pantoprazole  40 mg Oral BID AC    gabapentin  100 mg Oral TID    empagliflozin  10 mg Oral Daily    sodium chloride  80 mL IntraVENous Once    [Held by provider] metoprolol tartrate  25 mg Oral BID    aspirin  81 mg Oral Daily    furosemide  20 mg IntraVENous BID    sodium chloride  100 mL IntraVENous Once    guaiFENesin  600 mg Oral BID    ferrous sulfate  325 mg Oral Daily with breakfast    atorvastatin  40 mg Oral Nightly    vitamin D3  5,000 Units Oral Daily    vitamin B-12  1,000 mcg Oral Daily    tamsulosin  0.4 mg Oral Daily    sodium chloride flush  5-40 mL IntraVENous 2 times per day     Continuous Infusions:   sodium chloride 75 mL/hr at 04/19/24 0639    sodium chloride      sodium chloride 10 mL/hr at 04/19/24 0951    dextrose           Labs:   CBC:   Recent Labs     04/17/24  0300 04/18/24  0300   WBC 18.8* 16.0*   HGB 8.5* 8.1*    469*       BMP:    Recent Labs     04/17/24  0300 04/18/24  0300 04/19/24  0640   * 134* 139   K 3.7 3.3* 3.2*   CL 96* 98 106   CO2 20 21 24   BUN 14 21 23   CREATININE 1.1 1.2 1.0   GLUCOSE 88 110* 99       Hepatic:   Recent Labs     04/17/24  0300   AST 35   ALT 15   BILITOT 0.5   ALKPHOS 295*       Troponin: Invalid input(s): \"TROPONIN\"  BNP: No results for input(s):

## 2024-04-20 LAB
ANION GAP SERPL CALCULATED.3IONS-SCNC: 11 MMOL/L (ref 9–17)
BUN SERPL-MCNC: 17 MG/DL (ref 8–23)
BUN/CREAT SERPL: 17 (ref 9–20)
CALCIUM SERPL-MCNC: 8 MG/DL (ref 8.6–10.4)
CHLORIDE SERPL-SCNC: 106 MMOL/L (ref 98–107)
CO2 SERPL-SCNC: 24 MMOL/L (ref 20–31)
CREAT SERPL-MCNC: 1 MG/DL (ref 0.7–1.2)
ERYTHROCYTE [DISTWIDTH] IN BLOOD BY AUTOMATED COUNT: 17.7 % (ref 11.8–14.4)
GFR SERPL CREATININE-BSD FRML MDRD: 76 ML/MIN/1.73M2
GLUCOSE SERPL-MCNC: 86 MG/DL (ref 70–99)
HCT VFR BLD AUTO: 25.1 % (ref 40.7–50.3)
HGB BLD-MCNC: 7.9 G/DL (ref 13–17)
MAGNESIUM SERPL-MCNC: 2 MG/DL (ref 1.6–2.6)
MCH RBC QN AUTO: 29.4 PG (ref 25.2–33.5)
MCHC RBC AUTO-ENTMCNC: 31.5 G/DL (ref 28.4–34.8)
MCV RBC AUTO: 93.3 FL (ref 82.6–102.9)
NRBC BLD-RTO: 0 PER 100 WBC
PLATELET # BLD AUTO: 362 K/UL (ref 138–453)
PMV BLD AUTO: 9.6 FL (ref 8.1–13.5)
POTASSIUM SERPL-SCNC: 3.5 MMOL/L (ref 3.7–5.3)
RBC # BLD AUTO: 2.69 M/UL (ref 4.21–5.77)
SODIUM SERPL-SCNC: 141 MMOL/L (ref 135–144)
WBC OTHER # BLD: 7.6 K/UL (ref 3.5–11.3)

## 2024-04-20 PROCEDURE — 94761 N-INVAS EAR/PLS OXIMETRY MLT: CPT

## 2024-04-20 PROCEDURE — 6370000000 HC RX 637 (ALT 250 FOR IP): Performed by: INTERNAL MEDICINE

## 2024-04-20 PROCEDURE — 99232 SBSQ HOSP IP/OBS MODERATE 35: CPT | Performed by: INTERNAL MEDICINE

## 2024-04-20 PROCEDURE — 97112 NEUROMUSCULAR REEDUCATION: CPT

## 2024-04-20 PROCEDURE — 6370000000 HC RX 637 (ALT 250 FOR IP): Performed by: FAMILY MEDICINE

## 2024-04-20 PROCEDURE — 36415 COLL VENOUS BLD VENIPUNCTURE: CPT

## 2024-04-20 PROCEDURE — 97110 THERAPEUTIC EXERCISES: CPT

## 2024-04-20 PROCEDURE — 99232 SBSQ HOSP IP/OBS MODERATE 35: CPT | Performed by: FAMILY MEDICINE

## 2024-04-20 PROCEDURE — 2700000000 HC OXYGEN THERAPY PER DAY

## 2024-04-20 PROCEDURE — 6360000002 HC RX W HCPCS: Performed by: NURSE PRACTITIONER

## 2024-04-20 PROCEDURE — 80048 BASIC METABOLIC PNL TOTAL CA: CPT

## 2024-04-20 PROCEDURE — 85027 COMPLETE CBC AUTOMATED: CPT

## 2024-04-20 PROCEDURE — 6360000002 HC RX W HCPCS: Performed by: FAMILY MEDICINE

## 2024-04-20 PROCEDURE — 83735 ASSAY OF MAGNESIUM: CPT

## 2024-04-20 PROCEDURE — 2580000003 HC RX 258: Performed by: INTERNAL MEDICINE

## 2024-04-20 PROCEDURE — 2060000000 HC ICU INTERMEDIATE R&B

## 2024-04-20 PROCEDURE — 2580000003 HC RX 258: Performed by: FAMILY MEDICINE

## 2024-04-20 RX ORDER — MIDODRINE HYDROCHLORIDE 5 MG/1
5 TABLET ORAL
Status: DISCONTINUED | OUTPATIENT
Start: 2024-04-20 | End: 2024-05-11 | Stop reason: HOSPADM

## 2024-04-20 RX ORDER — FUROSEMIDE 40 MG/1
40 TABLET ORAL DAILY
Status: DISCONTINUED | OUTPATIENT
Start: 2024-04-21 | End: 2024-05-11 | Stop reason: HOSPADM

## 2024-04-20 RX ORDER — METOPROLOL TARTRATE 1 MG/ML
5 INJECTION, SOLUTION INTRAVENOUS EVERY 6 HOURS PRN
Status: DISCONTINUED | OUTPATIENT
Start: 2024-04-20 | End: 2024-05-11 | Stop reason: HOSPADM

## 2024-04-20 RX ORDER — FUROSEMIDE 10 MG/ML
20 INJECTION INTRAMUSCULAR; INTRAVENOUS 2 TIMES DAILY
Status: COMPLETED | OUTPATIENT
Start: 2024-04-20 | End: 2024-04-20

## 2024-04-20 RX ADMIN — PIPERACILLIN AND TAZOBACTAM 3375 MG: 3; .375 INJECTION, POWDER, LYOPHILIZED, FOR SOLUTION INTRAVENOUS at 10:41

## 2024-04-20 RX ADMIN — MORPHINE SULFATE 2 MG: 2 INJECTION, SOLUTION INTRAMUSCULAR; INTRAVENOUS at 06:25

## 2024-04-20 RX ADMIN — MORPHINE SULFATE 2 MG: 2 INJECTION, SOLUTION INTRAMUSCULAR; INTRAVENOUS at 17:08

## 2024-04-20 RX ADMIN — FUROSEMIDE 20 MG: 10 INJECTION, SOLUTION INTRAMUSCULAR; INTRAVENOUS at 18:13

## 2024-04-20 RX ADMIN — EMPAGLIFLOZIN 10 MG: 10 TABLET, FILM COATED ORAL at 08:58

## 2024-04-20 RX ADMIN — PIPERACILLIN AND TAZOBACTAM 3375 MG: 3; .375 INJECTION, POWDER, LYOPHILIZED, FOR SOLUTION INTRAVENOUS at 18:17

## 2024-04-20 RX ADMIN — PIPERACILLIN AND TAZOBACTAM 3375 MG: 3; .375 INJECTION, POWDER, LYOPHILIZED, FOR SOLUTION INTRAVENOUS at 02:28

## 2024-04-20 RX ADMIN — SODIUM CHLORIDE, PRESERVATIVE FREE 10 ML: 5 INJECTION INTRAVENOUS at 09:05

## 2024-04-20 RX ADMIN — PANTOPRAZOLE SODIUM 40 MG: 40 TABLET, DELAYED RELEASE ORAL at 16:02

## 2024-04-20 RX ADMIN — GUAIFENESIN 600 MG: 600 TABLET ORAL at 08:59

## 2024-04-20 RX ADMIN — APIXABAN 5 MG: 5 TABLET, FILM COATED ORAL at 21:07

## 2024-04-20 RX ADMIN — APIXABAN 5 MG: 5 TABLET, FILM COATED ORAL at 09:00

## 2024-04-20 RX ADMIN — GUAIFENESIN 600 MG: 600 TABLET ORAL at 21:07

## 2024-04-20 RX ADMIN — FUROSEMIDE 20 MG: 10 INJECTION, SOLUTION INTRAMUSCULAR; INTRAVENOUS at 09:06

## 2024-04-20 RX ADMIN — ATORVASTATIN CALCIUM 40 MG: 40 TABLET, FILM COATED ORAL at 21:07

## 2024-04-20 RX ADMIN — FERROUS SULFATE TAB EC 325 MG (65 MG FE EQUIVALENT) 325 MG: 325 (65 FE) TABLET DELAYED RESPONSE at 08:57

## 2024-04-20 RX ADMIN — PANTOPRAZOLE SODIUM 40 MG: 40 TABLET, DELAYED RELEASE ORAL at 05:29

## 2024-04-20 RX ADMIN — GABAPENTIN 100 MG: 100 CAPSULE ORAL at 08:59

## 2024-04-20 RX ADMIN — MORPHINE SULFATE 2 MG: 2 INJECTION, SOLUTION INTRAMUSCULAR; INTRAVENOUS at 02:29

## 2024-04-20 RX ADMIN — MIDODRINE HYDROCHLORIDE 5 MG: 5 TABLET ORAL at 13:26

## 2024-04-20 RX ADMIN — MIDODRINE HYDROCHLORIDE 5 MG: 5 TABLET ORAL at 17:09

## 2024-04-20 RX ADMIN — MIDODRINE HYDROCHLORIDE 10 MG: 10 TABLET ORAL at 08:57

## 2024-04-20 RX ADMIN — SODIUM CHLORIDE, PRESERVATIVE FREE 10 ML: 5 INJECTION INTRAVENOUS at 21:12

## 2024-04-20 RX ADMIN — TAMSULOSIN HYDROCHLORIDE 0.4 MG: 0.4 CAPSULE ORAL at 09:01

## 2024-04-20 RX ADMIN — METOPROLOL TARTRATE 25 MG: 25 TABLET, FILM COATED ORAL at 21:07

## 2024-04-20 RX ADMIN — ASPIRIN 81 MG: 81 TABLET, COATED ORAL at 09:00

## 2024-04-20 RX ADMIN — CHOLECALCIFEROL TAB 125 MCG (5000 UNIT) 5000 UNITS: 125 TAB at 09:01

## 2024-04-20 RX ADMIN — GABAPENTIN 100 MG: 100 CAPSULE ORAL at 21:07

## 2024-04-20 RX ADMIN — GABAPENTIN 100 MG: 100 CAPSULE ORAL at 13:26

## 2024-04-20 RX ADMIN — CYANOCOBALAMIN TAB 1000 MCG 1000 MCG: 1000 TAB at 09:00

## 2024-04-20 ASSESSMENT — PAIN DESCRIPTION - LOCATION
LOCATION: ABDOMEN
LOCATION: BACK;ABDOMEN
LOCATION: ABDOMEN
LOCATION: ABDOMEN
LOCATION: BACK
LOCATION: ABDOMEN

## 2024-04-20 ASSESSMENT — PAIN DESCRIPTION - ONSET
ONSET: ON-GOING
ONSET: GRADUAL
ONSET: ON-GOING

## 2024-04-20 ASSESSMENT — PAIN DESCRIPTION - DESCRIPTORS
DESCRIPTORS: SHARP
DESCRIPTORS: SHARP;SHOOTING
DESCRIPTORS: ACHING;SHARP
DESCRIPTORS: SHARP
DESCRIPTORS: ACHING;CRAMPING
DESCRIPTORS: SHARP

## 2024-04-20 ASSESSMENT — PAIN DESCRIPTION - FREQUENCY
FREQUENCY: INTERMITTENT
FREQUENCY: CONTINUOUS
FREQUENCY: INTERMITTENT

## 2024-04-20 ASSESSMENT — PAIN - FUNCTIONAL ASSESSMENT
PAIN_FUNCTIONAL_ASSESSMENT: ACTIVITIES ARE NOT PREVENTED

## 2024-04-20 ASSESSMENT — PAIN SCALES - GENERAL
PAINLEVEL_OUTOF10: 8
PAINLEVEL_OUTOF10: 3
PAINLEVEL_OUTOF10: 0
PAINLEVEL_OUTOF10: 0
PAINLEVEL_OUTOF10: 3
PAINLEVEL_OUTOF10: 4
PAINLEVEL_OUTOF10: 6
PAINLEVEL_OUTOF10: 2
PAINLEVEL_OUTOF10: 9
PAINLEVEL_OUTOF10: 0
PAINLEVEL_OUTOF10: 6

## 2024-04-20 ASSESSMENT — PAIN DESCRIPTION - ORIENTATION
ORIENTATION: MID
ORIENTATION: RIGHT;UPPER
ORIENTATION: LEFT;LOWER

## 2024-04-20 ASSESSMENT — PAIN DESCRIPTION - PAIN TYPE
TYPE: ACUTE PAIN

## 2024-04-20 NOTE — FLOWSHEET NOTE
04/20/24 0800   Treatment Team Notification   Reason for Communication Evaluate   Name of Team Member Notified Dr. Melara   Treatment Team Role Consulting Provider   Method of Communication Face to face   Response See orders

## 2024-04-20 NOTE — PROGRESS NOTES
Ry Traylor MD   Urology Progress Note            Subjective: Follow-up urinary incontinence high postvoid residual  Patient seen in conjunction with nursing staff  Patient Vitals for the past 24 hrs:   BP Temp Temp src Pulse Resp SpO2   04/20/24 0000 (!) 108/59 -- -- (!) 114 16 90 %   04/19/24 2253 -- 98.1 °F (36.7 °C) Oral -- -- --   04/19/24 2200 (!) 125/53 -- -- 90 20 98 %   04/19/24 2130 -- -- -- 92 22 98 %   04/19/24 2100 (!) 133/58 -- -- 91 20 99 %   04/19/24 2030 -- -- -- 89 18 99 %   04/19/24 2000 (!) 122/50 (!) 95.5 °F (35.3 °C) Temporal 89 16 99 %   04/19/24 1930 -- -- -- 85 22 100 %   04/19/24 1900 (!) 119/56 -- -- 99 23 99 %   04/19/24 1800 (!) 120/50 -- -- 89 16 99 %   04/19/24 1729 -- -- -- 99 23 99 %   04/19/24 1700 (!) 123/56 -- -- (!) 102 21 98 %   04/19/24 1600 129/71 97.1 °F (36.2 °C) Temporal 99 25 100 %   04/19/24 1530 -- -- -- 95 18 --   04/19/24 1500 -- -- -- 93 23 91 %   04/19/24 1445 -- -- -- 93 21 --   04/19/24 1430 -- -- -- 93 18 100 %   04/19/24 1415 -- -- -- 95 19 100 %   04/19/24 1403 132/65 -- -- 96 18 100 %   04/19/24 1325 134/69 -- -- 90 18 100 %   04/19/24 1320 135/70 -- -- 92 16 100 %   04/19/24 1315 121/60 -- -- 97 15 100 %   04/19/24 1310 131/63 -- -- (!) 113 16 100 %   04/19/24 1300 136/87 -- -- 94 -- --   04/19/24 1250 (!) 140/75 -- -- 97 24 --   04/19/24 1240 -- -- -- 93 -- 100 %   04/19/24 1225 (!) 139/59 -- -- 95 22 95 %   04/19/24 1200 (!) 144/55 97.3 °F (36.3 °C) Temporal 93 21 97 %   04/19/24 1100 (!) 147/57 -- -- 89 13 100 %   04/19/24 1000 (!) 135/48 -- -- 83 17 100 %   04/19/24 0900 (!) 133/47 -- -- 89 17 97 %   04/19/24 0800 (!) 147/54 97.3 °F (36.3 °C) Temporal 85 14 98 %   04/19/24 0700 -- -- -- 86 18 95 %   04/19/24 0600 -- -- -- 80 16 100 %   04/19/24 0500 (!) 115/58 -- -- 81 19 100 %   04/19/24 0400 -- -- -- 80 19 98 %   04/19/24 0300 -- -- -- 83 22 96 %   04/19/24 0200 -- -- -- 84 15 99 %   04/19/24 0100 -- -- -- 74 20 99 %

## 2024-04-20 NOTE — PLAN OF CARE
Problem: Safety - Adult  Goal: Free from fall injury  4/19/2024 2255 by Jason Hicks RN  Outcome: Progressing  4/19/2024 1256 by Hattie Loya RN  Outcome: Progressing  Flowsheets (Taken 4/19/2024 1252)  Free From Fall Injury:   Instruct family/caregiver on patient safety   Based on caregiver fall risk screen, instruct family/caregiver to ask for assistance with transferring infant if caregiver noted to have fall risk factors     Problem: Discharge Planning  Goal: Discharge to home or other facility with appropriate resources  4/19/2024 2255 by Jason Hicks RN  Outcome: Progressing  4/19/2024 1256 by Hattie Loya RN  Outcome: Progressing  Flowsheets (Taken 4/19/2024 0800)  Discharge to home or other facility with appropriate resources: Identify barriers to discharge with patient and caregiver     Problem: Pain  Goal: Verbalizes/displays adequate comfort level or baseline comfort level  4/19/2024 2255 by Jason Hicks RN  Outcome: Progressing  4/19/2024 1256 by Hattie Loya RN  Outcome: Progressing     Problem: Skin/Tissue Integrity - Adult  Goal: Incisions, wounds, or drain sites healing without S/S of infection  4/19/2024 2255 by Jason Hicks RN  Outcome: Progressing  4/19/2024 1256 by Hattie Loya RN  Outcome: Progressing  Flowsheets  Taken 4/19/2024 1252  Incisions, Wounds, or Drain Sites Healing Without Sign and Symptoms of Infection:   TWICE DAILY: Assess and document skin integrity   Implement wound care per orders   TWICE DAILY: Assess and document dressing/incision, wound bed, drain sites and surrounding tissue  Taken 4/19/2024 0800  Incisions, Wounds, or Drain Sites Healing Without Sign and Symptoms of Infection: TWICE DAILY: Assess and document skin integrity     Problem: Gastrointestinal - Adult  Goal: Minimal or absence of nausea and vomiting  4/19/2024 2255 by Jason Hicks RN  Outcome: Progressing  4/19/2024 1256 by Hattie Loya RN  Outcome:  therapy assess nares and determine need for appliance change or resting period.  4/19/2024 3235 by Jason Hicks, RN  Outcome: Progressing  4/19/2024 1256 by Hattie Loya, RN  Outcome: Progressing

## 2024-04-20 NOTE — PROGRESS NOTES
End Of Shift Note  [place] [facility]  Summary of shift: 1 BM (green, brown, loose). VSS. Morphine given for pain x3. Refused to turn because patient wanted to sleep. 1875ml UOP, 110 ml out of drain.     Vitals:    Vitals:    04/20/24 0000 04/20/24 0200 04/20/24 0400 04/20/24 0523   BP: (!) 108/59  108/64    Pulse: (!) 114 (!) 107 (!) 113    Resp: 16 22 16    Temp:   (!) 96.4 °F (35.8 °C)    TempSrc:   Temporal    SpO2: 90% 95% 96%    Weight:    75.3 kg (165 lb 14.4 oz)   Height:            I&O:   Intake/Output Summary (Last 24 hours) at 4/20/2024 0650  Last data filed at 4/20/2024 0235  Gross per 24 hour   Intake 1196.9 ml   Output 3561 ml   Net -2364.1 ml       Resp Status: 3L    Ventilator Settings:     / / /FiO2 : 94 %    Critical Care IV infusions:   sodium chloride 75 mL/hr at 04/19/24 2052    sodium chloride      sodium chloride 10 mL/hr at 04/19/24 0951    dextrose          LDA:   PICC Triple Lumen 04/14/24 Right Brachial (Active)   Number of days: 5       Peripheral IV 04/11/24 Right Forearm (Active)   Number of days: 8       Peripheral IV 04/13/24 Left;Posterior;Proximal Forearm (Active)   Number of days: 6       Biliary Tube 04/19/24 T-tube 8 fr RUQ (Active)   Number of days: 0       Wound 04/04/24 Foot Left;Dorsal dry, scabbed (Active)   Number of days: 15       Wound Foot Left;Lateral (Active)   Number of days:

## 2024-04-20 NOTE — PROGRESS NOTES
Occupational Therapy  Facility/Department: [facility]  Rehabilitation Occupational Therapy Daily Treatment Note    Date: 24  Patient Name: Jorge Luis Banda       Room:   MRN: [id number]  Account: [id number]   : 1945  (78 y.o.) Gender: male                    Past Medical History:  has a past medical history of Paroxysmal atrial fibrillation (HCC), Peripheral artery disease (HCC), and Primary hypertension.  Past Surgical History:   has a past surgical history that includes above knee amputation (Right, 2020); Upper gastrointestinal endoscopy (N/A, 2024); and IR CHOLECYSTOSTOMY PERCUTANEOUS COMPLETE (2024).    Restrictions  Restrictions/Precautions: Fall Risk, General Precautions, Bedrest with Bathroom Privileges  Other position/activity restrictions: Telemetry, LUE IV, L sided weakness (s/p R CVA); R AKA;  R UE IV; Arterial Line (L radial);  PICC Line (R brachial);  2L Supplemental O2 nasal cannula  Required Braces or Orthoses?: No    Subjective  Subjective: Pt in bed upon arrival with physical therapy. Pt requesting to sit EOB.  Restrictions/Precautions: Fall Risk;General Precautions;Bedrest with Bathroom Privileges             Objective     Cognition  Overall Cognitive Status: Exceptions  Arousal/Alertness: Appropriate responses to stimuli  Following Commands: Follows one step commands consistently  Attention Span: Attends with cues to redirect  Memory: Appears intact  Safety Judgement: Decreased awareness of need for assistance;Decreased awareness of need for safety  Problem Solving: Decreased awareness of errors;Assistance required to identify errors made;Assistance required to correct errors made;Assistance required to generate solutions;Assistance required to implement solutions  Insights: Decreased awareness of deficits  Initiation: Requires cues for some  Sequencing: Requires cues for some  Orientation  Overall Orientation Status: Within Normal Limits

## 2024-04-20 NOTE — PROGRESS NOTES
Patient demonstrated that he is able to move his L thumb a small bit, and this is a new development for him.

## 2024-04-20 NOTE — PROGRESS NOTES
GASTROENTEROLOGY NOTE       Patient:   Jorge Luis Banda   :    1945   Facility:   [facility]  Date:     2024  Consultant:   Gertrudis Trevino, APRN - CNP      SUBJECTIVE:      Continues to complain of abdominal pain.  Isaura tube in place, though pain not at the site of insertion.  Denies wanting to eat and does not like the food that is offered.  Currently on pureed diet.  Denies n/v.       OBJECTIVE:   Vital Signs:  /75   Pulse (!) 125   Temp 98 °F (36.7 °C) (Temporal)   Resp 20   Ht 1.829 m (6')   Wt 75.3 kg (165 lb 14.4 oz)   SpO2 96%   BMI 22.50 kg/m²      Physical Exam:   General appearance: Alert, NAD  Lungs: CTA bilaterally, unlabored pattern  Heart: S1S2, RRR without murmur, clicks, gallops.  Abdomen: Soft, right-sided tenderness, ND +BS, tube insertion site clean and dry, bloody drainage noted in drainage bag  Skin/Musculoskeletal:  No jaundice      Lab and Imaging Review   Recent Labs     24  0300 24  0640 24  1945 24  0603   WBC 16.0*  --   --  7.6   HGB 8.1*  --  8.6* 7.9*   MCV 92.2  --   --  93.3   *  --   --  362   * 139  --  141   K 3.3* 3.2* 3.5* 3.5*   CL 98 106  --  106   CO2 21 24  --  24   BUN 21 23  --  17   CREATININE 1.2 1.0  --  1.0   GLUCOSE 110* 99  --  86   CALCIUM 8.2* 8.2*  --  8.0*   MG 2.3  --   --  2.0     No results for input(s): \"INR\", \"PROTIME\" in the last 72 hours.      Impression:    Cholecystitis, s/p cholecystostomy tube  Anemia, EGD non-diagnostic for cause  Decreased appetite vs. Fear of eating      PLAN:    [place] poor, risk of colonoscopy greatly outweighs benefits and palliative care could be considered.   Could also consider CTA to evaluate for mesenteric ischemia, though CTA in 2019 showed significant disease, which has likely worsened.  Could consider vascular consult.      Electronically signed by POONAM Alvarenga CNP on 2024 at 10:20 AM

## 2024-04-20 NOTE — FLOWSHEET NOTE
04/20/24 1145   Treatment Team Notification   Reason for Communication Evaluate   Name of Team Member Notified Dr. Fierro   Treatment Team Role Consulting Provider   Method of Communication Face to face   Response See orders

## 2024-04-20 NOTE — PROGRESS NOTES
Pulmonary Critical Care Progress Note       Patient seen for the follow up of bilateral pleural effusions, hemoptysis     Subjective:  Patient  has pain right upper quadrant site of cholecystotomy tube.  He is still on oxygen at 2 L nasal cannula.  Has poor appetite.  No chest pain or shortness of breath.  He has been off pressors.  Heart rate slightly increased.  Lopressor dose increased.    Examination:  Vitals: BP (!) 110/56   Pulse (!) 107   Temp 98 °F (36.7 °C) (Temporal)   Resp 18   Ht 1.829 m (6')   Wt 75.3 kg (165 lb 14.4 oz)   SpO2 99%   BMI 22.50 kg/m²   General appearance: In no acute distress, alert and cooperative with exam cachectic  Neck: No JVD  Lungs: Decreased breath sound no crackles or wheeze  Heart: irregular rate and rhythm, S1, S2 normal, no gallop  Abdomen: Soft, non tender, + BS  Extremities: no cyanosis or clubbing. No significant edema, left upper and lower extremity weakness right above-knee amputation left foot dorsal ulcer    LABs:  CBC:   Recent Labs     04/18/24  0300 04/1945 04/20/24  0603   WBC 16.0*  --  7.6   HGB 8.1* 8.6* 7.9*   HCT 26.1* 29.0* 25.1*   *  --  362       BMP:   Recent Labs     04/18/24  0300 04/19/24  0640 04/1945 04/20/24  0603   * 139  --  141   K 3.3* 3.2* 3.5* 3.5*   CO2 21 24  --  24   BUN 21 23  --  17   CREATININE 1.2 1.0  --  1.0   LABGLOM 61 76  --  76   GLUCOSE 110* 99  --  86       No results for input(s): \"APTT\" in the last 72 hours.     Latest Reference Range & Units 04/11/24 15:56 04/13/24 23:26 04/16/24 04:21   Procalcitonin 0.00 - 0.09 ng/mL 1.95 (H) 1.02 (H) 0.72 (H)   (H): Data is abnormally high   Latest Reference Range & Units 04/15/24 04:34   Pro-BNP <300 pg/mL 16,301 (H)   (H): Data is abnormally high  Radiology:  HIDA scan  The gallbladder is not visualized after morphine administration, consistent  with acute cholecystitis.  CT chest abdomen pelvis  1.  CT CHEST: Unchanged presumed bilateral pneumonia:  Moderate volume right  pleural effusion and small volume left pleural effusion with areas of  consolidation posteriorly mid and lower lungs bilaterally.  2. Left basilar scarring.  3. 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  4.  Pulmonary sequela typical of that seen with smoking, including COPD.  5. Acute esophagitis.  6. Prominent esophageal ampulla versus small hiatal hernia.  7. Gastroesophageal reflux versus incomplete esophageal emptying.  8. Extensive calcific atherosclerosis coronary arteries.  9. CT ABDOMEN/PELVIS: Unchanged gallbladder hydrops and cholelithiasis with  findings of acute cholecystitis.  10. Stable right adrenal mass infiltrating into adjacent structures including  the right kidney, liver and inferior vena cava.  11. Probable reactive colitis at the hepatic flexure.    Chest x-ray 4/14  Mildly progressive bilateral infiltrates when compared to the prior exam           X-ray chest 4/13/24    X-ray chest 4/12/2024    Echocardiogram 4/3/24    Interpretation Summary         Left Ventricle: Mildly reduced left ventricular systolic function with a visually estimated EF of 45 - 50%. Left ventricle size is normal. Normal wall thickness. Severe hypokinesis of the apex. Grade II diastolic dysfunction with increased LAP.    Aortic Valve: Trileaflet valve. Mildly calcified cusp.    Mitral Valve: Mildly calcified leaflet. Mild regurgitation.    Tricuspid Valve: Moderate regurgitation. Moderately elevated RVSP, consistent with moderate pulmonary hypertension.    Image quality is good.      Impression & Recommendations:  Acute hypoxic respiratory insufficiency  Supplemental oxygen as needed to maintain oxygen saturation >90%  Incentive spirometer q1h while awake      Bilateral loculated pleural effusions/atelectasis status post thoracentesis  Off diuresis  Check pleural fluid cytology and cultures    Hypotension requiring pressor/increased cortisol level suspect following

## 2024-04-20 NOTE — PLAN OF CARE
Problem: Safety - Adult  Goal: Free from fall injury  4/20/2024 1033 by Myke Dumont, RN  Outcome: Progressing     Problem: Discharge Planning  Goal: Discharge to home or other facility with appropriate resources  4/20/2024 1033 by Myke Dumont, RN  Outcome: Progressing     Problem: Pain  Goal: Verbalizes/displays adequate comfort level or baseline comfort level  4/20/2024 1033 by Myke Dumont, RN  Outcome: Progressing     Problem: Skin/Tissue Integrity - Adult  Goal: Incisions, wounds, or drain sites healing without S/S of infection  4/20/2024 1033 by Myke Dumont, RN  Outcome: Progressing     Problem: Gastrointestinal - Adult  Goal: Minimal or absence of nausea and vomiting  4/20/2024 1033 by Myke Dumont, RN  Outcome: Progressing     Problem: Gastrointestinal - Adult  Goal: Maintains or returns to baseline bowel function  4/20/2024 1033 by Myke Dumont, RN  Outcome: Progressing     Problem: Infection - Adult  Goal: Absence of infection at discharge  4/20/2024 1033 by Myke Dumont, RN  Outcome: Progressing     Problem: Hematologic - Adult  Goal: Maintains hematologic stability  4/20/2024 1033 by Myke Dumont, RN  Outcome: Progressing     Problem: Genitourinary - Adult  Goal: Absence of urinary retention  4/20/2024 1033 by Myke Dumont, RN  Outcome: Progressing     Problem: Skin/Tissue Integrity  Goal: Absence of new skin breakdown  Description: 1.  Monitor for areas of redness and/or skin breakdown  2.  Assess vascular access sites hourly  3.  Every 4-6 hours minimum:  Change oxygen saturation probe site  4.  Every 4-6 hours:  If on nasal continuous positive airway pressure, respiratory therapy assess nares and determine need for appliance change or resting period.  4/20/2024 1033 by Myke Dumont, RN  Outcome: Progressing     Problem: ABCDS Injury Assessment  Goal: Absence of physical injury  4/20/2024 1033 by Myke Dumont, RN  Outcome: Progressing     Problem: Nutrition

## 2024-04-20 NOTE — PROGRESS NOTES
Subjective    States he is feeling better but really is not eating much at all.  Disappointed that all of his food is puréed.  I really think it would be okay for him to eat what ever he wants.  I think palliative care is his best option.    Objective    Alert and appropriate  Chest symmetric excursion  Abdomen scaphoid with percutaneous drain in place.  Draining just some bloody fluid.  No real bile.  Extremities status post right above-knee amputation.  Left upper extremity is motionless.    Assessment and plan    78-year-old here with epigastric pain and fear of eating.  I am not sure whether or not his Henna cystitis and cholelithiasis played much role.  He does feel a little bit better after his percutaneous cholecystostomy tube but it is just draining a little bit of bloody fluid.  I do think that he may well have some mesenteric ischemia.  I think that he is to sick to have a open procedure.  His CTA of his abdominal aorta did show all 3 of his mesenteric vessels to be open but with calcification and narrowing.  Will defer to vascular surgery whether there is any procedure that could be done to help inflow to his gut.  At this time general surgery has really nothing to offer as I do not think he can have a general anesthetic.  Please call us if we can help.

## 2024-04-20 NOTE — PROGRESS NOTES
[facility]  Progress Note    Jorge Luis Banda  MRN: [id number]  Date: 4/20/2024  LOS: 17     Reason for F/U :   Cholecystitis    Impression :   Acute cholecystitis   status post percutaneous cholecystostomy tube placement 4/19/2024  History of lung cancer stage I s/p resection 2019 and concerning findings of liver and adrenal metastases  Acute CVA with left-sided weakness and has flow-limiting critical right ICA stenosis 90%, bilateral proximal ICA atherosclerosis with flow limitation left 80% and right 90%  Severe peripheral arterial disease  Longstanding persistent atrial fibrillation with rapid ventricular response  Acute GI bleed unclear source  Neurogenic bladder dysfunction with urinary retention and refusing indwelling Dotson catheter  Elevated troponin consistent with type II non-ST elevation myocardial infarction secondary to acute anemia  Acute diastolic congestive heart failure    Recommendations:   The patient received Unasyn from 4/3/2024 4/18/2024 [5 days of therapy], levofloxacin on 4/11 and got a 3-day course of azithromycin from 4/11/2024 through 4/13/2024  He is currently on antimicrobial therapy with Zosyn since 4/11/2024 and today is day # 10 of therapy and is scheduled to continue through 4/26/2024   We will follow the culture data and adjust therapy according    Infection Control Recommendations:   Universal precautions    Discharge Planning:   Estimated Length of IV antimicrobials: 4/26/2024  Patient will need Midline Catheter Insertion/ PICC line Insertion: No  Patient will need: Home IV , Infusion Center,  SNF,  LTAC: Undetermined  Patient willneed outpatient wound care: No    Medical Decision making / Summary of Stay:   Jorge Luis Banda is a 78 y.o.-year-old male who was initially admitted on 4/3/2024.   The patient is being seen in consultation on HOSPITAL DAY #16 for duration of antibiotics given cholecystitis at the request of Dr. Fierro.     Jorge Luis has a history of

## 2024-04-20 NOTE — FLOWSHEET NOTE
04/20/24 0956   Treatment Team Notification   Reason for Communication Evaluate   Name of Team Member Notified Dr. Crawford   Treatment Team Role Attending Provider   Method of Communication Face to face   Response See orders     Meds adjusted, see orders.

## 2024-04-20 NOTE — PROGRESS NOTES
Physical Therapy  Facility/Department: [facility] CVICU  Daily Treatment Note  NAME: Jorge Luis Banda  : 1945  MRN: [id number]    Date of Service: 2024    Discharge Recommendations:  Pt currently functioning below baseline.  Recommend daily inpatient skilled therapy at time of discharge to maximize long term outcomes and prevent re-admission. Please refer to AM-PAC score for current level of function.        Patient Diagnosis(es): The primary encounter diagnosis was Left leg cellulitis. Diagnoses of Anemia, unspecified type, Melena, UMM (acute kidney injury) (HCC), Gastrointestinal hemorrhage, unspecified gastrointestinal hemorrhage type, Atrial fib/flutter, transient (HCC), Embolism and thrombosis of artery (HCC), and Claudication (HCC) were also pertinent to this visit.    Assessment   Activity Tolerance: Patient limited by pain;Patient limited by endurance     Plan      5-6x/wk     Restrictions  Restrictions/Precautions  Restrictions/Precautions: Fall Risk, General Precautions, Bedrest with Bathroom Privileges  Required Braces or Orthoses?: No  Position Activity Restriction  Other position/activity restrictions: Telemetry, LUE IV, L sided weakness (s/p R CVA); R AKA;  R UE IV; Arterial Line (L radial);  PICC Line (R brachial);  2L Supplemental O2 nasal cannula     Subjective    Subjective  Subjective: \"I can't move my left arm at all\"  Pain: c/o abdominal pain when rolling and sitting EOB (new drain Rt. abdomen)  Orientation  Overall Orientation Status: Within Normal Limits  Cognition  Overall Cognitive Status: Exceptions  Arousal/Alertness: Appropriate responses to stimuli  Following Commands: Follows one step commands consistently  Attention Span: Attends with cues to redirect  Memory: Appears intact  Safety Judgement: Decreased awareness of need for assistance;Decreased awareness of need for safety  Problem Solving: Decreased awareness of errors;Assistance required to identify errors made;Assistance

## 2024-04-20 NOTE — CARE COORDINATION
Patient has approved [facility] precert to admit to [facility] 4/20/24-4/23/24.

## 2024-04-20 NOTE — PROGRESS NOTES
Nutrition Assessment     Type and Reason for Visit: Reassess    Nutrition Recommendations/Plan:   Provide diet rx as ordered   Provide supplement as ordered   Monitor labs as they become available   Monitor skin integrity/weights     Malnutrition Assessment:  Malnutrition Status: No malnutrition    Nutrition Assessment:  Patient now Intermediate Status.  PICC discontinued, 2/3 ports occluded even after Alteplase injections yesterday.  PICC will need to be re-inserted Monday 4/22 if patient discharges to SNF on IV Zosyn per ID.  SNF is [facility], patient accepted. Metoprolol restarted, Lasix & Midodrine adjusted. Over 1.6L UO this shift. Continues on supplements twice a day provides 700 kcals and 40 grams of protein if both are 100% consumed. Monitor po intakes, weights, labs, supplement acceptance and follow up PRN.    Estimated Daily Nutrient Needs:  Energy (kcal):  1887-2526 kcals per day using 25-30 g/kg of actual body weight. Weight Used for Energy Requirements: Current     Protein (g):  87-95 gm of protein (1.2-1.3 gm/kg) Weight Used for Protein Requirements: Adjusted        Fluid (ml/day):  4380-6016 mL Method Used for Fluid Requirements: 1 ml/kcal    Nutrition Related Findings:   LUE and LLE non-pitting trace edema, active sounds. Wound Type: Venous Stasis (left foot arterial and venous ulcers.)    Current Nutrition Therapies:    ADULT DIET; Dysphagia - Pureed; No Drinking Straws  ADULT ORAL NUTRITION SUPPLEMENT; Breakfast, Dinner; Standard High Calorie/High Protein Oral Supplement    Anthropometric Measures:  Height: 182.9 cm (6')  Current Body Wt: 74.2 kg (163 lb 9.3 oz)   BMI: 22.2    Nutrition Diagnosis:   Altered GI function related to altered GI function (GI function) as evidenced by GI abnormality    Nutrition Interventions:   Food and/or Nutrient Delivery: Continue Current Diet, Continue Oral Nutrition Supplement  Nutrition Education/Counseling: Education not indicated  Coordination of

## 2024-04-20 NOTE — CARE COORDINATION
Social work: spoke to  auth received for [facility] however advised pt will need  Picc LINE ON Monday. Will advise rep for snf of same. Will need theresa and Rx at discharge. Wandy dowd

## 2024-04-20 NOTE — PROGRESS NOTES
[facility]  Office: [phone]  Chinmay Ramos DO, Jose Atkinson DO, Guevara Ramirez DO, Pranay Cooper, DO, Sumeet Eller MD, Karen Carvajal MD, Sony Crawford MD, Poonam Platt MD,  Stephan San MD, Marcello Daniels MD, Theodore Pascual MD,  Juve Mahmood DO, Matias Mckeon MD, Hero Tavarez MD, Valentin Ramos DO, Jazzy Spence MD,  Trino Handlye DO, Flavia Leonardo MD, Vanessa Soriano MD, Pricilla Akers MD, Casimiro Knight MD,  Zeke Maciel MD, Guera Cervantes MD, Kianna Martin MD, Mirlande Worrell MD, Ney Garcia MD, Savannah Dominguez MD, Ed Emerson DO, Jaylen West DO, Comfort Carcamo MD,  Siva Ashley MD, Shirley Waterhouse, CNP,  Carolyn Pelaez CNP, Scotty Ge, CNP,  Wandy Trivedi, DNP, Malissa Gaston, CNP, Lana Penaloza, CNP, Susan Haro, CNP, Sherrell Mendoza, CNP, Suzy Morales, PA-C, Dalila Lange PA-C, Sada Matt, CNP, Crystal Womack, CNP, Sheng Reddy, CNP, Arielle Guy, CNP, Linsey Perez, CNP, Anju Cho, CNS, Hoa Donahue, CNP, Cassidy Ferrer CNP, Tracy Schwab, CNP       [facility]      Daily Progress Note     Admit Date: 4/3/2024  Bed/Room No.  2031/2031-01  Admitting Physician : Sony Crawford MD  Code Status :Full Code  Hospital Day:  LOS: 17 days   Chief Complaint:     Chief Complaint   Patient presents with    Leg Pain     Left leg, open wounds and swollen       Principal Problem:    Melena  Active Problems:    History of arterial bypass of lower extremity    S/P AKA (above knee amputation) unilateral, right (HCC)    Primary hypertension    Peripheral arterial disease (HCC)    Neuropathy    Malignant neoplasm of lung (HCC)    Intermittent claudication of left lower extremity due to atherosclerosis (HCC)    Embolism and thrombosis of artery (HCC)    Left leg cellulitis    Iron deficiency anemia due to chronic blood loss    Hx of cancer of lung    Adrenal mass (HCC)    Claudication (HCC)    Pleural effusion, bilateral     to monitor vitals , Intake / output ,  Cell count , HGB , Kidney function, oxygenation  as indicated .   Plan and updates discussed with patient ,  answers  explained to satisfaction.  Wife at bedside  Plan discussed with Staff Myke RN     (Please note that portions of this note were completed with a voice recognition program. Efforts were made to edit the dictations but occasionally words are mis-transcribed.)      Sony Crawford MD  4/20/2024

## 2024-04-20 NOTE — PROGRESS NOTES
End Of Shift Note  [facility] CV[facility]    Summary of shift: Patient now Intermediate Status.  PICC discontinued, 2/3 ports occluded even after Alteplase injections yesterday.  PICC will need to be re-inserted Monday 4/22 if patient discharges to SNF on IV Zosyn per ID.  SNF is [facility], patient accepted. Metoprolol restarted, Lasix & Midodrine adjusted. Over 1.6L UO this shift.  Only 15ml dark brown sludge from perc drainage tube s/p cholecystostomy. Little to no appetite, still c/o abdominal pain.  Sips thin liquids, do not use straw per SLP.  Takes whole pills mixed with pudding or apple sauce.  Pureed diet which patient dislikes.  PT had patient dangling at bedside, tolerated well.      Vitals:    Vitals:    04/20/24 1615 04/20/24 1700 04/20/24 1708 04/20/24 1800   BP: 118/65 121/66  110/65   Pulse: (!) 104 (!) 108  (!) 115   Resp:  23 20 17   Temp:       TempSrc:       SpO2: 100% 100%  98%   Weight:       Height:            I&O:   Intake/Output Summary (Last 24 hours) at 4/20/2024 1830  Last data filed at 4/20/2024 1830  Gross per 24 hour   Intake 1439.91 ml   Output 3820 ml   Net -2380.09 ml       Resp Status: 2LNC    Ventilator Settings:     / / /FiO2 : 94 %    Critical Care IV infusions:   sodium chloride      sodium chloride Stopped (04/20/24 0943)    dextrose          LDA:   Peripheral IV 04/11/24 Right Forearm (Active)   Number of days: 9       Peripheral IV 04/13/24 Left;Posterior;Proximal Forearm (Active)   Number of days: 7       Biliary Tube 04/19/24 T-tube 8 fr RUQ (Active)   Number of days: 1       Wound 04/04/24 Foot Left;Dorsal dry, scabbed (Active)   Number of days: 16       Wound Foot Left;Lateral (Active)   Number of days:

## 2024-04-21 LAB
ANION GAP SERPL CALCULATED.3IONS-SCNC: 9 MMOL/L (ref 9–17)
BUN SERPL-MCNC: 13 MG/DL (ref 8–23)
BUN/CREAT SERPL: 14 (ref 9–20)
CALCIUM SERPL-MCNC: 7.9 MG/DL (ref 8.6–10.4)
CHLORIDE SERPL-SCNC: 102 MMOL/L (ref 98–107)
CO2 SERPL-SCNC: 28 MMOL/L (ref 20–31)
CREAT SERPL-MCNC: 0.9 MG/DL (ref 0.7–1.2)
GFR SERPL CREATININE-BSD FRML MDRD: 86 ML/MIN/1.73M2
GLUCOSE SERPL-MCNC: 94 MG/DL (ref 70–99)
MAGNESIUM SERPL-MCNC: 1.8 MG/DL (ref 1.6–2.6)
POTASSIUM SERPL-SCNC: 3.2 MMOL/L (ref 3.7–5.3)
SODIUM SERPL-SCNC: 139 MMOL/L (ref 135–144)

## 2024-04-21 PROCEDURE — 2580000003 HC RX 258: Performed by: INTERNAL MEDICINE

## 2024-04-21 PROCEDURE — 99232 SBSQ HOSP IP/OBS MODERATE 35: CPT | Performed by: INTERNAL MEDICINE

## 2024-04-21 PROCEDURE — 83735 ASSAY OF MAGNESIUM: CPT

## 2024-04-21 PROCEDURE — 6370000000 HC RX 637 (ALT 250 FOR IP): Performed by: INTERNAL MEDICINE

## 2024-04-21 PROCEDURE — 2700000000 HC OXYGEN THERAPY PER DAY

## 2024-04-21 PROCEDURE — 97110 THERAPEUTIC EXERCISES: CPT

## 2024-04-21 PROCEDURE — 99232 SBSQ HOSP IP/OBS MODERATE 35: CPT | Performed by: FAMILY MEDICINE

## 2024-04-21 PROCEDURE — 94761 N-INVAS EAR/PLS OXIMETRY MLT: CPT

## 2024-04-21 PROCEDURE — 36415 COLL VENOUS BLD VENIPUNCTURE: CPT

## 2024-04-21 PROCEDURE — 6370000000 HC RX 637 (ALT 250 FOR IP): Performed by: FAMILY MEDICINE

## 2024-04-21 PROCEDURE — 2060000000 HC ICU INTERMEDIATE R&B

## 2024-04-21 PROCEDURE — 99231 SBSQ HOSP IP/OBS SF/LOW 25: CPT | Performed by: INTERNAL MEDICINE

## 2024-04-21 PROCEDURE — 80048 BASIC METABOLIC PNL TOTAL CA: CPT

## 2024-04-21 PROCEDURE — 2580000003 HC RX 258: Performed by: FAMILY MEDICINE

## 2024-04-21 PROCEDURE — 6360000002 HC RX W HCPCS: Performed by: FAMILY MEDICINE

## 2024-04-21 RX ORDER — MIRTAZAPINE 15 MG/1
30 TABLET, ORALLY DISINTEGRATING ORAL NIGHTLY
Status: DISCONTINUED | OUTPATIENT
Start: 2024-04-21 | End: 2024-05-11 | Stop reason: HOSPADM

## 2024-04-21 RX ADMIN — POTASSIUM CHLORIDE 40 MEQ: 1500 TABLET, EXTENDED RELEASE ORAL at 10:35

## 2024-04-21 RX ADMIN — GUAIFENESIN 600 MG: 600 TABLET ORAL at 20:55

## 2024-04-21 RX ADMIN — SODIUM CHLORIDE, PRESERVATIVE FREE 10 ML: 5 INJECTION INTRAVENOUS at 09:08

## 2024-04-21 RX ADMIN — APIXABAN 5 MG: 5 TABLET, FILM COATED ORAL at 09:07

## 2024-04-21 RX ADMIN — MIDODRINE HYDROCHLORIDE 5 MG: 5 TABLET ORAL at 12:43

## 2024-04-21 RX ADMIN — APIXABAN 5 MG: 5 TABLET, FILM COATED ORAL at 20:55

## 2024-04-21 RX ADMIN — MIRTAZAPINE 30 MG: 15 TABLET, ORALLY DISINTEGRATING ORAL at 20:55

## 2024-04-21 RX ADMIN — PIPERACILLIN AND TAZOBACTAM 3375 MG: 3; .375 INJECTION, POWDER, LYOPHILIZED, FOR SOLUTION INTRAVENOUS at 18:12

## 2024-04-21 RX ADMIN — MIDODRINE HYDROCHLORIDE 5 MG: 5 TABLET ORAL at 16:40

## 2024-04-21 RX ADMIN — FERROUS SULFATE TAB EC 325 MG (65 MG FE EQUIVALENT) 325 MG: 325 (65 FE) TABLET DELAYED RESPONSE at 08:11

## 2024-04-21 RX ADMIN — MIDODRINE HYDROCHLORIDE 5 MG: 5 TABLET ORAL at 08:11

## 2024-04-21 RX ADMIN — TAMSULOSIN HYDROCHLORIDE 0.4 MG: 0.4 CAPSULE ORAL at 09:02

## 2024-04-21 RX ADMIN — SODIUM CHLORIDE, PRESERVATIVE FREE 10 ML: 5 INJECTION INTRAVENOUS at 20:56

## 2024-04-21 RX ADMIN — METOPROLOL TARTRATE 25 MG: 25 TABLET, FILM COATED ORAL at 09:02

## 2024-04-21 RX ADMIN — PANTOPRAZOLE SODIUM 40 MG: 40 TABLET, DELAYED RELEASE ORAL at 16:40

## 2024-04-21 RX ADMIN — ASPIRIN 81 MG: 81 TABLET, COATED ORAL at 09:06

## 2024-04-21 RX ADMIN — GABAPENTIN 100 MG: 100 CAPSULE ORAL at 09:06

## 2024-04-21 RX ADMIN — PIPERACILLIN AND TAZOBACTAM 3375 MG: 3; .375 INJECTION, POWDER, LYOPHILIZED, FOR SOLUTION INTRAVENOUS at 10:25

## 2024-04-21 RX ADMIN — ATORVASTATIN CALCIUM 40 MG: 40 TABLET, FILM COATED ORAL at 20:55

## 2024-04-21 RX ADMIN — FUROSEMIDE 40 MG: 40 TABLET ORAL at 09:07

## 2024-04-21 RX ADMIN — GUAIFENESIN 600 MG: 600 TABLET ORAL at 09:04

## 2024-04-21 RX ADMIN — EMPAGLIFLOZIN 10 MG: 10 TABLET, FILM COATED ORAL at 09:11

## 2024-04-21 RX ADMIN — CYANOCOBALAMIN TAB 1000 MCG 1000 MCG: 1000 TAB at 09:07

## 2024-04-21 RX ADMIN — CHOLECALCIFEROL TAB 125 MCG (5000 UNIT) 5000 UNITS: 125 TAB at 09:06

## 2024-04-21 RX ADMIN — ACETAMINOPHEN 650 MG: 325 TABLET ORAL at 23:06

## 2024-04-21 RX ADMIN — PANTOPRAZOLE SODIUM 40 MG: 40 TABLET, DELAYED RELEASE ORAL at 09:02

## 2024-04-21 RX ADMIN — PIPERACILLIN AND TAZOBACTAM 3375 MG: 3; .375 INJECTION, POWDER, LYOPHILIZED, FOR SOLUTION INTRAVENOUS at 02:16

## 2024-04-21 RX ADMIN — METOPROLOL TARTRATE 25 MG: 25 TABLET, FILM COATED ORAL at 20:55

## 2024-04-21 RX ADMIN — ACETAMINOPHEN 650 MG: 325 TABLET ORAL at 10:19

## 2024-04-21 ASSESSMENT — PAIN SCALES - GENERAL
PAINLEVEL_OUTOF10: 0
PAINLEVEL_OUTOF10: 2
PAINLEVEL_OUTOF10: 3
PAINLEVEL_OUTOF10: 0
PAINLEVEL_OUTOF10: 2

## 2024-04-21 ASSESSMENT — PAIN DESCRIPTION - DESCRIPTORS
DESCRIPTORS: DISCOMFORT
DESCRIPTORS: ACHING

## 2024-04-21 ASSESSMENT — PAIN - FUNCTIONAL ASSESSMENT
PAIN_FUNCTIONAL_ASSESSMENT: ACTIVITIES ARE NOT PREVENTED
PAIN_FUNCTIONAL_ASSESSMENT: ACTIVITIES ARE NOT PREVENTED
PAIN_FUNCTIONAL_ASSESSMENT: PREVENTS OR INTERFERES SOME ACTIVE ACTIVITIES AND ADLS
PAIN_FUNCTIONAL_ASSESSMENT: ACTIVITIES ARE NOT PREVENTED

## 2024-04-21 ASSESSMENT — PAIN DESCRIPTION - ORIENTATION
ORIENTATION: MID
ORIENTATION: MID
ORIENTATION: LOWER
ORIENTATION: MID

## 2024-04-21 ASSESSMENT — PAIN DESCRIPTION - FREQUENCY
FREQUENCY: INTERMITTENT
FREQUENCY: INTERMITTENT

## 2024-04-21 ASSESSMENT — PAIN DESCRIPTION - LOCATION
LOCATION: BACK

## 2024-04-21 ASSESSMENT — PAIN DESCRIPTION - ONSET
ONSET: GRADUAL
ONSET: GRADUAL

## 2024-04-21 ASSESSMENT — PAIN DESCRIPTION - PAIN TYPE
TYPE: ACUTE PAIN
TYPE: CHRONIC PAIN

## 2024-04-21 NOTE — PLAN OF CARE
Problem: Safety - Adult  Goal: Free from fall injury  4/21/2024 1042 by Myke Dumont, RN  Outcome: Progressing     Problem: Discharge Planning  Goal: Discharge to home or other facility with appropriate resources  4/21/2024 1042 by Myke Dumont, RN  Outcome: Progressing     Problem: Pain  Goal: Verbalizes/displays adequate comfort level or baseline comfort level  4/21/2024 1042 by Myke Dumont, RN  Outcome: Progressing     Problem: Skin/Tissue Integrity - Adult  Goal: Incisions, wounds, or drain sites healing without S/S of infection  4/21/2024 1042 by Myke Dumont, RN  Outcome: Progressing     Problem: Gastrointestinal - Adult  Goal: Minimal or absence of nausea and vomiting  4/21/2024 1042 by Myke Dumont, RN  Outcome: Progressing     Problem: Gastrointestinal - Adult  Goal: Maintains or returns to baseline bowel function  4/21/2024 1042 by Myke Dumont, RN  Outcome: Progressing     Problem: Infection - Adult  Goal: Absence of infection at discharge  4/21/2024 1042 by Myke Dumont, RN  Outcome: Progressing     Problem: Hematologic - Adult  Goal: Maintains hematologic stability  4/21/2024 1042 by Myke Dumont, RN  Outcome: Progressing     Problem: Genitourinary - Adult  Goal: Absence of urinary retention  4/21/2024 1042 by Myke Dumont, RN  Outcome: Progressing     Problem: Skin/Tissue Integrity  Goal: Absence of new skin breakdown  Description: 1.  Monitor for areas of redness and/or skin breakdown  2.  Assess vascular access sites hourly  3.  Every 4-6 hours minimum:  Change oxygen saturation probe site  4.  Every 4-6 hours:  If on nasal continuous positive airway pressure, respiratory therapy assess nares and determine need for appliance change or resting period.  4/21/2024 1042 by Myke Dumont, RN  Outcome: Progressing     Problem: ABCDS Injury Assessment  Goal: Absence of physical injury  4/21/2024 1042 by Myke Dumont, RN  Outcome: Progressing     Problem: Nutrition

## 2024-04-21 NOTE — PLAN OF CARE
Problem: Safety - Adult  Goal: Free from fall injury  Outcome: Progressing     Problem: Discharge Planning  Goal: Discharge to home or other facility with appropriate resources  Outcome: Progressing  Flowsheets (Taken 4/20/2024 2000)  Discharge to home or other facility with appropriate resources:   Identify barriers to discharge with patient and caregiver   Arrange for needed discharge resources and transportation as appropriate   Identify discharge learning needs (meds, wound care, etc)   Refer to discharge planning if patient needs post-hospital services based on physician order or complex needs related to functional status, cognitive ability or social support system     Problem: Pain  Goal: Verbalizes/displays adequate comfort level or baseline comfort level  Outcome: Progressing  Flowsheets (Taken 4/21/2024 0000)  Verbalizes/displays adequate comfort level or baseline comfort level:   Encourage patient to monitor pain and request assistance   Assess pain using appropriate pain scale   Administer analgesics based on type and severity of pain and evaluate response   Implement non-pharmacological measures as appropriate and evaluate response   Notify Licensed Independent Practitioner if interventions unsuccessful or patient reports new pain     Problem: Skin/Tissue Integrity - Adult  Goal: Incisions, wounds, or drain sites healing without S/S of infection  Outcome: Progressing  Flowsheets (Taken 4/20/2024 2000)  Incisions, Wounds, or Drain Sites Healing Without Sign and Symptoms of Infection:   TWICE DAILY: Assess and document skin integrity   TWICE DAILY: Assess and document dressing/incision, wound bed, drain sites and surrounding tissue     Problem: Gastrointestinal - Adult  Goal: Minimal or absence of nausea and vomiting  Outcome: Progressing     Problem: Infection - Adult  Goal: Absence of infection at discharge  Outcome: Progressing  Flowsheets (Taken 4/20/2024 2000)  Absence of infection at discharge:

## 2024-04-21 NOTE — FLOWSHEET NOTE
04/21/24 1240   Treatment Team Notification   Reason for Communication Evaluate   Name of Team Member Notified Dr. Melara   Treatment Team Role Consulting Provider   Method of Communication Face to face   Response See orders

## 2024-04-21 NOTE — PROGRESS NOTES
[facility]  Progress Note    Jorge Luis Banda  MRN: [id number]  Date: 4/21/2024  LOS: 18     Reason for F/U :   Cholecystitis    Impression :   Acute cholecystitis   status post percutaneous cholecystostomy tube placement 4/19/2024  History of lung cancer stage I s/p resection 2019 and concerning findings of liver and adrenal metastases  Acute CVA with left-sided weakness and has flow-limiting critical right ICA stenosis 90%, bilateral proximal ICA atherosclerosis with flow limitation left 80% and right 90%  Severe peripheral arterial disease  Longstanding persistent atrial fibrillation with rapid ventricular response  Acute GI bleed unclear source  Neurogenic bladder dysfunction with urinary retention and refusing indwelling Dotson catheter  Elevated troponin consistent with type II non-ST elevation myocardial infarction secondary to acute anemia  Acute diastolic congestive heart failure    Recommendations:   The patient received Unasyn from 4/3/2024 4/18/2024 [5 days of therapy], levofloxacin on 4/11 and got a 3-day course of azithromycin from 4/11/2024 through 4/13/2024  He is currently on antimicrobial therapy with Zosyn since 4/11/2024 and today is day # 11 of therapy   The plan is to switch him to oral antimicrobial therapy today with cefdinir and metronidazole through 4/26/2024   It is my understanding that the plan is for discharge to a skilled nursing facility tomorrow  Continue local wound care for the foot wound    Infection Control Recommendations:   Universal precautions    Discharge Planning:   Estimated Length of IV antimicrobials: 4/26/2024  Patient will need Midline Catheter Insertion/ PICC line Insertion: No  Patient will need: Home IV , Infusion Center,  SNF,  LTAC: Undetermined  Patient willneed outpatient wound care: No    Medical Decision making / Summary of Stay:   Jorge Luis Banda is a 78 y.o.-year-old male who was initially admitted on 4/3/2024.   The patient is being seen

## 2024-04-21 NOTE — PROGRESS NOTES
Subjective    He is alert and he is pleased that he is having some progress with his left arm functioning slightly returning.  He did give me a thumbs up and wanted me to note that.  On the left side.  He does have some pain at his drain site but only when it is pushed on.  He is tolerating some liquids and pudding.    Objective    Alert and appropriate  Chest symmetric excursion  Abdomen percutaneous drain in place.  Nontender to palpation.  Extremities status post right above-knee amputation.  Left hand is able to move slightly and his thumb is able to give a thumbs up.    Assessment and plan  Came for GI bleeding.  That appears to be stable.  Has gallstones and probable obstruction of his gallbladder.  His percutaneous drain is just draining small amount of bloody fluid.  This tube will need to remain in place for 6 weeks prior to removal.  He problems with pain after eating.  Whether this is biliary or mesenteric ischemia is unclear.  Not sure that anything much can be done.  Do not think that he is a surgical candidate for a general anesthesia or open abdominal procedure.  General surgery will be available.  We are signing off at this time.  Please call if we can help.   No

## 2024-04-21 NOTE — PROGRESS NOTES
[facility]  Office: [phone]  Chinmay Ramos DO, Jose Atkinson DO, Guevara Ramirez DO, Pranay Cooper, DO, Sumeet Eller MD, Karen Carvajal MD, Sony Crawford MD, Poonam Platt MD,  Stephan San MD, Marcello Daniels MD, Theodore Pascual MD,  Juve Mahmood DO, Matias Mckeon MD, Hero Tavarez MD, Valentin Ramos DO, Jazzy Spence MD,  Trino Handley DO, Flavia Leonardo MD, Vanessa Soriano MD, Pricilla Akers MD, Casimiro Knight MD,  Zeke Maciel MD, Guera Cervantes MD, Kianna Martin MD, Mirlande Worrell MD, Ney Garcia MD, Savannah Dominguez MD, Ed Emerson DO, Jaylen West DO, Comfort Carcamo MD,  Siva Ashley MD, Shirley Waterhouse, CNP,  Carolyn Pelaez CNP, Scotty Ge, CNP,  Wandy Trivedi, DNP, Malissa Gaston, CNP, Lana Penaloza, CNP, Susan Haro, CNP, Sherrell Mendoza, CNP, Suzy Morales, PA-C, Dalila Lange PA-C, Sada Matt, CNP, Crystal Womack, CNP, Sheng Reddy, CNP, Arielle Guy, CNP, Linsey Perez, CNP, Anju Cho, CNS, Hoa Donahue, CNP, Cassidy Ferrer CNP, Tracy Schwab, CNP       [facility]      Daily Progress Note     Admit Date: 4/3/2024  Bed/Room No.  2031/2031-01  Admitting Physician : Sony Crawford MD  Code Status :Full Code  Hospital Day:  LOS: 18 days   Chief Complaint:     Chief Complaint   Patient presents with    Leg Pain     Left leg, open wounds and swollen       Principal Problem:    Melena  Active Problems:    History of arterial bypass of lower extremity    S/P AKA (above knee amputation) unilateral, right (HCC)    Primary hypertension    Peripheral arterial disease (HCC)    Neuropathy    Malignant neoplasm of lung (HCC)    Intermittent claudication of left lower extremity due to atherosclerosis (HCC)    Embolism and thrombosis of artery (HCC)    Left leg cellulitis    Iron deficiency anemia due to chronic blood loss    Hx of cancer of lung    Adrenal mass (HCC)    Claudication (HCC)    Pleural effusion, bilateral     soft tissue gas.      No cortical destruction or acute osseous abnormalities.         Vascular duplex lower extremity venous left   Final Result      XR FOOT LEFT (MIN 3 VIEWS)   Final Result   No radiographic evidence for osteomyelitis. If there is further clinical   concern, MRI is recommended.            Echo 4/5/24    Left Ventricle: Mildly reduced left ventricular systolic function with a visually estimated EF of 45 - 50%. Left ventricle size is normal. Normal wall thickness. Severe hypokinesis of the apex. Grade II diastolic dysfunction with increased LAP.    Aortic Valve: Trileaflet valve. Mildly calcified cusp.    Mitral Valve: Mildly calcified leaflet. Mild regurgitation.    Tricuspid Valve: Moderate regurgitation. Moderately elevated RVSP, consistent with moderate pulmonary hypertension.      Clinical Course : unchanged  Assessment and Plan  :        Severe anemia  S/p PRBC transfusion -2 units on 4/3/2024 and 1 unit on 4/12/2024.  Coumadin and Plavix stopped  No source of bleeding identified on EGD.  Unable to tolerate bowel prep for colonoscopy.  Given hemoglobin has been stable and patient has multiple comorbidities, will recommend deferring colonoscopy to outpatient.  Heparin infusion stopped and patient started on Eliquis and monitor hemoglobin close.   Vascular surgery on board for carotid stenosis and planning endarterectomy after 4 to 6 weeks..  Gastroenterology on board (following distantly) .  Severe peripheral vascular disease  S/p multiple bypass surgeries.  Follows Dr. Ledbetter at [facility].  Anticoagulation and antiplatelet medication were held due to severe anemia.  Outpatient follow with primary vascular surgery for carotid stenosis and endarterectomy.  Right adrenal and liver lesions  Concerning for metastasis.  Known history of stage I lung cancer in 2019  s/p resection.  Defer biopsy due to multiple comorbidities at this time.  Outpatient follow-up with oncology..  PAF -   Oral

## 2024-04-21 NOTE — PROGRESS NOTES
Patient Name: Jorge Luis Banda  Date of admission: 4/3/2024  3:27 PM  Patient's age: 78 y.o., 1945  Admission Dx: Melena [K92.1]  GI bleed [K92.2]  UMM (acute kidney injury) (HCC) [N17.9]  Left leg cellulitis [L03.116]  Anemia, unspecified type [D64.9]    Reason for Consult: management recommendations  Requesting Physician: Sony Crawford MD    CHIEF COMPLAINT: GI bleeding    History Obtained From:  patient  INTERVAL HISTORY:    Patient seen and examined.  Clinically no significant changes.  No chest pain.    Labs were reviewed.    Continues to have episodic hemoptysis.  The patient is aware of his diagnosis and treatment plan.  And our suspicion for malignancy.   Planning discharge to rehab.  Pulmonary input appreciated.  Respiratory status is fairly stable  HISTORY OF PRESENT ILLNESS:    The patient is a 78 y.o.   male who is admitted to the hospital for anemia and suspicion for GI bleeding.  His hemoglobin was under 7 and received blood transfusion.  He has severe peripheral vascular disease on Plavix and Coumadin.  He underwent an EGD that showed gastritis and he refused colonoscopy.  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  From oncology perspective, the patient was diagnosed with stage I lung cancer in 2019 and underwent lung resection.  Never received any chemotherapy or radiation.  He has been in remission since then.  CT scan of the chest showed concerning changes in the liver and adrenal gland for metastatic disease.  Dedicated CT of the abdomen and pelvis is ordered and pending.    Past Medical History:   has a past medical history of Paroxysmal atrial fibrillation (HCC), Peripheral artery disease (HCC), and Primary hypertension.    Past Surgical History:   has a past surgical history that includes above knee amputation (Right, 01/01/2020); Upper gastrointestinal endoscopy (N/A,  we will continue conservative management.  Will reevaluate after recovery we see if he improves enough to become a candidate for aggressive testing and possibly treatment.  Overall prognosis is guarded considering his multiple comorbidities and poor performance status  His lung cancer was stage I and it was 5 years ago.  He never received any adjuvant therapy  Hemoptysis. Pulmonary following.  Watch hemoglobin and transfuse as needed  We will follow.          Juve Bolanos MD  Hematologist/Medical Oncologist  [facility]                           This note is created with the assistance of a speech recognition program.  While intending to generate a document that actually reflects the content of the visit, the document can still have some errors including those of syntax and sound a like substitutions which may escape proof reading.  It such instances, actual meaning can be extrapolated by contextual diversion.

## 2024-04-21 NOTE — PROGRESS NOTES
Patient Name: Jorge Luis Banda  Date of admission: 4/3/2024  3:27 PM  Patient's age: 78 y.o., 1945  Admission Dx: Melena [K92.1]  GI bleed [K92.2]  UMM (acute kidney injury) (HCC) [N17.9]  Left leg cellulitis [L03.116]  Anemia, unspecified type [D64.9]    Reason for Consult: management recommendations  Requesting Physician: Sony Crawford MD    CHIEF COMPLAINT: GI bleeding    History Obtained From:  patient  INTERVAL HISTORY:    Patient seen and examined.  Clinically no significant changes.  No chest pain.    Labs were reviewed.    Continues to have episodic hemoptysis.  The patient is aware of his diagnosis and treatment plan.  And our suspicion for malignancy.  He agrees that his performance status and comorbidities will prevent us from doing invasive testing.      HISTORY OF PRESENT ILLNESS:    The patient is a 78 y.o.   male who is admitted to the hospital for anemia and suspicion for GI bleeding.  His hemoglobin was under 7 and received blood transfusion.  He has severe peripheral vascular disease on Plavix and Coumadin.  He underwent an EGD that showed gastritis and he refused colonoscopy.  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  From oncology perspective, the patient was diagnosed with stage I lung cancer in 2019 and underwent lung resection.  Never received any chemotherapy or radiation.  He has been in remission since then.  CT scan of the chest showed concerning changes in the liver and adrenal gland for metastatic disease.  Dedicated CT of the abdomen and pelvis is ordered and pending.    Past Medical History:   has a past medical history of Paroxysmal atrial fibrillation (HCC), Peripheral artery disease (HCC), and Primary hypertension.    Past Surgical History:   has a past surgical history that includes above knee amputation (Right, 01/01/2020); Upper gastrointestinal endoscopy  with rapid ventricular response (HCC) [I48.91] 04/14/2024    Unstable angina (HCC) [I20.0] 04/14/2024    Acute ischemic right internal carotid artery (ICA) stroke (HCC) [I63.231] 04/13/2024    Internal carotid artery stenosis, bilateral [I65.23] 04/13/2024    Anemia [D64.9] 04/13/2024    Gastrointestinal hemorrhage [K92.2] 04/13/2024    Pleural effusion, bilateral [J90] 04/11/2024    Longstanding persistent atrial fibrillation (HCC) [I48.11] 04/11/2024    Iron deficiency anemia due to chronic blood loss [D50.0] 04/10/2024    Hx of cancer of lung [Z85.118] 04/10/2024    Adrenal mass (HCC) [E27.8] 04/10/2024    Claudication (HCC) [I73.9] 04/10/2024    Melena [K92.1] 04/03/2024    Peripheral arterial disease (HCC) [I73.9] 04/03/2024    S/P AKA (above knee amputation) unilateral, right (HCC) [Z89.611] 03/05/2024    Embolism and thrombosis of artery (HCC) [I74.9] 03/05/2024    Left leg cellulitis [L03.116] 03/05/2024    History of arterial bypass of lower extremity [Z95.828] 11/08/2019    Malignant neoplasm of lung (HCC) [C34.90] 04/24/2019    Primary hypertension [I10] 02/14/2019    Intermittent claudication of left lower extremity due to atherosclerosis (HCC) [I70.212] 02/14/2019    Neuropathy [G62.9] 01/17/2019         IMPRESSION:   Severe peripheral vascular disease  On Coumadin and Plavix  Drop in hemoglobin and likely GI bleeding  History of lung cancer 2019, stage I status post lung resection  Concerning findings on CT scan with adrenal and liver mass  Acute CVA with left arm weakness    RECOMMENDATIONS:  I reviewed the lab data, imaging studies and discussed the diagnosis and treatment recommendations  I recommended supportive care and transfusion.  Keep hemoglobin above 7.  Anemia likely secondary to ongoing blood loss as well as anemia of chronic illness.  Received IV iron infusion  Further care by primary team and other specialists.  The patient condition and performance status are fairly poor.  Not a

## 2024-04-21 NOTE — PROGRESS NOTES
End Of Shift Note  [facility]  Summary of shift: Patient had a very uneventful night.  Patient stated that he was able to get a few hours of sleep.  Patient continued with good urine output, and 100mL brown sludge was emptied from perc drain this morning.  Patient was able to move his left thumb and forefinger and was able to squeeze my fingers weakly.      Vitals:    Vitals:    04/21/24 0300 04/21/24 0400 04/21/24 0500 04/21/24 0600   BP: 100/64 105/68 105/62 111/65   Pulse: 87 93 96 (!) 107   Resp: 18 19 20 19   Temp:  96.8 °F (36 °C)     TempSrc:  Temporal     SpO2:  99%     Weight:       Height:            I&O:   Intake/Output Summary (Last 24 hours) at 4/21/2024 0633  Last data filed at 4/21/2024 0607  Gross per 24 hour   Intake 1439.91 ml   Output 2985 ml   Net -1545.09 ml       Resp Status: 2L nasal canula    Ventilator Settings:     / / /FiO2 : 94 %    Critical Care IV infusions:   sodium chloride      sodium chloride Stopped (04/20/24 0943)    dextrose          LDA:   Peripheral IV 04/11/24 Right Forearm (Active)   Number of days: 9       Peripheral IV 04/13/24 Left;Posterior;Proximal Forearm (Active)   Number of days: 7       Biliary Tube 04/19/24 T-tube 8 fr RUQ (Active)   Number of days: 1       Wound 04/04/24 Foot Left;Dorsal dry, scabbed (Active)   Number of days: 16       Wound Foot Left;Lateral (Active)   Number of days:

## 2024-04-21 NOTE — FLOWSHEET NOTE
04/21/24 1055   Treatment Team Notification   Reason for Communication Evaluate   Name of Team Member Notified Dr. Dowling   Treatment Team Role Consulting Provider   Method of Communication Face to face   Response No new orders;At bedside

## 2024-04-21 NOTE — PROGRESS NOTES
Pulmonary Critical Care Progress Note       Patient seen for the follow up of bilateral pleural effusions, hemoptysis     Subjective:  Patient  has  improved pain right upper quadrant site of cholecystotomy tube.  He is still on oxygen at 2 L nasal cannula.  Has poor appetite.  No chest pain or shortness of breath.  He has been off pressors. Heart rate improved..        Examination:  Vitals: BP (!) 98/58   Pulse 90   Temp 96.8 °F (36 °C) (Temporal)   Resp 25   Ht 1.829 m (6')   Wt 75.3 kg (165 lb 14.4 oz)   SpO2 99%   BMI 22.50 kg/m²   General appearance: In no acute distress, alert and cooperative with exam cachectic  Neck: No JVD  Lungs: Decreased breath sound no crackles or wheeze  Heart: irregular rate and rhythm, S1, S2 normal, no gallop  Abdomen: Soft, non tender, + BS  Cholecystotomy tube in place bloody output  Extremities: no cyanosis or clubbing. No significant edema, left upper and lower extremity weakness right above-knee amputation left foot dorsal ulcer    LABs:  CBC:   Recent Labs     04/1945 04/20/24  0603   WBC  --  7.6   HGB 8.6* 7.9*   HCT 29.0* 25.1*   PLT  --  362       BMP:   Recent Labs     04/19/24  0640 04/1945 04/20/24  0603 04/21/24  0401     --  141 139   K 3.2* 3.5* 3.5* 3.2*   CO2 24  --  24 28   BUN 23  --  17 13   CREATININE 1.0  --  1.0 0.9   LABGLOM 76  --  76 86   GLUCOSE 99  --  86 94       No results for input(s): \"APTT\" in the last 72 hours.     Latest Reference Range & Units 04/11/24 15:56 04/13/24 23:26 04/16/24 04:21   Procalcitonin 0.00 - 0.09 ng/mL 1.95 (H) 1.02 (H) 0.72 (H)   (H): Data is abnormally high   Latest Reference Range & Units 04/15/24 04:34   Pro-BNP <300 pg/mL 16,301 (H)   (H): Data is abnormally high  Radiology:  HIDA scan  The gallbladder is not visualized after morphine administration, consistent  with acute cholecystitis.  CT chest abdomen pelvis  1.  CT CHEST: Unchanged presumed bilateral pneumonia: Moderate volume right  pleural

## 2024-04-21 NOTE — PROGRESS NOTES
Occupational Therapy  Facility/Department: [facility]  Occupational TherapyTreatment    Name: Jorge Luis Banda  : 1945  MRN: [id number]  Date of Service: 2024    Discharge Recommendations:  Patient would benefit from continued therapy after discharge          Patient Diagnosis(es): The primary encounter diagnosis was Left leg cellulitis. Diagnoses of Anemia, unspecified type, Melena, UMM (acute kidney injury) (HCC), Gastrointestinal hemorrhage, unspecified gastrointestinal hemorrhage type, Atrial fib/flutter, transient (HCC), Embolism and thrombosis of artery (HCC), and Claudication (HCC) were also pertinent to this visit.  Past Medical History:  has a past medical history of Paroxysmal atrial fibrillation (HCC), Peripheral artery disease (HCC), and Primary hypertension.  Past Surgical History:  has a past surgical history that includes above knee amputation (Right, 2020); Upper gastrointestinal endoscopy (N/A, 2024); and IR CHOLECYSTOSTOMY PERCUTANEOUS COMPLETE (2024).     RN reports patient is medically stable for therapy treatment this date.    Chart reviewed prior to treatment and patient is agreeable for therapy.  All lines intact and patient positioned comfortably at end of treatment.  All patient needs addressed prior to ending therapy session.          Assessment   Performance deficits / Impairments: Decreased functional mobility ;Decreased ADL status;Decreased cognition;Decreased safe awareness;Decreased balance;Decreased endurance;Decreased strength;Decreased high-level IADLs;Decreased sensation;Decreased coordination;Decreased ROM;Decreased fine motor control  Assessment: Pt reassessed . Pt presenting with new onset L sided hemiparesis in LUE and LLE. Pt demo no AROM in LUE needing total A for PROM.     Pt would benefit from continued OT to increase indep with ADLs/IADLs for d/c.  Prognosis: Fair (fair-)  Decision Making: Medium Complexity  Plan   Occupational Therapy

## 2024-04-22 ENCOUNTER — APPOINTMENT (OUTPATIENT)
Dept: INTERVENTIONAL RADIOLOGY/VASCULAR | Age: 79
DRG: 377 | End: 2024-04-22
Payer: MEDICARE

## 2024-04-22 ENCOUNTER — APPOINTMENT (OUTPATIENT)
Dept: GENERAL RADIOLOGY | Age: 79
DRG: 377 | End: 2024-04-22
Payer: MEDICARE

## 2024-04-22 LAB
ANION GAP SERPL CALCULATED.3IONS-SCNC: 11 MMOL/L (ref 9–17)
BODY FLD TYPE: NORMAL
BUN SERPL-MCNC: 13 MG/DL (ref 8–23)
BUN/CREAT SERPL: 14 (ref 9–20)
CALCIUM SERPL-MCNC: 8 MG/DL (ref 8.6–10.4)
CASE NUMBER:: NORMAL
CHLORIDE SERPL-SCNC: 100 MMOL/L (ref 98–107)
CO2 SERPL-SCNC: 28 MMOL/L (ref 20–31)
CREAT SERPL-MCNC: 0.9 MG/DL (ref 0.7–1.2)
ERYTHROCYTE [DISTWIDTH] IN BLOOD BY AUTOMATED COUNT: 18.3 % (ref 11.8–14.4)
GFR SERPL CREATININE-BSD FRML MDRD: 86 ML/MIN/1.73M2
GLUCOSE SERPL-MCNC: 89 MG/DL (ref 70–99)
HCT VFR BLD AUTO: 24.2 % (ref 40.7–50.3)
HGB BLD-MCNC: 7.4 G/DL (ref 13–17)
LYMPHOCYTES NFR FLD: 5 %
MAGNESIUM SERPL-MCNC: 1.9 MG/DL (ref 1.6–2.6)
MCH RBC QN AUTO: 29.4 PG (ref 25.2–33.5)
MCHC RBC AUTO-ENTMCNC: 30.6 G/DL (ref 28.4–34.8)
MCV RBC AUTO: 96 FL (ref 82.6–102.9)
NEUTROPHILS NFR FLD: 79 %
NRBC BLD-RTO: 0 PER 100 WBC
PLATELET # BLD AUTO: 394 K/UL (ref 138–453)
PMV BLD AUTO: 10 FL (ref 8.1–13.5)
POTASSIUM SERPL-SCNC: 3.2 MMOL/L (ref 3.7–5.3)
RBC # BLD AUTO: 2.52 M/UL (ref 4.21–5.77)
RBC # FLD: <3000 CELLS/UL
SARS-COV-2 RDRP RESP QL NAA+PROBE: NOT DETECTED
SODIUM SERPL-SCNC: 139 MMOL/L (ref 135–144)
SPECIMEN DESCRIPTION: NORMAL
SPECIMEN DESCRIPTION: NORMAL
UNIDENT CELLS NFR FLD: NORMAL %
WBC # FLD: 223 CELLS/UL
WBC OTHER # BLD: 8.6 K/UL (ref 3.5–11.3)

## 2024-04-22 PROCEDURE — 87635 SARS-COV-2 COVID-19 AMP PRB: CPT

## 2024-04-22 PROCEDURE — 6360000002 HC RX W HCPCS: Performed by: NURSE PRACTITIONER

## 2024-04-22 PROCEDURE — 2580000003 HC RX 258: Performed by: FAMILY MEDICINE

## 2024-04-22 PROCEDURE — 94640 AIRWAY INHALATION TREATMENT: CPT

## 2024-04-22 PROCEDURE — 83735 ASSAY OF MAGNESIUM: CPT

## 2024-04-22 PROCEDURE — 6370000000 HC RX 637 (ALT 250 FOR IP): Performed by: INTERNAL MEDICINE

## 2024-04-22 PROCEDURE — 2700000000 HC OXYGEN THERAPY PER DAY

## 2024-04-22 PROCEDURE — 99232 SBSQ HOSP IP/OBS MODERATE 35: CPT | Performed by: FAMILY MEDICINE

## 2024-04-22 PROCEDURE — 94761 N-INVAS EAR/PLS OXIMETRY MLT: CPT

## 2024-04-22 PROCEDURE — 92526 ORAL FUNCTION THERAPY: CPT

## 2024-04-22 PROCEDURE — 85027 COMPLETE CBC AUTOMATED: CPT

## 2024-04-22 PROCEDURE — 6370000000 HC RX 637 (ALT 250 FOR IP): Performed by: FAMILY MEDICINE

## 2024-04-22 PROCEDURE — C1729 CATH, DRAINAGE: HCPCS

## 2024-04-22 PROCEDURE — 99232 SBSQ HOSP IP/OBS MODERATE 35: CPT | Performed by: INTERNAL MEDICINE

## 2024-04-22 PROCEDURE — 36415 COLL VENOUS BLD VENIPUNCTURE: CPT

## 2024-04-22 PROCEDURE — 99232 SBSQ HOSP IP/OBS MODERATE 35: CPT | Performed by: NURSE PRACTITIONER

## 2024-04-22 PROCEDURE — 76604 US EXAM CHEST: CPT

## 2024-04-22 PROCEDURE — 2580000003 HC RX 258: Performed by: INTERNAL MEDICINE

## 2024-04-22 PROCEDURE — 2060000000 HC ICU INTERMEDIATE R&B

## 2024-04-22 PROCEDURE — 80048 BASIC METABOLIC PNL TOTAL CA: CPT

## 2024-04-22 PROCEDURE — 71045 X-RAY EXAM CHEST 1 VIEW: CPT

## 2024-04-22 PROCEDURE — 94669 MECHANICAL CHEST WALL OSCILL: CPT

## 2024-04-22 RX ORDER — CEFDINIR 300 MG/1
300 CAPSULE ORAL EVERY 12 HOURS SCHEDULED
Status: DISCONTINUED | OUTPATIENT
Start: 2024-04-22 | End: 2024-04-27

## 2024-04-22 RX ORDER — TAMSULOSIN HYDROCHLORIDE 0.4 MG/1
0.4 CAPSULE ORAL DAILY
Qty: 30 CAPSULE | Refills: 3 | Status: ON HOLD | OUTPATIENT
Start: 2024-04-23

## 2024-04-22 RX ORDER — METOPROLOL SUCCINATE 25 MG/1
25 TABLET, EXTENDED RELEASE ORAL DAILY
Qty: 30 TABLET | Refills: 0 | Status: ON HOLD | OUTPATIENT
Start: 2024-04-22 | End: 2024-05-22

## 2024-04-22 RX ORDER — METRONIDAZOLE 500 MG/1
500 TABLET ORAL EVERY 8 HOURS SCHEDULED
Status: DISCONTINUED | OUTPATIENT
Start: 2024-04-22 | End: 2024-04-27

## 2024-04-22 RX ORDER — MIRTAZAPINE 30 MG/1
30 TABLET, FILM COATED ORAL NIGHTLY
Qty: 30 TABLET | Refills: 3 | Status: ON HOLD | OUTPATIENT
Start: 2024-04-22

## 2024-04-22 RX ORDER — PANTOPRAZOLE SODIUM 40 MG/1
40 TABLET, DELAYED RELEASE ORAL
Qty: 30 TABLET | Refills: 3 | Status: ON HOLD | OUTPATIENT
Start: 2024-04-22

## 2024-04-22 RX ORDER — OXYCODONE HYDROCHLORIDE AND ACETAMINOPHEN 5; 325 MG/1; MG/1
1 TABLET ORAL EVERY 6 HOURS PRN
Qty: 12 TABLET | Refills: 0 | Status: SHIPPED | OUTPATIENT
Start: 2024-04-22 | End: 2024-04-25

## 2024-04-22 RX ORDER — ALBUTEROL SULFATE 90 UG/1
2 AEROSOL, METERED RESPIRATORY (INHALATION) 3 TIMES DAILY
Status: DISCONTINUED | OUTPATIENT
Start: 2024-04-22 | End: 2024-04-22

## 2024-04-22 RX ORDER — LANOLIN ALCOHOL/MO/W.PET/CERES
3 CREAM (GRAM) TOPICAL NIGHTLY PRN
Qty: 30 TABLET | Refills: 1 | Status: ON HOLD | OUTPATIENT
Start: 2024-04-22

## 2024-04-22 RX ORDER — GUAIFENESIN 600 MG/1
600 TABLET, EXTENDED RELEASE ORAL 2 TIMES DAILY
Qty: 30 TABLET | Refills: 1 | Status: ON HOLD | OUTPATIENT
Start: 2024-04-22

## 2024-04-22 RX ORDER — ALBUTEROL SULFATE 2.5 MG/3ML
2.5 SOLUTION RESPIRATORY (INHALATION) 3 TIMES DAILY
Status: DISCONTINUED | OUTPATIENT
Start: 2024-04-22 | End: 2024-04-22

## 2024-04-22 RX ORDER — MIDODRINE HYDROCHLORIDE 2.5 MG/1
2.5 TABLET ORAL
Qty: 90 TABLET | Refills: 0 | Status: ON HOLD | OUTPATIENT
Start: 2024-04-22

## 2024-04-22 RX ORDER — CEFDINIR 300 MG/1
300 CAPSULE ORAL EVERY 12 HOURS SCHEDULED
Qty: 8 CAPSULE | Refills: 0 | Status: SHIPPED | OUTPATIENT
Start: 2024-04-22 | End: 2024-04-26

## 2024-04-22 RX ORDER — METRONIDAZOLE 500 MG/1
500 TABLET ORAL EVERY 8 HOURS SCHEDULED
Qty: 12 TABLET | Refills: 0 | Status: SHIPPED | OUTPATIENT
Start: 2024-04-22 | End: 2024-04-26

## 2024-04-22 RX ORDER — ALBUTEROL SULFATE 2.5 MG/3ML
2.5 SOLUTION RESPIRATORY (INHALATION) 3 TIMES DAILY PRN
Status: DISCONTINUED | OUTPATIENT
Start: 2024-04-22 | End: 2024-05-02

## 2024-04-22 RX ORDER — UREA 10 %
325 LOTION (ML) TOPICAL
Qty: 90 TABLET | Refills: 3 | Status: ON HOLD | OUTPATIENT
Start: 2024-04-23

## 2024-04-22 RX ORDER — ACETYLCYSTEINE 200 MG/ML
400 SOLUTION ORAL; RESPIRATORY (INHALATION)
Status: DISPENSED | OUTPATIENT
Start: 2024-04-22 | End: 2024-04-25

## 2024-04-22 RX ORDER — FUROSEMIDE 40 MG/1
40 TABLET ORAL DAILY
Qty: 60 TABLET | Refills: 3 | Status: ON HOLD | OUTPATIENT
Start: 2024-04-23

## 2024-04-22 RX ADMIN — CHOLECALCIFEROL TAB 125 MCG (5000 UNIT) 5000 UNITS: 125 TAB at 09:40

## 2024-04-22 RX ADMIN — MORPHINE SULFATE 2 MG: 2 INJECTION, SOLUTION INTRAMUSCULAR; INTRAVENOUS at 14:50

## 2024-04-22 RX ADMIN — GUAIFENESIN 600 MG: 600 TABLET ORAL at 20:51

## 2024-04-22 RX ADMIN — PANTOPRAZOLE SODIUM 40 MG: 40 TABLET, DELAYED RELEASE ORAL at 05:37

## 2024-04-22 RX ADMIN — APIXABAN 5 MG: 5 TABLET, FILM COATED ORAL at 09:40

## 2024-04-22 RX ADMIN — GUAIFENESIN 600 MG: 600 TABLET ORAL at 09:40

## 2024-04-22 RX ADMIN — ASPIRIN 81 MG: 81 TABLET, COATED ORAL at 09:39

## 2024-04-22 RX ADMIN — METRONIDAZOLE 500 MG: 500 TABLET ORAL at 20:51

## 2024-04-22 RX ADMIN — CEFDINIR 300 MG: 300 CAPSULE ORAL at 09:39

## 2024-04-22 RX ADMIN — FUROSEMIDE 40 MG: 40 TABLET ORAL at 09:40

## 2024-04-22 RX ADMIN — MORPHINE SULFATE 2 MG: 2 INJECTION, SOLUTION INTRAMUSCULAR; INTRAVENOUS at 08:38

## 2024-04-22 RX ADMIN — CEFDINIR 300 MG: 300 CAPSULE ORAL at 20:51

## 2024-04-22 RX ADMIN — FERROUS SULFATE TAB EC 325 MG (65 MG FE EQUIVALENT) 325 MG: 325 (65 FE) TABLET DELAYED RESPONSE at 09:39

## 2024-04-22 RX ADMIN — SODIUM CHLORIDE, PRESERVATIVE FREE 10 ML: 5 INJECTION INTRAVENOUS at 20:58

## 2024-04-22 RX ADMIN — MIRTAZAPINE 30 MG: 15 TABLET, ORALLY DISINTEGRATING ORAL at 20:51

## 2024-04-22 RX ADMIN — SODIUM CHLORIDE, PRESERVATIVE FREE 10 ML: 5 INJECTION INTRAVENOUS at 08:38

## 2024-04-22 RX ADMIN — METRONIDAZOLE 500 MG: 500 TABLET ORAL at 14:42

## 2024-04-22 RX ADMIN — SODIUM CHLORIDE, PRESERVATIVE FREE 10 ML: 5 INJECTION INTRAVENOUS at 23:14

## 2024-04-22 RX ADMIN — METOPROLOL TARTRATE 25 MG: 25 TABLET, FILM COATED ORAL at 20:51

## 2024-04-22 RX ADMIN — APIXABAN 5 MG: 5 TABLET, FILM COATED ORAL at 20:51

## 2024-04-22 RX ADMIN — PANTOPRAZOLE SODIUM 40 MG: 40 TABLET, DELAYED RELEASE ORAL at 17:28

## 2024-04-22 RX ADMIN — ALBUTEROL SULFATE 2.5 MG: 2.5 SOLUTION RESPIRATORY (INHALATION) at 18:23

## 2024-04-22 RX ADMIN — CYANOCOBALAMIN TAB 1000 MCG 1000 MCG: 1000 TAB at 09:39

## 2024-04-22 RX ADMIN — POTASSIUM BICARBONATE 40 MEQ: 782 TABLET, EFFERVESCENT ORAL at 09:38

## 2024-04-22 RX ADMIN — ATORVASTATIN CALCIUM 40 MG: 40 TABLET, FILM COATED ORAL at 20:51

## 2024-04-22 RX ADMIN — SODIUM CHLORIDE, PRESERVATIVE FREE 10 ML: 5 INJECTION INTRAVENOUS at 09:00

## 2024-04-22 RX ADMIN — MORPHINE SULFATE 2 MG: 2 INJECTION, SOLUTION INTRAMUSCULAR; INTRAVENOUS at 23:13

## 2024-04-22 RX ADMIN — METRONIDAZOLE 500 MG: 500 TABLET ORAL at 05:37

## 2024-04-22 RX ADMIN — ACETYLCYSTEINE 400 MG: 200 SOLUTION ORAL; RESPIRATORY (INHALATION) at 18:22

## 2024-04-22 RX ADMIN — EMPAGLIFLOZIN 10 MG: 10 TABLET, FILM COATED ORAL at 09:40

## 2024-04-22 RX ADMIN — TAMSULOSIN HYDROCHLORIDE 0.4 MG: 0.4 CAPSULE ORAL at 09:39

## 2024-04-22 RX ADMIN — MIDODRINE HYDROCHLORIDE 5 MG: 5 TABLET ORAL at 09:41

## 2024-04-22 ASSESSMENT — PAIN DESCRIPTION - DESCRIPTORS
DESCRIPTORS: DISCOMFORT
DESCRIPTORS: ACHING
DESCRIPTORS: DISCOMFORT
DESCRIPTORS: ACHING

## 2024-04-22 ASSESSMENT — PAIN DESCRIPTION - ORIENTATION
ORIENTATION: LOWER
ORIENTATION: RIGHT
ORIENTATION: RIGHT

## 2024-04-22 ASSESSMENT — PAIN SCALES - GENERAL
PAINLEVEL_OUTOF10: 10
PAINLEVEL_OUTOF10: 6
PAINLEVEL_OUTOF10: 8
PAINLEVEL_OUTOF10: 9

## 2024-04-22 ASSESSMENT — PAIN DESCRIPTION - ONSET
ONSET: GRADUAL
ONSET: GRADUAL

## 2024-04-22 ASSESSMENT — PAIN DESCRIPTION - LOCATION
LOCATION: ABDOMEN
LOCATION: ABDOMEN
LOCATION: BACK;ABDOMEN;FOOT
LOCATION: BACK

## 2024-04-22 ASSESSMENT — PAIN DESCRIPTION - FREQUENCY
FREQUENCY: INTERMITTENT
FREQUENCY: INTERMITTENT

## 2024-04-22 ASSESSMENT — PAIN DESCRIPTION - PAIN TYPE
TYPE: ACUTE PAIN;CHRONIC PAIN
TYPE: CHRONIC PAIN

## 2024-04-22 NOTE — PROGRESS NOTES
[facility]  Office: [phone]  Chinmay Ramos DO, Jose Atkinson DO, Guevara Ramirez DO, Pranay Cooper, DO, Sumeet Eller MD, Karen Carvajal MD, Sony Crawford MD, Poonam Platt MD,  Stephan San MD, Marcello Daniels MD, Theodore Pascual MD,  Juve Mahmood DO, Matias Mckeon MD, Hero Tavarez MD, Valentin Ramos DO, Jazzy Spence MD,  Trino Handley DO, Flavia Leonardo MD, Vanessa Soriano MD, Pricilla Akers MD, Casimiro Knight MD,  Zeke Maciel MD, Guera Cervantes MD, Kianna Martin MD, Mirlande Worrell MD, Ney Garcia MD, Savannah Dominguez MD, Ed Emerson DO, Jaylen West DO, Comfort Carcamo MD,  Siva Ashley MD, Shirley Waterhouse, CNP,  Carolyn Pelaez CNP, Scotty Ge, CNP,  Wandy Trivedi, DNP, Malissa Gaston, CNP, Lana Penaloza, CNP, Susan Haro, CNP, Sherrell Mendoza, CNP, Suzy Morales, PA-C, Dalila Lange PA-C, Sada Matt, CNP, Crystal Womack, CNP, Sheng Reddy, CNP, Arielle Guy, CNP, Linsey Perez, CNP, Anju Cho, CNS, Hoa Donahue, CNP, Cassidy Ferrer CNP, Tracy Schwab, CNP       [facility]      Daily Progress Note     Admit Date: 4/3/2024  Bed/Room No.  2031/2031-01  Admitting Physician : Sony Crawford MD  Code Status :Full Code  Hospital Day:  LOS: 19 days   Chief Complaint:     Chief Complaint   Patient presents with    Leg Pain     Left leg, open wounds and swollen       Principal Problem:    Melena  Active Problems:    History of arterial bypass of lower extremity    S/P AKA (above knee amputation) unilateral, right (HCC)    Primary hypertension    Peripheral arterial disease (HCC)    Neuropathy    Malignant neoplasm of lung (HCC)    Intermittent claudication of left lower extremity due to atherosclerosis (HCC)    Embolism and thrombosis of artery (HCC)    Left leg cellulitis    Iron deficiency anemia due to chronic blood loss    Hx of cancer of lung    Adrenal mass (HCC)    Claudication (HCC)    Pleural effusion, bilateral

## 2024-04-22 NOTE — PROGRESS NOTES
Speech Language Pathology  [facility]    Dysphagia Treatment Note    Date: 4/22/2024  Patient’s Name: Jorge Luis Banda  MRN: [id number]  Diagnosis: Dysphagia  Patient Active Problem List   Diagnosis Code    Skin eschar R23.4    Stenosis of left carotid artery I65.22    History of arterial bypass of lower extremity Z95.828    S/P AKA (above knee amputation) unilateral, right ([facility]) Z89.611    Primary hypertension I10    Persistent wound pain R52    Peripheral arterial disease ([facility]) I73.9    Ulcer of left foot ([facility]) L97.529    Neuropathy G62.9    Malignant neoplasm of lung ([facility]) C34.90    Intermittent claudication of left lower extremity due to atherosclerosis ([facility]) I70.212    Frequency of urination R35.0    Former smoker Z87.891    Embolism and thrombosis of artery ([facility]) I74.9    Dyslipidemia E78.5    Closed fracture of left humerus S42.302A    Closed fracture of surgical neck of humerus S42.213A    Acute exacerbation of chronic obstructive airways disease ([facility]) J44.1    Left leg cellulitis L03.116    Arteriosclerosis of coronary artery I25.10    Age-related osteoporosis without current pathological fracture M81.0    Adenocarcinoma of lung ([facility]) C34.90    Wound infection T14.8XXA, L08.9    History of above-knee amputation of both lower extremities ([facility]) Z89.611, Z89.612    Melena K92.1    Iron deficiency anemia due to chronic blood loss D50.0    Hx of cancer of lung Z85.118    Adrenal mass ([facility]) E27.8    Claudication ([facility]) I73.9    Pleural effusion, bilateral J90    Longstanding persistent atrial fibrillation ([facility]) I48.11    Acute ischemic right internal carotid artery (ICA) stroke ([facility]) I63.231    Internal carotid artery stenosis, bilateral I65.23    Anemia D64.9    Gastrointestinal hemorrhage K92.2    NSTEMI (non-ST elevated myocardial infarction) ([facility]) I21.4    Hypotension I95.9    Atrial fibrillation with rapid ventricular response ([facility]) I48.91    Unstable angina ([facility]) I20.0    Right upper

## 2024-04-22 NOTE — PROGRESS NOTES
Occupational Therapy  [facility]  Occupational Therapy Not Seen    DATE: 2024    NAME: Jorge Luis Banda  MRN: [id number]   : 1945    Patient not seen this date for Occupational Therapy due to:      [] Cancel by RN or physician due to:    [] Hemodialysis    [] Critical Lab Value Level     [] Blood transfusion in progress    [] Acute or unstable cardiovascular status   _MAP < 55 or more than >115  _HR < 40 or > 130    [] Acute or unstable pulmonary status   -FiO2 > 60%   _RR < 5 or >40    _O2 sats < 85%    [] Strict Bedrest    [] Off Unit for surgery or procedure    [] Off Unit for testing       [] Pending imaging to R/O fracture    [x] Refusal by Patient: Pt refused stating \"Not now, I'm wore out!\" Will check back if time allows.     [] Other      [] OT being discontinued at this time. Patient independent. No further needs.     [] OT being discontinued at this time as the patient has been transferred to hospice care. No further needs.      ZINA Li

## 2024-04-22 NOTE — PROGRESS NOTES
SPIRITUAL CARE DEPARTMENT [facility]  PROGRESS NOTE    Room # 2031/2031-01   Name: Jorge Luis Banda               Reason for visit:  Pall Care    I visited the patient.    Admit Date & Time: 4/3/2024  3:27 PM    Assessment:  Jorge Luis Banda is a 78 y.o. male in the hospital because of a G.I. bleed. Upon entering the room the patient was sitting up very alert and oriented (no family members present).      Intervention:  I introduced myself and my title as lizzy Loyd from the spiritual care department, and the patient engaged in a brief conversation and shares he's leaving soon, and is being transported to a nursing facility.     The patient has a peaceful and positive attitude and spirit (gives smiles). The writer stated he's glad to see and hear he's feeling better, and is glad the prayers are working. The writer stated he will pray for continued healing, comfort, and rest even after he's been discharged.    Outcome:  The patient is thankful for the visits, prayers, and support.    Plan:  Chaplains will remain available to offer spiritual and emotional support as needed.    Electronically signed by Chaplain Carey Jr, on 4/22/2024 at 11:22 AM.  Spiritual Care Department  [facility]     04/22/24 1116   Encounter Summary   Service Provided For: Patient   Referral/Consult From: Palliative Care   Support System Significant other   Last Encounter  04/22/24   Complexity of Encounter Moderate   Begin Time 1040   End Time  1050   Total Time Calculated 10 min   Spiritual/Emotional needs   Type Spiritual Support   Palliative Care   Type Palliative Care, Follow-up   Assessment/Intervention/Outcome   Assessment Calm;Coping;Hopeful;Peaceful   Intervention Nurtured Hope;Sustaining Presence/Ministry of presence   Outcome Engaged in conversation;Expressed Gratitude   Plan and Referrals   Plan/Referrals Continue Support (comment)

## 2024-04-22 NOTE — PROGRESS NOTES
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 of liver and adrenal metastasis  Acute CVA with left-sided weakness and has flow-limiting critical right ICA stenosis 90%, bilateral proximal ICA are atherosclerosis with flow limitation left 80% and right 90%  Severe peripheral arterial disease  Longstanding persistent atrial fibrillation with rapid ventricular response  Acute GI bleed, unclear source  Neurogenic bladder dysfunction with urinary retention and refusing indwelling Dotson catheter  Elevated troponin, consistent with type II non-ST elevation myocardial infarction, secondary to acute anemia  Acute diastolic congestive heart failure    Recommendations:   The patient received Unasyn from 4/3/2024 to 4/18/2024 [5 days], levofloxacin 411 and 3 days of azithromycin from 4/11/2024 through 4/13/2024  The patient received Zosyn 4/11/2024 through 4/21/2024 [11 days of therapy]  Patient was transitioned to oral antimicrobial therapy on 4/21/2024 with cefdinir and metronidazole with plans to continue through 4/26/2024  The pulmonary team has asked for interventional radiology to evaluate for a left-sided thoracentesis  Discharge planning    Infection Control Recommendations:   Universal precautions    Discharge Planning:     Patient will need Midline Catheter Insertion/ PICC line Insertion: No  Patient will need: Home IV , Infusion Center,  SNF,  LTAC: Undetermined  Patient willneed outpatient wound care: No    Medical Decision making / Summary of Stay:   Jorge Luis Banda is a 78 y.o.-year-old male who was initially admitted on 4/3/2024.   The patient is being seen in consultation on HOSPITAL DAY #16 for duration of antibiotics  capsule endoscopy.     The patient had a CT scan of the chest showing concerning changes in the liver and adrenal gland for metastatic disease and oncology service was consulted and a dedicated CT of the abdomen pelvis was ordered and the patient was noted to have a lobulated adrenal mass 6.5 cm and a liver mass.  The adrenal mass was suspicious for malignancy and a biopsy was ordered     The patient was seen in consultation by the pulmonary team on 4/11/2024 for the bilateral loculated pleural effusion/atelectasis and respiratory insufficiency requiring supplemental oxygen.  Lasix was started and it was not felt that he required a thoracentesis.  The antibiotic therapy was switched from Unasyn to Zosyn and the patient also had azithromycin added to the antimicrobial regimen     The patient developed some weakness in the left upper and lower extremity and heparin was started 4/12/2024.  He continued to have some dark stools and has required another unit PRBC transfusion and the discussion of the colonoscopy was again approached and the patient did state that he was amenable to taking the prep for an NG tube     Due to the weakness of the left arm and leg the patient underwent an MRI of the brain 4/11/2024 that did show some hypoperfusion to the right MCA/CLARKE and the patient is felt to have a CVA most likely related to his critical anemia and borderline hypotension.  A CTA of the head and neck was done that showed findings concerning for critical right ICA flow-limiting stenosis and this was discussed with the endovascular neurosurgery team and given the \"multiple comorbid acute issues including GI bleeding and stable hemoglobin with unknown source it was recommended to stabilize the blood pressure with a systolic blood pressure of 140-180 and medical management.  Vascular surgery was asked to consider TCAR.    The patient has flow-limiting critical right ICA stenosis 90% and bilateral proximal ICA atherosclerosis

## 2024-04-22 NOTE — FLOWSHEET NOTE
04/22/24 1247   Treatment Team Notification   Reason for Communication Evaluate;Review case   Name of Team Member Notified   (agressive \"Pulm\" toileting,)   Treatment Team Role Consulting Provider   Method of Communication Face to face   Response In department   Notification Time 1248     MD rounded, needs aggressive 'pulm toilet\". Would like to see either dangle at edge of bed or up to chair if able, ordered aerosols, percussion vest by RT., Enc. To use bedside IS q1hr.

## 2024-04-22 NOTE — PROGRESS NOTES
Ry Traylor MD   Urology Progress Note            Subjective: Follow-up enlarged prostate urinary retention    Patient Vitals for the past 24 hrs:   BP Temp Temp src Pulse Resp SpO2   04/22/24 0400 -- 97.3 °F (36.3 °C) Temporal -- -- --   04/22/24 0300 125/61 -- -- 83 19 100 %   04/22/24 0200 127/63 -- -- 85 24 100 %   04/22/24 0100 133/61 -- -- 85 19 100 %   04/22/24 0000 132/67 97.7 °F (36.5 °C) Temporal 90 22 100 %   04/21/24 2300 132/63 -- -- 96 22 100 %   04/21/24 2100 (!) 124/50 -- -- 93 25 98 %   04/21/24 2000 (!) 111/59 96.8 °F (36 °C) Temporal 93 20 99 %   04/21/24 1900 (!) 86/48 -- -- 98 16 99 %   04/21/24 1800 (!) 109/56 -- -- (!) 111 22 99 %   04/21/24 1700 104/62 -- -- 91 19 99 %   04/21/24 1600 (!) 114/52 -- -- 97 19 97 %   04/21/24 1533 -- 97.5 °F (36.4 °C) Oral -- -- --   04/21/24 1500 (!) 103/49 -- -- 86 19 98 %   04/21/24 1400 (!) 112/50 -- -- 88 16 100 %   04/21/24 1300 (!) 101/59 -- -- 85 15 99 %   04/21/24 1235 (!) 98/58 -- -- 90 25 99 %   04/21/24 1200 (!) 87/51 -- -- 89 16 99 %   04/21/24 1000 121/62 -- -- (!) 113 20 97 %   04/21/24 0900 113/70 -- -- (!) 105 21 97 %   04/21/24 0800 109/71 -- -- (!) 103 16 100 %       Intake/Output Summary (Last 24 hours) at 4/22/2024 0718  Last data filed at 4/21/2024 2030  Gross per 24 hour   Intake 147.43 ml   Output 1115 ml   Net -967.57 ml       Recent Labs     04/19/24  1943/24  0603 04/22/24  0556   WBC  --  7.6 8.6   HGB 8.6* 7.9* 7.4*   HCT 29.0* 25.1* 24.2*   MCV  --  93.3 96.0   PLT  --  362 394     Recent Labs     04/20/24  0603 04/21/24  0401 04/22/24  0556    139 139   K 3.5* 3.2* 3.2*    102 100   CO2 24 28 28   BUN 17 13 13   CREATININE 1.0 0.9 0.9       No results for input(s): \"COLORU\", \"PHUR\", \"LABCAST\", \"WBCUA\", \"RBCUA\", \"MUCUS\", \"TRICHOMONAS\", \"YEAST\", \"BACTERIA\", \"CLARITYU\", \"SPECGRAV\", \"LEUKOCYTESUR\", \"UROBILINOGEN\", \"BILIRUBINUR\", \"BLOODU\" in the last 72 hours.    Invalid input(s):

## 2024-04-22 NOTE — RT PROTOCOL NOTE
RT Inhaler-Nebulizer Bronchodilator Protocol Note    There is a bronchodilator order in the chart from a provider indicating to follow the RT Bronchodilator Protocol and there is an “Initiate RT Inhaler-Nebulizer Bronchodilator Protocol” order as well (see protocol at bottom of note).    CXR Findings:  XR CHEST PORTABLE    Result Date: 4/22/2024  1. Cardiomegaly with pulmonary vascular congestion. 2. Volume loss involving the left hemithorax with increase in opacification involving the mid and lower lung zones. Findings could be due to atelectasis with mucous plugging and effusion.       The findings from the last RT Protocol Assessment were as follows:   History Pulmonary Disease: Smoker 15 pack years or more  Respiratory Pattern: Regular pattern and RR 12-20 bpm  Breath Sounds: Slightly diminished and/or crackles  Cough: Strong, spontaneous, non-productive  Indication for Bronchodilator Therapy: Decreased or absent breath sounds  Bronchodilator Assessment Score: 3    Aerosolized bronchodilator medication orders have been revised according to the RT Inhaler-Nebulizer Bronchodilator Protocol below.    Respiratory Therapist to perform RT Therapy Protocol Assessment initially then follow the protocol.  Repeat RT Therapy Protocol Assessment PRN for score 0-3 or on second treatment, BID, and PRN for scores above 3.    No Indications - adjust the frequency to every 6 hours PRN wheezing or bronchospasm, if no treatments needed after 48 hours then discontinue using Per Protocol order mode.     If indication present, adjust the RT bronchodilator orders based on the Bronchodilator Assessment Score as indicated below.  Use Inhaler orders unless patient has one or more of the following: on home nebulizer, not able to hold breath for 10 seconds, is not alert and oriented, cannot activate and use MDI correctly, or respiratory rate 25 breaths per minute or more, then use the equivalent nebulizer order(s) with same Frequency and

## 2024-04-22 NOTE — PROGRESS NOTES
Physical Therapy  DATE: 2024    NAME: Jorge Luis Banda  MRN: [id number]   : 1945    Patient not seen this date for Physical Therapy due to:      [] Cancel by RN or physician due to:    [] Hemodialysis    [] Critical Lab Value Level     [] Blood transfusion in progress    [] Acute or unstable cardiovascular status   _MAP < 55 or more than >115  _HR < 40 or > 130    [] Acute or unstable pulmonary status   -FiO2 > 60%   _RR < 5 or >40    _O2 sats < 85%    [] Strict Bedrest    [] Off Unit for surgery or procedure    [] Off Unit for testing       [] Pending imaging to R/O fracture    [x] Refusal by Patient (Patient refused PT treatment, reporting \"Not now\". Will check back if time permits)      [] Other      [] PT being discontinued at this time. Patient independent. No further needs.     [] PT being discontinued at this time as the patient has been transferred to hospice care. No further needs.      Brianna Briceno, PTA

## 2024-04-22 NOTE — FLOWSHEET NOTE
04/22/24 1108   Treatment Team Notification   Reason for Communication Evaluate;Review case   Name of Team Member Notified    Treatment Team Role Attending Provider   Method of Communication Face to face   Response At bedside   Notification Time 1108   Deterioration Index RN Interventions Performed  Reviewed, No Action Needed     MD rounded, chart reviewed. Discharge today.

## 2024-04-22 NOTE — CARE COORDINATION
DC Planning    Spoke with pt at bedside, agrees to dc today to [facility]. PICC has been removed pt to dc on po abx. GB perc drain in place will need for 6 weeks. Pt is on 2l does not have at home-will need home 02 eval prior to dc from snf.     DAISY NEEDS SIGNED.

## 2024-04-22 NOTE — PROGRESS NOTES
.PALLIATIVE CARE NURSING ASSESSMENT    Patient: Jorge Luis Banda  Room: 2031/2031-01    Reason For Consult   Goals of care evaluation  Distress management  Guidance and support  Facilitate communications  Assistance in coordinating care    Code Status: Full Code    PLAN:  -Pt discharge held r/t worsening CXR. Pt will have a percussion vest placed today  -Talked at length with patient about his wishes regarding intubation, if needed. In previous conversations with our team, patient has said he wanted everything done. Today, he states he is not sure. He would like to think about it. I did discuss DNRCCA (no intubation) with him in detail  -Educated patient on importance of PT/OT compliance.   -Offered emotional support to patient. Pt did express fears of thinking of dying. Will have  follow up to explore this and offer spiritual support. Pt denies depression.  -Will follow to continue conversations regarding intubation/wishes.       Impression: Jorge Luis Banda is a 78 y.o. year old male  has a past medical history of Paroxysmal atrial fibrillation (HCC), Peripheral artery disease (HCC), and Primary hypertension..  Currently hospitalized for the management of melena.  The Palliative Care Team is following to assist with goals of care/support.     Vital Signs  Blood pressure 133/62, pulse 93, temperature 98.6 °F (37 °C), temperature source Temporal, resp. rate 22, height 1.829 m (6'), weight 75.3 kg (165 lb 14.4 oz), SpO2 98 %.    Patient Active Problem List   Diagnosis    Skin eschar    Stenosis of left carotid artery    History of arterial bypass of lower extremity    S/P AKA (above knee amputation) unilateral, right (HCC)    Primary hypertension    Persistent wound pain    Peripheral arterial disease (HCC)    Ulcer of left foot (HCC)    Neuropathy    Malignant neoplasm of lung (HCC)    Intermittent claudication of left lower extremity due to atherosclerosis (HCC)    Frequency of urination    Former smoker     Embolism and thrombosis of artery (HCC)    Dyslipidemia    Closed fracture of left humerus    Closed fracture of surgical neck of humerus    Acute exacerbation of chronic obstructive airways disease (HCC)    Left leg cellulitis    Arteriosclerosis of coronary artery    Age-related osteoporosis without current pathological fracture    Adenocarcinoma of lung (HCC)    Wound infection    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)    Melena    Iron deficiency anemia due to chronic blood loss    Hx of cancer of lung    Adrenal mass (HCC)    Claudication (HCC)    Pleural effusion, bilateral    Longstanding persistent atrial fibrillation (HCC)    Acute ischemic right internal carotid artery (ICA) stroke (HCC)    Internal carotid artery stenosis, bilateral    Anemia    Gastrointestinal hemorrhage    NSTEMI (non-ST elevated myocardial infarction) (HCC)    Hypotension    Atrial fibrillation with rapid ventricular response (HCC)    Unstable angina (HCC)    Right upper quadrant abdominal pain    ACP (advance care planning)    Palliative care encounter    Acute cholecystitis       Palliative Interaction: Update rec'd from bedside RN reports patient's discharge was held due to CXR worse, possible mucous plugging. Pt will get a vibrating vest and percussion. Nurse reports patient has refused therapy today.     Pt resting in bed. He is alert and oriented. He remembers talking with me and filling out DPOA paperwork last week. Pt c/o back pain r/t bedrest. We talked about his refusal for therapies. Pt states he did not refuse therapy, even though therapy clearly documented he did. He did work with speech therapy today and we discussed the reasoning and importance of this.     I asked patient, due to his worsening lung xray, if he thinks he would want intubation if needed. He states, \"I don't think so\". We talked about this at length and my fear that patient would not be able to come off the vent if intubated.

## 2024-04-22 NOTE — PROGRESS NOTES
Patient Name: Jorge Luis Banda  Date of admission: 4/3/2024  3:27 PM  Patient's age: 78 y.o., 1945  Admission Dx: Melena [K92.1]  GI bleed [K92.2]  UMM (acute kidney injury) (HCC) [N17.9]  Left leg cellulitis [L03.116]  Anemia, unspecified type [D64.9]    Reason for Consult: management recommendations  Requesting Physician: Sony Crawford MD    CHIEF COMPLAINT: GI bleeding    History Obtained From:  patient  INTERVAL HISTORY:    Patient seen and examined.    Denies any nausea vomiting   No fever chills   Has some discomfort in the right abdomen     HISTORY OF PRESENT ILLNESS:    The patient is a 78 y.o.   male who is admitted to the hospital for anemia and suspicion for GI bleeding.  His hemoglobin was under 7 and received blood transfusion.  He has severe peripheral vascular disease on Plavix and Coumadin.  He underwent an EGD that showed gastritis and he refused colonoscopy.  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  From oncology perspective, the patient was diagnosed with stage I lung cancer in 2019 and underwent lung resection.  Never received any chemotherapy or radiation.  He has been in remission since then.  CT scan of the chest showed concerning changes in the liver and adrenal gland for metastatic disease.  Dedicated CT of the abdomen and pelvis is ordered and pending.    Past Medical History:   has a past medical history of Paroxysmal atrial fibrillation (HCC), Peripheral artery disease (HCC), and Primary hypertension.    Past Surgical History:   has a past surgical history that includes above knee amputation (Right, 01/01/2020); Upper gastrointestinal endoscopy (N/A, 4/8/2024); and IR CHOLECYSTOSTOMY PERCUTANEOUS COMPLETE (4/19/2024).     Medications:    Reviewed in Epic     Allergies:  Patient has no known allergies.    Social History:   reports that he quit smoking about 4 years  hemorrhage [K92.2] 04/13/2024    Pleural effusion, bilateral [J90] 04/11/2024    Longstanding persistent atrial fibrillation (HCC) [I48.11] 04/11/2024    Iron deficiency anemia due to chronic blood loss [D50.0] 04/10/2024    Hx of cancer of lung [Z85.118] 04/10/2024    Adrenal mass (HCC) [E27.8] 04/10/2024    Claudication (HCC) [I73.9] 04/10/2024    Melena [K92.1] 04/03/2024    Peripheral arterial disease (HCC) [I73.9] 04/03/2024    S/P AKA (above knee amputation) unilateral, right (HCC) [Z89.611] 03/05/2024    Embolism and thrombosis of artery (HCC) [I74.9] 03/05/2024    Left leg cellulitis [L03.116] 03/05/2024    History of arterial bypass of lower extremity [Z95.828] 11/08/2019    Malignant neoplasm of lung (HCC) [C34.90] 04/24/2019    Primary hypertension [I10] 02/14/2019    Intermittent claudication of left lower extremity due to atherosclerosis (HCC) [I70.212] 02/14/2019    Neuropathy [G62.9] 01/17/2019         IMPRESSION:   Severe peripheral vascular disease  On Coumadin and Plavix  Drop in hemoglobin and likely GI bleeding  History of lung cancer 2019, stage I status post lung resection  Concerning findings on CT scan with adrenal and liver mass  Acute CVA with left arm weakness    RECOMMENDATIONS:  I reviewed the lab data, imaging studies and discussed the diagnosis and treatment recommendations  I recommended supportive care and transfusion.  Keep hemoglobin above 7.  Anemia likely secondary to ongoing blood loss as well as anemia of chronic illness.  Received IV iron infusion  Further care by primary team and other specialists.  Patient has multiple issues going on at this point and I would recommend continue current management as per primary team   His lung cancer was stage I and it was 5 years ago.  He never received any adjuvant therapy  Hemoptysis. Pulmonary following.  Watch hemoglobin and transfuse as needed  Will consider PET scan and possible biopsy as outpatient to evaluate liver and adrenal

## 2024-04-22 NOTE — PLAN OF CARE
Problem: Safety - Adult  Goal: Free from fall injury  4/21/2024 2232 by Jazzy Adrian RN  Outcome: Progressing  4/21/2024 1042 by Myke Dumont, RN  Outcome: Progressing     Problem: Discharge Planning  Goal: Discharge to home or other facility with appropriate resources  4/21/2024 2232 by Jazzy Adrian RN  Outcome: Progressing  4/21/2024 1042 by Myke Dumont, RN  Outcome: Progressing     Problem: Pain  Goal: Verbalizes/displays adequate comfort level or baseline comfort level  4/21/2024 2232 by Jazzy Adrian RN  Outcome: Progressing  4/21/2024 1042 by Myke Dumont, RN  Outcome: Progressing     Problem: Skin/Tissue Integrity - Adult  Goal: Incisions, wounds, or drain sites healing without S/S of infection  4/21/2024 2232 by Jazzy Adrian RN  Outcome: Progressing  Flowsheets (Taken 4/21/2024 2227)  Incisions, Wounds, or Drain Sites Healing Without Sign and Symptoms of Infection:   TWICE DAILY: Assess and document skin integrity   TWICE DAILY: Assess and document dressing/incision, wound bed, drain sites and surrounding tissue   Implement wound care per orders  4/21/2024 1042 by Myke Dumont, RN  Outcome: Progressing     Problem: Gastrointestinal - Adult  Goal: Minimal or absence of nausea and vomiting  4/21/2024 2232 by Jazzy Adrian RN  Outcome: Progressing  4/21/2024 1042 by Myke Dumont, RN  Outcome: Progressing  Goal: Maintains or returns to baseline bowel function  4/21/2024 2232 by Jazzy Adrian RN  Outcome: Progressing  4/21/2024 1042 by Myke Dumont, RN  Outcome: Progressing     Problem: Infection - Adult  Goal: Absence of infection at discharge  4/21/2024 2232 by Jazzy Adrian RN  Outcome: Progressing  4/21/2024 1042 by Myke Dumont, RN  Outcome: Progressing     Problem: Hematologic - Adult  Goal: Maintains hematologic stability  4/21/2024 2232 by Jazzy Adrian RN  Outcome: Progressing  4/21/2024 1042 by Myke Dumont, RN  Outcome: Progressing      Problem: Skin/Tissue Integrity  Goal: Absence of new skin breakdown  Description: 1.  Monitor for areas of redness and/or skin breakdown  2.  Assess vascular access sites hourly  3.  Every 4-6 hours minimum:  Change oxygen saturation probe site  4.  Every 4-6 hours:  If on nasal continuous positive airway pressure, respiratory therapy assess nares and determine need for appliance change or resting period.  4/21/2024 2232 by Jazzy Adrian RN  Outcome: Progressing  4/21/2024 1042 by Myke Dumont, RN  Outcome: Progressing     Problem: ABCDS Injury Assessment  Goal: Absence of physical injury  4/21/2024 2232 by Jazzy Adrian RN  Outcome: Progressing  4/21/2024 1042 by Myke Dumont, RN  Outcome: Progressing     Problem: Genitourinary - Adult  Goal: Absence of urinary retention  4/21/2024 2232 by Jazzy Adrian RN  Outcome: Progressing  4/21/2024 1042 by Myke Dumont, RN  Outcome: Progressing     Problem: Nutrition Deficit:  Goal: Optimize nutritional status  4/21/2024 2232 by Jazzy Adrian RN  Outcome: Progressing  4/21/2024 1042 by Myke Dumont, RN  Outcome: Progressing     Problem: Chronic Conditions and Co-morbidities  Goal: Patient's chronic conditions and co-morbidity symptoms are monitored and maintained or improved  4/21/2024 2232 by Jazzy Adrian RN  Outcome: Progressing  4/21/2024 1042 by Myke Dumont, RN  Outcome: Progressing

## 2024-04-22 NOTE — PROGRESS NOTES
Pulmonary Critical Care Progress Note       Patient seen for the follow up of bilateral pleural effusions, hemoptysis     Subjective:  Patient is resting comfortably in bed and remains on 2 L of oxygen continuously..  He has improved pain right upper quadrant site of cholecystotomy tube. Has poor appetite.  No chest pain or shortness of breath.  He has been off pressors. Heart rate improved..        Examination:  Vitals: /62   Pulse 93   Temp 96.8 °F (36 °C) (Temporal)   Resp 22   Ht 1.829 m (6')   Wt 75.3 kg (165 lb 14.4 oz)   SpO2 98%   BMI 22.50 kg/m²   General appearance: In no acute distress, alert and cooperative with exam cachectic  Neck: No JVD  Lungs: Decreased breath sound no crackles or wheeze  Heart: irregular rate and rhythm, S1, S2 normal, no gallop  Abdomen: Soft, non tender, + BS  Cholecystotomy tube in place bloody output  Extremities: no cyanosis or clubbing. No significant edema, left upper and lower extremity weakness right above-knee amputation left foot dorsal ulcer    LABs:  CBC:   Recent Labs     04/1945 04/20/24  0603 04/22/24  0556   WBC  --  7.6 8.6   HGB 8.6* 7.9* 7.4*   HCT 29.0* 25.1* 24.2*   PLT  --  362 394       BMP:   Recent Labs     04/1945 04/20/24  0603 04/21/24  0401 04/22/24  0556   NA  --  141 139 139   K 3.5* 3.5* 3.2* 3.2*   CO2  --  24 28 28   BUN  --  17 13 13   CREATININE  --  1.0 0.9 0.9   LABGLOM  --  76 86 86   GLUCOSE  --  86 94 89       No results for input(s): \"APTT\" in the last 72 hours.     Latest Reference Range & Units 04/11/24 15:56 04/13/24 23:26 04/16/24 04:21   Procalcitonin 0.00 - 0.09 ng/mL 1.95 (H) 1.02 (H) 0.72 (H)   (H): Data is abnormally high   Latest Reference Range & Units 04/15/24 04:34   Pro-BNP <300 pg/mL 16,301 (H)   (H): Data is abnormally high  Radiology:  X-ray chest 4/22/24      CT chest abdomen pelvis 4/16/24  1.  CT CHEST: Unchanged presumed bilateral pneumonia: Moderate volume right  pleural effusion and small  volume left pleural effusion with areas of  consolidation posteriorly mid and lower lungs bilaterally.  2. Left basilar scarring.  3. 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  4.  Pulmonary sequela typical of that seen with smoking, including COPD.  5. Acute esophagitis.  6. Prominent esophageal ampulla versus small hiatal hernia.  7. Gastroesophageal reflux versus incomplete esophageal emptying.  8. Extensive calcific atherosclerosis coronary arteries.  9. CT ABDOMEN/PELVIS: Unchanged gallbladder hydrops and cholelithiasis with  findings of acute cholecystitis.  10. Stable right adrenal mass infiltrating into adjacent structures including  the right kidney, liver and inferior vena cava.  11. Probable reactive colitis at the hepatic flexure.      Chest x-ray 4/14  Mildly progressive bilateral infiltrates when compared to the prior exam       X-ray chest 4/13/24    X-ray chest 4/12/2024    Impression & Recommendations:  Acute hypoxic respiratory insufficiency  Supplemental oxygen as needed to maintain oxygen saturation >90%  Incentive spirometer q1h while awake      Bilateral loculated pleural effusions/atelectasis status post thoracentesis  Off diuresis  Status post right thoracentesis 4/19/24 with 750 mL removed  Check pleural fluid cytology and cultures  Lasix 20 mg daily  Consult IR for left thoracentesis today  Repeat x-ray chest in a.m.  Aggressive pulmonary toilet/pulmonary hygiene  Chest vest percussion therapy 3 times a day  Albuterol aerosol treatment  Mucomyst aerosol treatment    Hypotension requiring pressor/increased cortisol level suspect following hydrocortisone dose  Monitor blood pressure off Mat-Synephrine   Midodrine  Off hydrocortisone    Acute cholecystitis/acute anemia/Hemoptysis  Hemoptysis  resolved  Monitor hemoglobin hematocrit  Status post EGD with negative findings  Plan for elective colonoscopy  outpatient  Cholecystotomy tube  monitor output /  Zosyn

## 2024-04-22 NOTE — CARE COORDINATION
Social work: Patient to dc to [id number] via [employer]  at 1:00PM.  # for RN report: [phone]. Completed DAISY faxed to [phone].  Informed RN, pt, and facility of dc time, agreeable to plan.   HENS completed.      UPDATE 11:50- DC HELD TODAY, NEEDS THORACENTESIS PRIOR TO DC, TRANSPORT SCHEDULED FOR 1PM 4/23.  Authorization valid through 4/25.

## 2024-04-22 NOTE — PLAN OF CARE
Problem: Safety - Adult  Goal: Free from fall injury  Outcome: Progressing     Problem: Discharge Planning  Goal: Discharge to home or other facility with appropriate resources  Outcome: Progressing     Problem: Pain  Goal: Verbalizes/displays adequate comfort level or baseline comfort level  Outcome: Progressing     Problem: Skin/Tissue Integrity - Adult  Goal: Incisions, wounds, or drain sites healing without S/S of infection  Outcome: Progressing     Problem: Gastrointestinal - Adult  Goal: Minimal or absence of nausea and vomiting  Outcome: Progressing  Goal: Maintains or returns to baseline bowel function  Outcome: Progressing     Problem: Infection - Adult  Goal: Absence of infection at discharge  Outcome: Progressing     Problem: Genitourinary - Adult  Goal: Absence of urinary retention  Outcome: Progressing     Problem: Skin/Tissue Integrity  Goal: Absence of new skin breakdown  Description: 1.  Monitor for areas of redness and/or skin breakdown  2.  Assess vascular access sites hourly  3.  Every 4-6 hours minimum:  Change oxygen saturation probe site  4.  Every 4-6 hours:  If on nasal continuous positive airway pressure, respiratory therapy assess nares and determine need for appliance change or resting period.  Outcome: Progressing     Problem: ABCDS Injury Assessment  Goal: Absence of physical injury  Outcome: Progressing     Problem: Nutrition Deficit:  Goal: Optimize nutritional status  Outcome: Progressing     Problem: Chronic Conditions and Co-morbidities  Goal: Patient's chronic conditions and co-morbidity symptoms are monitored and maintained or improved  Outcome: Progressing

## 2024-04-23 ENCOUNTER — TELEPHONE (OUTPATIENT)
Dept: FAMILY MEDICINE CLINIC | Age: 79
End: 2024-04-23

## 2024-04-23 ENCOUNTER — APPOINTMENT (OUTPATIENT)
Dept: GENERAL RADIOLOGY | Age: 79
DRG: 377 | End: 2024-04-23
Payer: MEDICARE

## 2024-04-23 PROCEDURE — 97112 NEUROMUSCULAR REEDUCATION: CPT

## 2024-04-23 PROCEDURE — 6370000000 HC RX 637 (ALT 250 FOR IP): Performed by: INTERNAL MEDICINE

## 2024-04-23 PROCEDURE — 99232 SBSQ HOSP IP/OBS MODERATE 35: CPT | Performed by: FAMILY MEDICINE

## 2024-04-23 PROCEDURE — 2580000003 HC RX 258: Performed by: INTERNAL MEDICINE

## 2024-04-23 PROCEDURE — 6360000002 HC RX W HCPCS: Performed by: INTERNAL MEDICINE

## 2024-04-23 PROCEDURE — 99231 SBSQ HOSP IP/OBS SF/LOW 25: CPT | Performed by: NURSE PRACTITIONER

## 2024-04-23 PROCEDURE — 99232 SBSQ HOSP IP/OBS MODERATE 35: CPT | Performed by: NURSE PRACTITIONER

## 2024-04-23 PROCEDURE — 99232 SBSQ HOSP IP/OBS MODERATE 35: CPT | Performed by: INTERNAL MEDICINE

## 2024-04-23 PROCEDURE — 2700000000 HC OXYGEN THERAPY PER DAY

## 2024-04-23 PROCEDURE — 2500000003 HC RX 250 WO HCPCS: Performed by: FAMILY MEDICINE

## 2024-04-23 PROCEDURE — 2580000003 HC RX 258: Performed by: FAMILY MEDICINE

## 2024-04-23 PROCEDURE — 92526 ORAL FUNCTION THERAPY: CPT

## 2024-04-23 PROCEDURE — 94669 MECHANICAL CHEST WALL OSCILL: CPT

## 2024-04-23 PROCEDURE — 6370000000 HC RX 637 (ALT 250 FOR IP): Performed by: FAMILY MEDICINE

## 2024-04-23 PROCEDURE — 2060000000 HC ICU INTERMEDIATE R&B

## 2024-04-23 PROCEDURE — 71045 X-RAY EXAM CHEST 1 VIEW: CPT

## 2024-04-23 PROCEDURE — 97530 THERAPEUTIC ACTIVITIES: CPT

## 2024-04-23 PROCEDURE — 94761 N-INVAS EAR/PLS OXIMETRY MLT: CPT

## 2024-04-23 PROCEDURE — 6360000002 HC RX W HCPCS: Performed by: NURSE PRACTITIONER

## 2024-04-23 PROCEDURE — 94640 AIRWAY INHALATION TREATMENT: CPT

## 2024-04-23 RX ORDER — LEVALBUTEROL 1.25 MG/.5ML
1.25 SOLUTION, CONCENTRATE RESPIRATORY (INHALATION) 3 TIMES DAILY
Status: DISCONTINUED | OUTPATIENT
Start: 2024-04-23 | End: 2024-05-11 | Stop reason: HOSPADM

## 2024-04-23 RX ORDER — ALBUTEROL SULFATE 2.5 MG/3ML
2.5 SOLUTION RESPIRATORY (INHALATION)
Status: DISCONTINUED | OUTPATIENT
Start: 2024-04-23 | End: 2024-04-23

## 2024-04-23 RX ORDER — OXYCODONE HYDROCHLORIDE AND ACETAMINOPHEN 5; 325 MG/1; MG/1
1 TABLET ORAL EVERY 4 HOURS PRN
Status: DISCONTINUED | OUTPATIENT
Start: 2024-04-23 | End: 2024-05-11 | Stop reason: HOSPADM

## 2024-04-23 RX ADMIN — ACETYLCYSTEINE 400 MG: 200 SOLUTION ORAL; RESPIRATORY (INHALATION) at 21:01

## 2024-04-23 RX ADMIN — GUAIFENESIN 600 MG: 600 TABLET ORAL at 20:58

## 2024-04-23 RX ADMIN — MIDODRINE HYDROCHLORIDE 5 MG: 5 TABLET ORAL at 17:11

## 2024-04-23 RX ADMIN — METOPROLOL TARTRATE 5 MG: 5 INJECTION INTRAVENOUS at 13:54

## 2024-04-23 RX ADMIN — CEFDINIR 300 MG: 300 CAPSULE ORAL at 20:58

## 2024-04-23 RX ADMIN — METOPROLOL TARTRATE 25 MG: 25 TABLET, FILM COATED ORAL at 20:39

## 2024-04-23 RX ADMIN — TAMSULOSIN HYDROCHLORIDE 0.4 MG: 0.4 CAPSULE ORAL at 11:18

## 2024-04-23 RX ADMIN — ATORVASTATIN CALCIUM 40 MG: 40 TABLET, FILM COATED ORAL at 20:57

## 2024-04-23 RX ADMIN — ASPIRIN 81 MG: 81 TABLET, COATED ORAL at 08:50

## 2024-04-23 RX ADMIN — METRONIDAZOLE 500 MG: 500 TABLET ORAL at 20:57

## 2024-04-23 RX ADMIN — SODIUM CHLORIDE, PRESERVATIVE FREE 10 ML: 5 INJECTION INTRAVENOUS at 08:52

## 2024-04-23 RX ADMIN — ALBUTEROL SULFATE 2.5 MG: 2.5 SOLUTION RESPIRATORY (INHALATION) at 08:02

## 2024-04-23 RX ADMIN — APIXABAN 5 MG: 5 TABLET, FILM COATED ORAL at 08:51

## 2024-04-23 RX ADMIN — FERROUS SULFATE TAB EC 325 MG (65 MG FE EQUIVALENT) 325 MG: 325 (65 FE) TABLET DELAYED RESPONSE at 08:51

## 2024-04-23 RX ADMIN — LEVALBUTEROL 1.25 MG: 1.25 SOLUTION, CONCENTRATE RESPIRATORY (INHALATION) at 14:11

## 2024-04-23 RX ADMIN — ACETYLCYSTEINE 400 MG: 200 SOLUTION ORAL; RESPIRATORY (INHALATION) at 08:02

## 2024-04-23 RX ADMIN — METOPROLOL TARTRATE 25 MG: 25 TABLET, FILM COATED ORAL at 08:51

## 2024-04-23 RX ADMIN — SODIUM CHLORIDE, PRESERVATIVE FREE 10 ML: 5 INJECTION INTRAVENOUS at 20:58

## 2024-04-23 RX ADMIN — MIDODRINE HYDROCHLORIDE 5 MG: 5 TABLET ORAL at 11:18

## 2024-04-23 RX ADMIN — LEVALBUTEROL 1.25 MG: 1.25 SOLUTION, CONCENTRATE RESPIRATORY (INHALATION) at 21:01

## 2024-04-23 RX ADMIN — GUAIFENESIN 600 MG: 600 TABLET ORAL at 08:50

## 2024-04-23 RX ADMIN — APIXABAN 5 MG: 5 TABLET, FILM COATED ORAL at 20:57

## 2024-04-23 RX ADMIN — METRONIDAZOLE 500 MG: 500 TABLET ORAL at 13:47

## 2024-04-23 RX ADMIN — PANTOPRAZOLE SODIUM 40 MG: 40 TABLET, DELAYED RELEASE ORAL at 17:11

## 2024-04-23 RX ADMIN — FUROSEMIDE 40 MG: 40 TABLET ORAL at 08:50

## 2024-04-23 RX ADMIN — METRONIDAZOLE 500 MG: 500 TABLET ORAL at 08:57

## 2024-04-23 RX ADMIN — CEFDINIR 300 MG: 300 CAPSULE ORAL at 08:50

## 2024-04-23 RX ADMIN — MIDODRINE HYDROCHLORIDE 5 MG: 5 TABLET ORAL at 08:51

## 2024-04-23 RX ADMIN — MIRTAZAPINE 30 MG: 15 TABLET, ORALLY DISINTEGRATING ORAL at 20:57

## 2024-04-23 RX ADMIN — ACETYLCYSTEINE 400 MG: 200 SOLUTION ORAL; RESPIRATORY (INHALATION) at 14:11

## 2024-04-23 RX ADMIN — MORPHINE SULFATE 2 MG: 2 INJECTION, SOLUTION INTRAMUSCULAR; INTRAVENOUS at 14:10

## 2024-04-23 RX ADMIN — EMPAGLIFLOZIN 10 MG: 10 TABLET, FILM COATED ORAL at 08:51

## 2024-04-23 ASSESSMENT — PAIN SCALES - GENERAL
PAINLEVEL_OUTOF10: 0
PAINLEVEL_OUTOF10: 0
PAINLEVEL_OUTOF10: 8

## 2024-04-23 NOTE — PROGRESS NOTES
Pulmonary Critical Care Progress Note       Patient seen for the follow up of bilateral pleural effusions, hemoptysis     Subjective:  Patient is resting comfortably in bed and remains on 2 L of oxygen continuously..  He has improved pain right upper quadrant site of cholecystotomy tube. Has poor appetite.  No chest pain or shortness of breath.  He has been off pressors. Heart rate improved. Chest vest therapy was initiated yesterday.     Examination:  Vitals: /63   Pulse 94   Temp 97.7 °F (36.5 °C) (Temporal)   Resp 24   Ht 1.829 m (6')   Wt 73.5 kg (162 lb)   SpO2 98%   BMI 21.97 kg/m²   General appearance: In no acute distress, alert and cooperative with exam cachectic  Neck: No JVD  Lungs: Decreased breath sound no crackles or wheeze  Heart: irregular rate and rhythm, S1, S2 normal, no gallop  Abdomen: Soft, non tender, + BS  Cholecystotomy tube in place bloody output  Extremities: no cyanosis or clubbing. No significant edema, left upper and lower extremity weakness right above-knee amputation left foot dorsal ulcer    LABs:  CBC:   Recent Labs     04/22/24  0556   WBC 8.6   HGB 7.4*   HCT 24.2*          BMP:   Recent Labs     04/21/24  0401 04/22/24  0556    139   K 3.2* 3.2*   CO2 28 28   BUN 13 13   CREATININE 0.9 0.9   LABGLOM 86 86   GLUCOSE 94 89       No results for input(s): \"APTT\" in the last 72 hours.     Latest Reference Range & Units 04/11/24 15:56 04/13/24 23:26 04/16/24 04:21   Procalcitonin 0.00 - 0.09 ng/mL 1.95 (H) 1.02 (H) 0.72 (H)   (H): Data is abnormally high   Latest Reference Range & Units 04/15/24 04:34   Pro-BNP <300 pg/mL 16,301 (H)   (H): Data is abnormally high  Radiology:  X-ray chest 4/23/24      X-ray chest 4/22/24      CT chest abdomen pelvis 4/16/24  1.  CT CHEST: Unchanged presumed bilateral pneumonia: Moderate volume right  pleural effusion and small volume left pleural effusion with areas of  consolidation posteriorly mid and lower lungs

## 2024-04-23 NOTE — PROGRESS NOTES
Occupational Therapy  Facility/Department: [facility]  Rehabilitation Occupational Therapy Daily Treatment Note    Date: 24  Patient Name: Jorge Luis Banda       Room:   MRN: [id number]  Account: [id number]   : 1945  (78 y.o.) Gender: male                    Past Medical History:  has a past medical history of Paroxysmal atrial fibrillation (HCC), Peripheral artery disease (HCC), and Primary hypertension.  Past Surgical History:   has a past surgical history that includes above knee amputation (Right, 2020); Upper gastrointestinal endoscopy (N/A, 2024); and IR CHOLECYSTOSTOMY PERCUTANEOUS COMPLETE (2024).    Restrictions  Restrictions/Precautions: Fall Risk, General Precautions, Bedrest with Bathroom Privileges  Other position/activity restrictions: Telemetry, LUE IV, L sided weakness (s/p R CVA); R AKA;  R UE IV; Arterial Line (L radial);  PICC Line (R brachial);  2L Supplemental O2 nasal cannula  Required Braces or Orthoses?: No    Subjective  Subjective: Pt in bed upon arrival with SLP and RN present. Pt agreeable to therapy.  Restrictions/Precautions: Fall Risk;General Precautions;Bedrest with Bathroom Privileges             Objective     Cognition  Overall Cognitive Status: Exceptions  Arousal/Alertness: Appropriate responses to stimuli  Following Commands: Follows one step commands with repetition;Follows one step commands with increased time  Attention Span: Attends with cues to redirect  Memory: Appears intact  Safety Judgement: Decreased awareness of need for assistance;Decreased awareness of need for safety  Problem Solving: Assistance required to identify errors made;Assistance required to correct errors made;Decreased awareness of errors  Insights: Decreased awareness of deficits  Initiation: Requires cues for all  Sequencing: Requires cues for all  Orientation  Overall Orientation Status: Within Functional Limits   Perception  Overall Perceptual Status:  fatigue;Patient limited by pain;Patient limited by endurance  Discharge Recommendations: Patient would benefit from continued therapy after discharge  Safety Devices  Safety Devices in place: Yes  Type of devices: All fall risk precautions in place;Left in bed;Bed alarm in place;Call light within reach;Nurse notified;Gait belt;Patient at risk for falls    Patient Education  Education  Education Given To: Patient  Education Provided: Mobility Training;Fall Prevention Strategies;Transfer Training;Energy Conservation;Precautions;Safety;Equipment  Education Method: Verbal;Teach Back  Barriers to Learning: None  Education Outcome: Continued education needed    Plan  Occupational Therapy Plan  Times Per Week: 4-5x/week 1x/day as tolerated  Current Treatment Recommendations: Strengthening;Functional mobility training;Endurance training;Pain management;Safety education & training;Positioning;Equipment evaluation, education, & procurement;Self-Care / ADL;Patient/Caregiver education & training;Cognitive/Perceptual training;Co-Treatment;Neuromuscular re-education;Balance training    Goals  Patient Goals   Patient goals : No goals stated.  Short Term Goals  Time Frame for Short Term Goals: By discharge, pt to demo:  Short Term Goal 1: bed mobility tasks to MIN A with Good safety and use of bedrails as needed.  Short Term Goal 2: increased tolerance for sitting at EOB w/ SUP to > 10 minutes to promote functional activity tolerance required for increased safety/participation in ADLs and in prep for transfers.  Short Term Goal 3: tolerance for reassessment of ADL transfers in order to add goal to OT POC as appropriate.  Short Term Goal 4: UB ADLs to SBA and LB ADLs to ModAx1 with Good safety and use of AD/AE/appropriate safety device as needed.  Short Term Goal 5: tolerance for BUE HEP with PROM/AAROM & proprioceptive input in LUE, with use of handouts, to promote functional strength/ROM required for increased safety/IND with self

## 2024-04-23 NOTE — PROGRESS NOTES
normal/reduced intake; LOC full  ___70% ambulation reduced; cannot do normal work/some disease; full self-care; normal/reduce intake; LOC full  ___60%  Ambulation reduced; Significant disease; Can't do hobbies/housework; intake normal or reduced; occasional assist; LOC full/confusion  ___50%  Mainly sit/lie; Extensive disease; Can't do any work; Considerable assist; intake normal or reduced; LOC full/confusion  ___40%  Mainly in bed; Extensive disease; Mainly assist; intake normal or reduced; LOC full/confusion   _X__30%  Bed Bound; Extensive disease; Total care; intake reduced; LOC full/confusion  ___20%  Bed Bound; Extensive disease; Total care; intake minimal; Drowsy/coma  ___10%  Bed Bound; Extensive disease; Total care; Mouth care only; Drowsy/coma  ___0       Death       Principle Problem/Diagnosis:  Principal Problem:    Melena  Active Problems:    History of arterial bypass of lower extremity    S/P AKA (above knee amputation) unilateral, right (HCC)    Primary hypertension    Peripheral arterial disease (HCC)    Neuropathy    Malignant neoplasm of lung (HCC)    Intermittent claudication of left lower extremity due to atherosclerosis (HCC)    Embolism and thrombosis of artery (HCC)    Left leg cellulitis    Iron deficiency anemia due to chronic blood loss    Hx of cancer of lung    Adrenal mass (HCC)    Claudication (HCC)    Pleural effusion, bilateral    Longstanding persistent atrial fibrillation (HCC)    Acute ischemic right internal carotid artery (ICA) stroke (HCC)    Internal carotid artery stenosis, bilateral    Anemia    Gastrointestinal hemorrhage    NSTEMI (non-ST elevated myocardial infarction) (HCC)    Hypotension    Atrial fibrillation with rapid ventricular response (HCC)    Unstable angina (HCC)    Right upper quadrant abdominal pain    ACP (advance care planning)    Palliative care encounter    Acute cholecystitis  Resolved Problems:    * No resolved hospital problems. *        Total time in  talking with family, discussing with patient, chart review, and writing note: 25  Complexity: 2        Electronically signed by      LEXX May  Hospice/Palliative Care  [facility], [place], [place]  4/23/2024 11:49 AM  Palliative Care Office [phone]

## 2024-04-23 NOTE — FLOWSHEET NOTE
End Of Shift Note  [facility]  Summary of shift: Patient drain remains intact & covered with extra dressing. Diet was advanced today. Worked with PT/OT did not tolerate well.    Vitals:    Vitals:    04/23/24 1200 04/23/24 1411 04/23/24 1415 04/23/24 1551   BP: 103/67   (!) 93/50   Pulse: (!) 105 (!) 107 (!) 115 (!) 101   Resp:    18   Temp: 97.3 °F (36.3 °C)   97.1 °F (36.2 °C)   TempSrc: Temporal   Temporal   SpO2:  93% 95% 99%   Weight:       Height:            I&O:   Intake/Output Summary (Last 24 hours) at 4/23/2024 1830  Last data filed at 4/23/2024 1700  Gross per 24 hour   Intake 100 ml   Output 1540 ml   Net -1440 ml       Resp Status: 2L    Ventilator Settings:     / / /FiO2 : 94 %    Critical Care IV infusions:   sodium chloride      sodium chloride Stopped (04/20/24 0943)    dextrose          LDA:   Peripheral IV 04/11/24 Right Forearm (Active)   Number of days: 12       Biliary Tube 04/19/24 T-tube 8 fr RUQ (Active)   Number of days: 4       Wound 04/04/24 Foot Left;Dorsal dry, scabbed (Active)   Number of days: 19       Wound Foot Left;Lateral (Active)   Number of days:

## 2024-04-23 NOTE — PROGRESS NOTES
Ry Traylor MD   Urology Progress Note            Subjective: Follow-up enlarged prostate urinary retention    Patient Vitals for the past 24 hrs:   BP Temp Temp src Pulse Resp SpO2 Weight   04/23/24 0400 125/63 97.7 °F (36.5 °C) Temporal 94 24 98 % --   04/23/24 0235 -- -- -- 87 16 97 % --   04/23/24 0000 126/64 97.3 °F (36.3 °C) Temporal 88 16 98 % 73.5 kg (162 lb)   04/22/24 2000 (!) 116/59 97.3 °F (36.3 °C) Temporal (!) 102 23 96 % --   04/22/24 1825 -- -- -- (!) 102 28 95 % --   04/22/24 1600 130/60 97.7 °F (36.5 °C) Temporal -- -- 95 % --   04/22/24 1200 -- 98.6 °F (37 °C) Temporal -- -- -- --   04/22/24 1000 133/62 -- -- 93 22 98 % --   04/22/24 0940 123/64 -- -- 88 18 100 % --   04/22/24 0900 123/64 -- -- 84 16 99 % --   04/22/24 0800 136/61 -- -- 86 17 100 % --   04/22/24 0741 -- 96.8 °F (36 °C) Temporal -- -- -- --       Intake/Output Summary (Last 24 hours) at 4/23/2024 0736  Last data filed at 4/23/2024 0615  Gross per 24 hour   Intake 280 ml   Output 2780 ml   Net -2500 ml       Recent Labs     04/22/24  0556   WBC 8.6   HGB 7.4*   HCT 24.2*   MCV 96.0        Recent Labs     04/21/24  0401 04/22/24  0556    139   K 3.2* 3.2*    100   CO2 28 28   BUN 13 13   CREATININE 0.9 0.9       No results for input(s): \"COLORU\", \"PHUR\", \"LABCAST\", \"WBCUA\", \"RBCUA\", \"MUCUS\", \"TRICHOMONAS\", \"YEAST\", \"BACTERIA\", \"CLARITYU\", \"SPECGRAV\", \"LEUKOCYTESUR\", \"UROBILINOGEN\", \"BILIRUBINUR\", \"BLOODU\" in the last 72 hours.    Invalid input(s): \"NITRATE\", \"GLUCOSEUKETONESUAMORPHOUS\"    Additional Lab/culture results:    Physical Exam: Patient voiding spontaneously, seems to be handling the cholecystostomy well    No new urologic concerns    Interval Imaging Findings:    Impression:    Patient Active Problem List   Diagnosis    Skin eschar    Stenosis of left carotid artery    History of arterial bypass of lower extremity    S/P AKA (above knee amputation) unilateral, right (HCC)

## 2024-04-23 NOTE — PROGRESS NOTES
Nutrition Assessment     Type and Reason for Visit: Reassess    Nutrition Recommendations/Plan:   Provide diet rx as ordered   Provide supplements as ordered   Monitor labs as they become available   Monitor skin integrity/weights      Malnutrition Assessment:  Malnutrition Status: No malnutrition    Nutrition Assessment:  noted 4/22 Patient is resting comfortably in bed and remains on 2 L of oxygen continuously..  He has improved pain right upper quadrant site of cholecystotomy tube. no cyanosis or clubbing. No significant edema, left upper and lower extremity weakness right above-knee amputation left foot dorsal ulcer. chest vest therapy initiated 4/22. Dr Mcneil noted on rounds to hold discharge for one more day. Discharge planning is to a SNF. Is eating 50% of meals at this time. Continues on ensure plus high protein twice daily to provide 700 kcals and 40 grams of protein if both are 100% consumed. weight on 4/23 was 162#, 4/15 was 160#, 4/10 was 161# weight generally stable. Monitor po intakes, weights, labs, skin integrity, suppleement acceptance and discharge planning.    Estimated Daily Nutrient Needs:  Energy (kcal):  1729-9935 kcals per day using 25-30 g/kg of actual body weight. Weight Used for Energy Requirements: Current     Protein (g):  87-95 gm of protein (1.2-1.3 gm/kg) Weight Used for Protein Requirements: Adjusted        Fluid (ml/day):  5929-2696 mL Method Used for Fluid Requirements: 1 ml/kcal    Nutrition Related Findings:   Edema to BUE trace, active sounds. Wound Type: Venous Stasis (left foot arterial and venous ulcers.)    Current Nutrition Therapies:    ADULT DIET; Dysphagia - Pureed; No Drinking Straws  ADULT ORAL NUTRITION SUPPLEMENT; Breakfast, Dinner; Standard High Calorie/High Protein Oral Supplement    Anthropometric Measures:  Height: 182.9 cm (6')  Current Body Wt: 74.2 kg (163 lb 9.3 oz)   BMI: 22.2    Nutrition Diagnosis:   Inadequate oral intake related to acute injury/trauma as  evidenced by poor intake prior to admission    Nutrition Interventions:   Food and/or Nutrient Delivery: Continue Current Diet, Continue Oral Nutrition Supplement  Nutrition Education/Counseling: Education not indicated  Coordination of Nutrition Care: Continue to monitor while inpatient       Goals:  Previous Goal Met: Progressing toward Goal(s)  Goals: PO intake 75% or greater       Nutrition Monitoring and Evaluation:   Behavioral-Environmental Outcomes: None Identified  Food/Nutrient Intake Outcomes: Food and Nutrient Intake, Supplement Intake  Physical Signs/Symptoms Outcomes: Skin, GI Status, Weight    Discharge Planning:    Continue Oral Nutrition Supplement, Continue current diet     Luís Hernandez RD  Contact: [phone]

## 2024-04-23 NOTE — PROGRESS NOTES
End Of Shift Note  [facility]  Summary of shift: Patient denies c/o pain. Perc drain w/sanguinous drainage 70mL. PCXR completed this am. No ectopy, SR. Renetta Jc RN     Vitals:    Vitals:    04/22/24 2000 04/23/24 0000 04/23/24 0235 04/23/24 0400   BP: (!) 116/59 126/64  125/63   Pulse: (!) 102 88 87 94   Resp: 23 16 16 24   Temp: 97.3 °F (36.3 °C) 97.3 °F (36.3 °C)  97.7 °F (36.5 °C)   TempSrc: Temporal Temporal  Temporal   SpO2: 96% 98% 97% 98%   Weight:  73.5 kg (162 lb)     Height:            I&O:   Intake/Output Summary (Last 24 hours) at 4/23/2024 0655  Last data filed at 4/23/2024 0615  Gross per 24 hour   Intake 280 ml   Output 2780 ml   Net -2500 ml       Resp Status: 2L NC    Ventilator Settings:     / / /FiO2 : 94 %    Critical Care IV infusions:   sodium chloride      sodium chloride Stopped (04/20/24 0943)    dextrose          LDA:   Peripheral IV 04/11/24 Right Forearm (Active)   Number of days: 11       Biliary Tube 04/19/24 T-tube 8 fr RUQ (Active)   Number of days: 3       Wound 04/04/24 Foot Left;Dorsal dry, scabbed (Active)   Number of days: 18       Wound Foot Left;Lateral (Active)   Number of days:

## 2024-04-23 NOTE — FLOWSHEET NOTE
04/23/24 1058   Treatment Team Notification   Reason for Communication Evaluate;Review case   Name of Team Member Notified   (ask pulm if ready for discharge today)   Treatment Team Role Attending Provider   Method of Communication Face to face   Response In department   Notification Time 1100         [id number]-  rounded, would like to keep another day.

## 2024-04-23 NOTE — TELEPHONE ENCOUNTER
Called to [facility] 4.22.24 appt. Female states flores is in the hospital.  They will call back at a later date to [facility].

## 2024-04-23 NOTE — PLAN OF CARE
Renetta Jc, RN  Outcome: Progressing  Flowsheets (Taken 4/22/2024 2045)  Care Plan - Patient's Chronic Conditions and Co-Morbidity Symptoms are Monitored and Maintained or Improved:   Monitor and assess patient's chronic conditions and comorbid symptoms for stability, deterioration, or improvement   Collaborate with multidisciplinary team to address chronic and comorbid conditions and prevent exacerbation or deterioration   Update acute care plan with appropriate goals if chronic or comorbid symptoms are exacerbated and prevent overall improvement and discharge  Note: Ongoing assessment.

## 2024-04-23 NOTE — PROGRESS NOTES
Physical Therapy  Facility/Department: [facility] CVICU  Daily Treatment Note  NAME: Jorge Luis Banda  : 1945  MRN: [id number]    Date of Service: 2024    Discharge Recommendations:  Patient would benefit from continued therapy after discharge    Pt currently functioning below baseline.  Recommend daily inpatient skilled therapy at time of discharge to maximize long term outcomes and prevent re-admission. Please refer to AM-PAC score for current level of function.     Patient Diagnosis(es): The primary encounter diagnosis was Left leg cellulitis. Diagnoses of Anemia, unspecified type, Melena, UMM (acute kidney injury) (HCC), Gastrointestinal hemorrhage, unspecified gastrointestinal hemorrhage type, Atrial fib/flutter, transient (HCC), Embolism and thrombosis of artery (HCC), Claudication (HCC), Right upper quadrant abdominal pain, and Acute cholecystitis were also pertinent to this visit.    Assessment   Assessment: Patient progressing toward STGs and was able to  sierra stedy with MAX-DEP x 2 A. Noted extensive LT side lean during standing.  Activity Tolerance: Patient limited by fatigue;Patient limited by pain;Patient limited by endurance     Plan    Physical Therapy Plan  General Plan: 6-7 times per week  Current Treatment Recommendations: Strengthening;Balance training;ROM;Functional mobility training;Endurance training;Neuromuscular re-education;Home exercise program;Safety education & training;Patient/Caregiver education & training;Equipment evaluation, education, & procurement;Therapeutic activities     Restrictions  Restrictions/Precautions  Restrictions/Precautions: Fall Risk, General Precautions, Bedrest with Bathroom Privileges  Required Braces or Orthoses?: No  Position Activity Restriction  Other position/activity restrictions: Telemetry, LUE IV, L sided weakness (s/p R CVA); R AKA;  R UE IV; Arterial Line (L radial);  PICC Line (R brachial);  2L Supplemental O2 nasal cannula     Subjective   Total  How much help is needed standing up from a chair using your arms?: A Lot  How much help is needed walking in hospital room?: Total  How much help is needed climbing 3-5 steps with a railing?: Total  AM-PAC Inpatient Mobility Raw Score : 9  AM-PAC Inpatient T-Scale Score : 30.55  Mobility Inpatient CMS 0-100% Score: 81.38  Mobility Inpatient CMS G-Code Modifier : CM         Therapy Time   Individual Concurrent Group Co-treatment   Time In       1345   Time Out       1404   Minutes       19       Co-treatment with OT warranted secondary to decreased safety and independence requiring 2 skilled therapy professionals to address individual discipline's goals. PT addressing pre gait trunk strengthening, weight shifting prior to transfers, transfer training, and postural control in sitting.       Brianna Briceno, PTA

## 2024-04-23 NOTE — FLOWSHEET NOTE
04/23/24 0957   Treatment Team Notification   Reason for Communication Evaluate;Review case   Name of Team Member Notified Giselle  (notified of 's-130's from albuterol treatment-will review)   Treatment Team Role Advanced Practice Nurse   Method of Communication Face to face   Response In department   Notification Time 0957

## 2024-04-23 NOTE — PROGRESS NOTES
Patient Name: Jorge Luis Banda  Date of admission: 4/3/2024  3:27 PM  Patient's age: 78 y.o., 1945  Admission Dx: Melena [K92.1]  GI bleed [K92.2]  UMM (acute kidney injury) (HCC) [N17.9]  Left leg cellulitis [L03.116]  Anemia, unspecified type [D64.9]    Reason for Consult: management recommendations  Requesting Physician: Sony Crawford MD    CHIEF COMPLAINT: GI bleeding    History Obtained From:  patient  INTERVAL HISTORY:    Patient seen and examined.    Denies any nausea vomiting   No shortness of breath  No fever chills    HISTORY OF PRESENT ILLNESS:    The patient is a 78 y.o.   male who is admitted to the hospital for anemia and suspicion for GI bleeding.  His hemoglobin was under 7 and received blood transfusion.  He has severe peripheral vascular disease on Plavix and Coumadin.  He underwent an EGD that showed gastritis and he refused colonoscopy.  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  From oncology perspective, the patient was diagnosed with stage I lung cancer in 2019 and underwent lung resection.  Never received any chemotherapy or radiation.  He has been in remission since then.  CT scan of the chest showed concerning changes in the liver and adrenal gland for metastatic disease.  Dedicated CT of the abdomen and pelvis is ordered and pending.    Past Medical History:   has a past medical history of Paroxysmal atrial fibrillation (HCC), Peripheral artery disease (HCC), and Primary hypertension.    Past Surgical History:   has a past surgical history that includes above knee amputation (Right, 01/01/2020); Upper gastrointestinal endoscopy (N/A, 4/8/2024); and IR CHOLECYSTOSTOMY PERCUTANEOUS COMPLETE (4/19/2024).     Medications:    Reviewed in Epic     Allergies:  Patient has no known allergies.    Social History:   reports that he quit smoking about 4 years ago. He has never  on his hospital course, performance status  Discussed with primary attending Dr. Crawford  We will follow.    Erick Tom MD  Hematologist/Medical Oncologist      This note is created with the assistance of a speech recognition program.  While intending to generate a document that actually reflects the content of the visit, the document can still have some errors including those of syntax and sound a like substitutions which may escape proof reading.  It such instances, actual meaning can be extrapolated by contextual diversion.

## 2024-04-23 NOTE — PROGRESS NOTES
Speech Language Pathology  [facility]    Dysphagia Treatment Note    Date: 4/23/2024  Patient’s Name: Jorge Luis Banda  MRN: [id number]  Diagnosis: dysphagia   Patient Active Problem List   Diagnosis Code    Skin eschar R23.4    Stenosis of left carotid artery I65.22    History of arterial bypass of lower extremity Z95.828    S/P AKA (above knee amputation) unilateral, right ([facility]) Z89.611    Primary hypertension I10    Persistent wound pain R52    Peripheral arterial disease ([facility]) I73.9    Ulcer of left foot ([facility]) L97.529    Neuropathy G62.9    Malignant neoplasm of lung ([facility]) C34.90    Intermittent claudication of left lower extremity due to atherosclerosis ([facility]) I70.212    Frequency of urination R35.0    Former smoker Z87.891    Embolism and thrombosis of artery ([facility]) I74.9    Dyslipidemia E78.5    Closed fracture of left humerus S42.302A    Closed fracture of surgical neck of humerus S42.213A    Acute exacerbation of chronic obstructive airways disease ([facility]) J44.1    Left leg cellulitis L03.116    Arteriosclerosis of coronary artery I25.10    Age-related osteoporosis without current pathological fracture M81.0    Adenocarcinoma of lung ([facility]) C34.90    Wound infection T14.8XXA, L08.9    History of above-knee amputation of both lower extremities ([facility]) Z89.611, Z89.612    Melena K92.1    Iron deficiency anemia due to chronic blood loss D50.0    Hx of cancer of lung Z85.118    Adrenal mass ([facility]) E27.8    Claudication ([facility]) I73.9    Pleural effusion, bilateral J90    Longstanding persistent atrial fibrillation ([facility]) I48.11    Acute ischemic right internal carotid artery (ICA) stroke ([facility]) I63.231    Internal carotid artery stenosis, bilateral I65.23    Anemia D64.9    Gastrointestinal hemorrhage K92.2    NSTEMI (non-ST elevated myocardial infarction) ([facility]) I21.4    Hypotension I95.9    Atrial fibrillation with rapid ventricular response ([facility]) I48.91    Unstable angina ([facility]) I20.0    Right upper  quadrant abdominal pain R10.11    ACP (advance care planning) Z71.89    Palliative care encounter Z51.5    Acute cholecystitis K81.0       Pain: denies     Dysphagia Treatment  Treatment time:2117-8249    Subjective: [x] Alert [x] Cooperative     [] Confused     [] Agitated    [] Lethargic    Objective/Assessment:    RN tells this ST that pt is not eating solid food and c/o it is \"too mushy\". MBS results indicated piecemeal deglutition of SB6 solids, although no penetration or aspiration was reported. Pt agreeable to trials of advanced texture solids - regular texture crackers. Mastication is slow and prolonged however pt benefits from a liquid wash of thin water per cup. Swallow is timely. No overt s/s of aspiration. Suspect pt will tolerate SB6 solids well. He has been educated on the risks of choking/aspiration with solid foods and verbalized understanding. Pt is edentulous at baseline.     Pt educated re safe swallow strategies (small bites/sips, one bite/sip at a time ,alternating liquids/solids, etc.) and verbalized understanding. ST reinforced education regarding recent MBS and reasoning for no straws. [place] [facility] [place] and RN. Both receptive to education.     Okay to advance to level 6 soft and bite sized solids and thin liquids, NO straws. Pt should be upright at 90 degrees for all meals. Intermittent supervision is recommended during meals. Discontinue SB6 diet should pt demo increased s/s of aspiration or should respiratory status decline. ST will continue to follow.       Plan:  [x] Continue ST services    [] Discharge from ST:        Discharge recommendations: []  Further therapy recommended at discharge.The patient should be able to tolerate at least 3 hours of therapy per day over 5 days or 15 hours over 7 days. [x] Further therapy recommended at discharge.   [] No therapy recommended at discharge.       Kinga Lakhani CCC-SLP   Speech-Language Pathologist

## 2024-04-23 NOTE — CARE COORDINATION
Social Work-Patient's discharge on hold. Updated Monica Chavis and family. [facility]

## 2024-04-23 NOTE — PLAN OF CARE
Problem: Safety - Adult  Goal: Free from fall injury  Outcome: Progressing  Flowsheets (Taken 4/19/2024 1252 by Hattie Loya, RN)  Free From Fall Injury:   Instruct family/caregiver on patient safety   Based on caregiver fall risk screen, instruct family/caregiver to ask for assistance with transferring infant if caregiver noted to have fall risk factors  Note: Ongoing assessment.     Problem: Discharge Planning  Goal: Discharge to home or other facility with appropriate resources  Outcome: Progressing  Flowsheets (Taken 4/22/2024 2045)  Discharge to home or other facility with appropriate resources:   Identify barriers to discharge with patient and caregiver   Arrange for needed discharge resources and transportation as appropriate   Identify discharge learning needs (meds, wound care, etc)   Refer to discharge planning if patient needs post-hospital services based on physician order or complex needs related to functional status, cognitive ability or social support system  Note: Ongoing assessment.     Problem: Pain  Goal: Verbalizes/displays adequate comfort level or baseline comfort level  Outcome: Progressing  Flowsheets (Taken 4/22/2024 2000)  Verbalizes/displays adequate comfort level or baseline comfort level:   Encourage patient to monitor pain and request assistance   Assess pain using appropriate pain scale   Administer analgesics based on type and severity of pain and evaluate response   Implement non-pharmacological measures as appropriate and evaluate response   Consider cultural and social influences on pain and pain management   Notify Licensed Independent Practitioner if interventions unsuccessful or patient reports new pain  Note: Denies c/o pain, morphine prn.     Problem: Skin/Tissue Integrity - Adult  Goal: Incisions, wounds, or drain sites healing without S/S of infection  Outcome: Progressing  Flowsheets (Taken 4/21/2024 2227 by Jazzy Adrian, RN)  Incisions, Wounds, or Drain Sites  Healing Without Sign and Symptoms of Infection:   TWICE DAILY: Assess and document skin integrity   TWICE DAILY: Assess and document dressing/incision, wound bed, drain sites and surrounding tissue   Implement wound care per orders  Note: Ongoing assessment, q2 hr turns, pillows cradled. Patient makes some changes in position.     Problem: Gastrointestinal - Adult  Goal: Minimal or absence of nausea and vomiting  Outcome: Progressing  Flowsheets (Taken 4/22/2024 2045)  Minimal or absence of nausea and vomiting:   Administer IV fluids as ordered to ensure adequate hydration   Maintain NPO status until nausea and vomiting are resolved   Administer ordered antiemetic medications as needed   Provide nonpharmacologic comfort measures as appropriate   Advance diet as tolerated, if ordered   Nutrition consult to assist patient with adequate nutrition and appropriate food choices  Note: Eating & drinking, no n/v.  Goal: Maintains or returns to baseline bowel function  Outcome: Progressing  Flowsheets (Taken 4/22/2024 2045)  Maintains or returns to baseline bowel function:   Assess bowel function   Encourage oral fluids to ensure adequate hydration   Administer IV fluids as ordered to ensure adequate hydration   Administer ordered medications as needed   Encourage mobilization and activity   Nutrition consult to assist patient with appropriate food choices  Note: No issues. Ongoing assessment.     Problem: Infection - Adult  Goal: Absence of infection at discharge  Outcome: Progressing  Flowsheets (Taken 4/22/2024 2045)  Absence of infection at discharge:   Assess and monitor for signs and symptoms of infection   Monitor lab/diagnostic results   Monitor all insertion sites i.e., indwelling lines, tubes and drains   Monitor endotracheal (as able) and nasal secretions for changes in amount and color   [place] appropriate cooling/warming therapies per order   Administer medications as ordered   Instruct and encourage patient

## 2024-04-23 NOTE — PROGRESS NOTES
[facility]  Progress Note    Jorge Luis Banda  MRN: [id number]  Date: 4/23/2024  LOS: 20     Reason for F/U :   Cholecystitis    Impression :   Acute cholecystitis  Status post percutaneous cholecystostomy tube placement 4/19/2024  History of lung cancer, stage I, status post resection in 2019 and concerning findings of liver and adrenal metastasis  Acute CVA with left-sided weakness and has flow-limiting critical right ICA stenosis 90%, bilateral proximal ICA are atherosclerosis with flow limitation left 80% and right 90%  Severe peripheral arterial disease  Small left-sided pleural effusion  Longstanding persistent atrial fibrillation with rapid ventricular response  Acute GI bleed, unclear source  Neurogenic bladder dysfunction with urinary retention and refusing indwelling Dotson catheter  Elevated troponin, consistent with type II non-ST elevation myocardial infarction, secondary to acute anemia  Acute diastolic congestive heart failure    Recommendations:   The patient received Unasyn from 4/3/2024 to 4/18/2024 [5 days], levofloxacin 411 and 3 days of azithromycin from 4/11/2024 through 4/13/2024  The patient received Zosyn 4/11/2024 through 4/21/2024 [11 days of therapy]  Patient was transitioned to oral antimicrobial therapy on 4/21/2024 with cefdinir and metronidazole with plans to continue through 4/26/2024  The pulmonary team has asked for interventional radiology to evaluate for a left-sided thoracentesis yesterday, the thoracentesis was deferred due to a small amount of fluid  Discharge planning    Infection Control Recommendations:   Universal precautions    Discharge Planning:     Patient will need Midline Catheter Insertion/ PICC line Insertion: No  Patient will need: Home IV , Infusion Center,  SNF,  LTAC: Undetermined  Patient willneed outpatient wound care: No    Medical Decision making / Summary of Stay:   Jorge Luis Banda is a 78 y.o.-year-old male who was initially admitted

## 2024-04-23 NOTE — PROGRESS NOTES
increase in opacification   involving the mid and lower lung zones. Findings could be due to atelectasis   with mucous plugging and effusion.         FL MODIFIED BARIUM SWALLOW W VIDEO   Final Result   Deep penetration with thin consistency eliciting cough.  No aspiration   identified.      Please see separate speech pathology report for full discussion of findings   and recommendations.         IR CHOLECYSTOSTOMY PERCUTANEOUS COMPLETE   Final Result   Successful ultrasound and fluoroscopic guided placement of an 8 Serbian   cholecystostomy tube.      RECOMMENDATIONS:   Record the ins and outs of the tube daily.  Flush the catheter with 5-10 mL   of sterile saline every 8-12 hours.  The catheter will need to be remain in   place for at least 6 weeks unless it is removed surgically before then.         IR GUIDED THORACENTESIS PLEURAL   Final Result   Successful ultrasound guided right thoracentesis.         XR CHEST PORTABLE   Final Result   No significant interval change.         NM HEPATOBILIARY   Final Result   The gallbladder is not visualized after morphine administration, consistent   with acute cholecystitis.         CT CHEST ABDOMEN PELVIS W CONTRAST Additional Contrast? None   Final Result   1.  CT CHEST: Unchanged presumed bilateral pneumonia: Moderate volume right   pleural effusion and small volume left pleural effusion with areas of   consolidation posteriorly mid and lower lungs bilaterally.   2. Left basilar scarring.   3. 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   4.  Pulmonary sequela typical of that seen with smoking, including COPD.   5. Acute esophagitis.   6. Prominent esophageal ampulla versus small hiatal hernia.   7. Gastroesophageal reflux versus incomplete esophageal emptying.   8. Extensive calcific atherosclerosis coronary arteries.   9. CT ABDOMEN/PELVIS: Unchanged gallbladder hydrops and cholelithiasis with   findings of acute cholecystitis.   10. Stable  right adrenal mass infiltrating into adjacent structures including   the right kidney, liver and inferior vena cava.   11. Probable reactive colitis at the hepatic flexure.         XR ABDOMEN (KUB) (SINGLE AP VIEW)   Final Result   No acute abdominal abnormality.         XR CHEST PORTABLE   Final Result   Stable bilateral hazy opacification representing the in pleural effusion.   Background chronic interstitial changes and emphysema.         XR CHEST PORTABLE   Final Result   Mildly progressive bilateral infiltrates when compared to the prior exam         CT HEAD WO CONTRAST   Final Result   No new acute intracranial abnormality.      Unchanged right frontal infarcts.         CTA HEAD NECK W CONTRAST   Final Result   1. Redemonstration of known subacute infarct involving the cortex,   subcortical and deep white matter of the right frontal lobe are noted, better   appreciated on the recent MRI. There is no acute intracranial hemorrhage.   2. Severe stenoses at the origins of the internal carotid arteries   bilaterally measuring 90% on the right and 80% on the left based on NASCET   criteria.   3. Severe stenosis at the origin of the left vertebral artery with multifocal   moderate to severe stenoses of the V2 and V3 segments of the left vertebral   artery. The V4 segment of the left vertebral artery is occluded.   4. Moderate stenosis of the V4 segment of the right vertebral artery.   5. Moderate stenosis of the proximal left subclavian artery.   6. No significant stenosis or aneurysm of the intracranial circulation.         XR CHEST PORTABLE   Final Result   Improved aeration of the lungs.  Bilateral pleuroparenchymal disease persists.         XR CHEST PORTABLE   Final Result   Mildly worsened bilateral airspace disease.      Small bilateral pleural effusions.         MRI BRAIN WO CONTRAST   Final Result   Acute infarcts within the anterior right CLARKE MCA watershed distribution   extending peripherally towards the

## 2024-04-23 NOTE — PROGRESS NOTES
VASCULAR SURGERY  PROGRESS NOTE      4/23/2024 4:38 PM  Subjective:   Admit Date: 4/3/2024  PCP: Lucila Machado APRN - [facility]    Chief Complaint   Patient presents with    Leg Pain     Left leg, open wounds and swollen       Interval History: No complaints.  Patient now has some function in his left hand.    Diet: ADULT DIET; Dysphagia - Pureed; No Drinking Straws  ADULT ORAL NUTRITION SUPPLEMENT; Breakfast, Dinner; Standard High Calorie/High Protein Oral Supplement    Medications:   Scheduled Meds:   levalbuterol  1.25 mg Nebulization TID    cefdinir  300 mg Oral 2 times per day    metroNIDAZOLE  500 mg Oral 3 times per day    acetylcysteine  400 mg Inhalation TID RT    mirtazapine  30 mg Oral Nightly    midodrine  5 mg Oral TID WC    furosemide  40 mg Oral Daily    sodium chloride  80 mL IntraVENous Once    apixaban  5 mg Oral BID    pantoprazole  40 mg Oral BID AC    empagliflozin  10 mg Oral Daily    sodium chloride  80 mL IntraVENous Once    metoprolol tartrate  25 mg Oral BID    aspirin  81 mg Oral Daily    sodium chloride  100 mL IntraVENous Once    guaiFENesin  600 mg Oral BID    ferrous sulfate  325 mg Oral Daily with breakfast    atorvastatin  40 mg Oral Nightly    vitamin D3  5,000 Units Oral Daily    vitamin B-12  1,000 mcg Oral Daily    tamsulosin  0.4 mg Oral Daily    sodium chloride flush  5-40 mL IntraVENous 2 times per day     Continuous Infusions:   sodium chloride      sodium chloride Stopped (04/20/24 0943)    dextrose           Labs:   CBC:   Recent Labs     04/22/24  0556   WBC 8.6   HGB 7.4*          BMP:    Recent Labs     04/21/24  0401 04/22/24  0556    139   K 3.2* 3.2*    100   CO2 28 28   BUN 13 13   CREATININE 0.9 0.9   GLUCOSE 94 89       Hepatic:   No results for input(s): \"AST\", \"ALT\", \"ALB\", \"BILITOT\", \"ALKPHOS\" in the last 72 hours.    Troponin: Invalid input(s): \"TROPONIN\"  BNP: No results for input(s): \"BNP\" in the last 72 hours.  Lipids:   No results for  input(s): \"CHOL\", \"HDL\" in the last 72 hours.    Invalid input(s): \"LDLCALCU\"    INR: No results for input(s): \"INR\" in the last 72 hours.    Objective:   Vitals: BP (!) 93/50   Pulse (!) 101   Temp 97.1 °F (36.2 °C) (Temporal)   Resp 18   Ht 1.829 m (6')   Wt 73.5 kg (162 lb)   SpO2 99%   BMI 21.97 kg/m²   General appearance: alert, cooperative and no distress  Mental Status: oriented to person, place and time with normal affect  Neck: good carotid pulses, no JVD  Lungs: clear to auscultation bilaterally, normal effort  Heart: regular rate and rhythm, no murmur,  Abdomen: soft, non-tender, non-distended, bowel sounds present all four quadrants, no masses, hepatomegaly, splenomegaly or aortic enlargement  Extremities: Right AKA site healed, dry ulcer dorsal aspect of the left foot  Neurologic: Left hand with some motor function however unable to raise the arm, left lower extremity weakness  Skin: no gross lesions, rashes, or induration    Assessment:   78-year-old male with 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 weakness  Severe 90% right internal carotid artery stenosis  Severe 80% left internal carotid artery stenosis with long segment of plaque involving the mid to distal common carotid  Severe peripheral arterial disease with patent right axillobifemoral bypass  GI bleed  Atrial fibrillation  Right AKA  6.5 cm right adrenal mass  Positive HIDA scan    Patient Active Problem List:     Skin eschar     Stenosis of left carotid artery     History of arterial bypass of lower extremity     S/P AKA (above knee amputation) unilateral, right (HCC)     Primary hypertension     Persistent wound pain     Peripheral arterial disease (HCC)     Ulcer of left foot (HCC)     Neuropathy     Malignant neoplasm of lung (HCC)     Intermittent claudication of left lower extremity due to atherosclerosis (HCC)     Frequency of urination     Former smoker     Embolism and thrombosis of artery (HCC)

## 2024-04-24 ENCOUNTER — APPOINTMENT (OUTPATIENT)
Dept: GENERAL RADIOLOGY | Age: 79
DRG: 377 | End: 2024-04-24
Payer: MEDICARE

## 2024-04-24 LAB
MICROORGANISM SPEC CULT: NORMAL
MICROORGANISM/AGENT SPEC: NORMAL
MICROORGANISM/AGENT SPEC: NORMAL
SPECIMEN DESCRIPTION: NORMAL
SURGICAL PATHOLOGY REPORT: NORMAL

## 2024-04-24 PROCEDURE — 6370000000 HC RX 637 (ALT 250 FOR IP): Performed by: INTERNAL MEDICINE

## 2024-04-24 PROCEDURE — 97112 NEUROMUSCULAR REEDUCATION: CPT

## 2024-04-24 PROCEDURE — 97530 THERAPEUTIC ACTIVITIES: CPT

## 2024-04-24 PROCEDURE — 2060000000 HC ICU INTERMEDIATE R&B

## 2024-04-24 PROCEDURE — 99232 SBSQ HOSP IP/OBS MODERATE 35: CPT | Performed by: NURSE PRACTITIONER

## 2024-04-24 PROCEDURE — 99232 SBSQ HOSP IP/OBS MODERATE 35: CPT | Performed by: INTERNAL MEDICINE

## 2024-04-24 PROCEDURE — 51798 US URINE CAPACITY MEASURE: CPT

## 2024-04-24 PROCEDURE — 94669 MECHANICAL CHEST WALL OSCILL: CPT

## 2024-04-24 PROCEDURE — 6370000000 HC RX 637 (ALT 250 FOR IP): Performed by: FAMILY MEDICINE

## 2024-04-24 PROCEDURE — 6360000002 HC RX W HCPCS: Performed by: NURSE PRACTITIONER

## 2024-04-24 PROCEDURE — 94640 AIRWAY INHALATION TREATMENT: CPT

## 2024-04-24 PROCEDURE — 71045 X-RAY EXAM CHEST 1 VIEW: CPT

## 2024-04-24 PROCEDURE — 92526 ORAL FUNCTION THERAPY: CPT

## 2024-04-24 PROCEDURE — 99232 SBSQ HOSP IP/OBS MODERATE 35: CPT | Performed by: FAMILY MEDICINE

## 2024-04-24 PROCEDURE — 2700000000 HC OXYGEN THERAPY PER DAY

## 2024-04-24 PROCEDURE — 2580000003 HC RX 258: Performed by: INTERNAL MEDICINE

## 2024-04-24 PROCEDURE — 94761 N-INVAS EAR/PLS OXIMETRY MLT: CPT

## 2024-04-24 RX ADMIN — EMPAGLIFLOZIN 10 MG: 10 TABLET, FILM COATED ORAL at 09:33

## 2024-04-24 RX ADMIN — APIXABAN 5 MG: 5 TABLET, FILM COATED ORAL at 09:33

## 2024-04-24 RX ADMIN — TAMSULOSIN HYDROCHLORIDE 0.4 MG: 0.4 CAPSULE ORAL at 09:33

## 2024-04-24 RX ADMIN — ASPIRIN 81 MG: 81 TABLET, COATED ORAL at 09:33

## 2024-04-24 RX ADMIN — LEVALBUTEROL 1.25 MG: 1.25 SOLUTION, CONCENTRATE RESPIRATORY (INHALATION) at 19:14

## 2024-04-24 RX ADMIN — PANTOPRAZOLE SODIUM 40 MG: 40 TABLET, DELAYED RELEASE ORAL at 06:08

## 2024-04-24 RX ADMIN — ATORVASTATIN CALCIUM 40 MG: 40 TABLET, FILM COATED ORAL at 22:02

## 2024-04-24 RX ADMIN — MIRTAZAPINE 30 MG: 15 TABLET, ORALLY DISINTEGRATING ORAL at 22:02

## 2024-04-24 RX ADMIN — CEFDINIR 300 MG: 300 CAPSULE ORAL at 09:33

## 2024-04-24 RX ADMIN — PANTOPRAZOLE SODIUM 40 MG: 40 TABLET, DELAYED RELEASE ORAL at 15:57

## 2024-04-24 RX ADMIN — MIDODRINE HYDROCHLORIDE 5 MG: 5 TABLET ORAL at 09:33

## 2024-04-24 RX ADMIN — CYANOCOBALAMIN TAB 1000 MCG 1000 MCG: 1000 TAB at 09:33

## 2024-04-24 RX ADMIN — MIDODRINE HYDROCHLORIDE 5 MG: 5 TABLET ORAL at 12:43

## 2024-04-24 RX ADMIN — GUAIFENESIN 600 MG: 600 TABLET ORAL at 09:33

## 2024-04-24 RX ADMIN — GUAIFENESIN 600 MG: 600 TABLET ORAL at 22:02

## 2024-04-24 RX ADMIN — METRONIDAZOLE 500 MG: 500 TABLET ORAL at 06:08

## 2024-04-24 RX ADMIN — MIDODRINE HYDROCHLORIDE 5 MG: 5 TABLET ORAL at 17:51

## 2024-04-24 RX ADMIN — ACETYLCYSTEINE 400 MG: 200 SOLUTION ORAL; RESPIRATORY (INHALATION) at 14:58

## 2024-04-24 RX ADMIN — LEVALBUTEROL 1.25 MG: 1.25 SOLUTION, CONCENTRATE RESPIRATORY (INHALATION) at 14:58

## 2024-04-24 RX ADMIN — FERROUS SULFATE TAB EC 325 MG (65 MG FE EQUIVALENT) 325 MG: 325 (65 FE) TABLET DELAYED RESPONSE at 09:32

## 2024-04-24 RX ADMIN — ACETYLCYSTEINE 400 MG: 200 SOLUTION ORAL; RESPIRATORY (INHALATION) at 19:13

## 2024-04-24 RX ADMIN — OXYCODONE AND ACETAMINOPHEN 1 TABLET: 5; 325 TABLET ORAL at 09:47

## 2024-04-24 RX ADMIN — METRONIDAZOLE 500 MG: 500 TABLET ORAL at 15:57

## 2024-04-24 RX ADMIN — FUROSEMIDE 40 MG: 40 TABLET ORAL at 09:32

## 2024-04-24 RX ADMIN — METRONIDAZOLE 500 MG: 500 TABLET ORAL at 22:02

## 2024-04-24 RX ADMIN — CEFDINIR 300 MG: 300 CAPSULE ORAL at 22:02

## 2024-04-24 RX ADMIN — SODIUM CHLORIDE, PRESERVATIVE FREE 10 ML: 5 INJECTION INTRAVENOUS at 22:02

## 2024-04-24 RX ADMIN — SODIUM CHLORIDE, PRESERVATIVE FREE 10 ML: 5 INJECTION INTRAVENOUS at 09:32

## 2024-04-24 RX ADMIN — METOPROLOL TARTRATE 25 MG: 25 TABLET, FILM COATED ORAL at 09:33

## 2024-04-24 RX ADMIN — LEVALBUTEROL 1.25 MG: 1.25 SOLUTION, CONCENTRATE RESPIRATORY (INHALATION) at 08:24

## 2024-04-24 RX ADMIN — ACETYLCYSTEINE 400 MG: 200 SOLUTION ORAL; RESPIRATORY (INHALATION) at 08:24

## 2024-04-24 RX ADMIN — CHOLECALCIFEROL TAB 125 MCG (5000 UNIT) 5000 UNITS: 125 TAB at 09:32

## 2024-04-24 ASSESSMENT — PAIN SCALES - WONG BAKER

## 2024-04-24 ASSESSMENT — PAIN DESCRIPTION - ORIENTATION
ORIENTATION: LEFT
ORIENTATION: LEFT

## 2024-04-24 ASSESSMENT — PAIN DESCRIPTION - LOCATION
LOCATION: ABDOMEN;FOOT
LOCATION: ABDOMEN;FOOT

## 2024-04-24 ASSESSMENT — PAIN DESCRIPTION - DESCRIPTORS
DESCRIPTORS: ACHING;DISCOMFORT
DESCRIPTORS: ACHING;DISCOMFORT

## 2024-04-24 ASSESSMENT — PAIN - FUNCTIONAL ASSESSMENT
PAIN_FUNCTIONAL_ASSESSMENT: PREVENTS OR INTERFERES SOME ACTIVE ACTIVITIES AND ADLS

## 2024-04-24 ASSESSMENT — PAIN DESCRIPTION - FREQUENCY: FREQUENCY: INTERMITTENT

## 2024-04-24 ASSESSMENT — PAIN SCALES - GENERAL
PAINLEVEL_OUTOF10: 8
PAINLEVEL_OUTOF10: 8

## 2024-04-24 ASSESSMENT — PAIN DESCRIPTION - PAIN TYPE
TYPE: CHRONIC PAIN;ACUTE PAIN
TYPE: CHRONIC PAIN;ACUTE PAIN

## 2024-04-24 ASSESSMENT — PAIN DESCRIPTION - ONSET: ONSET: GRADUAL

## 2024-04-24 NOTE — CARE COORDINATION
Social Work-Received letter of denial / Message sent to Dr Kumari. Patient signed authorization for representation form for [employer] to to expedited appeal, if P2P is denied. [place]

## 2024-04-24 NOTE — PROGRESS NOTES
End Of Shift Note  [facility]  Summary of shift: Continue CPT therapy, coughing thick brown phlegm. Sched for Bronch 11am 4/25. Not eating, only jello and 1/2 pears for lunch, x1 supplement. X2 BM, held urine all day, voided 750ml x1, dark yellow.     Vitals:    Vitals:    04/24/24 0932 04/24/24 1200 04/24/24 1458 04/24/24 1600   BP: (!) 118/57 (!) 92/44  (!) 118/59   Pulse: 97 86 92 97   Resp:  21 19 19   Temp:  97.7 °F (36.5 °C)  98 °F (36.7 °C)   TempSrc:  Temporal  Temporal   SpO2:  98% 97% 96%   Weight:       Height:            I&O:   Intake/Output Summary (Last 24 hours) at 4/24/2024 1809  Last data filed at 4/24/2024 1556  Gross per 24 hour   Intake 240 ml   Output 1670 ml   Net -1430 ml       Resp Status: 3L NC    Ventilator Settings:     / / /FiO2 : 94 %    Critical Care IV infusions:   sodium chloride      sodium chloride Stopped (04/20/24 0943)    dextrose          LDA:   Peripheral IV 04/24/24 Distal;Left Forearm (Active)   Number of days: 0       Biliary Tube 04/19/24 T-tube 8 fr RUQ (Active)   Number of days: 5       Wound 04/04/24 Foot Left;Dorsal dry, scabbed (Active)   Number of days: 20       Wound Foot Left;Lateral (Active)   Number of days:

## 2024-04-24 NOTE — PROGRESS NOTES
VASCULAR SURGERY  PROGRESS NOTE      4/24/2024 11:45 AM  Subjective:   Admit Date: 4/3/2024  PCP: Lucila Machado APRN - [facility]    Chief Complaint   Patient presents with    Leg Pain     Left leg, open wounds and swollen       Interval History: No complaints.  Neuro unchanged from yesterday.    Diet: ADULT ORAL NUTRITION SUPPLEMENT; Breakfast, Dinner; Standard High Calorie/High Protein Oral Supplement  ADULT DIET; Dysphagia - Soft and Bite Sized; No Drinking Straws    Medications:   Scheduled Meds:   levalbuterol  1.25 mg Nebulization TID    cefdinir  300 mg Oral 2 times per day    metroNIDAZOLE  500 mg Oral 3 times per day    acetylcysteine  400 mg Inhalation TID RT    mirtazapine  30 mg Oral Nightly    midodrine  5 mg Oral TID WC    furosemide  40 mg Oral Daily    sodium chloride  80 mL IntraVENous Once    apixaban  5 mg Oral BID    pantoprazole  40 mg Oral BID AC    empagliflozin  10 mg Oral Daily    sodium chloride  80 mL IntraVENous Once    metoprolol tartrate  25 mg Oral BID    aspirin  81 mg Oral Daily    sodium chloride  100 mL IntraVENous Once    guaiFENesin  600 mg Oral BID    ferrous sulfate  325 mg Oral Daily with breakfast    atorvastatin  40 mg Oral Nightly    vitamin D3  5,000 Units Oral Daily    vitamin B-12  1,000 mcg Oral Daily    tamsulosin  0.4 mg Oral Daily    sodium chloride flush  5-40 mL IntraVENous 2 times per day     Continuous Infusions:   sodium chloride      sodium chloride Stopped (04/20/24 0943)    dextrose           Labs:   CBC:   Recent Labs     04/22/24  0556   WBC 8.6   HGB 7.4*          BMP:    Recent Labs     04/22/24  0556      K 3.2*      CO2 28   BUN 13   CREATININE 0.9   GLUCOSE 89       Hepatic:   No results for input(s): \"AST\", \"ALT\", \"ALB\", \"BILITOT\", \"ALKPHOS\" in the last 72 hours.    Troponin: Invalid input(s): \"TROPONIN\"  BNP: No results for input(s): \"BNP\" in the last 72 hours.  Lipids:   No results for input(s): \"CHOL\", \"HDL\" in the last 72

## 2024-04-24 NOTE — PROGRESS NOTES
[facility]  Progress Note    Jorge Luis Banda  MRN: [id number]  Date: 4/24/2024  LOS: 21     Reason for F/U :   Cholecystitis    Impression :   Acute cholecystitis  Status post percutaneous cholecystostomy tube placement 4/19/2024  History of lung cancer, stage I, status post resection in 2019 and concerning findings of liver and adrenal metastasis  Acute CVA with left-sided weakness and has flow-limiting critical right ICA stenosis 90%, bilateral proximal ICA are atherosclerosis with flow limitation left 80% and right 90%  Severe peripheral arterial disease  Small left-sided pleural effusion  Longstanding persistent atrial fibrillation with rapid ventricular response  Acute GI bleed, unclear source  Neurogenic bladder dysfunction with urinary retention and refusing indwelling Dotson catheter  Elevated troponin, consistent with type II non-ST elevation myocardial infarction, secondary to acute anemia  Acute diastolic congestive heart failure    Recommendations:   The patient received Unasyn from 4/3/2024 to 4/18/2024 [5 days], levofloxacin 411 and 3 days of azithromycin from 4/11/2024 through 4/13/2024  The patient received Zosyn 4/11/2024 through 4/21/2024 [11 days of therapy]  Patient was transitioned to oral antimicrobial therapy on 4/21/2024 with cefdinir and metronidazole with plans to continue through 4/26/2024  The chest imaging continues to show progressive volume loss, the pulmonary team does plan to perform bronchoscopy tomorrow for mucous plugging  Continue supportive care    Infection Control Recommendations:   Universal precautions    Discharge Planning:     Patient will need Midline Catheter Insertion/ PICC line Insertion: No  Patient will need: Home IV , Infusion Center,  SNF,  LTAC: Undetermined  Patient willneed outpatient wound care: No    Medical Decision making / Summary of Stay:   Jorge Luis Banda is a 78 y.o.-year-old male who was initially admitted on 4/3/2024.   The patient  he stated he cannot and will not do colon prep.  He was recommended for video capsule endoscopy.     The patient had a CT scan of the chest showing concerning changes in the liver and adrenal gland for metastatic disease and oncology service was consulted and a dedicated CT of the abdomen pelvis was ordered and the patient was noted to have a lobulated adrenal mass 6.5 cm and a liver mass.  The adrenal mass was suspicious for malignancy and a biopsy was ordered     The patient was seen in consultation by the pulmonary team on 4/11/2024 for the bilateral loculated pleural effusion/atelectasis and respiratory insufficiency requiring supplemental oxygen.  Lasix was started and it was not felt that he required a thoracentesis.  The antibiotic therapy was switched from Unasyn to Zosyn and the patient also had azithromycin added to the antimicrobial regimen     The patient developed some weakness in the left upper and lower extremity and heparin was started 4/12/2024.  He continued to have some dark stools and has required another unit PRBC transfusion and the discussion of the colonoscopy was again approached and the patient did state that he was amenable to taking the prep for an NG tube     Due to the weakness of the left arm and leg the patient underwent an MRI of the brain 4/11/2024 that did show some hypoperfusion to the right MCA/CLARKE and the patient is felt to have a CVA most likely related to his critical anemia and borderline hypotension.  A CTA of the head and neck was done that showed findings concerning for critical right ICA flow-limiting stenosis and this was discussed with the endovascular neurosurgery team and given the \"multiple comorbid acute issues including GI bleeding and stable hemoglobin with unknown source it was recommended to stabilize the blood pressure with a systolic blood pressure of 140-180 and medical management.  Vascular surgery was asked to consider TCAR.    The patient has flow-limiting

## 2024-04-24 NOTE — PROGRESS NOTES
[J90] 04/11/2024    Longstanding persistent atrial fibrillation (HCC) [I48.11] 04/11/2024    Iron deficiency anemia due to chronic blood loss [D50.0] 04/10/2024    Hx of cancer of lung [Z85.118] 04/10/2024    Adrenal mass (HCC) [E27.8] 04/10/2024    Claudication (HCC) [I73.9] 04/10/2024    Melena [K92.1] 04/03/2024    Peripheral arterial disease (HCC) [I73.9] 04/03/2024    S/P AKA (above knee amputation) unilateral, right (HCC) [Z89.611] 03/05/2024    Embolism and thrombosis of artery (HCC) [I74.9] 03/05/2024    Left leg cellulitis [L03.116] 03/05/2024    History of arterial bypass of lower extremity [Z95.828] 11/08/2019    Malignant neoplasm of lung (HCC) [C34.90] 04/24/2019    Primary hypertension [I10] 02/14/2019    Intermittent claudication of left lower extremity due to atherosclerosis (HCC) [I70.212] 02/14/2019    Neuropathy [G62.9] 01/17/2019         IMPRESSION:   Severe peripheral vascular disease  On Coumadin and Plavix  Drop in hemoglobin and likely GI bleeding  History of lung cancer 2019, stage I status post lung resection  Concerning findings on CT scan with adrenal and liver mass  Acute CVA with left arm weakness  Acute cholecystitis status post cholecystotomy tube placement and on antibiotics    RECOMMENDATIONS:  I reviewed the lab data, imaging studies and discussed the diagnosis and treatment recommendations  Anemia likely secondary to ongoing blood loss as well as anemia of chronic illness.  Received IV iron infusion  Transfuse PRBC if hemoglobin less than 7  Further care by primary team and other specialists.  Patient has multiple issues going on at this point and I would recommend continue current management as per primary team   His lung cancer was stage I and it was 5 years ago.  He never received any adjuvant therapy  Hemoptysis. Pulmonary following.  Watch hemoglobin and transfuse as needed  His thoracentesis fluid negative for malignancy  Will consider PET scan and possible biopsy as  outpatient to evaluate liver and adrenal mass, depending on his hospital course, performance status  We will follow.    Erick Tom MD  Hematologist/Medical Oncologist      This note is created with the assistance of a speech recognition program.  While intending to generate a document that actually reflects the content of the visit, the document can still have some errors including those of syntax and sound a like substitutions which may escape proof reading.  It such instances, actual meaning can be extrapolated by contextual diversion.

## 2024-04-24 NOTE — PLAN OF CARE
Problem: Safety - Adult  Goal: Free from fall injury  Outcome: Progressing     Problem: Discharge Planning  Goal: Discharge to home or other facility with appropriate resources  Outcome: Progressing  Flowsheets (Taken 4/24/2024 0800)  Discharge to home or other facility with appropriate resources:   Identify barriers to discharge with patient and caregiver   Arrange for needed discharge resources and transportation as appropriate     Problem: Pain  Goal: Verbalizes/displays adequate comfort level or baseline comfort level  Outcome: Progressing     Problem: Skin/Tissue Integrity - Adult  Goal: Incisions, wounds, or drain sites healing without S/S of infection  Outcome: Progressing  Flowsheets (Taken 4/24/2024 0800)  Incisions, Wounds, or Drain Sites Healing Without Sign and Symptoms of Infection: TWICE DAILY: Assess and document skin integrity     Problem: Gastrointestinal - Adult  Goal: Minimal or absence of nausea and vomiting  Outcome: Progressing  Flowsheets (Taken 4/24/2024 0800)  Minimal or absence of nausea and vomiting: Administer ordered antiemetic medications as needed  Goal: Maintains or returns to baseline bowel function  Outcome: Progressing  Flowsheets (Taken 4/24/2024 0800)  Maintains or returns to baseline bowel function: Assess bowel function     Problem: Infection - Adult  Goal: Absence of infection at discharge  Outcome: Progressing  Flowsheets (Taken 4/24/2024 0800)  Absence of infection at discharge:   Assess and monitor for signs and symptoms of infection   Monitor lab/diagnostic results     Problem: Hematologic - Adult  Goal: Maintains hematologic stability  Outcome: Progressing  Flowsheets (Taken 4/24/2024 0800)  Maintains hematologic stability: Assess for signs and symptoms of bleeding or hemorrhage     Problem: Respiratory - Adult  Goal: Able to breathe comfortably  Description: Able to breathe comfortably  4/24/2024 0829 by Katharine Salazar RCP  Outcome: Progressing  Goal: Clear lung

## 2024-04-24 NOTE — PROGRESS NOTES
Message sent to [facility] per Dr Mcneil's request. Patient having a bronch in AM and requesting tohold Eilquis. Per DR Kacy fiore to hold eliquis for bronch.

## 2024-04-24 NOTE — PROGRESS NOTES
Conservation;Precautions;Safety  Education Method: Verbal;Teach Back  Barriers to Learning: None  Education Outcome: Verbalized understanding;Demonstrated understanding;Continued education needed    Plan  Occupational Therapy Plan  Times Per Week: 4-5x/week 1x/day as tolerated  Current Treatment Recommendations: Strengthening;Functional mobility training;Endurance training;Pain management;Safety education & training;Positioning;Equipment evaluation, education, & procurement;Self-Care / ADL;Patient/Caregiver education & training;Cognitive/Perceptual training;Co-Treatment;Neuromuscular re-education;Balance training    Goals  Patient Goals   Patient goals : No goals stated.  Short Term Goals  Time Frame for Short Term Goals: By discharge, pt to demo:  Short Term Goal 1: bed mobility tasks to MIN A with Good safety and use of bedrails as needed.  Short Term Goal 2: increased tolerance for sitting at EOB w/ SUP to > 10 minutes to promote functional activity tolerance required for increased safety/participation in ADLs and in prep for transfers.  Short Term Goal 3: tolerance for reassessment of ADL transfers in order to add goal to OT POC as appropriate.  Short Term Goal 4: UB ADLs to SBA and LB ADLs to ModAx1 with Good safety and use of AD/AE/appropriate safety device as needed.  Short Term Goal 5: tolerance for BUE HEP with PROM/AAROM & proprioceptive input in LUE, with use of handouts, to promote functional strength/ROM required for increased safety/IND with self care tasks.  Long Term Goals  Long Term Goal 1: Pt/family to be IND with pressure relief tech, fall prevention strategies, EC/WS tech, and potential equipment/discharge recommendations with use of handouts as needed.    AM-PAC Score        AM-PAC Inpatient Daily Activity Raw Score: 11 (04/24/24 1315)  AM-PAC Inpatient ADL T-Scale Score : 29.04 (04/24/24 1315)  ADL Inpatient CMS 0-100% Score: 70.42 (04/24/24 1315)  ADL Inpatient CMS G-Code Modifier : CL  (04/24/24 1315)      Therapy Time   Individual Concurrent Group Co-treatment   Time In       1020   Time Out       1043   Minutes       23       Co-treatment with PT warranted secondary to decreased safety and independence requiring 2 skilled therapy professionals to address individual discipline's goals. OT addressing preparation for ADL transfer, sitting balance for increased ADL performance, sitting/activity tolerance, functional reaching, environmental safety/scanning, fall prevention, functional mobility for ADL transfers, ability to sequence and follow directions, bed mobility tech, and functional UE strength.      ZINA Li

## 2024-04-24 NOTE — PLAN OF CARE
Problem: Safety - Adult  Goal: Free from fall injury  Outcome: Progressing     Problem: Discharge Planning  Goal: Discharge to home or other facility with appropriate resources  Outcome: Progressing     Problem: Pain  Goal: Verbalizes/displays adequate comfort level or baseline comfort level  Outcome: Progressing  Flowsheets (Taken 4/24/2024 0000)  Verbalizes/displays adequate comfort level or baseline comfort level:   Encourage patient to monitor pain and request assistance   Assess pain using appropriate pain scale     Problem: Skin/Tissue Integrity - Adult  Goal: Incisions, wounds, or drain sites healing without S/S of infection  Outcome: Progressing     Problem: Gastrointestinal - Adult  Goal: Minimal or absence of nausea and vomiting  Outcome: Progressing  Flowsheets (Taken 4/23/2024 2000)  Minimal or absence of nausea and vomiting:   Administer ordered antiemetic medications as needed   Provide nonpharmacologic comfort measures as appropriate  Goal: Maintains or returns to baseline bowel function  Outcome: Progressing  Flowsheets (Taken 4/23/2024 2000)  Maintains or returns to baseline bowel function:   Assess bowel function   Administer ordered medications as needed   Encourage mobilization and activity     Problem: Infection - Adult  Goal: Absence of infection at discharge  Outcome: Progressing  Flowsheets (Taken 4/23/2024 2000)  Absence of infection at discharge:   Assess and monitor for signs and symptoms of infection   Monitor lab/diagnostic results   Monitor all insertion sites i.e., indwelling lines, tubes and drains   Bessie appropriate cooling/warming therapies per order   Administer medications as ordered   Instruct and encourage patient and family to use good hand hygiene technique   Identify and instruct in appropriate isolation precautions for identified infection/condition     Problem: Hematologic - Adult  Goal: Maintains hematologic stability  Outcome: Progressing  Flowsheets (Taken

## 2024-04-24 NOTE — PROGRESS NOTES
Spoke with Dr Traylor regarding random bladder scan 500ml. Patient refuses catheter. Dr Traylor recommends sitting at bedside to urinate or straight cath patient. Will continue to monitor.

## 2024-04-24 NOTE — PROGRESS NOTES
further clinical   concern, MRI is recommended.            Echo 4/5/24    Left Ventricle: Mildly reduced left ventricular systolic function with a visually estimated EF of 45 - 50%. Left ventricle size is normal. Normal wall thickness. Severe hypokinesis of the apex. Grade II diastolic dysfunction with increased LAP.    Aortic Valve: Trileaflet valve. Mildly calcified cusp.    Mitral Valve: Mildly calcified leaflet. Mild regurgitation.    Tricuspid Valve: Moderate regurgitation. Moderately elevated RVSP, consistent with moderate pulmonary hypertension.      Clinical Course : unchanged  Assessment and Plan  :        Severe anemia  S/p PRBC transfusion -2 units on 4/3/2024 and 1 unit on 4/12/2024.  Coumadin and Plavix stopped  No source of bleeding identified on EGD.  Unable to tolerate bowel prep for colonoscopy.  Given hemoglobin has been stable and patient has multiple comorbidities, will recommend deferring colonoscopy to outpatient.  Heparin infusion stopped and patient started on Eliquis and monitor hemoglobin close.   Vascular surgery on board for carotid stenosis and planning endarterectomy after 4 to 6 weeks..  Gastroenterology on board (following distantly) .  Outpatient colonoscopy recommended.  Severe peripheral vascular disease  S/p multiple bypass surgeries.  Follows Dr. Ledbetter at [facility].  Anticoagulation and antiplatelet medication were held due to severe anemia.  Outpatient follow with primary vascular surgery for carotid stenosis and endarterectomy.  Right adrenal and liver lesions  Concerning for metastasis.  Known history of stage I lung cancer in 2019  s/p resection.  Defer biopsy due to multiple comorbidities at this time.  Outpatient follow-up with oncology..  PAF -   Oral Anticoagulation was on  hold due to severe anemia and biopsy.  Hemoglobin stable.  With no evidence of bleeding,   heparin infusion was started and later switched to Eliquis.  Currently on hold for cholecystostomy  tube and thoracentesis.  Cardiology recommendations noted.  Elevated troponin due to tachycardia and demand ischemia.  Unlikely ACS.  Loculated pleural effusion and bilateral pneumonia   Treated with IV antibiotics and pulmonary following.  Sputum culture negative.  No plan for bronc at this time.  Underwent thoracentesis on 4/19/2024 by IR.  Primary Hypertension  - well-controlled.  Acute R watershed MCA/CLARKE infarct with left-sided weakness - on heparin infusion.  Resume aspirin 81 mg daily.  Neurology following.  Treated with Mat-Synephrine for permissive hypertension, increase midodrine patient off , Mat-Synephrine.  Target MAP > 65..   Former Smoker   Quit in 2019.  Acute on chronic combined systolic and diastolic CHF  IV Lasix -   Monitor kidney function.  Moderate pulmonary hypertension   O2   Lasix  -   Severe ICA stenosis. 90 % R and 80 % Left .   No plan for inpatient endarterectomy.  Outpatient follow-up with primary vascular surgery..  Acute acalculous cholecystitis  Poor surgical candidate  S/P cholecystotomy tube placement.  On IV Zosyn.  Stop date 4/26/2024.  Atelectasis left lung  Plan for Bronch tomorrow.   Urinary retention   Straight cath as needed , Flomax. Refuses Dotson Catheter    Patient stable on Flagyl, Omnicef.  Pain control with morphine..  Medication reviewed.  Stop IV morphine.    Discharge planning to skilled nursing facility when okay with all services.    plan to keep Cholecystostomy tube in place for ~ 6 weeks.  Outpatient follow-up with Dr. Dowling    Discussed with wife over the phone       Continue to monitor vitals , Intake / output ,  Cell count , HGB , Kidney function, oxygenation  as indicated .   Plan and updates discussed with patient ,  answers  explained to satisfaction.  Plan discussed with Staff Hattie MA     (Please note that portions of this note were completed with a voice recognition program. Efforts were made to edit the dictations but occasionally words are

## 2024-04-24 NOTE — PROGRESS NOTES
Physical Therapy  Facility/Department: [facility] CVICU  Daily Treatment Note  NAME: Jorge Luis Banda  : 1945  MRN: [id number]    Date of Service: 2024    Discharge Recommendations:  Patient would benefit from continued therapy after discharge    Pt currently functioning below baseline.  Recommend daily inpatient skilled therapy at time of discharge to maximize long term outcomes and prevent re-admission. Please refer to AM-PAC score for current level of function.     Patient Diagnosis(es): The primary encounter diagnosis was Left leg cellulitis. Diagnoses of Anemia, unspecified type, Melena, UMM (acute kidney injury) (HCC), Gastrointestinal hemorrhage, unspecified gastrointestinal hemorrhage type, Atrial fib/flutter, transient (HCC), Embolism and thrombosis of artery (HCC), Claudication (HCC), Right upper quadrant abdominal pain, and Acute cholecystitis were also pertinent to this visit.    Assessment   Assessment: Patient able to tolerated sitting EOB x 10 mins with MIN-MOD x 2 A for bed mobility and CGA seated EOB. Focused on core strengthening and LT UE support and propropception into hand, elbow and shoulder to promoted strengthening and coordination.  Activity Tolerance: Patient tolerated treatment well;Patient limited by pain;Patient limited by endurance     Plan    Physical Therapy Plan  General Plan: 6-7 times per week  Current Treatment Recommendations: Strengthening;Balance training;ROM;Functional mobility training;Endurance training;Neuromuscular re-education;Home exercise program;Safety education & training;Patient/Caregiver education & training;Equipment evaluation, education, & procurement;Therapeutic activities     Restrictions  Restrictions/Precautions  Restrictions/Precautions: Fall Risk, General Precautions, Bedrest with Bathroom Privileges  Required Braces or Orthoses?: No  Position Activity Restriction  Other position/activity restrictions: Telemetry, LUE IV, L sided weakness (s/p R CVA);  strength while writer assisted with proprioception pressure into Lt UE with support at elbow and shoulder.        Safety Devices  Type of Devices: Left in bed;Call light within reach;Nurse notified;Bed alarm in place;Patient at risk for falls  Restraints  Restraints Initially in Place: No       Goals  Short Term Goals  Time Frame for Short Term Goals: 12 visits  Short Term Goal 1: Patient will be indep with bed mobility.  Short Term Goal 2: Patient will verbalize and demonstrate follow through of pressure relief techqniques to maintain good skin integrity  Short Term Goal 3: Patient will demo good- seated balance.  Short Term Goal 4: PT to complete Re-Eval for transfers/gait when appropriate.  Short Term Goal 5: Patient will tolerate 30 minutes of ther-ex and ther-act.  Additional Goals?: Yes  Short Term Goal 6: Patient will improve L LE strength to 4-/5 in order to increase indep with mobility tasks.  Patient Goals   Patient Goals : Return home    Education  Patient Education  Education Given To: Patient  Education Provided: Role of Therapy;Plan of Care  Education Provided Comments: Seated balance and core strengthening  Education Method: Verbal  Barriers to Learning: None  Education Outcome: Verbalized understanding;Continued education needed    -PAC - Mobility    AM-PAC Basic Mobility - Inpatient   How much help is needed turning from your back to your side while in a flat bed without using bedrails?: A Lot  How much help is needed moving from lying on your back to sitting on the side of a flat bed without using bedrails?: A Lot  How much help is needed moving to and from a bed to a chair?: Total  How much help is needed standing up from a chair using your arms?: A Lot  How much help is needed walking in hospital room?: Total  How much help is needed climbing 3-5 steps with a railing?: Total  -PAC Inpatient Mobility Raw Score : 9  AM-PAC Inpatient T-Scale Score : 30.55  Mobility Inpatient CMS 0-100% Score:

## 2024-04-24 NOTE — PLAN OF CARE
Problem: Respiratory - Adult  Goal: Able to breathe comfortably  Description: Able to breathe comfortably  4/24/2024 1923 by Madhav Waite RCP  Outcome: Progressing  4/24/2024 0829 by Katharine Salazar RCP  Outcome: Progressing     Problem: Respiratory - Adult  Goal: Clear lung sounds  4/24/2024 1923 by Madhav Waite RCP  Outcome: Progressing  4/24/2024 0829 by Katharine Salazar RCP  Outcome: Progressing     Problem: Respiratory - Adult  Goal: Adequate oxygenation  Description: Adequate oxygenation  4/24/2024 1923 by Madhav Waite RCP  Outcome: Progressing  4/24/2024 0829 by Katharine Salazar RCP  Outcome: Progressing

## 2024-04-24 NOTE — PROGRESS NOTES
End Of Shift Note  [facility]  Summary of shift: Patient had an uneventful night.  Patient only had 20ml out perc. Drain, bloody drainage.  Urine output 900ml.     Vitals:    Vitals:    04/23/24 2150 04/23/24 2300 04/24/24 0000 04/24/24 0400   BP: (!) 86/46 (!) 102/57 (!) 98/54 (!) 107/53   Pulse: (!) 121 (!) 117 (!) 106 92   Resp:    16   Temp:   97 °F (36.1 °C) 97.5 °F (36.4 °C)   TempSrc:   Temporal Temporal   SpO2: 97% 99% 99% 91%   Weight:       Height:            I&O:   Intake/Output Summary (Last 24 hours) at 4/24/2024 0642  Last data filed at 4/24/2024 0600  Gross per 24 hour   Intake 100 ml   Output 990 ml   Net -890 ml       Resp Status: 2L    Ventilator Settings:     / / /FiO2 : 94 %    Critical Care IV infusions:   sodium chloride      sodium chloride Stopped (04/20/24 0943)    dextrose          LDA:   Peripheral IV 04/11/24 Right Forearm (Active)   Number of days: 12       Biliary Tube 04/19/24 T-tube 8 fr RUQ (Active)   Number of days: 4       Wound 04/04/24 Foot Left;Dorsal dry, scabbed (Active)   Number of days: 19       Wound Foot Left;Lateral (Active)   Number of days:

## 2024-04-24 NOTE — PROGRESS NOTES
Pulmonary Critical Care Progress Note       Patient seen for the follow up of bilateral pleural effusions, hemoptysis     Subjective:  Patient is resting comfortably in bed and remains on 2 L of oxygen continuously. Has poor appetite.  No chest pain or shortness of breath.  Chest vest therapy continues.     Examination:  Vitals: BP (!) 118/57   Pulse 97   Temp 97.7 °F (36.5 °C) (Temporal)   Resp 16   Ht 1.829 m (6')   Wt 73.5 kg (162 lb)   SpO2 93%   BMI 21.97 kg/m²   General appearance: In no acute distress, alert and cooperative with exam cachectic  Neck: No JVD  Lungs: Decreased breath sound no crackles or wheeze  Heart: irregular rate and rhythm, S1, S2 normal, no gallop  Abdomen: Soft, non tender, + BS  Cholecystotomy tube in place bloody output  Extremities: no cyanosis or clubbing. No significant edema, left upper and lower extremity weakness right above-knee amputation left foot dorsal ulcer    LABs:  CBC:   Recent Labs     04/22/24  0556   WBC 8.6   HGB 7.4*   HCT 24.2*          BMP:   Recent Labs     04/22/24  0556      K 3.2*   CO2 28   BUN 13   CREATININE 0.9   LABGLOM 86   GLUCOSE 89       No results for input(s): \"APTT\" in the last 72 hours.     Latest Reference Range & Units 04/11/24 15:56 04/13/24 23:26 04/16/24 04:21   Procalcitonin 0.00 - 0.09 ng/mL 1.95 (H) 1.02 (H) 0.72 (H)   (H): Data is abnormally high   Latest Reference Range & Units 04/15/24 04:34   Pro-BNP <300 pg/mL 16,301 (H)   (H): Data is abnormally high  Radiology:  X-ray chest 4/24/24      X-ray chest 4/23/24      X-ray chest 4/22/24      CT chest abdomen pelvis 4/16/24  1.  CT CHEST: Unchanged presumed bilateral pneumonia: Moderate volume right  pleural effusion and small volume left pleural effusion with areas of  consolidation posteriorly mid and lower lungs bilaterally.  2. Left basilar scarring.  3. 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  4.  Pulmonary sequela typical of that  weakness/Acute right MCA/CLARKE CVA consistent with hypoperfusion on MRI  Monitor mental status  Neurology on consult    Adrenal mass  Oncology on consult  Elective biopsy per IR     Urinary retention  Urology following     Peripheral vascular disease/Severe ICA stenosis. 90 % R and 80 % Left .   Off plavix  Status post R AKA  Vascular on consult CEA electively    Bedside physical therapy  Rehab discharge planning  peptic ulcer disease prophylaxis   DVT prophylaxis on Eliquis    Electronically signed by     Joe Mcneil MD on 4/24/2024 at 3:56 PM  Pulmonary Critical Care and Sleep Medicine,  [facility]  Office: [phone]

## 2024-04-24 NOTE — PROGRESS NOTES
PALLIATIVE CARE PROGRESS NOTE     NAME:  Jorge Luis Banda  MEDICAL RECORD NUMBER:  [id number]  AGE: 78 y.o.   GENDER: male  : 1945  TODAY'S DATE:  2024  Room:     Reason For Consult:  Goals of care evaluation  Distress management  Symptom Management  Guidance and support  Facilitate communications  Assistance in coordinating care  Recommendations for the above    Plan      Palliative Interaction:    I met with patient this afternoon on rounds.  He tells me that he is feeling okay today but he just wants to go home.  He tells me that every day someone tells him that he is stable to go home and then they change their mind and he has to have more testing.  Patient states that they are planning to do bronch tomorrow.  He tells me that \"they plan on putting a tube down his nose.\"  I did verify that patient is on the surgery schedule for tomorrow.  I spoke with him regarding the bronchoscopy and that there is a likelihood that he will be intubated for the procedure.  I did tell the patient that if he requires intubation, it may not be easy to extubate him after the procedure.  Patient again reiterates that he wants to remain a full code.  He became frustrated when CPR was brought up stating \"I just do not understand why you all want to talk to me about dying.\" I explained to him that he is very ill and we just want to clarify what his wishes are moving forward.  He does tell me that in the event that he would require ventilator, he would not want tracheostomy or PEG tube.  He tells me that his significant other will be at the bedside tomorrow morning.  I updated patient that I will attempt an meet with her and him prior to bronchoscopy.  Patient tells me that his pain is well-controlled with current medications.  He denies any current needs at this time.  Palliative care will continue to follow. Dr. Crawford updated regarding patient's wishes.    IMPRESSION/ PLAN  Symptom management/pain control  has a past medical history of Paroxysmal atrial fibrillation (HCC), Peripheral artery disease (HCC), and Primary hypertension.    No family history on file.    Social History     Tobacco Use    Smoking status: Former     Current packs/day: 0.00     Types: Cigarettes     Quit date: 2019     Years since quittin.9    Smokeless tobacco: Never   Vaping Use    Vaping Use: Never used   Substance Use Topics    Alcohol use: Yes     Alcohol/week: 3.0 standard drinks of alcohol     Types: 3 Cans of beer per week     Comment: every once in a while    Drug use: Never     REVIEW OF SYSTEMS  CONSTITUTIONAL: + fatigue, weight loss negative for fevers, chills, sweats  HEENT:  negative for vision, hearing changes, runny nose, throat pain  RESPIRATORY: + shortness of breath, cough negative for congestion, wheezing  CARDIOVASCULAR:  negative for chest pain, palpitations  GASTROINTESTINAL: + loss of appetite, abdominal pain, nausea negative for vomiting, diarrhea, constipation, change in bowel habits  GENITOURINARY:  negative for difficulty of urination, burning with urination, frequency   INTEGUMENT:  negative for rash, skin lesions, easy bruising   HEMATOLOGIC/LYMPHATIC:  negative for swelling/edema   ALLERGIC/IMMUNOLOGIC:  negative for urticaria , itching  ENDOCRINE:  negative increase in drinking, increase in urination, hot or cold intolerance  MUSCULOSKELETAL:  negative joint pains, muscle aches, swelling of joints  NEUROLOGICAL:  negative for headaches, dizziness, lightheadedness, numbness, pain, tingling extremities  BEHAVIOR/PSYCH:  negative for depression, anxiety     PHYSICAL ASSESSMENT:  General Appearance: alert, ill and cachectic appearing, and in no acute distress  Mental status: oriented to person, place, and time  Head: normocephalic, atraumatic  Eye: no icterus, redness, pupils equal and reactive, extraocular eye movements intact, conjunctiva clear  Ear: normal external ear, no discharge, hearing intact  Nose:

## 2024-04-24 NOTE — PROGRESS NOTES
04/23/24 2037   Encounter Summary   Service Provided For: Patient   Referral/Consult From: Other    Support System Significant other   Last Encounter  04/23/24   Complexity of Encounter Moderate   Begin Time 1900   End Time  1915   Total Time Calculated 15 min   Spiritual/Emotional needs   Type Spiritual Support   Palliative Care   Type Palliative Care, Follow-up   Assessment/Intervention/Outcome   Assessment Anxious;Compromised coping;Impaired resilience;Powerlessness;Loneliness   Intervention Active listening;Discussed belief system/Orthodoxy practices/josefina;Explored/Affirmed feelings, thoughts, concerns;Discussed meaning/purpose;Explored Coping Skills/Resources;Nurtured Hope;Sustaining Presence/Ministry of presence   Outcome Comfort;Connection/Belonging;Engaged in conversation;Expressed feelings, needs, and concerns;Receptive

## 2024-04-24 NOTE — PLAN OF CARE
Problem: Respiratory - Adult  Goal: Able to breathe comfortably  Description: Able to breathe comfortably  4/24/2024 0829 by Katharine Salazar RCP  Outcome: Progressing  4/23/2024 2107 by Madhav Waite RCP  Outcome: Progressing  Goal: Clear lung sounds  4/24/2024 0829 by Katharine Salazar RCP  Outcome: Progressing  4/23/2024 2107 by Madhav Waite RCP  Outcome: Progressing  Goal: Adequate oxygenation  Description: Adequate oxygenation  4/24/2024 0829 by Katharine Salazar RCP  Outcome: Progressing  4/23/2024 2107 by Madhav Waite RCP  Outcome: Progressing

## 2024-04-24 NOTE — FLOWSHEET NOTE
04/24/24 0937   Treatment Team Notification   Reason for Communication Evaluate;Review case   Name of Team Member Notified    Treatment Team Role Attending Provider   Method of Communication Face to face   Response In department   Notification Time 0937     MD rounded, chart reviewed.. Ready for discharge today.

## 2024-04-24 NOTE — PLAN OF CARE
Problem: Respiratory - Adult  Goal: Able to breathe comfortably  Description: Able to breathe comfortably  4/23/2024 2107 by Madhav Waite RCP  Outcome: Progressing  4/23/2024 0753 by Jackie Cruz RCP  Outcome: Progressing     Problem: Respiratory - Adult  Goal: Clear lung sounds  4/23/2024 2107 by Madhav Waite, NADJA  Outcome: Progressing  4/23/2024 0753 by Jackie Cruz, NADJA  Outcome: Progressing     Problem: Respiratory - Adult  Goal: Adequate oxygenation  Description: Adequate oxygenation  4/23/2024 2107 by Madhav Waite RCP  Outcome: Progressing  4/23/2024 0753 by Jackie Cruz, NADJA  Outcome: Progressing

## 2024-04-24 NOTE — PROGRESS NOTES
Speech Language Pathology  [facility]    Dysphagia Treatment Note    Date: 4/24/2024  Patient’s Name: Jorge Luis Banda  MRN: [id number]  Diagnosis: dysphagia  Patient Active Problem List   Diagnosis Code    Skin eschar R23.4    Stenosis of left carotid artery I65.22    History of arterial bypass of lower extremity Z95.828    S/P AKA (above knee amputation) unilateral, right ([facility]) Z89.611    Primary hypertension I10    Persistent wound pain R52    Peripheral arterial disease ([facility]) I73.9    Ulcer of left foot ([facility]) L97.529    Neuropathy G62.9    Malignant neoplasm of lung ([facility]) C34.90    Intermittent claudication of left lower extremity due to atherosclerosis ([facility]) I70.212    Frequency of urination R35.0    Former smoker Z87.891    Embolism and thrombosis of artery ([facility]) I74.9    Dyslipidemia E78.5    Closed fracture of left humerus S42.302A    Closed fracture of surgical neck of humerus S42.213A    Acute exacerbation of chronic obstructive airways disease ([facility]) J44.1    Left leg cellulitis L03.116    Arteriosclerosis of coronary artery I25.10    Age-related osteoporosis without current pathological fracture M81.0    Adenocarcinoma of lung ([facility]) C34.90    Wound infection T14.8XXA, L08.9    History of above-knee amputation of both lower extremities ([facility]) Z89.611, Z89.612    Melena K92.1    Iron deficiency anemia due to chronic blood loss D50.0    Hx of cancer of lung Z85.118    Adrenal mass ([facility]) E27.8    Claudication ([facility]) I73.9    Pleural effusion, bilateral J90    Longstanding persistent atrial fibrillation ([facility]) I48.11    Acute ischemic right internal carotid artery (ICA) stroke ([facility]) I63.231    Internal carotid artery stenosis, bilateral I65.23    Anemia D64.9    Gastrointestinal hemorrhage K92.2    NSTEMI (non-ST elevated myocardial infarction) ([facility]) I21.4    Hypotension I95.9    Atrial fibrillation with rapid ventricular response ([facility]) I48.91    Unstable angina ([facility]) I20.0    Right upper  quadrant abdominal pain R10.11    ACP (advance care planning) Z71.89    Palliative care encounter Z51.5    Acute cholecystitis K81.0       Pain: denies    Dysphagia Treatment  Treatment time:[id number]    Subjective: [x] Alert [x] Cooperative     [] Confused     [] Agitated    [] Lethargic    Objective/Assessment:    Pt was recently upgraded by this ST to SB6 solids as pt c/o disliking the food on level 4 puree and RN felt that PO intake would increase if diet was advanced. Pt unfortunately continues to refuse solids despite upgrade. Pt reports he \"just isn't hungry\". RN tells ST pt is drinking fluids but requires encouragement.     Effortful swallows completed 10xs each min cues.     Pt consumed 8 oz of thin liquid (Ensure). Utilized safe swallow strategies independently. No overt s/s of aspiration observed.  Dysphagia education provided. Pt receptive to education.      Plan:  [] Continue ST services    [] Discharge from ST:        Discharge recommendations: []  Further therapy recommended at discharge.The patient should be able to tolerate at least 3 hours of therapy per day over 5 days or 15 hours over 7 days. [x] Further therapy recommended at discharge.   [] No therapy recommended at discharge.       Kinga Lakhani CCC-SLP   Speech-Language Pathologist

## 2024-04-24 NOTE — PROGRESS NOTES
Ry Traylor MD   Urology Progress Note            Subjective: Follow-up urinary retention enlarged prostate    Patient Vitals for the past 24 hrs:   BP Temp Temp src Pulse Resp SpO2   04/24/24 1200 (!) 92/44 97.7 °F (36.5 °C) Temporal 86 21 98 %   04/24/24 0932 (!) 118/57 -- -- 97 -- --   04/24/24 0824 -- -- -- 92 -- 93 %   04/24/24 0800 (!) 118/57 97.8 °F (36.6 °C) Temporal 94 16 95 %   04/24/24 0400 (!) 107/53 97.5 °F (36.4 °C) Temporal 92 16 91 %   04/24/24 0000 (!) 98/54 97 °F (36.1 °C) Temporal (!) 106 -- 99 %   04/23/24 2300 (!) 102/57 -- -- (!) 117 -- 99 %   04/23/24 2150 (!) 86/46 -- -- (!) 121 -- 97 %   04/23/24 2116 -- -- -- (!) 114 -- 94 %   04/23/24 2106 -- -- -- (!) 105 20 99 %   04/23/24 2040 (!) 100/53 -- -- (!) 105 -- 95 %   04/23/24 2000 (!) 89/48 97.5 °F (36.4 °C) Temporal (!) 109 16 96 %   04/23/24 1551 (!) 93/50 97.1 °F (36.2 °C) Temporal (!) 101 18 99 %       Intake/Output Summary (Last 24 hours) at 4/24/2024 1450  Last data filed at 4/24/2024 1443  Gross per 24 hour   Intake 240 ml   Output 950 ml   Net -710 ml       Recent Labs     04/22/24  0556   WBC 8.6   HGB 7.4*   HCT 24.2*   MCV 96.0        Recent Labs     04/22/24  0556      K 3.2*      CO2 28   BUN 13   CREATININE 0.9       No results for input(s): \"COLORU\", \"PHUR\", \"LABCAST\", \"WBCUA\", \"RBCUA\", \"MUCUS\", \"TRICHOMONAS\", \"YEAST\", \"BACTERIA\", \"CLARITYU\", \"SPECGRAV\", \"LEUKOCYTESUR\", \"UROBILINOGEN\", \"BILIRUBINUR\", \"BLOODU\" in the last 72 hours.    Invalid input(s): \"NITRATE\", \"GLUCOSEUKETONESUAMORPHOUS\"    Additional Lab/culture results:    Physical Exam: Patient had minimal output today consequently a bladder scan was performed he had 500 mL residual the patient is very hesitant about having a catheter reinserted    Interval Imaging Findings:    Impression:    Patient Active Problem List   Diagnosis    Skin eschar    Stenosis of left carotid artery    History of arterial bypass of lower  extremity    S/P AKA (above knee amputation) unilateral, right (HCC)    Primary hypertension    Persistent wound pain    Peripheral arterial disease (HCC)    Ulcer of left foot (HCC)    Neuropathy    Malignant neoplasm of lung (HCC)    Intermittent claudication of left lower extremity due to atherosclerosis (HCC)    Frequency of urination    Former smoker    Embolism and thrombosis of artery (HCC)    Dyslipidemia    Closed fracture of left humerus    Closed fracture of surgical neck of humerus    Acute exacerbation of chronic obstructive airways disease (HCC)    Left leg cellulitis    Arteriosclerosis of coronary artery    Age-related osteoporosis without current pathological fracture    Adenocarcinoma of lung (HCC)    Wound infection    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)    Melena    Iron deficiency anemia due to chronic blood loss    Hx of cancer of lung    Adrenal mass (HCC)    Claudication (HCC)    Pleural effusion, bilateral    Longstanding persistent atrial fibrillation (HCC)    Acute ischemic right internal carotid artery (ICA) stroke (HCC)    Internal carotid artery stenosis, bilateral    Anemia    Gastrointestinal hemorrhage    NSTEMI (non-ST elevated myocardial infarction) (HCC)    Hypotension    Atrial fibrillation with rapid ventricular response (HCC)    Unstable angina (HCC)    Right upper quadrant abdominal pain    ACP (advance care planning)    Palliative care encounter    Acute cholecystitis       Plan: Patient will be encouraged to try to void frequently even if it is a small amount and will repeat bladder scan    Ry Traylor MD  2:50 PM 4/24/2024

## 2024-04-25 ENCOUNTER — ANESTHESIA EVENT (OUTPATIENT)
Dept: OPERATING ROOM | Age: 79
End: 2024-04-25
Payer: MEDICARE

## 2024-04-25 ENCOUNTER — ANESTHESIA (OUTPATIENT)
Dept: OPERATING ROOM | Age: 79
End: 2024-04-25
Payer: MEDICARE

## 2024-04-25 LAB
ANION GAP SERPL CALCULATED.3IONS-SCNC: 8 MMOL/L (ref 9–17)
BUN SERPL-MCNC: 13 MG/DL (ref 8–23)
BUN/CREAT SERPL: 13 (ref 9–20)
CALCIUM SERPL-MCNC: 8 MG/DL (ref 8.6–10.4)
CHLORIDE SERPL-SCNC: 99 MMOL/L (ref 98–107)
CO2 SERPL-SCNC: 30 MMOL/L (ref 20–31)
CREAT SERPL-MCNC: 1 MG/DL (ref 0.7–1.2)
ERYTHROCYTE [DISTWIDTH] IN BLOOD BY AUTOMATED COUNT: 18.6 % (ref 11.8–14.4)
ERYTHROCYTE [DISTWIDTH] IN BLOOD BY AUTOMATED COUNT: 19.5 % (ref 11.8–14.4)
GFR SERPL CREATININE-BSD FRML MDRD: 76 ML/MIN/1.73M2
GLUCOSE SERPL-MCNC: 98 MG/DL (ref 70–99)
HCT VFR BLD AUTO: 21.7 % (ref 40.7–50.3)
HCT VFR BLD AUTO: 27 % (ref 40.7–50.3)
HCT VFR BLD AUTO: 27.4 % (ref 40.7–50.3)
HGB BLD-MCNC: 6.9 G/DL (ref 13–17)
HGB BLD-MCNC: 8.3 G/DL (ref 13–17)
HGB BLD-MCNC: 8.6 G/DL (ref 13–17)
INR PPP: 2
MCH RBC QN AUTO: 28.5 PG (ref 25.2–33.5)
MCH RBC QN AUTO: 29.6 PG (ref 25.2–33.5)
MCHC RBC AUTO-ENTMCNC: 30.3 G/DL (ref 28.4–34.8)
MCHC RBC AUTO-ENTMCNC: 31.8 G/DL (ref 28.4–34.8)
MCV RBC AUTO: 93.1 FL (ref 82.6–102.9)
MCV RBC AUTO: 94.2 FL (ref 82.6–102.9)
MICROORGANISM SPEC CULT: NORMAL
MICROORGANISM/AGENT SPEC: NORMAL
NRBC BLD-RTO: 0 PER 100 WBC
NRBC BLD-RTO: 0 PER 100 WBC
PLATELET # BLD AUTO: 363 K/UL (ref 138–453)
PLATELET # BLD AUTO: 367 K/UL (ref 138–453)
PMV BLD AUTO: 9.9 FL (ref 8.1–13.5)
PMV BLD AUTO: 9.9 FL (ref 8.1–13.5)
POTASSIUM SERPL-SCNC: 3 MMOL/L (ref 3.7–5.3)
PROTHROMBIN TIME: 22.6 SEC (ref 11.5–14.2)
RBC # BLD AUTO: 2.33 M/UL (ref 4.21–5.77)
RBC # BLD AUTO: 2.91 M/UL (ref 4.21–5.77)
SODIUM SERPL-SCNC: 137 MMOL/L (ref 135–144)
SPECIMEN DESCRIPTION: NORMAL
WBC OTHER # BLD: 11.1 K/UL (ref 3.5–11.3)
WBC OTHER # BLD: 11.3 K/UL (ref 3.5–11.3)

## 2024-04-25 PROCEDURE — 6370000000 HC RX 637 (ALT 250 FOR IP): Performed by: INTERNAL MEDICINE

## 2024-04-25 PROCEDURE — 2580000003 HC RX 258: Performed by: NURSE PRACTITIONER

## 2024-04-25 PROCEDURE — 2580000003 HC RX 258: Performed by: FAMILY MEDICINE

## 2024-04-25 PROCEDURE — 6360000002 HC RX W HCPCS: Performed by: FAMILY MEDICINE

## 2024-04-25 PROCEDURE — 85610 PROTHROMBIN TIME: CPT

## 2024-04-25 PROCEDURE — 36430 TRANSFUSION BLD/BLD COMPNT: CPT

## 2024-04-25 PROCEDURE — 6360000002 HC RX W HCPCS: Performed by: INTERNAL MEDICINE

## 2024-04-25 PROCEDURE — 86901 BLOOD TYPING SEROLOGIC RH(D): CPT

## 2024-04-25 PROCEDURE — 2700000000 HC OXYGEN THERAPY PER DAY

## 2024-04-25 PROCEDURE — 94640 AIRWAY INHALATION TREATMENT: CPT

## 2024-04-25 PROCEDURE — P9016 RBC LEUKOCYTES REDUCED: HCPCS

## 2024-04-25 PROCEDURE — 36415 COLL VENOUS BLD VENIPUNCTURE: CPT

## 2024-04-25 PROCEDURE — 2000000000 HC ICU R&B

## 2024-04-25 PROCEDURE — 85018 HEMOGLOBIN: CPT

## 2024-04-25 PROCEDURE — 94669 MECHANICAL CHEST WALL OSCILL: CPT

## 2024-04-25 PROCEDURE — 6370000000 HC RX 637 (ALT 250 FOR IP): Performed by: FAMILY MEDICINE

## 2024-04-25 PROCEDURE — 99232 SBSQ HOSP IP/OBS MODERATE 35: CPT | Performed by: FAMILY MEDICINE

## 2024-04-25 PROCEDURE — 2580000003 HC RX 258: Performed by: INTERNAL MEDICINE

## 2024-04-25 PROCEDURE — 86900 BLOOD TYPING SEROLOGIC ABO: CPT

## 2024-04-25 PROCEDURE — 94761 N-INVAS EAR/PLS OXIMETRY MLT: CPT

## 2024-04-25 PROCEDURE — 85027 COMPLETE CBC AUTOMATED: CPT

## 2024-04-25 PROCEDURE — 80048 BASIC METABOLIC PNL TOTAL CA: CPT

## 2024-04-25 PROCEDURE — 86920 COMPATIBILITY TEST SPIN: CPT

## 2024-04-25 PROCEDURE — 85014 HEMATOCRIT: CPT

## 2024-04-25 PROCEDURE — 99232 SBSQ HOSP IP/OBS MODERATE 35: CPT | Performed by: INTERNAL MEDICINE

## 2024-04-25 PROCEDURE — 6360000002 HC RX W HCPCS: Performed by: NURSE PRACTITIONER

## 2024-04-25 PROCEDURE — 99232 SBSQ HOSP IP/OBS MODERATE 35: CPT | Performed by: NURSE PRACTITIONER

## 2024-04-25 PROCEDURE — 86850 RBC ANTIBODY SCREEN: CPT

## 2024-04-25 PROCEDURE — 2500000003 HC RX 250 WO HCPCS: Performed by: FAMILY MEDICINE

## 2024-04-25 RX ORDER — HEPARIN SODIUM 10000 [USP'U]/100ML
5-30 INJECTION, SOLUTION INTRAVENOUS CONTINUOUS
Status: DISCONTINUED | OUTPATIENT
Start: 2024-04-25 | End: 2024-04-26

## 2024-04-25 RX ORDER — 0.9 % SODIUM CHLORIDE 0.9 %
500 INTRAVENOUS SOLUTION INTRAVENOUS ONCE
Status: COMPLETED | OUTPATIENT
Start: 2024-04-25 | End: 2024-04-25

## 2024-04-25 RX ORDER — HEPARIN SODIUM 1000 [USP'U]/ML
2000 INJECTION, SOLUTION INTRAVENOUS; SUBCUTANEOUS PRN
Status: DISCONTINUED | OUTPATIENT
Start: 2024-04-25 | End: 2024-04-26

## 2024-04-25 RX ORDER — SODIUM CHLORIDE 9 MG/ML
INJECTION, SOLUTION INTRAVENOUS PRN
Status: COMPLETED | OUTPATIENT
Start: 2024-04-25 | End: 2024-04-25

## 2024-04-25 RX ORDER — ACETYLCYSTEINE 200 MG/ML
400 SOLUTION ORAL; RESPIRATORY (INHALATION)
Status: DISPENSED | OUTPATIENT
Start: 2024-04-25 | End: 2024-04-28

## 2024-04-25 RX ORDER — HEPARIN SODIUM 1000 [USP'U]/ML
4000 INJECTION, SOLUTION INTRAVENOUS; SUBCUTANEOUS ONCE
Status: COMPLETED | OUTPATIENT
Start: 2024-04-25 | End: 2024-04-25

## 2024-04-25 RX ORDER — HEPARIN SODIUM 1000 [USP'U]/ML
4000 INJECTION, SOLUTION INTRAVENOUS; SUBCUTANEOUS PRN
Status: DISCONTINUED | OUTPATIENT
Start: 2024-04-25 | End: 2024-04-26

## 2024-04-25 RX ADMIN — METRONIDAZOLE 500 MG: 500 TABLET ORAL at 22:05

## 2024-04-25 RX ADMIN — ACETYLCYSTEINE 400 MG: 200 SOLUTION ORAL; RESPIRATORY (INHALATION) at 15:33

## 2024-04-25 RX ADMIN — GUAIFENESIN 600 MG: 600 TABLET ORAL at 08:14

## 2024-04-25 RX ADMIN — PANTOPRAZOLE SODIUM 40 MG: 40 TABLET, DELAYED RELEASE ORAL at 08:14

## 2024-04-25 RX ADMIN — ACETYLCYSTEINE 400 MG: 200 SOLUTION ORAL; RESPIRATORY (INHALATION) at 07:32

## 2024-04-25 RX ADMIN — TAMSULOSIN HYDROCHLORIDE 0.4 MG: 0.4 CAPSULE ORAL at 08:14

## 2024-04-25 RX ADMIN — OXYCODONE AND ACETAMINOPHEN 1 TABLET: 5; 325 TABLET ORAL at 12:05

## 2024-04-25 RX ADMIN — LEVALBUTEROL 1.25 MG: 1.25 SOLUTION, CONCENTRATE RESPIRATORY (INHALATION) at 07:31

## 2024-04-25 RX ADMIN — PANTOPRAZOLE SODIUM 40 MG: 40 TABLET, DELAYED RELEASE ORAL at 16:47

## 2024-04-25 RX ADMIN — POTASSIUM CHLORIDE 10 MEQ: 7.46 INJECTION, SOLUTION INTRAVENOUS at 14:48

## 2024-04-25 RX ADMIN — HEPARIN SODIUM 12 UNITS/KG/HR: 10000 INJECTION, SOLUTION INTRAVENOUS at 18:02

## 2024-04-25 RX ADMIN — ASPIRIN 81 MG: 81 TABLET, COATED ORAL at 08:14

## 2024-04-25 RX ADMIN — SODIUM CHLORIDE, PRESERVATIVE FREE 10 ML: 5 INJECTION INTRAVENOUS at 08:15

## 2024-04-25 RX ADMIN — METRONIDAZOLE 500 MG: 500 TABLET ORAL at 06:19

## 2024-04-25 RX ADMIN — POTASSIUM CHLORIDE 10 MEQ: 7.46 INJECTION, SOLUTION INTRAVENOUS at 11:47

## 2024-04-25 RX ADMIN — ATORVASTATIN CALCIUM 40 MG: 40 TABLET, FILM COATED ORAL at 20:50

## 2024-04-25 RX ADMIN — GUAIFENESIN 600 MG: 600 TABLET ORAL at 20:50

## 2024-04-25 RX ADMIN — HEPARIN SODIUM 4000 UNITS: 1000 INJECTION INTRAVENOUS; SUBCUTANEOUS at 17:57

## 2024-04-25 RX ADMIN — METOPROLOL TARTRATE 5 MG: 5 INJECTION INTRAVENOUS at 08:05

## 2024-04-25 RX ADMIN — MIDODRINE HYDROCHLORIDE 5 MG: 5 TABLET ORAL at 11:14

## 2024-04-25 RX ADMIN — LEVALBUTEROL 1.25 MG: 1.25 SOLUTION, CONCENTRATE RESPIRATORY (INHALATION) at 15:28

## 2024-04-25 RX ADMIN — FUROSEMIDE 40 MG: 40 TABLET ORAL at 08:13

## 2024-04-25 RX ADMIN — MIDODRINE HYDROCHLORIDE 5 MG: 5 TABLET ORAL at 16:47

## 2024-04-25 RX ADMIN — POTASSIUM CHLORIDE 10 MEQ: 7.46 INJECTION, SOLUTION INTRAVENOUS at 12:48

## 2024-04-25 RX ADMIN — CEFDINIR 300 MG: 300 CAPSULE ORAL at 08:13

## 2024-04-25 RX ADMIN — CYANOCOBALAMIN TAB 1000 MCG 1000 MCG: 1000 TAB at 08:14

## 2024-04-25 RX ADMIN — METRONIDAZOLE 500 MG: 500 TABLET ORAL at 13:51

## 2024-04-25 RX ADMIN — POTASSIUM CHLORIDE 10 MEQ: 7.46 INJECTION, SOLUTION INTRAVENOUS at 13:50

## 2024-04-25 RX ADMIN — MIRTAZAPINE 30 MG: 15 TABLET, ORALLY DISINTEGRATING ORAL at 20:50

## 2024-04-25 RX ADMIN — POTASSIUM CHLORIDE 10 MEQ: 7.46 INJECTION, SOLUTION INTRAVENOUS at 10:40

## 2024-04-25 RX ADMIN — SODIUM CHLORIDE: 9 INJECTION, SOLUTION INTRAVENOUS at 09:09

## 2024-04-25 RX ADMIN — MIDODRINE HYDROCHLORIDE 5 MG: 5 TABLET ORAL at 08:32

## 2024-04-25 RX ADMIN — EMPAGLIFLOZIN 10 MG: 10 TABLET, FILM COATED ORAL at 08:14

## 2024-04-25 RX ADMIN — ACETYLCYSTEINE 400 MG: 200 SOLUTION ORAL; RESPIRATORY (INHALATION) at 21:14

## 2024-04-25 RX ADMIN — SODIUM CHLORIDE 500 ML: 9 INJECTION, SOLUTION INTRAVENOUS at 13:46

## 2024-04-25 RX ADMIN — CEFDINIR 300 MG: 300 CAPSULE ORAL at 20:50

## 2024-04-25 RX ADMIN — POTASSIUM CHLORIDE 10 MEQ: 7.46 INJECTION, SOLUTION INTRAVENOUS at 16:29

## 2024-04-25 RX ADMIN — FERROUS SULFATE TAB EC 325 MG (65 MG FE EQUIVALENT) 325 MG: 325 (65 FE) TABLET DELAYED RESPONSE at 08:14

## 2024-04-25 RX ADMIN — CHOLECALCIFEROL TAB 125 MCG (5000 UNIT) 5000 UNITS: 125 TAB at 08:14

## 2024-04-25 RX ADMIN — LEVALBUTEROL 1.25 MG: 1.25 SOLUTION, CONCENTRATE RESPIRATORY (INHALATION) at 21:13

## 2024-04-25 ASSESSMENT — PAIN SCALES - GENERAL
PAINLEVEL_OUTOF10: 8
PAINLEVEL_OUTOF10: 0

## 2024-04-25 ASSESSMENT — PAIN SCALES - WONG BAKER

## 2024-04-25 ASSESSMENT — PAIN DESCRIPTION - LOCATION: LOCATION: ABDOMEN;INCISION

## 2024-04-25 ASSESSMENT — PAIN DESCRIPTION - ORIENTATION: ORIENTATION: RIGHT

## 2024-04-25 NOTE — PLAN OF CARE
Problem: Respiratory - Adult  Goal: Able to breathe comfortably  Description: Able to breathe comfortably  4/25/2024 0738 by Jackie Cruz RCP  Outcome: Progressing  4/24/2024 1923 by Madhav Waite RCP  Outcome: Progressing  Goal: Clear lung sounds  4/25/2024 0738 by Jackie Cruz RCP  Outcome: Progressing  4/24/2024 1923 by Madhav Waite RCP  Outcome: Progressing  Goal: Adequate oxygenation  Description: Adequate oxygenation  4/25/2024 0738 by Jackie Cruz RCP  Outcome: Progressing  4/24/2024 1923 by Madhav Waite RCP  Outcome: Progressing

## 2024-04-25 NOTE — PROGRESS NOTES
VASCULAR SURGERY  PROGRESS NOTE      4/25/2024 5:45 PM  Subjective:   Admit Date: 4/3/2024  PCP: Lucila Machado APRN - [facility]    Chief Complaint   Patient presents with    Leg Pain     Left leg, open wounds and swollen       Interval History: Events noted.  Transfused 1 unit packed red blood cells for hemoglobin of 6.9.  Plans for bronchoscopy tomorrow.    Diet: ADULT DIET; Dysphagia - Soft and Bite Sized; No Drinking Straws  ADULT ORAL NUTRITION SUPPLEMENT; Breakfast, Dinner; Standard High Calorie/High Protein Oral Supplement  Diet NPO Exceptions are: Sips of Water with Meds    Medications:   Scheduled Meds:   acetylcysteine  400 mg Inhalation TID RT    heparin (porcine)  4,000 Units IntraVENous Once    levalbuterol  1.25 mg Nebulization TID    cefdinir  300 mg Oral 2 times per day    metroNIDAZOLE  500 mg Oral 3 times per day    mirtazapine  30 mg Oral Nightly    midodrine  5 mg Oral TID WC    furosemide  40 mg Oral Daily    sodium chloride  80 mL IntraVENous Once    [Held by provider] apixaban  5 mg Oral BID    pantoprazole  40 mg Oral BID AC    empagliflozin  10 mg Oral Daily    sodium chloride  80 mL IntraVENous Once    metoprolol tartrate  25 mg Oral BID    aspirin  81 mg Oral Daily    sodium chloride  100 mL IntraVENous Once    guaiFENesin  600 mg Oral BID    ferrous sulfate  325 mg Oral Daily with breakfast    atorvastatin  40 mg Oral Nightly    vitamin D3  5,000 Units Oral Daily    vitamin B-12  1,000 mcg Oral Daily    tamsulosin  0.4 mg Oral Daily    sodium chloride flush  5-40 mL IntraVENous 2 times per day     Continuous Infusions:   heparin (PORCINE) Infusion      sodium chloride      sodium chloride Stopped (04/20/24 0943)    dextrose           Labs:   CBC:   Recent Labs     04/25/24  0627 04/25/24  1256   WBC 11.3  --    HGB 6.9* 8.6*     --        BMP:    Recent Labs     04/25/24  0627      K 3.0*   CL 99   CO2 30   BUN 13   CREATININE 1.0   GLUCOSE 98       Hepatic:   No results  neoplasm of lung (HCC)     Intermittent claudication of left lower extremity due to atherosclerosis (HCC)     Frequency of urination     Former smoker     Embolism and thrombosis of artery (HCC)     Dyslipidemia     Closed fracture of left humerus     Closed fracture of surgical neck of humerus     Acute exacerbation of chronic obstructive airways disease (HCC)     Left leg cellulitis     Arteriosclerosis of coronary artery     Age-related osteoporosis without current pathological fracture     Adenocarcinoma of lung (HCC)     Wound infection     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)     Melena     Iron deficiency anemia due to chronic blood loss     Hx of cancer of lung     Adrenal mass (HCC)     Claudication (HCC)     Pleural effusion, bilateral     Longstanding persistent atrial fibrillation (HCC)     Acute ischemic right internal carotid artery (ICA) stroke (HCC)     Internal carotid artery stenosis, bilateral     Anemia     Gastrointestinal hemorrhage     NSTEMI (non-ST elevated myocardial infarction) (HCC)     Hypotension     Atrial fibrillation with rapid ventricular response (HCC)     Unstable angina (HCC)      Plan:   Continue supportive care  Continue physical therapy  Continue cholecystostomy tube  Continue IV heparin  For bronchoscopy tomorrow  Palliative care following    Electronically signed by Sonny Alcantar MD on 4/25/2024 at 5:45 PM

## 2024-04-25 NOTE — PROGRESS NOTES
Ry Traylor MD   Urology Progress Note            Subjective: Follow-up with intermittent urinary retention    Patient Vitals for the past 24 hrs:   BP Temp Temp src Pulse Resp SpO2 Weight   04/25/24 0400 -- 97.3 °F (36.3 °C) Temporal -- -- -- 74.2 kg (163 lb 8 oz)   04/25/24 0000 -- 97.5 °F (36.4 °C) Temporal -- -- -- --   04/24/24 2202 (!) 117/57 -- -- (!) 101 -- -- --   04/24/24 2000 -- 97.3 °F (36.3 °C) Temporal -- -- -- --   04/24/24 1922 -- -- -- (!) 102 24 95 % --   04/24/24 1921 -- -- -- 97 19 90 % --   04/24/24 1600 (!) 118/59 98 °F (36.7 °C) Temporal 97 19 96 % --   04/24/24 1458 -- -- -- 92 19 97 % --   04/24/24 1200 (!) 92/44 97.7 °F (36.5 °C) Temporal 86 21 98 % --   04/24/24 0932 (!) 118/57 -- -- 97 -- -- --   04/24/24 0824 -- -- -- 92 -- 93 % --   04/24/24 0800 (!) 118/57 97.8 °F (36.6 °C) Temporal 94 16 95 % --       Intake/Output Summary (Last 24 hours) at 4/25/2024 0644  Last data filed at 4/24/2024 1556  Gross per 24 hour   Intake 240 ml   Output 750 ml   Net -510 ml       No results for input(s): \"WBC\", \"HGB\", \"HCT\", \"MCV\", \"PLT\" in the last 72 hours.  No results for input(s): \"NA\", \"K\", \"CL\", \"CO2\", \"PHOS\", \"BUN\", \"CREATININE\", \"CA\" in the last 72 hours.    No results for input(s): \"COLORU\", \"PHUR\", \"LABCAST\", \"WBCUA\", \"RBCUA\", \"MUCUS\", \"TRICHOMONAS\", \"YEAST\", \"BACTERIA\", \"CLARITYU\", \"SPECGRAV\", \"LEUKOCYTESUR\", \"UROBILINOGEN\", \"BILIRUBINUR\", \"BLOODU\" in the last 72 hours.    Invalid input(s): \"NITRATE\", \"GLUCOSEUKETONESUAMORPHOUS\"    Additional Lab/culture results:    Physical Exam: Patient voiding infrequently the patient requires reminders, he voided 700 mL at a time when he was encouraged to void    We will avoid inserting a Dotson at this time  Interval Imaging Findings:    Impression:    Patient Active Problem List   Diagnosis    Skin eschar    Stenosis of left carotid artery    History of arterial bypass of lower extremity    S/P AKA (above knee amputation)

## 2024-04-25 NOTE — PROGRESS NOTES
[facility]  Progress Note    Jorge Luis Banda  MRN: [id number]  Date: 4/25/2024  LOS: 22     Reason for F/U :   Cholecystitis    Impression :   Acute cholecystitis  Status post percutaneous cholecystostomy tube placement 4/19/2024  History of lung cancer, stage I, status post resection in 2019 and concerning findings of liver and adrenal metastasis  Acute CVA with left-sided weakness and has flow-limiting critical right ICA stenosis 90%, bilateral proximal ICA are atherosclerosis with flow limitation left 80% and right 90%  Severe peripheral arterial disease  Small left-sided pleural effusion  Longstanding persistent atrial fibrillation with rapid ventricular response  Acute GI bleed, unclear source  Neurogenic bladder dysfunction with urinary retention and refusing indwelling Dotson catheter  Elevated troponin, consistent with type II non-ST elevation myocardial infarction, secondary to acute anemia  Acute diastolic congestive heart failure    Recommendations:   The patient received Unasyn from 4/3/2024 to 4/18/2024 [5 days], levofloxacin 411 and 3 days of azithromycin from 4/11/2024 through 4/13/2024  The patient received Zosyn 4/11/2024 through 4/21/2024 [11 days of therapy]  Patient was transitioned to oral antimicrobial therapy on 4/21/2024 with cefdinir and metronidazole with plans to continue through 4/26/2024  The pulmonary team does plan to perform bronchoscopy tomorrow for mucous plugging  Continue supportive care    Infection Control Recommendations:   Universal precautions    Discharge Planning:     Patient will need Midline Catheter Insertion/ PICC line Insertion: No  Patient will need: Home IV , Infusion Center,  SNF,  LTAC: Undetermined  Patient willneed outpatient wound care: No    Medical Decision making / Summary of Stay:   Jorge Luis Banda is a 78 y.o.-year-old male who was initially admitted on 4/3/2024.   The patient is being seen in consultation on HOSPITAL DAY #16 for  duration of antibiotics given cholecystitis at the request of Dr. Fierro.     Jorge Luis has a history of stage I lung cancer diagnosed in 2019 status post partial lobe resection and is in remission did not require any chemotherapy, peripheral arterial disease leading to right above-the-knee amputation and axillobifemoral bypass.     The patient reported multiple wounds in the left lower extremity for which she was seen at the wound care center and have been poorly healing for a month.  He was admitted with worsening left lower leg redness, pain and swelling and reported some drainage from the wounds.  The patient was admitted for left lower extremity cellulitis and was started on antimicrobial therapy with Unasyn     The patient was noted to have anemia with a hemoglobin of 6.7 and was reporting some dark stools for a month.  The patient has been seen by the GI service and has a history of partial gastrectomy.  He was started on a Protonix drip and did require 2 units of PRBC transfusion.  His INR was 4.8 on admission and Coumadin was held     The patient was seen by vascular surgery who recommended restarting Coumadin and Plavix once cleared by the GI service and outpatient follow-up with his vascular surgeon Dr. Ledbetter     The patient was seen by the urology service for urinary retention and did have some elevated creatinine.  A Dotson catheter was placed 4/6/2024     The patient had an episode of A-fib with RVR     The patient did undergo EGD 4/8/2024 and the esophagus was normal, stomach had small sessile polyp in the body which was biopsied, there was surgically altered anatomy, no bleeding stigmata noted, moderate amount of clumped food in the proximal body, shallow medium sized polyp in the proximal small bowel which was biopsied.  The patient was recommended to get a colonoscopy but the patient could only tolerate 1 to 2 glasses of prep and was refusing as he stated he cannot and will not do colon prep.  He

## 2024-04-25 NOTE — CARE COORDINATION
Social Work-Spoke with Monica. They are reviewing to see if they can still accept patient. If they are able to accept, they will begin new precert. Max

## 2024-04-25 NOTE — PROGRESS NOTES
Occupational Therapy  [facility]  Occupational Therapy Not Seen    DATE: 2024    NAME: Jorge Luis Banda  MRN: [id number]   : 1945    Patient not seen this date for Occupational Therapy due to:      [] Cancel by RN or physician due to:    [] Hemodialysis    [x] Critical Lab Value Level: HGB 6.9 receiving blood transfusion    [] Blood transfusion in progress    [] Acute or unstable cardiovascular status   _MAP < 55 or more than >115  _HR < 40 or > 130    [] Acute or unstable pulmonary status   -FiO2 > 60%   _RR < 5 or >40    _O2 sats < 85%    [] Strict Bedrest    [] Off Unit for surgery or procedure    [] Off Unit for testing       [] Pending imaging to R/O fracture    [] Refusal by Patient      [] Other      [] OT being discontinued at this time. Patient independent. No further needs.     [] OT being discontinued at this time as the patient has been transferred to hospice care. No further needs.      Gertrudis Wetzel ZINA

## 2024-04-25 NOTE — CARE COORDINATION
Social Work-Patient is scheduled for bronch tomorrow. He will need a new precert. Left message for Monica. Yeison

## 2024-04-25 NOTE — PROGRESS NOTES
Message sent to IM about cardiology consult and what to do about restarting anticoagulation. No response

## 2024-04-25 NOTE — PROGRESS NOTES
[facility]  Office: [phone]  Chinmay Ramos DO, Jose Atkinson DO, Guevara Ramirez DO, Pranay Cooper, DO, Sumeet Eller MD, Karen Carvajal MD, Sony Crawford MD, Poonam Platt MD,  Stephan San MD, Marcello Daniels MD, Theodore Pascual MD,  Juve Mahmood DO, Matias Mckeon MD, Hero Tavarez MD, Valentin Ramos DO, Jazzy Spence MD,  Trino Handley DO, Flavia Leonardo MD, Vanessa Soriano MD, Pricilla Akers MD, Casimiro Knight MD,  Zeke Maciel MD, Guera Cervantes MD, Kianna Martin MD, Mirlande Worrell MD, Ney Garcia MD, Savannah Dominguez MD, Ed Emerson DO, Jaylen West DO, Comfort Carcamo MD,  Siva Ashley MD, Shirley Waterhouse, CNP,  Carolyn Pelaez CNP, Scotty Ge, CNP,  Wandy Trivedi, DNP, Malissa Gaston, CNP, Lana Penaloza, CNP, Susan Haro, CNP, Sherrell Mendoza, CNP, Suzy Morales, PA-C, Dalila Lange PA-C, Sada Matt, CNP, Crystal Womack, CNP, Sheng Reddy, CNP, Arielle Guy, CNP, Linsey Preez, CNP, Anju Cho, CNS, Hoa Donahue, CNP, Cassidy Ferrer CNP, Tracy Schwab, CNP       [facility]      Daily Progress Note     Admit Date: 4/3/2024  Bed/Room No.  2031/2031-01  Admitting Physician : Sony Crawford MD  Code Status :Full Code  Hospital Day:  LOS: 22 days   Chief Complaint:     Chief Complaint   Patient presents with    Leg Pain     Left leg, open wounds and swollen       Principal Problem:    Melena  Active Problems:    History of arterial bypass of lower extremity    S/P AKA (above knee amputation) unilateral, right (HCC)    Primary hypertension    Peripheral arterial disease (HCC)    Neuropathy    Malignant neoplasm of lung (HCC)    Intermittent claudication of left lower extremity due to atherosclerosis (HCC)    Embolism and thrombosis of artery (HCC)    Left leg cellulitis    Iron deficiency anemia due to chronic blood loss    Hx of cancer of lung    Adrenal mass (HCC)    Claudication (HCC)    Pleural effusion, bilateral     infection and pain. Fluid resuscitation.   Loculated pleural effusion and bilateral pneumonia   Treated with IV antibiotics and pulmonary following.  Sputum culture negative.  Underwent thoracentesis on 4/19/2024 by IR. Pulm planning Bronch likely on 4/26/24.  Primary Hypertension  - well-controlled.  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  Continue  aspirin 81 mg daily.  Evaluated by neurology.   Treated with Mat-Synephrine for permissive hypertension,continue midodrine.    Former Smoker   Quit in 2019.  Acute on chronic combined systolic and diastolic CHF  Oral Lasix -   Monitor kidney function.  Moderate pulmonary hypertension   O2   Lasix  -   Severe ICA stenosis. 90 % R and 80 % Left .   No plan for inpatient endarterectomy.  Outpatient follow-up with primary vascular surgery..  Acute acalculous cholecystitis  Poor surgical candidate  S/P cholecystotomy tube placement.  On IV Zosyn.  Stop date 4/26/2024.  Atelectasis left lung  Plan for Bronch tomorrow.   Urinary retention   Straight cath as needed , Flomax. Refuses Dotson Catheter    Eliquis on hold for possible bronch - procedure cancelled today due to anemia and hypotension and sinus tachy      plan to keep Cholecystostomy tube in place for ~ 6 weeks.  Outpatient follow-up with Dr. Dowling    No family at bedside,       Continue to monitor vitals , Intake / output ,  Cell count , HGB , Kidney function, oxygenation  as indicated .   Plan and updates discussed with patient ,  answers  explained to satisfaction.  Plan discussed with Staff Gianluca MA     (Please note that portions of this note were completed with a voice recognition program. Efforts were made to edit the dictations but occasionally words are mis-transcribed.)      Sony Crawford MD  4/25/2024

## 2024-04-25 NOTE — PROGRESS NOTES
End Of Shift Note  [facility]  Summary of shift: Patient had an uneventful shift tonight. He remained hemodynamically stable. Plan is for Bronchoscopy today at 1100. Eliquis has been on hold since last night and this AM dose is also to be held. Plan is to discharge to [facility]. Precert is effective until tomorrow.    Vitals:    Vitals:    04/24/24 2000 04/24/24 2202 04/25/24 0000 04/25/24 0400   BP:  (!) 117/57     Pulse:  (!) 101     Resp:       Temp: 97.3 °F (36.3 °C)  97.5 °F (36.4 °C) 97.3 °F (36.3 °C)   TempSrc: Temporal  Temporal Temporal   SpO2:       Weight:    74.2 kg (163 lb 8 oz)   Height:            I&O:   Intake/Output Summary (Last 24 hours) at 4/25/2024 0627  Last data filed at 4/24/2024 1556  Gross per 24 hour   Intake 240 ml   Output 750 ml   Net -510 ml       Resp Status: 3L NC    Ventilator Settings:     / / /FiO2 : 94 %    Critical Care IV infusions:   sodium chloride      sodium chloride Stopped (04/20/24 0943)    dextrose          LDA:   Peripheral IV 04/24/24 Distal;Left Forearm (Active)   Number of days: 0       Biliary Tube 04/19/24 T-tube 8 fr RUQ (Active)   Number of days: 5       Wound 04/04/24 Foot Left;Dorsal dry, scabbed (Active)   Number of days: 20       Wound Foot Left;Lateral (Active)   Number of days:

## 2024-04-25 NOTE — PROGRESS NOTES
PALLIATIVE CARE PROGRESS NOTE     NAME:  Jorge Luis Banda  MEDICAL RECORD NUMBER:  [id number]  AGE: 78 y.o.   GENDER: male  : 1945  TODAY'S DATE:  2024  Room:     Reason For Consult:  Goals of care evaluation  Distress management  Symptom Management  Guidance and support  Facilitate communications  Assistance in coordinating care  Recommendations for the above    Plan      Palliative Interaction:    I met with patient this morning on rounds.  Dr. Crawford at bedside.  Patient states pain is controlled \"when he does not move.\"He continues to have poor appetite and has not eaten much.  Plan is for bronchoscopy today with pulmonary.  He does again tell me that he would not want long-term tracheostomy if it were needed but he again wants me to speak with his significant other Rita.      Patient was receiving 1 unit of PRBC with elevated heart rate while I was in the room.  Patient did appear fatigued.  Patient states his breathing feels fine.  Per RN at the bedside, patient's oxygen demand is increased this morning.  I updated patient that I would reach out to Rita prior to his procedure.    I spoke with Rita on the phone and updated her regarding upcoming procedure.  I did update her that his heart rate is elevated and he required 1 unit of blood this morning.  She states that she is surprised because yesterday things were going well and plan was for discharge.  She tells me that she is surprised that there has been such a decline.  I did update her regarding my conversation with the patient that he would not want long-term tracheostomy.  She is in agreement and tells me that he would not want to live stuck in the bed like this.  She is planning to be at the bedside today.  All questions and concerns addressed.    IMPRESSION/ PLAN  Symptom management/pain control    We feel the patient's symptoms are being controlled. Their current regimen has been reviewed by myself and discussed with the

## 2024-04-25 NOTE — PROGRESS NOTES
bilaterally.  2. Left basilar scarring.  3. 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  4.  Pulmonary sequela typical of that seen with smoking, including COPD.  5. Acute esophagitis.  6. Prominent esophageal ampulla versus small hiatal hernia.  7. Gastroesophageal reflux versus incomplete esophageal emptying.  8. Extensive calcific atherosclerosis coronary arteries.  9. CT ABDOMEN/PELVIS: Unchanged gallbladder hydrops and cholelithiasis with  findings of acute cholecystitis.  10. Stable right adrenal mass infiltrating into adjacent structures including  the right kidney, liver and inferior vena cava.  11. Probable reactive colitis at the hepatic flexure.      Chest x-ray 4/14  Mildly progressive bilateral infiltrates when compared to the prior exam       X-ray chest 4/13/24      Impression & Recommendations:  Acute hypoxic respiratory insufficiency  Supplemental oxygen as needed to maintain oxygen saturation >90%  Incentive spirometer q1h while awake      Bilateral loculated pleural effusions/atelectasis status post thoracentesis  Off diuresis  Status post right thoracentesis 4/19/24 with 750 mL removed  Check pleural fluid cytology and cultures  Lasix 20 mg daily  Aggressive pulmonary toilet/pulmonary hygiene  Chest vest percussion therapy 3 times a day  Xopenex  Mucomyst aerosol treatment  Mucus plugging not improving, plan for bronchoscopy today.  Discussed with patient detail about the procedure and questions answered.  Patient is okay to proceed with a bronchoscopy    Hypotension requiring pressor/increased cortisol level suspect following hydrocortisone dose  Monitor blood pressure off Mat-Synephrine   Midodrine  Off hydrocortisone    Acute cholecystitis/acute anemia/Hemoptysis  Hemoptysis  resolved  Monitor hemoglobin hematocrit  Type and crossmatch and transfuse 1 unit PRBC  Status post EGD with negative findings  Plan for elective colonoscopy  outpatient  Cholecystotomy tube   monitor output /  Zosyn  resumed per ID plan till 4/26 course  Hematology oncology following    Paroxysmal A-fib    Cardiology on consult   Eliquis    history of stage 1 lung cancer   5 years ago with lung resection  No adjuvant therapy     Acute CVA with left-sided weakness/Acute right MCA/CLARKE CVA consistent with hypoperfusion on MRI  Monitor mental status  Neurology on consult    Adrenal mass  Oncology on consult  Elective biopsy per IR     Urinary retention  Urology following     Peripheral vascular disease/Severe ICA stenosis. 90 % R and 80 % Left .   Off plavix  Status post R AKA  Vascular on consult CEA electively    Bedside physical therapy  Rehab discharge planning  peptic ulcer disease prophylaxis   DVT prophylaxis on Eliquis, currently on hold for bronchoscopy    Electronically signed by     Joe Mcneil MD on 4/25/2024 at 10:39 AM  Pulmonary Critical Care and Sleep Medicine,  [facility]  Office: [phone]  Addendum:  Discussed with anesthesia secondary patient's sick condition we will cancel the bronchoscopy today.  Patient is scheduled for tomorrow at 12:45 PM.  Depending upon patient's overall clinical status.  I will consult cardiology.  Electronically signed by   Joe Mcneil MD on 4/25/2024 at 12:33 PM  Pulmonary Critical Care and Sleep Medicine,  [facility]  Office: [phone]  CC: 35 minutes

## 2024-04-25 NOTE — FLOWSHEET NOTE
04/25/24 0935   Vitals   /60   Temp 97.3 °F (36.3 °C)   Pulse (!) 131   Respirations 17   SpO2 99 %   Transfuse RBC   Patient Observed Initial 15 Min  Yes   Neurological   Level of Consciousness 0     Blood hit vein at 0920. Pt shows no sign of distress or reaction observed.

## 2024-04-25 NOTE — PLAN OF CARE
Problem: Safety - Adult  Goal: Free from fall injury  4/24/2024 2243 by Rola Myers, RN  Outcome: Progressing  Flowsheets (Taken 4/24/2024 1632 by Hattie Loya, RN)  Free From Fall Injury:   Instruct family/caregiver on patient safety   Based on caregiver fall risk screen, instruct family/caregiver to ask for assistance with transferring infant if caregiver noted to have fall risk factors  4/24/2024 1622 by Hattie Loya RN  Outcome: Progressing     Problem: Discharge Planning  Goal: Discharge to home or other facility with appropriate resources  4/24/2024 2243 by Rola Myers RN  Outcome: Progressing  Flowsheets (Taken 4/24/2024 2000)  Discharge to home or other facility with appropriate resources:   Identify barriers to discharge with patient and caregiver   Arrange for needed discharge resources and transportation as appropriate   Identify discharge learning needs (meds, wound care, etc)   Refer to discharge planning if patient needs post-hospital services based on physician order or complex needs related to functional status, cognitive ability or social support system  4/24/2024 1622 by Hattie Loya RN  Outcome: Progressing  Flowsheets (Taken 4/24/2024 0800)  Discharge to home or other facility with appropriate resources:   Identify barriers to discharge with patient and caregiver   Arrange for needed discharge resources and transportation as appropriate     Problem: Pain  Goal: Verbalizes/displays adequate comfort level or baseline comfort level  4/24/2024 2243 by Rola Myers RN  Outcome: Progressing  Flowsheets (Taken 4/24/2024 2000)  Verbalizes/displays adequate comfort level or baseline comfort level:   Encourage patient to monitor pain and request assistance   Assess pain using appropriate pain scale   Administer analgesics based on type and severity of pain and evaluate response  4/24/2024 1622 by Hattie Loya RN  Outcome: Progressing     Problem: Skin/Tissue Integrity - Adult  Goal:  patient’s ability to void and empty bladder     Problem: Nutrition Deficit:  Goal: Optimize nutritional status  4/24/2024 2243 by Rola Myers RN  Outcome: Progressing  4/24/2024 1622 by Hattie Loya RN  Outcome: Progressing     Problem: Chronic Conditions and Co-morbidities  Goal: Patient's chronic conditions and co-morbidity symptoms are monitored and maintained or improved  4/24/2024 2243 by Rola Myers RN  Outcome: Progressing  Flowsheets (Taken 4/24/2024 2000)  Care Plan - Patient's Chronic Conditions and Co-Morbidity Symptoms are Monitored and Maintained or Improved:   Monitor and assess patient's chronic conditions and comorbid symptoms for stability, deterioration, or improvement   Collaborate with multidisciplinary team to address chronic and comorbid conditions and prevent exacerbation or deterioration   Update acute care plan with appropriate goals if chronic or comorbid symptoms are exacerbated and prevent overall improvement and discharge  4/24/2024 1622 by Hattie Loya RN  Outcome: Progressing  Flowsheets (Taken 4/24/2024 0800)  Care Plan - Patient's Chronic Conditions and Co-Morbidity Symptoms are Monitored and Maintained or Improved:   Monitor and assess patient's chronic conditions and comorbid symptoms for stability, deterioration, or improvement   Collaborate with multidisciplinary team to address chronic and comorbid conditions and prevent exacerbation or deterioration

## 2024-04-25 NOTE — PROGRESS NOTES
Physical Therapy  DATE: 2024    NAME: Jorge Luis Banda  MRN: [id number]   : 1945    Patient not seen this date for Physical Therapy due to:      [] Cancel by RN or physician due to:    [] Hemodialysis    [x] Critical Lab Value Level Hemoglobin 6.9 receiving blood transfusion    [] Blood transfusion in progress    [] Acute or unstable cardiovascular status   _MAP < 55 or more than >115  _HR < 40 or > 130    [] Acute or unstable pulmonary status   -FiO2 > 60%   _RR < 5 or >40    _O2 sats < 85%    [] Strict Bedrest    [] Off Unit for surgery or procedure    [] Off Unit for testing       [] Pending imaging to R/O fracture    [] Refusal by Patient      [] Other      [] PT being discontinued at this time. Patient independent. No further needs.     [] PT being discontinued at this time as the patient has been transferred to hospice care. No further needs.      Alia Quiles, PTA

## 2024-04-25 NOTE — CARE COORDINATION
Social Work-Received phone call from [facility]. [facility] are unable to accept patient. Will discuss with wife tomorrow. Yeison

## 2024-04-25 NOTE — PROGRESS NOTES
End Of Shift Note  [facility]  Summary of shift: pt had low HGB and tachycardia 's at beginning of shift. Unit of PRBC given. 500 fluid bolus given. Cardiology re consulted but no response form cardiology team. Prn Percocet given one time for leg and RLQ pain at incision sight. 85 mL out of Perc drain. Pulm held off on Bronch today and rescheduled for tomorrow at 1245. Heparin drip started for anticoagulant. Pt HR at 100 SR and Hgb has stabilized after interventions.     Vitals:    Vitals:    04/25/24 1200 04/25/24 1213 04/25/24 1300 04/25/24 1600   BP: 105/63 (!) 103/57 106/67 (!) 112/57   Pulse: (!) 118 (!) 130 (!) 130 98   Resp: 21 28 22 20   Temp: 97.8 °F (36.6 °C)   97.8 °F (36.6 °C)   TempSrc: Temporal   Temporal   SpO2: 96% 96% 96% 100%   Weight:       Height:            I&O:   Intake/Output Summary (Last 24 hours) at 4/25/2024 1846  Last data filed at 4/25/2024 1214  Gross per 24 hour   Intake 183 ml   Output 85 ml   Net 98 ml       Resp Status: 5L NC    Ventilator Settings:     / / /FiO2 : 94 %    Critical Care IV infusions:   heparin (PORCINE) Infusion 12 Units/kg/hr (04/25/24 1802)    sodium chloride      sodium chloride Stopped (04/20/24 0943)    dextrose          LDA:   Peripheral IV 04/24/24 Distal;Left Forearm (Active)   Number of days: 1       Peripheral IV 04/25/24 Right;Anterior Cephalic (Active)   Number of days: 0       Biliary Tube 04/19/24 T-tube 8 fr RUQ (Active)   Number of days: 6       Wound 04/04/24 Foot Left;Dorsal dry, scabbed (Active)   Number of days: 21       Wound Foot Left;Lateral (Active)   Number of days:

## 2024-04-26 LAB
ANION GAP SERPL CALCULATED.3IONS-SCNC: 13 MMOL/L (ref 9–17)
BUN SERPL-MCNC: 10 MG/DL (ref 8–23)
BUN/CREAT SERPL: 10 (ref 9–20)
CALCIUM SERPL-MCNC: 8.4 MG/DL (ref 8.6–10.4)
CHLORIDE SERPL-SCNC: 98 MMOL/L (ref 98–107)
CO2 SERPL-SCNC: 25 MMOL/L (ref 20–31)
CREAT SERPL-MCNC: 1 MG/DL (ref 0.7–1.2)
GFR SERPL CREATININE-BSD FRML MDRD: 76 ML/MIN/1.73M2
GLUCOSE SERPL-MCNC: 83 MG/DL (ref 70–99)
HCT VFR BLD AUTO: 24.6 % (ref 40.7–50.3)
HCT VFR BLD AUTO: 24.7 % (ref 40.7–50.3)
HCT VFR BLD AUTO: 26.3 % (ref 40.7–50.3)
HCT VFR BLD AUTO: 27 % (ref 40.7–50.3)
HGB BLD-MCNC: 7.6 G/DL (ref 13–17)
HGB BLD-MCNC: 7.8 G/DL (ref 13–17)
HGB BLD-MCNC: 8.2 G/DL (ref 13–17)
HGB BLD-MCNC: 8.2 G/DL (ref 13–17)
PARTIAL THROMBOPLASTIN TIME: 45.5 SEC (ref 23.9–33.8)
PARTIAL THROMBOPLASTIN TIME: 69.8 SEC (ref 23.9–33.8)
PARTIAL THROMBOPLASTIN TIME: 81.5 SEC (ref 23.9–33.8)
PARTIAL THROMBOPLASTIN TIME: >150 SEC (ref 23.9–33.8)
POTASSIUM SERPL-SCNC: 4.2 MMOL/L (ref 3.7–5.3)
SODIUM SERPL-SCNC: 136 MMOL/L (ref 135–144)

## 2024-04-26 PROCEDURE — 2500000003 HC RX 250 WO HCPCS: Performed by: FAMILY MEDICINE

## 2024-04-26 PROCEDURE — 3700000001 HC ADD 15 MINUTES (ANESTHESIA): Performed by: INTERNAL MEDICINE

## 2024-04-26 PROCEDURE — 2500000003 HC RX 250 WO HCPCS: Performed by: NURSE ANESTHETIST, CERTIFIED REGISTERED

## 2024-04-26 PROCEDURE — 3700000000 HC ANESTHESIA ATTENDED CARE: Performed by: INTERNAL MEDICINE

## 2024-04-26 PROCEDURE — 87206 SMEAR FLUORESCENT/ACID STAI: CPT

## 2024-04-26 PROCEDURE — 85018 HEMOGLOBIN: CPT

## 2024-04-26 PROCEDURE — 99232 SBSQ HOSP IP/OBS MODERATE 35: CPT | Performed by: INTERNAL MEDICINE

## 2024-04-26 PROCEDURE — 7100000000 HC PACU RECOVERY - FIRST 15 MIN: Performed by: INTERNAL MEDICINE

## 2024-04-26 PROCEDURE — 94761 N-INVAS EAR/PLS OXIMETRY MLT: CPT

## 2024-04-26 PROCEDURE — 88112 CYTOPATH CELL ENHANCE TECH: CPT

## 2024-04-26 PROCEDURE — 87140 CULTURE TYPE IMMUNOFLUORESC: CPT

## 2024-04-26 PROCEDURE — 87070 CULTURE OTHR SPECIMN AEROBIC: CPT

## 2024-04-26 PROCEDURE — 6370000000 HC RX 637 (ALT 250 FOR IP): Performed by: INTERNAL MEDICINE

## 2024-04-26 PROCEDURE — 6360000002 HC RX W HCPCS: Performed by: NURSE ANESTHETIST, CERTIFIED REGISTERED

## 2024-04-26 PROCEDURE — 6360000002 HC RX W HCPCS: Performed by: NURSE PRACTITIONER

## 2024-04-26 PROCEDURE — 7100000001 HC PACU RECOVERY - ADDTL 15 MIN: Performed by: INTERNAL MEDICINE

## 2024-04-26 PROCEDURE — 80048 BASIC METABOLIC PNL TOTAL CA: CPT

## 2024-04-26 PROCEDURE — 2709999900 HC NON-CHARGEABLE SUPPLY: Performed by: INTERNAL MEDICINE

## 2024-04-26 PROCEDURE — 89051 BODY FLUID CELL COUNT: CPT

## 2024-04-26 PROCEDURE — 87106 FUNGI IDENTIFICATION YEAST: CPT

## 2024-04-26 PROCEDURE — 97110 THERAPEUTIC EXERCISES: CPT

## 2024-04-26 PROCEDURE — 2580000003 HC RX 258: Performed by: FAMILY MEDICINE

## 2024-04-26 PROCEDURE — 2580000003 HC RX 258: Performed by: INTERNAL MEDICINE

## 2024-04-26 PROCEDURE — 87205 SMEAR GRAM STAIN: CPT

## 2024-04-26 PROCEDURE — 0B978ZZ DRAINAGE OF LEFT MAIN BRONCHUS, VIA NATURAL OR ARTIFICIAL OPENING ENDOSCOPIC: ICD-10-PCS | Performed by: INTERNAL MEDICINE

## 2024-04-26 PROCEDURE — 87300 AG DETECTION POLYVAL IF: CPT

## 2024-04-26 PROCEDURE — 2580000003 HC RX 258: Performed by: NURSE ANESTHETIST, CERTIFIED REGISTERED

## 2024-04-26 PROCEDURE — 36415 COLL VENOUS BLD VENIPUNCTURE: CPT

## 2024-04-26 PROCEDURE — 6360000002 HC RX W HCPCS: Performed by: INTERNAL MEDICINE

## 2024-04-26 PROCEDURE — 2000000000 HC ICU R&B

## 2024-04-26 PROCEDURE — 99232 SBSQ HOSP IP/OBS MODERATE 35: CPT | Performed by: FAMILY MEDICINE

## 2024-04-26 PROCEDURE — 94669 MECHANICAL CHEST WALL OSCILL: CPT

## 2024-04-26 PROCEDURE — 87015 SPECIMEN INFECT AGNT CONCNTJ: CPT

## 2024-04-26 PROCEDURE — 85014 HEMATOCRIT: CPT

## 2024-04-26 PROCEDURE — 87255 GENET VIRUS ISOLATE HSV: CPT

## 2024-04-26 PROCEDURE — 85730 THROMBOPLASTIN TIME PARTIAL: CPT

## 2024-04-26 PROCEDURE — 6370000000 HC RX 637 (ALT 250 FOR IP): Performed by: FAMILY MEDICINE

## 2024-04-26 PROCEDURE — 2700000000 HC OXYGEN THERAPY PER DAY

## 2024-04-26 PROCEDURE — 87102 FUNGUS ISOLATION CULTURE: CPT

## 2024-04-26 PROCEDURE — 0B988ZZ DRAINAGE OF LEFT UPPER LOBE BRONCHUS, VIA NATURAL OR ARTIFICIAL OPENING ENDOSCOPIC: ICD-10-PCS | Performed by: INTERNAL MEDICINE

## 2024-04-26 PROCEDURE — 94640 AIRWAY INHALATION TREATMENT: CPT

## 2024-04-26 PROCEDURE — 2500000003 HC RX 250 WO HCPCS

## 2024-04-26 PROCEDURE — 87252 VIRUS INOCULATION TISSUE: CPT

## 2024-04-26 PROCEDURE — 3609027000 HC BRONCHOSCOPY: Performed by: INTERNAL MEDICINE

## 2024-04-26 PROCEDURE — 88305 TISSUE EXAM BY PATHOLOGIST: CPT

## 2024-04-26 PROCEDURE — 87116 MYCOBACTERIA CULTURE: CPT

## 2024-04-26 PROCEDURE — 99232 SBSQ HOSP IP/OBS MODERATE 35: CPT | Performed by: NURSE PRACTITIONER

## 2024-04-26 RX ORDER — ONDANSETRON 2 MG/ML
4 INJECTION INTRAMUSCULAR; INTRAVENOUS
Status: DISCONTINUED | OUTPATIENT
Start: 2024-04-26 | End: 2024-04-26 | Stop reason: HOSPADM

## 2024-04-26 RX ORDER — FENTANYL CITRATE 50 UG/ML
INJECTION, SOLUTION INTRAMUSCULAR; INTRAVENOUS PRN
Status: DISCONTINUED | OUTPATIENT
Start: 2024-04-26 | End: 2024-04-26 | Stop reason: SDUPTHER

## 2024-04-26 RX ORDER — SODIUM CHLORIDE, SODIUM LACTATE, POTASSIUM CHLORIDE, CALCIUM CHLORIDE 600; 310; 30; 20 MG/100ML; MG/100ML; MG/100ML; MG/100ML
INJECTION, SOLUTION INTRAVENOUS CONTINUOUS PRN
Status: DISCONTINUED | OUTPATIENT
Start: 2024-04-26 | End: 2024-04-26 | Stop reason: SDUPTHER

## 2024-04-26 RX ORDER — METOPROLOL TARTRATE 1 MG/ML
INJECTION, SOLUTION INTRAVENOUS PRN
Status: DISCONTINUED | OUTPATIENT
Start: 2024-04-26 | End: 2024-04-26 | Stop reason: SDUPTHER

## 2024-04-26 RX ORDER — NALOXONE HYDROCHLORIDE 0.4 MG/ML
INJECTION, SOLUTION INTRAMUSCULAR; INTRAVENOUS; SUBCUTANEOUS PRN
Status: DISCONTINUED | OUTPATIENT
Start: 2024-04-26 | End: 2024-04-26 | Stop reason: HOSPADM

## 2024-04-26 RX ORDER — HYDROMORPHONE HYDROCHLORIDE 1 MG/ML
0.5 INJECTION, SOLUTION INTRAMUSCULAR; INTRAVENOUS; SUBCUTANEOUS EVERY 5 MIN PRN
Status: DISCONTINUED | OUTPATIENT
Start: 2024-04-26 | End: 2024-04-26 | Stop reason: HOSPADM

## 2024-04-26 RX ORDER — DEXAMETHASONE SODIUM PHOSPHATE 10 MG/ML
INJECTION, SOLUTION INTRAMUSCULAR; INTRAVENOUS PRN
Status: DISCONTINUED | OUTPATIENT
Start: 2024-04-26 | End: 2024-04-26 | Stop reason: SDUPTHER

## 2024-04-26 RX ORDER — PROPOFOL 10 MG/ML
INJECTION, EMULSION INTRAVENOUS PRN
Status: DISCONTINUED | OUTPATIENT
Start: 2024-04-26 | End: 2024-04-26 | Stop reason: SDUPTHER

## 2024-04-26 RX ORDER — ONDANSETRON 2 MG/ML
INJECTION INTRAMUSCULAR; INTRAVENOUS PRN
Status: DISCONTINUED | OUTPATIENT
Start: 2024-04-26 | End: 2024-04-26 | Stop reason: SDUPTHER

## 2024-04-26 RX ORDER — PHENYLEPHRINE HCL IN 0.9% NACL 1 MG/10 ML
SYRINGE (ML) INTRAVENOUS PRN
Status: DISCONTINUED | OUTPATIENT
Start: 2024-04-26 | End: 2024-04-26 | Stop reason: SDUPTHER

## 2024-04-26 RX ORDER — LIDOCAINE HYDROCHLORIDE 20 MG/ML
INJECTION, SOLUTION EPIDURAL; INFILTRATION; INTRACAUDAL; PERINEURAL PRN
Status: DISCONTINUED | OUTPATIENT
Start: 2024-04-26 | End: 2024-04-26 | Stop reason: SDUPTHER

## 2024-04-26 RX ORDER — METOPROLOL TARTRATE 1 MG/ML
5 INJECTION, SOLUTION INTRAVENOUS EVERY 4 HOURS
Status: DISCONTINUED | OUTPATIENT
Start: 2024-04-26 | End: 2024-04-27

## 2024-04-26 RX ORDER — SODIUM CHLORIDE 0.9 % (FLUSH) 0.9 %
5-40 SYRINGE (ML) INJECTION EVERY 12 HOURS SCHEDULED
Status: DISCONTINUED | OUTPATIENT
Start: 2024-04-26 | End: 2024-04-26 | Stop reason: HOSPADM

## 2024-04-26 RX ORDER — SODIUM CHLORIDE 9 MG/ML
INJECTION, SOLUTION INTRAVENOUS PRN
Status: DISCONTINUED | OUTPATIENT
Start: 2024-04-26 | End: 2024-04-26 | Stop reason: HOSPADM

## 2024-04-26 RX ORDER — SODIUM CHLORIDE 9 MG/ML
INJECTION, SOLUTION INTRAVENOUS CONTINUOUS
Status: ACTIVE | OUTPATIENT
Start: 2024-04-26 | End: 2024-04-26

## 2024-04-26 RX ORDER — ROCURONIUM BROMIDE 10 MG/ML
INJECTION, SOLUTION INTRAVENOUS PRN
Status: DISCONTINUED | OUTPATIENT
Start: 2024-04-26 | End: 2024-04-26 | Stop reason: SDUPTHER

## 2024-04-26 RX ORDER — SODIUM CHLORIDE 0.9 % (FLUSH) 0.9 %
5-40 SYRINGE (ML) INJECTION PRN
Status: DISCONTINUED | OUTPATIENT
Start: 2024-04-26 | End: 2024-04-26 | Stop reason: HOSPADM

## 2024-04-26 RX ORDER — DRONABINOL 2.5 MG/1
2.5 CAPSULE ORAL 2 TIMES DAILY WITH MEALS
Status: DISCONTINUED | OUTPATIENT
Start: 2024-04-26 | End: 2024-05-11 | Stop reason: HOSPADM

## 2024-04-26 RX ORDER — FENTANYL CITRATE 50 UG/ML
25 INJECTION, SOLUTION INTRAMUSCULAR; INTRAVENOUS EVERY 5 MIN PRN
Status: DISCONTINUED | OUTPATIENT
Start: 2024-04-26 | End: 2024-04-26 | Stop reason: HOSPADM

## 2024-04-26 RX ADMIN — Medication 200 MCG: at 14:42

## 2024-04-26 RX ADMIN — METOPROLOL TARTRATE 5 MG: 5 INJECTION INTRAVENOUS at 12:01

## 2024-04-26 RX ADMIN — EMPAGLIFLOZIN 10 MG: 10 TABLET, FILM COATED ORAL at 16:28

## 2024-04-26 RX ADMIN — CHOLECALCIFEROL TAB 125 MCG (5000 UNIT) 5000 UNITS: 125 TAB at 16:32

## 2024-04-26 RX ADMIN — CEFDINIR 300 MG: 300 CAPSULE ORAL at 23:19

## 2024-04-26 RX ADMIN — SODIUM CHLORIDE, PRESERVATIVE FREE 10 ML: 5 INJECTION INTRAVENOUS at 23:27

## 2024-04-26 RX ADMIN — ATORVASTATIN CALCIUM 40 MG: 40 TABLET, FILM COATED ORAL at 23:19

## 2024-04-26 RX ADMIN — METOPROLOL TARTRATE 2.5 MG: 5 INJECTION INTRAVENOUS at 14:32

## 2024-04-26 RX ADMIN — METRONIDAZOLE 500 MG: 500 TABLET ORAL at 23:19

## 2024-04-26 RX ADMIN — ACETYLCYSTEINE 400 MG: 200 SOLUTION ORAL; RESPIRATORY (INHALATION) at 15:43

## 2024-04-26 RX ADMIN — ACETYLCYSTEINE 400 MG: 200 SOLUTION ORAL; RESPIRATORY (INHALATION) at 19:08

## 2024-04-26 RX ADMIN — FENTANYL CITRATE 25 MCG: 50 INJECTION INTRAMUSCULAR; INTRAVENOUS at 14:35

## 2024-04-26 RX ADMIN — FERROUS SULFATE TAB EC 325 MG (65 MG FE EQUIVALENT) 325 MG: 325 (65 FE) TABLET DELAYED RESPONSE at 16:30

## 2024-04-26 RX ADMIN — METOPROLOL TARTRATE 5 MG: 5 INJECTION INTRAVENOUS at 10:21

## 2024-04-26 RX ADMIN — METRONIDAZOLE 500 MG: 500 TABLET ORAL at 16:24

## 2024-04-26 RX ADMIN — LIDOCAINE HYDROCHLORIDE 40 MG: 20 INJECTION, SOLUTION EPIDURAL; INFILTRATION; INTRACAUDAL; PERINEURAL at 14:35

## 2024-04-26 RX ADMIN — PANTOPRAZOLE SODIUM 40 MG: 40 TABLET, DELAYED RELEASE ORAL at 16:37

## 2024-04-26 RX ADMIN — MIRTAZAPINE 30 MG: 15 TABLET, ORALLY DISINTEGRATING ORAL at 23:19

## 2024-04-26 RX ADMIN — DEXAMETHASONE SODIUM PHOSPHATE 10 MG: 10 INJECTION INTRAMUSCULAR; INTRAVENOUS at 14:40

## 2024-04-26 RX ADMIN — ACETYLCYSTEINE 400 MG: 200 SOLUTION ORAL; RESPIRATORY (INHALATION) at 08:35

## 2024-04-26 RX ADMIN — Medication 200 MCG: at 14:48

## 2024-04-26 RX ADMIN — CYANOCOBALAMIN TAB 1000 MCG 1000 MCG: 1000 TAB at 16:32

## 2024-04-26 RX ADMIN — Medication 200 MCG: at 14:40

## 2024-04-26 RX ADMIN — MIDODRINE HYDROCHLORIDE 5 MG: 5 TABLET ORAL at 16:31

## 2024-04-26 RX ADMIN — METOPROLOL TARTRATE 2.5 MG: 5 INJECTION INTRAVENOUS at 14:51

## 2024-04-26 RX ADMIN — LEVALBUTEROL 1.25 MG: 1.25 SOLUTION, CONCENTRATE RESPIRATORY (INHALATION) at 08:35

## 2024-04-26 RX ADMIN — LEVALBUTEROL 1.25 MG: 1.25 SOLUTION, CONCENTRATE RESPIRATORY (INHALATION) at 15:42

## 2024-04-26 RX ADMIN — GUAIFENESIN 600 MG: 600 TABLET ORAL at 23:20

## 2024-04-26 RX ADMIN — PROPOFOL 100 MG: 10 INJECTION, EMULSION INTRAVENOUS at 14:35

## 2024-04-26 RX ADMIN — Medication 200 MCG: at 14:44

## 2024-04-26 RX ADMIN — SODIUM CHLORIDE, POTASSIUM CHLORIDE, SODIUM LACTATE AND CALCIUM CHLORIDE: 600; 310; 30; 20 INJECTION, SOLUTION INTRAVENOUS at 14:26

## 2024-04-26 RX ADMIN — CEFDINIR 300 MG: 300 CAPSULE ORAL at 16:25

## 2024-04-26 RX ADMIN — SODIUM CHLORIDE, POTASSIUM CHLORIDE, SODIUM LACTATE AND CALCIUM CHLORIDE: 600; 310; 30; 20 INJECTION, SOLUTION INTRAVENOUS at 15:08

## 2024-04-26 RX ADMIN — METOPROLOL TARTRATE 5 MG: 5 INJECTION INTRAVENOUS at 14:00

## 2024-04-26 RX ADMIN — SODIUM CHLORIDE: 9 INJECTION, SOLUTION INTRAVENOUS at 09:55

## 2024-04-26 RX ADMIN — LEVALBUTEROL 1.25 MG: 1.25 SOLUTION, CONCENTRATE RESPIRATORY (INHALATION) at 19:08

## 2024-04-26 RX ADMIN — ONDANSETRON 4 MG: 2 INJECTION INTRAMUSCULAR; INTRAVENOUS at 14:58

## 2024-04-26 RX ADMIN — SUGAMMADEX 200 MG: 100 INJECTION, SOLUTION INTRAVENOUS at 15:00

## 2024-04-26 RX ADMIN — METOPROLOL TARTRATE 5 MG: 5 INJECTION INTRAVENOUS at 09:27

## 2024-04-26 RX ADMIN — Medication 200 MCG: at 14:46

## 2024-04-26 RX ADMIN — PANTOPRAZOLE SODIUM 40 MG: 40 TABLET, DELAYED RELEASE ORAL at 05:41

## 2024-04-26 RX ADMIN — TAMSULOSIN HYDROCHLORIDE 0.4 MG: 0.4 CAPSULE ORAL at 16:32

## 2024-04-26 RX ADMIN — DRONABINOL 2.5 MG: 2.5 CAPSULE ORAL at 16:33

## 2024-04-26 RX ADMIN — ROCURONIUM BROMIDE 30 MG: 10 SOLUTION INTRAVENOUS at 14:35

## 2024-04-26 RX ADMIN — METRONIDAZOLE 500 MG: 500 TABLET ORAL at 05:41

## 2024-04-26 ASSESSMENT — PAIN SCALES - GENERAL: PAINLEVEL_OUTOF10: 0

## 2024-04-26 NOTE — PROGRESS NOTES
Patient Name: Jorge Luis Banda  Date of admission: 4/3/2024  3:27 PM  Patient's age: 78 y.o., 1945  Admission Dx: Melena [K92.1]  GI bleed [K92.2]  UMM (acute kidney injury) (HCC) [N17.9]  Left leg cellulitis [L03.116]  Anemia, unspecified type [D64.9]    Reason for Consult: management recommendations  Requesting Physician: Sony Crawford MD    CHIEF COMPLAINT: GI bleeding    History Obtained From:  patient  INTERVAL HISTORY:    Patient seen and examined.    Had dropped hb 6.9 this AM and received PRBC  No NV   NO fever chills    HISTORY OF PRESENT ILLNESS:    The patient is a 78 y.o.   male who is admitted to the hospital for anemia and suspicion for GI bleeding.  His hemoglobin was under 7 and received blood transfusion.  He has severe peripheral vascular disease on Plavix and Coumadin.  He underwent an EGD that showed gastritis and he refused colonoscopy.  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  From oncology perspective, the patient was diagnosed with stage I lung cancer in 2019 and underwent lung resection.  Never received any chemotherapy or radiation.  He has been in remission since then.  CT scan of the chest showed concerning changes in the liver and adrenal gland for metastatic disease.  Dedicated CT of the abdomen and pelvis is ordered and pending.    Past Medical History:   has a past medical history of Paroxysmal atrial fibrillation (HCC), Peripheral artery disease (HCC), and Primary hypertension.    Past Surgical History:   has a past surgical history that includes above knee amputation (Right, 01/01/2020); Upper gastrointestinal endoscopy (N/A, 4/8/2024); and IR CHOLECYSTOSTOMY PERCUTANEOUS COMPLETE (4/19/2024).     Medications:    Reviewed in Epic     Allergies:  Patient has no known allergies.    Social History:   reports that he quit smoking about 4 years ago. He has never

## 2024-04-26 NOTE — PROGRESS NOTES
PALLIATIVE CARE PROGRESS NOTE     NAME:  Jorge Luis Banda  MEDICAL RECORD NUMBER:  [id number]  AGE: 78 y.o.   GENDER: male  : 1945  TODAY'S DATE:  2024  Room:     Reason For Consult:  Goals of care evaluation  Distress management  Symptom Management  Guidance and support  Facilitate communications  Assistance in coordinating care  Recommendations for the above    Plan      Palliative Interaction:    I met with patient and his significant other Rita at the bedside.  Patient had bronchoscopy planned for yesterday which was canceled due to elevated heart rate.  Patient tells me he is tentatively scheduled for bronchoscopy this afternoon.  RN updated that patient has received 15 mg IV Lopressor for heart rate control.  They are waiting evaluation by anesthesia.    Patient tells me that his breathing feels stable today.  Pain is under control if no significant movement.  He tells me that yesterday he did not eat but a few bites of pudding or yogurt.  He is interested in something to help increase his appetite.  It is noted that patient has been receiving 30 mg Remeron nightly.    I did discuss with patient and Rita about continuing conversations regarding goals of care.  Patient continues to tell me that he does not want to remain in the bed and unable to care for himself.  Rita is in agreement with this.  We spoke about moving forward, how much interventions he would want and that he is dealing with multiple specialists and diagnoses.  Both he and Rita are in agreement to discuss these wishes.  Patient to remain full code at this time.    Support provided.  Palliative care will continue to follow.    IMPRESSION/ PLAN  Symptom management/pain control    We feel the patient's symptoms are being controlled. Their current regimen has been reviewed by myself and discussed with the staff. We recommend adjusting their medications as follows:  Marinol 2.5mg    Goals of care evaluation  The patient  patient is likely not a candidate for aggressive treatment.  Biopsy on hold until patient is stable and consider biopsy outpatient.  Patient currently on Zosyn and requiring 2 L nasal cannula.  Vascular is following for carotid stenosis and planning for endarterectomy outpatient.  Patient has required high dose of Mat-Synephrine for hypotension.  Due to continued complaints of abdominal pain, HIDA scan was obtained today.  Per GI, patient is not a candidate for surgical intervention.     Palliative care consulted for goals of care.     Referred to Palliative Care by   [x] Physician   [] Nursing  [] Family Request   [] Other:       OVERNIGHT EVENTS:  4/18: No acute events overnight. Mat-synephrine weaned down. Plan for IR dhiraj drain tomorrow. Thoracentesis this afternoon.   4/23: Discharge held yesterday due to worsening chest x-ray. Percussion vest started. Patient had bleeding around drain site.   4/24: Patient has had urinary retention. Urology consulted. Patient has been voiding once a day and holding urine. Patient appetite poor. Pain controlled. Planning for bronchoscopy at 11am tomorrow.   4/25: Patient tachycardic with increased oxygen demands. Hemoglobin 6.9 - receiving 1 unit PRBC.       Vital Signs:  /62   Pulse (!) 139   Temp 98.1 °F (36.7 °C)   Resp 27   Ht 1.829 m (6')   Wt 76 kg (167 lb 8 oz)   SpO2 97%   BMI 22.72 kg/m²     Pertinent Laboratory StudiesReviewed by Me Personally Today:  Lab Results   Component Value Date    WBC 11.1 04/25/2024    HGB 7.6 (L) 04/26/2024    HCT 24.6 (L) 04/26/2024    MCV 94.2 04/25/2024     04/25/2024     Lab Results   Component Value Date/Time     04/25/2024 06:27 AM    K 3.0 04/25/2024 06:27 AM    CL 99 04/25/2024 06:27 AM    CO2 30 04/25/2024 06:27 AM    BUN 13 04/25/2024 06:27 AM    CREATININE 1.0 04/25/2024 06:27 AM    GLUCOSE 98 04/25/2024 06:27 AM    CALCIUM 8.0 04/25/2024 06:27 AM         has a past medical history of Paroxysmal atrial

## 2024-04-26 NOTE — CARE COORDINATION
DC Planning    Pt had bronch today now down to 2l, discussed snf pt agrees to rehab-awaiting to hear if [facility] can accept and then will need precert.

## 2024-04-26 NOTE — ANESTHESIA POSTPROCEDURE EVALUATION
Department of Anesthesiology  Postprocedure Note    Patient: Jorge Luis Banda  MRN: [id number]  YOB: 1945  Date of evaluation: 4/26/2024    Procedure Summary       Date: 04/26/24 Room / Location: [address]    Anesthesia Start: 1426 Anesthesia Stop: 1511    Procedure: BRONCHOSCOPY WITH WASHING BIOPSY Diagnosis:       Atelectasis of left lung      (Atelectasis of left lung [J98.11])    Surgeons: Joe Mcneil MD Responsible Provider: Gayle Patel MD    Anesthesia Type: General ASA Status: 4            Anesthesia Type: General    Galindo Phase I: Galindo Score: 9    Galindo Phase II:      Anesthesia Post Evaluation    Patient location during evaluation: PACU  Patient participation: complete - patient participated  Level of consciousness: awake and alert  Airway patency: patent  Nausea & Vomiting: no nausea and no vomiting  Cardiovascular status: hemodynamically stable  Respiratory status: acceptable  Hydration status: euvolemic  Pain management: adequate    No notable events documented.

## 2024-04-26 NOTE — PROGRESS NOTES
for input(s): \"CHOL\", \"HDL\" in the last 72 hours.    Invalid input(s): \"LDLCALCU\"    INR:   Recent Labs     04/25/24 1803   INR 2.0       Objective:   Vitals: /61   Pulse 100   Temp 97.3 °F (36.3 °C) (Temporal)   Resp 19   Ht 1.829 m (6')   Wt 76 kg (167 lb 8 oz)   SpO2 99%   BMI 22.72 kg/m²   General appearance: alert, cooperative and no distress  Mental Status: oriented to person, place and time with normal affect  Neck: good carotid pulses, no JVD  Lungs: clear to auscultation bilaterally, normal effort  Heart: regular rate and rhythm, no murmur,  Abdomen: soft, non-tender, non-distended, bowel sounds present all four quadrants, no masses, hepatomegaly, splenomegaly or aortic enlargement  Extremities: Right AKA site healed, dry ulcer dorsal aspect of the left foot  Neurologic: Left hand with limited motor function however unable to raise the arm, left lower extremity weakness  Skin: no gross lesions, rashes, or induration    Assessment:   78-year-old male with 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 weakness  Severe 90% right internal carotid artery stenosis  Severe 80% left internal carotid artery stenosis with long segment of plaque involving the mid to distal common carotid  Severe peripheral arterial disease with patent right axillobifemoral bypass  GI bleed  Atrial fibrillation  Right AKA  6.5 cm right adrenal mass  Positive HIDA scan    Patient Active Problem List:     Skin eschar     Stenosis of left carotid artery     History of arterial bypass of lower extremity     S/P AKA (above knee amputation) unilateral, right (HCC)     Primary hypertension     Persistent wound pain     Peripheral arterial disease (HCC)     Ulcer of left foot (HCC)     Neuropathy     Malignant neoplasm of lung (HCC)     Intermittent claudication of left lower extremity due to atherosclerosis (HCC)     Frequency of urination     Former smoker     Embolism and thrombosis of artery (HCC)      Dyslipidemia     Closed fracture of left humerus     Closed fracture of surgical neck of humerus     Acute exacerbation of chronic obstructive airways disease (HCC)     Left leg cellulitis     Arteriosclerosis of coronary artery     Age-related osteoporosis without current pathological fracture     Adenocarcinoma of lung (HCC)     Wound infection     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)     Melena     Iron deficiency anemia due to chronic blood loss     Hx of cancer of lung     Adrenal mass (HCC)     Claudication (HCC)     Pleural effusion, bilateral     Longstanding persistent atrial fibrillation (HCC)     Acute ischemic right internal carotid artery (ICA) stroke (HCC)     Internal carotid artery stenosis, bilateral     Anemia     Gastrointestinal hemorrhage     NSTEMI (non-ST elevated myocardial infarction) (HCC)     Hypotension     Atrial fibrillation with rapid ventricular response (HCC)     Unstable angina (HCC)      Plan:   Continue supportive care  Continue physical therapy  Continue cholecystostomy tube  Continue IV heparin  Bronchoscopy today  Palliative care following    Electronically signed by Sonny Alcantar MD on 4/26/2024 at 2:41 AM

## 2024-04-26 NOTE — OP NOTE
Operative Note    Procedure Note: Bronchoscopy  Joe Mcneil MD     Pre-op Diagnosis: Left lung atelectasis  Post-op Diagnosis: Left upper lobe obstruction with large amount of mucous plugging of left main bronchus  Bronchoscopist: Joe Mcneil MD  Anesthesia: General anesthesia   Procedure: Flexible fiberoptic bronchoscopy    Indications and History  The patient is a 78 y.o. male with left lung atelectasis  (Please see today's progress notes for the latest issues,  physical exam and lab data)    Consent to Procedure  The risks, benefits, complications, treatment options and expected outcomes were discussed with the patient.  The possibilities of reaction to medication, pulmonary aspiration, perforation of a viscus, bleeding, failure to diagnose a condition and creating a complication requiring transfusion or operation were discussed with the patient, who freely signed the consent.      Description of Procedure  The patient was intubated on mechanical ventilation and placed on 100% oxygen. Jorge Luis Banda was monitored by anesthesia. Jorge Luis Banda and the procedure were verified as Flexible Fiberoptic Bronchoscopy.  A Time Out was held and the above information confirmed.     The bronchoscope was then passed into the trachea via the ET tube. After careful inspection of the tracheal, the bronchoscope was sequentially passed into all segments of right and left endobronchial trees to the second and/or third divisions.    Endobronchial findings  Distal trachea: relatively normal with no significant lesion.   Larissa: Sharp and mobile with no mass.  Right endobronchial trees: The endobronchial survey was significant for   No mass  No bleeding  No significant inflammation  No significant bronchomalcia  No significant mucous plugging    Left endobronchial trees: The endobronchial survey was significant for   Left upper bronchus takeoff shows obstruction with mass.  Washings were obtained from the area but no biopsy or

## 2024-04-26 NOTE — FLOWSHEET NOTE
04/26/24 0730   Treatment Team Notification   Reason for Communication Review case   Name of Team Member Notified Tarun   Treatment Team Role Consulting Provider   Method of Communication Secure Message   Response Other (Comment)   Notification Time 0738     HR 120s-130s afib on monitor  Lopressor PO scheduled for this AM  Pt is NPO for procedure, takes pills with pudding  Lopressor IV ordered prn for HTN only  Per Dr Mcneil, RN to discuss with anesthesia     Call to surgery 0800 - phones forwarded to house supervisor  Will attempt again    0820 - Surgery control will locate available anesthesia & request call to unit    0842 - call to surgery control; will leave note for anesthesiologist again    0910 - Spoke with Dr Jacinto who relates he is not the anesthesiologist seeing patient but can advise. Explained concerns. Per Dr Jacinto, pudding/po meds not acceptable. Will not proceed with anesthesia for procedure if HR is not under control. HR remains 120s-140s Afib on monitor.    0913 - Dr Mcneil updated on anesthesia input. Per Dr Mcneil, give prn lopressor

## 2024-04-26 NOTE — FLOWSHEET NOTE
04/26/24 1659   Treatment Team Notification   Reason for Communication Review case   Name of Team Member Notified Ty   Treatment Team Role Attending Provider   Method of Communication Call   Response See orders   Notification Time 1659     MD call to unit for update on pt post bronch  Updated by RN on ac recommendations per Dr Mcneil  Per Dr Crawford, discontinued heparin  Resume eliquis tomorrow AM

## 2024-04-26 NOTE — FLOWSHEET NOTE
04/26/24 1315   Treatment Team Notification   Reason for Communication Review case   Name of Team Member Notified Madeline   Treatment Team Role Advanced Practice Nurse  (palliative)   Method of Communication Face to face   Response At bedside     APRN at bedside, discussed importance of adequate nutrition  Pt agreeable to medication to stimulate appetite  Will revisit goals of care once patient is through acute phase of illness  Orders received for marinol one pt is no longer NPO

## 2024-04-26 NOTE — PROGRESS NOTES
Physical Therapy  DATE: 2024    NAME: Jorge Luis Banda  MRN: [id number]   : 1945    Patient not seen this date for Physical Therapy due to:      [] Cancel by RN or physician due to:    [] Hemodialysis    [] Critical Lab Value Level     [] Blood transfusion in progress    [] Acute or unstable cardiovascular status   _MAP < 55 or more than >115  _HR < 40 or > 130    [] Acute or unstable pulmonary status   -FiO2 > 60%   _RR < 5 or >40    _O2 sats < 85%    [] Strict Bedrest    [] Off Unit for surgery or procedure    [] Off Unit for testing       [] Pending imaging to R/O fracture    [] Refusal by Patient      [x] Other Working with OT currently and RN requests lower level treatment today as patient is having a procedure. Will continue to follow.     [] PT being discontinued at this time. Patient independent. No further needs.     [] PT being discontinued at this time as the patient has been transferred to hospice care. No further needs.      Alia Quiles, PTA

## 2024-04-26 NOTE — PLAN OF CARE
Problem: Respiratory - Adult  Goal: Able to breathe comfortably  Description: Able to breathe comfortably  Outcome: Progressing     Problem: Respiratory - Adult  Goal: Clear lung sounds  Outcome: Progressing     Problem: Respiratory - Adult  Goal: Adequate oxygenation  Description: Adequate oxygenation  Outcome: Progressing     Problem: Respiratory - Adult  Goal: Ability to maintain normal respiratory secretions will improve  Description: Ability to maintain normal respiratory secretions will improve  4/26/2024 1911 by Dick Garcia RCP  Outcome: Progressing

## 2024-04-26 NOTE — PROGRESS NOTES
End Of Shift Note  [facility]  Summary of shift: Pt in Afib RVR at shift change. Pt NPO for bronchoscopy this afternoon, prn IV lopressor given and subsequently ordered on schedule per cardiology until pt able to take po medications.   Weaned down to 3LNC. Heparin gtt off @ 1145 per Dr Mcneil for procedure originally scheduled at 1245. Bronchoscopy completed, pt returned to unit at 1545, washings sent for specimen. Returns on 2LNC. Hemoptysis present, using suction at bedside.  New order for marinol per palliative, pt agreeable.  Eliquis to resume in AM    Vitals:    Vitals:    04/26/24 1100 04/26/24 1200 04/26/24 1205 04/26/24 1300   BP: 116/68 119/61  114/67   Pulse: (!) 112 (!) 115  (!) 108   Resp: 17 23 21   Temp:  96.8 °F (36 °C)     TempSrc:  Temporal     SpO2: 97% 100% 100% 100%   Weight:       Height:            I&O:   Intake/Output Summary (Last 24 hours) at 4/26/2024 1503  Last data filed at 4/26/2024 1200  Gross per 24 hour   Intake 64.04 ml   Output 930 ml   Net -865.96 ml       Resp Status: 2L NC    Ventilator Settings:     / / /FiO2 : 94 %    Critical Care IV infusions:   [MAR Hold] sodium chloride 70 mL/hr at 04/26/24 1019    [MAR Hold] heparin (PORCINE) Infusion 9 Units/kg/hr (04/26/24 0917)    [MAR Hold] sodium chloride      [MAR Hold] sodium chloride Stopped (04/20/24 0943)    [MAR Hold] dextrose          LDA:   Peripheral IV 04/24/24 Distal;Left Forearm (Active)   Number of days: 2       Peripheral IV 04/25/24 Right;Anterior Cephalic (Active)   Number of days: 1       Biliary Tube 04/19/24 T-tube 8 fr RUQ (Active)   Number of days: 7       ETT  (Active)   Number of days: 0       Wound 04/04/24 Foot Left;Dorsal dry, scabbed (Active)   Number of days: 22       Wound Foot Left;Lateral (Active)   Number of days:

## 2024-04-26 NOTE — PROGRESS NOTES
Occupational Therapy  Facility/Department: [facility]  Rehabilitation Occupational Therapy Daily Treatment Note    Date: 24  Patient Name: Jorge Luis Banda       Room:   MRN: [id number]  Account: [id number]   : 1945  (78 y.o.) Gender: male      ANIL Charles reports patient is medically stable for therapy treatment this date. Chart reviewed prior to treatment and patient is agreeable for therapy.  All lines intact and patient positioned comfortably at end of treatment.  All patient needs addressed prior to ending therapy session.      Pt currently functioning below baseline.  Recommend daily inpatient skilled therapy at time of discharge to maximize long term outcomes and prevent re-admission. Please refer to AM-PAC score for current level of function.               Past Medical History:  has a past medical history of Paroxysmal atrial fibrillation (HCC), Peripheral artery disease (HCC), and Primary hypertension.  Past Surgical History:   has a past surgical history that includes above knee amputation (Right, 2020); Upper gastrointestinal endoscopy (N/A, 2024); and IR CHOLECYSTOSTOMY PERCUTANEOUS COMPLETE (2024).    Restrictions  Restrictions/Precautions: Fall Risk, General Precautions, Bedrest with Bathroom Privileges  Other position/activity restrictions: Telemetry, LUE IV, L sided weakness (s/p R CVA); R AKA;  R UE IV; Arterial Line (L radial);  PICC Line (R brachial);  2L Supplemental O2 nasal cannula  Required Braces or Orthoses?: No    Subjective  Subjective: Pt resting in bed upon arrival agreeable to OT.  Restrictions/Precautions: Fall Risk;General Precautions;Bedrest with Bathroom Privileges             Objective     Cognition  Overall Cognitive Status: Exceptions  Arousal/Alertness: Appropriate responses to stimuli  Following Commands: Follows one step commands with repetition;Follows one step commands with increased time  Attention Span: Appears intact;Attends with  cues to redirect  Memory: Appears intact  Safety Judgement: Decreased awareness of need for assistance;Decreased awareness of need for safety  Problem Solving: Assistance required to identify errors made;Assistance required to correct errors made;Decreased awareness of errors  Insights: Decreased awareness of deficits  Initiation: Requires cues for all  Sequencing: Requires cues for all  Orientation  Overall Orientation Status: Within Functional Limits  Orientation Level: Oriented X4         ADL                 OT Exercises  Exercise Treatment: While supine in bed pt completed UB AROM to RUE while writer completed PROM/stretching to LUE all for 1 set x 10 reps in all planes with good response and no pain reported. Also completed some fine motor stretching to L hand and yellow foam cube given d/t pt losing other one when moving rooms.     Assessment  Assessment  Assessment: Pt able to tolerate bed level exercises well but unable to progress any further this date d/t procedure later today and d/t being NPO and taking meds has been increasing HR and needs to be stable for procedure. Skilled OT indicated to increase safety and IND with all functional tasks to ensure a safe return to PLOF.  Activity Tolerance: Patient tolerated treatment well;Treatment limited secondary to medical complications  Discharge Recommendations: Patient would benefit from continued therapy after discharge  Safety Devices  Safety Devices in place: Yes  Type of devices: All fall risk precautions in place;Left in bed;Bed alarm in place;Call light within reach;Nurse notified;Patient at risk for falls    Patient Education  Education  Education Given To: Patient  Education Provided: Home Exercise Program;Safety;Precautions  Education Provided Comments: Given edu on proper positioning of LUE and use of sensory input. Pt reported he has been trying to properly position his arm and fingers to keep them straight but can be difficult to keep track of

## 2024-04-26 NOTE — PROGRESS NOTES
Ry Traylor MD   Urology Progress Note            Subjective: Follow-up enlarged prostate neurogenic bladder    Patient Vitals for the past 24 hrs:   BP Temp Temp src Pulse Resp SpO2 Weight   04/26/24 0400 128/64 97.3 °F (36.3 °C) Temporal (!) 111 21 97 % --   04/26/24 0315 -- -- -- (!) 106 18 -- --   04/26/24 0300 128/62 -- -- (!) 103 19 -- --   04/26/24 0200 122/61 -- -- 100 19 -- --   04/26/24 0100 (!) 121/58 -- -- (!) 101 20 -- --   04/26/24 0000 (!) 126/59 97.3 °F (36.3 °C) Temporal (!) 102 21 99 % 76 kg (167 lb 8 oz)   04/25/24 2300 (!) 108/57 -- -- (!) 109 21 -- --   04/25/24 2200 132/61 -- -- (!) 112 22 -- --   04/25/24 2115 -- -- -- (!) 103 23 94 % --   04/25/24 2100 113/60 -- -- (!) 102 17 -- --   04/25/24 2050 (!) 118/58 -- -- (!) 102 -- -- --   04/25/24 2000 115/61 97.3 °F (36.3 °C) Temporal (!) 103 23 94 % --   04/25/24 1900 (!) 115/59 -- -- 97 19 99 % --   04/25/24 1600 (!) 112/57 97.8 °F (36.6 °C) Temporal 98 20 100 % --   04/25/24 1300 106/67 -- -- (!) 130 22 96 % --   04/25/24 1213 (!) 103/57 -- -- (!) 130 28 96 % --   04/25/24 1200 105/63 97.8 °F (36.6 °C) Temporal (!) 118 21 96 % --   04/25/24 1100 118/65 -- -- (!) 119 20 98 % --   04/25/24 1045 (!) 117/59 -- -- (!) 117 27 98 % --   04/25/24 1040 (!) 105/57 -- -- (!) 130 -- -- --   04/25/24 1000 105/61 -- -- (!) 112 22 98 % --   04/25/24 0935 111/60 97.3 °F (36.3 °C) -- (!) 131 17 99 % --   04/25/24 0930 116/65 -- -- (!) 122 23 99 % --   04/25/24 0925 106/72 -- -- (!) 127 25 97 % --   04/25/24 0920 109/62 97.1 °F (36.2 °C) -- (!) 123 28 98 % --   04/25/24 0919 114/65 -- -- (!) 124 25 99 % --   04/25/24 0918 -- -- -- (!) 126 19 100 % --   04/25/24 0911 (!) 105/59 97.5 °F (36.4 °C) -- (!) 120 22 99 % --   04/25/24 0813 (!) 117/58 -- -- -- -- -- --   04/25/24 0748 131/60 97.5 °F (36.4 °C) Temporal (!) 142 27 97 % --   04/25/24 0731 -- -- -- -- -- 97 % --       Intake/Output Summary (Last 24 hours) at 4/26/2024  infection    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)    Melena    Iron deficiency anemia due to chronic blood loss    Hx of cancer of lung    Adrenal mass (HCC)    Claudication (HCC)    Pleural effusion, bilateral    Longstanding persistent atrial fibrillation (HCC)    Acute ischemic right internal carotid artery (ICA) stroke (HCC)    Internal carotid artery stenosis, bilateral    Anemia    Gastrointestinal hemorrhage    NSTEMI (non-ST elevated myocardial infarction) (HCC)    Hypotension    Atrial fibrillation with rapid ventricular response (HCC)    Unstable angina (HCC)    Right upper quadrant abdominal pain    ACP (advance care planning)    Palliative care encounter    Acute cholecystitis       Plan: Continue with periodic bladder scans    Ry Traylor MD  4:40 AM 4/26/2024

## 2024-04-26 NOTE — ANESTHESIA PRE PROCEDURE
elevated troponin nonspecific     2.  Acute on chronic heart failure with preserved ejection fraction/loculated pleural effusion  Status post thoracentesis is planned for bronchoscopy  3.  Recent CVA  4.  Peripheral vascular disease   \"     Anesthesia Plan      general and TIVA     ASA 4       Induction: intravenous.    MIPS: Postoperative opioids intended, Prophylactic antiemetics administered and Postoperative trial extubation.  Anesthetic plan and risks discussed with patient.                Explained to patient that he is at elevated risk of perioperative CVA given his recent CVA 2 weeks ago.        HEATHER VILLATORO MD   4/26/2024

## 2024-04-26 NOTE — FLOWSHEET NOTE
24 0930   Treatment Team Notification   Reason for Communication Review case   Name of Team Member Notified Ahmad   Treatment Team Role Attending Provider   Method of Communication Face to face   Response At bedside     MD rounds at bedside, updated by RN   Fluids ordered while pt NPO  Monitor glucose level prn while NPO  Plan for likely discharge Monday or Tuesday  Previous placement precert  - ensure case mgmt has plan in place

## 2024-04-26 NOTE — PROGRESS NOTES
Patient Name: Jorge Luis Banda  Date of admission: 4/3/2024  3:27 PM  Patient's age: 78 y.o., 1945  Admission Dx: Melena [K92.1]  GI bleed [K92.2]  UMM (acute kidney injury) (HCC) [N17.9]  Left leg cellulitis [L03.116]  Anemia, unspecified type [D64.9]    Reason for Consult: management recommendations  Requesting Physician: Sony Crawford MD    CHIEF COMPLAINT: GI bleeding    History Obtained From:  patient  INTERVAL HISTORY:    Patient seen and examined.    Patient's family at bedside  Shortness of breath stable  Hemoglobin today stable at 8.2  Plan for [facility] today per pulmonary    HISTORY OF PRESENT ILLNESS:    The patient is a 78 y.o.   male who is admitted to the hospital for anemia and suspicion for GI bleeding.  His hemoglobin was under 7 and received blood transfusion.  He has severe peripheral vascular disease on Plavix and Coumadin.  He underwent an EGD that showed gastritis and he refused colonoscopy.  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  From oncology perspective, the patient was diagnosed with stage I lung cancer in 2019 and underwent lung resection.  Never received any chemotherapy or radiation.  He has been in remission since then.  CT scan of the chest showed concerning changes in the liver and adrenal gland for metastatic disease.  Dedicated CT of the abdomen and pelvis is ordered and pending.    Past Medical History:   has a past medical history of Paroxysmal atrial fibrillation (HCC), Peripheral artery disease (HCC), and Primary hypertension.    Past Surgical History:   has a past surgical history that includes above knee amputation (Right, 01/01/2020); Upper gastrointestinal endoscopy (N/A, 4/8/2024); and IR CHOLECYSTOSTOMY PERCUTANEOUS COMPLETE (4/19/2024).     Medications:    Reviewed in Epic     Allergies:  Patient has no known allergies.    Social History:   reports  Sections of 1 lymph node, negative for tumor (0/1).     3. #7 nodes, excision:        Sections of 2 lymph nodes, negative for tumor (0/2).     4. Parabronchial lymph node, excision:        Sections of 2 lymph nodes, negative for tumor (0/2).     CANCER CASE SUMMARY         Procedure: Lobectomy   Specimen laterality: Left   Tumor site: Lower lobe   Tumor size: 2.8 cm   Tumor focality: Unifocal   Histologic type: Adenocarcinoma   Histologic grade: Moderately differentiated   Visceral pleura invasion: Not identified   Lymphovascular invasion: Not identified   Direct invasion of adjacent structures: No adjacent structures present   Margins: All margins are uninvolved by tumor. Margins examining include bronchial, vascular and parenchymal   Treatment effect: No known presurgical therapy   Regional Lymph Nodes --     Number of lymph nodes involved: 0      Specify anitra stations involved: NA     Number of lymph nodes examined: 29      Specify anitra stations examined: Stations 10, 9 and 7     TNM descriptors:   Primary Tumor (pT): pT1c   Regional Lymph Nodes (pN): pN0   IMAGING DATA:  CT chest   IMPRESSION:  1. Mild-to-moderate bilateral pleural effusions greater on the right with  areas of loculation. Underlying bibasilar atelectasis.  2. Biapical and bibasal fibrosis.  3. Partially visualize low-density mass seen involving the right adrenal  gland and portion of the right hepatic lobe most likely representing  malignancy.    Primary Problem  Melena    Active Hospital Problems    Diagnosis Date Noted    Acute cholecystitis [K81.0] 04/18/2024    Right upper quadrant abdominal pain [R10.11] 04/17/2024    ACP (advance care planning) [Z71.89] 04/17/2024    Palliative care encounter [Z51.5] 04/17/2024    NSTEMI (non-ST elevated myocardial infarction) (HCC) [I21.4] 04/14/2024    Hypotension [I95.9] 04/14/2024    Atrial fibrillation with rapid ventricular response (HCC) [I48.91] 04/14/2024    Unstable angina (HCC) [I20.0]

## 2024-04-26 NOTE — FLOWSHEET NOTE
04/26/24 1130   Treatment Team Notification   Reason for Communication Review case   Name of Team Member Notified Ahmed   Treatment Team Role Consulting Provider   Method of Communication Face to face   Response In department     MD on unit for rounds  Plan for bronchoscopy this afternoon  IV lopressor to maintain HR while NPO  Hold heparin starting now

## 2024-04-26 NOTE — PLAN OF CARE
Problem: Respiratory - Adult  Goal: Ability to maintain normal respiratory secretions will improve  Description: Ability to maintain normal respiratory secretions will improve  Outcome: Progressing

## 2024-04-26 NOTE — FLOWSHEET NOTE
04/26/24 1015   Treatment Team Notification   Reason for Communication Review case   Name of Team Member Notified Islas   Treatment Team Role Advanced Practice Nurse  (cardiology)   Method of Communication Face to face   Response At bedside     Cardiology NP at bedside, updated by RN  Per APRN - give additional dose of IV lopressor now  Will schedule IV lopressor for HR control until pt no longer NPO

## 2024-04-26 NOTE — CONSULTS
Progress Note    Patient Name:  Jorge Luis Banda    :  1945 11:11 AM      SUBJECTIVE       Mr. Banda  has no chest pain, shortness of breath, palpitations, nausea or vomiting      OBJECTIVE     Vital signs:    /61   Pulse (!) 113   Temp 97.7 °F (36.5 °C) (Oral)   Resp 16   Ht 1.829 m (6')   Wt 76 kg (167 lb 8 oz)   SpO2 99%   BMI 22.72 kg/m²  (S) 4 L/min      Admit Weight:  68 kg (150 lb)    Last 3 weights:  Wt Readings from Last 3 Encounters:   24 76 kg (167 lb 8 oz)   20 54.4 kg (120 lb)       BMI: Body mass index is 22.72 kg/m².    Input/Output:       Intake/Output Summary (Last 24 hours) at 2024 1111  Last data filed at 2024 0538  Gross per 24 hour   Intake 247.04 ml   Output 630 ml   Net -382.96 ml         Exam:     General appearance: awake and alert moves all ext   Lungs: no rhonchi, no wheezes, no rales  Heart: S1 and S2 no murmur  Abdomen: positive bowel sounds, no bruits, no masses  Extremities: warm and dry, no cyanosis, no clubbing        Laboratory Studies:     CBC:   Recent Labs     24  0627 24  1256 24  1803 24  0002 24  0332 24  0758   WBC 11.3  --  11.1  --   --   --    HGB 6.9*   < > 8.3* 7.8* 7.6* 8.2*   HCT 21.7*   < > 27.4* 24.7* 24.6* 26.3*   MCV 93.1  --  94.2  --   --   --      --  367  --   --   --     < > = values in this interval not displayed.     BMP:   Recent Labs     24  0627 24  0758    136   K 3.0* 4.2   CL 99 98   CO2 30 25   BUN 13 10   CREATININE 1.0 1.0     PT/INR:   Recent Labs     24  1803   PROTIME 22.6*   INR 2.0     APTT:   Recent Labs     24  0002 24  0332 24  0758   APTT >150.0* 69.8* 81.5*     MAG: No results for input(s): \"MG\" in the last 72 hours.  D Dimer: No results for input(s): \"DDIMER\" in the last 72 hours.  Troponin  No results for input(s): \"TROPONINI\" in the last 72 hours.      No results for input(s): \"TROPONINT\" in

## 2024-04-26 NOTE — FLOWSHEET NOTE
04/26/24 1555   Treatment Team Notification   Reason for Communication Review case   Name of Team Member Notified Tarun   Treatment Team Role Consulting Provider   Method of Communication Secure Message   Response See orders   Notification Time 1603     Request to clarify heparin gtt and isolation orders that populated upon return from bronchoscopy  Per Dr Mcneil, hold heparin gtt for now, resume in AM  Not a concern for tuberculosis - no isolation required

## 2024-04-26 NOTE — PROGRESS NOTES
[facility]  Office: [phone]  Chinmay Ramos DO, Jose Atkinson DO, Guevara Ramirez DO, Pranay Cooper, DO, Sumeet Eller MD, Karen Carvajal MD, Sony Crawford MD, Poonam Platt MD,  Stephan San MD, Marcello Daniels MD, Theodore Pascual MD,  Juve Mahmood DO, Matias Mckeon MD, Hero Tavarez MD, Valentin Ramos DO, Jazzy Spence MD,  Trino Handley DO, Flavia Leonardo MD, Vanessa Soriano MD, Pricilla Akers MD, aCsimiro Knight MD,  Zeke Maciel MD, Guera Cervantes MD, Kianna Martin MD, Mirlande Worrell MD, Ney Garcia MD, Savannah Dominguez MD, Ed Emerson DO, Jaylen West DO, Comfort Carcamo MD,  Siva Ashley MD, Shirley Waterhouse, CNP,  Carolyn Pelaez CNP, Scotty Ge, CNP,  Wandy Trivedi, DNP, Malissa Gaston, CNP, Lana Penaloza, CNP, Susan Haro, CNP, Sherrell Mendoza, CNP, Suzy Morales, PA-C, Dalila Lange PA-C, Sada Matt, CNP, Crystal Womack, CNP, Sheng Reddy, CNP, Arielle Guy, CNP, Linsey Perez, CNP, Anju Cho, CNS, Hoa Donahue, CNP, Cassidy Ferrer CNP, Tracy Schwab, CNP       [facility]      Daily Progress Note     Admit Date: 4/3/2024  Bed/Room No.  2027/2027-01  Admitting Physician : Sony Crawford MD  Code Status :Full Code  Hospital Day:  LOS: 23 days   Chief Complaint:     Chief Complaint   Patient presents with    Leg Pain     Left leg, open wounds and swollen       Principal Problem:    Melena  Active Problems:    History of arterial bypass of lower extremity    S/P AKA (above knee amputation) unilateral, right (HCC)    Primary hypertension    Peripheral arterial disease (HCC)    Neuropathy    Malignant neoplasm of lung (HCC)    Intermittent claudication of left lower extremity due to atherosclerosis (HCC)    Embolism and thrombosis of artery (HCC)    Left leg cellulitis    Iron deficiency anemia due to chronic blood loss    Hx of cancer of lung    Adrenal mass (HCC)    Claudication (HCC)    Pleural effusion, bilateral     combined systolic and diastolic CHF  Oral Lasix -   Monitor kidney function.  Moderate pulmonary hypertension   O2   Lasix  -   Severe ICA stenosis. 90 % R and 80 % Left .   No plan for inpatient endarterectomy.  Outpatient follow-up with primary vascular surgery..  Acute acalculous cholecystitis  Poor surgical candidate  S/P cholecystotomy tube placement.  On IV Zosyn.  Stop date 4/26/2024.  Atelectasis left lung  Plan for Bronch tomorrow.   Urinary retention   Straight cath as needed , Flomax. Refuses Dotson Catheter    Eliquis on hold for possible bronch -resume Eliquis after procedure.      plan to keep Cholecystostomy tube in place for ~ 6 weeks.  Outpatient follow-up with Dr. Dowling    Discussed with ANIL Charles.    Patient will need placement in skilled nursing facility at discharge.      Continue to monitor vitals , Intake / output ,  Cell count , HGB , Kidney function, oxygenation  as indicated .   Plan and updates discussed with patient ,  answers  explained to satisfaction.      (Please note that portions of this note were completed with a voice recognition program. Efforts were made to edit the dictations but occasionally words are mis-transcribed.)      Sony Crawford MD  4/26/2024

## 2024-04-26 NOTE — PROGRESS NOTES
Pulmonary Critical Care Progress Note       Patient seen for the follow up of bilateral pleural effusions, hemoptysis     Subjective:  Patient is feeling better today.  He denies any chest pain.  He still has weak cough.  He denies significant shortness of breath.  He is on heparin drip for his A-fib.  Earlier I gave him Lopressor x 1 because he is n.p.o.  Chest vest therapy continues.     Examination:  Vitals: /61   Pulse (!) 113   Temp 97.7 °F (36.5 °C) (Oral)   Resp 16   Ht 1.829 m (6')   Wt 76 kg (167 lb 8 oz)   SpO2 99%   BMI 22.72 kg/m²   General appearance: In no acute distress, alert and cooperative with exam cachectic  Neck: No JVD  Lungs: Decreased breath sound no crackles or wheeze  Heart: irregular rate and rhythm, S1, S2 normal, no gallop  Abdomen: Soft, non tender, + BS  Cholecystotomy tube in place bloody output  Extremities: no cyanosis or clubbing. No significant edema, left upper and lower extremity weakness right above-knee amputation left foot dorsal ulcer    LABs:  CBC:   Recent Labs     04/25/24  0627 04/25/24  1256 04/25/24  1803 04/26/24  0002 04/26/24  0332 04/26/24  0758   WBC 11.3  --  11.1  --   --   --    HGB 6.9* 8.6* 8.3* 7.8* 7.6* 8.2*   HCT 21.7* 27.0* 27.4* 24.7* 24.6* 26.3*     --  367  --   --   --        BMP:   Recent Labs     04/25/24  0627 04/26/24  0758    136   K 3.0* 4.2   CO2 30 25   BUN 13 10   CREATININE 1.0 1.0   LABGLOM 76 76   GLUCOSE 98 83       Recent Labs     04/26/24  0002 04/26/24  0332 04/26/24  0758   APTT >150.0* 69.8* 81.5*        Latest Reference Range & Units 04/11/24 15:56 04/13/24 23:26 04/16/24 04:21   Procalcitonin 0.00 - 0.09 ng/mL 1.95 (H) 1.02 (H) 0.72 (H)   (H): Data is abnormally high   Latest Reference Range & Units 04/15/24 04:34   Pro-BNP <300 pg/mL 16,301 (H)   (H): Data is abnormally high  Radiology:  X-ray chest 4/24/24      X-ray chest 4/23/24      X-ray chest 4/22/24      CT chest abdomen pelvis 4/16/24  1.  CT

## 2024-04-26 NOTE — CARE COORDINATION
Social work: Spoke to Rita/spouse and informed her [facility] cannot accept, she is interested in [facility] and [facility] as alternative options.   Referrals sent.     UPDATE 12:43- [facility] is full.  Await response from [facility].     UPDATE 17:10- [facility] is full until at least Monday.  Will discuss another choice with spouse.

## 2024-04-27 ENCOUNTER — APPOINTMENT (OUTPATIENT)
Dept: GENERAL RADIOLOGY | Age: 79
DRG: 377 | End: 2024-04-27
Payer: MEDICARE

## 2024-04-27 PROBLEM — R91.8 LUNG MASS: Status: ACTIVE | Noted: 2024-04-27

## 2024-04-27 PROBLEM — K76.9 LIVER LESION: Status: ACTIVE | Noted: 2024-04-27

## 2024-04-27 LAB
ANION GAP SERPL CALCULATED.3IONS-SCNC: 9 MMOL/L (ref 9–17)
BUN SERPL-MCNC: 15 MG/DL (ref 8–23)
BUN/CREAT SERPL: 14 (ref 9–20)
CALCIUM SERPL-MCNC: 8 MG/DL (ref 8.6–10.4)
CHLORIDE SERPL-SCNC: 100 MMOL/L (ref 98–107)
CO2 SERPL-SCNC: 26 MMOL/L (ref 20–31)
CREAT SERPL-MCNC: 1.1 MG/DL (ref 0.7–1.2)
ERYTHROCYTE [DISTWIDTH] IN BLOOD BY AUTOMATED COUNT: 19.5 % (ref 11.8–14.4)
GFR SERPL CREATININE-BSD FRML MDRD: 68 ML/MIN/1.73M2
GLUCOSE SERPL-MCNC: 155 MG/DL (ref 70–99)
HCT VFR BLD AUTO: 23 % (ref 40.7–50.3)
HCT VFR BLD AUTO: 23.4 % (ref 40.7–50.3)
HCT VFR BLD AUTO: 27.1 % (ref 40.7–50.3)
HCT VFR BLD AUTO: 28.1 % (ref 40.7–50.3)
HGB BLD-MCNC: 7.1 G/DL (ref 13–17)
HGB BLD-MCNC: 7.3 G/DL (ref 13–17)
HGB BLD-MCNC: 8.1 G/DL (ref 13–17)
HGB BLD-MCNC: 8.1 G/DL (ref 13–17)
MCH RBC QN AUTO: 28.6 PG (ref 25.2–33.5)
MCHC RBC AUTO-ENTMCNC: 30.9 G/DL (ref 28.4–34.8)
MCV RBC AUTO: 92.7 FL (ref 82.6–102.9)
NRBC BLD-RTO: 0 PER 100 WBC
PLATELET # BLD AUTO: 316 K/UL (ref 138–453)
PMV BLD AUTO: 10.2 FL (ref 8.1–13.5)
POTASSIUM SERPL-SCNC: 3.8 MMOL/L (ref 3.7–5.3)
RBC # BLD AUTO: 2.48 M/UL (ref 4.21–5.77)
SODIUM SERPL-SCNC: 135 MMOL/L (ref 135–144)
WBC OTHER # BLD: 8.4 K/UL (ref 3.5–11.3)

## 2024-04-27 PROCEDURE — 6360000002 HC RX W HCPCS: Performed by: INTERNAL MEDICINE

## 2024-04-27 PROCEDURE — 6370000000 HC RX 637 (ALT 250 FOR IP): Performed by: INTERNAL MEDICINE

## 2024-04-27 PROCEDURE — 85014 HEMATOCRIT: CPT

## 2024-04-27 PROCEDURE — 36415 COLL VENOUS BLD VENIPUNCTURE: CPT

## 2024-04-27 PROCEDURE — 2700000000 HC OXYGEN THERAPY PER DAY

## 2024-04-27 PROCEDURE — 99232 SBSQ HOSP IP/OBS MODERATE 35: CPT | Performed by: INTERNAL MEDICINE

## 2024-04-27 PROCEDURE — 97110 THERAPEUTIC EXERCISES: CPT

## 2024-04-27 PROCEDURE — 94761 N-INVAS EAR/PLS OXIMETRY MLT: CPT

## 2024-04-27 PROCEDURE — 80048 BASIC METABOLIC PNL TOTAL CA: CPT

## 2024-04-27 PROCEDURE — 6370000000 HC RX 637 (ALT 250 FOR IP): Performed by: FAMILY MEDICINE

## 2024-04-27 PROCEDURE — 85018 HEMOGLOBIN: CPT

## 2024-04-27 PROCEDURE — 94640 AIRWAY INHALATION TREATMENT: CPT

## 2024-04-27 PROCEDURE — 71045 X-RAY EXAM CHEST 1 VIEW: CPT

## 2024-04-27 PROCEDURE — 85027 COMPLETE CBC AUTOMATED: CPT

## 2024-04-27 PROCEDURE — 2000000000 HC ICU R&B

## 2024-04-27 PROCEDURE — 2580000003 HC RX 258: Performed by: INTERNAL MEDICINE

## 2024-04-27 PROCEDURE — 94669 MECHANICAL CHEST WALL OSCILL: CPT

## 2024-04-27 RX ADMIN — METOPROLOL TARTRATE 25 MG: 25 TABLET, FILM COATED ORAL at 11:05

## 2024-04-27 RX ADMIN — ASPIRIN 81 MG: 81 TABLET, COATED ORAL at 11:05

## 2024-04-27 RX ADMIN — METRONIDAZOLE 500 MG: 500 TABLET ORAL at 14:25

## 2024-04-27 RX ADMIN — ATORVASTATIN CALCIUM 40 MG: 40 TABLET, FILM COATED ORAL at 21:07

## 2024-04-27 RX ADMIN — SODIUM CHLORIDE, PRESERVATIVE FREE 10 ML: 5 INJECTION INTRAVENOUS at 09:00

## 2024-04-27 RX ADMIN — Medication 3 MG: at 00:23

## 2024-04-27 RX ADMIN — EMPAGLIFLOZIN 10 MG: 10 TABLET, FILM COATED ORAL at 11:06

## 2024-04-27 RX ADMIN — CHOLECALCIFEROL TAB 125 MCG (5000 UNIT) 5000 UNITS: 125 TAB at 11:44

## 2024-04-27 RX ADMIN — TAMSULOSIN HYDROCHLORIDE 0.4 MG: 0.4 CAPSULE ORAL at 11:06

## 2024-04-27 RX ADMIN — APIXABAN 5 MG: 5 TABLET, FILM COATED ORAL at 11:06

## 2024-04-27 RX ADMIN — DRONABINOL 2.5 MG: 2.5 CAPSULE ORAL at 21:07

## 2024-04-27 RX ADMIN — CEFDINIR 300 MG: 300 CAPSULE ORAL at 11:05

## 2024-04-27 RX ADMIN — FUROSEMIDE 40 MG: 40 TABLET ORAL at 11:05

## 2024-04-27 RX ADMIN — CYANOCOBALAMIN TAB 1000 MCG 1000 MCG: 1000 TAB at 11:06

## 2024-04-27 RX ADMIN — MIRTAZAPINE 30 MG: 15 TABLET, ORALLY DISINTEGRATING ORAL at 21:07

## 2024-04-27 RX ADMIN — MIDODRINE HYDROCHLORIDE 5 MG: 5 TABLET ORAL at 14:13

## 2024-04-27 RX ADMIN — ACETYLCYSTEINE 400 MG: 200 SOLUTION ORAL; RESPIRATORY (INHALATION) at 19:14

## 2024-04-27 RX ADMIN — APIXABAN 5 MG: 5 TABLET, FILM COATED ORAL at 21:07

## 2024-04-27 RX ADMIN — LEVALBUTEROL 1.25 MG: 1.25 SOLUTION, CONCENTRATE RESPIRATORY (INHALATION) at 19:14

## 2024-04-27 RX ADMIN — ACETYLCYSTEINE 400 MG: 200 SOLUTION ORAL; RESPIRATORY (INHALATION) at 08:01

## 2024-04-27 RX ADMIN — OXYCODONE AND ACETAMINOPHEN 1 TABLET: 5; 325 TABLET ORAL at 11:44

## 2024-04-27 RX ADMIN — MIDODRINE HYDROCHLORIDE 5 MG: 5 TABLET ORAL at 21:07

## 2024-04-27 RX ADMIN — METRONIDAZOLE 500 MG: 500 TABLET ORAL at 05:53

## 2024-04-27 RX ADMIN — LEVALBUTEROL 1.25 MG: 1.25 SOLUTION, CONCENTRATE RESPIRATORY (INHALATION) at 08:01

## 2024-04-27 RX ADMIN — PANTOPRAZOLE SODIUM 40 MG: 40 TABLET, DELAYED RELEASE ORAL at 05:54

## 2024-04-27 RX ADMIN — GUAIFENESIN 600 MG: 600 TABLET ORAL at 21:07

## 2024-04-27 RX ADMIN — FERROUS SULFATE TAB EC 325 MG (65 MG FE EQUIVALENT) 325 MG: 325 (65 FE) TABLET DELAYED RESPONSE at 11:45

## 2024-04-27 RX ADMIN — GUAIFENESIN 600 MG: 600 TABLET ORAL at 11:06

## 2024-04-27 RX ADMIN — DRONABINOL 2.5 MG: 2.5 CAPSULE ORAL at 11:44

## 2024-04-27 RX ADMIN — SODIUM CHLORIDE, PRESERVATIVE FREE 10 ML: 5 INJECTION INTRAVENOUS at 21:12

## 2024-04-27 ASSESSMENT — PAIN SCALES - GENERAL
PAINLEVEL_OUTOF10: 4
PAINLEVEL_OUTOF10: 3

## 2024-04-27 NOTE — PROGRESS NOTES
VASCULAR SURGERY  PROGRESS NOTE      4/27/2024 4:54 PM  Subjective:   Admit Date: 4/3/2024  PCP: Lucila Machado APRN - CNP    Chief Complaint   Patient presents with    Leg Pain     Left leg, open wounds and swollen       Interval History: No complaints.  Tentative plans for discharge on Monday noted.    Diet: ADULT ORAL NUTRITION SUPPLEMENT; Breakfast, Dinner; Standard High Calorie/High Protein Oral Supplement  ADULT DIET; Regular; No Drinking Straws    Medications:   Scheduled Meds:   droNABinol  2.5 mg Oral BID WC    apixaban  5 mg Oral BID    acetylcysteine  400 mg Inhalation TID RT    levalbuterol  1.25 mg Nebulization TID    mirtazapine  30 mg Oral Nightly    midodrine  5 mg Oral TID WC    furosemide  40 mg Oral Daily    sodium chloride  80 mL IntraVENous Once    pantoprazole  40 mg Oral BID AC    empagliflozin  10 mg Oral Daily    sodium chloride  80 mL IntraVENous Once    metoprolol tartrate  25 mg Oral BID    aspirin  81 mg Oral Daily    sodium chloride  100 mL IntraVENous Once    guaiFENesin  600 mg Oral BID    ferrous sulfate  325 mg Oral Daily with breakfast    atorvastatin  40 mg Oral Nightly    vitamin D3  5,000 Units Oral Daily    vitamin B-12  1,000 mcg Oral Daily    tamsulosin  0.4 mg Oral Daily    sodium chloride flush  5-40 mL IntraVENous 2 times per day     Continuous Infusions:   sodium chloride      sodium chloride Stopped (04/20/24 0943)    dextrose           Labs:   CBC:   Recent Labs     04/25/24  0627 04/25/24  1256 04/25/24  1803 04/26/24  0002 04/26/24  2352 04/27/24  0315 04/27/24  1201   WBC 11.3  --  11.1  --   --  8.4  --    HGB 6.9*   < > 8.3*   < > 8.1* 7.1* 8.1*     --  367  --   --  316  --     < > = values in this interval not displayed.       BMP:    Recent Labs     04/25/24  0627 04/26/24  0758 04/27/24  0315    136 135   K 3.0* 4.2 3.8   CL 99 98 100   CO2 30 25 26   BUN 13 10 15   CREATININE 1.0 1.0 1.1   GLUCOSE 98 83 155*       Hepatic:   No results

## 2024-04-27 NOTE — PLAN OF CARE
Problem: Respiratory - Adult  Goal: Able to breathe comfortably  Description: Able to breathe comfortably  4/27/2024 1921 by Hoa Thornton RCP  Outcome: Progressing     Problem: Respiratory - Adult  Goal: Clear lung sounds  4/27/2024 1921 by Hoa Thornton RCP  Outcome: Progressing     Problem: Respiratory - Adult  Goal: Adequate oxygenation  Description: Adequate oxygenation  4/27/2024 1921 by Hoa Thornton RCP  Outcome: Progressing     Problem: Respiratory - Adult  Goal: Ability to maintain normal respiratory secretions will improve  Description: Ability to maintain normal respiratory secretions will improve  4/27/2024 1921 by Hoa Thornton RCP  Outcome: Progressing           BRONCHOSPASM/BRONCHOCONSTRICTION    IMPROVE  AERATION/BREATHSOUNDS  ADMINISTER BRONCHODILATOR THERAPY AS APPROPRIATE  ASSESS BREATH SOUNDS  INITIATE AEROSOL PROTOCOL IF ORDERED TO DO SO  PATIENT EDUCATION AS NEEDED      PROVIDE ADEQUATE OXYGENATION WITH ACCEPTABLE SP02/ABG'S    [x]  IDENTIFY APPROPRIATE OXYGEN THERAPY  [x]   MONITOR SP02/ABG'S AS NEEDED   [x]   PATIENT EDUCATION AS NEEDED

## 2024-04-27 NOTE — DISCHARGE SUMMARY
[facility]  Office: [phone]  Chinmay Ramos DO, Jose Atkinson DO, Guevara Ramirez DO, Pranay Cooper, DO, Sumeet Eller MD, Karen Carvajal MD, Sony Crawford MD, Pooanm Platt MD,  Stephan San MD, Marcello Daniels MD, Theodore Pascual MD,  Juve Mahmood DO, Matias Mckeon MD, Hero Tavarez MD, Valentin Ramos DO, Jazzy Spence MD,  Trino Handley DO, Flavia Leonardo MD, Vanessa Soriano MD, Pricilla Akers MD, Casimiro Knight MD,  Zeke Maciel MD, Guera Cervantes MD, Kianna Martin MD, Mirlande Worrell MD, Ney Garcia MD, Savannah Dominguez MD, Ed Emerson DO, Jaylen West DO, Comfort Carcamo MD,  Siva Ashley MD, Shirley Waterhouse, CNP,  Carolyn Pelaez CNP, Scotty Ge, CNP,  Wandy Trivedi, DNP, Malissa Gaston, CNP, Lana Penaloza, CNP, Susan Haro, CNP, Sherrell Mendoza, CNP, Suzy Morales, PA-C, Dalila Lange PA-C, Sada Matt, CNP, Crystal Womack, CNP, Sheng Reddy, CNP, Arielle Guy, CNP, Linsey Perez, CNP, Anju Cho, CNS, Hoa Donahue, CNP, Cassidy Ferrer CNP, Tracy Schwab, CNP         [facility]   IN-PATIENT SERVICE   [facility]    Progress Note    4/27/2024    2:43 PM    Name:   Jorge Luis Banda  MRN:     [id number]     Acct:      [id number]   Room:   2027/2027-01  IP Day:  24  Admit Date:  4/3/2024  3:27 PM    PCP:   Lucila Machado APRN - CNP  Code Status:  Full Code    Subjective:     C/C:   Chief Complaint   Patient presents with    Leg Pain     Left leg, open wounds and swollen       Interval History Status: .   Still feels very weak  Has minimal dark-colored drainage from cholecystostomy tube  Had bronchoscopy yesterday 4/26/2024 which showed left upper bronchus has obstruction with masslike lesion, washings taken-fungal elements seen on culture, pulmonary planning PET scan  He had right thoracentesis on 4/19/2024 with 750 mL fluid removed  Current hemoglobin 8.1  He is on 4 L oxygen  Brief History:     Jorge Luis Banda is 78  4/19/2024 by IR. Pulm did bronch  on 4/26/24.+ Fungal elements, ID following  Primary Hypertension  - well-controlled.  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  Continue  aspirin 81 mg daily.  Evaluated by neurology.   Treated with Mat-Synephrine for permissive hypertension,continue midodrine.    Former Smoker   Quit in 2019.  Acute on chronic combined systolic and diastolic CHF  Oral Lasix -   Monitor kidney function.  Moderate pulmonary hypertension   O2   Lasix  -   Severe ICA stenosis. 90 % R and 80 % Left .   No plan for inpatient endarterectomy.  Outpatient follow-up with primary vascular surgery..  Acute acalculous cholecystitis  Poor surgical candidate  S/P cholecystotomy tube placement.   IV Zosyn.  Stopped 4/26/2024.,  Continued on Flagyl orally  Atelectasis left lung  S/p bronch-see pulmonary note   urinary retention   Straight cath as needed , Flomax. Refuses Dotson Catheter     14.  Eliquis restarted after procedure.  15.   plan to keep Cholecystostomy tube in place for ~ 6 weeks.  Outpatient follow-up with Dr. Dowling       Patient will need placement in skilled nursing facility at discharge.    Sumeet Eller MD  4/27/2024  2:43 PM

## 2024-04-27 NOTE — PROGRESS NOTES
Nebulizer tx given with Xopenex and Mucomyst. Therapy vest unable due to pt being up in a chair at this time. Pt did not want therapy done while in chair. RN (Adan) aware. Will attempt therapy vest later when pt is back in bed.

## 2024-04-27 NOTE — PROGRESS NOTES
End Of Shift Note  [facility]  Summary of shift: Pt had uneventful night this shift. Pt still in afib RVR with soft pressures. Pt stated appetite has improved, was able to tolerate a sandwich with small bites. Tolerated oral fluids. Goal today is to continue plan of care     Vitals:    Vitals:    04/27/24 0400 04/27/24 0500 04/27/24 0601 04/27/24 0606   BP: (!) 92/56 95/61 126/75 129/72   Pulse: (!) 117 (!) 105 (!) 122 (!) 116   Resp: 18 17 22 18   Temp: 97.5 °F (36.4 °C)      TempSrc: Temporal      SpO2: 97%      Weight: 78.9 kg (174 lb)      Height:            I&O:   Intake/Output Summary (Last 24 hours) at 4/27/2024 0633  Last data filed at 4/27/2024 0631  Gross per 24 hour   Intake 1000 ml   Output 470 ml   Net 530 ml       Resp Status: 2L NC. Productive cough noted with blood tinged sputum    Ventilator Settings:     / / /FiO2 : 94 %    Critical Care IV infusions:   sodium chloride      sodium chloride Stopped (04/20/24 0943)    dextrose          LDA:   Peripheral IV 04/24/24 Distal;Left Forearm (Active)   Number of days: 2       Peripheral IV 04/25/24 Right;Anterior Cephalic (Active)   Number of days: 1       Biliary Tube 04/19/24 T-tube 8 fr RUQ (Active)   Number of days: 7       Wound 04/04/24 Foot Left;Dorsal dry, scabbed (Active)   Number of days: 22       Wound Foot Left;Lateral (Active)   Number of days:

## 2024-04-27 NOTE — FLOWSHEET NOTE
04/27/24 1300   Treatment Team Notification   Reason for Communication Review case   Name of Team Member Notified Ahmed   Treatment Team Role Consulting Provider   Method of Communication Face to face   Response In department     Ensure physiotherapy is being completed  Will need follow up with hem/onc for PET scan   No new orders received at this time

## 2024-04-27 NOTE — PLAN OF CARE
Problem: Safety - Adult  Goal: Free from fall injury  Outcome: Progressing     Problem: Discharge Planning  Goal: Discharge to home or other facility with appropriate resources  Outcome: Progressing     Problem: Pain  Goal: Verbalizes/displays adequate comfort level or baseline comfort level  Outcome: Progressing     Problem: Skin/Tissue Integrity - Adult  Goal: Incisions, wounds, or drain sites healing without S/S of infection  Outcome: Progressing     Problem: Gastrointestinal - Adult  Goal: Minimal or absence of nausea and vomiting  Outcome: Progressing  Goal: Maintains or returns to baseline bowel function  Outcome: Progressing     Problem: Infection - Adult  Goal: Absence of infection at discharge  Outcome: Progressing     Problem: Hematologic - Adult  Goal: Maintains hematologic stability  Outcome: Progressing     Problem: Respiratory - Adult  Goal: Able to breathe comfortably  Description: Able to breathe comfortably  4/27/2024 1921 by Hoa Thornton RCP  Outcome: Progressing  4/27/2024 0809 by Clifton Thomas RCP  Outcome: Progressing  Goal: Clear lung sounds  4/27/2024 1921 by Hoa Thornton RCP  Outcome: Progressing  4/27/2024 0809 by Clifton Thomas RCP  Outcome: Progressing  Goal: Adequate oxygenation  Description: Adequate oxygenation  4/27/2024 1921 by Hoa Thornton RCP  Outcome: Progressing  4/27/2024 0809 by Clifton Thomas RCP  Outcome: Progressing  Goal: Ability to maintain normal respiratory secretions will improve  Description: Ability to maintain normal respiratory secretions will improve  4/27/2024 1921 by Hoa Thornton RCP  Outcome: Progressing  4/27/2024 0809 by Clifton Thomas RCP  Outcome: Progressing     Problem: Skin/Tissue Integrity  Goal: Absence of new skin breakdown  Description: 1.  Monitor for areas of redness and/or skin breakdown  2.  Assess vascular access sites hourly  3.  Every 4-6 hours minimum:  Change oxygen saturation probe site  4.  Every 4-6  hours:  If on nasal continuous positive airway pressure, respiratory therapy assess nares and determine need for appliance change or resting period.  Outcome: Progressing     Problem: ABCDS Injury Assessment  Goal: Absence of physical injury  Outcome: Progressing     Problem: Genitourinary - Adult  Goal: Absence of urinary retention  Outcome: Progressing     Problem: Nutrition Deficit:  Goal: Optimize nutritional status  Outcome: Progressing     Problem: Chronic Conditions and Co-morbidities  Goal: Patient's chronic conditions and co-morbidity symptoms are monitored and maintained or improved  Outcome: Progressing

## 2024-04-27 NOTE — PROGRESS NOTES
Patient Name: Jorge Luis Banda  Date of admission: 4/3/2024  3:27 PM  Patient's age: 78 y.o., 1945  Admission Dx: Melena [K92.1]  GI bleed [K92.2]  UMM (acute kidney injury) (HCC) [N17.9]  Left leg cellulitis [L03.116]  Anemia, unspecified type [D64.9]    Reason for Consult: management recommendations  Requesting Physician: Sumeet Eller MD    CHIEF COMPLAINT: GI bleeding    History Obtained From:  patient  INTERVAL HISTORY:    Patient seen and examined.    Patient's family at bedside  Shortness of breath stable  Hemoglobin today stable   S/p bronc and pathology pending     left upper bronchus takeoff has obstruction with mass like lesion.?  Stump versus mass. washings taken.  We will obtain a PET scan.  Patient may need reevaluation with a bronchoscopy if PET scan is positive in left upper lobe bronchus takeoff.  Mucous plugging which was obstructing was mostly clotted blood, left mainstream bronchus washings obtained     HISTORY OF PRESENT ILLNESS:    The patient is a 78 y.o.   male who is admitted to the hospital for anemia and suspicion for GI bleeding.  His hemoglobin was under 7 and received blood transfusion.  He has severe peripheral vascular disease on Plavix and Coumadin.  He underwent an EGD that showed gastritis and he refused colonoscopy.  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  From oncology perspective, the patient was diagnosed with stage I lung cancer in 2019 and underwent lung resection.  Never received any chemotherapy or radiation.  He has been in remission since then.  CT scan of the chest showed concerning changes in the liver and adrenal gland for metastatic disease.  Dedicated CT of the abdomen and pelvis is ordered and pending.    Past Medical History:   has a past medical history of Paroxysmal atrial fibrillation (HCC), Peripheral artery disease (HCC), and Primary  pain [R10.11] 04/17/2024    ACP (advance care planning) [Z71.89] 04/17/2024    Palliative care encounter [Z51.5] 04/17/2024    NSTEMI (non-ST elevated myocardial infarction) (HCC) [I21.4] 04/14/2024    Hypotension [I95.9] 04/14/2024    Atrial fibrillation with rapid ventricular response (HCC) [I48.91] 04/14/2024    Unstable angina (HCC) [I20.0] 04/14/2024    Acute ischemic right internal carotid artery (ICA) stroke (HCC) [I63.231] 04/13/2024    Internal carotid artery stenosis, bilateral [I65.23] 04/13/2024    Anemia [D64.9] 04/13/2024    Gastrointestinal hemorrhage [K92.2] 04/13/2024    Pleural effusion, bilateral [J90] 04/11/2024    Longstanding persistent atrial fibrillation (HCC) [I48.11] 04/11/2024    Iron deficiency anemia due to chronic blood loss [D50.0] 04/10/2024    Hx of cancer of lung [Z85.118] 04/10/2024    Adrenal mass (HCC) [E27.8] 04/10/2024    Claudication (HCC) [I73.9] 04/10/2024    Melena [K92.1] 04/03/2024    Peripheral arterial disease (HCC) [I73.9] 04/03/2024    S/P AKA (above knee amputation) unilateral, right (HCC) [Z89.611] 03/05/2024    Embolism and thrombosis of artery (HCC) [I74.9] 03/05/2024    Left leg cellulitis [L03.116] 03/05/2024    History of arterial bypass of lower extremity [Z95.828] 11/08/2019    Malignant neoplasm of lung (HCC) [C34.90] 04/24/2019    Primary hypertension [I10] 02/14/2019    Intermittent claudication of left lower extremity due to atherosclerosis (HCC) [I70.212] 02/14/2019    Neuropathy [G62.9] 01/17/2019         IMPRESSION:   Severe peripheral vascular disease  On Coumadin and Plavix  Drop in hemoglobin and likely GI bleeding  History of lung cancer 2019, stage I status post lung resection  Concerning findings on CT scan with adrenal and liver mass  Acute CVA with left arm weakness  Acute cholecystitis status post cholecystotomy tube placement and on antibiotics    RECOMMENDATIONS:  I reviewed the lab data, imaging studies and discussed the diagnosis and

## 2024-04-27 NOTE — PROGRESS NOTES
Occupational Therapy  Facility/Department: [facility]  Rehabilitation Occupational Therapy Daily Treatment Note    Date: 24  Patient Name: Jorge Luis Banda       Room:   MRN: [id number]  Account: [id number]   : 1945  (78 y.o.) Gender: male      ANIL Charles reports patient is medically stable for therapy treatment this date. Chart reviewed prior to treatment and patient is agreeable for therapy.  All lines intact and patient positioned comfortably at end of treatment.  All patient needs addressed prior to ending therapy session.      Pt currently functioning below baseline.  Recommend daily inpatient skilled therapy at time of discharge to maximize long term outcomes and prevent re-admission. Please refer to AM-PAC score for current level of function.               Past Medical History:  has a past medical history of Paroxysmal atrial fibrillation (HCC), Peripheral artery disease (HCC), and Primary hypertension.  Past Surgical History:   has a past surgical history that includes above knee amputation (Right, 2020); Upper gastrointestinal endoscopy (N/A, 2024); IR CHOLECYSTOSTOMY PERCUTANEOUS COMPLETE (2024); and bronchoscopy (N/A, 2024).    Restrictions  Restrictions/Precautions: Fall Risk, General Precautions, Bedrest with Bathroom Privileges  Other position/activity restrictions: Telemetry, LUE IV, L sided weakness (s/p R CVA); R AKA;  R UE IV; Arterial Line (L radial);  PICC Line (R brachial);  2L Supplemental O2 nasal cannula  Required Braces or Orthoses?: No    Subjective  Subjective: Pt resting in bed upon arrival agreeable to OT. Reported general soreness.  Restrictions/Precautions: Fall Risk;General Precautions;Bedrest with Bathroom Privileges             Objective     Cognition  Overall Cognitive Status: Exceptions  Arousal/Alertness: Appropriate responses to stimuli  Following Commands: Follows one step commands with repetition;Follows one step commands with  70.42 (04/27/24 1024)  ADL Inpatient CMS G-Code Modifier : CL (04/27/24 1024)      Therapy Time   Individual Concurrent Group Co-treatment   Time In 0956         Time Out 1021         Minutes ZINA PUENTE

## 2024-04-27 NOTE — PROGRESS NOTES
Physical Therapy  DATE: 2024    NAME: Jorge Luis Banda  MRN: [id number]   : 1945    Patient not seen this date for Physical Therapy due to:      [] Cancel by RN or physician due to:    [] Hemodialysis    [] Critical Lab Value Level     [] Blood transfusion in progress    [] Acute or unstable cardiovascular status   _MAP < 55 or more than >115  _HR < 40 or > 130    [] Acute or unstable pulmonary status   -FiO2 > 60%   _RR < 5 or >40    _O2 sats < 85%    [] Strict Bedrest    [] Off Unit for surgery or procedure    [] Off Unit for testing       [] Pending imaging to R/O fracture    [] Refusal by Patient      [x] Other nursing currently adjusting medication to better control HR (130bpm)      [] PT being discontinued at this time. Patient independent. No further needs.     [] PT being discontinued at this time as the patient has been transferred to hospice care. No further needs.      Elida Oreilly, PTA

## 2024-04-27 NOTE — PROGRESS NOTES
Pulmonary Critical Care Progress Note       Patient seen for the follow up of bilateral pleural effusions, hemoptysis     Subjective:  Patient is sitting in bedside chair.  He denies any chest pain.  He still has weak cough.  He denies significant shortness of breath. Chest vest therapy continues.     Examination:  Vitals: /62   Pulse (!) 134   Temp 97.1 °F (36.2 °C) (Temporal)   Resp 22   Ht 1.829 m (6')   Wt 78.9 kg (174 lb)   SpO2 100%   BMI 23.60 kg/m²   General appearance: In no acute distress, alert and cooperative with exam cachectic  Neck: No JVD  Lungs: Decreased breath sound no crackles or wheeze  Heart: irregular rate and rhythm, S1, S2 normal, no gallop  Abdomen: Soft, non tender, + BS  Cholecystotomy tube in place bloody output  Extremities: no cyanosis or clubbing. No significant edema, left upper and lower extremity weakness right above-knee amputation left foot dorsal ulcer    LABs:  CBC:   Recent Labs     04/25/24  0627 04/25/24  1256 04/25/24  1803 04/26/24  0002 04/26/24  0332 04/26/24 0758 04/26/24 2004 04/26/24  2352 04/27/24 0315 04/27/24  1201   WBC 11.3  --  11.1  --   --   --   --   --  8.4  --    HGB 6.9*   < > 8.3* 7.8* 7.6* 8.2* 8.2* 8.1* 7.1* 8.1*   HCT 21.7*   < > 27.4* 24.7* 24.6* 26.3* 27.0* 27.1* 23.0* 28.1*     --  367  --   --   --   --   --  316  --     < > = values in this interval not displayed.       BMP:   Recent Labs     04/25/24  0627 04/26/24 0758 04/27/24 0315    136 135   K 3.0* 4.2 3.8   CO2 30 25 26   BUN 13 10 15   CREATININE 1.0 1.0 1.1   LABGLOM 76 76 68   GLUCOSE 98 83 155*       Recent Labs     04/26/24  0002 04/26/24  0332 04/26/24 0758 04/26/24  1604   APTT >150.0* 69.8* 81.5* 45.5*        Latest Reference Range & Units 04/11/24 15:56 04/13/24 23:26 04/16/24 04:21   Procalcitonin 0.00 - 0.09 ng/mL 1.95 (H) 1.02 (H) 0.72 (H)   (H): Data is abnormally high   Latest Reference Range & Units 04/15/24 04:34   Pro-BNP <300 pg/mL 16,301

## 2024-04-27 NOTE — FLOWSHEET NOTE
04/26/24 2007   Treatment Team Notification   Reason for Communication Review case   Name of Team Member Notified DAJUAN Luciano   Treatment Team Role Advanced Practice Nurse   Method of Communication Call   Response Waiting for response   Notification Time 2007     Pt has elevated heart rate and low blood pressure. Writer contacted on call NP for cardiology group. Awaiting response     2133- Writer attempted to call cardiology a second time. No response at this time

## 2024-04-27 NOTE — FLOWSHEET NOTE
04/27/24 1100   Treatment Team Notification   Reason for Communication Review case   Name of Team Member Notified Daphnie   Treatment Team Role Attending Provider   Method of Communication Face to face   Response At bedside     MD at bedside, updated by RN  Noted fungal elements in preliminary bronchial washings culture - will await ID input on current medications when result is final  Continue trending hgb

## 2024-04-27 NOTE — FLOWSHEET NOTE
04/27/24 1130   Treatment Team Notification   Reason for Communication Review case   Name of Team Member Notified Ike   Treatment Team Role Consulting Provider   Method of Communication Face to face   Response At bedside     MD at bedside  Reviewed preliminary cultures with pt  Will need outpt PET scan after discharge

## 2024-04-27 NOTE — PROGRESS NOTES
[facility]  Progress Note    Jorge Luis Banda  MRN: [id number]  Date: 4/27/2024  LOS: 24     Reason for F/U :   Cholecystitis    Impression :   Acute cholecystitis  Status post percutaneous cholecystostomy tube placement 4/19/2024  History of lung cancer, stage I, status post resection in 2019 and concerning findings of liver and adrenal metastasis  Acute CVA with left-sided weakness and has flow-limiting critical right ICA stenosis 90%, bilateral proximal ICA are atherosclerosis with flow limitation left 80% and right 90%  Severe peripheral arterial disease  Small left-sided pleural effusion  Longstanding persistent atrial fibrillation with rapid ventricular response  Acute GI bleed, unclear source  Neurogenic bladder dysfunction with urinary retention and refusing indwelling Dotson catheter  Elevated troponin, consistent with type II non-ST elevation myocardial infarction, secondary to acute anemia  Acute diastolic congestive heart failure    Recommendations:   The patient received Unasyn from 4/3/2024 to 4/18/2024 [5 days], levofloxacin from 4/11 and 3 days of azithromycin from 4/11/2024 through 4/13/2024  The patient received Zosyn 4/11/2024 through 4/21/2024 [11 days of therapy]  Patient was transitioned to oral antimicrobial therapy on 4/21/2024   Discontinue antibiotics  Status post bronchoscopy 4/26/2024 with fungal pneumonias on bronchial washing pending identification, suspect contamination  Continue supportive care    Infection Control Recommendations:   Universal precautions    Discharge Planning:     Patient will need Midline Catheter Insertion/ PICC line Insertion: No  Patient will need: Home IV , Infusion Center,  SNF,  LTAC: Undetermined  Patient willneed outpatient wound care: No    Medical Decision making / Summary of Stay:   Jorge Luis Banda is a 78 y.o.-year-old male who was initially admitted on 4/3/2024.   The patient is being seen in consultation on HOSPITAL DAY #16 for  \"BILITOT\", \"ALKPHOS\", \"ALT\", \"AST\" in the last 72 hours.      Lab Results   Component Value Date/Time    PROCAL 0.72 04/16/2024 04:21 AM    PROCAL 1.02 04/13/2024 11:26 PM    PROCAL 1.95 04/11/2024 03:56 PM     Lab Results   Component Value Date/Time    CRP 7.8 04/03/2024 03:40 PM     No results found for: \"SEDRATE\"      No results found for: \"DDIMER\"  No results found for: \"FERRITIN\"  No results found for: \"LDH\"  No results found for: \"FIBRINOGEN\"    Results in Past 30 Days  Result Component Current Result Ref Range Previous Result Ref Range   SARS-CoV-2, Rapid Not Detected (4/22/2024) Not Detected Not in Time Range      Lab Results   Component Value Date/Time    COVID19 Not Detected 04/22/2024 02:49 PM       No results for input(s): \"VANCOTROUGH\" in the last 72 hours.    Imaging Studies:   Chest x-ray  Impression:        Increasing volume loss in the left lung       Cultures:     Procedure Component Value Units Date/Time   Culture, Fungus [[id number]] Collected: 04/19/24 1200   Order Status: Completed Specimen: Thoracentesis Updated: 04/22/24 0930    Specimen Description .THORACENTESIS FLUID RT    Direct Exam NO FUNGAL ELEMENTS SEEN    Culture NO GROWTH 3 DAYS   Culture with Smear, Acid Fast Bacillius [[id number]] Collected: 04/19/24 1200   Order Status: Completed Specimen: Thoracentesis Updated: 04/22/24 0910    Specimen Description .THORACENTESIS FLUID RT    Direct Exam NO ACID FAST BACILLI SEEN (CONCENTRATED SMEAR)    Culture NO GROWTH 2 DAYS   Culture, Anaerobic and Aerobic [[id number]] Collected: 04/19/24 1552   Order Status: Completed Specimen: Bile Updated: 04/22/24 0753    Specimen Description .BILE    Direct Exam MANY EPITHELIAL     NO BACTERIA SEEN    Culture NO GROWTH 3 DAYS   Culture, Body Fluid [[id number]] Collected: 04/19/24 1200   Order Status: Completed Specimen: Body Fluid from Thoracentesis Updated: 04/22/24 0731    Specimen Description .THORACENTESIS FLUID RT    Direct Exam Gram stain made from

## 2024-04-27 NOTE — PLAN OF CARE
Problem: Respiratory - Adult  Goal: Able to breathe comfortably  Description: Able to breathe comfortably  4/27/2024 0809 by Clifton Thomas RCP  Outcome: Progressing     Problem: Respiratory - Adult  Goal: Clear lung sounds  4/27/2024 0809 by Clifton Thomas RCP  Outcome: Progressing     Problem: Respiratory - Adult  Goal: Adequate oxygenation  Description: Adequate oxygenation  4/27/2024 0809 by Clifton Thomas RCP  Outcome: Progressing     Problem: Respiratory - Adult  Goal: Ability to maintain normal respiratory secretions will improve  Description: Ability to maintain normal respiratory secretions will improve  4/27/2024 0809 by Clifton Thomas RCP  Outcome: Progressing

## 2024-04-27 NOTE — PLAN OF CARE
Problem: Safety - Adult  Goal: Free from fall injury  Outcome: Progressing     Problem: Discharge Planning  Goal: Discharge to home or other facility with appropriate resources  Outcome: Progressing     Problem: Pain  Goal: Verbalizes/displays adequate comfort level or baseline comfort level  Outcome: Progressing     Problem: Skin/Tissue Integrity - Adult  Goal: Incisions, wounds, or drain sites healing without S/S of infection  Outcome: Progressing     Problem: Gastrointestinal - Adult  Goal: Minimal or absence of nausea and vomiting  Outcome: Progressing  Goal: Maintains or returns to baseline bowel function  Outcome: Progressing     Problem: Infection - Adult  Goal: Absence of infection at discharge  Outcome: Progressing     Problem: Hematologic - Adult  Goal: Maintains hematologic stability  Outcome: Progressing     Problem: Respiratory - Adult  Goal: Able to breathe comfortably  Description: Able to breathe comfortably  4/26/2024 1911 by Dick Garcia RCP  Outcome: Progressing  Goal: Clear lung sounds  4/26/2024 1911 by Dick Garcia RCP  Outcome: Progressing  Goal: Adequate oxygenation  Description: Adequate oxygenation  4/26/2024 1911 by Dick Garcia RCP  Outcome: Progressing  Goal: Ability to maintain normal respiratory secretions will improve  Description: Ability to maintain normal respiratory secretions will improve  4/26/2024 1911 by Dick Garcia RCP  Outcome: Progressing  4/26/2024 1611 by Shana Isaac RCP  Outcome: Progressing     Problem: Skin/Tissue Integrity  Goal: Absence of new skin breakdown  Description: 1.  Monitor for areas of redness and/or skin breakdown  2.  Assess vascular access sites hourly  3.  Every 4-6 hours minimum:  Change oxygen saturation probe site  4.  Every 4-6 hours:  If on nasal continuous positive airway pressure, respiratory therapy assess nares and determine need for appliance change or resting period.  Outcome: Progressing     Problem: ABCDS Injury Assessment  Goal:

## 2024-04-28 ENCOUNTER — APPOINTMENT (OUTPATIENT)
Dept: GENERAL RADIOLOGY | Age: 79
DRG: 377 | End: 2024-04-28
Payer: MEDICARE

## 2024-04-28 LAB
BILIRUB DIRECT SERPL-MCNC: 0.2 MG/DL
BILIRUB SERPL-MCNC: 0.7 MG/DL (ref 0.3–1.2)
DAT POLY-SP REAG RBC QL: NEGATIVE
HAPTOGLOB SERPL-MCNC: 322 MG/DL (ref 30–200)
HCT VFR BLD AUTO: 23.2 % (ref 40.7–50.3)
HCT VFR BLD AUTO: 24.1 % (ref 40.7–50.3)
HCT VFR BLD AUTO: 29.6 % (ref 40.7–50.3)
HGB BLD-MCNC: 7 G/DL (ref 13–17)
HGB BLD-MCNC: 7.1 G/DL (ref 13–17)
HGB BLD-MCNC: 9.3 G/DL (ref 13–17)
LDH SERPL-CCNC: 340 U/L (ref 135–225)
MICROORGANISM SPEC CULT: ABNORMAL
MICROORGANISM SPEC CULT: ABNORMAL
MICROORGANISM/AGENT SPEC: ABNORMAL
SPECIMEN DESCRIPTION: ABNORMAL

## 2024-04-28 PROCEDURE — 6370000000 HC RX 637 (ALT 250 FOR IP): Performed by: FAMILY MEDICINE

## 2024-04-28 PROCEDURE — 83615 LACTATE (LD) (LDH) ENZYME: CPT

## 2024-04-28 PROCEDURE — 6370000000 HC RX 637 (ALT 250 FOR IP): Performed by: INTERNAL MEDICINE

## 2024-04-28 PROCEDURE — 86880 COOMBS TEST DIRECT: CPT

## 2024-04-28 PROCEDURE — 94669 MECHANICAL CHEST WALL OSCILL: CPT

## 2024-04-28 PROCEDURE — 6360000002 HC RX W HCPCS

## 2024-04-28 PROCEDURE — 6360000002 HC RX W HCPCS: Performed by: INTERNAL MEDICINE

## 2024-04-28 PROCEDURE — 71045 X-RAY EXAM CHEST 1 VIEW: CPT

## 2024-04-28 PROCEDURE — 85018 HEMOGLOBIN: CPT

## 2024-04-28 PROCEDURE — 85014 HEMATOCRIT: CPT

## 2024-04-28 PROCEDURE — 2580000003 HC RX 258: Performed by: INTERNAL MEDICINE

## 2024-04-28 PROCEDURE — 6360000002 HC RX W HCPCS: Performed by: NURSE PRACTITIONER

## 2024-04-28 PROCEDURE — 97110 THERAPEUTIC EXERCISES: CPT

## 2024-04-28 PROCEDURE — 83010 ASSAY OF HAPTOGLOBIN QUANT: CPT

## 2024-04-28 PROCEDURE — 36430 TRANSFUSION BLD/BLD COMPNT: CPT

## 2024-04-28 PROCEDURE — 82248 BILIRUBIN DIRECT: CPT

## 2024-04-28 PROCEDURE — 2700000000 HC OXYGEN THERAPY PER DAY

## 2024-04-28 PROCEDURE — 99232 SBSQ HOSP IP/OBS MODERATE 35: CPT | Performed by: INTERNAL MEDICINE

## 2024-04-28 PROCEDURE — P9016 RBC LEUKOCYTES REDUCED: HCPCS

## 2024-04-28 PROCEDURE — 94640 AIRWAY INHALATION TREATMENT: CPT

## 2024-04-28 PROCEDURE — 2000000000 HC ICU R&B

## 2024-04-28 PROCEDURE — 94761 N-INVAS EAR/PLS OXIMETRY MLT: CPT

## 2024-04-28 PROCEDURE — 36415 COLL VENOUS BLD VENIPUNCTURE: CPT

## 2024-04-28 PROCEDURE — 82247 BILIRUBIN TOTAL: CPT

## 2024-04-28 RX ORDER — SODIUM CHLORIDE 9 MG/ML
INJECTION, SOLUTION INTRAVENOUS PRN
Status: DISCONTINUED | OUTPATIENT
Start: 2024-04-28 | End: 2024-05-11 | Stop reason: HOSPADM

## 2024-04-28 RX ORDER — MORPHINE SULFATE 2 MG/ML
INJECTION, SOLUTION INTRAMUSCULAR; INTRAVENOUS
Status: COMPLETED
Start: 2024-04-28 | End: 2024-04-28

## 2024-04-28 RX ORDER — MORPHINE SULFATE 2 MG/ML
1 INJECTION, SOLUTION INTRAMUSCULAR; INTRAVENOUS EVERY 4 HOURS PRN
Status: DISCONTINUED | OUTPATIENT
Start: 2024-04-28 | End: 2024-05-11 | Stop reason: HOSPADM

## 2024-04-28 RX ORDER — ACETYLCYSTEINE 200 MG/ML
400 SOLUTION ORAL; RESPIRATORY (INHALATION)
Status: DISCONTINUED | OUTPATIENT
Start: 2024-04-28 | End: 2024-04-30

## 2024-04-28 RX ADMIN — PANTOPRAZOLE SODIUM 40 MG: 40 TABLET, DELAYED RELEASE ORAL at 17:25

## 2024-04-28 RX ADMIN — MIRTAZAPINE 30 MG: 15 TABLET, ORALLY DISINTEGRATING ORAL at 20:14

## 2024-04-28 RX ADMIN — ACETYLCYSTEINE 400 MG: 200 SOLUTION ORAL; RESPIRATORY (INHALATION) at 08:00

## 2024-04-28 RX ADMIN — DRONABINOL 2.5 MG: 2.5 CAPSULE ORAL at 17:25

## 2024-04-28 RX ADMIN — METOPROLOL TARTRATE 25 MG: 25 TABLET, FILM COATED ORAL at 20:14

## 2024-04-28 RX ADMIN — MORPHINE SULFATE 1 MG: 2 INJECTION, SOLUTION INTRAMUSCULAR; INTRAVENOUS at 09:55

## 2024-04-28 RX ADMIN — METOPROLOL TARTRATE 25 MG: 25 TABLET, FILM COATED ORAL at 11:31

## 2024-04-28 RX ADMIN — MIDODRINE HYDROCHLORIDE 5 MG: 5 TABLET ORAL at 14:00

## 2024-04-28 RX ADMIN — EMPAGLIFLOZIN 10 MG: 10 TABLET, FILM COATED ORAL at 09:48

## 2024-04-28 RX ADMIN — SODIUM CHLORIDE, PRESERVATIVE FREE 10 ML: 5 INJECTION INTRAVENOUS at 20:14

## 2024-04-28 RX ADMIN — LEVALBUTEROL 1.25 MG: 1.25 SOLUTION, CONCENTRATE RESPIRATORY (INHALATION) at 14:14

## 2024-04-28 RX ADMIN — LEVALBUTEROL 1.25 MG: 1.25 SOLUTION, CONCENTRATE RESPIRATORY (INHALATION) at 08:00

## 2024-04-28 RX ADMIN — SODIUM CHLORIDE, PRESERVATIVE FREE 10 ML: 5 INJECTION INTRAVENOUS at 08:00

## 2024-04-28 RX ADMIN — MIDODRINE HYDROCHLORIDE 5 MG: 5 TABLET ORAL at 17:25

## 2024-04-28 RX ADMIN — ATORVASTATIN CALCIUM 40 MG: 40 TABLET, FILM COATED ORAL at 20:14

## 2024-04-28 RX ADMIN — ACETYLCYSTEINE 400 MG: 200 SOLUTION ORAL; RESPIRATORY (INHALATION) at 14:14

## 2024-04-28 RX ADMIN — LEVALBUTEROL 1.25 MG: 1.25 SOLUTION, CONCENTRATE RESPIRATORY (INHALATION) at 19:36

## 2024-04-28 RX ADMIN — TAMSULOSIN HYDROCHLORIDE 0.4 MG: 0.4 CAPSULE ORAL at 09:48

## 2024-04-28 RX ADMIN — ASPIRIN 81 MG: 81 TABLET, COATED ORAL at 09:48

## 2024-04-28 RX ADMIN — GUAIFENESIN 600 MG: 600 TABLET ORAL at 09:48

## 2024-04-28 RX ADMIN — CYANOCOBALAMIN TAB 1000 MCG 1000 MCG: 1000 TAB at 09:47

## 2024-04-28 RX ADMIN — CHOLECALCIFEROL TAB 125 MCG (5000 UNIT) 5000 UNITS: 125 TAB at 09:47

## 2024-04-28 RX ADMIN — OXYCODONE AND ACETAMINOPHEN 1 TABLET: 5; 325 TABLET ORAL at 09:10

## 2024-04-28 RX ADMIN — APIXABAN 5 MG: 5 TABLET, FILM COATED ORAL at 09:48

## 2024-04-28 RX ADMIN — GUAIFENESIN 600 MG: 600 TABLET ORAL at 20:14

## 2024-04-28 RX ADMIN — APIXABAN 5 MG: 5 TABLET, FILM COATED ORAL at 20:14

## 2024-04-28 RX ADMIN — MIDODRINE HYDROCHLORIDE 5 MG: 5 TABLET ORAL at 09:48

## 2024-04-28 RX ADMIN — ACETYLCYSTEINE 400 MG: 200 SOLUTION ORAL; RESPIRATORY (INHALATION) at 19:36

## 2024-04-28 RX ADMIN — PANTOPRAZOLE SODIUM 40 MG: 40 TABLET, DELAYED RELEASE ORAL at 06:38

## 2024-04-28 RX ADMIN — FERROUS SULFATE TAB EC 325 MG (65 MG FE EQUIVALENT) 325 MG: 325 (65 FE) TABLET DELAYED RESPONSE at 09:47

## 2024-04-28 RX ADMIN — DRONABINOL 2.5 MG: 2.5 CAPSULE ORAL at 09:48

## 2024-04-28 ASSESSMENT — PAIN DESCRIPTION - LOCATION
LOCATION: BACK;FLANK
LOCATION: BACK
LOCATION: BACK

## 2024-04-28 ASSESSMENT — PAIN DESCRIPTION - PAIN TYPE
TYPE: ACUTE PAIN
TYPE: CHRONIC PAIN
TYPE: ACUTE PAIN

## 2024-04-28 ASSESSMENT — PAIN DESCRIPTION - ORIENTATION
ORIENTATION: LOWER;RIGHT;OUTER
ORIENTATION: RIGHT;LOWER;OUTER
ORIENTATION: LOWER

## 2024-04-28 ASSESSMENT — PAIN DESCRIPTION - DIRECTION
RADIATING_TOWARDS: ABDOMEN
RADIATING_TOWARDS: ABDOMEN

## 2024-04-28 ASSESSMENT — PAIN DESCRIPTION - ONSET
ONSET: SUDDEN
ONSET: SUDDEN
ONSET: GRADUAL

## 2024-04-28 ASSESSMENT — PAIN SCALES - GENERAL
PAINLEVEL_OUTOF10: 0
PAINLEVEL_OUTOF10: 9
PAINLEVEL_OUTOF10: 9
PAINLEVEL_OUTOF10: 7

## 2024-04-28 ASSESSMENT — PAIN DESCRIPTION - FREQUENCY: FREQUENCY: INTERMITTENT

## 2024-04-28 ASSESSMENT — PAIN DESCRIPTION - DESCRIPTORS
DESCRIPTORS: SORE
DESCRIPTORS: SHARP
DESCRIPTORS: SHARP

## 2024-04-28 NOTE — PROGRESS NOTES
[facility]  Office: [phone]  Chinmay Ramos DO, Jose Atkinson DO, Guevara Ramirez DO, Pranay Cooper, DO, Sumeet Eller MD, Karen Carvajal MD, Sony Crawford MD, Poonam Platt MD,  Stephan San MD, Marcello Daniels MD, Theodore Pascual MD,  Juve Mahmood DO, Matias Mckeon MD, Hero Tavarez MD, Valentin Ramos DO, Jazzy Spence MD,  Trino Handley DO, Flavia Leonardo MD, Vanessa Soriano MD, Pricilla Akers MD, Casimiro Knight MD,  Zeke Maciel MD, Guera Cervantes MD, Kianna Martin MD, Mirlande Worrell MD, Ney Garcia MD, Savannah Dominguez MD, Ed Emerson DO, Jaylen West DO, Comfort Carcamo MD,  Siva Ashley MD, Shirley Waterhouse, CNP,  Carolyn Pelaez CNP, Scotty Ge, CNP,  Wandy Trivedi, DNP, Malissa Gaston, CNP, Lana Penaloza, CNP, Susan Haro CNP, Sherrell Mendoza, CNP, Suzy Morales, PA-C, Dalila Lange PA-C, Sada Matt, CNP, Crystal Womack, CNP, Sheng Reddy, CNP, Arielle Guy, CNP, Linsey Perez, CNP, Anju Cho, CNS, Hoa Donahue, CNP, Cassidy Ferrer CNP, Tracy Schwab, CNP         [facility]   IN-PATIENT SERVICE   [facility]    Progress Note    4/28/2024    4:36 PM    Name:   Jorge Luis Banda    MRN:     [id number]     Acct:      [id number]   Room:   2027/2027-01  IP Day:  25  Admit Date:  4/3/2024  3:27 PM    PCP:   Lucila Machado APRN - CNP  Code Status:  Full Code    Subjective:     C/C:   Chief Complaint   Patient presents with    Leg Pain     Left leg, open wounds and swollen       Interval History Status: .   Still feels very weak  Has minimal dark-colored drainage from cholecystostomy tube  Had bronchoscopy  4/26/2024 which showed left upper bronchus has obstruction with masslike lesion, washings taken-fungal elements seen on culture, pulmonary planning PET scan  He had right thoracentesis on 4/19/2024 with 750 mL fluid removed    He is on 2 L oxygen  Hemoglobin today was 7.0-PRBC has been ordered  Has chronic A-fib with  improvement in left upper lobe airspace disease.     XR CHEST 1 VIEW    Result Date: 4/24/2024  Increasing volume loss in the left lung     XR CHEST PORTABLE    Result Date: 4/23/2024  Improved aeration of the left lung suggesting improving consolidation and decreasing effusion.     IR US GUIDED NEEDLE PLACEMENT    Result Date: 4/22/2024  Ultrasound left chest shows only a very small pleural effusion.  Planned thoracentesis was deferred at this time.     XR CHEST PORTABLE    Result Date: 4/22/2024  1. Cardiomegaly with pulmonary vascular congestion. 2. Volume loss involving the left hemithorax with increase in opacification involving the mid and lower lung zones. Findings could be due to atelectasis with mucous plugging and effusion.       Physical Examination:        General appearance: Sick looking, alert, cooperative and no distress, fatigued  Mental Status:  oriented to person, place and time and normal affect  Lungs: Air entry is diminished bilaterally, markedly diminished breath sounds at bases  Heart: Tachycardic, irregular rhythm, no murmur  Abdomen:  + Cholecystostomy tube in place , nondistended, normal bowel sounds, no masses, hepatomegaly, splenomegaly  Extremities:  + Right AKA,+ wound on left foot, no tenderness in the calves  Skin:  no other gross lesions, rashes, induration    Assessment:        Hospital Problems             Last Modified POA    * (Principal) Melena 4/7/2024 Yes    History of arterial bypass of lower extremity 4/3/2024 Yes    S/P AKA (above knee amputation) unilateral, right (HCC) 4/3/2024 Yes    Primary hypertension (Chronic) 4/3/2024 Yes    Peripheral arterial disease (HCC) 4/3/2024 Yes    Neuropathy 4/3/2024 Yes    Malignant neoplasm of lung (HCC) 4/4/2024 Yes    Overview Signed 4/3/2024  6:44 PM by Malissa Gaston APRN - NP     Added automatically from request for surgery [id number]         Intermittent claudication of left lower extremity due to atherosclerosis (HCC) 4/4/2024 Yes

## 2024-04-28 NOTE — PLAN OF CARE
Problem: Respiratory - Adult  Goal: Able to breathe comfortably  Description: Able to breathe comfortably  4/28/2024 0807 by Clifton Thomas RCP  Outcome: Progressing     Problem: Respiratory - Adult  Goal: Clear lung sounds  4/28/2024 0807 by Clifton Thomas RCP  Outcome: Progressing     Problem: Respiratory - Adult  Goal: Adequate oxygenation  Description: Adequate oxygenation  4/28/2024 0807 by Clifton Thomas RCP  Outcome: Progressing     Problem: Respiratory - Adult  Goal: Ability to maintain normal respiratory secretions will improve  Description: Ability to maintain normal respiratory secretions will improve  4/28/2024 0807 by Clifton Thomas RCP  Outcome: Progressing

## 2024-04-28 NOTE — PROGRESS NOTES
Pulmonary Critical Care Progress Note       Patient seen for the follow up of bilateral pleural effusions, hemoptysis     Subjective:  Patient is sitting in bedside chair.  He denies any chest pain.  He still has weak cough.  He denies significant shortness of breath. Chest vest therapy continues.     Examination:  Vitals: /67   Pulse (!) 133   Temp 96.8 °F (36 °C) (Temporal)   Resp 28   Ht 1.829 m (6')   Wt 74.1 kg (163 lb 7 oz)   SpO2 93%   BMI 22.17 kg/m²   General appearance: In no acute distress, alert and cooperative with exam cachectic  Neck: No JVD  Lungs: Decreased breath sound no crackles or wheeze  Heart: irregular rate and rhythm, S1, S2 normal, no gallop  Abdomen: Soft, non tender, + BS  Cholecystotomy tube in place bloody output  Extremities: no cyanosis or clubbing. No significant edema, left upper and lower extremity weakness right above-knee amputation left foot dorsal ulcer    LABs:  CBC:   Recent Labs     04/25/24  1803 04/26/24  0002 04/26/24  2004 04/26/24  2352 04/27/24  0315 04/27/24  1201 04/27/24  1821 04/28/24  0006 04/28/24  0628   WBC 11.1  --   --   --  8.4  --   --   --   --    HGB 8.3*   < > 8.2* 8.1* 7.1* 8.1* 7.3* 7.1* 7.0*   HCT 27.4*   < > 27.0* 27.1* 23.0* 28.1* 23.4* 24.1* 23.2*     --   --   --  316  --   --   --   --     < > = values in this interval not displayed.       BMP:   Recent Labs     04/26/24  0758 04/27/24  0315    135   K 4.2 3.8   CO2 25 26   BUN 10 15   CREATININE 1.0 1.1   LABGLOM 76 68   GLUCOSE 83 155*       Recent Labs     04/26/24  0002 04/26/24  0332 04/26/24  0758 04/26/24  1604   APTT >150.0* 69.8* 81.5* 45.5*        Latest Reference Range & Units 04/11/24 15:56 04/13/24 23:26 04/16/24 04:21   Procalcitonin 0.00 - 0.09 ng/mL 1.95 (H) 1.02 (H) 0.72 (H)   (H): Data is abnormally high   Latest Reference Range & Units 04/15/24 04:34   Pro-BNP <300 pg/mL 16,301 (H)   (H): Data is abnormally high  Radiology:  X-ray 4/27/24      CT chest

## 2024-04-28 NOTE — FLOWSHEET NOTE
I attended the patient during the first 15 minutes of the blood transfusion and did not recognize a potential blood reaction.     Attending physician updated on patient vitals and new onset pain  AM medications have not been administered at this time, likely contributing to HR  No concern for transfusion reaction per MD   Awaiting orders for pain control     04/28/24 0926   Vitals   Temp 96.8 °F (36 °C)   Temp Source Temporal   Pulse (!) 128   Respirations (!) 37   /67   MAP (Calculated) 78   BP Location Left upper arm   BP Method Automatic   Patient Position Lying left side   Cardiac Rhythm A fib RVR   Ectopy PVC   Ectopy Frequency Frequent   Pain Assessment   Pain Assessment 0-10   Pain Level 9   Pain Location Back   Pain Orientation Lower;Right;Outer   Pain Descriptors Sharp   Functional Pain Assessment Prevents or interferes with many active not passive activities   Pain Type Acute pain   Pain Radiating Towards abdomen   Pain Onset Sudden   Non-Pharmaceutical Pain Intervention(s) Repositioned   Oxygen Therapy   SpO2 92 %   Pulse Oximeter Device Mode Continuous   Pulse Oximeter Device Location Finger   O2 Device Nasal cannula   O2 Flow Rate (L/min) 2 L/min

## 2024-04-28 NOTE — PROGRESS NOTES
End Of Shift Note  [facility]  Summary of shift: Pt had uneventful night this shift. Tolerating diet advancement. Was in chair yesterday. Goal today is to continue POC    Vitals:    Vitals:    04/28/24 0400 04/28/24 0500 04/28/24 0550 04/28/24 0600   BP: 106/64 101/60  96/66   Pulse: (!) 116 (!) 105  (!) 111   Resp: 18 17  16   Temp: 97.3 °F (36.3 °C)      TempSrc: Temporal      SpO2: 97%      Weight:   74.1 kg (163 lb 7 oz)    Height:            I&O:   Intake/Output Summary (Last 24 hours) at 4/28/2024 0655  Last data filed at 4/27/2024 2300  Gross per 24 hour   Intake --   Output 750 ml   Net -750 ml       Resp Status: 2L nasal cannula    Ventilator Settings:     / / /FiO2 : 94 %    Critical Care IV infusions:   sodium chloride      sodium chloride Stopped (04/20/24 0943)    dextrose          LDA:   Peripheral IV 04/24/24 Distal;Left Forearm (Active)   Number of days: 3       Peripheral IV 04/25/24 Right;Anterior Cephalic (Active)   Number of days: 2       Biliary Tube 04/19/24 T-tube 8 fr RUQ (Active)   Number of days: 8       Wound 04/04/24 Foot Left;Dorsal dry, scabbed (Active)   Number of days: 23       Wound Foot Left;Lateral (Active)   Number of days:

## 2024-04-28 NOTE — FLOWSHEET NOTE
Hgb critically low with morning draw  Dr Eller notified, orders received to transfuse 1u PRBC  Patient is alert and oriented, agreeable to transfusion  VSS as charted  Transfusion initiated at this time     Latest Reference Range & Units 04/28/24 06:28   Hemoglobin Quant 13.0 - 17.0 g/dL 7.0 (LL)   Hematocrit 40.7 - 50.3 % 23.2 (L)   (LL): Data is critically low  (L): Data is abnormally low     04/28/24 0806   Treatment Team Notification   Reason for Communication Critical results   Type of Critical Result Laboratory   Critical Lab Information hgb 7.0   Person Result Received From lab   Critical Lab Result Type Hemoglobin and hematocrit   Name of Team Member Notified Daphnie   Treatment Team Role Attending Provider   Method of Communication Secure Message   Response See orders   Notification Time 0805

## 2024-04-28 NOTE — PROGRESS NOTES
Physical Therapy        Physical Therapy Cancel Note      DATE: 2024    NAME: Jorge Luis Banda  MRN: [id number]   : 1945      Patient not seen this date for Physical Therapy due to:    Blood Transfusion:       Electronically signed by Carmen Gyu PTA on 2024 at 10:06 AM

## 2024-04-28 NOTE — PLAN OF CARE
Problem: Safety - Adult  Goal: Free from fall injury  4/27/2024 2135 by Carlito Rao RN  Outcome: Progressing  4/27/2024 1939 by Araceli Warren RN  Outcome: Progressing     Problem: Discharge Planning  Goal: Discharge to home or other facility with appropriate resources  4/27/2024 2135 by Carlito Rao RN  Outcome: Progressing  4/27/2024 1939 by Araceli Warren RN  Outcome: Progressing     Problem: Pain  Goal: Verbalizes/displays adequate comfort level or baseline comfort level  4/27/2024 2135 by Carlito Rao RN  Outcome: Progressing  4/27/2024 1939 by Araceli Warren RN  Outcome: Progressing     Problem: Skin/Tissue Integrity - Adult  Goal: Incisions, wounds, or drain sites healing without S/S of infection  4/27/2024 2135 by Carlito Rao RN  Outcome: Progressing  4/27/2024 1939 by Araceli Warren RN  Outcome: Progressing     Problem: Gastrointestinal - Adult  Goal: Minimal or absence of nausea and vomiting  4/27/2024 2135 by Carlito Rao RN  Outcome: Progressing  4/27/2024 1939 by Araceli Warren RN  Outcome: Progressing  Goal: Maintains or returns to baseline bowel function  4/27/2024 2135 by Carlito Rao RN  Outcome: Progressing  4/27/2024 1939 by Araceli Warren RN  Outcome: Progressing     Problem: Infection - Adult  Goal: Absence of infection at discharge  4/27/2024 2135 by Carlito Rao RN  Outcome: Progressing  4/27/2024 1939 by Araceli Warren RN  Outcome: Progressing     Problem: Hematologic - Adult  Goal: Maintains hematologic stability  4/27/2024 2135 by Carlito Rao RN  Outcome: Progressing  4/27/2024 1939 by Araceli Warren RN  Outcome: Progressing     Problem: Respiratory - Adult  Goal: Able to breathe comfortably  Description: Able to breathe comfortably  4/27/2024 1921 by Hoa Thornton RCP  Outcome: Progressing  4/27/2024 0809 by Clifton Thomas RCP  Outcome: Progressing  Goal: Clear lung sounds  4/27/2024 1921 by Hoa Thornton RCP  Outcome: Progressing  4/27/2024 0809 by

## 2024-04-28 NOTE — PROGRESS NOTES
Occupational Therapy  Facility/Department: [facility]  Rehabilitation Occupational Therapy Daily Treatment Note    Date: 24  Patient Name: Jorge Luis Banda       Room:   MRN: [id number]  Account: [id number]   : 1945  (78 y.o.) Gender: male      ANIL Charles reports patient is medically stable for therapy treatment this date. Chart reviewed prior to treatment and patient is agreeable for therapy.  All lines intact and patient positioned comfortably at end of treatment.  All patient needs addressed prior to ending therapy session.      Pt currently functioning below baseline.  Recommend daily inpatient skilled therapy at time of discharge to maximize long term outcomes and prevent re-admission. Please refer to AM-PAC score for current level of function.               Past Medical History:  has a past medical history of Paroxysmal atrial fibrillation (HCC), Peripheral artery disease (HCC), and Primary hypertension.  Past Surgical History:   has a past surgical history that includes above knee amputation (Right, 2020); Upper gastrointestinal endoscopy (N/A, 2024); IR CHOLECYSTOSTOMY PERCUTANEOUS COMPLETE (2024); and bronchoscopy (N/A, 2024).    Restrictions  Restrictions/Precautions: Fall Risk, General Precautions, Bedrest with Bathroom Privileges  Other position/activity restrictions: Telemetry, LUE IV, L sided weakness (s/p R CVA); R AKA;  R UE IV; Arterial Line (L radial);  PICC Line (R brachial);  2L Supplemental O2 nasal cannula  Required Braces or Orthoses?: No    Subjective  Subjective: Pt resting in bed upon arrival agreeable to OT. Pt receiving blood hemoglobin 7.1 ANIL Charles approved session in bed.  Restrictions/Precautions: Fall Risk;General Precautions;Bedrest with Bathroom Privileges             Objective     Cognition  Overall Cognitive Status: Exceptions  Arousal/Alertness: Appropriate responses to stimuli  Following Commands: Follows one step commands with  T-Scale Score : 29.04 (04/28/24 1043)  ADL Inpatient CMS 0-100% Score: 70.42 (04/28/24 1043)  ADL Inpatient CMS G-Code Modifier : CL (04/28/24 1043)      Therapy Time   Individual Concurrent Group Co-treatment   Time In 1015         Time Out 1041         Minutes 26                 MAU KULKARNI ZINA

## 2024-04-29 PROBLEM — E43 SEVERE MALNUTRITION (HCC): Status: ACTIVE | Noted: 2024-04-29

## 2024-04-29 LAB
ABO/RH: NORMAL
ANION GAP SERPL CALCULATED.3IONS-SCNC: 8 MMOL/L (ref 9–17)
ANTIBODY SCREEN: NEGATIVE
ARM BAND NUMBER: NORMAL
BLOOD BANK DISPENSE STATUS: NORMAL
BLOOD BANK SAMPLE EXPIRATION: NORMAL
BODY FLD TYPE: NORMAL
BPU ID: NORMAL
BUN SERPL-MCNC: 13 MG/DL (ref 8–23)
BUN/CREAT SERPL: 13 (ref 9–20)
CALCIUM SERPL-MCNC: 8.3 MG/DL (ref 8.6–10.4)
CASE NUMBER:: NORMAL
CHLORIDE SERPL-SCNC: 103 MMOL/L (ref 98–107)
CO2 SERPL-SCNC: 27 MMOL/L (ref 20–31)
COMPONENT: NORMAL
CREAT SERPL-MCNC: 1 MG/DL (ref 0.7–1.2)
CROSSMATCH RESULT: NORMAL
ERYTHROCYTE [DISTWIDTH] IN BLOOD BY AUTOMATED COUNT: 18.8 % (ref 11.8–14.4)
GFR SERPL CREATININE-BSD FRML MDRD: 76 ML/MIN/1.73M2
GLUCOSE SERPL-MCNC: 96 MG/DL (ref 70–99)
HCO3 VENOUS: 25.9 MMOL/L (ref 22–29)
HCT VFR BLD AUTO: 28.4 % (ref 40.7–50.3)
HCT VFR BLD AUTO: 28.9 % (ref 40.7–50.3)
HGB BLD-MCNC: 8.7 G/DL (ref 13–17)
HGB BLD-MCNC: 8.8 G/DL (ref 13–17)
LYMPHOCYTES NFR FLD: 0 %
MCH RBC QN AUTO: 29.4 PG (ref 25.2–33.5)
MCHC RBC AUTO-ENTMCNC: 30.6 G/DL (ref 28.4–34.8)
MCV RBC AUTO: 95.9 FL (ref 82.6–102.9)
NEUTROPHILS NFR FLD: 98 %
NRBC BLD-RTO: 0 PER 100 WBC
O2 SAT, VEN: 99.3 % (ref 60–85)
PCO2, VEN: 41.2 MM HG (ref 41–51)
PH VENOUS: 7.41 (ref 7.32–7.43)
PLATELET # BLD AUTO: 328 K/UL (ref 138–453)
PMV BLD AUTO: 10.1 FL (ref 8.1–13.5)
PO2, VEN: 144.8 MM HG (ref 30–50)
POSITIVE BASE EXCESS, VEN: 1.1 MMOL/L (ref 0–3)
POTASSIUM SERPL-SCNC: 3.6 MMOL/L (ref 3.7–5.3)
RBC # BLD AUTO: 2.96 M/UL (ref 4.21–5.77)
RBC # FLD: NORMAL CELLS/UL
SODIUM SERPL-SCNC: 138 MMOL/L (ref 135–144)
SPECIMEN DESCRIPTION: NORMAL
TRANSFUSION STATUS: NORMAL
UNIDENT CELLS NFR FLD: NORMAL %
UNIT DIVISION: 0
WBC # FLD: 2774 CELLS/UL
WBC OTHER # BLD: 9.8 K/UL (ref 3.5–11.3)

## 2024-04-29 PROCEDURE — 99232 SBSQ HOSP IP/OBS MODERATE 35: CPT | Performed by: INTERNAL MEDICINE

## 2024-04-29 PROCEDURE — 6370000000 HC RX 637 (ALT 250 FOR IP): Performed by: INTERNAL MEDICINE

## 2024-04-29 PROCEDURE — 94640 AIRWAY INHALATION TREATMENT: CPT

## 2024-04-29 PROCEDURE — 82803 BLOOD GASES ANY COMBINATION: CPT

## 2024-04-29 PROCEDURE — 6370000000 HC RX 637 (ALT 250 FOR IP): Performed by: FAMILY MEDICINE

## 2024-04-29 PROCEDURE — 51798 US URINE CAPACITY MEASURE: CPT

## 2024-04-29 PROCEDURE — 2000000000 HC ICU R&B

## 2024-04-29 PROCEDURE — 94660 CPAP INITIATION&MGMT: CPT

## 2024-04-29 PROCEDURE — 2580000003 HC RX 258: Performed by: INTERNAL MEDICINE

## 2024-04-29 PROCEDURE — 94669 MECHANICAL CHEST WALL OSCILL: CPT

## 2024-04-29 PROCEDURE — 5A09357 ASSISTANCE WITH RESPIRATORY VENTILATION, LESS THAN 24 CONSECUTIVE HOURS, CONTINUOUS POSITIVE AIRWAY PRESSURE: ICD-10-PCS | Performed by: INTERNAL MEDICINE

## 2024-04-29 PROCEDURE — 6360000002 HC RX W HCPCS: Performed by: NURSE PRACTITIONER

## 2024-04-29 PROCEDURE — 2700000000 HC OXYGEN THERAPY PER DAY

## 2024-04-29 PROCEDURE — 85018 HEMOGLOBIN: CPT

## 2024-04-29 PROCEDURE — 36415 COLL VENOUS BLD VENIPUNCTURE: CPT

## 2024-04-29 PROCEDURE — 97530 THERAPEUTIC ACTIVITIES: CPT

## 2024-04-29 PROCEDURE — 80048 BASIC METABOLIC PNL TOTAL CA: CPT

## 2024-04-29 PROCEDURE — 85027 COMPLETE CBC AUTOMATED: CPT

## 2024-04-29 PROCEDURE — 99232 SBSQ HOSP IP/OBS MODERATE 35: CPT | Performed by: NURSE PRACTITIONER

## 2024-04-29 PROCEDURE — 94761 N-INVAS EAR/PLS OXIMETRY MLT: CPT

## 2024-04-29 PROCEDURE — 6360000002 HC RX W HCPCS: Performed by: INTERNAL MEDICINE

## 2024-04-29 PROCEDURE — 85014 HEMATOCRIT: CPT

## 2024-04-29 RX ORDER — MIDODRINE HYDROCHLORIDE 5 MG/1
5 TABLET ORAL ONCE
Status: COMPLETED | OUTPATIENT
Start: 2024-04-29 | End: 2024-04-29

## 2024-04-29 RX ORDER — 0.9 % SODIUM CHLORIDE 0.9 %
500 INTRAVENOUS SOLUTION INTRAVENOUS ONCE
Status: COMPLETED | OUTPATIENT
Start: 2024-04-29 | End: 2024-04-29

## 2024-04-29 RX ORDER — MIDODRINE HYDROCHLORIDE 5 MG/1
5 TABLET ORAL
Status: DISCONTINUED | OUTPATIENT
Start: 2024-04-30 | End: 2024-04-30 | Stop reason: SDUPTHER

## 2024-04-29 RX ADMIN — DRONABINOL 2.5 MG: 2.5 CAPSULE ORAL at 08:20

## 2024-04-29 RX ADMIN — MIDODRINE HYDROCHLORIDE 5 MG: 5 TABLET ORAL at 13:23

## 2024-04-29 RX ADMIN — ACETYLCYSTEINE 400 MG: 200 SOLUTION ORAL; RESPIRATORY (INHALATION) at 07:43

## 2024-04-29 RX ADMIN — MIRTAZAPINE 30 MG: 15 TABLET, ORALLY DISINTEGRATING ORAL at 19:41

## 2024-04-29 RX ADMIN — APIXABAN 5 MG: 5 TABLET, FILM COATED ORAL at 19:41

## 2024-04-29 RX ADMIN — PANTOPRAZOLE SODIUM 40 MG: 40 TABLET, DELAYED RELEASE ORAL at 06:06

## 2024-04-29 RX ADMIN — CHOLECALCIFEROL TAB 125 MCG (5000 UNIT) 5000 UNITS: 125 TAB at 08:49

## 2024-04-29 RX ADMIN — MIDODRINE HYDROCHLORIDE 5 MG: 5 TABLET ORAL at 16:18

## 2024-04-29 RX ADMIN — GUAIFENESIN 600 MG: 600 TABLET ORAL at 19:41

## 2024-04-29 RX ADMIN — LEVALBUTEROL 1.25 MG: 1.25 SOLUTION, CONCENTRATE RESPIRATORY (INHALATION) at 19:12

## 2024-04-29 RX ADMIN — FUROSEMIDE 40 MG: 40 TABLET ORAL at 08:47

## 2024-04-29 RX ADMIN — DRONABINOL 2.5 MG: 2.5 CAPSULE ORAL at 16:18

## 2024-04-29 RX ADMIN — ACETYLCYSTEINE 400 MG: 200 SOLUTION ORAL; RESPIRATORY (INHALATION) at 14:28

## 2024-04-29 RX ADMIN — LEVALBUTEROL 1.25 MG: 1.25 SOLUTION, CONCENTRATE RESPIRATORY (INHALATION) at 14:28

## 2024-04-29 RX ADMIN — LEVALBUTEROL 1.25 MG: 1.25 SOLUTION, CONCENTRATE RESPIRATORY (INHALATION) at 07:43

## 2024-04-29 RX ADMIN — SODIUM CHLORIDE: 9 INJECTION, SOLUTION INTRAVENOUS at 15:28

## 2024-04-29 RX ADMIN — APIXABAN 5 MG: 5 TABLET, FILM COATED ORAL at 08:50

## 2024-04-29 RX ADMIN — MIDODRINE HYDROCHLORIDE 5 MG: 5 TABLET ORAL at 20:41

## 2024-04-29 RX ADMIN — CYANOCOBALAMIN TAB 1000 MCG 1000 MCG: 1000 TAB at 08:49

## 2024-04-29 RX ADMIN — ACETYLCYSTEINE 400 MG: 200 SOLUTION ORAL; RESPIRATORY (INHALATION) at 19:12

## 2024-04-29 RX ADMIN — FERROUS SULFATE TAB EC 325 MG (65 MG FE EQUIVALENT) 325 MG: 325 (65 FE) TABLET DELAYED RESPONSE at 08:20

## 2024-04-29 RX ADMIN — ATORVASTATIN CALCIUM 40 MG: 40 TABLET, FILM COATED ORAL at 19:41

## 2024-04-29 RX ADMIN — TAMSULOSIN HYDROCHLORIDE 0.4 MG: 0.4 CAPSULE ORAL at 08:49

## 2024-04-29 RX ADMIN — PANTOPRAZOLE SODIUM 40 MG: 40 TABLET, DELAYED RELEASE ORAL at 16:18

## 2024-04-29 RX ADMIN — METOPROLOL TARTRATE 25 MG: 25 TABLET, FILM COATED ORAL at 08:47

## 2024-04-29 RX ADMIN — OXYCODONE AND ACETAMINOPHEN 1 TABLET: 5; 325 TABLET ORAL at 08:56

## 2024-04-29 RX ADMIN — SODIUM CHLORIDE 500 ML: 9 INJECTION, SOLUTION INTRAVENOUS at 14:23

## 2024-04-29 RX ADMIN — EMPAGLIFLOZIN 10 MG: 10 TABLET, FILM COATED ORAL at 08:47

## 2024-04-29 RX ADMIN — SODIUM CHLORIDE, PRESERVATIVE FREE 10 ML: 5 INJECTION INTRAVENOUS at 08:52

## 2024-04-29 RX ADMIN — ASPIRIN 81 MG: 81 TABLET, COATED ORAL at 08:50

## 2024-04-29 RX ADMIN — GUAIFENESIN 600 MG: 600 TABLET ORAL at 08:50

## 2024-04-29 RX ADMIN — MIDODRINE HYDROCHLORIDE 5 MG: 5 TABLET ORAL at 08:19

## 2024-04-29 RX ADMIN — METOPROLOL TARTRATE 25 MG: 25 TABLET, FILM COATED ORAL at 22:46

## 2024-04-29 ASSESSMENT — PAIN SCALES - GENERAL
PAINLEVEL_OUTOF10: 5
PAINLEVEL_OUTOF10: 3
PAINLEVEL_OUTOF10: 4
PAINLEVEL_OUTOF10: 3
PAINLEVEL_OUTOF10: 7
PAINLEVEL_OUTOF10: 4

## 2024-04-29 ASSESSMENT — PAIN DESCRIPTION - ORIENTATION
ORIENTATION: LOWER

## 2024-04-29 ASSESSMENT — PAIN DESCRIPTION - DESCRIPTORS
DESCRIPTORS: ACHING

## 2024-04-29 ASSESSMENT — PAIN DESCRIPTION - PAIN TYPE
TYPE: CHRONIC PAIN
TYPE: CHRONIC PAIN

## 2024-04-29 ASSESSMENT — PAIN DESCRIPTION - LOCATION
LOCATION: BACK

## 2024-04-29 ASSESSMENT — PAIN - FUNCTIONAL ASSESSMENT
PAIN_FUNCTIONAL_ASSESSMENT: ACTIVITIES ARE NOT PREVENTED

## 2024-04-29 NOTE — PROGRESS NOTES
Physical Therapy  Facility/Department: [facility]  Rehabilitation Physical Therapy Treatment Note    NAME: Jorge Luis Banda  : 1945 (78 y.o.)  MRN: [id number]  CODE STATUS: Full Code    Date of Service: 24     Pt currently functioning below baseline.  Recommend daily inpatient skilled therapy at time of discharge to maximize long term outcomes and prevent re-admission. Please refer to AM-PAC score for current level of function.     Restrictions:  Restrictions/Precautions: Fall Risk, General Precautions, Bedrest with Bathroom Privileges  Position Activity Restriction  Other position/activity restrictions: Telemetry, LUE IV, L sided weakness (s/p R CVA); R AKA;  R UE IV; Arterial Line (L radial);  PICC Line (R brachial);  2L Supplemental O2 nasal cannula     SUBJECTIVE  Subjective  Subjective: Patient up in bed upon therapists arrival RN reports patient is medically stable for therapy treatment this date.    Chart reviewed prior to treatment and patient is agreeable for therapy.  All lines intact and patient positioned comfortably at end of treatment. Chair alarm on and tested for patient safety.    OBJECTIVE     Functional Mobility  Bed Mobility  Overall Assistance Level: Moderate Assistance;Maximum Assistance  Additional Factors: Set-up;Verbal cues;Increased time to complete;Head of bed raised;With handrails  Roll Left  Assistance Level: Moderate assistance;Maximum assistance;Requires x 2 assistance  Skilled Clinical Factors: vc's for hand placement bring R UE across body to L hand rail to facilitate rolling motion.  Roll Right  Assistance Level: Moderate assistance;Maximum assistance;Requires x 2 assistance  Skilled Clinical Factors: vc's for hand placement and progression techniques. Patient L UE flacid and unable to assist. Therapist performs gentle ROM to fingers, wrist and elbow this treatment.  Balance  Sitting Balance: Supervision (Patient nela lifted to recliner to promote core control and  stability in seated posture.  Patient with good engagement and patient provided auditory and visual stimulation with education for Left LE AROM as RLE Amputation)    Neuromuscular Education  Neuromuscular Education: Yes Sitting    ASSESSMENT/PROGRESS TOWARDS GOALS     AM-PAC Inpatient Mobility Raw Score 8   AM-PAC Inpatient T-Scale Score 28.52   Mobility Inpatient CMS 0-100% Score 86.62   Mobility Inpatient CMS G-Code Modifier CM     Assessment  Assessment: Patient nela lifted to side chair to promote OOB activity and core engagement. ANIL Stringer assisted therapist with transfer this date. Patinet would benefit from continued skilled PT treatment to maximize return to PLOF  Activity Tolerance: Patient tolerated treatment well;Treatment limited secondary to medical complications  Discharge Recommendations: Patient would benefit from continued therapy after discharge    Goals  Patient Goals   Patient Goals : Return home  Short Term Goals  Time Frame for Short Term Goals: 12 visits  Short Term Goal 1: Patient will be indep with bed mobility.  Short Term Goal 2: Patient will verbalize and demonstrate follow through of pressure relief techqniques to maintain good skin integrity  Short Term Goal 3: Patient will demo good- seated balance.  Short Term Goal 4: PT to complete Re-Eval for transfers/gait when appropriate.  Short Term Goal 5: Patient will tolerate 30 minutes of ther-ex and ther-act.  Additional Goals?: Yes  Short Term Goal 6: Patient will improve L LE strength to 4-/5 in order to increase indep with mobility tasks.    PLAN OF CARE/SAFETY  Physical Therapy Plan  General Plan: 6-7 times per week  Specific Instructions for Next Treatment: transfers, HEP  Current Treatment Recommendations: Strengthening;Balance training;ROM;Functional mobility training;Endurance training;Neuromuscular re-education;Home exercise program;Safety education & training;Patient/Caregiver education & training;Equipment evaluation, education, &

## 2024-04-29 NOTE — PROGRESS NOTES
Comprehensive Nutrition Assessment    Type and Reason for Visit:  Reassess    Nutrition Recommendations/Plan:   Diet NPO Exceptions are: Sips of Water with Meds   Monitor diet advancement, GI function, weights and labs  Consider need for nutrition intervention if diet can not be advanced  in 2-3 days     Malnutrition Assessment:  Malnutrition Status:  Severe malnutrition (04/29/24 1844)    Context:  Chronic Illness     Findings of the 6 clinical characteristics of malnutrition:  Energy Intake:  75% or less estimated energy requirements for 1 month or longer  Weight Loss:  Unable to assess     Body Fat Loss:  Severe body fat loss Triceps, Orbital, Buccal region   Muscle Mass Loss:  Severe muscle mass loss Clavicles (pectoralis & deltoids), Temples (temporalis)  Fluid Accumulation:  No significant fluid accumulation Extremities   Strength:  Not Performed    Nutrition Assessment:    Patient reports he continues to have poor oral intakes. Patient reports he prefers snack over meals. Patient reports eating 3-4 bites of pot roast, some potatoes and fruit for lunch today. Patient is status post cholecystostomy tube (4/19). Patient reports he like strawberry Ensure supplements. Patient continues to have unstable hemoglobin labs and was transfused 1 unit of PRBC yesterday (4/28) when labs dropped down to 7.0 (L). Patient is severely malnourished as evidence by oral intakes less than 50% for several weeks and severe fat and muscle mass loss. Patient is now NPO due to need for NIPPV. Consider need for nutrition intervention if diet can not be advanced over the next 2-3 days.    Nutrition Related Findings:    Edema: +1 LUE, +1 LLE, +1 sacral. Transfused 1 unit PRBC (4/28). Cholecystomy tube (4/19) Wound Type: Venous Stasis (left foot arterial and venous ulcers.)       Current Nutrition Intake & Therapies:    Average Meal Intake: 1-25%, NPO  Average Supplements Intake: 26-50%  Diet NPO Exceptions are: Sips of Water with

## 2024-04-29 NOTE — PROGRESS NOTES
[facility]  [address] [place] [address]  [phone]          Progress Note    Patient Name:  Jorge Luis Banda    :  1945 11:58 AM      SUBJECTIVE       Mr. Banda  has no chest pain, shortness of breath, palpitations, nausea or vomiting  Atrial fibrillation with hr ~ 100      OBJECTIVE     Vital signs:    BP 92/60   Pulse (!) 112   Temp 98 °F (36.7 °C) (Temporal)   Resp 17   Ht 1.829 m (6')   Wt 74.1 kg (163 lb 7 oz)   SpO2 99%   BMI 22.17 kg/m²  3 L/min  .tro    Admit Weight:  68 kg (150 lb)    Last 3 weights:  Wt Readings from Last 3 Encounters:   24 74.1 kg (163 lb 7 oz)   20 54.4 kg (120 lb)       BMI: Body mass index is 22.17 kg/m².    Input/Output:       Intake/Output Summary (Last 24 hours) at 2024 1158  Last data filed at 2024 0900  Gross per 24 hour   Intake 722.42 ml   Output 540 ml   Net 182.42 ml       Date 24 0000 - 24 2359   Shift 5935-8040 7777-7391 6446-8900 24 Hour Total   INTAKE   Shift Total(mL/kg)       OUTPUT   Urine(mL/kg/hr)  500  500   Emesis/NG output(mL/kg) 20(0.3)   20(0.3)   Shift Total(mL/kg) 20(0.3) 500(6.7)  520(7)   Weight (kg) 74.1 74.1 74.1 74.1     Exam:     General appearance: awake and alert moves all ext   Lungs: coarse bilaterally  Heart: irregular. S1 and S2 no murmur  Abdomen: positive bowel sounds, no bruits, no masses          Laboratory Studies:     CBC:   Recent Labs     24  0315 24  1201 24  1705 24  0004 24  0613   WBC 8.4  --   --   --  9.8   HGB 7.1*   < > 9.3* 8.8* 8.7*   HCT 23.0*   < > 29.6* 28.9* 28.4*   MCV 92.7  --   --   --  95.9     --   --   --  328    < > = values in this interval not displayed.     BMP:   Recent Labs     24  0315 24  0613    138   K 3.8 3.6*    103   CO2 26 27   BUN 15 13   CREATININE 1.1 1.0     PT/INR: No results for input(s): \"PROTIME\", \"INR\" in the last 72 hours.  APTT:  volume loss in the left lung     XR CHEST PORTABLE    Result Date: 4/23/2024  Improved aeration of the left lung suggesting improving consolidation and decreasing effusion.     IR US GUIDED NEEDLE PLACEMENT    Result Date: 4/22/2024  Ultrasound left chest shows only a very small pleural effusion.  Planned thoracentesis was deferred at this time.       Echo:      ASSESSMENT     Principal Problem:    Melena  Active Problems:    History of arterial bypass of lower extremity    S/P AKA (above knee amputation) unilateral, right (HCC)    Primary hypertension    Peripheral arterial disease (HCC)    Neuropathy    Malignant neoplasm of lung (HCC)    Intermittent claudication of left lower extremity due to atherosclerosis (HCC)    Embolism and thrombosis of artery (HCC)    Left leg cellulitis    Iron deficiency anemia due to chronic blood loss    Hx of cancer of lung    Adrenal mass (HCC)    Claudication (HCC)    Pleural effusion, bilateral    Longstanding persistent atrial fibrillation (HCC)    Acute ischemic right internal carotid artery (ICA) stroke (HCC)    Internal carotid artery stenosis, bilateral    Anemia    Gastrointestinal hemorrhage    NSTEMI (non-ST elevated myocardial infarction) (HCC)    Hypotension    Atrial fibrillation with rapid ventricular response (HCC)    Unstable angina (HCC)    Right upper quadrant abdominal pain    ACP (advance care planning)    Palliative care encounter    Acute cholecystitis    Lung mass    Liver lesion  Resolved Problems:    * No resolved hospital problems. *      PLAN           Longstanding persistent atrial fibrillation  -Continue Lopressor . Despite CVA risk not likely to be able to resume anticoagulation. Supportive care  -Minimally elevated troponin nonspecific   -not much further to add from my standpoint. Following peripherally Please call if changes.  2.  Acute on chronic heart failure with preserved ejection fraction/loculated pleural effusion  Status post thoracentesis

## 2024-04-29 NOTE — PLAN OF CARE
Problem: Safety - Adult  Goal: Free from fall injury  Outcome: Progressing     Problem: Discharge Planning  Goal: Discharge to home or other facility with appropriate resources  Outcome: Progressing     Problem: Pain  Goal: Verbalizes/displays adequate comfort level or baseline comfort level  Outcome: Progressing     Problem: Skin/Tissue Integrity - Adult  Goal: Incisions, wounds, or drain sites healing without S/S of infection  Outcome: Progressing     Problem: Gastrointestinal - Adult  Goal: Maintains or returns to baseline bowel function  Outcome: Progressing     Problem: Infection - Adult  Goal: Absence of infection at discharge  Outcome: Progressing     Problem: Hematologic - Adult  Goal: Maintains hematologic stability  Outcome: Progressing     Problem: Genitourinary - Adult  Goal: Absence of urinary retention  Outcome: Progressing     Problem: Skin/Tissue Integrity  Goal: Absence of new skin breakdown  Description: 1.  Monitor for areas of redness and/or skin breakdown  2.  Assess vascular access sites hourly  3.  Every 4-6 hours minimum:  Change oxygen saturation probe site  4.  Every 4-6 hours:  If on nasal continuous positive airway pressure, respiratory therapy assess nares and determine need for appliance change or resting period.  Outcome: Progressing     Problem: ABCDS Injury Assessment  Goal: Absence of physical injury  Outcome: Progressing     Problem: Nutrition Deficit:  Goal: Optimize nutritional status  Outcome: Progressing     Problem: Chronic Conditions and Co-morbidities  Goal: Patient's chronic conditions and co-morbidity symptoms are monitored and maintained or improved  Outcome: Progressing

## 2024-04-29 NOTE — PROGRESS NOTES
Ry Traylor MD   Urology Progress Note            Subjective: Follow-up urinary retention    Patient Vitals for the past 24 hrs:   BP Temp Temp src Pulse Resp SpO2   04/29/24 0620 -- -- -- 98 15 97 %   04/29/24 0600 106/68 -- -- 99 16 96 %   04/29/24 0500 113/68 -- -- 100 18 99 %   04/29/24 0400 112/68 -- -- (!) 102 16 100 %   04/29/24 0353 -- -- -- 100 16 100 %   04/29/24 0347 -- 97.7 °F (36.5 °C) Temporal -- -- --   04/29/24 0300 (!) 91/57 -- -- 89 16 100 %   04/29/24 0200 (!) 92/57 -- -- 91 15 97 %   04/29/24 0100 (!) 100/55 -- -- 91 16 96 %   04/29/24 0000 104/61 -- -- 96 17 100 %   04/28/24 2352 -- 97.7 °F (36.5 °C) Temporal 84 16 --   04/28/24 2300 96/64 -- -- 79 15 98 %   04/28/24 2200 -- -- -- -- -- 100 %   04/28/24 2100 (!) 92/51 -- -- 87 16 100 %   04/28/24 2002 -- -- -- (!) 115 -- 97 %   04/28/24 2000 (!) 97/52 -- -- (!) 112 20 96 %   04/28/24 1933 -- -- -- (!) 119 22 98 %   04/28/24 1900 (!) 90/45 97.1 °F (36.2 °C) Tympanic (!) 111 17 99 %   04/28/24 1800 (!) 92/59 -- -- (!) 106 18 --   04/28/24 1700 (!) 105/57 -- -- (!) 108 18 --   04/28/24 1630 98/61 -- -- (!) 103 17 98 %   04/28/24 1500 (!) 99/54 -- -- (!) 101 17 --   04/28/24 1419 -- -- -- (!) 104 21 100 %   04/28/24 1414 -- -- -- 93 20 100 %   04/28/24 1400 101/62 -- -- (!) 103 18 --   04/28/24 1300 101/60 -- -- 90 20 --   04/28/24 1200 (!) 86/57 97.1 °F (36.2 °C) Temporal (!) 107 16 99 %   04/28/24 1131 -- -- -- (!) 127 -- --   04/28/24 1117 (!) 101/56 96.9 °F (36.1 °C) Temporal (!) 140 21 99 %   04/28/24 1100 95/67 -- -- (!) 123 16 99 %   04/28/24 1000 102/69 -- -- (!) 134 24 96 %   04/28/24 0955 102/69 -- -- (!) 130 22 --   04/28/24 0948 -- -- -- (!) 133 -- --   04/28/24 0940 -- -- -- (!) 142 28 93 %   04/28/24 0926 101/67 96.8 °F (36 °C) Temporal (!) 128 (!) 37 92 %   04/28/24 0911 -- -- -- -- 21 --   04/28/24 0910 (!) 114/59 96.8 °F (36 °C) Temporal (!) 120 16 99 %   04/28/24 0900 (!) 114/59 -- -- (!) 121 15 --    04/28/24 0805 -- -- -- -- -- 100 %   04/28/24 0800 (!) 97/58 96.9 °F (36.1 °C) Temporal (!) 114 19 100 %   04/28/24 0700 117/68 -- -- (!) 117 17 --       Intake/Output Summary (Last 24 hours) at 4/29/2024 0642  Last data filed at 4/29/2024 0500  Gross per 24 hour   Intake 1066.42 ml   Output 40 ml   Net 1026.42 ml       Recent Labs     04/27/24  0315 04/27/24  1201 04/28/24  0628 04/28/24  1705 04/29/24  0004   WBC 8.4  --   --   --   --    HGB 7.1*   < > 7.0* 9.3* 8.8*   HCT 23.0*   < > 23.2* 29.6* 28.9*   MCV 92.7  --   --   --   --      --   --   --   --     < > = values in this interval not displayed.     Recent Labs     04/26/24  0758 04/27/24  0315    135   K 4.2 3.8   CL 98 100   CO2 25 26   BUN 10 15   CREATININE 1.0 1.1       No results for input(s): \"COLORU\", \"PHUR\", \"LABCAST\", \"WBCUA\", \"RBCUA\", \"MUCUS\", \"TRICHOMONAS\", \"YEAST\", \"BACTERIA\", \"CLARITYU\", \"SPECGRAV\", \"LEUKOCYTESUR\", \"UROBILINOGEN\", \"BILIRUBINUR\", \"BLOODU\" in the last 72 hours.    Invalid input(s): \"NITRATE\", \"GLUCOSEUKETONESUAMORPHOUS\"    Additional Lab/culture results:    Physical Exam: Patient with enlarged prostate bladder outlet obstruction and neurogenic bladder dysfunction voiding frequently small amounts    Interval Imaging Findings:    Impression:    Patient Active Problem List   Diagnosis    Skin eschar    Stenosis of left carotid artery    History of arterial bypass of lower extremity    S/P AKA (above knee amputation) unilateral, right (HCC)    Primary hypertension    Persistent wound pain    Peripheral arterial disease (HCC)    Ulcer of left foot ([facility])    Neuropathy    Malignant neoplasm of lung (HCC)    Intermittent claudication of left lower extremity due to atherosclerosis (HCC)    Frequency of urination    Former smoker    Embolism and thrombosis of artery (HCC)    Dyslipidemia    Closed fracture of left humerus    Closed fracture of surgical neck of humerus    Acute exacerbation of chronic obstructive airways

## 2024-04-29 NOTE — PROGRESS NOTES
End Of Shift Note  [facility]    Summary of shift: AM hgb 7.0 - pt received 1uPRBC. Hgb recheck 9.1. Pt experienced episode of severe sudden onset right sided back/flank pain early in transfusion, HR/RR increased significantly but resolved with treatment of pain. MD was made aware- no concern for biliary tube placement or transfusion reaction.     AM CXR-  IMPRESSION:  Extensive bilateral pleural/parenchymal disease with slight interval increase  in extent on the left.  Findings may reflect worsening multifocal  pneumonia/atelectasis.  Imaging follow-up recommended to document resolution.  Slight interval increase in secondary leftward mediastinal shift.     Uneventful day once transfusion was complete and pain was resolved. Up to chair for afternoon/evening. Reports improved appetite, eating and drinking well. Wound care completed, bathed, hair washed.  Pt needed reminding to void with the urinal at end of shift - pt avoids task while up in chair.     Attempt to step patient down from ICU status.   Per Attending physician, cardiology clearance is required d/t persistent tachycardia/chronic afib, hypotension.   RN paged cardiology and spoke with Dr Pitts who states Dr Valencia signed off of case upon rounding on Friday after being re-consulted for same issues.  MD did state tachycardia is likely related to anemia and hypotension, not afib, and will not advise on step-down.   Attending notified. Patient remains ICU status. Cardiology re-consulted again.     Vitals:    Vitals:    04/28/24 1900 04/28/24 1933 04/28/24 2000 04/28/24 2002   BP: (!) 90/45  (!) 97/52    Pulse: (!) 111 (!) 119 (!) 112 (!) 115   Resp: 17 22 20    Temp: 97.1 °F (36.2 °C)      TempSrc: Tympanic      SpO2: 99% 98%     Weight:       Height:            I&O:   Intake/Output Summary (Last 24 hours) at 4/28/2024 2045  Last data filed at 4/28/2024 1900  Gross per 24 hour   Intake 344 ml   Output 570 ml   Net -226 ml       Resp Status: SpO2 %

## 2024-04-29 NOTE — PLAN OF CARE
Problem: Respiratory - Adult  Goal: Able to breathe comfortably  Description: Able to breathe comfortably  4/29/2024 0736 by Katharine Salazar RCP  Outcome: Progressing  4/28/2024 1931 by Hoa Thornton RCP  Outcome: Progressing  Goal: Clear lung sounds  4/29/2024 0736 by Katharine Salazar RCP  Outcome: Progressing  4/28/2024 1931 by Hoa Thornton RCP  Outcome: Progressing  Goal: Adequate oxygenation  Description: Adequate oxygenation  4/29/2024 0736 by Katharine Salazar RCP  Outcome: Progressing  4/28/2024 1931 by Hoa Thornton RCP  Outcome: Progressing  Goal: Ability to maintain normal respiratory secretions will improve  Description: Ability to maintain normal respiratory secretions will improve  4/29/2024 0736 by Katharine Salazar RCP  Outcome: Progressing  4/28/2024 1931 by Hoa Thornton RCP  Outcome: Progressing

## 2024-04-29 NOTE — CARE COORDINATION
Social work: [facility] is able to accept patient at discharge.   Await confirmation of dc date and SW will submit for precert.   HENS will need resubmitted when ready.

## 2024-04-29 NOTE — PROGRESS NOTES
4/22/2024  Ultrasound left chest shows only a very small pleural effusion.  Planned thoracentesis was deferred at this time.     XR CHEST PORTABLE    Result Date: 4/22/2024  1. Cardiomegaly with pulmonary vascular congestion. 2. Volume loss involving the left hemithorax with increase in opacification involving the mid and lower lung zones. Findings could be due to atelectasis with mucous plugging and effusion.       Physical Examination:        General appearance: Sick looking, alert, cooperative and no distress, fatigued  Mental Status:  oriented to person, place and time and normal affect  Lungs: Air entry is diminished bilaterally, markedly diminished breath sounds at bases  Heart: Tachycardic, irregular rhythm, no murmur  Abdomen:  + Cholecystostomy tube in place , nondistended, normal bowel sounds, no masses, hepatomegaly, splenomegaly  Extremities:  + Right AKA,+ wound on left foot, no tenderness in the calves  Skin:  no other gross lesions, rashes, induration    Assessment:        Hospital Problems             Last Modified POA    * (Principal) Melena 4/7/2024 Yes    History of arterial bypass of lower extremity 4/3/2024 Yes    S/P AKA (above knee amputation) unilateral, right (HCC) 4/3/2024 Yes    Primary hypertension (Chronic) 4/3/2024 Yes    Peripheral arterial disease (HCC) 4/3/2024 Yes    Neuropathy 4/3/2024 Yes    Malignant neoplasm of lung (HCC) 4/4/2024 Yes    Overview Signed 4/3/2024  6:44 PM by Malissa Gaston APRN - NP     Added automatically from request for surgery [id number]         Intermittent claudication of left lower extremity due to atherosclerosis (HCC) 4/4/2024 Yes    Embolism and thrombosis of artery (HCC) 4/4/2024 Yes    Left leg cellulitis 4/9/2024 Yes    Iron deficiency anemia due to chronic blood loss 4/10/2024 Yes    Hx of cancer of lung 4/10/2024 Yes    Adrenal mass (HCC) 4/10/2024 Yes    Claudication (HCC) 4/10/2024 Yes    Pleural effusion, bilateral 4/11/2024 Yes    Longstanding  persistent atrial fibrillation (HCC) 4/11/2024 Yes    Acute ischemic right internal carotid artery (ICA) stroke (HCC) 4/13/2024 Yes    Internal carotid artery stenosis, bilateral 4/13/2024 Yes    Anemia 4/13/2024 Yes    Gastrointestinal hemorrhage 4/13/2024 Yes    NSTEMI (non-ST elevated myocardial infarction) (HCC) 4/14/2024 No    Hypotension 4/14/2024 Yes    Atrial fibrillation with rapid ventricular response (HCC) 4/14/2024 Yes    Unstable angina (HCC) 4/14/2024 Yes    Right upper quadrant abdominal pain 4/17/2024 Yes    ACP (advance care planning) 4/17/2024 Yes    Palliative care encounter 4/17/2024 Yes    Acute cholecystitis 4/18/2024 No    Lung mass 4/27/2024 Yes    Liver lesion 4/27/2024 Yes     Plan:        Severe anemia  S/p PRBC transfusion -5 units so far, current hemoglobin 8.7  Coumadin and Plavix have already been stopped  No source of bleeding identified on EGD.  Unable to tolerate bowel prep for colonoscopy.  Given hemoglobin has been relatively stable and patient has multiple comorbidities, will recommend deferring colonoscopy to outpatient.  Heparin infusion stopped and patient started on Eliquis and monitor hemoglobin close.     Vascular surgery was on board for carotid stenosis and planning endarterectomy after 4 to 6 weeks..  Gastroenterology on board (following distantly) .  Outpatient colonoscopy recommended.  Severe peripheral vascular disease  S/p multiple bypass surgeries.  Follow[facility] Dr[facility] Ledbetter at [facility].  Anticoagulation and antiplatelet medication were held due to severe anemia.  Outpatient follow with primary vascular surgery for carotid stenosis and endarterectomy.  Right adrenal and liver lesions  Concerning for metastasis.  Known history of stage I lung cancer in 2019  s/p resection.  Defer biopsy due to multiple comorbidities at this time.  Outpatient follow-up with oncology..  PAF -   On  Eliquis.  Restarted after bronch.   No ischemic work up at this time due to critical

## 2024-04-29 NOTE — PROGRESS NOTES
Patient up to chairj via overhead hoist with PT assistance.  Tolerated well.  VSS.  Continue to monitor.

## 2024-04-29 NOTE — FLOWSHEET NOTE
04/29/24 1145   Treatment Team Notification   Reason for Communication Evaluate   Name of Team Member Notified Dr. Lopez   Treatment Team Role Consulting Provider   Method of Communication Face to face   Response At bedside;See orders

## 2024-04-29 NOTE — FLOWSHEET NOTE
End Of Shift Note  [facility]  Summary of shift: Hgb 8.8, ángel drain 20/10/10ml out bloody, hemoptysis still present upon coughing. Pt did not void bladder scan result 584, pt refused strait cath, denies pain,     Vitals:    Vitals:    04/29/24 0500 04/29/24 0600 04/29/24 0620 04/29/24 0700   BP: 113/68 106/68  110/60   Pulse: 100 99 98 (!) 106   Resp: 18 16 15 19   Temp:       TempSrc:       SpO2: 99% 96% 97%    Weight:       Height:            I&O:   Intake/Output Summary (Last 24 hours) at 4/29/2024 0711  Last data filed at 4/29/2024 0500  Gross per 24 hour   Intake 1066.42 ml   Output 40 ml   Net 1026.42 ml       Resp Status: 3L NC    Ventilator Settings:     / / /FiO2 : 94 %    Critical Care IV infusions:   sodium chloride      sodium chloride      sodium chloride Stopped (04/20/24 0943)    dextrose          LDA:   Peripheral IV 04/24/24 Distal;Left Forearm (Active)   Number of days: 4       Peripheral IV 04/25/24 Right;Anterior Cephalic (Active)   Number of days: 3       Biliary Tube 04/19/24 T-tube 8 fr RUQ (Active)   Number of days: 9       Wound 04/04/24 Foot Left;Dorsal dry, scabbed (Active)   Number of days: 24       Wound Foot Left;Lateral (Active)   Number of days:

## 2024-04-29 NOTE — PLAN OF CARE
Problem: Safety - Adult  Goal: Free from fall injury  4/28/2024 2104 by Araceli Warren RN  Outcome: Progressing  4/28/2024 2005 by Crys Ivory RN  Outcome: Progressing     Problem: Discharge Planning  Goal: Discharge to home or other facility with appropriate resources  4/28/2024 2104 by Araceli Warren RN  Outcome: Progressing  4/28/2024 2005 by Crys Ivory RN  Outcome: Progressing  Flowsheets (Taken 4/28/2024 1900)  Discharge to home or other facility with appropriate resources:   Identify barriers to discharge with patient and caregiver   Arrange for needed discharge resources and transportation as appropriate   Identify discharge learning needs (meds, wound care, etc)   Arrange for interpreters to assist at discharge as needed   Refer to discharge planning if patient needs post-hospital services based on physician order or complex needs related to functional status, cognitive ability or social support system     Problem: Pain  Goal: Verbalizes/displays adequate comfort level or baseline comfort level  4/28/2024 2104 by Araceli Warren RN  Outcome: Progressing  4/28/2024 2005 by Crys Ivory RN  Outcome: Progressing  Flowsheets (Taken 4/28/2024 1900)  Verbalizes/displays adequate comfort level or baseline comfort level:   Encourage patient to monitor pain and request assistance   Assess pain using appropriate pain scale   Administer analgesics based on type and severity of pain and evaluate response   Implement non-pharmacological measures as appropriate and evaluate response   Consider cultural and social influences on pain and pain management   Notify Licensed Independent Practitioner if interventions unsuccessful or patient reports new pain     Problem: Skin/Tissue Integrity - Adult  Goal: Incisions, wounds, or drain sites healing without S/S of infection  4/28/2024 2104 by Araceli Warren RN  Outcome: Progressing  4/28/2024 2005 by Crys Ivory, RN  Outcome: Progressing  Flowsheets  Taken  4/28/2024 2002  Incisions, Wounds, or Drain Sites Healing Without Sign and Symptoms of Infection: ADMISSION and DAILY: Assess and document risk factors for pressure ulcer development  Taken 4/28/2024 1900  Incisions, Wounds, or Drain Sites Healing Without Sign and Symptoms of Infection: ADMISSION and DAILY: Assess and document risk factors for pressure ulcer development     Problem: Gastrointestinal - Adult  Goal: Minimal or absence of nausea and vomiting  4/28/2024 2104 by Araceli Warren RN  Outcome: Progressing  4/28/2024 2005 by Crys Ivory RN  Outcome: Progressing  Flowsheets (Taken 4/28/2024 1900)  Minimal or absence of nausea and vomiting:   Administer IV fluids as ordered to ensure adequate hydration   Maintain NPO status until nausea and vomiting are resolved   Nasogastric tube to low intermittent suction as ordered   Administer ordered antiemetic medications as needed   Provide nonpharmacologic comfort measures as appropriate   Advance diet as tolerated, if ordered   Nutrition consult to assist patient with adequate nutrition and appropriate food choices  Goal: Maintains or returns to baseline bowel function  4/28/2024 2104 by Araceli Warren RN  Outcome: Progressing  4/28/2024 2005 by Crys Ivory RN  Outcome: Progressing  Flowsheets (Taken 4/28/2024 1900)  Maintains or returns to baseline bowel function: Assess bowel function     Problem: Infection - Adult  Goal: Absence of infection at discharge  4/28/2024 2104 by Araceli Warren RN  Outcome: Progressing  4/28/2024 2005 by Crys Ivory RN  Outcome: Progressing  Flowsheets  Taken 4/28/2024 2002  Absence of infection at discharge:   Assess and monitor for signs and symptoms of infection   Monitor lab/diagnostic results   Monitor all insertion sites i.e., indwelling lines, tubes and drains  Taken 4/28/2024 1900  Absence of infection at discharge:   Assess and monitor for signs and symptoms of infection   Monitor lab/diagnostic results   Monitor

## 2024-04-29 NOTE — PROGRESS NOTES
Occupational Therapy  DATE: 2024    NAME: Jorge Luis Banda  MRN: [id number]   : 1945    Patient not seen this date for Occupational Therapy due to:      [] Cancel by RN or physician due to:    [] Hemodialysis    [] Critical Lab Value Level     [] Blood transfusion in progress    [] Acute or unstable cardiovascular status   _MAP < 55 or more than >115  _HR < 40 or > 130    [] Acute or unstable pulmonary status   -FiO2 > 60%   _RR < 5 or >40    _O2 sats < 85%    [] Strict Bedrest    [] Off Unit for surgery or procedure    [] Off Unit for testing       [] Pending imaging to R/O fracture    [x] Refusal by Patient : Pt. Declined treatment d/t just getting up in chair with P.T. and feeling fatigued. OT will cont to follow.     [] Other      [] OT being discontinued at this time. Patient independent. No further needs.     [] OT being discontinued at this time as the patient has been transferred to hospice care. No further needs.      BRUCE FINE ZINA

## 2024-04-29 NOTE — PROGRESS NOTES
Patient Name: Jorge Luis Banda  Date of admission: 4/3/2024  3:27 PM  Patient's age: 78 y.o., 1945  Admission Dx: Melena [K92.1]  GI bleed [K92.2]  UMM (acute kidney injury) (HCC) [N17.9]  Left leg cellulitis [L03.116]  Anemia, unspecified type [D64.9]    Reason for Consult: management recommendations  Requesting Physician: Sumeet Eller MD    CHIEF COMPLAINT: GI bleeding    History Obtained From:  patient  INTERVAL HISTORY:    Patient seen and examined.    Patient's family at bedside  Shortness of breath stable  He is on 2 L oxygen  Hemoglobin today 8.7, got PRBC on previous day  S/p bronc on 4/26 :left upper bronchus takeoff has obstruction with mass like lesion.?       HISTORY OF PRESENT ILLNESS:    The patient is a 78 y.o.   male who is admitted to the hospital for anemia and suspicion for GI bleeding.  His hemoglobin was under 7 and received blood transfusion.  He has severe peripheral vascular disease on Plavix and Coumadin.  He underwent an EGD that showed gastritis and he refused colonoscopy.  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  From oncology perspective, the patient was diagnosed with stage I lung cancer in 2019 and underwent lung resection.  Never received any chemotherapy or radiation.  He has been in remission since then.  CT scan of the chest showed concerning changes in the liver and adrenal gland for metastatic disease.  Dedicated CT of the abdomen and pelvis is ordered and pending.    Past Medical History:   has a past medical history of Paroxysmal atrial fibrillation (HCC), Peripheral artery disease (HCC), and Primary hypertension.    Past Surgical History:   has a past surgical history that includes above knee amputation (Right, 01/01/2020); Upper gastrointestinal endoscopy (N/A, 4/8/2024); IR CHOLECYSTOSTOMY PERCUTANEOUS COMPLETE (4/19/2024); and bronchoscopy (N/A,  multiple issues going on at this point and I would recommend continue current management as per primary team   His lung cancer was stage I and it was 5 years ago.  He never received any adjuvant therapy  Hemoptysis. Pulmonary following.  Watch hemoglobin and transfuse as needed> at this time no hemoptysis  His thoracentesis fluid negative for malignancy  PET scan and possible  repeat bronch  Hgb stable s/p transfusion ;no evidence of bleed transfuse one unit of blood  We will follow.                                    Liliane Ramires MD                          [facility]                            This note is created with the assistance of a speech recognition program.  While intending to generate a document that actually reflects the content of the visit, the document can still have some errors including those of syntax and sound a like substitutions which may escape proof reading.  It such instances, actual meaning can be extrapolated by contextual diversion.

## 2024-04-29 NOTE — PROGRESS NOTES
[facility]  Progress Note    Jorge Luis Banda  MRN: [id number]  Date: 4/29/2024  LOS: 26     Reason for F/U :   Cholecystitis    Impression :   Acute cholecystitis   status post percutaneous cholecystostomy tube placement 4/19/2024  History of lung cancer stage I s/p resection 2019 and concerning findings of liver and adrenal metastases  Acute CVA with left-sided weakness and has flow-limiting critical right ICA stenosis 90%, bilateral proximal ICA atherosclerosis with flow limitation left 80% and right 90%  Severe peripheral arterial disease  Longstanding persistent atrial fibrillation with rapid ventricular response  Acute GI bleed unclear source  Neurogenic bladder dysfunction with urinary retention and refusing indwelling Dotson catheter  Elevated troponin consistent with type II non-ST elevation myocardial infarction secondary to acute anemia  Acute diastolic congestive heart failure    Recommendations:   The patient received Unasyn from 4/3/2024 4/18/2024 [5 days of therapy], levofloxacin on 4/11 and got a 3-day course of azithromycin from 4/11/2024 through 4/13/2024  The patient did receive Zosyn from 4/11/2023 through 4/21/2023  He was then switched to oral antimicrobial therapy today with cefdinir and metronidazole through 4/26/2024   Continue local wound care for the foot wound  The patient underwent a bronchoscopy and the BAL cultures were negative other than for some Candida species which are not typically pathogens  Infectious disease wise is nothing further to add and I will sign off    Infection Control Recommendations:   Universal precautions    Discharge Planning:   Estimated Length of IV antimicrobials: 4/26/2024  Patient will need Midline Catheter Insertion/ PICC line Insertion: No  Patient will need: Home IV , Infusion Center,  SNF,  LTAC: Undetermined  Patient willneed outpatient wound care: No    Medical Decision making / Summary of Stay:   Jorge Luis Banda is a 78  MD Saritha  [employer]  OFFICE: [phone]    Thank you for allowing us to participate in the care of this patient. Please call with questions.    This note is created with the assistance of a speech recognition program.  While intending to generate a document that actually reflects the content of the visit, the document can still have some errors including those of syntax and sound a like substitutions which may escape proof reading.  In such instances, actual meaning can be extrapolated by contextual diversion.

## 2024-04-29 NOTE — PROGRESS NOTES
Patient Name: Jorge Luis Banda  Date of admission: 4/3/2024  3:27 PM  Patient's age: 78 y.o., 1945  Admission Dx: Melena [K92.1]  GI bleed [K92.2]  UMM (acute kidney injury) (HCC) [N17.9]  Left leg cellulitis [L03.116]  Anemia, unspecified type [D64.9]    Reason for Consult: management recommendations  Requesting Physician: Sumeet Eller MD    CHIEF COMPLAINT: GI bleeding    History Obtained From:  patient  INTERVAL HISTORY:    Patient seen and examined.    Patient's family at bedside  Shortness of breath stable  Hemoglobin 7  S/p [facility]   left upper bronchus takeoff has obstruction with mass like lesion.?  Stump versus mass. washings taken.  We will obtain a PET scan.  Patient may need reevaluation with a bronchoscopy if PET scan is positive in left upper lobe bronchus takeoff.  Mucous plugging which was obstructing was mostly clotted blood, left mainstream bronchus washings obtained     HISTORY OF PRESENT ILLNESS:    The patient is a 78 y.o.   male who is admitted to the hospital for anemia and suspicion for GI bleeding.  His hemoglobin was under 7 and received blood transfusion.  He has severe peripheral vascular disease on Plavix and Coumadin.  He underwent an EGD that showed gastritis and he refused colonoscopy.  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  From oncology perspective, the patient was diagnosed with stage I lung cancer in 2019 and underwent lung resection.  Never received any chemotherapy or radiation.  He has been in remission since then.  CT scan of the chest showed concerning changes in the liver and adrenal gland for metastatic disease.  Dedicated CT of the abdomen and pelvis is ordered and pending.    Past Medical History:   has a past medical history of Paroxysmal atrial fibrillation (HCC), Peripheral artery disease (HCC), and Primary hypertension.    Past Surgical

## 2024-04-29 NOTE — PROGRESS NOTES
Pulmonary Critical Care Progress Note       Patient seen for the follow up of bilateral pleural effusions, hemoptysis     Subjective:  Patient is on 3 L oxygen.  He denies any chest pain.  He still has weak cough.  He denies significant shortness of breath.  He has been on chest vest therapy    Examination:  Vitals: BP (!) 81/56   Pulse (!) 107   Temp 96.9 °F (36.1 °C) (Temporal)   Resp 15   Ht 1.829 m (6')   Wt 74.1 kg (163 lb 7 oz)   SpO2 98%   BMI 22.17 kg/m²   General appearance: In no acute distress, alert and cooperative with exam cachectic  Neck: No JVD  Lungs: Decreased breath sound no crackles or wheeze  Heart: irregular rate and rhythm, S1, S2 normal, no gallop  Abdomen: Soft, non tender, + BS  Cholecystotomy tube in place bloody output  Extremities: no cyanosis or clubbing. No significant edema, left upper and lower extremity weakness right above-knee amputation left foot dorsal ulcer    LABs:  CBC:   Recent Labs     04/27/24  0315 04/27/24  1201 04/27/24  1821 04/28/24  0006 04/28/24  0628 04/28/24  1705 04/29/24  0004 04/29/24  0613   WBC 8.4  --   --   --   --   --   --  9.8   HGB 7.1* 8.1* 7.3* 7.1* 7.0* 9.3* 8.8* 8.7*   HCT 23.0* 28.1* 23.4* 24.1* 23.2* 29.6* 28.9* 28.4*     --   --   --   --   --   --  328       BMP:   Recent Labs     04/27/24  0315 04/29/24  0613    138   K 3.8 3.6*   CO2 26 27   BUN 15 13   CREATININE 1.1 1.0   LABGLOM 68 76   GLUCOSE 155* 96       Recent Labs     04/26/24  1604   APTT 45.5*        Latest Reference Range & Units 04/11/24 15:56 04/13/24 23:26 04/16/24 04:21   Procalcitonin 0.00 - 0.09 ng/mL 1.95 (H) 1.02 (H) 0.72 (H)   (H): Data is abnormally high   Latest Reference Range & Units 04/15/24 04:34   Pro-BNP <300 pg/mL 16,301 (H)   (H): Data is abnormally high  Radiology:  Chest x-ray 4/29    Extensive bilateral pleural/parenchymal disease with slight interval increase  in extent on the left.  Findings may reflect worsening  multifocal  pneumonia/atelectasis.  Imaging follow-up recommended to document resolution.  Slight interval increase in secondary leftward mediastinal shift.    X-ray 4/27/24      CT chest abdomen pelvis 4/16/24  1.  CT CHEST: Unchanged presumed bilateral pneumonia: Moderate volume right  pleural effusion and small volume left pleural effusion with areas of  consolidation posteriorly mid and lower lungs bilaterally.  2. Left basilar scarring.  3. 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  4.  Pulmonary sequela typical of that seen with smoking, including COPD.  5. Acute esophagitis.  6. Prominent esophageal ampulla versus small hiatal hernia.  7. Gastroesophageal reflux versus incomplete esophageal emptying.  8. Extensive calcific atherosclerosis coronary arteries.  9. CT ABDOMEN/PELVIS: Unchanged gallbladder hydrops and cholelithiasis with  findings of acute cholecystitis.  10. Stable right adrenal mass infiltrating into adjacent structures including  the right kidney, liver and inferior vena cava.  11. Probable reactive colitis at the hepatic flexure.      Chest x-ray 4/14  Mildly progressive bilateral infiltrates when compared to the prior exam       Impression & Recommendations:  Acute hypoxic respiratory insufficiency  Supplemental oxygen as needed to maintain oxygen saturation >90%  Incentive spirometer q1h while awake   Initiate BiPAP support will recruit atelectasis lung     Bilateral loculated pleural effusions/atelectasis status post thoracentesis    Aggressive pulmonary toilet/pulmonary hygiene  Chest vest percussion therapy 3 times a day  Xopenex  Mucomyst aerosol treatment  Status post right thoracentesis 4/19/24 with 750 mL removed with negative cultures and cytology  Lasix 40 mg daily  Status post bronchoscopy 4/26/24 per Dr. Mcneil ; bronchoscopy shows left upper bronchus takeoff has obstruction with mass like lesion.?  Stump versus mass. washings taken.  We will obtain a

## 2024-04-29 NOTE — PLAN OF CARE
Problem: Respiratory - Adult  Goal: Able to breathe comfortably  Description: Able to breathe comfortably  4/29/2024 1916 by Dick Garcia RCP  Outcome: Progressing     Problem: Respiratory - Adult  Goal: Clear lung sounds  4/29/2024 1916 by Dick Garcia RCP  Outcome: Progressing     Problem: Respiratory - Adult  Goal: Adequate oxygenation  Description: Adequate oxygenation  4/29/2024 1916 by Dick Garcia RCP  Outcome: Progressing     Problem: Respiratory - Adult  Goal: Ability to maintain normal respiratory secretions will improve  Description: Ability to maintain normal respiratory secretions will improve  4/29/2024 1916 by Dick Garcia RCP  Outcome: Progressing

## 2024-04-29 NOTE — PLAN OF CARE
RCP  Outcome: Progressing  4/28/2024 0807 by Clifton Thomas RCP  Outcome: Progressing  Goal: Adequate oxygenation  Description: Adequate oxygenation  4/28/2024 1931 by Hoa Thornton RCP  Outcome: Progressing  4/28/2024 0807 by Clifton Thomas RCP  Outcome: Progressing  Goal: Ability to maintain normal respiratory secretions will improve  Description: Ability to maintain normal respiratory secretions will improve  4/28/2024 1931 by Hoa Thornton RCP  Outcome: Progressing  4/28/2024 0807 by Clifton Thomas RCP  Outcome: Progressing     Problem: Skin/Tissue Integrity  Goal: Absence of new skin breakdown  Description: 1.  Monitor for areas of redness and/or skin breakdown  2.  Assess vascular access sites hourly  3.  Every 4-6 hours minimum:  Change oxygen saturation probe site  4.  Every 4-6 hours:  If on nasal continuous positive airway pressure, respiratory therapy assess nares and determine need for appliance change or resting period.  Outcome: Progressing     Problem: ABCDS Injury Assessment  Goal: Absence of physical injury  Outcome: Progressing     Problem: Genitourinary - Adult  Goal: Absence of urinary retention  Outcome: Progressing  Flowsheets (Taken 4/28/2024 1900)  Absence of urinary retention:   Monitor intake/output and perform bladder scan as needed   Assess patient’s ability to void and empty bladder   Place urinary catheter per Licensed Independent Practitioner order if needed   Discuss with Licensed Independent Practitioner  medications to alleviate retention as needed   Discuss catheterization for long term situations as appropriate     Problem: Nutrition Deficit:  Goal: Optimize nutritional status  Outcome: Progressing     Problem: Chronic Conditions and Co-morbidities  Goal: Patient's chronic conditions and co-morbidity symptoms are monitored and maintained or improved  Outcome: Progressing  Flowsheets (Taken 4/28/2024 1900)  Care Plan - Patient's Chronic Conditions and  Co-Morbidity Symptoms are Monitored and Maintained or Improved:   Monitor and assess patient's chronic conditions and comorbid symptoms for stability, deterioration, or improvement   Collaborate with multidisciplinary team to address chronic and comorbid conditions and prevent exacerbation or deterioration   Update acute care plan with appropriate goals if chronic or comorbid symptoms are exacerbated and prevent overall improvement and discharge

## 2024-04-29 NOTE — PROGRESS NOTES
PALLIATIVE CARE PROGRESS NOTE     NAME:  Jorge Luis Banda  MEDICAL RECORD NUMBER:  [id number]  AGE: 78 y.o.   GENDER: male  : 1945  TODAY'S DATE:  2024  Room:     Reason For Consult:  Goals of care evaluation  Distress management  Symptom Management  Guidance and support  Facilitate communications  Assistance in coordinating care  Recommendations for the above    Plan      Palliative Interaction:    I met with patient at the bedside this morning.  Patient tells me he is feeling much better than last week.  He updated me that he received a unit of blood yesterday.  He does tell me that his breathing feels better since his bronchoscopy.  He has no complaints of pain at this time.    He states that he was up in the chair over the weekend.  He was able to sit in the chair for 8 hours and tells me that he felt great. Although it was difficult for him to get up, he was very thankful that he was out of the bed.     I asked patient how he was feeling after starting Marinol over the weekend. He states he has a had a increase in his appetite and was even craving potato chips. He states \"I haven't wanted chips in so long and they have tasted so good.\" Patient denied any side effects from medications. Patient thankful for medication adjustments and is hoping that his appetite will continue to improve. He is looking forward to discharge from the hospital.     I spoke with him about palliative services. He is open to them but would like his significant other Rita contact. I told him that I would call her and speak with her about this.     He denies any other current needs at this time. Palliative will continue to follow.     I called and spoke with Rita. I updated her regarding rounds on Rich and that he was looking better sitting in bed. She states the weekend went very well and he has been improving the last few days. I spoke with her about palliative services at home which she was in agreement for.

## 2024-04-30 ENCOUNTER — APPOINTMENT (OUTPATIENT)
Dept: GENERAL RADIOLOGY | Age: 79
DRG: 377 | End: 2024-04-30
Payer: MEDICARE

## 2024-04-30 LAB
ANION GAP SERPL CALCULATED.3IONS-SCNC: 10 MMOL/L (ref 9–17)
BASOPHILS # BLD: 0.04 K/UL (ref 0–0.2)
BASOPHILS NFR BLD: 0 % (ref 0–2)
BUN SERPL-MCNC: 11 MG/DL (ref 8–23)
BUN/CREAT SERPL: 12 (ref 9–20)
CALCIUM SERPL-MCNC: 8.1 MG/DL (ref 8.6–10.4)
CHLORIDE SERPL-SCNC: 105 MMOL/L (ref 98–107)
CO2 SERPL-SCNC: 24 MMOL/L (ref 20–31)
CREAT SERPL-MCNC: 0.9 MG/DL (ref 0.7–1.2)
EOSINOPHIL # BLD: 0.15 K/UL (ref 0–0.44)
EOSINOPHILS RELATIVE PERCENT: 2 % (ref 1–4)
ERYTHROCYTE [DISTWIDTH] IN BLOOD BY AUTOMATED COUNT: 18.6 % (ref 11.8–14.4)
GFR SERPL CREATININE-BSD FRML MDRD: 86 ML/MIN/1.73M2
GLUCOSE SERPL-MCNC: 106 MG/DL (ref 70–99)
HCT VFR BLD AUTO: 29 % (ref 40.7–50.3)
HGB BLD-MCNC: 8.7 G/DL (ref 13–17)
IMM GRANULOCYTES # BLD AUTO: 0.06 K/UL (ref 0–0.3)
IMM GRANULOCYTES NFR BLD: 1 %
LYMPHOCYTES NFR BLD: 0.82 K/UL (ref 1.1–3.7)
LYMPHOCYTES RELATIVE PERCENT: 9 % (ref 24–43)
MAGNESIUM SERPL-MCNC: 1.8 MG/DL (ref 1.6–2.6)
MCH RBC QN AUTO: 29 PG (ref 25.2–33.5)
MCHC RBC AUTO-ENTMCNC: 30 G/DL (ref 28.4–34.8)
MCV RBC AUTO: 96.7 FL (ref 82.6–102.9)
MICROORGANISM SPEC CULT: ABNORMAL
MICROORGANISM SPEC CULT: ABNORMAL
MICROORGANISM/AGENT SPEC: ABNORMAL
MONOCYTES NFR BLD: 0.67 K/UL (ref 0.1–1.2)
MONOCYTES NFR BLD: 7 % (ref 3–12)
NEUTROPHILS NFR BLD: 81 % (ref 36–65)
NEUTS SEG NFR BLD: 7.65 K/UL (ref 1.5–8.1)
NRBC BLD-RTO: 0 PER 100 WBC
PLATELET # BLD AUTO: 314 K/UL (ref 138–453)
PMV BLD AUTO: 9.8 FL (ref 8.1–13.5)
POTASSIUM SERPL-SCNC: 3.6 MMOL/L (ref 3.7–5.3)
RBC # BLD AUTO: 3 M/UL (ref 4.21–5.77)
RBC # BLD: ABNORMAL 10*6/UL
SODIUM SERPL-SCNC: 139 MMOL/L (ref 135–144)
SPECIMEN DESCRIPTION: ABNORMAL
SURGICAL PATHOLOGY REPORT: NORMAL
WBC OTHER # BLD: 9.4 K/UL (ref 3.5–11.3)

## 2024-04-30 PROCEDURE — 6370000000 HC RX 637 (ALT 250 FOR IP): Performed by: INTERNAL MEDICINE

## 2024-04-30 PROCEDURE — 2060000000 HC ICU INTERMEDIATE R&B

## 2024-04-30 PROCEDURE — 2580000003 HC RX 258: Performed by: INTERNAL MEDICINE

## 2024-04-30 PROCEDURE — 99232 SBSQ HOSP IP/OBS MODERATE 35: CPT | Performed by: FAMILY MEDICINE

## 2024-04-30 PROCEDURE — 80048 BASIC METABOLIC PNL TOTAL CA: CPT

## 2024-04-30 PROCEDURE — 97530 THERAPEUTIC ACTIVITIES: CPT

## 2024-04-30 PROCEDURE — 6360000002 HC RX W HCPCS: Performed by: INTERNAL MEDICINE

## 2024-04-30 PROCEDURE — 94640 AIRWAY INHALATION TREATMENT: CPT

## 2024-04-30 PROCEDURE — 83735 ASSAY OF MAGNESIUM: CPT

## 2024-04-30 PROCEDURE — 2700000000 HC OXYGEN THERAPY PER DAY

## 2024-04-30 PROCEDURE — 36415 COLL VENOUS BLD VENIPUNCTURE: CPT

## 2024-04-30 PROCEDURE — 99232 SBSQ HOSP IP/OBS MODERATE 35: CPT | Performed by: INTERNAL MEDICINE

## 2024-04-30 PROCEDURE — 97110 THERAPEUTIC EXERCISES: CPT

## 2024-04-30 PROCEDURE — 99231 SBSQ HOSP IP/OBS SF/LOW 25: CPT | Performed by: NURSE PRACTITIONER

## 2024-04-30 PROCEDURE — 94660 CPAP INITIATION&MGMT: CPT

## 2024-04-30 PROCEDURE — 6360000002 HC RX W HCPCS: Performed by: FAMILY MEDICINE

## 2024-04-30 PROCEDURE — 94761 N-INVAS EAR/PLS OXIMETRY MLT: CPT

## 2024-04-30 PROCEDURE — 6370000000 HC RX 637 (ALT 250 FOR IP): Performed by: FAMILY MEDICINE

## 2024-04-30 PROCEDURE — 71045 X-RAY EXAM CHEST 1 VIEW: CPT

## 2024-04-30 PROCEDURE — 94669 MECHANICAL CHEST WALL OSCILL: CPT

## 2024-04-30 PROCEDURE — 85025 COMPLETE CBC W/AUTO DIFF WBC: CPT

## 2024-04-30 PROCEDURE — 6360000002 HC RX W HCPCS: Performed by: NURSE PRACTITIONER

## 2024-04-30 RX ORDER — ACETYLCYSTEINE 200 MG/ML
400 SOLUTION ORAL; RESPIRATORY (INHALATION)
Status: COMPLETED | OUTPATIENT
Start: 2024-04-30 | End: 2024-05-01

## 2024-04-30 RX ORDER — METOCLOPRAMIDE HYDROCHLORIDE 5 MG/ML
5 INJECTION INTRAMUSCULAR; INTRAVENOUS
Status: DISCONTINUED | OUTPATIENT
Start: 2024-04-30 | End: 2024-05-04

## 2024-04-30 RX ORDER — ACETYLCYSTEINE 200 MG/ML
600 SOLUTION ORAL; RESPIRATORY (INHALATION)
Status: DISCONTINUED | OUTPATIENT
Start: 2024-04-30 | End: 2024-04-30

## 2024-04-30 RX ADMIN — GUAIFENESIN 600 MG: 600 TABLET ORAL at 10:21

## 2024-04-30 RX ADMIN — MIDODRINE HYDROCHLORIDE 5 MG: 5 TABLET ORAL at 10:21

## 2024-04-30 RX ADMIN — ACETYLCYSTEINE 400 MG: 200 SOLUTION ORAL; RESPIRATORY (INHALATION) at 07:36

## 2024-04-30 RX ADMIN — FERROUS SULFATE TAB EC 325 MG (65 MG FE EQUIVALENT) 325 MG: 325 (65 FE) TABLET DELAYED RESPONSE at 10:21

## 2024-04-30 RX ADMIN — ACETYLCYSTEINE 600 MG: 200 SOLUTION ORAL; RESPIRATORY (INHALATION) at 14:43

## 2024-04-30 RX ADMIN — MIRTAZAPINE 30 MG: 15 TABLET, ORALLY DISINTEGRATING ORAL at 20:15

## 2024-04-30 RX ADMIN — ASPIRIN 81 MG: 81 TABLET, COATED ORAL at 10:21

## 2024-04-30 RX ADMIN — METOPROLOL TARTRATE 25 MG: 25 TABLET, FILM COATED ORAL at 10:21

## 2024-04-30 RX ADMIN — SODIUM CHLORIDE, PRESERVATIVE FREE 10 ML: 5 INJECTION INTRAVENOUS at 10:23

## 2024-04-30 RX ADMIN — PANTOPRAZOLE SODIUM 40 MG: 40 TABLET, DELAYED RELEASE ORAL at 05:39

## 2024-04-30 RX ADMIN — ACETYLCYSTEINE 400 MG: 200 SOLUTION ORAL; RESPIRATORY (INHALATION) at 18:56

## 2024-04-30 RX ADMIN — GUAIFENESIN 600 MG: 600 TABLET ORAL at 20:15

## 2024-04-30 RX ADMIN — DRONABINOL 2.5 MG: 2.5 CAPSULE ORAL at 10:21

## 2024-04-30 RX ADMIN — ATORVASTATIN CALCIUM 40 MG: 40 TABLET, FILM COATED ORAL at 20:15

## 2024-04-30 RX ADMIN — CYANOCOBALAMIN TAB 1000 MCG 1000 MCG: 1000 TAB at 10:22

## 2024-04-30 RX ADMIN — LEVALBUTEROL 1.25 MG: 1.25 SOLUTION, CONCENTRATE RESPIRATORY (INHALATION) at 07:36

## 2024-04-30 RX ADMIN — FUROSEMIDE 40 MG: 40 TABLET ORAL at 10:21

## 2024-04-30 RX ADMIN — TAMSULOSIN HYDROCHLORIDE 0.4 MG: 0.4 CAPSULE ORAL at 10:21

## 2024-04-30 RX ADMIN — CHOLECALCIFEROL TAB 125 MCG (5000 UNIT) 5000 UNITS: 125 TAB at 10:22

## 2024-04-30 RX ADMIN — MIDODRINE HYDROCHLORIDE 5 MG: 5 TABLET ORAL at 12:57

## 2024-04-30 RX ADMIN — APIXABAN 5 MG: 5 TABLET, FILM COATED ORAL at 20:15

## 2024-04-30 RX ADMIN — APIXABAN 5 MG: 5 TABLET, FILM COATED ORAL at 10:21

## 2024-04-30 RX ADMIN — LEVALBUTEROL 1.25 MG: 1.25 SOLUTION, CONCENTRATE RESPIRATORY (INHALATION) at 18:56

## 2024-04-30 RX ADMIN — EMPAGLIFLOZIN 10 MG: 10 TABLET, FILM COATED ORAL at 10:21

## 2024-04-30 RX ADMIN — OXYCODONE AND ACETAMINOPHEN 1 TABLET: 5; 325 TABLET ORAL at 05:39

## 2024-04-30 RX ADMIN — SODIUM CHLORIDE, PRESERVATIVE FREE 10 ML: 5 INJECTION INTRAVENOUS at 18:34

## 2024-04-30 RX ADMIN — LEVALBUTEROL 1.25 MG: 1.25 SOLUTION, CONCENTRATE RESPIRATORY (INHALATION) at 14:43

## 2024-04-30 RX ADMIN — METOCLOPRAMIDE HYDROCHLORIDE 5 MG: 5 INJECTION INTRAMUSCULAR; INTRAVENOUS at 18:32

## 2024-04-30 ASSESSMENT — PAIN DESCRIPTION - LOCATION: LOCATION: ARM

## 2024-04-30 ASSESSMENT — PAIN SCALES - GENERAL
PAINLEVEL_OUTOF10: 7
PAINLEVEL_OUTOF10: 0

## 2024-04-30 ASSESSMENT — PAIN DESCRIPTION - ORIENTATION: ORIENTATION: LEFT

## 2024-04-30 ASSESSMENT — PAIN DESCRIPTION - DESCRIPTORS: DESCRIPTORS: ACHING;DULL

## 2024-04-30 ASSESSMENT — PAIN - FUNCTIONAL ASSESSMENT: PAIN_FUNCTIONAL_ASSESSMENT: ACTIVITIES ARE NOT PREVENTED

## 2024-04-30 NOTE — PROGRESS NOTES
VASCULAR SURGERY  PROGRESS NOTE      4/30/2024 9:36 AM  Subjective:   Admit Date: 4/3/2024  PCP: Lucila Machado APRN - [facility]    Chief Complaint   Patient presents with    Leg Pain     Left leg, open wounds and swollen       Interval History: No complaints.  Still awaiting precertification.    Diet: Diet NPO Exceptions are: Sips of Water with Meds    Medications:   Scheduled Meds:   midodrine  5 mg Oral TID WC    acetylcysteine  400 mg Inhalation TID RT    droNABinol  2.5 mg Oral BID WC    apixaban  5 mg Oral BID    levalbuterol  1.25 mg Nebulization TID    mirtazapine  30 mg Oral Nightly    midodrine  5 mg Oral TID WC    furosemide  40 mg Oral Daily    sodium chloride  80 mL IntraVENous Once    pantoprazole  40 mg Oral BID AC    empagliflozin  10 mg Oral Daily    sodium chloride  80 mL IntraVENous Once    metoprolol tartrate  25 mg Oral BID    aspirin  81 mg Oral Daily    sodium chloride  100 mL IntraVENous Once    guaiFENesin  600 mg Oral BID    ferrous sulfate  325 mg Oral Daily with breakfast    atorvastatin  40 mg Oral Nightly    vitamin D3  5,000 Units Oral Daily    vitamin B-12  1,000 mcg Oral Daily    tamsulosin  0.4 mg Oral Daily    sodium chloride flush  5-40 mL IntraVENous 2 times per day     Continuous Infusions:   phenylephrine (HERMILA-SYNEPHRINE) 50 mg in sodium chloride 0.9 % 250 mL infusion      sodium chloride      sodium chloride      sodium chloride 20 mL/hr at 04/30/24 0415    dextrose           Labs:   CBC:   Recent Labs     04/29/24  0004 04/29/24  0613 04/30/24  0302   WBC  --  9.8 9.4   HGB 8.8* 8.7* 8.7*   PLT  --  328 314       BMP:    Recent Labs     04/29/24  0613 04/30/24  0302    139   K 3.6* 3.6*    105   CO2 27 24   BUN 13 11   CREATININE 1.0 0.9   GLUCOSE 96 106*       Hepatic:   Recent Labs     04/28/24  1705   BILITOT 0.7       Troponin: Invalid input(s): \"TROPONIN\"  BNP: No results for input(s): \"BNP\" in the last 72 hours.  Lipids:   No results for input(s): \"CHOL\",

## 2024-04-30 NOTE — PROGRESS NOTES
management as per primary team   His lung cancer was stage I and it was 5 years ago.  He never received any adjuvant therapy  Hemoptysis. Pulmonary following.  Watch hemoglobin and transfuse as needed> at this time no hemoptysis  His thoracentesis fluid negative for malignancy  PET scan and possible  repeat bronch as an outpatient  Hgb stable s/p transfusion ;no evidence of bleed transfuse one unit of blood  Discharge per pulmonary/primary  We will follow.                                    Liliane Ramires MD                          [facility]                            This note is created with the assistance of a speech recognition program.  While intending to generate a document that actually reflects the content of the visit, the document can still have some errors including those of syntax and sound a like substitutions which may escape proof reading.  It such instances, actual meaning can be extrapolated by contextual diversion.

## 2024-04-30 NOTE — PROGRESS NOTES
PALLIATIVE CARE PROGRESS NOTE     NAME:  Jorge Luis Banda  MEDICAL RECORD NUMBER:  [id number]  AGE: 78 y.o.   GENDER: male  : 1945  TODAY'S DATE:  2024  Room:     Reason For Consult:  Goals of care evaluation  Distress management  Symptom Management  Guidance and support  Facilitate communications  Assistance in coordinating care  Recommendations for the above    Plan      Palliative Interaction:    Met with patient this morning on rounds.  He tells me he is feeling good today.  He has had good appetite since starting Marinol.  Patient is looking forward to discharge.  I did provide palliative information and he states he is going to review with Alonso when she arrives at the bedside.  He tells me that his pain is controlled and has no concerns today.  Palliative will follow-up with patient for outpatient services.    IMPRESSION/ PLAN  Symptom management/pain control    We feel the patient's symptoms are being controlled. Their current regimen has been reviewed by myself and discussed with the staff. We recommend adjusting their medications as follows:    Goals of care evaluation  The patient goals of care are live longer, improve or maintain function/quality of life, provide comfort care/support/palliation/relieve suffering, remain at home, and preserve independence/autonomy/control  Long discussion to ensure the patient's and family's understanding of goals of care, and theconcept of palliative care.    Code Status:  FULL    Other Recommendations -       History of Present Illness     HISTORY OF PRESENT ILLNESS:   The patient is a 78 y.o. male with a history of peripheral vascular disease with right AKA, A-fib, stage I lung cancer.  Patient has had multiple vascular surgeries for PAD.  Patient presented to [facility] emergency department on 4/3/2024 for complaints of melena.  Patient was found to have low hemoglobin and concern for GI bleeding.  Patient received multiple PRBCs in the

## 2024-04-30 NOTE — PROGRESS NOTES
structures including   the right kidney, liver and inferior vena cava.   11. Probable reactive colitis at the hepatic flexure.         XR ABDOMEN (KUB) (SINGLE AP VIEW)   Final Result   No acute abdominal abnormality.         XR CHEST PORTABLE   Final Result   Stable bilateral hazy opacification representing the in pleural effusion.   Background chronic interstitial changes and emphysema.         XR CHEST PORTABLE   Final Result   Mildly progressive bilateral infiltrates when compared to the prior exam         CT HEAD WO CONTRAST   Final Result   No new acute intracranial abnormality.      Unchanged right frontal infarcts.         CTA HEAD NECK W CONTRAST   Final Result   1. Redemonstration of known subacute infarct involving the cortex,   subcortical and deep white matter of the right frontal lobe are noted, better   appreciated on the recent MRI. There is no acute intracranial hemorrhage.   2. Severe stenoses at the origins of the internal carotid arteries   bilaterally measuring 90% on the right and 80% on the left based on NASCET   criteria.   3. Severe stenosis at the origin of the left vertebral artery with multifocal   moderate to severe stenoses of the V2 and V3 segments of the left vertebral   artery. The V4 segment of the left vertebral artery is occluded.   4. Moderate stenosis of the V4 segment of the right vertebral artery.   5. Moderate stenosis of the proximal left subclavian artery.   6. No significant stenosis or aneurysm of the intracranial circulation.         XR CHEST PORTABLE   Final Result   Improved aeration of the lungs.  Bilateral pleuroparenchymal disease persists.         XR CHEST PORTABLE   Final Result   Mildly worsened bilateral airspace disease.      Small bilateral pleural effusions.         MRI BRAIN WO CONTRAST   Final Result   Acute infarcts within the anterior right CLARKE MCA watershed distribution   extending peripherally towards the right posterior frontal lobe.  No   hemorrhagic  oncology..  PAF -   On  Eliquis. Currently held for possible Bronch.   No ischemic work up at this time due to critical illness . Elevated troponin due to tachycardia and demand ischemia.  Unlikely ACS.  In sinus tachycardia due to anemia and infection and pain. Fluid resuscitation.   Left loculated pleural effusion and bilateral pneumonia   Treated with IV antibiotics and pulmonary following.  Sputum culture negative.  Underwent thoracentesis on 4/19/2024 by IR. Underwent  Bronch  on 4/26/24. Fungal element.   Patient received Unasyn on admission for 5 days, later Levaquin was added.  He also received Zosyn 10 days course from 4/11 to 4/21 followed by Flagyl and cefdinir.  ID following.  Primary Hypertension  - well-controlled.  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  Continue  aspirin 81 mg daily.  Evaluated by neurology.   Treated with Mat-Synephrine for permissive hypertension,continue midodrine.    Former Smoker   Quit in 2019.  Acute on chronic combined systolic and diastolic CHF  Oral Lasix -   Monitor kidney function.  Moderate pulmonary hypertension   O2   Lasix  -   Severe ICA stenosis. 90 % R and 80 % Left .   No plan for inpatient endarterectomy.  Outpatient follow-up with primary vascular surgery..  Acute acalculous cholecystitis  Poor surgical candidate  S/P cholecystotomy tube placement.  On IV Zosyn.  Stop date 4/26/2024.  Atelectasis left lung  Underwent Bronch on 4/26/24  Urinary retention   Straight cath as needed , Flomax. Refuses Dotson Catheter  Acute respiratory failure with hypoxia secondary to loculated pleural effusion   Pulmonary following.  May need repeat bronc.  Chest x-ray worsening.  Gastroparesis  Start Reglan.  Severe malnutrition-  Continue supplements.          Plan to keep Cholecystostomy tube in place for ~ 6 weeks.  Outpatient follow-up with Dr. Dowling    Discussed with Aida MA    No family at bedside.      Continue to

## 2024-04-30 NOTE — PROGRESS NOTES
Occupational Therapy  Facility/Department: [facility]  Rehabilitation Occupational Therapy Daily Treatment Note    Date: 24  Patient Name: Jorge Luis Banda       Room:   MRN: [id number]  Account: [id number]   : 1945  (78 y.o.) Gender: male      ANIL Parra reports patient is medically stable for therapy treatment this date. Chart reviewed prior to treatment and patient is agreeable for therapy.  All lines intact and patient positioned comfortably at end of treatment.  All patient needs addressed prior to ending therapy session.      Pt currently functioning below baseline.  Recommend daily inpatient skilled therapy at time of discharge to maximize long term outcomes and prevent re-admission. Please refer to AM-PAC score for current level of function.               Past Medical History:  has a past medical history of Paroxysmal atrial fibrillation (HCC), Peripheral artery disease (HCC), and Primary hypertension.  Past Surgical History:   has a past surgical history that includes above knee amputation (Right, 2020); Upper gastrointestinal endoscopy (N/A, 2024); IR CHOLECYSTOSTOMY PERCUTANEOUS COMPLETE (2024); and bronchoscopy (N/A, 2024).    Restrictions  Restrictions/Precautions: Fall Risk, General Precautions, Bedrest with Bathroom Privileges  Other position/activity restrictions: Telemetry, LUE IV, L sided weakness (s/p R CVA); R AKA;  R UE IV; Arterial Line (L radial);  PICC Line (R brachial);  2L Supplemental O2 nasal cannula  Required Braces or Orthoses?: No    Subjective  Subjective: Pt resting in bed upon arrival agreeable to therapy.  Restrictions/Precautions: Fall Risk;General Precautions;Bedrest with Bathroom Privileges             Objective     Cognition  Overall Cognitive Status: Exceptions  Arousal/Alertness: Appropriate responses to stimuli  Following Commands: Follows multistep commands with increased time;Follows one step commands consistently  Attention  Span: Appears intact;Attends with cues to redirect  Memory: Appears intact  Safety Judgement: Decreased awareness of need for assistance;Decreased awareness of need for safety  Problem Solving: Assistance required to identify errors made;Assistance required to correct errors made;Decreased awareness of errors  Insights: Decreased awareness of deficits  Initiation: Requires cues for some  Sequencing: Requires cues for some  Cognition Comment: Pleasant, cooperative;  Orientation  Overall Orientation Status: Within Functional Limits  Orientation Level: Oriented X4         ADL  Toileting  Skilled Clinical Factors: No needs          Bed Mobility  Overall Assistance Level: Moderate Assistance;Requires x 2 Assistance (Pt sat EOB for ~ 6-7 min working on WB to L side and functional reaching wiht RUE across midline and out of LOKI. CGA for sitting static but when performing some reaching required Mod A.)  Additional Factors: Verbal cues;Increased time to complete;Head of bed flat;Head of bed raised;With handrails;Without handrails  Sit to Supine  Assistance Level: Moderate assistance;Requires x 2 assistance  Supine to Sit  Assistance Level: Moderate assistance;Requires x 2 assistance  Scooting  Assistance Level: Moderate assistance;Requires x 2 assistance;Maximum assistance  Skilled Clinical Factors: to scoot to EOB and up higher in bed         Assessment  Assessment  Assessment: Pt tolerated sitting EOB well for increasing sitting tolerance/balance, functional reaching, and WB through L side. Pt declined sitting up in recliner today d/t being up in it the past 3 days and wanted a break. Skilled OT indicated to increase safety and IND with all functional tasks to ensure a safe return to PLOF.  Activity Tolerance: Patient tolerated treatment well;Patient limited by endurance  Discharge Recommendations: Patient would benefit from continued therapy after discharge  Safety Devices  Safety Devices in place: Yes  Type of devices:

## 2024-04-30 NOTE — PROGRESS NOTES
Discharge for today cancelled due to pulmonary treatment plan. Pt refused to get in chair as he stated he has been up to the chair the last 3 days. Writer explained to pt the importance and necessity of getting up to the chair and pt adamantly refused. Pt did agree to wear Bipap for a couple of hours.    Aida Matos RN

## 2024-04-30 NOTE — PROGRESS NOTES
Physical Therapy  Facility/Department: [facility]  Rehabilitation Physical Therapy Treatment Note    NAME: Jorge Luis Banda  : 1945 (78 y.o.)  MRN: [id number]  CODE STATUS: Full Code    Date of Service: 24     Pt currently functioning below baseline.  Recommend daily inpatient skilled therapy at time of discharge to maximize long term outcomes and prevent re-admission. Please refer to AM-PAC score for current level of function.     Restrictions:  Restrictions/Precautions: Fall Risk, General Precautions, Bedrest with Bathroom Privileges  Position Activity Restriction  Other position/activity restrictions: Telemetry, LUE IV, L sided weakness (s/p R CVA); R AKA;  R UE IV; Arterial Line (L radial);  PICC Line (R brachial);  2L Supplemental O2 nasal cannula     SUBJECTIVE  Subjective  Subjective: Patient up in bed upon therapists arrival RN reports patient is medically stable for therapy treatment this date.    Chart reviewed prior to treatment and patient is agreeable for therapy.  All lines intact and patient positioned comfortably at end of treatment. Bed alarm on and tested for patient safety.    OBJECTIVE  Cognition  Overall Cognitive Status: Exceptions  Arousal/Alertness: Appropriate responses to stimuli  Following Commands: Follows multistep commands with increased time;Follows one step commands consistently  Attention Span: Appears intact;Attends with cues to redirect  Memory: Appears intact  Safety Judgement: Decreased awareness of need for assistance;Decreased awareness of need for safety  Problem Solving: Assistance required to identify errors made;Assistance required to correct errors made;Decreased awareness of errors  Insights: Decreased awareness of deficits  Initiation: Requires cues for some  Sequencing: Requires cues for some  Cognition Comment: Pleasant, cooperative;  Orientation  Overall Orientation Status: Within Functional Limits  Orientation Level: Oriented X4    Functional  Mobility  Bed Mobility  Overall Assistance Level: Moderate Assistance;Maximum Assistance  Additional Factors: Set-up;Verbal cues;Increased time to complete;Head of bed raised;With handrails  Roll Left  Assistance Level: Moderate assistance;Maximum assistance;Requires x 2 assistance  Skilled Clinical Factors: vc's for hand placement bring R UE across body to L hand rail to facilitate rolling motion.  Roll Right  Assistance Level: Moderate assistance;Maximum assistance;Requires x 2 assistance  Skilled Clinical Factors: vc's for hand placement and progression techniques. Patient L UE flacid and unable to assist. Therapist performs gentle ROM to fingers, wrist and elbow this treatment.  Sit to Supine  Assistance Level: Maximum assistance;Dependent;Requires x 2 assistance  Skilled Clinical Factors: Patient requires increased assist to HOB and repositioned into comfort  Supine to Sit  Assistance Level: Moderate assistance;Maximum assistance;Requires x 2 assistance  Skilled Clinical Factors: vc's for hand placement and progression into seated posture EOB with vc's for R hand placement on rail to promote stability in upright posture  Scooting  Assistance Level: Moderate assistance;Maximum assistance;Requires x 2 assistance  Skilled Clinical Factors: vc's for anterior hip progression to seated EOB posture with LLE flat on the floor for increased stability and support.  Balance  Sitting Balance: Contact guard assistance (Patient sat EOB x 6-7 minutes working on core control and reaching Outside LOKI to challenge seated balance requiring increased support.  Patient performs lateral WS onto L UE to promote WB techniques x10)    Neuromuscular Education  Neuromuscular Education: Yes Sitting    PT Exercises  A/AROM Exercises: Seated LLE SILVIA mendez 1 set x10 reps  Circulation/Endurance Exercises: Ap on LLE x10      ASSESSMENT/PROGRESS TOWARDS GOALS     AM-PAC Inpatient Mobility Raw Score 8   AM-PAC Inpatient T-Scale Score 28.52

## 2024-04-30 NOTE — PLAN OF CARE
Problem: Respiratory - Adult  Goal: Able to breathe comfortably  Description: Able to breathe comfortably  4/30/2024 1901 by Dick Garcia RCP  Outcome: Progressing     Problem: Respiratory - Adult  Goal: Clear lung sounds  4/30/2024 1901 by Dick Garcia RCP  Outcome: Progressing     Problem: Respiratory - Adult  Goal: Adequate oxygenation  Description: Adequate oxygenation  4/30/2024 1901 by Dick Garcia RCP  Outcome: Progressing     Problem: Respiratory - Adult  Goal: Ability to maintain normal respiratory secretions will improve  Description: Ability to maintain normal respiratory secretions will improve  4/30/2024 1901 by Dick Garcia RCP  Outcome: Progressing

## 2024-04-30 NOTE — PLAN OF CARE
Problem: Respiratory - Adult  Goal: Able to breathe comfortably  Description: Able to breathe comfortably  4/30/2024 0742 by Shana Motley RCP  Outcome: Progressing  4/29/2024 1916 by Dick Garcia RCP  Outcome: Progressing  Goal: Clear lung sounds  4/30/2024 0742 by Shana Motley RCP  Outcome: Progressing  4/29/2024 1916 by Dick Garcia RCP  Outcome: Progressing  Goal: Adequate oxygenation  Description: Adequate oxygenation  4/30/2024 0742 by Shana Motley RCP  Outcome: Progressing  4/29/2024 1916 by Dick Garcia RCP  Outcome: Progressing  Goal: Ability to maintain normal respiratory secretions will improve  Description: Ability to maintain normal respiratory secretions will improve  4/30/2024 0742 by Shana Motley RCP  Outcome: Progressing  4/29/2024 1916 by Dick Garcia RCP  Outcome: Progressing

## 2024-04-30 NOTE — FLOWSHEET NOTE
04/29/24 2000   Vitals   BP (!) 86/44     Dr CHELO Fierro paged on patient status. Orders for one time dose of midorine and Mat for a SBP greater than 90 if needed. Care ongoing at this time.

## 2024-04-30 NOTE — PROGRESS NOTES
Pulmonary Critical Care Progress Note       Patient seen for the follow up of bilateral pleural effusions, hemoptysis     Subjective:  Patient is on 3 L oxygen.  He denies any chest pain.  He still has weak cough.  He denies significant shortness of breath.  He has been on chest vest therapy    Examination:  Vitals: BP (!) 104/56   Pulse (!) 103   Temp 96.9 °F (36.1 °C) (Temporal)   Resp 16   Ht 1.829 m (6')   Wt 68.3 kg (150 lb 8 oz)   SpO2 99%   BMI 20.41 kg/m²   General appearance: In no acute distress, alert and cooperative with exam cachectic  Neck: No JVD  Lungs: Decreased breath sound no crackles or wheeze  Heart: irregular rate and rhythm, S1, S2 normal, no gallop  Abdomen: Soft, non tender, + BS  Cholecystotomy tube in place bloody output  Extremities: no cyanosis or clubbing. No significant edema, left upper and lower extremity weakness right above-knee amputation left foot dorsal ulcer    LABs:  CBC:   Recent Labs     04/27/24  1821 04/28/24  0006 04/28/24  0628 04/28/24  1705 04/29/24  0004 04/29/24  0613 04/30/24  0302   WBC  --   --   --   --   --  9.8 9.4   HGB 7.3* 7.1* 7.0* 9.3* 8.8* 8.7* 8.7*   HCT 23.4* 24.1* 23.2* 29.6* 28.9* 28.4* 29.0*   PLT  --   --   --   --   --  328 314       BMP:   Recent Labs     04/29/24  0613 04/30/24  0302    139   K 3.6* 3.6*   CO2 27 24   BUN 13 11   CREATININE 1.0 0.9   LABGLOM 76 86   GLUCOSE 96 106*       No results for input(s): \"APTT\" in the last 72 hours.     Latest Reference Range & Units 04/11/24 15:56 04/13/24 23:26 04/16/24 04:21   Procalcitonin 0.00 - 0.09 ng/mL 1.95 (H) 1.02 (H) 0.72 (H)   (H): Data is abnormally high   Latest Reference Range & Units 04/15/24 04:34   Pro-BNP <300 pg/mL 16,301 (H)   (H): Data is abnormally high    LEFT MAIN STEM WASHINGS:           NEGATIVE FOR MALIGNANCY.        THORACENTESIS, RIGHT FLUID:           NEGATIVE FOR MALIGNANCY.   Radiology:  Chest x-ray 4/30  Residual barium in the stomach which is probably from

## 2024-04-30 NOTE — PROGRESS NOTES
Patient receives Sacrament of the Sick (anointing) from Parminder kuo.    Spiritual Care will follow as needed.  (writer charting for [employer].)    04/30/24 0912   Encounter Summary   Encounter Overview/Reason  Encounter   Service Provided For Patient   Referral/Consult From Rounding   Last Encounter  04/30/24   Rituals, Rites and Sacraments   Type Sacrament of Sick;Anointing

## 2024-05-01 ENCOUNTER — APPOINTMENT (OUTPATIENT)
Dept: GENERAL RADIOLOGY | Age: 79
DRG: 377 | End: 2024-05-01
Payer: MEDICARE

## 2024-05-01 LAB
ERYTHROCYTE [DISTWIDTH] IN BLOOD BY AUTOMATED COUNT: 18.6 % (ref 11.8–14.4)
HCT VFR BLD AUTO: 28.5 % (ref 40.7–50.3)
HGB BLD-MCNC: 8.7 G/DL (ref 13–17)
MCH RBC QN AUTO: 29 PG (ref 25.2–33.5)
MCHC RBC AUTO-ENTMCNC: 30.5 G/DL (ref 28.4–34.8)
MCV RBC AUTO: 95 FL (ref 82.6–102.9)
NRBC BLD-RTO: 0 PER 100 WBC
PLATELET # BLD AUTO: 313 K/UL (ref 138–453)
PMV BLD AUTO: 10.1 FL (ref 8.1–13.5)
RBC # BLD AUTO: 3 M/UL (ref 4.21–5.77)
WBC OTHER # BLD: 8.4 K/UL (ref 3.5–11.3)

## 2024-05-01 PROCEDURE — 97535 SELF CARE MNGMENT TRAINING: CPT

## 2024-05-01 PROCEDURE — 99232 SBSQ HOSP IP/OBS MODERATE 35: CPT | Performed by: FAMILY MEDICINE

## 2024-05-01 PROCEDURE — 2700000000 HC OXYGEN THERAPY PER DAY

## 2024-05-01 PROCEDURE — 6370000000 HC RX 637 (ALT 250 FOR IP): Performed by: INTERNAL MEDICINE

## 2024-05-01 PROCEDURE — 94761 N-INVAS EAR/PLS OXIMETRY MLT: CPT

## 2024-05-01 PROCEDURE — 6370000000 HC RX 637 (ALT 250 FOR IP): Performed by: FAMILY MEDICINE

## 2024-05-01 PROCEDURE — 97530 THERAPEUTIC ACTIVITIES: CPT

## 2024-05-01 PROCEDURE — 85027 COMPLETE CBC AUTOMATED: CPT

## 2024-05-01 PROCEDURE — 92526 ORAL FUNCTION THERAPY: CPT

## 2024-05-01 PROCEDURE — 36415 COLL VENOUS BLD VENIPUNCTURE: CPT

## 2024-05-01 PROCEDURE — 99232 SBSQ HOSP IP/OBS MODERATE 35: CPT | Performed by: INTERNAL MEDICINE

## 2024-05-01 PROCEDURE — 6360000002 HC RX W HCPCS: Performed by: INTERNAL MEDICINE

## 2024-05-01 PROCEDURE — 94660 CPAP INITIATION&MGMT: CPT

## 2024-05-01 PROCEDURE — 2060000000 HC ICU INTERMEDIATE R&B

## 2024-05-01 PROCEDURE — 2580000003 HC RX 258: Performed by: INTERNAL MEDICINE

## 2024-05-01 PROCEDURE — 94640 AIRWAY INHALATION TREATMENT: CPT

## 2024-05-01 PROCEDURE — 94669 MECHANICAL CHEST WALL OSCILL: CPT

## 2024-05-01 PROCEDURE — 6360000002 HC RX W HCPCS: Performed by: FAMILY MEDICINE

## 2024-05-01 PROCEDURE — 71045 X-RAY EXAM CHEST 1 VIEW: CPT

## 2024-05-01 RX ORDER — DIGOXIN 125 MCG
250 TABLET ORAL ONCE
Status: COMPLETED | OUTPATIENT
Start: 2024-05-01 | End: 2024-05-01

## 2024-05-01 RX ORDER — DIGOXIN 125 MCG
125 TABLET ORAL DAILY
Status: DISCONTINUED | OUTPATIENT
Start: 2024-05-02 | End: 2024-05-11 | Stop reason: HOSPADM

## 2024-05-01 RX ORDER — SODIUM CHLORIDE FOR INHALATION 0.9 %
3 VIAL, NEBULIZER (ML) INHALATION
Status: DISCONTINUED | OUTPATIENT
Start: 2024-05-01 | End: 2024-05-11 | Stop reason: HOSPADM

## 2024-05-01 RX ADMIN — FUROSEMIDE 40 MG: 40 TABLET ORAL at 09:22

## 2024-05-01 RX ADMIN — GUAIFENESIN 600 MG: 600 TABLET ORAL at 09:22

## 2024-05-01 RX ADMIN — LEVALBUTEROL 1.25 MG: 1.25 SOLUTION, CONCENTRATE RESPIRATORY (INHALATION) at 14:14

## 2024-05-01 RX ADMIN — DRONABINOL 2.5 MG: 2.5 CAPSULE ORAL at 09:22

## 2024-05-01 RX ADMIN — MIDODRINE HYDROCHLORIDE 5 MG: 5 TABLET ORAL at 11:37

## 2024-05-01 RX ADMIN — LEVALBUTEROL 1.25 MG: 1.25 SOLUTION, CONCENTRATE RESPIRATORY (INHALATION) at 20:11

## 2024-05-01 RX ADMIN — METOPROLOL TARTRATE 25 MG: 25 TABLET, FILM COATED ORAL at 09:22

## 2024-05-01 RX ADMIN — CHOLECALCIFEROL TAB 125 MCG (5000 UNIT) 5000 UNITS: 125 TAB at 09:22

## 2024-05-01 RX ADMIN — APIXABAN 5 MG: 5 TABLET, FILM COATED ORAL at 21:08

## 2024-05-01 RX ADMIN — ASPIRIN 81 MG: 81 TABLET, COATED ORAL at 09:22

## 2024-05-01 RX ADMIN — EMPAGLIFLOZIN 10 MG: 10 TABLET, FILM COATED ORAL at 09:22

## 2024-05-01 RX ADMIN — OXYCODONE AND ACETAMINOPHEN 1 TABLET: 5; 325 TABLET ORAL at 16:58

## 2024-05-01 RX ADMIN — DIGOXIN 250 MCG: 125 TABLET ORAL at 09:37

## 2024-05-01 RX ADMIN — APIXABAN 5 MG: 5 TABLET, FILM COATED ORAL at 09:22

## 2024-05-01 RX ADMIN — GUAIFENESIN 600 MG: 600 TABLET ORAL at 21:07

## 2024-05-01 RX ADMIN — OXYCODONE AND ACETAMINOPHEN 1 TABLET: 5; 325 TABLET ORAL at 02:46

## 2024-05-01 RX ADMIN — SODIUM CHLORIDE, PRESERVATIVE FREE 10 ML: 5 INJECTION INTRAVENOUS at 21:08

## 2024-05-01 RX ADMIN — METOCLOPRAMIDE HYDROCHLORIDE 5 MG: 5 INJECTION INTRAMUSCULAR; INTRAVENOUS at 11:37

## 2024-05-01 RX ADMIN — ISODIUM CHLORIDE 3 ML: 0.03 SOLUTION RESPIRATORY (INHALATION) at 14:14

## 2024-05-01 RX ADMIN — MIDODRINE HYDROCHLORIDE 5 MG: 5 TABLET ORAL at 09:22

## 2024-05-01 RX ADMIN — CYANOCOBALAMIN TAB 1000 MCG 1000 MCG: 1000 TAB at 09:22

## 2024-05-01 RX ADMIN — TAMSULOSIN HYDROCHLORIDE 0.4 MG: 0.4 CAPSULE ORAL at 09:22

## 2024-05-01 RX ADMIN — METOCLOPRAMIDE HYDROCHLORIDE 5 MG: 5 INJECTION INTRAMUSCULAR; INTRAVENOUS at 05:55

## 2024-05-01 RX ADMIN — ATORVASTATIN CALCIUM 40 MG: 40 TABLET, FILM COATED ORAL at 21:08

## 2024-05-01 RX ADMIN — MIDODRINE HYDROCHLORIDE 5 MG: 5 TABLET ORAL at 16:35

## 2024-05-01 RX ADMIN — PANTOPRAZOLE SODIUM 40 MG: 40 TABLET, DELAYED RELEASE ORAL at 05:55

## 2024-05-01 RX ADMIN — FERROUS SULFATE TAB EC 325 MG (65 MG FE EQUIVALENT) 325 MG: 325 (65 FE) TABLET DELAYED RESPONSE at 09:22

## 2024-05-01 RX ADMIN — ACETYLCYSTEINE 400 MG: 200 SOLUTION ORAL; RESPIRATORY (INHALATION) at 08:01

## 2024-05-01 RX ADMIN — SODIUM CHLORIDE, PRESERVATIVE FREE 10 ML: 5 INJECTION INTRAVENOUS at 09:35

## 2024-05-01 RX ADMIN — METOCLOPRAMIDE HYDROCHLORIDE 5 MG: 5 INJECTION INTRAMUSCULAR; INTRAVENOUS at 16:35

## 2024-05-01 RX ADMIN — DRONABINOL 2.5 MG: 2.5 CAPSULE ORAL at 16:35

## 2024-05-01 RX ADMIN — METOCLOPRAMIDE HYDROCHLORIDE 5 MG: 5 INJECTION INTRAMUSCULAR; INTRAVENOUS at 21:08

## 2024-05-01 RX ADMIN — LEVALBUTEROL 1.25 MG: 1.25 SOLUTION, CONCENTRATE RESPIRATORY (INHALATION) at 08:01

## 2024-05-01 RX ADMIN — MIRTAZAPINE 30 MG: 15 TABLET, ORALLY DISINTEGRATING ORAL at 21:08

## 2024-05-01 RX ADMIN — ISODIUM CHLORIDE 3 ML: 0.03 SOLUTION RESPIRATORY (INHALATION) at 20:11

## 2024-05-01 RX ADMIN — PANTOPRAZOLE SODIUM 40 MG: 40 TABLET, DELAYED RELEASE ORAL at 16:13

## 2024-05-01 ASSESSMENT — PAIN - FUNCTIONAL ASSESSMENT
PAIN_FUNCTIONAL_ASSESSMENT: ACTIVITIES ARE NOT PREVENTED
PAIN_FUNCTIONAL_ASSESSMENT: PREVENTS OR INTERFERES WITH ALL ACTIVE AND SOME PASSIVE ACTIVITIES

## 2024-05-01 ASSESSMENT — PAIN DESCRIPTION - DESCRIPTORS
DESCRIPTORS: ACHING
DESCRIPTORS: DISCOMFORT
DESCRIPTORS: ACHING

## 2024-05-01 ASSESSMENT — PAIN DESCRIPTION - LOCATION
LOCATION: ABDOMEN;FLANK;HIP
LOCATION: BACK
LOCATION: BACK

## 2024-05-01 ASSESSMENT — PAIN DESCRIPTION - PAIN TYPE: TYPE: ACUTE PAIN

## 2024-05-01 ASSESSMENT — PAIN DESCRIPTION - FREQUENCY: FREQUENCY: INTERMITTENT

## 2024-05-01 ASSESSMENT — PAIN DESCRIPTION - ORIENTATION
ORIENTATION: RIGHT
ORIENTATION: LOWER
ORIENTATION: LOWER

## 2024-05-01 ASSESSMENT — PAIN SCALES - GENERAL
PAINLEVEL_OUTOF10: 8
PAINLEVEL_OUTOF10: 6
PAINLEVEL_OUTOF10: 9
PAINLEVEL_OUTOF10: 1

## 2024-05-01 ASSESSMENT — PAIN DESCRIPTION - ONSET: ONSET: SUDDEN

## 2024-05-01 NOTE — PLAN OF CARE
Problem: Respiratory - Adult  Goal: Able to breathe comfortably  Description: Able to breathe comfortably  5/1/2024 0807 by Katharine Salazar RCP  Outcome: Progressing  4/30/2024 1901 by Dick Garcia RCP  Outcome: Progressing  Goal: Clear lung sounds  5/1/2024 0807 by Katharine Salazar RCP  Outcome: Progressing  4/30/2024 1901 by Dick Garcia RCP  Outcome: Progressing  Goal: Adequate oxygenation  Description: Adequate oxygenation  5/1/2024 0807 by Katharine Salazar RCP  Outcome: Progressing  4/30/2024 1901 by Dick Garcia RCP  Outcome: Progressing  Goal: Ability to maintain normal respiratory secretions will improve  Description: Ability to maintain normal respiratory secretions will improve  5/1/2024 0807 by Katharine Salazar RCP  Outcome: Progressing  4/30/2024 1901 by Dick Garcia RCP  Outcome: Progressing

## 2024-05-01 NOTE — PROGRESS NOTES
Pulmonary Critical Care Progress Note       Patient seen for the follow up of bilateral pleural effusions, hemoptysis     Subjective:  Patient is on 3 L oxygen.  He denies any chest pain.  He still has weak cough.  He denies significant shortness of breath.  He has been on chest vest therapy    Examination:  Vitals: /71   Pulse (!) 119   Temp 97.1 °F (36.2 °C) (Temporal)   Resp 21   Ht 1.829 m (6')   Wt 64.8 kg (142 lb 12.8 oz) Comment: I remove pole from head of the bed.  SpO2 98%   BMI 19.37 kg/m²   General appearance: In no acute distress, alert and cooperative with exam cachectic  Neck: No JVD  Lungs: Decreased breath sound no crackles or wheeze  Heart: irregular rate and rhythm, S1, S2 normal, no gallop  Abdomen: Soft, non tender, + BS  Cholecystotomy tube in place bloody output  Extremities: no cyanosis or clubbing. No significant edema, left upper and lower extremity weakness right above-knee amputation left foot dorsal ulcer    LABs:  CBC:   Recent Labs     04/28/24  1705 04/29/24  0004 04/29/24  0613 04/30/24  0302 05/01/24  0324   WBC  --   --  9.8 9.4 8.4   HGB 9.3* 8.8* 8.7* 8.7* 8.7*   HCT 29.6* 28.9* 28.4* 29.0* 28.5*   PLT  --   --  328 314 313       BMP:   Recent Labs     04/29/24  0613 04/30/24  0302    139   K 3.6* 3.6*   CO2 27 24   BUN 13 11   CREATININE 1.0 0.9   GLUCOSE 96 106*       No results for input(s): \"APTT\" in the last 72 hours.     Latest Reference Range & Units 04/11/24 15:56 04/13/24 23:26 04/16/24 04:21   Procalcitonin 0.00 - 0.09 ng/mL 1.95 (H) 1.02 (H) 0.72 (H)   (H): Data is abnormally high   Latest Reference Range & Units 04/15/24 04:34   Pro-BNP <300 pg/mL 16,301 (H)   (H): Data is abnormally high    LEFT MAIN STEM WASHINGS:           NEGATIVE FOR MALIGNANCY.        THORACENTESIS, RIGHT FLUID:           NEGATIVE FOR MALIGNANCY.   Radiology:  Chest x-ray 5/1  Unchanged left lung atelectasis    Chest x-ray 4/30  Residual barium in the stomach which is probably  daily  Status post bronchoscopy 4/26/24 per Dr. Mcneil ; bronchoscopy shows left upper bronchus takeoff has obstruction with mass like lesion.?  Stump versus mass. washings taken.  We will obtain a PET scan.  Patient may need reevaluation with a bronchoscopy if PET scan is positive in left upper lobe bronchus takeoff.  Mucous plugging which was obstructing was mostly clotted blood, left mainstream bronchus washings obtained  Patient advised that he will need repeat bronchoscopy with intubation and ventilator stay for few days with humidified heated circuit for lung expansion.  Will also perform endobronchial biopsy given above mentioned finding per Dr. Mcneil.  He is advised he may become ventilator dependent given significant weakness.  Advised to address with significant other further wishes.    Hypotension requiring pressor/increased cortisol level suspect following hydrocortisone dose  Monitor blood pressure off Mat-Synephrine   Midodrine  Off hydrocortisone    Acute cholecystitis/acute anemia/Hemoptysis  Hemoptysis  resolved  Monitor hemoglobin hematocrit   transfuse 1 unit PRBC if hemoglobin less than 7  Status post EGD with negative findings  Plan for elective colonoscopy  outpatient  Cholecystotomy tube  monitor output /finished Zosyn   per ID 4/26 course  Hematology oncology following    Paroxysmal A-fib   Cardiology signed off  Stated not able to resume anticoagulation given bleeding issues despite CVA risk    Hemoptysis  History of stage 1 lung cancer 2019 s/p lung resection  No adjuvant therapy     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 90 % R and 80 % Left .   Off plavix  Status post R AKA  Vascular consult/CEA electively    Bedside

## 2024-05-01 NOTE — PROGRESS NOTES
Ry Traylor MD   Urology Progress Note            Subjective: Follow-up urinary retention    Patient Vitals for the past 24 hrs:   BP Temp Temp src Pulse Resp SpO2 Weight   05/01/24 0500 (!) 109/51 -- -- 98 18 96 % --   05/01/24 0400 (!) 88/56 97.3 °F (36.3 °C) Temporal (!) 108 15 98 % --   05/01/24 0335 -- -- -- (!) 119 17 97 % --   05/01/24 0300 (!) 107/58 -- -- (!) 120 22 94 % --   05/01/24 0214 -- -- -- (!) 112 21 96 % --   05/01/24 0000 (!) 95/52 96.9 °F (36.1 °C) Temporal (!) 121 21 -- 64.8 kg (142 lb 12.8 oz)   04/30/24 2242 -- -- -- (!) 117 23 95 % --   04/30/24 2016 96/73 -- -- (!) 131 23 95 % --   04/30/24 2000 (!) 89/60 96.9 °F (36.1 °C) Temporal (!) 107 20 97 % --   04/30/24 1901 -- -- -- (!) 109 20 95 % --   04/30/24 1600 (!) 97/55 -- -- (!) 108 20 98 % --   04/30/24 1527 101/63 96.9 °F (36.1 °C) Temporal 96 17 97 % --   04/30/24 1526 -- -- -- 100 -- -- --   04/30/24 1200 (!) 104/56 -- -- (!) 103 16 -- --   04/30/24 1100 115/65 96.9 °F (36.1 °C) Temporal (!) 108 18 99 % --   04/30/24 1000 115/61 -- -- (!) 103 17 100 % --   04/30/24 0900 126/70 -- -- 97 15 100 % --   04/30/24 0800 117/60 -- -- 97 14 100 % --       Intake/Output Summary (Last 24 hours) at 5/1/2024 0712  Last data filed at 5/1/2024 0600  Gross per 24 hour   Intake 120 ml   Output 1807 ml   Net -1687 ml       Recent Labs     04/29/24  0613 04/30/24  0302 05/01/24  0324   WBC 9.8 9.4 8.4   HGB 8.7* 8.7* 8.7*   HCT 28.4* 29.0* 28.5*   MCV 95.9 96.7 95.0    314 313     Recent Labs     04/29/24 0613 04/30/24 0302    139   K 3.6* 3.6*    105   CO2 27 24   BUN 13 11   CREATININE 1.0 0.9       No results for input(s): \"COLORU\", \"PHUR\", \"LABCAST\", \"WBCUA\", \"RBCUA\", \"MUCUS\", \"TRICHOMONAS\", \"YEAST\", \"BACTERIA\", \"CLARITYU\", \"SPECGRAV\", \"LEUKOCYTESUR\", \"UROBILINOGEN\", \"BILIRUBINUR\", \"BLOODU\" in the last 72 hours.    Invalid input(s): \"NITRATE\", \"GLUCOSEUKETONESUAMORPHOUS\"    Additional Lab/culture  MD Kymberly  7:12 AM 5/1/2024

## 2024-05-01 NOTE — PROGRESS NOTES
Occupational Therapy  Facility/Department: [facility]  Rehabilitation Occupational Therapy Daily Treatment Note    Date: 24  Patient Name: Jorge Luis Banda       Room:   MRN: [id number]  Account: [id number]   : 1945  (78 y.o.) Gender: male       Past Medical History:  has a past medical history of Paroxysmal atrial fibrillation (HCC), Peripheral artery disease (HCC), and Primary hypertension.  Past Surgical History:   has a past surgical history that includes above knee amputation (Right, 2020); Upper gastrointestinal endoscopy (N/A, 2024); IR CHOLECYSTOSTOMY PERCUTANEOUS COMPLETE (2024); and bronchoscopy (N/A, 2024).    Restrictions  Restrictions/Precautions: Fall Risk, General Precautions, Bedrest with Bathroom Privileges  Other position/activity restrictions: Telemetry, LUE IV, L sided weakness (s/p R CVA); R AKA;  R UE IV; Arterial Line (L radial);  PICC Line (R brachial);  2L Supplemental O2 nasal cannula  Required Braces or Orthoses?: No    Subjective  Subjective: Pt resting in bed upon arrival agreeable to therapy.  Restrictions/Precautions: Fall Risk;General Precautions;Bedrest with Bathroom Privileges  Objective     Cognition  Overall Cognitive Status: Exceptions  Arousal/Alertness: Appropriate responses to stimuli  Following Commands: Follows multistep commands with increased time;Follows one step commands consistently  Attention Span: Appears intact;Attends with cues to redirect  Memory: Appears intact  Safety Judgement: Decreased awareness of need for assistance;Decreased awareness of need for safety  Problem Solving: Assistance required to identify errors made;Assistance required to correct errors made;Decreased awareness of errors  Insights: Decreased awareness of deficits  Initiation: Requires cues for some  Sequencing: Requires cues for some  Cognition Comment: Pt. cooperative and willing to participate  Orientation  Overall Orientation Status: Within  Functional Limits  Orientation Level: Oriented X4         ADL  Putting On/Taking Off Footwear  Assistance Level: Dependent  Skilled Clinical Factors: to cristy left sock  Toileting  Assistance Level: Dependent  Skilled Clinical Factors: Brief changed d/t being soiled and dependent for care.         Functional Mobility  Activity:  (Bed mobility)  Assistance Level: Maximum assistance;Requires x 2 assistance  Skilled Clinical Factors: Original plan was to get pt. up to chair with nela lift.  came in during treatment of changing brief and discussed with pt. his concerns.  suggested keeping pt. in bed d/t elevated HR of 130. Pt. repositioned in bed for comfort.  Bed Mobility  Overall Assistance Level: Maximum Assistance;Requires x 2 Assistance  Additional Factors: Verbal cues;Increased time to complete;Head of bed flat;Head of bed raised;With handrails;Without handrails  Roll Left  Assistance Level: Moderate assistance  Skilled Clinical Factors: Mod assist x1 to roll with use of bedrails.  Roll Right  Assistance Level: Moderate assistance  Skilled Clinical Factors: Mod assist with rolling side to side with use of bedrails.         Assessment  Assessment  Assessment: Pt. required max assist to complete LE hygiene prior to getting out of bed.  entered room during treatment to discuss pt's illness and recommended to leave pt. in bed d/t increased HR. Nursing aware of 's suggestion. Pt. positioned in bed for comfort. Skilled OT warranted to promote I/safety to return pt to prior living arrangement with assist as needed.  Activity Tolerance: Patient limited by endurance;Patient limited by pain;Patient limited by fatigue  Discharge Recommendations: Patient would benefit from continued therapy after discharge  Safety Devices  Safety Devices in place: Yes  Type of devices: All fall risk precautions in place;Left in bed;Bed alarm in place;Call light within reach;Nurse notified;Patient at risk for

## 2024-05-01 NOTE — PROGRESS NOTES
End Of Shift Note  [facility]    Summary of shift: Patient had an uneventful day. Up to the chair for about 3 hours, tolerated well. Possible intubation for bronch tomorrow after speaking with patient and POA, meeting scheduled with palliative care at 11am.    Vitals:    Vitals:    05/01/24 1423 05/01/24 1429 05/01/24 1600 05/01/24 1658   BP:       Pulse: (!) 117 (!) 113     Resp: 23 27  18   Temp:   97.8 °F (36.6 °C)    TempSrc:   Temporal    SpO2: 96% 97%     Weight:       Height:            I&O:   Intake/Output Summary (Last 24 hours) at 5/1/2024 1855  Last data filed at 5/1/2024 1300  Gross per 24 hour   Intake 120 ml   Output 1950 ml   Net -1830 ml       Resp Status: 2 L NC    Ventilator Settings:     / / /FiO2 : 30 %    Critical Care IV infusions:   sodium chloride      sodium chloride      sodium chloride 20 mL/hr at 04/30/24 0415    dextrose          LDA:   Peripheral IV 04/24/24 Distal;Left Forearm (Active)   Number of days: 7       Peripheral IV 04/25/24 Right;Anterior Cephalic (Active)   Number of days: 6       Biliary Tube 04/19/24 T-tube 8 fr RUQ (Active)   Number of days: 12       Wound 04/04/24 Foot Left;Dorsal dry, scabbed (Active)   Number of days: 27       Wound Foot Left;Lateral (Active)   Number of days:

## 2024-05-01 NOTE — PROGRESS NOTES
Speech Language Pathology  [facility]    Dysphagia Treatment Note    Date: 5/1/2024  Patient’s Name: Jorge Luis Banda  MRN: [id number]  Diagnosis:   Patient Active Problem List   Diagnosis Code    Skin eschar R23.4    Stenosis of left carotid artery I65.22    History of arterial bypass of lower extremity Z95.828    S/P AKA (above knee amputation) unilateral, right ([facility]) Z89.611    Primary hypertension I10    Persistent wound pain R52    Peripheral arterial disease ([facility]) I73.9    Ulcer of left foot ([facility]) L97.529    Neuropathy G62.9    Malignant neoplasm of lung ([facility]) C34.90    Intermittent claudication of left lower extremity due to atherosclerosis ([facility]) I70.212    Frequency of urination R35.0    Former smoker Z87.891    Embolism and thrombosis of artery ([facility]) I74.9    Dyslipidemia E78.5    Closed fracture of left humerus S42.302A    Closed fracture of surgical neck of humerus S42.213A    Acute exacerbation of chronic obstructive airways disease ([facility]) J44.1    Left leg cellulitis L03.116    Arteriosclerosis of coronary artery I25.10    Age-related osteoporosis without current pathological fracture M81.0    Adenocarcinoma of lung ([facility]) C34.90    Wound infection T14.8XXA, L08.9    History of above-knee amputation of both lower extremities ([facility]) Z89.611, Z89.612    Melena K92.1    Iron deficiency anemia due to chronic blood loss D50.0    Hx of cancer of lung Z85.118    Adrenal mass ([facility]) E27.8    Claudication ([facility]) I73.9    Pleural effusion, bilateral J90    Longstanding persistent atrial fibrillation ([facility]) I48.11    Acute ischemic right internal carotid artery (ICA) stroke ([facility]) I63.231    Internal carotid artery stenosis, bilateral I65.23    Anemia D64.9    Gastrointestinal hemorrhage K92.2    NSTEMI (non-ST elevated myocardial infarction) ([facility]) I21.4    Hypotension I95.9    Atrial fibrillation with rapid ventricular response ([facility]) I48.91    Unstable angina ([facility]) I20.0    Right upper quadrant

## 2024-05-01 NOTE — PLAN OF CARE
Problem: Genitourinary - Adult  Goal: Urinary catheter remains patent  Recent Flowsheet Documentation  Taken 5/1/2024 0800 by Svitlana Weeks RN  Urinary catheter remains patent:   Assess patency of urinary catheter   Irrigate catheter per Licensed Independent Practitioner order if indicated and notify Licensed Independent Practitioner if unable to irrigate   Assess need for a larger catheter size or a 3-way catheter for continuous bladder irrigation     Problem: Genitourinary - Adult  Goal: Absence of urinary retention  Recent Flowsheet Documentation  Taken 5/1/2024 0800 by Svitlana Weeks RN  Absence of urinary retention:   Assess patient’s ability to void and empty bladder   Monitor intake/output and perform bladder scan as needed   Place urinary catheter per Licensed Independent Practitioner order if needed   Discuss with Licensed Independent Practitioner  medications to alleviate retention as needed   Discuss catheterization for long term situations as appropriate     Problem: Gastrointestinal - Adult  Goal: Maintains or returns to baseline bowel function  Recent Flowsheet Documentation  Taken 5/1/2024 0800 by Svitlana Weeks RN  Maintains or returns to baseline bowel function:   Assess bowel function   Encourage oral fluids to ensure adequate hydration   Administer IV fluids as ordered to ensure adequate hydration   Administer ordered medications as needed   Nutrition consult to assist patient with appropriate food choices   Encourage mobilization and activity     Problem: Gastrointestinal - Adult  Goal: Minimal or absence of nausea and vomiting  Recent Flowsheet Documentation  Taken 5/1/2024 0800 by Svitlana Weeks RN  Minimal or absence of nausea and vomiting:   Administer IV fluids as ordered to ensure adequate hydration   Maintain NPO status until nausea and vomiting are resolved   Nasogastric tube to low intermittent suction as ordered   Administer ordered antiemetic medications as needed   Provide  no anxiety/no depression

## 2024-05-01 NOTE — PLAN OF CARE
Problem: Safety - Adult  Goal: Free from fall injury  Outcome: Progressing  Flowsheets (Taken 4/29/2024 1900 by Kkio Todd, RN)  Free From Fall Injury:   Instruct family/caregiver on patient safety   Based on caregiver fall risk screen, instruct family/caregiver to ask for assistance with transferring infant if caregiver noted to have fall risk factors  Note: Ongoing assessment, no injury @ this time. Safety measures in place.     Problem: Discharge Planning  Goal: Discharge to home or other facility with appropriate resources  Outcome: Progressing  Flowsheets (Taken 4/30/2024 1945)  Discharge to home or other facility with appropriate resources:   Arrange for needed discharge resources and transportation as appropriate   Identify barriers to discharge with patient and caregiver   Identify discharge learning needs (meds, wound care, etc)   Refer to discharge planning if patient needs post-hospital services based on physician order or complex needs related to functional status, cognitive ability or social support system  Note: Ongoing assessment. POC transfer to [facility].     Problem: Pain  Goal: Verbalizes/displays adequate comfort level or baseline comfort level  Outcome: Progressing  Flowsheets (Taken 4/28/2024 1900 by Crys Ivory, RN)  Verbalizes/displays adequate comfort level or baseline comfort level:   Encourage patient to monitor pain and request assistance   Assess pain using appropriate pain scale   Administer analgesics based on type and severity of pain and evaluate response   Implement non-pharmacological measures as appropriate and evaluate response   Consider cultural and social influences on pain and pain management   Notify Licensed Independent Practitioner if interventions unsuccessful or patient reports new pain  Note: Ongoing assessment, denies c/o pain at this time.     Problem: Skin/Tissue Integrity - Adult  Goal: Incisions, wounds, or drain sites healing without S/S of  infection  Outcome: Progressing  Flowsheets  Taken 4/30/2024 2319  Incisions, Wounds, or Drain Sites Healing Without Sign and Symptoms of Infection:   TWICE DAILY: Assess and document skin integrity   Implement wound care per orders   Initiate pressure ulcer prevention bundle as indicated  Taken 4/30/2024 1945  Incisions, Wounds, or Drain Sites Healing Without Sign and Symptoms of Infection: ADMISSION and DAILY: Assess and document risk factors for pressure ulcer development  Note: Prevention measures in place. Ongoing assessment. No further breakdown noted. Patient w/existing PU of left foot, wound care consulted.     Problem: Infection - Adult  Goal: Absence of infection at discharge  Outcome: Progressing  Flowsheets (Taken 4/30/2024 1945)  Absence of infection at discharge:   Assess and monitor for signs and symptoms of infection   Monitor lab/diagnostic results   Monitor all insertion sites i.e., indwelling lines, tubes and drains   Monitor endotracheal (as able) and nasal secretions for changes in amount and color   [place] appropriate cooling/warming therapies per order   Administer medications as ordered   Instruct and encourage patient and family to use good hand hygiene technique   Identify and instruct in appropriate isolation precautions for identified infection/condition  Note: Ongoing assessment. No signs/symptoms of infection @ this time.      Problem: Hematologic - Adult  Goal: Maintains hematologic stability  Outcome: Progressing  Flowsheets (Taken 4/30/2024 1945)  Maintains hematologic stability:   Assess for signs and symptoms of bleeding or hemorrhage   Monitor labs for bleeding or clotting disorders   Administer blood products/factors as ordered  Note: Ongoing assessment, Hgb stable. Daily labs monitored.     Problem: Respiratory - Adult  Goal: Able to breathe comfortably  Description: Able to breathe comfortably  4/30/2024 1901 by Dick Garcia RCP  Outcome: Progressing  Goal: Clear lung

## 2024-05-01 NOTE — PROGRESS NOTES
[facility]  Office: [phone]  Chinmay Ramos DO, Jose Atkinson DO, Guevara Ramirez DO, Pranay Cooper, DO, Sumeet Eller MD, Karen Carvajal MD, Sony Crawford MD, Poonam Platt MD,  Stephan San MD, Marcello Daniels MD, Theodore Pascual MD,  Juve Mahmood DO, Matias Mckeon MD, Hero Tavarez MD, Valentin Ramos DO, Jazzy Spence MD,  Trino Handley DO, Flavia Leonardo MD, Vanessa Soriano MD, Pricilla Akers MD, Casimiro Knight MD,  Zeke Maciel MD, Guera Cervantes MD, Kianna Martin MD, Mirlande Worrell MD, Ney Garcia MD, Savannah Dominguez MD, Ed Emerson DO, Jaylen West DO, Comfort Carcamo MD,  Siva Ashley MD, Shirley Waterhouse, CNP,  Carolyn Pelaez CNP, Scotty Ge, CNP,  Wandy Trivedi, DNP, Malissa Gaston, CNP, Lana Penaloza, CNP, Susan Haro, CNP, Sherrell Mendoza, CNP, Suzy Morales, PA-C, Dalila Lange PA-C, Sada Matt, CNP, Crystal Womack, CNP, Sheng Reddy, CNP, Arielle Guy, CNP, Linsey Perez, CNP, Anju Cho, CNS, Hoa Donahue, CNP, Cassidy Ferrer CNP, Tracy Schwab, CNP       [facility]      Daily Progress Note     Admit Date: 4/3/2024  Bed/Room No.  2027/2027-01  Admitting Physician : Sony Crawford MD  Code Status :Full Code  Hospital Day:  LOS: 28 days   Chief Complaint:     Chief Complaint   Patient presents with    Leg Pain     Left leg, open wounds and swollen       Principal Problem:    Melena  Active Problems:    History of arterial bypass of lower extremity    S/P AKA (above knee amputation) unilateral, right (HCC)    Primary hypertension    Peripheral arterial disease (HCC)    Neuropathy    Malignant neoplasm of lung (HCC)    Intermittent claudication of left lower extremity due to atherosclerosis (HCC)    Embolism and thrombosis of artery (HCC)    Left leg cellulitis    Iron deficiency anemia due to chronic blood loss    Hx of cancer of lung    Adrenal mass (HCC)    Claudication (HCC)    Pleural effusion, bilateral     following.  Primary Hypertension  - well-controlled.  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  Continue  aspirin 81 mg daily.  Evaluated by neurology.   Treated with Mat-Synephrine for permissive hypertension,continue midodrine.    Former Smoker   Quit in 2019.  Acute on chronic combined systolic and diastolic CHF  Oral Lasix -   Monitor kidney function.  Moderate pulmonary hypertension   O2   Lasix  -   Severe ICA stenosis. 90 % R and 80 % Left .   No plan for inpatient endarterectomy.  Outpatient follow-up with primary vascular surgery..  Acute acalculous cholecystitis  Poor surgical candidate  S/P cholecystotomy tube placement.  On IV Zosyn.  Stop date 4/26/2024.  Atelectasis left lung  Underwent Bronch on 4/26/24  Urinary retention   Straight cath as needed , Flomax. Refuses Dotson Catheter  Acute respiratory failure with hypoxia secondary to loculated pleural effusion   Pulmonary following.  May need repeat bronch.  Chest x-ray worsening.   Gastroparesis  Continue  Reglan.  Severe malnutrition-  Continue supplements.      Discussed with Dr Guerda Thompson about poor clinical improvement in left lower lobe atelectasis  and Plan for Bronch. Very difficult situation , may end up intubated and difficult to wean . He remains full code.     Plan to keep Cholecystostomy tube in place for ~ 6 weeks.  Outpatient follow-up with Dr. Dowling    Discussed with Aida MA    I Called wife ( Rita )  and updated.       Continue to monitor vitals , Intake / output ,  Cell count , HGB , Kidney function, oxygenation  as indicated .   Plan and updates discussed with patient ,  answers  explained to satisfaction.      (Please note that portions of this note were completed with a voice recognition program. Efforts were made to edit the dictations but occasionally words are mis-transcribed.)      Snoy Crawford MD  5/1/2024

## 2024-05-01 NOTE — PROGRESS NOTES
VASCULAR SURGERY  PROGRESS NOTE      5/1/2024 5:48 PM  Subjective:   Admit Date: 4/3/2024  PCP: Lucila Machado APRN - CNP    Chief Complaint   Patient presents with    Leg Pain     Left leg, open wounds and swollen       Interval History: No complaints.  Discharge canceled.  Plans for bronchoscopy tomorrow noted.    Diet: ADULT DIET; Regular; No Drinking Straws  ADULT ORAL NUTRITION SUPPLEMENT; Breakfast, Dinner; Standard High Calorie/High Protein Oral Supplement    Medications:   Scheduled Meds:   [START ON 5/2/2024] digoxin  125 mcg Oral Daily    sodium chloride nebulizer  3 mL Nebulization TID RT    metoclopramide  5 mg IntraVENous 4x Daily AC & HS    droNABinol  2.5 mg Oral BID WC    apixaban  5 mg Oral BID    levalbuterol  1.25 mg Nebulization TID    mirtazapine  30 mg Oral Nightly    midodrine  5 mg Oral TID WC    furosemide  40 mg Oral Daily    sodium chloride  80 mL IntraVENous Once    pantoprazole  40 mg Oral BID AC    empagliflozin  10 mg Oral Daily    sodium chloride  80 mL IntraVENous Once    metoprolol tartrate  25 mg Oral BID    aspirin  81 mg Oral Daily    sodium chloride  100 mL IntraVENous Once    guaiFENesin  600 mg Oral BID    ferrous sulfate  325 mg Oral Daily with breakfast    atorvastatin  40 mg Oral Nightly    vitamin D3  5,000 Units Oral Daily    vitamin B-12  1,000 mcg Oral Daily    tamsulosin  0.4 mg Oral Daily    sodium chloride flush  5-40 mL IntraVENous 2 times per day     Continuous Infusions:   sodium chloride      sodium chloride      sodium chloride 20 mL/hr at 04/30/24 0415    dextrose           Labs:   CBC:   Recent Labs     04/29/24  0613 04/30/24  0302 05/01/24  0324   WBC 9.8 9.4 8.4   HGB 8.7* 8.7* 8.7*    314 313       BMP:    Recent Labs     04/29/24  0613 04/30/24  0302    139   K 3.6* 3.6*    105   CO2 27 24   BUN 13 11   CREATININE 1.0 0.9   GLUCOSE 96 106*       Hepatic:   No results for input(s): \"AST\", \"ALT\", \"BILITOT\", \"ALKPHOS\" in the last

## 2024-05-01 NOTE — PROGRESS NOTES
Nutrition Assessment     Type and Reason for Visit: Reassess    Nutrition Recommendations/Plan:   Provide diet rx as ordered   Provide supplements as ordered   Monitor labs as they become available   Monitor skin integrity/weights     Malnutrition Assessment:  Malnutrition Status: Severe malnutrition    Nutrition Assessment:  Patient is on 3 L oxygen.  He denies any chest pain.  He still has weak cough.  He denies significant shortness of breath.  He has been on chest vest therapy. Continues to have Left lower atelectasis. He has poor appetite and poor oral intake. He is using urinal. No diarrhea. Has generalized weakness. Continues on supplement twice a day providing 700 kcals and 40 grams of protein if both are 100% consumed. weight on 5/1 was 142#, 4/30 was 150#, sigiifcant weight loss continues due to poor appetite and previously noted to be severely malnourished. Discharge planning is to SNF. Monitor po intakes, weights, labs, skin integrity.    Estimated Daily Nutrient Needs:  Energy (kcal):  9088-6425 kcals per day using 25-30 g/kg of actual body weight. Weight Used for Energy Requirements: Current     Protein (g):  87-95 gm of protein (1.2-1.3 gm/kg) Weight Used for Protein Requirements: Adjusted        Fluid (ml/day):  9133-4580 mL Method Used for Fluid Requirements: 1 ml/kcal    Nutrition Related Findings:   Edema LUE +1, LLE +1, sacral edema +1, active sounds. ransfused 1 unit PRBC (4/28). Cholecystomy tube (4/19) Wound Type: Venous Stasis    Current Nutrition Therapies:    ADULT DIET; Regular; No Drinking Straws  ADULT ORAL NUTRITION SUPPLEMENT; Breakfast, Dinner; Standard High Calorie/High Protein Oral Supplement    Anthropometric Measures:  Height: 182.9 cm (6')  Current Body Wt: 74.2 kg (163 lb 9.3 oz)   BMI: 22.2    Nutrition Diagnosis:   Severe malnutrition related to inadequate protein-energy intake as evidenced by intake 0-25%, severe loss of subcutaneous fat, severe muscle loss    Nutrition

## 2024-05-01 NOTE — PROGRESS NOTES
End Of Shift Note  [facility]  Summary of shift: Patient on bipap approximately 2.5 hours, requests it be removed. Placed on 3 L NC. Pain med x1 during the night with relief. Plan is possible bronch with Dr Fierro. Patient tolerating diet & fluids.Afib rate controlled on metoprolol, BP control on midodrine. Renetta Jc RN     Vitals:    Vitals:    05/01/24 0214 05/01/24 0300 05/01/24 0335 05/01/24 0400   BP:  (!) 107/58  (!) 88/56   Pulse: (!) 112 (!) 120 (!) 119 (!) 108   Resp: 21 22 17 15   Temp:    97.3 °F (36.3 °C)   TempSrc:    Temporal   SpO2: 96% 94% 97% 98%   Weight:       Height:            I&O:   Intake/Output Summary (Last 24 hours) at 5/1/2024 0458  Last data filed at 5/1/2024 0443  Gross per 24 hour   Intake --   Output 1807 ml   Net -1807 ml       Resp Status: 3 L NC, bipap @ hs    Ventilator Settings:     / / /FiO2 : 30 %    Critical Care IV infusions:   sodium chloride      sodium chloride      sodium chloride 20 mL/hr at 04/30/24 0415    dextrose          LDA:   Peripheral IV 04/24/24 Distal;Left Forearm (Active)   Number of days: 6       Peripheral IV 04/25/24 Right;Anterior Cephalic (Active)   Number of days: 5       Biliary Tube 04/19/24 T-tube 8 fr RUQ (Active)   Number of days: 11       Wound 04/04/24 Foot Left;Dorsal dry, scabbed (Active)   Number of days: 26       Wound Foot Left;Lateral (Active)   Number of days:

## 2024-05-01 NOTE — PROGRESS NOTES
Physical Therapy  Facility/Department: [facility]  Rehabilitation Physical Therapy Treatment Note    NAME: Jorge Luis Banda  : 1945 (78 y.o.)  MRN: [id number]  CODE STATUS: Full Code    Date of Service: 24       Restrictions:  Restrictions/Precautions: Fall Risk, General Precautions, Bedrest with Bathroom Privileges  Position Activity Restriction  Other position/activity restrictions: Telemetry, LUE IV, L sided weakness (s/p R CVA); R AKA;  R UE IV; Arterial Line (L radial);  PICC Line (R brachial);  2L Supplemental O2 nasal cannula     SUBJECTIVE  Subjective  Subjective: Patient up in bed upon therapists arrival RN reports patient is medically stable for therapy treatment this date.    Chart reviewed prior to treatment and patient is agreeable for therapy.  All lines intact and patient positioned comfortably at end of treatment. Bed alarm on and tested for patient safety.                 OBJECTIVE  Cognition  Overall Cognitive Status: Exceptions  Arousal/Alertness: Appropriate responses to stimuli  Following Commands: Follows multistep commands with increased time;Follows one step commands consistently  Attention Span: Appears intact;Attends with cues to redirect  Memory: Appears intact  Safety Judgement: Decreased awareness of need for assistance;Decreased awareness of need for safety  Problem Solving: Assistance required to identify errors made;Assistance required to correct errors made;Decreased awareness of errors  Insights: Decreased awareness of deficits  Initiation: Requires cues for some  Sequencing: Requires cues for some  Cognition Comment: Pt. cooperative and willing to participaten after much encouragement  Orientation  Overall Orientation Status: Within Functional Limits  Orientation Level: Oriented X4    Functional Mobility  Activity:  (Bed mobility)  Assistance Level: Maximum assistance;Requires x 2 assistance  Skilled Clinical Factors: Original plan was to get pt. up to chair with nela  verbalize and demonstrate follow through of pressure relief techqniques to maintain good skin integrity  Short Term Goal 3: Patient will demo good- seated balance.  Short Term Goal 4: PT to complete Re-Eval for transfers/gait when appropriate.  Short Term Goal 5: Patient will tolerate 30 minutes of ther-ex and ther-act.  Short Term Goal 6: Patient will improve L LE strength to 4-/5 in order to increase indep with mobility tasks.    PLAN OF CARE/SAFETY  Physical Therapy Plan  General Plan: 6-7 times per week  Current Treatment Recommendations: Strengthening;Balance training;ROM;Functional mobility training;Endurance training;Neuromuscular re-education;Home exercise program;Safety education & training;Patient/Caregiver education & training;Equipment evaluation, education, & procurement;Therapeutic activities  Safety Devices  Type of Devices: Call light within reach;Nurse notified;Patient at risk for falls;Left in bed;Bed alarm in place;All medhat prominences offloaded;All fall risk precautions in place;Heels elevated for pressure relief    EDUCATION  Education  Education Given To: Patient  Education Provided: Role of Therapy;Safety;Plan of Care;ADL Function;Home Exercise Program;IADL Function;Precautions;Mobility Training  Education Provided Comments: Patient educated on the importance of continued movement of every available joint to maximzie tolerance. Education on lateral WS in seated to prevent pressure.  Pillows placed to off load heel and L UE proped with pillows  Education Method: Demonstration;Verbal;Teach Back  Barriers to Learning: None  Education Outcome: Verbalized understanding;Continued education needed          AM-PAC Score    AM-PAC Inpatient Mobility Raw Score : 10  AM-PAC Inpatient T-Scale Score : 32.29  Mobility Inpatient CMS 0-100% Score: 76.75  Mobility Inpatient CMS G-Code Modifier:CL      Therapy Time   cotx Concurrent Group Co-treatment   Time In 0942         Time Out 1005         Minutes 23

## 2024-05-01 NOTE — PROGRESS NOTES
Per Lizzy Vera and Temi (PC), bronch not scheduled for tomorrow at this time, will reconvene tomorrow with family to make decision regarding intubation.

## 2024-05-01 NOTE — PROGRESS NOTES
..PALLIATIVE CARE TEAM    Patient: Jorge Luis Banda  Room: 2027/2027-01    Reason For Consult   Goals of care evaluation  Distress management  Symptom Management  Guidance and support  Facilitate communications  Assistance in coordinating care  Recommendations for the above    Impression: Jorge Luis Banda is a 78 y.o. year old male with  has a past medical history of Paroxysmal atrial fibrillation (HCC), Peripheral artery disease (HCC), and Primary hypertension..  Currently hospitalized for the management of Melena .  The Palliative Care Team is following to assist with goals of care and patient/ family support.     Code Status  Full Code  PLAN:   - the patient is alert and oriented   - we had a long conversation about his wishes, and he is agreeable to the bronchoscopy and biopsy.  - he understands that he will on the ventilator and may be post op as well    - he tells me that he will stay a full code and does not want a trach/ feeding tube or to live his life on a machine   - he wants me to talk to his HCPOA Rita and I call her and we have a long conversation  - will follow for goals of care and patient/family support.   Vital Signs:  BP 94/62   Pulse (!) 103   Temp 97.1 °F (36.2 °C) (Temporal)   Resp 18   Ht 1.829 m (6')   Wt 64.8 kg (142 lb 12.8 oz) Comment: I remove pole from head of the bed.  SpO2 98%   BMI 19.37 kg/m²     Patient Active Problem List   Diagnosis    Skin eschar    Stenosis of left carotid artery    History of arterial bypass of lower extremity    S/P AKA (above knee amputation) unilateral, right (HCC)    Primary hypertension    Persistent wound pain    Peripheral arterial disease (HCC)    Ulcer of left foot (HCC)    Neuropathy    Malignant neoplasm of lung (HCC)    Intermittent claudication of left lower extremity due to atherosclerosis (HCC)    Frequency of urination    Former smoker    Embolism and thrombosis of artery (HCC)    Dyslipidemia    Closed fracture of left humerus

## 2024-05-02 ENCOUNTER — APPOINTMENT (OUTPATIENT)
Dept: GENERAL RADIOLOGY | Age: 79
DRG: 377 | End: 2024-05-02
Payer: MEDICARE

## 2024-05-02 LAB
ALLEN TEST: POSITIVE
CO2 BLD CALC-SCNC: 26 MMOL/L (ref 22–30)
ERYTHROCYTE [DISTWIDTH] IN BLOOD BY AUTOMATED COUNT: 18.6 % (ref 11.8–14.4)
FIO2: 80
HCT VFR BLD AUTO: 27.2 % (ref 40.7–50.3)
HGB BLD-MCNC: 8.4 G/DL (ref 13–17)
INR PPP: 1.8
MCH RBC QN AUTO: 29.5 PG (ref 25.2–33.5)
MCHC RBC AUTO-ENTMCNC: 30.9 G/DL (ref 28.4–34.8)
MCV RBC AUTO: 95.4 FL (ref 82.6–102.9)
MICROORGANISM SPEC CULT: NORMAL
NRBC BLD-RTO: 0 PER 100 WBC
O2 DELIVERY DEVICE: ABNORMAL
PARTIAL THROMBOPLASTIN TIME: 35.7 SEC (ref 23.9–33.8)
PARTIAL THROMBOPLASTIN TIME: 68.3 SEC (ref 23.9–33.8)
PATIENT TEMP: 37
PLATELET # BLD AUTO: 301 K/UL (ref 138–453)
PMV BLD AUTO: 10 FL (ref 8.1–13.5)
POC HCO3: 26.4 MMOL/L (ref 21–28)
POC O2 SATURATION: 100 % (ref 94–98)
POC PCO2: 38.5 MM HG (ref 35–48)
POC PH: 7.44 (ref 7.35–7.45)
POC PO2: 344.9 MM HG (ref 83–108)
POSITIVE BASE EXCESS, ART: 2.2 MMOL/L (ref 0–3)
PROTHROMBIN TIME: 20.6 SEC (ref 11.5–14.2)
RBC # BLD AUTO: 2.85 M/UL (ref 4.21–5.77)
SAMPLE SITE: ABNORMAL
SPECIMEN DESCRIPTION: NORMAL
WBC OTHER # BLD: 7.7 K/UL (ref 3.5–11.3)

## 2024-05-02 PROCEDURE — 99232 SBSQ HOSP IP/OBS MODERATE 35: CPT | Performed by: FAMILY MEDICINE

## 2024-05-02 PROCEDURE — 86403 PARTICLE AGGLUT ANTBDY SCRN: CPT

## 2024-05-02 PROCEDURE — 94761 N-INVAS EAR/PLS OXIMETRY MLT: CPT

## 2024-05-02 PROCEDURE — 97535 SELF CARE MNGMENT TRAINING: CPT

## 2024-05-02 PROCEDURE — 2500000003 HC RX 250 WO HCPCS: Performed by: INTERNAL MEDICINE

## 2024-05-02 PROCEDURE — 87206 SMEAR FLUORESCENT/ACID STAI: CPT

## 2024-05-02 PROCEDURE — 31500 INSERT EMERGENCY AIRWAY: CPT

## 2024-05-02 PROCEDURE — 99232 SBSQ HOSP IP/OBS MODERATE 35: CPT | Performed by: NURSE PRACTITIONER

## 2024-05-02 PROCEDURE — 5A1955Z RESPIRATORY VENTILATION, GREATER THAN 96 CONSECUTIVE HOURS: ICD-10-PCS | Performed by: INTERNAL MEDICINE

## 2024-05-02 PROCEDURE — 6370000000 HC RX 637 (ALT 250 FOR IP): Performed by: INTERNAL MEDICINE

## 2024-05-02 PROCEDURE — 2000000000 HC ICU R&B

## 2024-05-02 PROCEDURE — 94002 VENT MGMT INPAT INIT DAY: CPT

## 2024-05-02 PROCEDURE — 97530 THERAPEUTIC ACTIVITIES: CPT

## 2024-05-02 PROCEDURE — 51702 INSERT TEMP BLADDER CATH: CPT

## 2024-05-02 PROCEDURE — 74018 RADEX ABDOMEN 1 VIEW: CPT

## 2024-05-02 PROCEDURE — 94669 MECHANICAL CHEST WALL OSCILL: CPT

## 2024-05-02 PROCEDURE — 94640 AIRWAY INHALATION TREATMENT: CPT

## 2024-05-02 PROCEDURE — 36415 COLL VENOUS BLD VENIPUNCTURE: CPT

## 2024-05-02 PROCEDURE — 6360000002 HC RX W HCPCS: Performed by: INTERNAL MEDICINE

## 2024-05-02 PROCEDURE — 94660 CPAP INITIATION&MGMT: CPT

## 2024-05-02 PROCEDURE — 36600 WITHDRAWAL OF ARTERIAL BLOOD: CPT

## 2024-05-02 PROCEDURE — 2700000000 HC OXYGEN THERAPY PER DAY

## 2024-05-02 PROCEDURE — 2580000003 HC RX 258: Performed by: INTERNAL MEDICINE

## 2024-05-02 PROCEDURE — 0BH17EZ INSERTION OF ENDOTRACHEAL AIRWAY INTO TRACHEA, VIA NATURAL OR ARTIFICIAL OPENING: ICD-10-PCS | Performed by: INTERNAL MEDICINE

## 2024-05-02 PROCEDURE — 85027 COMPLETE CBC AUTOMATED: CPT

## 2024-05-02 PROCEDURE — 87070 CULTURE OTHR SPECIMN AEROBIC: CPT

## 2024-05-02 PROCEDURE — 6360000002 HC RX W HCPCS: Performed by: FAMILY MEDICINE

## 2024-05-02 PROCEDURE — 82374 ASSAY BLOOD CARBON DIOXIDE: CPT

## 2024-05-02 PROCEDURE — 31622 DX BRONCHOSCOPE/WASH: CPT

## 2024-05-02 PROCEDURE — 6370000000 HC RX 637 (ALT 250 FOR IP): Performed by: FAMILY MEDICINE

## 2024-05-02 PROCEDURE — 82803 BLOOD GASES ANY COMBINATION: CPT

## 2024-05-02 PROCEDURE — 99232 SBSQ HOSP IP/OBS MODERATE 35: CPT | Performed by: INTERNAL MEDICINE

## 2024-05-02 PROCEDURE — 85730 THROMBOPLASTIN TIME PARTIAL: CPT

## 2024-05-02 PROCEDURE — 85610 PROTHROMBIN TIME: CPT

## 2024-05-02 PROCEDURE — 87205 SMEAR GRAM STAIN: CPT

## 2024-05-02 RX ORDER — FENTANYL CITRATE 50 UG/ML
100 INJECTION, SOLUTION INTRAMUSCULAR; INTRAVENOUS ONCE
Status: COMPLETED | OUTPATIENT
Start: 2024-05-02 | End: 2024-05-02

## 2024-05-02 RX ORDER — HEPARIN SODIUM 1000 [USP'U]/ML
60 INJECTION, SOLUTION INTRAVENOUS; SUBCUTANEOUS PRN
Status: DISCONTINUED | OUTPATIENT
Start: 2024-05-02 | End: 2024-05-05

## 2024-05-02 RX ORDER — HEPARIN SODIUM 10000 [USP'U]/100ML
5-30 INJECTION, SOLUTION INTRAVENOUS CONTINUOUS
Status: DISCONTINUED | OUTPATIENT
Start: 2024-05-02 | End: 2024-05-02 | Stop reason: CLARIF

## 2024-05-02 RX ORDER — HEPARIN SODIUM 1000 [USP'U]/ML
30 INJECTION, SOLUTION INTRAVENOUS; SUBCUTANEOUS PRN
Status: DISCONTINUED | OUTPATIENT
Start: 2024-05-02 | End: 2024-05-05

## 2024-05-02 RX ORDER — ACETYLCYSTEINE 200 MG/ML
400 SOLUTION ORAL; RESPIRATORY (INHALATION)
Status: DISCONTINUED | OUTPATIENT
Start: 2024-05-02 | End: 2024-05-11 | Stop reason: HOSPADM

## 2024-05-02 RX ORDER — HEPARIN SODIUM 1000 [USP'U]/ML
60 INJECTION, SOLUTION INTRAVENOUS; SUBCUTANEOUS ONCE
Status: COMPLETED | OUTPATIENT
Start: 2024-05-02 | End: 2024-05-02

## 2024-05-02 RX ORDER — MIDAZOLAM HYDROCHLORIDE 1 MG/ML
2 INJECTION INTRAMUSCULAR; INTRAVENOUS
Status: DISCONTINUED | OUTPATIENT
Start: 2024-05-02 | End: 2024-05-11 | Stop reason: HOSPADM

## 2024-05-02 RX ORDER — MIDAZOLAM HYDROCHLORIDE 1 MG/ML
2 INJECTION INTRAMUSCULAR; INTRAVENOUS ONCE
Status: COMPLETED | OUTPATIENT
Start: 2024-05-02 | End: 2024-05-02

## 2024-05-02 RX ORDER — MIDAZOLAM HYDROCHLORIDE 1 MG/ML
6 INJECTION INTRAMUSCULAR; INTRAVENOUS ONCE
Status: DISCONTINUED | OUTPATIENT
Start: 2024-05-02 | End: 2024-05-02

## 2024-05-02 RX ORDER — 0.9 % SODIUM CHLORIDE 0.9 %
1000 INTRAVENOUS SOLUTION INTRAVENOUS ONCE
Status: COMPLETED | OUTPATIENT
Start: 2024-05-02 | End: 2024-05-02

## 2024-05-02 RX ORDER — HEPARIN SODIUM 10000 [USP'U]/100ML
5-30 INJECTION, SOLUTION INTRAVENOUS CONTINUOUS
Status: DISCONTINUED | OUTPATIENT
Start: 2024-05-02 | End: 2024-05-05

## 2024-05-02 RX ORDER — MIDAZOLAM HYDROCHLORIDE 1 MG/ML
2 INJECTION INTRAMUSCULAR; INTRAVENOUS ONCE
Status: DISCONTINUED | OUTPATIENT
Start: 2024-05-02 | End: 2024-05-11 | Stop reason: HOSPADM

## 2024-05-02 RX ADMIN — HEPARIN SODIUM 3700 UNITS: 1000 INJECTION INTRAVENOUS; SUBCUTANEOUS at 15:57

## 2024-05-02 RX ADMIN — DEXMEDETOMIDINE 1 MCG/KG/HR: 100 INJECTION, SOLUTION INTRAVENOUS at 20:52

## 2024-05-02 RX ADMIN — ISODIUM CHLORIDE 3 ML: 0.03 SOLUTION RESPIRATORY (INHALATION) at 09:26

## 2024-05-02 RX ADMIN — OXYCODONE AND ACETAMINOPHEN 1 TABLET: 5; 325 TABLET ORAL at 21:28

## 2024-05-02 RX ADMIN — SODIUM CHLORIDE, PRESERVATIVE FREE 10 ML: 5 INJECTION INTRAVENOUS at 21:28

## 2024-05-02 RX ADMIN — LEVALBUTEROL 1.25 MG: 1.25 SOLUTION, CONCENTRATE RESPIRATORY (INHALATION) at 09:26

## 2024-05-02 RX ADMIN — PHENYLEPHRINE HYDROCHLORIDE 10 MCG/MIN: 10 INJECTION INTRAVENOUS at 14:21

## 2024-05-02 RX ADMIN — DEXMEDETOMIDINE 0.2 MCG/KG/HR: 100 INJECTION, SOLUTION INTRAVENOUS at 13:04

## 2024-05-02 RX ADMIN — MIRTAZAPINE 30 MG: 15 TABLET, ORALLY DISINTEGRATING ORAL at 21:28

## 2024-05-02 RX ADMIN — ACETYLCYSTEINE 400 MG: 200 SOLUTION ORAL; RESPIRATORY (INHALATION) at 14:45

## 2024-05-02 RX ADMIN — FENTANYL CITRATE 50 MCG: 50 INJECTION INTRAMUSCULAR; INTRAVENOUS at 12:47

## 2024-05-02 RX ADMIN — ATORVASTATIN CALCIUM 40 MG: 40 TABLET, FILM COATED ORAL at 21:28

## 2024-05-02 RX ADMIN — APIXABAN 5 MG: 5 TABLET, FILM COATED ORAL at 10:33

## 2024-05-02 RX ADMIN — GUAIFENESIN 600 MG: 600 TABLET ORAL at 21:28

## 2024-05-02 RX ADMIN — ACETYLCYSTEINE 400 MG: 200 SOLUTION ORAL; RESPIRATORY (INHALATION) at 20:21

## 2024-05-02 RX ADMIN — LEVALBUTEROL 1.25 MG: 1.25 SOLUTION, CONCENTRATE RESPIRATORY (INHALATION) at 14:45

## 2024-05-02 RX ADMIN — METOCLOPRAMIDE HYDROCHLORIDE 5 MG: 5 INJECTION INTRAMUSCULAR; INTRAVENOUS at 16:00

## 2024-05-02 RX ADMIN — ISODIUM CHLORIDE 3 ML: 0.03 SOLUTION RESPIRATORY (INHALATION) at 14:45

## 2024-05-02 RX ADMIN — MIDAZOLAM HYDROCHLORIDE 2 MG: 1 INJECTION, SOLUTION INTRAMUSCULAR; INTRAVENOUS at 14:09

## 2024-05-02 RX ADMIN — SODIUM CHLORIDE, PRESERVATIVE FREE 10 ML: 5 INJECTION INTRAVENOUS at 10:30

## 2024-05-02 RX ADMIN — MIDAZOLAM 2 MG: 1 INJECTION INTRAMUSCULAR; INTRAVENOUS at 12:43

## 2024-05-02 RX ADMIN — METOCLOPRAMIDE HYDROCHLORIDE 5 MG: 5 INJECTION INTRAMUSCULAR; INTRAVENOUS at 21:28

## 2024-05-02 RX ADMIN — METOCLOPRAMIDE HYDROCHLORIDE 5 MG: 5 INJECTION INTRAMUSCULAR; INTRAVENOUS at 06:37

## 2024-05-02 RX ADMIN — OXYCODONE AND ACETAMINOPHEN 1 TABLET: 5; 325 TABLET ORAL at 10:39

## 2024-05-02 RX ADMIN — HEPARIN SODIUM 12 UNITS/KG/HR: 10000 INJECTION, SOLUTION INTRAVENOUS at 15:57

## 2024-05-02 RX ADMIN — LEVALBUTEROL 1.25 MG: 1.25 SOLUTION, CONCENTRATE RESPIRATORY (INHALATION) at 20:21

## 2024-05-02 RX ADMIN — METOCLOPRAMIDE HYDROCHLORIDE 5 MG: 5 INJECTION INTRAMUSCULAR; INTRAVENOUS at 10:33

## 2024-05-02 RX ADMIN — SODIUM CHLORIDE 1000 ML: 9 INJECTION, SOLUTION INTRAVENOUS at 11:15

## 2024-05-02 ASSESSMENT — PULMONARY FUNCTION TESTS
PIF_VALUE: 22
PIF_VALUE: 21
PIF_VALUE: 21
PIF_VALUE: 15
PIF_VALUE: 21
PIF_VALUE: 23
PIF_VALUE: 21
PIF_VALUE: 23
PIF_VALUE: 35
PIF_VALUE: 21
PIF_VALUE: 21
PIF_VALUE: 20
PIF_VALUE: 21
PIF_VALUE: 24
PIF_VALUE: 22
PIF_VALUE: 21
PIF_VALUE: 22
PIF_VALUE: 22
PIF_VALUE: 21
PIF_VALUE: 22
PIF_VALUE: 21
PIF_VALUE: 20
PIF_VALUE: 21

## 2024-05-02 ASSESSMENT — PAIN DESCRIPTION - ORIENTATION
ORIENTATION: RIGHT
ORIENTATION: RIGHT

## 2024-05-02 ASSESSMENT — PAIN DESCRIPTION - DESCRIPTORS: DESCRIPTORS: ACHING

## 2024-05-02 ASSESSMENT — PAIN SCALES - GENERAL: PAINLEVEL_OUTOF10: 9

## 2024-05-02 ASSESSMENT — PAIN DESCRIPTION - LOCATION
LOCATION: CHEST
LOCATION: BACK

## 2024-05-02 NOTE — PROGRESS NOTES
Physical Therapy  Facility/Department: [facility]  Rehabilitation Physical Therapy Treatment Note    NAME: Jorge Luis Banda  : 1945 (78 y.o.)  MRN: [id number]  CODE STATUS: Full Code    Date of Service: 24       Restrictions:  Restrictions/Precautions: Fall Risk, General Precautions, Bedrest with Bathroom Privileges  Position Activity Restriction  Other position/activity restrictions: Telemetry, LUE IV, L sided weakness (s/p R CVA); R AKA;  R UE IV; Arterial Line (L radial);  PICC Line (R brachial);  2L Supplemental O2 nasal cannula     SUBJECTIVE  Subjective  Subjective: pt in bed agreeable to PT              OBJECTIVE  Cognition  Overall Cognitive Status: Exceptions  Arousal/Alertness: Appropriate responses to stimuli  Following Commands: Follows multistep commands with increased time;Follows one step commands consistently  Attention Span: Appears intact;Attends with cues to redirect  Memory: Appears intact  Safety Judgement: Decreased awareness of need for assistance;Decreased awareness of need for safety  Problem Solving: Assistance required to identify errors made;Assistance required to correct errors made;Decreased awareness of errors  Insights: Decreased awareness of deficits  Initiation: Requires cues for some  Sequencing: Requires cues for some  Cognition Comment: Pt. cooperative and attempted to participate in treatment.  Orientation  Overall Orientation Status: Within Functional Limits  Orientation Level: Oriented X4    Functional Mobility  Bed Mobility  Overall Assistance Level: Moderate Assistance;Maximum Assistance;Requires x 2 Assistance  Additional Factors: Set-up;Verbal cues;Increased time to complete;Head of bed raised;With handrails  Roll Left  Assistance Level: Moderate assistance;Maximum assistance;Requires x 2 assistance  Skilled Clinical Factors: vc's for hand placement bring R UE across body to L hand rail to facilitate rolling motion.  Supine to Sit  Assistance Level: Moderate  training;Neuromuscular re-education;Home exercise program;Safety education & training;Patient/Caregiver education & training;Equipment evaluation, education, & procurement;Therapeutic activities  Safety Devices  Type of Devices: Call light within reach;Nurse notified;Patient at risk for falls;Left in bed;Bed alarm in place;All medhat prominences offloaded;All fall risk precautions in place;Heels elevated for pressure relief    EDUCATION  Education  Education Given To: Patient  Education Provided: Role of Therapy;Safety;Plan of Care;ADL Function;Home Exercise Program;IADL Function;Precautions;Mobility Training  Education Provided Comments: Patient educated on the importance of continued movement of every available joint to maximzie tolerance. Education on lateral WS in seated to prevent pressure.  Pillows placed to off load heel and L UE proped with pillows  Education Method: Demonstration;Verbal;Teach Back  Barriers to Learning: None  Education Outcome: Verbalized understanding;Continued education needed    AM-PAC Score    AM-PAC Inpatient Mobility Raw Score : 10  AM-PAC Inpatient T-Scale Score : 32.29  Mobility Inpatient CMS 0-100% Score: 76.75  Mobility Inpatient CMS G-Code Modifier:CL        Therapy Time   cotx Concurrent Group Co-treatment   Time In 0901         Time Out 0911         Minutes 10                 Co-treatment with OT warranted secondary to decreased safety and independence requiring 2 skilled therapy professionals to address individual discipline's goals. PT addressing preparation for  Transfers, gait and pre gait activities,  sitting balance for increased  sitting/activity tolerance, functional mobility, environmental safety/scanning, fall prevention, functional mobility  transfers and functional LE strength.  Upon writer exit, call light within reach, pt retired to bed. All lines intact and patient positioned comfortably. All patient needs addressed prior to ending therapy session. Chart reviewed

## 2024-05-02 NOTE — PLAN OF CARE
hours minimum:  Change oxygen saturation probe site  4.  Every 4-6 hours:  If on nasal continuous positive airway pressure, respiratory therapy assess nares and determine need for appliance change or resting period.  5/1/2024 2244 by Rola Myers RN  Outcome: Progressing  5/1/2024 1330 by Svitlana Weeks RN  Outcome: Progressing     Problem: ABCDS Injury Assessment  Goal: Absence of physical injury  5/1/2024 2244 by Rola Myers RN  Outcome: Progressing  5/1/2024 1330 by Svitlana Weeks RN  Outcome: Progressing     Problem: Nutrition Deficit:  Goal: Optimize nutritional status  5/1/2024 2244 by Rola Myers RN  Outcome: Progressing  5/1/2024 1330 by Svitlana Weeks RN  Outcome: Progressing     Problem: Chronic Conditions and Co-morbidities  Goal: Patient's chronic conditions and co-morbidity symptoms are monitored and maintained or improved  5/1/2024 2244 by Rola Myers RN  Outcome: Progressing  Flowsheets (Taken 5/1/2024 2000)  Care Plan - Patient's Chronic Conditions and Co-Morbidity Symptoms are Monitored and Maintained or Improved:   Monitor and assess patient's chronic conditions and comorbid symptoms for stability, deterioration, or improvement   Collaborate with multidisciplinary team to address chronic and comorbid conditions and prevent exacerbation or deterioration   Update acute care plan with appropriate goals if chronic or comorbid symptoms are exacerbated and prevent overall improvement and discharge  5/1/2024 1330 by Svitlana Weeks RN  Outcome: Progressing  Flowsheets (Taken 5/1/2024 0800)  Care Plan - Patient's Chronic Conditions and Co-Morbidity Symptoms are Monitored and Maintained or Improved:   Monitor and assess patient's chronic conditions and comorbid symptoms for stability, deterioration, or improvement   Collaborate with multidisciplinary team to address chronic and comorbid conditions and prevent exacerbation or deterioration   Update acute care plan with appropriate goals if  chronic or comorbid symptoms are exacerbated and prevent overall improvement and discharge

## 2024-05-02 NOTE — PROGRESS NOTES
End Of Shift Note  [facility]  Summary of shift: Patient had an uneventful shift. Plan is for meeting with Palliative care at 1100, possible bronch and/or intubation today--pt has been NPO since 0000. Patient did wear his BiPAP for about 4 hours overnight in total. D/C to [facility] is the plan for now.     Vitals:    Vitals:    05/01/24 2321 05/02/24 0000 05/02/24 0320 05/02/24 0400   BP:  (!) 90/57     Pulse: 100  (!) 106    Resp: 20  18    Temp:  96.8 °F (36 °C)  97.3 °F (36.3 °C)   TempSrc:  Temporal  Temporal   SpO2: 94%  97%    Weight:    62.3 kg (137 lb 4.8 oz)   Height:            I&O:   Intake/Output Summary (Last 24 hours) at 5/2/2024 0617  Last data filed at 5/1/2024 2149  Gross per 24 hour   Intake --   Output 1375 ml   Net -1375 ml       Resp Status: 2L NC; BiPAP @ night    Ventilator Settings:     / / /FiO2 : 30 %    Critical Care IV infusions:   sodium chloride      sodium chloride      sodium chloride 20 mL/hr at 04/30/24 0415    dextrose          LDA:   Peripheral IV 04/24/24 Distal;Left Forearm (Active)   Number of days: 7       Peripheral IV 04/25/24 Right;Anterior Cephalic (Active)   Number of days: 6       Biliary Tube 04/19/24 T-tube 8 fr RUQ (Active)   Number of days: 12       Wound 04/04/24 Foot Left;Dorsal dry, scabbed (Active)   Number of days: 27       Wound Foot Left;Lateral (Active)   Number of days:

## 2024-05-02 NOTE — PROGRESS NOTES
VASCULAR SURGERY  PROGRESS NOTE      5/2/2024 5:36 PM  Subjective:   Admit Date: 4/3/2024  PCP: Lucila Machado APRN - CNP    Chief Complaint   Patient presents with    Leg Pain     Left leg, open wounds and swollen       Interval History: Patient intubated and sedated.  Status post bronchoscopy with plans for biopsy on Monday.    Diet: Diet NPO Exceptions are: Sips of Water with Meds    Medications:   Scheduled Meds:   midazolam  2 mg IntraVENous Once    acetylcysteine  400 mg Inhalation BID RT    digoxin  125 mcg Oral Daily    sodium chloride nebulizer  3 mL Nebulization TID RT    metoclopramide  5 mg IntraVENous 4x Daily AC & HS    droNABinol  2.5 mg Oral BID WC    levalbuterol  1.25 mg Nebulization TID    mirtazapine  30 mg Oral Nightly    midodrine  5 mg Oral TID WC    furosemide  40 mg Oral Daily    sodium chloride  80 mL IntraVENous Once    pantoprazole  40 mg Oral BID AC    empagliflozin  10 mg Oral Daily    sodium chloride  80 mL IntraVENous Once    metoprolol tartrate  25 mg Oral BID    aspirin  81 mg Oral Daily    sodium chloride  100 mL IntraVENous Once    guaiFENesin  600 mg Oral BID    ferrous sulfate  325 mg Oral Daily with breakfast    atorvastatin  40 mg Oral Nightly    vitamin D3  5,000 Units Oral Daily    vitamin B-12  1,000 mcg Oral Daily    tamsulosin  0.4 mg Oral Daily    sodium chloride flush  5-40 mL IntraVENous 2 times per day     Continuous Infusions:   phenylephrine (HERMILA-SYNEPHRINE) 50 mg in sodium chloride 0.9 % 250 mL infusion 30 mcg/min (05/02/24 1510)    dexmedeTOMIDine (PRECEDEX) 400 mcg in sodium chloride 0.9 % 100 mL infusion 0.6 mcg/kg/hr (05/02/24 1406)    heparin (PORCINE) Infusion 12 Units/kg/hr (05/02/24 1557)    sodium chloride      sodium chloride      sodium chloride 20 mL/hr at 04/30/24 0415    dextrose           Labs:   CBC:   Recent Labs     04/30/24  0302 05/01/24  0324 05/02/24  1439   WBC 9.4 8.4 7.7   HGB 8.7* 8.7* 8.4*    313 301       BMP:    Recent  arterial disease (HCC)     Ulcer of left foot (HCC)     Neuropathy     Malignant neoplasm of lung (HCC)     Intermittent claudication of left lower extremity due to atherosclerosis (HCC)     Frequency of urination     Former smoker     Embolism and thrombosis of artery (HCC)     Dyslipidemia     Closed fracture of left humerus     Closed fracture of surgical neck of humerus     Acute exacerbation of chronic obstructive airways disease (HCC)     Left leg cellulitis     Arteriosclerosis of coronary artery     Age-related osteoporosis without current pathological fracture     Adenocarcinoma of lung (HCC)     Wound infection     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)     Melena     Iron deficiency anemia due to chronic blood loss     Hx of cancer of lung     Adrenal mass (HCC)     Claudication (HCC)     Pleural effusion, bilateral     Longstanding persistent atrial fibrillation (HCC)     Acute ischemic right internal carotid artery (ICA) stroke (HCC)     Internal carotid artery stenosis, bilateral     Anemia     Gastrointestinal hemorrhage     NSTEMI (non-ST elevated myocardial infarction) (HCC)     Hypotension     Atrial fibrillation with rapid ventricular response (HCC)     Unstable angina (HCC)      Plan:   Vent management per pulmonary  Biopsy on Monday    Electronically signed by Sonny Alcantar MD on 5/2/2024 at 5:36 PM

## 2024-05-02 NOTE — PROGRESS NOTES
Patient Name: Jorge Luis Banda  Date of admission: 4/3/2024  3:27 PM  Patient's age: 78 y.o., 1945  Admission Dx: Melena [K92.1]  GI bleed [K92.2]  UMM (acute kidney injury) (HCC) [N17.9]  Left leg cellulitis [L03.116]  Anemia, unspecified type [D64.9]    Reason for Consult: management recommendations  Requesting Physician: Sumeet Eller MD    CHIEF COMPLAINT: GI bleeding    History Obtained From:  patient  INTERVAL HISTORY:    Patient seen and examined.    Patient's family at bedside  Shortness of breath stable  He is on 3 L oxygen  Hemoglobin stable  S/p [facility] on 4/26 :left upper bronchus takeoff has obstruction with mass like lesion.?  And plan to repeat bronchoscopy prior to discharge    HISTORY OF PRESENT ILLNESS:    The patient is a 78 y.o.   male who is admitted to the hospital for anemia and suspicion for GI bleeding.  His hemoglobin was under 7 and received blood transfusion.  He has severe peripheral vascular disease on Plavix and Coumadin.  He underwent an EGD that showed gastritis and he refused colonoscopy.  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  From oncology perspective, the patient was diagnosed with stage I lung cancer in 2019 and underwent lung resection.  Never received any chemotherapy or radiation.  He has been in remission since then.  CT scan of the chest showed concerning changes in the liver and adrenal gland for metastatic disease.  Dedicated CT of the abdomen and pelvis is ordered and pending.    Past Medical History:   has a past medical history of Paroxysmal atrial fibrillation (HCC), Peripheral artery disease (HCC), and Primary hypertension.    Past Surgical History:   has a past surgical history that includes above knee amputation (Right, 01/01/2020); Upper gastrointestinal endoscopy (N/A, 4/8/2024); IR CHOLECYSTOSTOMY PERCUTANEOUS COMPLETE (4/19/2024); and

## 2024-05-02 NOTE — PROGRESS NOTES
2 RRTs assisted Dr Fierro with intubation and bedside bronch afterwards.  Sample obtained from LLL and sent to lab. Pt tolerated both procedures well.

## 2024-05-02 NOTE — PLAN OF CARE
Problem: Respiratory - Adult  Goal: Able to breathe comfortably  Description: Able to breathe comfortably  5/2/2024 1937 by Giselle Salas RCP  Outcome: Progressing     Problem: Respiratory - Adult  Goal: Clear lung sounds  5/2/2024 1937 by Giselle Salas RCP  Outcome: Progressing     Problem: Respiratory - Adult  Goal: Adequate oxygenation  Description: Adequate oxygenation  5/2/2024 1937 by Giselle Salas RCP  Outcome: Progressing     Problem: Respiratory - Adult  Goal: Ability to maintain normal respiratory secretions will improve  Description: Ability to maintain normal respiratory secretions will improve  5/2/2024 1937 by Giselle Salas RCP  Outcome: Progressing

## 2024-05-02 NOTE — PROCEDURES
PROCEDURE NOTE  Date: 5/2/2024   Name: Jorge Luis Banda  YOB: 1945    Intubation    Date/Time: 5/2/2024 1:02 PM    Performed by: Kleber Fierro MD  Authorized by: Kleber Fierro MD  Consent: Verbal consent obtained.  Risks and benefits: risks, benefits and alternatives were discussed  Patient identity confirmed: arm band and provided demographic data  Time out: Immediately prior to procedure a \"time out\" was called to verify the correct patient, procedure, equipment, support staff and site/side marked as required.  Indications: pulmonary toilet (Left lung atelectasis)  Intubation method: video-assisted  Patient status: sedated  Pretreatment medications: midazolam and fentanyl  Paralytic: none  Tube size: 7.5 mm  Tube type: cuffed  Number of attempts: 1  Post-procedure assessment: chest rise and CO2 detector  Breath sounds: equal  ETT to lip: 22 cm

## 2024-05-02 NOTE — PLAN OF CARE
Problem: Respiratory - Adult  Goal: Able to breathe comfortably  Description: Able to breathe comfortably  Outcome: Progressing  Goal: Clear lung sounds  Outcome: Progressing  Goal: Adequate oxygenation  Description: Adequate oxygenation  Outcome: Progressing  Goal: Ability to maintain normal respiratory secretions will improve  Description: Ability to maintain normal respiratory secretions will improve  Outcome: Progressing

## 2024-05-02 NOTE — PROGRESS NOTES
Patient Name: Jorge Luis Banda  Date of admission: 4/3/2024  3:27 PM  Patient's age: 78 y.o., 1945  Admission Dx: Melena [K92.1]  GI bleed [K92.2]  UMM (acute kidney injury) (HCC) [N17.9]  Left leg cellulitis [L03.116]  Anemia, unspecified type [D64.9]    Reason for Consult: management recommendations  Requesting Physician: Sumeet Eller MD    CHIEF COMPLAINT: GI bleeding    History Obtained From:  patient  INTERVAL HISTORY:    Patient seen and examined.    Patient's family at bedside  Shortness of breath stable  He is on 3 L oxygen  Hemoglobin stable  S/p [facility] on 4/26 :left upper bronchus takeoff has obstruction with mass like lesion.?  And plan to repeat bronchoscopy today    HISTORY OF PRESENT ILLNESS:    The patient is a 78 y.o.   male who is admitted to the hospital for anemia and suspicion for GI bleeding.  His hemoglobin was under 7 and received blood transfusion.  He has severe peripheral vascular disease on Plavix and Coumadin.  He underwent an EGD that showed gastritis and he refused colonoscopy.  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  From oncology perspective, the patient was diagnosed with stage I lung cancer in 2019 and underwent lung resection.  Never received any chemotherapy or radiation.  He has been in remission since then.  CT scan of the chest showed concerning changes in the liver and adrenal gland for metastatic disease.  Dedicated CT of the abdomen and pelvis is ordered and pending.    Past Medical History:   has a past medical history of Paroxysmal atrial fibrillation (HCC), Peripheral artery disease (HCC), and Primary hypertension.    Past Surgical History:   has a past surgical history that includes above knee amputation (Right, 01/01/2020); Upper gastrointestinal endoscopy (N/A, 4/8/2024); IR CHOLECYSTOSTOMY PERCUTANEOUS COMPLETE (4/19/2024); and bronchoscopy

## 2024-05-02 NOTE — OP NOTE
Operative Note      Patient: Jorge Luis Banda  YOB: 1945  MRN: [id number]    Date of Procedure: 4/26/2024    Pre-Op Diagnosis Codes:     * Atelectasis of left lung [J98.11]    Post-Op Diagnosis: Same evidence of sick clotted blood obstructing the entire left main/large mucous plug       Procedure(s):  BRONCHOSCOPY WITH WASHING BIOPSY        Anesthesia: General    Estimated Blood Loss (mL): None    Complications: None    Specimens:   ID Type Source Tests Collected by Time Destination   A : WASHING LEFT MAIN STEM Body Fluid Lung CYTOLOGY, NON-GYN, CELL COUNT WITH DIFFERENTIAL, BODY FLUID, CULTURE, FUNGUS, FUNGAL STAIN (Canceled), CULTURE WITH SMEAR, ACID FAST BACILLIUS, CULTURE, VIRUS, RESPIRATORY, CULTURE, RESPIRATORY Joe Mcneil MD 4/26/2024 1449        Implants:  * No implants in log *      Drains:   Biliary Tube 04/19/24 T-tube 8 fr RUQ (Active)   Site Description Intact;Painful 05/01/24 1600   Dressing Status Clean, dry & intact 05/01/24 1600   Dressing Type Special type 05/01/24 1600   Drainage Color Sanguinous 05/01/24 1600   Drain Status Open to gravity drainage 05/01/24 1600   Output (mL) 0 ml 05/01/24 0443       [REMOVED] Urinary Catheter 04/06/24 (Removed)   $ Urethral catheter insertion $ Not inserted for procedure 04/06/24 1759   Catheter Indications Urinary retention (acute or chronic), continuous bladder irrigation or bladder outlet obstruction 04/09/24 0800   Site Assessment No urethral drainage 04/09/24 0800   Urine Color Yellow 04/09/24 0800   Urine Appearance Clear 04/09/24 0800   Collection Container Standard 04/08/24 2247   Securement Method Securing device (Describe) 04/09/24 0800   Catheter Care  Soap and water 04/08/24 2247   Status Draining 04/09/24 0800   Output (mL) 150 mL 04/09/24 0822       Findings:  After intubated patient placed on 100% oxygen on assist-control ventilation.  I introduced bronchoscope through the ET tube.  Immediately was noted complete obstruction left

## 2024-05-02 NOTE — PROGRESS NOTES
Pulmonary Critical Care Progress Note       Patient seen for the follow up of bilateral pleural effusions, hemoptysis     Subjective:  Patient is on 3 L oxygen.  He denies any chest pain.  He still has weak cough.  He denies significant shortness of breath.  He has been on chest vest therapy    Examination:  Vitals: BP (!) 90/57   Pulse (!) 121   Temp 97.1 °F (36.2 °C) (Temporal)   Resp 25   Ht 1.829 m (6')   Wt 62.3 kg (137 lb 4.8 oz)   SpO2 100%   BMI 18.62 kg/m²   General appearance: In no acute distress, alert and cooperative with exam cachectic  Neck: No JVD  Lungs: Decreased breath sound no crackles or wheeze  Heart: irregular rate and rhythm, S1, S2 normal, no gallop  Abdomen: Soft, non tender, + BS  Cholecystotomy tube in place bloody output  Extremities: no cyanosis or clubbing. No significant edema, left upper and lower extremity weakness right above-knee amputation left foot dorsal ulcer    LABs:  CBC:   Recent Labs     04/30/24  0302 05/01/24  0324   WBC 9.4 8.4   HGB 8.7* 8.7*   HCT 29.0* 28.5*    313       BMP:   Recent Labs     04/30/24  0302      K 3.6*   CO2 24   BUN 11   CREATININE 0.9   LABGLOM 86   GLUCOSE 106*       No results for input(s): \"APTT\" in the last 72 hours.     Latest Reference Range & Units 04/11/24 15:56 04/13/24 23:26 04/16/24 04:21   Procalcitonin 0.00 - 0.09 ng/mL 1.95 (H) 1.02 (H) 0.72 (H)   (H): Data is abnormally high   Latest Reference Range & Units 04/15/24 04:34   Pro-BNP <300 pg/mL 16,301 (H)   (H): Data is abnormally high    LEFT MAIN STEM WASHINGS:           NEGATIVE FOR MALIGNANCY.        THORACENTESIS, RIGHT FLUID:           NEGATIVE FOR MALIGNANCY.   Radiology:  Chest x-ray 5/1  Unchanged left lung atelectasis    Chest x-ray 4/30  Residual barium in the stomach which is probably from the April 19, 2024  swallow study indicates significant delayed gastric emptying/gastroparesis.     Stable scattered severe airspace disease in the left hemithorax   Patient may need reevaluation with a bronchoscopy if PET scan is positive in left upper lobe bronchus takeoff.  Mucous plugging which was obstructing was mostly clotted blood, left mainstream bronchus washings obtained  Patient advised that he will need repeat bronchoscopy with intubation and ventilator stay for few days with humidified heated circuit for lung expansion.  Will also perform endobronchial biopsy given above mentioned finding per Dr. Mcneil.  He is advised he may become ventilator dependent given significant weakness.      Hypotension requiring pressor/increased cortisol level suspect following hydrocortisone dose  Monitor blood pressure off Mat-Synephrine   Midodrine  Off hydrocortisone    Acute cholecystitis/acute anemia/Hemoptysis  Hemoptysis  resolved  Monitor hemoglobin hematocrit   transfuse 1 unit PRBC if hemoglobin less than 7  Status post EGD with negative findings  Plan for elective colonoscopy  outpatient  Cholecystotomy tube  monitor output /finished Zosyn   per ID 4/26 course  Hematology oncology following    Paroxysmal A-fib   Cardiology signed off  Stated not able to resume anticoagulation given bleeding issues despite CVA risk    Hemoptysis  History of stage 1 lung cancer 2019 s/p lung resection  No adjuvant therapy     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 90 % R and 80 % Left .   Off plavix  Status post R AKA  Vascular consult/CEA electively    Bedside physical therapy      Discussed with POA/significant other at bedside and patient in details.  Advised indication for intubation given persistent lung atelectasis.  Patient does not want tracheostomy.  POA agrees on terminal wean in case of ventilator dependency.

## 2024-05-02 NOTE — PROGRESS NOTES
Occupational Therapy  Facility/Department: [facility]  Rehabilitation Occupational Therapy Daily Treatment Note    Date: 24  Patient Name: Jorge Luis Banda       Room:   MRN: [id number]  Account: [id number]   : 1945  (78 y.o.) Gender: male       Past Medical History:  has a past medical history of Paroxysmal atrial fibrillation (HCC), Peripheral artery disease (HCC), and Primary hypertension.  Past Surgical History:   has a past surgical history that includes above knee amputation (Right, 2020); Upper gastrointestinal endoscopy (N/A, 2024); IR CHOLECYSTOSTOMY PERCUTANEOUS COMPLETE (2024); and bronchoscopy (N/A, 2024).    Restrictions  Restrictions/Precautions: Fall Risk, General Precautions, Bedrest with Bathroom Privileges  Other position/activity restrictions: Telemetry, LUE IV, L sided weakness (s/p R CVA); R AKA;  R UE IV; Arterial Line (L radial);  PICC Line (R brachial);  2L Supplemental O2 nasal cannula  Required Braces or Orthoses?: No    Subjective  Subjective: \"I am in so much pain.....I can't do this\".  Restrictions/Precautions: Fall Risk;General Precautions;Bedrest with Bathroom Privileges   Objective     Cognition  Overall Cognitive Status: Exceptions  Arousal/Alertness: Appropriate responses to stimuli  Following Commands: Follows multistep commands with increased time;Follows one step commands consistently  Attention Span: Appears intact;Attends with cues to redirect  Memory: Appears intact  Safety Judgement: Decreased awareness of need for assistance;Decreased awareness of need for safety  Problem Solving: Assistance required to identify errors made;Assistance required to correct errors made;Decreased awareness of errors  Insights: Decreased awareness of deficits  Initiation: Requires cues for some  Sequencing: Requires cues for some  Cognition Comment: Pt. cooperative and attempted to participate in treatment.  Orientation  Overall Orientation Status:

## 2024-05-02 NOTE — PROGRESS NOTES
[facility]  Office: [phone]  Chinmay Ramos DO, Jose Atkinson DO, Guevara Ramirez DO, Pranay Cooper, DO, Sumeet Eller MD, Karen Carvajal MD, Sony Crawford MD, Poonam Platt MD,  Stephan San MD, Marcello Daniels MD, Theodore Pascual MD,  Juve Mahmood DO, Matias Mckeon MD, Hero Tavarez MD, Valentin Ramos DO, Jazzy Spence MD,  Trino Handley DO, Flavia Leonardo MD, Vanessa Soriano MD, Pricilla Akers MD, Casimiro Knight MD,  Zeke Maciel MD, Guera Cervantes MD, Kianna Martin MD, Mirlande Worrell MD, Ney Garcia MD, Savannah Dominguez MD, Ed Emerson DO, Jaylen West DO, Comfort Carcamo MD,  Siva Ashley MD, Shirley Waterhouse, CNP,  Carolyn Pelaez CNP, Scotty Ge, CNP,  Wandy Trivedi, DNP, Malissa Gaston, CNP, Lana Penaloza, CNP, Susan Haro, CNP, Sherrell Mendoza, CNP, Suzy Morales, PA-C, Dalila Lange PA-C, Sada Matt, CNP, Crystal Womack, CNP, Sheng Reddy, CNP, Arielle Guy, CNP, Linsey Perez, CNP, Anju Cho, CNS, Hoa Donahue, CNP, Cassidy Ferrer CNP, Tracy Schwab, CNP       [facility]      Daily Progress Note     Admit Date: 4/3/2024  Bed/Room No.  2027/2027-01  Admitting Physician : Sony Crawford MD  Code Status :Full Code  Hospital Day:  LOS: 29 days   Chief Complaint:     Chief Complaint   Patient presents with    Leg Pain     Left leg, open wounds and swollen       Principal Problem:    Melena  Active Problems:    History of arterial bypass of lower extremity    S/P AKA (above knee amputation) unilateral, right (HCC)    Primary hypertension    Peripheral arterial disease (HCC)    Neuropathy    Malignant neoplasm of lung (HCC)    Intermittent claudication of left lower extremity due to atherosclerosis (HCC)    Embolism and thrombosis of artery (HCC)    Left leg cellulitis    Iron deficiency anemia due to chronic blood loss    Hx of cancer of lung    Adrenal mass (HCC)    Claudication (HCC)    Pleural effusion, bilateral

## 2024-05-02 NOTE — PROGRESS NOTES
staff. We recommend adjusting their medications as follows:    Goals of care evaluation  The patient goals of care are improve or maintain function/quality of life, provide comfort care/support/palliation/relieve suffering, preserve independence/autonomy/control, and support for family/caregiver  Long discussion to ensure the patient's and family's understanding of goals of care, and theconcept of palliative care.    Code Status:  FULL    Other Recommendations -       History of Present Illness     HISTORY OF PRESENT ILLNESS:   The patient is a 78 y.o. male with a history of peripheral vascular disease with right AKA, A-fib, stage I lung cancer.  Patient has had multiple vascular surgeries for PAD.  Patient presented to [facility] emergency department on 4/3/2024 for complaints of melena.  Patient was found to have low hemoglobin and concern for GI bleeding.  Patient received multiple PRBCs in the emergency room.  Patient is on long-term anticoagulation-Coumadin and Plavix.  Patient additionally has had history of peptic ulcer disease with surgical intervention.  EGD on 4/8/2024 did not show source of bleeding but if small gastric polyp was removed.  Patient was recommended colonoscopy but was unable to complete bowel prep.  MRI brain on 4/11/2024 due to concerns of left arm and leg weakness which showed hypoperfusion.  Heparin drip was restarted on 4/12 and neurology was consulted for stroke.  CTA head and neck showed critical right ICA flow.  Patient had another drop in hemoglobin on 4/12 and received additional PRBC.  Patient's anticoagulation on hold due to bleeding.  Urology was consulted for urinary retention and Dotson catheter was inserted.  Patient does have a history of lung cancer with resection and did not receive chemotherapy.  Patient had imaging which showed a mass in the right adrenal and liver mass concerning for new metastasis.  He additionally had CT chest concerning for loculated pleural  full/confusion   __X_30%  Bed Bound; Extensive disease; Total care; intake reduced; LOC full/confusion  ___20%  Bed Bound; Extensive disease; Total care; intake minimal; Drowsy/coma  ___10%  Bed Bound; Extensive disease; Total care; Mouth care only; Drowsy/coma  ___0       Death    Principle Problem/Diagnosis:  Principal Problem:    Melena  Active Problems:    History of arterial bypass of lower extremity    S/P AKA (above knee amputation) unilateral, right (HCC)    Primary hypertension    Peripheral arterial disease (HCC)    Neuropathy    Malignant neoplasm of lung (HCC)    Intermittent claudication of left lower extremity due to atherosclerosis (HCC)    Embolism and thrombosis of artery (HCC)    Left leg cellulitis    Iron deficiency anemia due to chronic blood loss    Hx of cancer of lung    Adrenal mass (HCC)    Claudication (HCC)    Pleural effusion, bilateral    Longstanding persistent atrial fibrillation (HCC)    Acute ischemic right internal carotid artery (ICA) stroke (HCC)    Internal carotid artery stenosis, bilateral    Anemia    Gastrointestinal hemorrhage    NSTEMI (non-ST elevated myocardial infarction) (HCC)    Hypotension    Atrial fibrillation with rapid ventricular response (HCC)    Unstable angina (HCC)    Right upper quadrant abdominal pain    ACP (advance care planning)    Palliative care encounter    Acute cholecystitis    Lung mass    Liver lesion    Severe malnutrition (HCC)  Resolved Problems:    * No resolved hospital problems. *        Total time in talking with family, discussing with patient, chart review, and writing note: 35  Complexity: 2        Electronically signed by      LEXX May  Hospice/Palliative Care  [facility], [place], [place]  5/2/2024 8:42 AM  Palliative Care Office [phone]

## 2024-05-03 ENCOUNTER — APPOINTMENT (OUTPATIENT)
Dept: GENERAL RADIOLOGY | Age: 79
DRG: 377 | End: 2024-05-03
Payer: MEDICARE

## 2024-05-03 LAB
ALLEN TEST: POSITIVE
ERYTHROCYTE [DISTWIDTH] IN BLOOD BY AUTOMATED COUNT: 18.8 % (ref 11.8–14.4)
FIO2: 30
HCT VFR BLD AUTO: 31.8 % (ref 40.7–50.3)
HGB BLD-MCNC: 9.3 G/DL (ref 13–17)
MCH RBC QN AUTO: 30.1 PG (ref 25.2–33.5)
MCHC RBC AUTO-ENTMCNC: 29.2 G/DL (ref 28–38)
MCV RBC AUTO: 102.9 FL (ref 82.6–102.9)
MICROORGANISM/AGENT SPEC: ABNORMAL
MODE: NORMAL
NEGATIVE BASE EXCESS, ART: 0.6 MMOL/L (ref 0–2)
O2 DELIVERY DEVICE: NORMAL
PARTIAL THROMBOPLASTIN TIME: 48.9 SEC (ref 23.9–33.8)
PARTIAL THROMBOPLASTIN TIME: 65.7 SEC (ref 23.9–33.8)
PARTIAL THROMBOPLASTIN TIME: 68.8 SEC (ref 23.9–33.8)
PLATELET # BLD AUTO: 340 K/UL (ref 138–453)
PMV BLD AUTO: 9.9 FL (ref 8.1–13.5)
POC HCO3: 23.2 MMOL/L (ref 21–28)
POC O2 SATURATION: 96.6 % (ref 94–98)
POC PCO2: 35.2 MM HG (ref 35–48)
POC PH: 7.43 (ref 7.35–7.45)
POC PO2: 83.6 MM HG (ref 83–108)
RBC # BLD AUTO: 3.09 M/UL (ref 4.21–5.77)
SAMPLE SITE: NORMAL
SPECIMEN DESCRIPTION: ABNORMAL
WBC OTHER # BLD: 9.3 K/UL (ref 3.5–11.3)

## 2024-05-03 PROCEDURE — 6370000000 HC RX 637 (ALT 250 FOR IP): Performed by: INTERNAL MEDICINE

## 2024-05-03 PROCEDURE — 99232 SBSQ HOSP IP/OBS MODERATE 35: CPT | Performed by: INTERNAL MEDICINE

## 2024-05-03 PROCEDURE — 94003 VENT MGMT INPAT SUBQ DAY: CPT

## 2024-05-03 PROCEDURE — 2700000000 HC OXYGEN THERAPY PER DAY

## 2024-05-03 PROCEDURE — 6360000002 HC RX W HCPCS: Performed by: INTERNAL MEDICINE

## 2024-05-03 PROCEDURE — 2580000003 HC RX 258: Performed by: INTERNAL MEDICINE

## 2024-05-03 PROCEDURE — 36600 WITHDRAWAL OF ARTERIAL BLOOD: CPT

## 2024-05-03 PROCEDURE — 99213 OFFICE O/P EST LOW 20 MIN: CPT

## 2024-05-03 PROCEDURE — 82803 BLOOD GASES ANY COMBINATION: CPT

## 2024-05-03 PROCEDURE — 6370000000 HC RX 637 (ALT 250 FOR IP): Performed by: FAMILY MEDICINE

## 2024-05-03 PROCEDURE — 2000000000 HC ICU R&B

## 2024-05-03 PROCEDURE — 85027 COMPLETE CBC AUTOMATED: CPT

## 2024-05-03 PROCEDURE — 36415 COLL VENOUS BLD VENIPUNCTURE: CPT

## 2024-05-03 PROCEDURE — 99232 SBSQ HOSP IP/OBS MODERATE 35: CPT | Performed by: FAMILY MEDICINE

## 2024-05-03 PROCEDURE — 85730 THROMBOPLASTIN TIME PARTIAL: CPT

## 2024-05-03 PROCEDURE — 2500000003 HC RX 250 WO HCPCS: Performed by: INTERNAL MEDICINE

## 2024-05-03 PROCEDURE — 94761 N-INVAS EAR/PLS OXIMETRY MLT: CPT

## 2024-05-03 PROCEDURE — 99232 SBSQ HOSP IP/OBS MODERATE 35: CPT | Performed by: NURSE PRACTITIONER

## 2024-05-03 PROCEDURE — 94669 MECHANICAL CHEST WALL OSCILL: CPT

## 2024-05-03 PROCEDURE — 6360000002 HC RX W HCPCS: Performed by: FAMILY MEDICINE

## 2024-05-03 PROCEDURE — 71045 X-RAY EXAM CHEST 1 VIEW: CPT

## 2024-05-03 RX ORDER — PROPOFOL 10 MG/ML
5-50 INJECTION, EMULSION INTRAVENOUS CONTINUOUS
Status: DISCONTINUED | OUTPATIENT
Start: 2024-05-03 | End: 2024-05-10

## 2024-05-03 RX ADMIN — HEPARIN SODIUM 1900 UNITS: 1000 INJECTION INTRAVENOUS; SUBCUTANEOUS at 06:11

## 2024-05-03 RX ADMIN — METOPROLOL TARTRATE 25 MG: 25 TABLET, FILM COATED ORAL at 08:19

## 2024-05-03 RX ADMIN — ASPIRIN 81 MG: 81 TABLET, COATED ORAL at 08:16

## 2024-05-03 RX ADMIN — ACETYLCYSTEINE 400 MG: 200 SOLUTION ORAL; RESPIRATORY (INHALATION) at 07:58

## 2024-05-03 RX ADMIN — MIDODRINE HYDROCHLORIDE 5 MG: 5 TABLET ORAL at 10:48

## 2024-05-03 RX ADMIN — MORPHINE SULFATE 1 MG: 2 INJECTION, SOLUTION INTRAMUSCULAR; INTRAVENOUS at 20:15

## 2024-05-03 RX ADMIN — FUROSEMIDE 40 MG: 40 TABLET ORAL at 08:16

## 2024-05-03 RX ADMIN — EMPAGLIFLOZIN 10 MG: 10 TABLET, FILM COATED ORAL at 08:17

## 2024-05-03 RX ADMIN — CYANOCOBALAMIN TAB 1000 MCG 1000 MCG: 1000 TAB at 08:16

## 2024-05-03 RX ADMIN — CHOLECALCIFEROL TAB 125 MCG (5000 UNIT) 5000 UNITS: 125 TAB at 08:16

## 2024-05-03 RX ADMIN — LEVALBUTEROL 1.25 MG: 1.25 SOLUTION, CONCENTRATE RESPIRATORY (INHALATION) at 15:06

## 2024-05-03 RX ADMIN — ATORVASTATIN CALCIUM 40 MG: 40 TABLET, FILM COATED ORAL at 22:59

## 2024-05-03 RX ADMIN — DEXMEDETOMIDINE 1 MCG/KG/HR: 100 INJECTION, SOLUTION INTRAVENOUS at 22:23

## 2024-05-03 RX ADMIN — PHENYLEPHRINE HYDROCHLORIDE 30 MCG/MIN: 10 INJECTION INTRAVENOUS at 22:24

## 2024-05-03 RX ADMIN — ISODIUM CHLORIDE 3 ML: 0.03 SOLUTION RESPIRATORY (INHALATION) at 15:06

## 2024-05-03 RX ADMIN — LEVALBUTEROL 1.25 MG: 1.25 SOLUTION, CONCENTRATE RESPIRATORY (INHALATION) at 19:39

## 2024-05-03 RX ADMIN — DEXMEDETOMIDINE 1 MCG/KG/HR: 100 INJECTION, SOLUTION INTRAVENOUS at 12:06

## 2024-05-03 RX ADMIN — HEPARIN SODIUM 14 UNITS/KG/HR: 10000 INJECTION, SOLUTION INTRAVENOUS at 22:26

## 2024-05-03 RX ADMIN — PHENYLEPHRINE HYDROCHLORIDE 50 MCG/MIN: 10 INJECTION INTRAVENOUS at 07:30

## 2024-05-03 RX ADMIN — ACETYLCYSTEINE 400 MG: 200 SOLUTION ORAL; RESPIRATORY (INHALATION) at 19:39

## 2024-05-03 RX ADMIN — GUAIFENESIN 600 MG: 600 TABLET ORAL at 22:59

## 2024-05-03 RX ADMIN — PROPOFOL 10 MCG/KG/MIN: 10 INJECTION, EMULSION INTRAVENOUS at 23:40

## 2024-05-03 RX ADMIN — MIRTAZAPINE 30 MG: 15 TABLET, ORALLY DISINTEGRATING ORAL at 23:00

## 2024-05-03 RX ADMIN — DEXMEDETOMIDINE 1 MCG/KG/HR: 100 INJECTION, SOLUTION INTRAVENOUS at 18:10

## 2024-05-03 RX ADMIN — DRONABINOL 2.5 MG: 2.5 CAPSULE ORAL at 08:17

## 2024-05-03 RX ADMIN — DIGOXIN 125 MCG: 125 TABLET ORAL at 08:16

## 2024-05-03 RX ADMIN — METOCLOPRAMIDE HYDROCHLORIDE 5 MG: 5 INJECTION INTRAMUSCULAR; INTRAVENOUS at 22:57

## 2024-05-03 RX ADMIN — GUAIFENESIN 600 MG: 600 TABLET ORAL at 08:19

## 2024-05-03 RX ADMIN — SODIUM CHLORIDE, PRESERVATIVE FREE 10 ML: 5 INJECTION INTRAVENOUS at 23:01

## 2024-05-03 RX ADMIN — Medication 3 MG: at 23:04

## 2024-05-03 RX ADMIN — DEXMEDETOMIDINE 1 MCG/KG/HR: 100 INJECTION, SOLUTION INTRAVENOUS at 08:06

## 2024-05-03 RX ADMIN — MIDODRINE HYDROCHLORIDE 5 MG: 5 TABLET ORAL at 08:19

## 2024-05-03 RX ADMIN — LEVALBUTEROL 1.25 MG: 1.25 SOLUTION, CONCENTRATE RESPIRATORY (INHALATION) at 07:57

## 2024-05-03 RX ADMIN — FERROUS SULFATE TAB EC 325 MG (65 MG FE EQUIVALENT) 325 MG: 325 (65 FE) TABLET DELAYED RESPONSE at 08:16

## 2024-05-03 RX ADMIN — TAMSULOSIN HYDROCHLORIDE 0.4 MG: 0.4 CAPSULE ORAL at 08:16

## 2024-05-03 RX ADMIN — SODIUM CHLORIDE: 9 INJECTION, SOLUTION INTRAVENOUS at 22:23

## 2024-05-03 RX ADMIN — DEXMEDETOMIDINE 0.8 MCG/KG/HR: 100 INJECTION, SOLUTION INTRAVENOUS at 05:11

## 2024-05-03 RX ADMIN — OXYCODONE AND ACETAMINOPHEN 1 TABLET: 5; 325 TABLET ORAL at 23:04

## 2024-05-03 ASSESSMENT — PULMONARY FUNCTION TESTS
PIF_VALUE: 21
PIF_VALUE: 22
PIF_VALUE: 22
PIF_VALUE: 20
PIF_VALUE: 22
PIF_VALUE: 22
PIF_VALUE: 20
PIF_VALUE: 21
PIF_VALUE: 21
PIF_VALUE: 22
PIF_VALUE: 22
PIF_VALUE: 21
PIF_VALUE: 20
PIF_VALUE: 21
PIF_VALUE: 22
PIF_VALUE: 22
PIF_VALUE: 21
PIF_VALUE: 21
PIF_VALUE: 20
PIF_VALUE: 21
PIF_VALUE: 23
PIF_VALUE: 22
PIF_VALUE: 21
PIF_VALUE: 20
PIF_VALUE: 22
PIF_VALUE: 21
PIF_VALUE: 20
PIF_VALUE: 21
PIF_VALUE: 22
PIF_VALUE: 21
PIF_VALUE: 20
PIF_VALUE: 20
PIF_VALUE: 16
PIF_VALUE: 22
PIF_VALUE: 21

## 2024-05-03 ASSESSMENT — PAIN SCALES - GENERAL: PAINLEVEL_OUTOF10: 0

## 2024-05-03 ASSESSMENT — PAIN DESCRIPTION - LOCATION: LOCATION: GENERALIZED

## 2024-05-03 NOTE — PROGRESS NOTES
VASCULAR SURGERY  PROGRESS NOTE      5/3/2024 1:49 PM  Subjective:   Admit Date: 4/3/2024  PCP: Lucila Machado APRN - CNP    Chief Complaint   Patient presents with    Leg Pain     Left leg, open wounds and swollen       Interval History: Patient remains intubated and sedated.  Plans for bronchoscopy on biopsy on Monday noted.    Diet: Diet NPO Exceptions are: Sips of Water with Meds    Medications:   Scheduled Meds:   midazolam  2 mg IntraVENous Once    acetylcysteine  400 mg Inhalation BID RT    digoxin  125 mcg Oral Daily    sodium chloride nebulizer  3 mL Nebulization TID RT    metoclopramide  5 mg IntraVENous 4x Daily AC & HS    droNABinol  2.5 mg Oral BID WC    levalbuterol  1.25 mg Nebulization TID    mirtazapine  30 mg Oral Nightly    midodrine  5 mg Oral TID WC    furosemide  40 mg Oral Daily    sodium chloride  80 mL IntraVENous Once    pantoprazole  40 mg Oral BID AC    empagliflozin  10 mg Oral Daily    sodium chloride  80 mL IntraVENous Once    metoprolol tartrate  25 mg Oral BID    aspirin  81 mg Oral Daily    sodium chloride  100 mL IntraVENous Once    guaiFENesin  600 mg Oral BID    ferrous sulfate  325 mg Oral Daily with breakfast    atorvastatin  40 mg Oral Nightly    vitamin D3  5,000 Units Oral Daily    vitamin B-12  1,000 mcg Oral Daily    tamsulosin  0.4 mg Oral Daily    sodium chloride flush  5-40 mL IntraVENous 2 times per day     Continuous Infusions:   phenylephrine (HERMILA-SYNEPHRINE) 50 mg in sodium chloride 0.9 % 250 mL infusion 30 mcg/min (05/03/24 1203)    dexmedeTOMIDine (PRECEDEX) 400 mcg in sodium chloride 0.9 % 100 mL infusion 1 mcg/kg/hr (05/03/24 1206)    heparin (PORCINE) Infusion 14 Units/kg/hr (05/03/24 0611)    sodium chloride      sodium chloride      sodium chloride Stopped (04/30/24 1829)    dextrose           Labs:   CBC:   Recent Labs     05/01/24  0324 05/02/24  1439 05/03/24  0423   WBC 8.4 7.7 9.3   HGB 8.7* 8.4* 9.3*    301 340       BMP:    No results

## 2024-05-03 NOTE — PROGRESS NOTES
End Of Shift Note  [facility]  Summary of shift: Remains intubated on vent, stable on precedex and hermila gtt. Alert at times, able to follow commands, lots of secretions. Restraints in place. Biopsy's to be done next week.    Vitals:    Vitals:    05/03/24 0332 05/03/24 0345 05/03/24 0358 05/03/24 0400   BP:  109/63  121/65   Pulse: 85 83 80 84   Resp: 17 16 16 17   Temp:    97.5 °F (36.4 °C)   TempSrc:    Temporal   SpO2: 97% 97% 97% 97%   Weight:       Height:            I&O:   Intake/Output Summary (Last 24 hours) at 5/3/2024 0554  Last data filed at 5/3/2024 0427  Gross per 24 hour   Intake 726.98 ml   Output --   Net 726.98 ml       Resp Status: intubated on vent    Ventilator Settings:  Vent Mode: AC/PRVC Resp Rate (Set): 16 bpm/Vt (Set, mL): 500 mL/ /FiO2 : 30 %    Critical Care IV infusions:   phenylephrine (HERMILA-SYNEPHRINE) 50 mg in sodium chloride 0.9 % 250 mL infusion 50 mcg/min (05/03/24 0427)    dexmedeTOMIDine (PRECEDEX) 400 mcg in sodium chloride 0.9 % 100 mL infusion 0.8 mcg/kg/hr (05/03/24 0511)    heparin (PORCINE) Infusion 12 Units/kg/hr (05/03/24 0427)    sodium chloride      sodium chloride      sodium chloride Stopped (04/30/24 1829)    dextrose          LDA:   Peripheral IV 04/24/24 Distal;Left Forearm (Active)   Number of days: 8       Peripheral IV 04/25/24 Right;Anterior Cephalic (Active)   Number of days: 7       Biliary Tube 04/19/24 T-tube 8 fr RUQ (Active)   Number of days: 13       NG/OG/NJ/NE Tube Nasogastric 14 fr Left nostril (Active)   Number of days: 0       Urinary Catheter 05/02/24 Dotson (Active)   Number of days: 0       ETT  (Active)   Number of days: 0       Wound 04/04/24 Foot Left;Dorsal dry, scabbed (Active)   Number of days: 28       Wound Foot Left;Lateral (Active)   Number of days:

## 2024-05-03 NOTE — PROGRESS NOTES
Ry Traylor MD   Urology Progress Note            Subjective: Follow-up urinary retention enlarged prostate    Patient Vitals for the past 24 hrs:   BP Temp Temp src Pulse Resp SpO2 Weight   05/03/24 1504 -- -- -- 73 16 98 % --   05/03/24 1400 (!) 121/56 -- -- 66 18 96 % --   05/03/24 1345 130/60 -- -- 66 18 95 % --   05/03/24 1330 (!) 118/56 -- -- 74 22 95 % --   05/03/24 1315 (!) 126/59 -- -- 67 18 97 % --   05/03/24 1300 (!) 124/57 -- -- 66 18 97 % --   05/03/24 1245 131/61 -- -- 66 18 97 % --   05/03/24 1230 130/60 -- -- 66 18 98 % --   05/03/24 1215 (!) 140/58 -- -- 66 18 98 % --   05/03/24 1208 (!) 112/56 -- -- 71 22 98 % --   05/03/24 1205 (!) 76/50 -- -- 69 18 96 % --   05/03/24 1200 (!) 84/46 -- -- 77 24 97 % --   05/03/24 1149 (!) 112/58 97.5 °F (36.4 °C) Temporal 66 15 97 % --   05/03/24 1145 (!) 112/58 -- -- 66 18 97 % --   05/03/24 1130 (!) 116/58 -- -- 67 17 97 % --   05/03/24 1115 (!) 122/59 -- -- 64 19 100 % --   05/03/24 1109 -- -- -- 67 17 97 % --   05/03/24 1100 (!) 98/56 -- -- 77 17 96 % --   05/03/24 1045 136/63 -- -- 78 16 96 % --   05/03/24 1030 (!) 140/58 -- -- 66 17 97 % --   05/03/24 1015 132/60 -- -- 66 16 98 % --   05/03/24 1000 119/61 -- -- 71 17 97 % --   05/03/24 0945 (!) 125/59 -- -- 66 17 98 % --   05/03/24 0930 132/65 -- -- 76 16 98 % --   05/03/24 0915 (!) 127/58 -- -- 66 17 98 % --   05/03/24 0900 134/65 -- -- 79 16 98 % --   05/03/24 0845 123/62 -- -- 81 16 98 % --   05/03/24 0830 116/63 -- -- 72 18 97 % --   05/03/24 0815 126/65 -- -- 83 23 98 % --   05/03/24 0801 -- -- -- 81 19 97 % --   05/03/24 0800 130/64 97.3 °F (36.3 °C) Temporal 75 17 97 % --   05/03/24 0750 -- -- -- 70 18 97 % --   05/03/24 0706 -- 97.3 °F (36.3 °C) Temporal -- -- -- --   05/03/24 0700 -- -- -- 82 18 97 % --   05/03/24 0600 124/65 -- -- 76 17 97 % --   05/03/24 0545 122/61 -- -- 78 16 97 % --   05/03/24 0530 118/62 -- -- 82 16 97 % --   05/03/24 0515 118/65 -- -- 81 18 97  % --   05/03/24 0500 122/64 -- -- 81 17 97 % --   05/03/24 0400 121/65 97.5 °F (36.4 °C) Temporal 84 17 97 % --   05/03/24 0358 -- -- -- 80 16 97 % --   05/03/24 0345 109/63 -- -- 83 16 97 % --   05/03/24 0332 -- -- -- 85 17 97 % --   05/03/24 0330 116/62 -- -- 83 17 97 % --   05/03/24 0315 118/67 -- -- 85 17 97 % --   05/03/24 0300 114/61 -- -- 82 17 97 % --   05/03/24 0245 112/65 -- -- 84 18 97 % --   05/03/24 0230 111/62 -- -- 85 17 97 % --   05/03/24 0215 115/63 -- -- 85 17 96 % --   05/03/24 0200 112/63 -- -- 85 16 96 % --   05/03/24 0145 96/65 -- -- 85 17 96 % --   05/03/24 0115 (!) 89/56 -- -- 87 18 98 % --   05/03/24 0100 96/60 -- -- 87 18 99 % --   05/03/24 0045 (!) 94/59 -- -- 87 17 98 % --   05/03/24 0030 (!) 96/58 -- -- 85 18 99 % --   05/03/24 0015 (!) 98/57 -- -- 80 17 98 % --   05/03/24 0000 91/63 96.8 °F (36 °C) Temporal 87 17 98 % 63.1 kg (139 lb 3.2 oz)   05/02/24 2345 (!) 88/58 -- -- 88 16 98 % --   05/02/24 2343 -- -- -- 96 17 98 % --   05/02/24 2330 (!) 90/59 -- -- 82 17 98 % --   05/02/24 2315 (!) 89/55 -- -- 86 19 98 % --   05/02/24 2300 (!) 94/57 -- -- 93 19 98 % --   05/02/24 2245 (!) 88/52 -- -- 84 20 98 % --   05/02/24 2230 (!) 88/58 -- -- 93 19 98 % --   05/02/24 2215 (!) 103/57 -- -- 89 17 98 % --   05/02/24 2210 112/60 -- -- 85 20 98 % --   05/02/24 2200 (!) 71/47 -- -- 83 20 96 % --   05/02/24 2158 -- -- -- -- 19 -- --   05/02/24 2145 103/65 -- -- 90 19 96 % --   05/02/24 2140 102/65 -- -- 82 20 97 % --   05/02/24 2130 (!) 76/53 -- -- 93 22 96 % --   05/02/24 2127 104/61 -- -- 87 -- -- --   05/02/24 2115 104/61 -- -- 81 17 97 % --   05/02/24 2100 (!) 82/51 -- -- 81 19 96 % --   05/02/24 2045 (!) 88/61 -- -- 86 18 96 % --   05/02/24 2030 (!) 77/51 -- -- 84 23 97 % --   05/02/24 2021 -- -- -- 88 18 98 % --   05/02/24 2019 -- -- -- 80 18 98 % --   05/02/24 2015 (!) 96/59 -- -- 83 16 98 % --   05/02/24 2000 104/63 97.5 °F (36.4 °C) Temporal 89 16 98 % --   05/02/24 1945 (!) 83/55 -- -- 86

## 2024-05-03 NOTE — PROGRESS NOTES
SPIRITUAL CARE DEPARTMENT [facility]  PROGRESS NOTE    Room # 2027/2027-01   Name: Jorge Luis Banda            Denominational: Anabaptism     Reason for visit:  Rounding    I visited the patient.    Admit Date & Time: 4/3/2024  3:27 PM    Assessment:  Jorge Luis Banda is a 78 y.o. male in the hospital because Melena. Upon entering the room Pt. As intubated and sedated.  As Pt. Is Anabaptism, Writer prayed over Pt. With 's Archieyer, Wilbert Boyd, and Vanessa Vera.        Intervention:  I introduced myself and my title as   to Staff to Inquire about Pt. Condition.    Given Pt. Conditions of intubation and sedation, Writer could only pray over Pt.    Outcome:  Prayer Only     05/02/24 2001   Encounter Summary   Encounter Overview/Reason Spiritual/Emotional Needs   Service Provided For Patient   Referral/Consult From [facility]   Support System Unknown   Last Encounter  05/02/24   Complexity of Encounter Low   Begin Time 1955   End Time  2003   Total Time Calculated 8 min   Spiritual/Emotional needs   Type Spiritual Support   Assessment/Intervention/Outcome   Assessment Unable to assess;Other (Comment)  (Pt. Intubated and Sedated)   Intervention Prayer (assurance of)/[place]   Outcome Did not respond       Plan:  Chaplains will remain available to offer spiritual and emotional support as needed.    Electronically signed by Chaplain Samantha, on 5/2/2024 at 8:06 PM.  Spiritual Care Department  [facility]

## 2024-05-03 NOTE — PROGRESS NOTES
End Of Shift Note  [facility]  Summary of shift: Patient resting in bed. New orders were received for tube feed for patient. Dr. Fierro would like to give patient another day or two on the vent to rest. Biopsies were not able to be performed yesterday due to patient on eliquis. Dr. Mcneil will assess patient Monday to see if biopsy is able to be done. Urology is on board for maldonado catheter and wants it to remain in place at this time. Orders were received for PICC line due to patient only having peripheral IV's. House supervisor notified.     Vitals:    Vitals:    05/03/24 1800 05/03/24 1810 05/03/24 1815 05/03/24 1830   BP: (!) 125/56 (!) 102/53 (!) 102/53 (!) 119/54   Pulse: 71 75 69 73   Resp: 18 18 16 16   Temp:       TempSrc:       SpO2:  96% 97% 96%   Weight:       Height:            I&O:   Intake/Output Summary (Last 24 hours) at 5/3/2024 1841  Last data filed at 5/3/2024 1806  Gross per 24 hour   Intake 726.98 ml   Output 1300 ml   Net -573.02 ml       Resp Status: vent    Ventilator Settings:  Vent Mode: AC/PRVC Resp Rate (Set): 16 bpm/Vt (Set, mL): 500 mL/ /FiO2 : 30 %    Critical Care IV infusions:   phenylephrine (HERMILA-SYNEPHRINE) 50 mg in sodium chloride 0.9 % 250 mL infusion 30 mcg/min (05/03/24 1203)    dexmedeTOMIDine (PRECEDEX) 400 mcg in sodium chloride 0.9 % 100 mL infusion 1 mcg/kg/hr (05/03/24 1810)    heparin (PORCINE) Infusion 14 Units/kg/hr (05/03/24 0611)    sodium chloride      sodium chloride      sodium chloride Stopped (04/30/24 1829)    dextrose          LDA:   Peripheral IV 04/24/24 Distal;Left Forearm (Active)   Number of days: 9       Peripheral IV 04/25/24 Right;Anterior Cephalic (Active)   Number of days: 8       Biliary Tube 04/19/24 T-tube 8 fr RUQ (Active)   Number of days: 14       NG/OG/NJ/NE Tube Nasogastric 14 fr Left nostril (Active)   Number of days: 1       Urinary Catheter 05/02/24 Maldonado (Active)   Number of days: 0       ETT  (Active)   Number of days: 1       Wound  04/04/24 Foot Left;Dorsal dry, scabbed (Active)   Number of days: 29       Wound Foot Left;Lateral (Active)   Number of days:

## 2024-05-03 NOTE — PROGRESS NOTES
Scale:  ___100% Full ambulation; normal activity/No disease; full self-care; normal intake; LOC full  ___90% full ambulation; normal activity/some disease; full self-care; normal intake; LOC full  ___80% ambulation full; normal activity with effort/some disease; full self-care; normal/reduced intake; LOC full  ___70% ambulation reduced; cannot do normal work/some disease; full self-care; normal/reduce intake; LOC full  ___60%  Ambulation reduced; Significant disease; Can't do hobbies/housework; intake normal or reduced; occasional assist; LOC full/confusion  ___50%  Mainly sit/lie; Extensive disease; Can't do any work; Considerable assist; intake normal or reduced; LOC full/confusion  ___40%  Mainly in bed; Extensive disease; Mainly assist; intake normal or reduced; LOC full/confusion   ___30%  Bed Bound; Extensive disease; Total care; intake reduced; LOC full/confusion  __X_20%  Bed Bound; Extensive disease; Total care; intake minimal; Drowsy/coma  ___10%  Bed Bound; Extensive disease; Total care; Mouth care only; Drowsy/coma  ___0       Death    Principle Problem/Diagnosis:  Principal Problem:    Melena  Active Problems:    History of arterial bypass of lower extremity    S/P AKA (above knee amputation) unilateral, right (HCC)    Primary hypertension    Peripheral arterial disease (HCC)    Neuropathy    Malignant neoplasm of lung (HCC)    Intermittent claudication of left lower extremity due to atherosclerosis (HCC)    Embolism and thrombosis of artery (HCC)    Left leg cellulitis    Iron deficiency anemia due to chronic blood loss    Hx of cancer of lung    Adrenal mass (HCC)    Claudication (HCC)    Pleural effusion, bilateral    Longstanding persistent atrial fibrillation (HCC)    Acute ischemic right internal carotid artery (ICA) stroke (HCC)    Internal carotid artery stenosis, bilateral    Anemia    Gastrointestinal hemorrhage    NSTEMI (non-ST elevated myocardial infarction) ([facility])    Hypotension    Atrial  fibrillation with rapid ventricular response (HCC)    Unstable angina (HCC)    Right upper quadrant abdominal pain    ACP (advance care planning)    Palliative care encounter    Acute cholecystitis    Lung mass    Liver lesion    Severe malnutrition (HCC)  Resolved Problems:    * No resolved hospital problems. *        Total time in talking with family, discussing with patient, chart review, and writing note: 30  Complexity: 2        Electronically signed by      LEXX May  Hospice/Palliative Care  [facility], [place], [place]  5/3/2024 9:11 AM  Palliative Care Office [phone]

## 2024-05-03 NOTE — PLAN OF CARE
Problem: Safety - Adult  Goal: Free from fall injury  Outcome: Progressing  Flowsheets (Taken 5/2/2024 2000)  Free From Fall Injury:   Instruct family/caregiver on patient safety   Based on caregiver fall risk screen, instruct family/caregiver to ask for assistance with transferring infant if caregiver noted to have fall risk factors     Problem: Discharge Planning  Goal: Discharge to home or other facility with appropriate resources  Outcome: Progressing  Flowsheets (Taken 5/2/2024 2000)  Discharge to home or other facility with appropriate resources:   Identify barriers to discharge with patient and caregiver   Arrange for needed discharge resources and transportation as appropriate   Refer to discharge planning if patient needs post-hospital services based on physician order or complex needs related to functional status, cognitive ability or social support system     Problem: Pain  Goal: Verbalizes/displays adequate comfort level or baseline comfort level  Outcome: Progressing     Problem: Skin/Tissue Integrity - Adult  Goal: Incisions, wounds, or drain sites healing without S/S of infection  Outcome: Progressing  Flowsheets (Taken 5/2/2024 2000)  Incisions, Wounds, or Drain Sites Healing Without Sign and Symptoms of Infection:   TWICE DAILY: Assess and document skin integrity   Implement wound care per orders     Problem: Infection - Adult  Goal: Absence of infection at discharge  Outcome: Progressing  Flowsheets (Taken 5/2/2024 2000)  Absence of infection at discharge:   Assess and monitor for signs and symptoms of infection   Monitor lab/diagnostic results   Monitor all insertion sites i.e., indwelling lines, tubes and drains   Administer medications as ordered     Problem: Hematologic - Adult  Goal: Maintains hematologic stability  Outcome: Progressing  Flowsheets (Taken 5/2/2024 2000)  Maintains hematologic stability: Assess for signs and symptoms of bleeding or hemorrhage     Problem: Respiratory -

## 2024-05-03 NOTE — PROGRESS NOTES
Comprehensive Nutrition Assessment    Type and Reason for Visit:  Consult (Tube feeding order and management)    Nutrition Recommendations/Plan:   NPO diet  Osmolite 1.2 Ronal goal rate 55 mL/hr (1320 mL total volume). Water flush 30 mL every 4 hours  Monitor tube feeding rate, tolerance, GI function, vent status and labs     Malnutrition Assessment:  Malnutrition Status:  Severe malnutrition (04/29/24 1844)        Nutrition Assessment:    Patient was intubated for bronch yesterday but was not extubated (5/2). Current plan is for patient to have bronch with biopsy on Monday (5/6). Patient is expected to be extubated after the procedure. Recommend Osmolite 1.2 Ronal goal rate 55 mL/hr (1320 mL total volume). Monitor tube feeding rate, tolerance, GI function, vent status and labs.    Nutrition Related Findings:    Edema: +1 LUE, +1 LLE, +1 sacral edema. Active bowel sounds. Transfused 1 unit PRBC (4/28). Biliary tube Wound Type: Venous Stasis (left dorsal ft. wound)       Current Nutrition Intake & Therapies:    Average Meal Intake: NPO  Average Supplements Intake: NPO  Diet NPO Exceptions are: Sips of Water with Meds  ADULT TUBE FEEDING; Nasogastric; Standard without Fiber; Continuous; 15; Yes; 15; Q 4 hours; 55; 30; Q 4 hours  Current Tube Feeding (TF) Orders:  Feeding Route: Nasoenteric  Formula: Standard without Fiber  Schedule: Continuous  Feeding Regimen: Osmolite 1.2 Ronal goal rate 55 mL/hr (1320 mL total volume)  Additives/Modulars: None  Water Flushes: 30 mL flush every 4 hours (180 mL total volume)  Current TF & Flush Orders Provides: TF has not started yet  Goal TF & Flush Orders Provides: 1584 kcal, 74 gm of protein and 1262 mL free water    Anthropometric Measures:  Height: 182.9 cm (6')  Ideal Body Weight (IBW): 178 lbs (81 kg)       Current Body Weight: 63.1 kg (139 lb 1.8 oz), 78.2 % IBW. Weight Source: Bed Scale  Current BMI (kg/m2): 18.9        Weight Adjustment For: Amputation  Total Adjusted Percentage

## 2024-05-03 NOTE — PROGRESS NOTES
[facility]  Progress Note      NAME:  Jorge Luis Banda  MEDICAL RECORD NUMBER:  [id number]  AGE: 78 y.o.   GENDER: male  : 1945  TODAY'S DATE:  5/3/2024      Met with patient today for planned follow-up. Wound to dorsal foot now with devitalized tissue absent with small amount slough to wound base noted. Wound with minimal improvement. Will discontinue betadine to dorsal foot wound. Continue alginate to lateral foot wound and cover dorsal and lateral wound with foam dressing, change every other day.      Measurements:  Wound 24 Foot Left;Dorsal dry, scabbed (Active)   Wound Image   24 1013   Wound Etiology Arterial 24 1013   Dressing Status New dressing applied 24 1013   Wound Cleansed Cleansed with saline 24 1013   Dressing/Treatment Foam 24 1013   Offloading for Diabetic Foot Ulcers Air cast boot 24 0800   Dressing Change Due 24 1013   Wound Length (cm) 2.4 cm 24 1013   Wound Width (cm) 2.1 cm 24 1013   Wound Depth (cm) 0.1 cm 24 1013   Wound Surface Area (cm^2) 5.04 cm^2 24 1013   Change in Wound Size % (l*w) 8.7 24 1013   Wound Volume (cm^3) 0.504 cm^3 24 1013   Wound Healing % 9 24 1013   Wound Assessment Slough 24 1013   Drainage Amount None (dry) 24 1013   Drainage Description Serosanguinous 24 0400   Odor None 24 1013   Manasa-wound Assessment Warm 24 1013   Margins Defined edges 24 1013   Number of days: 29       Wound Foot Left;Lateral (Active)   Wound Image   24 1013   Wound Etiology Venous 24 1013   Dressing Status New dressing applied 24 1013   Wound Cleansed Cleansed with saline 24 1013   Dressing/Treatment Alginate with Ag;Foam;Protective barrier 24 1013   Dressing Change Due 24 1013   Wound Length (cm) 1.7 cm 24 1013   Wound Width (cm) 1.3 cm 24 1013   Wound Depth (cm) 0.1 cm

## 2024-05-03 NOTE — PROGRESS NOTES
Pulmonary Critical Care Progress Note       Patient seen for the follow up of bilateral pleural effusions, hemoptysis     Subjective:  Patient is intubated sedated on the ventilator.  He is on Precedex at 1.  He developed hypotension following intubation start him on Mat-Synephrine now at 30 mcg/min.  He is awake follows commands..  He has fair urine output.  He has bloody secretion on suctioning per RT.    Examination:  Vitals: BP (!) 121/56   Pulse 73   Temp 97.5 °F (36.4 °C) (Temporal)   Resp 16   Ht 1.829 m (6')   Wt 63.1 kg (139 lb 3.2 oz)   SpO2 98%   BMI 18.88 kg/m²   General appearance: In no acute distress, alert and cooperative with exam cachectic  Neck: No JVD  Lungs: Decreased breath sound no crackles or wheeze  Heart: irregular rate and rhythm, S1, S2 normal, no gallop  Abdomen: Soft, non tender, + BS  Cholecystotomy tube in place bloody output  Extremities: no cyanosis or clubbing. No significant edema, left upper and lower extremity weakness right above-knee amputation left foot dorsal ulcer    LABs:  CBC:   Recent Labs     05/01/24  0324 05/02/24  1439 05/03/24  0423   WBC 8.4 7.7 9.3   HGB 8.7* 8.4* 9.3*   HCT 28.5* 27.2* 31.8*    301 340       BMP:   No results for input(s): \"NA\", \"K\", \"CO2\", \"BUN\", \"CREATININE\", \"LABGLOM\", \"GLUCOSE\" in the last 72 hours.    Recent Labs     05/02/24  1439 05/02/24  2242 05/03/24  0423 05/03/24  1147   APTT 35.7* 68.3* 48.9* 65.7*        Latest Reference Range & Units 04/11/24 15:56 04/13/24 23:26 04/16/24 04:21   Procalcitonin 0.00 - 0.09 ng/mL 1.95 (H) 1.02 (H) 0.72 (H)   (H): Data is abnormally high   Latest Reference Range & Units 04/15/24 04:34   Pro-BNP <300 pg/mL 16,301 (H)   (H): Data is abnormally high    LEFT MAIN STEM WASHINGS:           NEGATIVE FOR MALIGNANCY.        THORACENTESIS, RIGHT FLUID:           NEGATIVE FOR MALIGNANCY.   Radiology:  Chest x-ray 5/3  Scattered left pulmonary infiltrates with trace bibasilar effusions.     Support

## 2024-05-03 NOTE — CARE COORDINATION
Discharge planning    Patient still intubated. FIO2 30%. Plan is bronch with biopsies on Monday. Then probable extubation after. [place] following.

## 2024-05-03 NOTE — PLAN OF CARE
Problem: Respiratory - Adult  Goal: Able to breathe comfortably  Description: Able to breathe comfortably  5/3/2024 1936 by Giselle Salas RCP  Outcome: Progressing     Problem: Respiratory - Adult  Goal: Clear lung sounds  5/3/2024 1936 by Giselle Salas RCP  Outcome: Progressing     Problem: Respiratory - Adult  Goal: Adequate oxygenation  Description: Adequate oxygenation  5/3/2024 1936 by Giselle Salas RCP  Outcome: Progressing     Problem: Respiratory - Adult  Goal: Ability to maintain normal respiratory secretions will improve  Description: Ability to maintain normal respiratory secretions will improve  5/3/2024 1936 by Giselle Salas RCP  Outcome: Progressing

## 2024-05-03 NOTE — PROGRESS NOTES
[facility]  Office: [phone]  Chinmay Ramos DO, Jose Atkinson DO, Guevara Ramirez DO, Pranay Cooper, DO, Sumeet Eller MD, Karen Carvajal MD, Sony Crawford MD, Poonam Platt MD,  Stephan San MD, Marcello Daniels MD, Theodore Pascual MD,  Juve Mahmood DO, Matias Mckeon MD, Hero Tavarez MD, Valentin Ramos DO, Jazzy Spence MD,  Trino Handley DO, Flavia Leonardo MD, Vanessa Soriano MD, Pricilla Akers MD, Casimiro Knight MD,  Zeke Maciel MD, Guera Cervantes MD, Kianna Martin MD, Mirlande Worrell MD, Ney Garcia MD, Savannah Dominguez MD, Ed Emerson DO, Jaylen West DO, Comfort Carcamo MD,  Siva Ashley MD, Shirley Waterhouse, CNP,  Carolyn Pelaez CNP, Scotty Ge, CNP,  Wandy Trivedi, DNP, Malissa Gaston, CNP, Lana Penaloza, CNP, Susan Haro, CNP, Sherrell Mendoza, CNP, Suzy Morales, PA-C, Dalila Lange PA-C, Sada Matt, CNP, Crystal Womack, CNP, Sheng Reddy, CNP, Arielle Guy, CNP, Linsey Perez, CNP, Anju Cho, CNS, Hoa Don[place], CNP, Cassidy Ferrer CNP, Tracy Schwab, CNP       [facility]      Daily Progress Note     Admit Date: 4/3/2024  Bed/Room No.  2027/2027-01  Admitting Physician : Sony Crawford MD  Code Status :Full Code  Hospital Day:  LOS: 30 days   Chief Complaint:     Chief Complaint   Patient presents with    Leg Pain     Left leg, open wounds and swollen       Principal Problem:    Melena  Active Problems:    History of arterial bypass of lower extremity    S/P AKA (above knee amputation) unilateral, right (HCC)    Primary hypertension    Peripheral arterial disease (HCC)    Neuropathy    Malignant neoplasm of lung (HCC)    Intermittent claudication of left lower extremity due to atherosclerosis (HCC)    Embolism and thrombosis of artery (HCC)    Left leg cellulitis    Iron deficiency anemia due to chronic blood loss    Hx of cancer of lung    Adrenal mass (HCC)    Claudication (HCC)    Pleural effusion, bilateral

## 2024-05-03 NOTE — PROGRESS NOTES
SPIRITUAL CARE DEPARTMENT [facility]  PROGRESS NOTE    Room # 2027/2027-01   Name: Jorge Luis Banda        Reason for visit:  [facility]    I visited the patient.    Admit Date & Time: 4/3/2024  3:27 PM    Assessment:  Jorge Luis Banda is a 78 y.o. male in the hospital because of Melena. Upon entering the room the patient remains intubated (no family members present).      Intervention:  Writer provided a silent prayer of continued healing, comfort, rest, and inner strength. Write also left a prayer card as additional support.     Plan:  Chaplains will remain available to offer spiritual and emotional support as needed.    Electronically signed by Evert Pyle Jr, on 5/3/2024 at 8:37 AM.  Spiritual Care Department  [facility]     05/03/24 0834   Encounter Summary   Service Provided For Patient   Referral/Consult From Palliative Care   Last Encounter  05/03/24   Complexity of Encounter Low   Begin Time 0800   End Time  0805   Total Time Calculated 5 min   Spiritual/Emotional needs   Type Spiritual Support   Palliative Care   Type Palliative Care, Follow-up   Assessment/Intervention/Outcome   Assessment Unable to assess   Intervention Prayer (assurance of)/[place];Read/Provided Scripture;Sustaining Presence/Ministry of presence   Outcome Did not respond   Plan and Referrals   Plan/Referrals Continue to visit, (comment);Continue Support (comment)

## 2024-05-04 ENCOUNTER — APPOINTMENT (OUTPATIENT)
Dept: GENERAL RADIOLOGY | Age: 79
DRG: 377 | End: 2024-05-04
Payer: MEDICARE

## 2024-05-04 PROBLEM — N17.9 AKI (ACUTE KIDNEY INJURY) (HCC): Status: ACTIVE | Noted: 2024-05-04

## 2024-05-04 LAB
ANION GAP SERPL CALCULATED.3IONS-SCNC: 13 MMOL/L (ref 9–17)
BUN SERPL-MCNC: 20 MG/DL (ref 8–23)
BUN/CREAT SERPL: 15 (ref 9–20)
CALCIUM SERPL-MCNC: 8.7 MG/DL (ref 8.6–10.4)
CHLORIDE SERPL-SCNC: 101 MMOL/L (ref 98–107)
CO2 SERPL-SCNC: 23 MMOL/L (ref 20–31)
CREAT SERPL-MCNC: 1.3 MG/DL (ref 0.7–1.2)
ERYTHROCYTE [DISTWIDTH] IN BLOOD BY AUTOMATED COUNT: 18.9 % (ref 11.8–14.4)
GFR, ESTIMATED: 56 ML/MIN/1.73M2
GLUCOSE BLD-MCNC: 131 MG/DL (ref 75–110)
GLUCOSE BLD-MCNC: 142 MG/DL (ref 75–110)
GLUCOSE BLD-MCNC: 146 MG/DL (ref 75–110)
GLUCOSE BLD-MCNC: 90 MG/DL (ref 75–110)
GLUCOSE BLD-MCNC: 94 MG/DL (ref 75–110)
GLUCOSE SERPL-MCNC: 105 MG/DL (ref 70–99)
HCT VFR BLD AUTO: 25.6 % (ref 40.7–50.3)
HGB BLD-MCNC: 8 G/DL (ref 13–17)
MAGNESIUM SERPL-MCNC: 1.9 MG/DL (ref 1.6–2.6)
MCH RBC QN AUTO: 30.1 PG (ref 25.2–33.5)
MCHC RBC AUTO-ENTMCNC: 31.3 G/DL (ref 28.4–34.8)
MCV RBC AUTO: 96.2 FL (ref 82.6–102.9)
MICROORGANISM SPEC CULT: ABNORMAL
MICROORGANISM SPEC CULT: ABNORMAL
MICROORGANISM/AGENT SPEC: ABNORMAL
MICROORGANISM/AGENT SPEC: ABNORMAL
NRBC BLD-RTO: 0 PER 100 WBC
PARTIAL THROMBOPLASTIN TIME: 65.7 SEC (ref 23.9–33.8)
PLATELET # BLD AUTO: 250 K/UL (ref 138–453)
PMV BLD AUTO: 9.8 FL (ref 8.1–13.5)
POTASSIUM SERPL-SCNC: 3.7 MMOL/L (ref 3.7–5.3)
RBC # BLD AUTO: 2.66 M/UL (ref 4.21–5.77)
SODIUM SERPL-SCNC: 137 MMOL/L (ref 135–144)
SPECIMEN DESCRIPTION: ABNORMAL
WBC OTHER # BLD: 7.7 K/UL (ref 3.5–11.3)

## 2024-05-04 PROCEDURE — 2500000003 HC RX 250 WO HCPCS: Performed by: NURSE PRACTITIONER

## 2024-05-04 PROCEDURE — 94761 N-INVAS EAR/PLS OXIMETRY MLT: CPT

## 2024-05-04 PROCEDURE — 99232 SBSQ HOSP IP/OBS MODERATE 35: CPT | Performed by: FAMILY MEDICINE

## 2024-05-04 PROCEDURE — 6370000000 HC RX 637 (ALT 250 FOR IP): Performed by: INTERNAL MEDICINE

## 2024-05-04 PROCEDURE — 2580000003 HC RX 258: Performed by: INTERNAL MEDICINE

## 2024-05-04 PROCEDURE — 83735 ASSAY OF MAGNESIUM: CPT

## 2024-05-04 PROCEDURE — C9113 INJ PANTOPRAZOLE SODIUM, VIA: HCPCS | Performed by: FAMILY MEDICINE

## 2024-05-04 PROCEDURE — 99232 SBSQ HOSP IP/OBS MODERATE 35: CPT | Performed by: INTERNAL MEDICINE

## 2024-05-04 PROCEDURE — 94669 MECHANICAL CHEST WALL OSCILL: CPT

## 2024-05-04 PROCEDURE — 94640 AIRWAY INHALATION TREATMENT: CPT

## 2024-05-04 PROCEDURE — 80048 BASIC METABOLIC PNL TOTAL CA: CPT

## 2024-05-04 PROCEDURE — 2580000003 HC RX 258: Performed by: FAMILY MEDICINE

## 2024-05-04 PROCEDURE — 85027 COMPLETE CBC AUTOMATED: CPT

## 2024-05-04 PROCEDURE — 2700000000 HC OXYGEN THERAPY PER DAY

## 2024-05-04 PROCEDURE — 6370000000 HC RX 637 (ALT 250 FOR IP): Performed by: FAMILY MEDICINE

## 2024-05-04 PROCEDURE — 85730 THROMBOPLASTIN TIME PARTIAL: CPT

## 2024-05-04 PROCEDURE — 2000000000 HC ICU R&B

## 2024-05-04 PROCEDURE — 2500000003 HC RX 250 WO HCPCS: Performed by: INTERNAL MEDICINE

## 2024-05-04 PROCEDURE — A4216 STERILE WATER/SALINE, 10 ML: HCPCS | Performed by: FAMILY MEDICINE

## 2024-05-04 PROCEDURE — 6360000002 HC RX W HCPCS: Performed by: FAMILY MEDICINE

## 2024-05-04 PROCEDURE — 6360000002 HC RX W HCPCS: Performed by: INTERNAL MEDICINE

## 2024-05-04 PROCEDURE — 71045 X-RAY EXAM CHEST 1 VIEW: CPT

## 2024-05-04 PROCEDURE — 82947 ASSAY GLUCOSE BLOOD QUANT: CPT

## 2024-05-04 PROCEDURE — 94003 VENT MGMT INPAT SUBQ DAY: CPT

## 2024-05-04 RX ORDER — POLYETHYLENE GLYCOL 3350 17 G/17G
17 POWDER, FOR SOLUTION ORAL DAILY
Status: DISCONTINUED | OUTPATIENT
Start: 2024-05-04 | End: 2024-05-11 | Stop reason: HOSPADM

## 2024-05-04 RX ORDER — METOCLOPRAMIDE HYDROCHLORIDE 5 MG/ML
5 INJECTION INTRAMUSCULAR; INTRAVENOUS EVERY 12 HOURS
Status: DISCONTINUED | OUTPATIENT
Start: 2024-05-04 | End: 2024-05-09

## 2024-05-04 RX ORDER — SENNOSIDES A AND B 8.6 MG/1
1 TABLET, FILM COATED ORAL 2 TIMES DAILY
Status: DISCONTINUED | OUTPATIENT
Start: 2024-05-04 | End: 2024-05-11 | Stop reason: HOSPADM

## 2024-05-04 RX ORDER — TRAMADOL HYDROCHLORIDE 50 MG/1
50 TABLET ORAL EVERY 6 HOURS PRN
Status: DISCONTINUED | OUTPATIENT
Start: 2024-05-04 | End: 2024-05-04

## 2024-05-04 RX ORDER — GUAIFENESIN 200 MG/10ML
200 LIQUID ORAL EVERY 4 HOURS
Status: DISCONTINUED | OUTPATIENT
Start: 2024-05-04 | End: 2024-05-08

## 2024-05-04 RX ADMIN — SENNOSIDES 8.6 MG: 8.6 TABLET, FILM COATED ORAL at 11:28

## 2024-05-04 RX ADMIN — MIDODRINE HYDROCHLORIDE 5 MG: 5 TABLET ORAL at 14:18

## 2024-05-04 RX ADMIN — METOCLOPRAMIDE HYDROCHLORIDE 5 MG: 5 INJECTION INTRAMUSCULAR; INTRAVENOUS at 21:04

## 2024-05-04 RX ADMIN — MIRTAZAPINE 30 MG: 15 TABLET, ORALLY DISINTEGRATING ORAL at 21:04

## 2024-05-04 RX ADMIN — ASPIRIN 81 MG: 81 TABLET, COATED ORAL at 09:26

## 2024-05-04 RX ADMIN — OXYCODONE AND ACETAMINOPHEN 1 TABLET: 5; 325 TABLET ORAL at 17:05

## 2024-05-04 RX ADMIN — METOPROLOL TARTRATE 25 MG: 25 TABLET, FILM COATED ORAL at 09:25

## 2024-05-04 RX ADMIN — ACETYLCYSTEINE 400 MG: 200 SOLUTION ORAL; RESPIRATORY (INHALATION) at 19:22

## 2024-05-04 RX ADMIN — MIDODRINE HYDROCHLORIDE 5 MG: 5 TABLET ORAL at 09:26

## 2024-05-04 RX ADMIN — CYANOCOBALAMIN TAB 1000 MCG 1000 MCG: 1000 TAB at 09:26

## 2024-05-04 RX ADMIN — POLYETHYLENE GLYCOL 3350 17 G: 17 POWDER, FOR SOLUTION ORAL at 11:28

## 2024-05-04 RX ADMIN — SODIUM CHLORIDE, PRESERVATIVE FREE 10 ML: 5 INJECTION INTRAVENOUS at 09:30

## 2024-05-04 RX ADMIN — OXYCODONE AND ACETAMINOPHEN 1 TABLET: 5; 325 TABLET ORAL at 20:29

## 2024-05-04 RX ADMIN — SODIUM CHLORIDE, PRESERVATIVE FREE 10 ML: 5 INJECTION INTRAVENOUS at 20:29

## 2024-05-04 RX ADMIN — METOCLOPRAMIDE HYDROCHLORIDE 5 MG: 5 INJECTION INTRAMUSCULAR; INTRAVENOUS at 09:29

## 2024-05-04 RX ADMIN — PANTOPRAZOLE SODIUM 40 MG: 40 INJECTION, POWDER, FOR SOLUTION INTRAVENOUS at 11:28

## 2024-05-04 RX ADMIN — Medication 2000 MG: at 17:02

## 2024-05-04 RX ADMIN — FERROUS SULFATE TAB EC 325 MG (65 MG FE EQUIVALENT) 325 MG: 325 (65 FE) TABLET DELAYED RESPONSE at 09:26

## 2024-05-04 RX ADMIN — DEXMEDETOMIDINE 0.8 MCG/KG/HR: 100 INJECTION, SOLUTION INTRAVENOUS at 23:55

## 2024-05-04 RX ADMIN — FUROSEMIDE 40 MG: 40 TABLET ORAL at 09:26

## 2024-05-04 RX ADMIN — Medication 2000 MG: at 23:46

## 2024-05-04 RX ADMIN — EMPAGLIFLOZIN 10 MG: 10 TABLET, FILM COATED ORAL at 09:25

## 2024-05-04 RX ADMIN — PHENYLEPHRINE HYDROCHLORIDE 35 MCG/MIN: 10 INJECTION INTRAVENOUS at 19:42

## 2024-05-04 RX ADMIN — OXYCODONE AND ACETAMINOPHEN 1 TABLET: 5; 325 TABLET ORAL at 09:27

## 2024-05-04 RX ADMIN — ACETYLCYSTEINE 400 MG: 200 SOLUTION ORAL; RESPIRATORY (INHALATION) at 14:52

## 2024-05-04 RX ADMIN — PROPOFOL 5 MCG/KG/MIN: 10 INJECTION, EMULSION INTRAVENOUS at 19:38

## 2024-05-04 RX ADMIN — DEXMEDETOMIDINE 0.8 MCG/KG/HR: 100 INJECTION, SOLUTION INTRAVENOUS at 02:52

## 2024-05-04 RX ADMIN — DIGOXIN 125 MCG: 125 TABLET ORAL at 09:26

## 2024-05-04 RX ADMIN — LEVALBUTEROL 1.25 MG: 1.25 SOLUTION, CONCENTRATE RESPIRATORY (INHALATION) at 19:22

## 2024-05-04 RX ADMIN — ATORVASTATIN CALCIUM 40 MG: 40 TABLET, FILM COATED ORAL at 20:29

## 2024-05-04 RX ADMIN — SENNOSIDES 8.6 MG: 8.6 TABLET, FILM COATED ORAL at 20:29

## 2024-05-04 RX ADMIN — METOCLOPRAMIDE HYDROCHLORIDE 5 MG: 5 INJECTION INTRAMUSCULAR; INTRAVENOUS at 06:41

## 2024-05-04 RX ADMIN — CHOLECALCIFEROL TAB 125 MCG (5000 UNIT) 5000 UNITS: 125 TAB at 09:25

## 2024-05-04 RX ADMIN — LEVALBUTEROL 1.25 MG: 1.25 SOLUTION, CONCENTRATE RESPIRATORY (INHALATION) at 08:27

## 2024-05-04 RX ADMIN — LEVALBUTEROL 1.25 MG: 1.25 SOLUTION, CONCENTRATE RESPIRATORY (INHALATION) at 14:52

## 2024-05-04 RX ADMIN — GUAIFENESIN 200 MG: 200 SOLUTION ORAL at 23:52

## 2024-05-04 RX ADMIN — GUAIFENESIN 200 MG: 200 SOLUTION ORAL at 21:04

## 2024-05-04 RX ADMIN — DEXMEDETOMIDINE 0.6 MCG/KG/HR: 100 INJECTION, SOLUTION INTRAVENOUS at 13:56

## 2024-05-04 RX ADMIN — MIDODRINE HYDROCHLORIDE 5 MG: 5 TABLET ORAL at 17:02

## 2024-05-04 RX ADMIN — ACETYLCYSTEINE 400 MG: 200 SOLUTION ORAL; RESPIRATORY (INHALATION) at 08:28

## 2024-05-04 RX ADMIN — HEPARIN SODIUM 14 UNITS/KG/HR: 10000 INJECTION, SOLUTION INTRAVENOUS at 23:49

## 2024-05-04 ASSESSMENT — PAIN SCALES - GENERAL
PAINLEVEL_OUTOF10: 5
PAINLEVEL_OUTOF10: 8
PAINLEVEL_OUTOF10: 8
PAINLEVEL_OUTOF10: 4

## 2024-05-04 ASSESSMENT — PAIN DESCRIPTION - ONSET
ONSET: GRADUAL
ONSET: ON-GOING

## 2024-05-04 ASSESSMENT — PULMONARY FUNCTION TESTS
PIF_VALUE: 20
PIF_VALUE: 22
PIF_VALUE: 20
PIF_VALUE: 14
PIF_VALUE: 19
PIF_VALUE: 22
PIF_VALUE: 23
PIF_VALUE: 24

## 2024-05-04 ASSESSMENT — PAIN DESCRIPTION - DESCRIPTORS
DESCRIPTORS: PATIENT UNABLE TO DESCRIBE
DESCRIPTORS: PATIENT UNABLE TO DESCRIBE

## 2024-05-04 ASSESSMENT — PAIN DESCRIPTION - PAIN TYPE
TYPE: ACUTE PAIN;CHRONIC PAIN
TYPE: ACUTE PAIN;CHRONIC PAIN

## 2024-05-04 ASSESSMENT — PAIN DESCRIPTION - ORIENTATION
ORIENTATION: RIGHT
ORIENTATION: RIGHT

## 2024-05-04 ASSESSMENT — PAIN DESCRIPTION - LOCATION
LOCATION: GENERALIZED
LOCATION: ABDOMEN
LOCATION: ABDOMEN

## 2024-05-04 ASSESSMENT — PAIN - FUNCTIONAL ASSESSMENT
PAIN_FUNCTIONAL_ASSESSMENT: PREVENTS OR INTERFERES SOME ACTIVE ACTIVITIES AND ADLS
PAIN_FUNCTIONAL_ASSESSMENT: PREVENTS OR INTERFERES SOME ACTIVE ACTIVITIES AND ADLS

## 2024-05-04 ASSESSMENT — PAIN DESCRIPTION - FREQUENCY
FREQUENCY: INTERMITTENT
FREQUENCY: CONTINUOUS

## 2024-05-04 NOTE — PLAN OF CARE
RN  Outcome: Progressing  Flowsheets (Taken 5/4/2024 0800)  Care Plan - Patient's Chronic Conditions and Co-Morbidity Symptoms are Monitored and Maintained or Improved:   Monitor and assess patient's chronic conditions and comorbid symptoms for stability, deterioration, or improvement   Collaborate with multidisciplinary team to address chronic and comorbid conditions and prevent exacerbation or deterioration   Update acute care plan with appropriate goals if chronic or comorbid symptoms are exacerbated and prevent overall improvement and discharge     Problem: Safety - Medical Restraint  Goal: Remains free of injury from restraints (Restraint for Interference with Medical Device)  Description: INTERVENTIONS:  1. Determine that other, less restrictive measures have been tried or would not be effective before applying the restraint  2. Evaluate the patient's condition at the time of restraint application  3. Inform patient/family regarding the reason for restraint  4. Q2H: Monitor safety, psychosocial status, comfort, nutrition and hydration  5/4/2024 1339 by Svitlana Weeks RN  Outcome: Progressing     Problem: Genitourinary - Adult  Goal: Absence of urinary retention  Recent Flowsheet Documentation  Taken 5/4/2024 0800 by Svitlana Weeks RN  Absence of urinary retention:   Assess patient’s ability to void and empty bladder   Monitor intake/output and perform bladder scan as needed   Place urinary catheter per Licensed Independent Practitioner order if needed   Discuss with Licensed Independent Practitioner  medications to alleviate retention as needed   Discuss catheterization for long term situations as appropriate     Problem: Genitourinary - Adult  Goal: Urinary catheter remains patent  Recent Flowsheet Documentation  Taken 5/4/2024 0800 by Svitlana Weeks RN  Urinary catheter remains patent:   Assess patency of urinary catheter   Irrigate catheter per Licensed Independent Practitioner order if indicated and  notify Licensed Independent Practitioner if unable to irrigate   Assess need for a larger catheter size or a 3-way catheter for continuous bladder irrigation

## 2024-05-04 NOTE — PROGRESS NOTES
End Of Shift Note  [facility]  Summary of shift: Started tube feed, goal is 55ml/hr, osmolite 1.2 nish. Three lumen PICC line placed in left upper. Heparin is therapeutic at 14u/kg/hr and PTT lab is to be drawn daily. Propofol started at low dose for sedation. Plan for biopsy on Monday per Dr. Mcneil's discretion then extubation as tolerated.     Vitals:    Vitals:    05/04/24 0500 05/04/24 0515 05/04/24 0600 05/04/24 0700   BP: (!) 110/56 (!) 102/51 (!) 103/50 (!) 110/54   Pulse: 67 73 69 72   Resp: 17 17 17 17   Temp:       TempSrc:       SpO2: 97% 98%     Weight:   63.5 kg (140 lb)    Height:            I&O:   Intake/Output Summary (Last 24 hours) at 5/4/2024 0710  Last data filed at 5/4/2024 0400  Gross per 24 hour   Intake 414.11 ml   Output 850 ml   Net -435.89 ml       Resp Status: Vent    Ventilator Settings:  Vent Mode: AC/PRVC Resp Rate (Set): 16 bpm/Vt (Set, mL): 500 mL/ /FiO2 : 30 %    Critical Care IV infusions:   propofol 5 mcg/kg/min (05/04/24 0050)    phenylephrine (HERMILA-SYNEPHRINE) 50 mg in sodium chloride 0.9 % 250 mL infusion 35 mcg/min (05/04/24 0519)    dexmedeTOMIDine (PRECEDEX) 400 mcg in sodium chloride 0.9 % 100 mL infusion 0.6 mcg/kg/hr (05/04/24 0309)    heparin (PORCINE) Infusion 14 Units/kg/hr (05/03/24 2226)    sodium chloride      sodium chloride      sodium chloride 10 mL/hr at 05/03/24 2223    dextrose          LDA:   PICC 05/03/24 Right Cephalic (Active)   Number of days: 0       Peripheral IV 04/24/24 Distal;Left Forearm (Active)   Number of days: 9       Peripheral IV 04/25/24 Right;Anterior Cephalic (Active)   Number of days: 8       Biliary Tube 04/19/24 T-tube 8 fr RUQ (Active)   Number of days: 14       NG/OG/NJ/NE Tube Nasogastric 14 fr Left nostril (Active)   Number of days: 1       Urinary Catheter 05/02/24 [place] (Active)   Number of days: 1       ETT  (Active)   Number of days: 1       Wound 04/04/24 Foot Left;Dorsal dry, scabbed (Active)   Number of days: 29       Wound  Foot Left;Lateral (Active)   Number of days:

## 2024-05-04 NOTE — PROGRESS NOTES
Patient Name: Jorge Luis Banda  Date of admission: 4/3/2024  3:27 PM  Patient's age: 78 y.o., 1945  Admission Dx: Melena [K92.1]  GI bleed [K92.2]  UMM (acute kidney injury) (HCC) [N17.9]  Left leg cellulitis [L03.116]  Anemia, unspecified type [D64.9]    Reason for Consult: management recommendations  Requesting Physician: Sumeet Eller MD    CHIEF COMPLAINT: GI bleeding    History Obtained From:  patient  INTERVAL HISTORY:    Patient seen and examined.    Intubated sedated after bronchoscopy  Hypotension on Mat-Synephrine  No biopsy done  Cytology from thoracentesis on bronchial washing negative.    HISTORY OF PRESENT ILLNESS:    The patient is a 78 y.o.   male who is admitted to the hospital for anemia and suspicion for GI bleeding.  His hemoglobin was under 7 and received blood transfusion.  He has severe peripheral vascular disease on Plavix and Coumadin.  He underwent an EGD that showed gastritis and he refused colonoscopy.  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  From oncology perspective, the patient was diagnosed with stage I lung cancer in 2019 and underwent lung resection.  Never received any chemotherapy or radiation.  He has been in remission since then.  CT scan of the chest showed concerning changes in the liver and adrenal gland for metastatic disease.  Dedicated CT of the abdomen and pelvis is ordered and pending.    Past Medical History:   has a past medical history of Paroxysmal atrial fibrillation (HCC), Peripheral artery disease (HCC), and Primary hypertension.    Past Surgical History:   has a past surgical history that includes above knee amputation (Right, 01/01/2020); Upper gastrointestinal endoscopy (N/A, 4/8/2024); IR CHOLECYSTOSTOMY PERCUTANEOUS COMPLETE (4/19/2024); and bronchoscopy (N/A, 4/26/2024).     Medications:    Reviewed in Epic     Allergies:  Patient

## 2024-05-04 NOTE — PROGRESS NOTES
[facility]  Office: [phone]  Chinmay Ramos DO, Jose Atkinson DO, Guevara Ramirez DO, Pranay Cooper, DO, Sumeet Eller MD, Karen Carvajal MD, Sony Crawford MD, Poonam Platt MD,  Stephan San MD, Marcello Daniels MD, Theodore Pascual MD,  Juve Mahmood DO, Matias Mckeon MD, Hero Tavarez MD, Valentin Ramos DO, Jazzy Spence MD,  Trino Handley DO, Flavia Leonardo MD, Vanessa Soriano MD, Pricilla Akers MD, Casimiro Knight MD,  Zeke Maciel MD, Guera Cervantes MD, Kianna Martin MD, Mirlande Worrell MD, Ney Garcia MD, Savannah Dominguez MD, Ed Emerson DO, Jaylen West DO, Comfort Carcamo MD,  Siva Ashley MD, Shirley Waterhouse, CNP,  Carolyn Pelaez CNP, Scotty Ge, CNP,  Wandy Trivedi, DNP, Malissa Gaston, CNP, Lana Penaloza, CNP, Susan Haro, CNP, Sherrell Mendoza, CNP, Suzy Morales, PA-C, Dalila Lange PA-C, Sada Matt, CNP, Crystal Womack, CNP, Sheng Reddy, CNP, Arielle Guy, CNP, Linsey Perez, CNP, Anju Cho, CNS, Hoa Donahue, CNP, Cassidy Ferrer CNP, Tracy Schwab, CNP       [facility]      Daily Progress Note     Admit Date: 4/3/2024  Bed/Room No.  2027/2027-01  Admitting Physician : Sony Crawford MD  Code Status :Full Code  Hospital Day:  LOS: 31 days   Chief Complaint:     Chief Complaint   Patient presents with    Leg Pain     Left leg, open wounds and swollen       Principal Problem:    Melena  Active Problems:    History of arterial bypass of lower extremity    S/P AKA (above knee amputation) unilateral, right (HCC)    Primary hypertension    Peripheral arterial disease (HCC)    Neuropathy    Malignant neoplasm of lung (HCC)    Intermittent claudication of left lower extremity due to atherosclerosis (HCC)    Embolism and thrombosis of artery (HCC)    Left leg cellulitis    Iron deficiency anemia due to chronic blood loss    Hx of cancer of lung    Adrenal mass (HCC)    Claudication (HCC)    Pleural effusion, bilateral

## 2024-05-04 NOTE — PLAN OF CARE
Problem: Safety - Adult  Goal: Free from fall injury  Outcome: Progressing     Problem: Discharge Planning  Goal: Discharge to home or other facility with appropriate resources  Outcome: Progressing     Problem: Pain  Goal: Verbalizes/displays adequate comfort level or baseline comfort level  Outcome: Progressing     Problem: Skin/Tissue Integrity - Adult  Goal: Incisions, wounds, or drain sites healing without S/S of infection  Outcome: Progressing     Problem: Infection - Adult  Goal: Absence of infection at discharge  Outcome: Progressing     Problem: Hematologic - Adult  Goal: Maintains hematologic stability  Outcome: Progressing     Problem: Respiratory - Adult  Goal: Able to breathe comfortably  Description: Able to breathe comfortably  5/3/2024 1936 by Giselle Salas RCP  Outcome: Progressing  Goal: Clear lung sounds  5/3/2024 1936 by Giselle Salas RCP  Outcome: Progressing  Goal: Adequate oxygenation  Description: Adequate oxygenation  5/3/2024 1936 by Giselle Salas RCP  Outcome: Progressing  Goal: Ability to maintain normal respiratory secretions will improve  Description: Ability to maintain normal respiratory secretions will improve  5/3/2024 1936 by Giselle Salas RCP  Outcome: Progressing     Problem: Skin/Tissue Integrity  Goal: Absence of new skin breakdown  Description: 1.  Monitor for areas of redness and/or skin breakdown  2.  Assess vascular access sites hourly  3.  Every 4-6 hours minimum:  Change oxygen saturation probe site  4.  Every 4-6 hours:  If on nasal continuous positive airway pressure, respiratory therapy assess nares and determine need for appliance change or resting period.  Outcome: Progressing     Problem: ABCDS Injury Assessment  Goal: Absence of physical injury  Outcome: Progressing     Problem: Nutrition Deficit:  Goal: Optimize nutritional status  Outcome: Progressing  Flowsheets (Taken 5/3/2024 1747 by Linsey Dao, RD, LD)  Nutrient intake appropriate

## 2024-05-04 NOTE — PLAN OF CARE
Problem: Respiratory - Adult  Goal: Able to breathe comfortably  Description: Able to breathe comfortably  5/4/2024 1914 by Hoa Thornton RCP  Outcome: Progressing     Problem: Respiratory - Adult  Goal: Clear lung sounds  5/4/2024 1914 by Hoa Thornton RCP  Outcome: Progressing     Problem: Respiratory - Adult  Goal: Adequate oxygenation  Description: Adequate oxygenation  5/4/2024 1914 by Hoa Thornton RCP  Outcome: Progressing     Problem: Respiratory - Adult  Goal: Will be able to breathe spontaneously, without ventilator support  Description: Will be able to breathe spontaneously, without ventilator support  Outcome: Progressing           BRONCHOSPASM/BRONCHOCONSTRICTION    IMPROVE  AERATION/BREATHSOUNDS  ADMINISTER BRONCHODILATOR THERAPY AS APPROPRIATE  ASSESS BREATH SOUNDS  INITIATE AEROSOL PROTOCOL IF ORDERED TO DO SO  PATIENT EDUCATION AS NEEDED      PROVIDE ADEQUATE OXYGENATION WITH ACCEPTABLE SP02/ABG'S    [x]  IDENTIFY APPROPRIATE OXYGEN THERAPY  [x]   MONITOR SP02/ABG'S AS NEEDED   [x]   PATIENT EDUCATION AS NEEDED      MECHANICAL VENTILATION     [x]  PROVIDE OPTIMAL VENTILATION  [x]   ASSESS FOR EXTUBATION READINESS  [x]   ASSESS FOR WEANING READINESS  [x]  EXTUBATE AS TOLERATED  [x]  IMPLEMENT ADULT MECHANICAL VENTILATION PROTOCOL  [x]  MAINTAIN ADEQUATE OXYGENATION  [x]  PERFORM SPONTANEOUS WEANING TRIAL AS TOLERATED

## 2024-05-04 NOTE — PROGRESS NOTES
.   Off plavix  Status post R AKA  Vascular consult/CEA electively    Bedside physical therapy      Discussed with POA/significant other at bedside and patient in details.  Advised indication for intubation given persistent lung atelectasis.  Patient does not want tracheostomy.  POA agrees on terminal wean in case of ventilator dependency.        Palliative team on consult  Discussed with RN and RT  peptic ulcer disease prophylaxis   DVT prophylaxis EPC heparin drip of Eliquis      Electronically signed by     Kleber Fierro MD on 5/4/2024 at 4:08 PM  Pulmonary Critical Care and Sleep Medicine,  [facility]  Office: [phone]

## 2024-05-04 NOTE — PROGRESS NOTES
Patient Name: Jorge Luis Banda  Date of admission: 4/3/2024  3:27 PM  Patient's age: 78 y.o., 1945  Admission Dx: Melena [K92.1]  GI bleed [K92.2]  UMM (acute kidney injury) (HCC) [N17.9]  Left leg cellulitis [L03.116]  Anemia, unspecified type [D64.9]    Reason for Consult: management recommendations  Requesting Physician: Sumeet Eller MD    CHIEF COMPLAINT: GI bleeding    History Obtained From:  patient  INTERVAL HISTORY:    Patient seen and examined.    Intubated sedated after bronchoscopy  Hypotension on Mat-Synephrine  No biopsy done    HISTORY OF PRESENT ILLNESS:    The patient is a 78 y.o.   male who is admitted to the hospital for anemia and suspicion for GI bleeding.  His hemoglobin was under 7 and received blood transfusion.  He has severe peripheral vascular disease on Plavix and Coumadin.  He underwent an EGD that showed gastritis and he refused colonoscopy.  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  From oncology perspective, the patient was diagnosed with stage I lung cancer in 2019 and underwent lung resection.  Never received any chemotherapy or radiation.  He has been in remission since then.  CT scan of the chest showed concerning changes in the liver and adrenal gland for metastatic disease.  Dedicated CT of the abdomen and pelvis is ordered and pending.    Past Medical History:   has a past medical history of Paroxysmal atrial fibrillation (HCC), Peripheral artery disease (HCC), and Primary hypertension.    Past Surgical History:   has a past surgical history that includes above knee amputation (Right, 01/01/2020); Upper gastrointestinal endoscopy (N/A, 4/8/2024); IR CHOLECYSTOSTOMY PERCUTANEOUS COMPLETE (4/19/2024); and bronchoscopy (N/A, 4/26/2024).     Medications:    Reviewed in Epic     Allergies:  Patient has no known allergies.    Social History:   reports that he  quit smoking about 5 years ago. He has never used smokeless tobacco. He reports current alcohol use of about 3.0 standard drinks of alcohol per week. He reports that he does not use drugs.     Family History: family history is not on file.    REVIEW OF SYSTEMS:    Unobtainable  PHYSICAL EXAM:      BP (!) 119/54   Pulse 69   Temp 97.3 °F (36.3 °C) (Temporal)   Resp 20   Ht 1.829 m (6')   Wt 63.1 kg (139 lb 3.2 oz)   SpO2 96%   BMI 18.88 kg/m²    Temp (24hrs), Av.3 °F (36.3 °C), Min:96.8 °F (36 °C), Max:97.5 °F (36.4 °C)    General appearance sedated intubated appears her stated age.  Cachectic  Mental status - alert and cooperative   Eyes - pupils equal and reactive, extraocular eye movements intact   Ears - bilateral TM's and external ear canals normal   Mouth - mucous membranes moist, pharynx normal without lesions   Neck - supple, no significant adenopathy   Lymphatics - no palpable lymphadenopathy, no hepatosplenomegaly   Chest -diminished air entry, bilateral crackles  Heart - normal rate, regular rhythm, normal S1, S2, no murmurs  Abdomen - soft, nontender, nondistended, no masses or organomegaly   Neurological -sedated intubated  Musculoskeletal -right AKA, diffuse aches and pains.  No joint swelling.  He has signs of severe peripheral vascular disease in the left lower extremity  Skin -severe PAD changes and ulcer in his left foot    DATA:    Labs:   CBC:   Recent Labs     24  1439 24  0423   WBC 7.7 9.3   HGB 8.4* 9.3*   HCT 27.2* 31.8*    340       BMP:   No results for input(s): \"NA\", \"K\", \"CO2\", \"BUN\", \"CREATININE\", \"LABGLOM\", \"GLUCOSE\" in the last 72 hours.    PT/INR:   Recent Labs     24  1439   PROTIME 20.6*   INR 1.8         Pathology from lung resection in 2019   Patient Name: ADRIANA CAMPOS : 1945 (Age: 73) Gender: M Taken: 2019 Reported: 5/3/2019 Physician(s): Leonardo Ryan MD ([phone]) Copy To:  Accession #: [id number] Med. Rec. #:

## 2024-05-05 ENCOUNTER — APPOINTMENT (OUTPATIENT)
Dept: GENERAL RADIOLOGY | Age: 79
DRG: 377 | End: 2024-05-05
Payer: MEDICARE

## 2024-05-05 LAB
ANION GAP SERPL CALCULATED.3IONS-SCNC: 12 MMOL/L (ref 9–17)
ANTI-XA UNFRAC HEPARIN: >1.1 IU/L (ref 0.3–0.7)
BUN SERPL-MCNC: 23 MG/DL (ref 8–23)
BUN/CREAT SERPL: 19 (ref 9–20)
CALCIUM SERPL-MCNC: 8.4 MG/DL (ref 8.6–10.4)
CHLORIDE SERPL-SCNC: 99 MMOL/L (ref 98–107)
CO2 SERPL-SCNC: 25 MMOL/L (ref 20–31)
CREAT SERPL-MCNC: 1.2 MG/DL (ref 0.7–1.2)
ERYTHROCYTE [DISTWIDTH] IN BLOOD BY AUTOMATED COUNT: 18.6 % (ref 11.8–14.4)
FIO2: 30
GFR, ESTIMATED: 61 ML/MIN/1.73M2
GLUCOSE BLD-MCNC: 106 MG/DL (ref 75–110)
GLUCOSE BLD-MCNC: 122 MG/DL (ref 75–110)
GLUCOSE BLD-MCNC: 127 MG/DL (ref 75–110)
GLUCOSE BLD-MCNC: 134 MG/DL (ref 75–110)
GLUCOSE SERPL-MCNC: 146 MG/DL (ref 70–99)
HCT VFR BLD AUTO: 23.2 % (ref 40.7–50.3)
HCT VFR BLD AUTO: 24.1 % (ref 40.7–50.3)
HCT VFR BLD AUTO: 24.6 % (ref 40.7–50.3)
HGB BLD-MCNC: 7.1 G/DL (ref 13–17)
HGB BLD-MCNC: 7.4 G/DL (ref 13–17)
HGB BLD-MCNC: 7.5 G/DL (ref 13–17)
MCH RBC QN AUTO: 29.4 PG (ref 25.2–33.5)
MCHC RBC AUTO-ENTMCNC: 30.7 G/DL (ref 28.4–34.8)
MCV RBC AUTO: 95.6 FL (ref 82.6–102.9)
MODE: ABNORMAL
NRBC BLD-RTO: 0 PER 100 WBC
O2 DELIVERY DEVICE: ABNORMAL
PARTIAL THROMBOPLASTIN TIME: 70.9 SEC (ref 23.9–33.8)
PLATELET # BLD AUTO: 225 K/UL (ref 138–453)
PMV BLD AUTO: 10 FL (ref 8.1–13.5)
POC HCO3: 25.1 MMOL/L (ref 21–28)
POC O2 SATURATION: 96.3 % (ref 94–98)
POC PCO2: 37.1 MM HG (ref 35–48)
POC PH: 7.44 (ref 7.35–7.45)
POC PO2: 80.8 MM HG (ref 83–108)
POSITIVE BASE EXCESS, ART: 0.9 MMOL/L (ref 0–3)
POTASSIUM SERPL-SCNC: 3.6 MMOL/L (ref 3.7–5.3)
RBC # BLD AUTO: 2.52 M/UL (ref 4.21–5.77)
SODIUM SERPL-SCNC: 136 MMOL/L (ref 135–144)
WBC OTHER # BLD: 7.6 K/UL (ref 3.5–11.3)

## 2024-05-05 PROCEDURE — 71045 X-RAY EXAM CHEST 1 VIEW: CPT

## 2024-05-05 PROCEDURE — 2500000003 HC RX 250 WO HCPCS: Performed by: INTERNAL MEDICINE

## 2024-05-05 PROCEDURE — 82947 ASSAY GLUCOSE BLOOD QUANT: CPT

## 2024-05-05 PROCEDURE — 6360000002 HC RX W HCPCS: Performed by: INTERNAL MEDICINE

## 2024-05-05 PROCEDURE — 6370000000 HC RX 637 (ALT 250 FOR IP): Performed by: FAMILY MEDICINE

## 2024-05-05 PROCEDURE — C9113 INJ PANTOPRAZOLE SODIUM, VIA: HCPCS | Performed by: FAMILY MEDICINE

## 2024-05-05 PROCEDURE — 82803 BLOOD GASES ANY COMBINATION: CPT

## 2024-05-05 PROCEDURE — 94640 AIRWAY INHALATION TREATMENT: CPT

## 2024-05-05 PROCEDURE — 6370000000 HC RX 637 (ALT 250 FOR IP): Performed by: INTERNAL MEDICINE

## 2024-05-05 PROCEDURE — 85018 HEMOGLOBIN: CPT

## 2024-05-05 PROCEDURE — 99232 SBSQ HOSP IP/OBS MODERATE 35: CPT | Performed by: FAMILY MEDICINE

## 2024-05-05 PROCEDURE — 36600 WITHDRAWAL OF ARTERIAL BLOOD: CPT

## 2024-05-05 PROCEDURE — 2580000003 HC RX 258: Performed by: INTERNAL MEDICINE

## 2024-05-05 PROCEDURE — 99232 SBSQ HOSP IP/OBS MODERATE 35: CPT | Performed by: INTERNAL MEDICINE

## 2024-05-05 PROCEDURE — 85520 HEPARIN ASSAY: CPT

## 2024-05-05 PROCEDURE — 85730 THROMBOPLASTIN TIME PARTIAL: CPT

## 2024-05-05 PROCEDURE — 85027 COMPLETE CBC AUTOMATED: CPT

## 2024-05-05 PROCEDURE — 94003 VENT MGMT INPAT SUBQ DAY: CPT

## 2024-05-05 PROCEDURE — 2700000000 HC OXYGEN THERAPY PER DAY

## 2024-05-05 PROCEDURE — 2580000003 HC RX 258: Performed by: FAMILY MEDICINE

## 2024-05-05 PROCEDURE — 80048 BASIC METABOLIC PNL TOTAL CA: CPT

## 2024-05-05 PROCEDURE — 94669 MECHANICAL CHEST WALL OSCILL: CPT

## 2024-05-05 PROCEDURE — A4216 STERILE WATER/SALINE, 10 ML: HCPCS | Performed by: FAMILY MEDICINE

## 2024-05-05 PROCEDURE — 2500000003 HC RX 250 WO HCPCS: Performed by: NURSE PRACTITIONER

## 2024-05-05 PROCEDURE — 94761 N-INVAS EAR/PLS OXIMETRY MLT: CPT

## 2024-05-05 PROCEDURE — 85014 HEMATOCRIT: CPT

## 2024-05-05 PROCEDURE — 2000000000 HC ICU R&B

## 2024-05-05 PROCEDURE — 6360000002 HC RX W HCPCS: Performed by: FAMILY MEDICINE

## 2024-05-05 RX ADMIN — METOPROLOL TARTRATE 25 MG: 25 TABLET, FILM COATED ORAL at 09:18

## 2024-05-05 RX ADMIN — SENNOSIDES 8.6 MG: 8.6 TABLET, FILM COATED ORAL at 20:56

## 2024-05-05 RX ADMIN — GUAIFENESIN 200 MG: 200 SOLUTION ORAL at 05:17

## 2024-05-05 RX ADMIN — ATORVASTATIN CALCIUM 40 MG: 40 TABLET, FILM COATED ORAL at 20:56

## 2024-05-05 RX ADMIN — SODIUM CHLORIDE, PRESERVATIVE FREE 10 ML: 5 INJECTION INTRAVENOUS at 20:56

## 2024-05-05 RX ADMIN — ASPIRIN 81 MG: 81 TABLET, COATED ORAL at 09:18

## 2024-05-05 RX ADMIN — POLYETHYLENE GLYCOL 3350 17 G: 17 POWDER, FOR SOLUTION ORAL at 09:18

## 2024-05-05 RX ADMIN — CYANOCOBALAMIN TAB 1000 MCG 1000 MCG: 1000 TAB at 09:23

## 2024-05-05 RX ADMIN — Medication 2000 MG: at 15:50

## 2024-05-05 RX ADMIN — OXYCODONE AND ACETAMINOPHEN 1 TABLET: 5; 325 TABLET ORAL at 16:29

## 2024-05-05 RX ADMIN — SODIUM CHLORIDE, PRESERVATIVE FREE 10 ML: 5 INJECTION INTRAVENOUS at 09:15

## 2024-05-05 RX ADMIN — ACETYLCYSTEINE 400 MG: 200 SOLUTION ORAL; RESPIRATORY (INHALATION) at 19:14

## 2024-05-05 RX ADMIN — EMPAGLIFLOZIN 10 MG: 10 TABLET, FILM COATED ORAL at 09:18

## 2024-05-05 RX ADMIN — SENNOSIDES 8.6 MG: 8.6 TABLET, FILM COATED ORAL at 09:18

## 2024-05-05 RX ADMIN — OXYCODONE AND ACETAMINOPHEN 1 TABLET: 5; 325 TABLET ORAL at 05:21

## 2024-05-05 RX ADMIN — GUAIFENESIN 200 MG: 200 SOLUTION ORAL at 09:18

## 2024-05-05 RX ADMIN — METOCLOPRAMIDE HYDROCHLORIDE 5 MG: 5 INJECTION INTRAMUSCULAR; INTRAVENOUS at 20:56

## 2024-05-05 RX ADMIN — GUAIFENESIN 200 MG: 200 SOLUTION ORAL at 11:30

## 2024-05-05 RX ADMIN — FUROSEMIDE 40 MG: 40 TABLET ORAL at 09:18

## 2024-05-05 RX ADMIN — MIRTAZAPINE 30 MG: 15 TABLET, ORALLY DISINTEGRATING ORAL at 20:56

## 2024-05-05 RX ADMIN — MIDODRINE HYDROCHLORIDE 5 MG: 5 TABLET ORAL at 16:21

## 2024-05-05 RX ADMIN — OXYCODONE AND ACETAMINOPHEN 1 TABLET: 5; 325 TABLET ORAL at 09:23

## 2024-05-05 RX ADMIN — DIGOXIN 125 MCG: 125 TABLET ORAL at 09:18

## 2024-05-05 RX ADMIN — DEXMEDETOMIDINE 0.8 MCG/KG/HR: 100 INJECTION, SOLUTION INTRAVENOUS at 09:15

## 2024-05-05 RX ADMIN — LEVALBUTEROL 1.25 MG: 1.25 SOLUTION, CONCENTRATE RESPIRATORY (INHALATION) at 07:25

## 2024-05-05 RX ADMIN — CHOLECALCIFEROL TAB 125 MCG (5000 UNIT) 5000 UNITS: 125 TAB at 09:18

## 2024-05-05 RX ADMIN — PHENYLEPHRINE HYDROCHLORIDE 50 MCG/MIN: 10 INJECTION INTRAVENOUS at 14:50

## 2024-05-05 RX ADMIN — MIDODRINE HYDROCHLORIDE 5 MG: 5 TABLET ORAL at 09:18

## 2024-05-05 RX ADMIN — PANTOPRAZOLE SODIUM 40 MG: 40 INJECTION, POWDER, FOR SOLUTION INTRAVENOUS at 09:11

## 2024-05-05 RX ADMIN — Medication 2000 MG: at 09:14

## 2024-05-05 RX ADMIN — LEVALBUTEROL 1.25 MG: 1.25 SOLUTION, CONCENTRATE RESPIRATORY (INHALATION) at 14:55

## 2024-05-05 RX ADMIN — GUAIFENESIN 200 MG: 200 SOLUTION ORAL at 16:21

## 2024-05-05 RX ADMIN — LEVALBUTEROL 1.25 MG: 1.25 SOLUTION, CONCENTRATE RESPIRATORY (INHALATION) at 19:13

## 2024-05-05 RX ADMIN — GUAIFENESIN 200 MG: 200 SOLUTION ORAL at 20:56

## 2024-05-05 RX ADMIN — ACETYLCYSTEINE 400 MG: 200 SOLUTION ORAL; RESPIRATORY (INHALATION) at 07:25

## 2024-05-05 RX ADMIN — DEXMEDETOMIDINE 0.8 MCG/KG/HR: 100 INJECTION, SOLUTION INTRAVENOUS at 16:40

## 2024-05-05 RX ADMIN — FERROUS SULFATE TAB EC 325 MG (65 MG FE EQUIVALENT) 325 MG: 325 (65 FE) TABLET DELAYED RESPONSE at 09:18

## 2024-05-05 RX ADMIN — ISODIUM CHLORIDE 3 ML: 0.03 SOLUTION RESPIRATORY (INHALATION) at 14:55

## 2024-05-05 RX ADMIN — METOCLOPRAMIDE HYDROCHLORIDE 5 MG: 5 INJECTION INTRAMUSCULAR; INTRAVENOUS at 09:23

## 2024-05-05 RX ADMIN — MIDODRINE HYDROCHLORIDE 5 MG: 5 TABLET ORAL at 11:30

## 2024-05-05 ASSESSMENT — PAIN DESCRIPTION - DESCRIPTORS
DESCRIPTORS: PATIENT UNABLE TO DESCRIBE
DESCRIPTORS: PATIENT UNABLE TO DESCRIBE

## 2024-05-05 ASSESSMENT — PAIN SCALES - PAIN ASSESSMENT IN ADVANCED DEMENTIA (PAINAD)
CONSOLABILITY: NO NEED TO CONSOLE
BREATHING: NORMAL
FACIALEXPRESSION: SMILING OR INEXPRESSIVE
BODYLANGUAGE: RELAXED
TOTALSCORE: 0

## 2024-05-05 ASSESSMENT — PAIN DESCRIPTION - PAIN TYPE
TYPE: ACUTE PAIN;CHRONIC PAIN
TYPE: ACUTE PAIN;CHRONIC PAIN

## 2024-05-05 ASSESSMENT — PAIN DESCRIPTION - ORIENTATION
ORIENTATION: RIGHT
ORIENTATION: RIGHT

## 2024-05-05 ASSESSMENT — PAIN DESCRIPTION - LOCATION
LOCATION: ABDOMEN;THROAT
LOCATION: ABDOMEN;THROAT

## 2024-05-05 ASSESSMENT — PAIN DESCRIPTION - FREQUENCY
FREQUENCY: INTERMITTENT
FREQUENCY: CONTINUOUS

## 2024-05-05 ASSESSMENT — PAIN SCALES - GENERAL
PAINLEVEL_OUTOF10: 8
PAINLEVEL_OUTOF10: 5
PAINLEVEL_OUTOF10: 7
PAINLEVEL_OUTOF10: 5

## 2024-05-05 ASSESSMENT — PULMONARY FUNCTION TESTS
PIF_VALUE: 21
PIF_VALUE: 18
PIF_VALUE: 20
PIF_VALUE: 11
PIF_VALUE: 21
PIF_VALUE: 23
PIF_VALUE: 22
PIF_VALUE: 22
PIF_VALUE: 23
PIF_VALUE: 23
PIF_VALUE: 22

## 2024-05-05 ASSESSMENT — PAIN DESCRIPTION - ONSET
ONSET: AWAKENED FROM SLEEP
ONSET: ON-GOING

## 2024-05-05 NOTE — PROGRESS NOTES
[facility]  Office: [phone]  Chinmay Ramos DO, Jose Atkinson DO, Guevara Ramirez DO, Pranay Cooper, DO, Sumeet Eller MD, Karen Carvajal MD, Sony Crawford MD, Poonam Platt MD,  Stephan San MD, Marcello Daniels MD, Theodore Pascual MD,  Juve Mahmood DO, Matias Mckeon MD, Hero Tavarez MD, Valentin Ramos DO, Jazzy Spence MD,  Trino Handley DO, Flavia Leonardo MD, Vanessa Soriano MD, Pricilla Akers MD, Casimiro Knight MD,  Zeke Maciel MD, Guera Cervantes MD, Kianna Martin MD, Mirlande Worrell MD, Ney Garcia MD, Savannah Dominguez MD, Ed Emerson DO, Jaylen West DO, Comfort Carcamo MD,  Siva Ashley MD, Shirley Waterhouse, CNP,  Carolyn Pelaez CNP, Scotty Ge, CNP,  Wandy Trivedi, DNP, Malissa Gaston, CNP, Lana Penaloza, CNP, Susan Haro, CNP, Sherrell Mendoza, CNP, Suzy Morales, PA-C, Dalila Lange PA-C, Sada Matt, CNP, Crystal Womack, CNP, Sheng Reddy, CNP, Arielle Guy, CNP, Linsey Perez, CNP, Anju Cho, CNS, Hoa Donahue, CNP, Cassidy Ferrer CNP, Tracy Schwab, CNP       [facility]      Daily Progress Note     Admit Date: 4/3/2024  Bed/Room No.  2027/2027-01  Admitting Physician : Sony Crawford MD  Code Status :Full Code  Hospital Day:  LOS: 32 days   Chief Complaint:     Chief Complaint   Patient presents with    Leg Pain     Left leg, open wounds and swollen       Principal Problem:    Melena  Active Problems:    History of arterial bypass of lower extremity    S/P AKA (above knee amputation) unilateral, right (HCC)    Primary hypertension    Peripheral arterial disease (HCC)    Neuropathy    Malignant neoplasm of lung (HCC)    Intermittent claudication of left lower extremity due to atherosclerosis (HCC)    Embolism and thrombosis of artery (HCC)    Left leg cellulitis    Iron deficiency anemia due to chronic blood loss    Hx of cancer of lung    Adrenal mass (HCC)    Claudication (HCC)    Pleural effusion, bilateral     mass infiltrating into adjacent structures including   the right kidney, liver and inferior vena cava.   11. Probable reactive colitis at the hepatic flexure.         XR ABDOMEN (KUB) (SINGLE AP VIEW)   Final Result   No acute abdominal abnormality.         XR CHEST PORTABLE   Final Result   Stable bilateral hazy opacification representing the in pleural effusion.   Background chronic interstitial changes and emphysema.         XR CHEST PORTABLE   Final Result   Mildly progressive bilateral infiltrates when compared to the prior exam         CT HEAD WO CONTRAST   Final Result   No new acute intracranial abnormality.      Unchanged right frontal infarcts.         CTA HEAD NECK W CONTRAST   Final Result   1. Redemonstration of known subacute infarct involving the cortex,   subcortical and deep white matter of the right frontal lobe are noted, better   appreciated on the recent MRI. There is no acute intracranial hemorrhage.   2. Severe stenoses at the origins of the internal carotid arteries   bilaterally measuring 90% on the right and 80% on the left based on NASCET   criteria.   3. Severe stenosis at the origin of the left vertebral artery with multifocal   moderate to severe stenoses of the V2 and V3 segments of the left vertebral   artery. The V4 segment of the left vertebral artery is occluded.   4. Moderate stenosis of the V4 segment of the right vertebral artery.   5. Moderate stenosis of the proximal left subclavian artery.   6. No significant stenosis or aneurysm of the intracranial circulation.         XR CHEST PORTABLE   Final Result   Improved aeration of the lungs.  Bilateral pleuroparenchymal disease persists.         XR CHEST PORTABLE   Final Result   Mildly worsened bilateral airspace disease.      Small bilateral pleural effusions.         MRI BRAIN WO CONTRAST   Final Result   Acute infarcts within the anterior right CLARKE MCA watershed distribution   extending peripherally towards the right posterior

## 2024-05-05 NOTE — PROGRESS NOTES
End Of Shift Note  [facility]  Summary of shift: Patient drowsy, but easily arousable and alert/oriented, right wrist restraint continued, on 0.8 Precedex. Hermila down to 40, pressures soft in AM, stable throughout afternoon. Possible bronch/biopsy tomorrow, followed by extubation. Tolerating tube feeds well at 55. Urine output steady 200-300 every 2 hours. Hgb continues to trend down, Q8 H&H unit draws and d/c Heparin per Dr. Fierro. Pain managed with Q4 Percocet.      Vitals:    Vitals:    05/05/24 1500 05/05/24 1600 05/05/24 1629 05/05/24 1700   BP: (!) 99/56 127/60  (!) 104/53   Pulse: 73 68  66   Resp: 29 16 16 16   Temp:  97.1 °F (36.2 °C)     TempSrc:  Temporal     SpO2: 100% 100%  98%   Weight:       Height:            I&O:   Intake/Output Summary (Last 24 hours) at 5/5/2024 1816  Last data filed at 5/5/2024 1815  Gross per 24 hour   Intake 3780.48 ml   Output 1420 ml   Net 2360.48 ml       Resp Status: Vent 30%    Ventilator Settings:  Vent Mode: AC/PRVC Resp Rate (Set): 16 bpm/Vt (Set, mL): 500 mL/ /FiO2 : 30 %    Critical Care IV infusions:   propofol Stopped (05/04/24 2247)    phenylephrine (HERMILA-SYNEPHRINE) 50 mg in sodium chloride 0.9 % 250 mL infusion 40 mcg/min (05/05/24 1648)    dexmedeTOMIDine (PRECEDEX) 400 mcg in sodium chloride 0.9 % 100 mL infusion 0.8 mcg/kg/hr (05/05/24 1648)    sodium chloride      sodium chloride 10 mL/hr at 05/05/24 1648    dextrose          LDA:   PICC 05/03/24 Right Cephalic (Active)   Number of days: 1       Peripheral IV 04/24/24 Distal;Left Forearm (Active)   Number of days: 11       Peripheral IV 04/25/24 Right;Anterior Cephalic (Active)   Number of days: 10       Biliary Tube 04/19/24 T-tube 8 fr RUQ (Active)   Number of days: 16       NG/OG/NJ/NE Tube Nasogastric 14 fr Left nostril (Active)   Number of days: 3       Urinary Catheter 05/02/24 [place] (Active)   Number of days: 2       ETT  (Active)   Number of days: 3       Wound 04/04/24 Foot Left;Dorsal dry,  scabbed (Active)   Number of days: 31       Wound Foot Left;Lateral (Active)   Number of days:

## 2024-05-05 NOTE — PLAN OF CARE
Problem: Respiratory - Adult  Goal: Able to breathe comfortably  Description: Able to breathe comfortably  5/5/2024 0734 by Shana Motley RCP  Outcome: Progressing  5/4/2024 1914 by Hoa Thornton RCP  Outcome: Progressing  Goal: Clear lung sounds  5/5/2024 0734 by Shana Motley RCP  Outcome: Progressing  5/4/2024 1914 by Hoa Thornton RCP  Outcome: Progressing  Goal: Adequate oxygenation  Description: Adequate oxygenation  5/5/2024 0734 by Shana Motley RCP  Outcome: Progressing  5/4/2024 1914 by Hoa Thornton RCP  Outcome: Progressing  Goal: Will be able to breathe spontaneously, without ventilator support  Description: Will be able to breathe spontaneously, without ventilator support  5/5/2024 0734 by Shana Motley RCP  Outcome: Progressing  5/4/2024 1914 by Hoa Thornton RCP  Outcome: Progressing

## 2024-05-05 NOTE — PLAN OF CARE
Problem: Respiratory - Adult  Goal: Able to breathe comfortably  Description: Able to breathe comfortably  5/5/2024 1909 by Hoa Thornton RCP  Outcome: Progressing     Problem: Respiratory - Adult  Goal: Clear lung sounds  5/5/2024 1909 by Hoa Thornton RCP  Outcome: Progressing     Problem: Respiratory - Adult  Goal: Adequate oxygenation  Description: Adequate oxygenation  5/5/2024 1909 by Hoa Thornton RCP  Outcome: Progressing     Problem: Respiratory - Adult  Goal: Will be able to breathe spontaneously, without ventilator support  Description: Will be able to breathe spontaneously, without ventilator support  5/5/2024 1909 by Hoa Thornton RCP  Outcome: Progressing           BRONCHOSPASM/BRONCHOCONSTRICTION    IMPROVE  AERATION/BREATHSOUNDS  ADMINISTER BRONCHODILATOR THERAPY AS APPROPRIATE  ASSESS BREATH SOUNDS  INITIATE AEROSOL PROTOCOL IF ORDERED TO DO SO  PATIENT EDUCATION AS NEEDED      PROVIDE ADEQUATE OXYGENATION WITH ACCEPTABLE SP02/ABG'S    [x]  IDENTIFY APPROPRIATE OXYGEN THERAPY  [x]   MONITOR SP02/ABG'S AS NEEDED   [x]   PATIENT EDUCATION AS NEEDED      MECHANICAL VENTILATION     [x]  PROVIDE OPTIMAL VENTILATION  [x]   ASSESS FOR EXTUBATION READINESS  [x]   ASSESS FOR WEANING READINESS  [x]  EXTUBATE AS TOLERATED  [x]  IMPLEMENT ADULT MECHANICAL VENTILATION PROTOCOL  [x]  MAINTAIN ADEQUATE OXYGENATION  [x]  PERFORM SPONTANEOUS WEANING TRIAL AS TOLERATED

## 2024-05-05 NOTE — PLAN OF CARE
Problem: Safety - Adult  Goal: Free from fall injury  5/4/2024 2237 by Antionette Zhang RN  Outcome: Progressing  5/4/2024 1339 by Svitlana Weeks RN  Outcome: Progressing  Flowsheets (Taken 5/4/2024 0800)  Free From Fall Injury:   Instruct family/caregiver on patient safety   Based on caregiver fall risk screen, instruct family/caregiver to ask for assistance with transferring infant if caregiver noted to have fall risk factors     Problem: Discharge Planning  Goal: Discharge to home or other facility with appropriate resources  5/4/2024 2237 by Antionette Zhang, RN  Outcome: Progressing  Flowsheets (Taken 5/4/2024 1945)  Discharge to home or other facility with appropriate resources:   Identify barriers to discharge with patient and caregiver   Arrange for needed discharge resources and transportation as appropriate   Identify discharge learning needs (meds, wound care, etc)   Refer to discharge planning if patient needs post-hospital services based on physician order or complex needs related to functional status, cognitive ability or social support system  5/4/2024 1339 by Svitlana Weeks, RN  Outcome: Progressing  Flowsheets (Taken 5/4/2024 0800)  Discharge to home or other facility with appropriate resources:   Identify barriers to discharge with patient and caregiver   Arrange for needed discharge resources and transportation as appropriate   Identify discharge learning needs (meds, wound care, etc)   Arrange for interpreters to assist at discharge as needed   Refer to discharge planning if patient needs post-hospital services based on physician order or complex needs related to functional status, cognitive ability or social support system     Problem: Pain  Goal: Verbalizes/displays adequate comfort level or baseline comfort level  5/4/2024 2237 by Antionette Zhang RN  Outcome: Progressing  5/4/2024 1339 by Svitlana Weeks, RN  Outcome: Progressing     Problem: Skin/Tissue Integrity - Adult  Goal:

## 2024-05-05 NOTE — PROGRESS NOTES
Pulmonary Critical Care Progress Note       Patient seen for the follow up of bilateral pleural effusions, hemoptysis     Subjective:  Patient is intubated sedated on Precedex 0.8 on the ventilator.  He is still requiring Mat-Synephrine now at 50 mcg/min.  He is awake follows commands..  He has fair urine output.   POA at bedside    Examination:  Vitals: BP (!) 93/48   Pulse 71   Temp 97.5 °F (36.4 °C) (Temporal)   Resp 19   Ht 1.829 m (6')   Wt 66.7 kg (147 lb 1.6 oz)   SpO2 100%   BMI 19.95 kg/m²   General appearance: In no acute distress, alert and cooperative with exam cachectic  Neck: No JVD  Lungs: Decreased breath sound no crackles or wheeze  Heart: irregular rate and rhythm, S1, S2 normal, no gallop  Abdomen: Soft, non tender, + BS  Cholecystotomy tube in place bloody output  Extremities: no cyanosis or clubbing. No significant edema, left upper and lower extremity weakness right above-knee amputation left foot dorsal ulcer    LABs:  CBC:   Recent Labs     05/02/24  1439 05/03/24  0423 05/04/24  0515 05/05/24  0505   WBC 7.7 9.3 7.7 7.6   HGB 8.4* 9.3* 8.0* 7.4*   HCT 27.2* 31.8* 25.6* 24.1*    340 250 225       BMP:   Recent Labs     05/04/24  0515 05/05/24  0505    136   K 3.7 3.6*   CO2 23 25   BUN 20 23   CREATININE 1.3* 1.2   LABGLOM 56* 61   GLUCOSE 105* 146*       Recent Labs     05/02/24  1439 05/02/24  2242 05/03/24  0423 05/03/24  1147 05/03/24  1811 05/04/24  0515 05/05/24  0505   APTT 35.7* 68.3* 48.9* 65.7* 68.8* 65.7* 70.9*        Latest Reference Range & Units 04/11/24 15:56 04/13/24 23:26 04/16/24 04:21   Procalcitonin 0.00 - 0.09 ng/mL 1.95 (H) 1.02 (H) 0.72 (H)   (H): Data is abnormally high   Latest Reference Range & Units 04/15/24 04:34   Pro-BNP <300 pg/mL 16,301 (H)   (H): Data is abnormally high  BAL culture 5/2      Specimen Description .BRONCHIAL WASHINGS LLL   Direct Exam FEW NEUTROPHILS    FEW GRAM POSITIVE COCCI IN PAIRS Abnormal    Culture  Abnormal  mental status  Neurology on consult    Adrenal mass and liver mass  Oncology on consult  Elective biopsy per IR     Urinary retention  Urology following     Peripheral vascular disease/Severe ICA stenosis. 90 % R and 80 % Left .   Off plavix  Status post R AKA  Vascular consult/CEA electively    Bedside physical therapy      Discussed with POA today/.  Reviewed images Patient does not want tracheostomy.  POA agrees on terminal wean in case of ventilator dependency.        Palliative team on consult  Discussed with RN     peptic ulcer disease prophylaxis   DVT prophylaxis EPC for now      Electronically signed by     Kleber Feirro MD on 5/5/2024 at 1:01 PM  Pulmonary Critical Care and Sleep Medicine,  [facility]  Office: [phone]

## 2024-05-05 NOTE — PROGRESS NOTES
Rounded with Dr. Fierro at bedside. Ordered H&H now, possible hold heparin based on H&H (continues to trend down) and acknowledged new orders.

## 2024-05-05 NOTE — PLAN OF CARE
Problem: Safety - Adult  Goal: Free from fall injury  5/5/2024 1023 by Svitlana Weeks, RN  Outcome: Progressing  Flowsheets (Taken 5/5/2024 0800)  Free From Fall Injury:   Instruct family/caregiver on patient safety   Based on caregiver fall risk screen, instruct family/caregiver to ask for assistance with transferring infant if caregiver noted to have fall risk factors     Problem: Discharge Planning  Goal: Discharge to home or other facility with appropriate resources  5/5/2024 1023 by Svitlana Weeks, RN  Outcome: Progressing  Flowsheets (Taken 5/5/2024 0800)  Discharge to home or other facility with appropriate resources:   Identify barriers to discharge with patient and caregiver   Arrange for needed discharge resources and transportation as appropriate   Identify discharge learning needs (meds, wound care, etc)   Arrange for interpreters to assist at discharge as needed   Refer to discharge planning if patient needs post-hospital services based on physician order or complex needs related to functional status, cognitive ability or social support system     Problem: Pain  Goal: Verbalizes/displays adequate comfort level or baseline comfort level  5/5/2024 1023 by Svitlana Weeks, RN  Outcome: Progressing     Problem: Skin/Tissue Integrity - Adult  Goal: Incisions, wounds, or drain sites healing without S/S of infection  5/5/2024 1023 by Svitlana Weeks, RN  Outcome: Progressing  Flowsheets (Taken 5/5/2024 0800)  Incisions, Wounds, or Drain Sites Healing Without Sign and Symptoms of Infection:   ADMISSION and DAILY: Assess and document risk factors for pressure ulcer development   TWICE DAILY: Assess and document skin integrity   TWICE DAILY: Assess and document dressing/incision, wound bed, drain sites and surrounding tissue   Implement wound care per orders   Initiate isolation precautions as appropriate   Initiate pressure ulcer prevention bundle as indicated     Problem: Infection - Adult  Goal: Absence of

## 2024-05-05 NOTE — PROGRESS NOTES
End Of Shift Note  [facility]  Summary of shift: Propofol restarted at 1930 and stopped at 2230 for soft pressures; neosynephrine titrated up to 50mcg/min in response. Mucinex tablet discontinued and Robitussin 200mg ordered and scheduled for every four hours. Heparin therapeutic. Patient's mentation remains flat affect and frustrated due to the communication barrier of being intubated. Plan to do bronchoscopy and biopsy on Monday or Tuesday.    HGB 7.4. Standing order to transfuse less than 7.  Changed left foot dressing. Minimal serous drainage   Thick, dark red secretions suctioned through ETT. 150ml output from last two days.   Eliquis to resume after bronchoscopy  Afib rate controlled with soft pressures on pressor. Lopressor 25mg not given.   Tube feed at goal of 55ml/hr  Plans for colonoscopy, endarterectomy outpatient.     Vitals:    Vitals:    05/05/24 0530 05/05/24 0532 05/05/24 0545 05/05/24 0600   BP: (!) 84/54 (!) 108/54 (!) 116/58 (!) 110/56   Pulse: 74  71 70   Resp: 23  18 17   Temp:       TempSrc:       SpO2: 99%  99% 97%   Weight:       Height:            I&O:   Intake/Output Summary (Last 24 hours) at 5/5/2024 0602  Last data filed at 5/5/2024 0514  Gross per 24 hour   Intake 3190.62 ml   Output 1470 ml   Net 1720.62 ml       Resp Status: Vent    Ventilator Settings:  Vent Mode: AC/PRVC Resp Rate (Set): 16 bpm/Vt (Set, mL): 500 mL/ /FiO2 : 30 %    Critical Care IV infusions:   propofol Stopped (05/04/24 2247)    phenylephrine (HERMILA-SYNEPHRINE) 50 mg in sodium chloride 0.9 % 250 mL infusion 50 mcg/min (05/04/24 2346)    dexmedeTOMIDine (PRECEDEX) 400 mcg in sodium chloride 0.9 % 100 mL infusion 0.8 mcg/kg/hr (05/04/24 2355)    heparin (PORCINE) Infusion 14 Units/kg/hr (05/04/24 2349)    sodium chloride      sodium chloride 10 mL/hr at 05/04/24 1923    dextrose          LDA:   PICC 05/03/24 Right Cephalic (Active)   Number of days: 1       Peripheral IV 04/24/24 Distal;Left Forearm (Active)

## 2024-05-06 ENCOUNTER — APPOINTMENT (OUTPATIENT)
Dept: GENERAL RADIOLOGY | Age: 79
DRG: 377 | End: 2024-05-06
Payer: MEDICARE

## 2024-05-06 LAB
ANION GAP SERPL CALCULATED.3IONS-SCNC: 8 MMOL/L (ref 9–17)
BUN SERPL-MCNC: 22 MG/DL (ref 8–23)
BUN/CREAT SERPL: 24 (ref 9–20)
CALCIUM SERPL-MCNC: 8.5 MG/DL (ref 8.6–10.4)
CASE NUMBER:: NORMAL
CHLORIDE SERPL-SCNC: 100 MMOL/L (ref 98–107)
CO2 SERPL-SCNC: 27 MMOL/L (ref 20–31)
CREAT SERPL-MCNC: 0.9 MG/DL (ref 0.7–1.2)
DIGOXIN SERPL-MCNC: 0.7 NG/ML (ref 0.5–2)
ERYTHROCYTE [DISTWIDTH] IN BLOOD BY AUTOMATED COUNT: 18.6 % (ref 11.8–14.4)
GFR, ESTIMATED: 86 ML/MIN/1.73M2
GLUCOSE BLD-MCNC: 105 MG/DL (ref 75–110)
GLUCOSE BLD-MCNC: 154 MG/DL (ref 75–110)
GLUCOSE BLD-MCNC: 93 MG/DL (ref 75–110)
GLUCOSE SERPL-MCNC: 171 MG/DL (ref 70–99)
HCT VFR BLD AUTO: 24 % (ref 40.7–50.3)
HCT VFR BLD AUTO: 24.6 % (ref 40.7–50.3)
HCT VFR BLD AUTO: 25.9 % (ref 40.7–50.3)
HGB BLD-MCNC: 7.5 G/DL (ref 13–17)
HGB BLD-MCNC: 7.6 G/DL (ref 13–17)
HGB BLD-MCNC: 7.9 G/DL (ref 13–17)
MCH RBC QN AUTO: 29.7 PG (ref 25.2–33.5)
MCHC RBC AUTO-ENTMCNC: 30.9 G/DL (ref 28.4–34.8)
MCV RBC AUTO: 96.1 FL (ref 82.6–102.9)
MICROORGANISM SPEC CULT: NORMAL
MICROORGANISM/AGENT SPEC: NORMAL
NRBC BLD-RTO: 0 PER 100 WBC
PLATELET # BLD AUTO: 197 K/UL (ref 138–453)
PMV BLD AUTO: 10.4 FL (ref 8.1–13.5)
POTASSIUM SERPL-SCNC: 4.2 MMOL/L (ref 3.7–5.3)
RBC # BLD AUTO: 2.56 M/UL (ref 4.21–5.77)
SODIUM SERPL-SCNC: 135 MMOL/L (ref 135–144)
SPECIMEN DESCRIPTION: NORMAL
WBC OTHER # BLD: 5.6 K/UL (ref 3.5–11.3)

## 2024-05-06 PROCEDURE — 2500000003 HC RX 250 WO HCPCS: Performed by: INTERNAL MEDICINE

## 2024-05-06 PROCEDURE — 6360000002 HC RX W HCPCS: Performed by: INTERNAL MEDICINE

## 2024-05-06 PROCEDURE — 71045 X-RAY EXAM CHEST 1 VIEW: CPT

## 2024-05-06 PROCEDURE — 2500000003 HC RX 250 WO HCPCS: Performed by: NURSE PRACTITIONER

## 2024-05-06 PROCEDURE — 6370000000 HC RX 637 (ALT 250 FOR IP): Performed by: INTERNAL MEDICINE

## 2024-05-06 PROCEDURE — 82947 ASSAY GLUCOSE BLOOD QUANT: CPT

## 2024-05-06 PROCEDURE — 99232 SBSQ HOSP IP/OBS MODERATE 35: CPT | Performed by: FAMILY MEDICINE

## 2024-05-06 PROCEDURE — 80162 ASSAY OF DIGOXIN TOTAL: CPT

## 2024-05-06 PROCEDURE — 87206 SMEAR FLUORESCENT/ACID STAI: CPT

## 2024-05-06 PROCEDURE — 0B9B8ZZ DRAINAGE OF LEFT LOWER LOBE BRONCHUS, VIA NATURAL OR ARTIFICIAL OPENING ENDOSCOPIC: ICD-10-PCS | Performed by: INTERNAL MEDICINE

## 2024-05-06 PROCEDURE — C9113 INJ PANTOPRAZOLE SODIUM, VIA: HCPCS | Performed by: FAMILY MEDICINE

## 2024-05-06 PROCEDURE — 2000000000 HC ICU R&B

## 2024-05-06 PROCEDURE — 89051 BODY FLUID CELL COUNT: CPT

## 2024-05-06 PROCEDURE — 88312 SPECIAL STAINS GROUP 1: CPT

## 2024-05-06 PROCEDURE — 88112 CYTOPATH CELL ENHANCE TECH: CPT

## 2024-05-06 PROCEDURE — 80048 BASIC METABOLIC PNL TOTAL CA: CPT

## 2024-05-06 PROCEDURE — 99232 SBSQ HOSP IP/OBS MODERATE 35: CPT | Performed by: INTERNAL MEDICINE

## 2024-05-06 PROCEDURE — 6360000002 HC RX W HCPCS: Performed by: FAMILY MEDICINE

## 2024-05-06 PROCEDURE — 2580000003 HC RX 258: Performed by: INTERNAL MEDICINE

## 2024-05-06 PROCEDURE — 85027 COMPLETE CBC AUTOMATED: CPT

## 2024-05-06 PROCEDURE — 87070 CULTURE OTHR SPECIMN AEROBIC: CPT

## 2024-05-06 PROCEDURE — 88305 TISSUE EXAM BY PATHOLOGIST: CPT

## 2024-05-06 PROCEDURE — 2700000000 HC OXYGEN THERAPY PER DAY

## 2024-05-06 PROCEDURE — 2580000003 HC RX 258: Performed by: FAMILY MEDICINE

## 2024-05-06 PROCEDURE — A4216 STERILE WATER/SALINE, 10 ML: HCPCS | Performed by: FAMILY MEDICINE

## 2024-05-06 PROCEDURE — 94003 VENT MGMT INPAT SUBQ DAY: CPT

## 2024-05-06 PROCEDURE — 87205 SMEAR GRAM STAIN: CPT

## 2024-05-06 PROCEDURE — 87077 CULTURE AEROBIC IDENTIFY: CPT

## 2024-05-06 PROCEDURE — 87186 SC STD MICRODIL/AGAR DIL: CPT

## 2024-05-06 PROCEDURE — 6370000000 HC RX 637 (ALT 250 FOR IP): Performed by: FAMILY MEDICINE

## 2024-05-06 PROCEDURE — 94761 N-INVAS EAR/PLS OXIMETRY MLT: CPT

## 2024-05-06 PROCEDURE — 87184 SC STD DISK METHOD PER PLATE: CPT

## 2024-05-06 PROCEDURE — 85018 HEMOGLOBIN: CPT

## 2024-05-06 PROCEDURE — 31622 DX BRONCHOSCOPE/WASH: CPT

## 2024-05-06 PROCEDURE — 85014 HEMATOCRIT: CPT

## 2024-05-06 PROCEDURE — 94669 MECHANICAL CHEST WALL OSCILL: CPT

## 2024-05-06 RX ORDER — FENTANYL CITRATE 0.05 MG/ML
50 INJECTION, SOLUTION INTRAMUSCULAR; INTRAVENOUS ONCE
Status: COMPLETED | OUTPATIENT
Start: 2024-05-06 | End: 2024-05-06

## 2024-05-06 RX ORDER — ASPIRIN 81 MG/1
81 TABLET, CHEWABLE ORAL DAILY
Status: DISCONTINUED | OUTPATIENT
Start: 2024-05-06 | End: 2024-05-11 | Stop reason: HOSPADM

## 2024-05-06 RX ORDER — MIDAZOLAM HYDROCHLORIDE 1 MG/ML
2 INJECTION INTRAMUSCULAR; INTRAVENOUS ONCE
Status: COMPLETED | OUTPATIENT
Start: 2024-05-06 | End: 2024-05-06

## 2024-05-06 RX ADMIN — MIDODRINE HYDROCHLORIDE 5 MG: 5 TABLET ORAL at 16:42

## 2024-05-06 RX ADMIN — METOPROLOL TARTRATE 25 MG: 25 TABLET, FILM COATED ORAL at 20:28

## 2024-05-06 RX ADMIN — DRONABINOL 2.5 MG: 2.5 CAPSULE ORAL at 16:42

## 2024-05-06 RX ADMIN — SODIUM CHLORIDE: 9 INJECTION, SOLUTION INTRAVENOUS at 16:26

## 2024-05-06 RX ADMIN — EMPAGLIFLOZIN 10 MG: 10 TABLET, FILM COATED ORAL at 08:36

## 2024-05-06 RX ADMIN — METOCLOPRAMIDE HYDROCHLORIDE 5 MG: 5 INJECTION INTRAMUSCULAR; INTRAVENOUS at 20:28

## 2024-05-06 RX ADMIN — GUAIFENESIN 200 MG: 200 SOLUTION ORAL at 20:28

## 2024-05-06 RX ADMIN — MIDODRINE HYDROCHLORIDE 5 MG: 5 TABLET ORAL at 12:33

## 2024-05-06 RX ADMIN — CHOLECALCIFEROL TAB 125 MCG (5000 UNIT) 5000 UNITS: 125 TAB at 08:36

## 2024-05-06 RX ADMIN — ONDANSETRON 4 MG: 2 INJECTION INTRAMUSCULAR; INTRAVENOUS at 16:41

## 2024-05-06 RX ADMIN — ACETYLCYSTEINE 400 MG: 200 SOLUTION ORAL; RESPIRATORY (INHALATION) at 19:27

## 2024-05-06 RX ADMIN — LEVALBUTEROL 1.25 MG: 1.25 SOLUTION, CONCENTRATE RESPIRATORY (INHALATION) at 14:07

## 2024-05-06 RX ADMIN — ATORVASTATIN CALCIUM 40 MG: 40 TABLET, FILM COATED ORAL at 20:28

## 2024-05-06 RX ADMIN — FUROSEMIDE 40 MG: 40 TABLET ORAL at 08:35

## 2024-05-06 RX ADMIN — METOCLOPRAMIDE HYDROCHLORIDE 5 MG: 5 INJECTION INTRAMUSCULAR; INTRAVENOUS at 08:37

## 2024-05-06 RX ADMIN — Medication 2000 MG: at 00:07

## 2024-05-06 RX ADMIN — SENNOSIDES 8.6 MG: 8.6 TABLET, FILM COATED ORAL at 08:35

## 2024-05-06 RX ADMIN — FENTANYL CITRATE 50 MCG: 0.05 INJECTION, SOLUTION INTRAMUSCULAR; INTRAVENOUS at 11:56

## 2024-05-06 RX ADMIN — Medication 2000 MG: at 08:39

## 2024-05-06 RX ADMIN — Medication 2000 MG: at 23:19

## 2024-05-06 RX ADMIN — SODIUM CHLORIDE, PRESERVATIVE FREE 10 ML: 5 INJECTION INTRAVENOUS at 08:33

## 2024-05-06 RX ADMIN — ASPIRIN 81 MG CHEWABLE TABLET 81 MG: 81 TABLET CHEWABLE at 08:35

## 2024-05-06 RX ADMIN — LEVALBUTEROL 1.25 MG: 1.25 SOLUTION, CONCENTRATE RESPIRATORY (INHALATION) at 19:26

## 2024-05-06 RX ADMIN — GUAIFENESIN 200 MG: 200 SOLUTION ORAL at 00:09

## 2024-05-06 RX ADMIN — ACETYLCYSTEINE 400 MG: 200 SOLUTION ORAL; RESPIRATORY (INHALATION) at 08:23

## 2024-05-06 RX ADMIN — LEVALBUTEROL 1.25 MG: 1.25 SOLUTION, CONCENTRATE RESPIRATORY (INHALATION) at 08:23

## 2024-05-06 RX ADMIN — DEXMEDETOMIDINE 0.8 MCG/KG/HR: 100 INJECTION, SOLUTION INTRAVENOUS at 01:13

## 2024-05-06 RX ADMIN — SODIUM CHLORIDE: 9 INJECTION, SOLUTION INTRAVENOUS at 16:52

## 2024-05-06 RX ADMIN — GUAIFENESIN 200 MG: 200 SOLUTION ORAL at 05:08

## 2024-05-06 RX ADMIN — OXYCODONE AND ACETAMINOPHEN 1 TABLET: 5; 325 TABLET ORAL at 16:42

## 2024-05-06 RX ADMIN — DEXMEDETOMIDINE 1 MCG/KG/HR: 100 INJECTION, SOLUTION INTRAVENOUS at 08:24

## 2024-05-06 RX ADMIN — CYANOCOBALAMIN TAB 1000 MCG 1000 MCG: 1000 TAB at 08:35

## 2024-05-06 RX ADMIN — PANTOPRAZOLE SODIUM 40 MG: 40 INJECTION, POWDER, FOR SOLUTION INTRAVENOUS at 08:37

## 2024-05-06 RX ADMIN — DIGOXIN 125 MCG: 125 TABLET ORAL at 08:35

## 2024-05-06 RX ADMIN — Medication 2000 MG: at 16:29

## 2024-05-06 RX ADMIN — SENNOSIDES 8.6 MG: 8.6 TABLET, FILM COATED ORAL at 20:28

## 2024-05-06 RX ADMIN — OXYCODONE AND ACETAMINOPHEN 1 TABLET: 5; 325 TABLET ORAL at 08:34

## 2024-05-06 RX ADMIN — MIDODRINE HYDROCHLORIDE 5 MG: 5 TABLET ORAL at 08:36

## 2024-05-06 RX ADMIN — MIRTAZAPINE 30 MG: 15 TABLET, ORALLY DISINTEGRATING ORAL at 20:28

## 2024-05-06 RX ADMIN — MIDAZOLAM HYDROCHLORIDE 2 MG: 1 INJECTION, SOLUTION INTRAMUSCULAR; INTRAVENOUS at 11:59

## 2024-05-06 RX ADMIN — PHENYLEPHRINE HYDROCHLORIDE 42 MCG/MIN: 10 INJECTION INTRAVENOUS at 11:52

## 2024-05-06 RX ADMIN — ISODIUM CHLORIDE 3 ML: 0.03 SOLUTION RESPIRATORY (INHALATION) at 14:07

## 2024-05-06 ASSESSMENT — PAIN DESCRIPTION - ORIENTATION
ORIENTATION: RIGHT

## 2024-05-06 ASSESSMENT — PULMONARY FUNCTION TESTS
PIF_VALUE: 20
PIF_VALUE: 21
PIF_VALUE: 20
PIF_VALUE: 22
PIF_VALUE: 18
PIF_VALUE: 20
PIF_VALUE: 20
PIF_VALUE: 21
PIF_VALUE: 20
PIF_VALUE: 21
PIF_VALUE: 20
PIF_VALUE: 21
PIF_VALUE: 21
PIF_VALUE: 20
PIF_VALUE: 21
PIF_VALUE: 20
PIF_VALUE: 20

## 2024-05-06 ASSESSMENT — PAIN SCALES - GENERAL
PAINLEVEL_OUTOF10: 0
PAINLEVEL_OUTOF10: 9
PAINLEVEL_OUTOF10: 0
PAINLEVEL_OUTOF10: 4
PAINLEVEL_OUTOF10: 5

## 2024-05-06 ASSESSMENT — PAIN - FUNCTIONAL ASSESSMENT
PAIN_FUNCTIONAL_ASSESSMENT: PREVENTS OR INTERFERES SOME ACTIVE ACTIVITIES AND ADLS

## 2024-05-06 ASSESSMENT — PAIN SCALES - WONG BAKER
WONGBAKER_NUMERICALRESPONSE: NO HURT

## 2024-05-06 ASSESSMENT — PAIN DESCRIPTION - LOCATION
LOCATION: FLANK

## 2024-05-06 ASSESSMENT — PAIN DESCRIPTION - DESCRIPTORS
DESCRIPTORS: PATIENT UNABLE TO DESCRIBE
DESCRIPTORS: ACHING;DISCOMFORT
DESCRIPTORS: PATIENT UNABLE TO DESCRIBE
DESCRIPTORS: ACHING;DISCOMFORT

## 2024-05-06 ASSESSMENT — PAIN DESCRIPTION - ONSET
ONSET: ON-GOING

## 2024-05-06 ASSESSMENT — PAIN DESCRIPTION - FREQUENCY
FREQUENCY: CONTINUOUS

## 2024-05-06 ASSESSMENT — PAIN DESCRIPTION - PAIN TYPE
TYPE: ACUTE PAIN
TYPE: ACUTE PAIN

## 2024-05-06 NOTE — PROGRESS NOTES
Pulmonary Critical Care Progress Note       Patient seen for the follow up of bilateral pleural effusions, hemoptysis     Subjective:  Patient is sedated, intubated on ventilator.  He is on Precedex.  Mat-Synephrine continues.He is awake & follows commands.    Examination:  Vitals: BP (!) 101/57   Pulse 67   Temp 97.3 °F (36.3 °C) (Temporal)   Resp 17   Ht 1.829 m (6')   Wt 66.5 kg (146 lb 11.2 oz)   SpO2 98%   BMI 19.90 kg/m²   General appearance: In no acute distress, alert and cooperative with exam cachectic  Neck: No JVD  Lungs: Decreased breath sound bilateral rhonchi  Heart: irregular rate and rhythm, S1, S2 normal, no gallop  Abdomen: Soft, non tender, + BS  Cholecystotomy tube in place bloody output  Extremities: no cyanosis or clubbing. No significant edema, left upper and lower extremity weakness right above-knee amputation left foot dorsal ulcer    LABs:  CBC:   Recent Labs     05/04/24  0515 05/05/24  0505 05/05/24  1330 05/05/24  2112 05/06/24  0525   WBC 7.7 7.6  --   --  5.6   HGB 8.0* 7.4* 7.1* 7.5* 7.6*   HCT 25.6* 24.1* 23.2* 24.6* 24.6*    225  --   --  197       BMP:   Recent Labs     05/04/24  0515 05/05/24  0505 05/06/24  0525    136 135   K 3.7 3.6* 4.2   CO2 23 25 27   BUN 20 23 22   CREATININE 1.3* 1.2 0.9   LABGLOM 56* 61 86   GLUCOSE 105* 146* 171*       BAL culture 5/2      Specimen Description .BRONCHIAL WASHINGS LLL   Direct Exam FEW NEUTROPHILS    FEW GRAM POSITIVE COCCI IN PAIRS Abnormal    Culture  Abnormal   STAPHYLOCOCCUS SPECIES, COAGULASE NEGATIVE MODERATE GROWTH Susceptibilities have not been performed.  Notify the laboratory within 7 days for further workup if clinically indicated.   LEFT MAIN STEM WASHINGS:         NEGATIVE FOR MALIGNANCY.    THORACENTESIS, RIGHT FLUID:         NEGATIVE FOR MALIGNANCY.     Radiology:      Chest x-ray 5/4        Chest x-ray 5/3    Chest x-ray 5/1  Unchanged left lung atelectasis      Chest x-ray 4/30      Chest x-ray  IV every 8  Bronchoscopy and pulmonary toilet prior to extubation to improve chance of preserving lung recruitment    Hypotension requiring pressor/increased cortisol level suspect following hydrocortisone dose  Titrate Mat-Synephrine to MAP 65-75 mmHg  Midodrine  Off hydrocortisone    Acute cholecystitis/acute anemia/Hemoptysis  Hemoptysis persist on suction  Monitor hemoglobin hematocrit  Transfuse 1 unit PRBC if hemoglobin less than 7  Status post EGD with negative findings  Plan for elective colonoscopy outpatient  Cholecystotomy tube  monitor output   Completed Zosyn per ID 4/26  Hematology oncology following    Paroxysmal A-fib   Cardiology signed off  Not able to resume anticoagulation given bleeding issues despite CVA risk  Hold heparin drip off Eliquis given decreased hemoglobin    History of stage 1 lung cancer 2019 s/p lung resection  No adjuvant therapy     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 90 % R and 80 % Left .   Off plavix  Status post R AKA  Vascular consult  CEA electively    Patient does not want tracheostomy.  I called and discussed with patient's wife.  She will be discussing with patient about tracheostomy and long-term plans tomorrow. Palliative team on consult  peptic ulcer disease prophylaxis   DVT prophylaxis EPC for now    Electronically signed by     Joe Mcneil MD on 5/6/2024 at 12:00 PM  Pulmonary Critical Care and Sleep Medicine,  [facility]  Office: [phone]  CC: 35 minutes

## 2024-05-06 NOTE — PROGRESS NOTES
End Of Shift Note  [facility]  Summary of shift: Patient had an uneventful night.  Hermila remained the same rate all night which was at 40, Precedex increased to 1mcg d/t restlessness.  Patient was bathe, linens changed and wt was taken.  Patient has had no pain all night.      Vitals:    Vitals:    05/06/24 0222 05/06/24 0256 05/06/24 0300 05/06/24 0318   BP:   (!) 98/50 (!) 118/56   Pulse: 74 73 73 74   Resp:  18 19 19   Temp:    98 °F (36.7 °C)   TempSrc:    Temporal   SpO2: 98% 99% 98% 99%   Weight:    66.5 kg (146 lb 11.2 oz)   Height:            I&O:   Intake/Output Summary (Last 24 hours) at 5/6/2024 0330  Last data filed at 5/6/2024 0318  Gross per 24 hour   Intake 2477.53 ml   Output 1890 ml   Net 587.53 ml       Resp Status: 30%FIo2    Ventilator Settings:  Vent Mode: AC/PRVC Resp Rate (Set): 16 bpm/Vt (Set, mL): 500 mL/ /FiO2 : 30 %    Critical Care IV infusions:   propofol Stopped (05/04/24 2247)    phenylephrine (HERMILA-SYNEPHRINE) 50 mg in sodium chloride 0.9 % 250 mL infusion 40 mcg/min (05/05/24 1648)    dexmedeTOMIDine (PRECEDEX) 400 mcg in sodium chloride 0.9 % 100 mL infusion 1 mcg/kg/hr (05/06/24 0310)    sodium chloride      sodium chloride 10 mL/hr at 05/05/24 1648    dextrose          LDA:   PICC 05/03/24 Right Cephalic (Active)   Number of days: 2       Peripheral IV 04/24/24 Distal;Left Forearm (Active)   Number of days: 11       Peripheral IV 04/25/24 Right;Anterior Cephalic (Active)   Number of days: 10       Biliary Tube 04/19/24 T-tube 8 fr RUQ (Active)   Number of days: 16       NG/OG/NJ/NE Tube Nasogastric 14 fr Left nostril (Active)   Number of days: 3       Urinary Catheter 05/02/24 Dotson (Active)   Number of days: 3       ETT  (Active)   Number of days: 3       Wound 04/04/24 Foot Left;Dorsal dry, scabbed (Active)   Number of days: 31       Wound Foot Left;Lateral (Active)   Number of days:

## 2024-05-06 NOTE — PLAN OF CARE
Problem: Respiratory - Adult  Goal: Able to breathe comfortably  Description: Able to breathe comfortably  5/6/2024 1905 by Giselle Salas RCP  Outcome: Progressing     Problem: Respiratory - Adult  Goal: Clear lung sounds  5/6/2024 1905 by Giselle Salas RCP  Outcome: Progressing     Problem: Respiratory - Adult  Goal: Adequate oxygenation  Description: Adequate oxygenation  5/6/2024 1905 by Giselle Salas RCP  Outcome: Progressing     Problem: Respiratory - Adult  Goal: Will be able to breathe spontaneously, without ventilator support  Description: Will be able to breathe spontaneously, without ventilator support  5/6/2024 1905 by Giselle Salas RCP  Outcome: Completed

## 2024-05-06 NOTE — PROGRESS NOTES
[facility]  Office: [phone]  Chinmay Ramos DO, Jose Atkinson DO, Guevara Ramirez DO, Pranay Cooper, DO, Sumeet Eller MD, Karen Carvajal MD, Sony Crawford MD, Poonam Platt MD,  Stephan San MD, Marcello Daniels MD, Theodore Pascual MD,  Juve Mahmood DO, Matias Mckeon MD, Hero Tavarez MD, Valentin Ramos DO, Jazzy Spence MD,  Trino Handley DO, Flavia Leonardo MD, Vanessa Soriano MD, Pricilla Akers MD, Casimiro Knight MD,  Zeke Maciel MD, Guera Cervantes MD, Kianna Martin MD, Mirlande Worrell MD, Ney Garcia MD, Savannah Domignuez MD, Ed Emerson DO, Jaylen West DO, Comfort Carcamo MD,  Siva Ashley MD, Shirley Waterhouse, CNP,  Carolyn Pelaez CNP, Scotty Ge, CNP,  Wandy Trivedi, DNP, Malissa Gaston, CNP, Lana Penaloza, CNP, Susan Haro, CNP, Sherrell Mendoza, CNP, Suzy Morales, PA-C, Dalila Lange PA-C, Sada Matt, CNP, Crystal Womack, CNP, Sheng Reddy, CNP, Arielle Guy, CNP, Linsey Perez, CNP, Anju Cho, CNS, Hoa Donahue, CNP, Cassidy Ferrer CNP, Tracy Schwab, CNP       [facility]      Daily Progress Note     Admit Date: 4/3/2024  Bed/Room No.  2027/2027-01  Admitting Physician : Sony Crawford MD  Code Status :Full Code  Hospital Day:  LOS: 33 days   Chief Complaint:     Chief Complaint   Patient presents with    Leg Pain     Left leg, open wounds and swollen       Principal Problem:    Melena  Active Problems:    History of arterial bypass of lower extremity    S/P AKA (above knee amputation) unilateral, right (HCC)    Primary hypertension    Peripheral arterial disease (HCC)    Neuropathy    Malignant neoplasm of lung (HCC)    Intermittent claudication of left lower extremity due to atherosclerosis (HCC)    Embolism and thrombosis of artery (HCC)    Left leg cellulitis    Iron deficiency anemia due to chronic blood loss    Hx of cancer of lung    Adrenal mass (HCC)    Claudication (HCC)    Pleural effusion, bilateral

## 2024-05-06 NOTE — PLAN OF CARE
Problem: Respiratory - Adult  Goal: Able to breathe comfortably  Description: Able to breathe comfortably  Outcome: Progressing  Goal: Clear lung sounds  Outcome: Progressing  Goal: Adequate oxygenation  Description: Adequate oxygenation  Outcome: Progressing  Goal: Will be able to breathe spontaneously, without ventilator support  Description: Will be able to breathe spontaneously, without ventilator support  Outcome: Progressing

## 2024-05-06 NOTE — CARE COORDINATION
Discharge planning    Patient is extubated. Physicians will re-evaluate tomorrow. On discussion about trach vs other long term plans.Marquise following. Will need Swallow Study.

## 2024-05-06 NOTE — PLAN OF CARE
Problem: Safety - Adult  Goal: Free from fall injury  Outcome: Progressing     Problem: Discharge Planning  Goal: Discharge to home or other facility with appropriate resources  Outcome: Progressing     Problem: Pain  Goal: Verbalizes/displays adequate comfort level or baseline comfort level  Outcome: Progressing     Problem: Skin/Tissue Integrity - Adult  Goal: Incisions, wounds, or drain sites healing without S/S of infection  Outcome: Progressing  Flowsheets (Taken 5/6/2024 0202)  Incisions, Wounds, or Drain Sites Healing Without Sign and Symptoms of Infection:   ADMISSION and DAILY: Assess and document risk factors for pressure ulcer development   TWICE DAILY: Assess and document skin integrity   Implement wound care per orders   Initiate isolation precautions as appropriate   Initiate pressure ulcer prevention bundle as indicated   TWICE DAILY: Assess and document dressing/incision, wound bed, drain sites and surrounding tissue     Problem: Infection - Adult  Goal: Absence of infection at discharge  Outcome: Progressing     Problem: Hematologic - Adult  Goal: Maintains hematologic stability  Outcome: Progressing     Problem: Respiratory - Adult  Goal: Able to breathe comfortably  Description: Able to breathe comfortably  5/5/2024 1909 by Hoa Thornton RCP  Outcome: Progressing  Goal: Clear lung sounds  5/5/2024 1909 by Hoa Thornton RCP  Outcome: Progressing  Goal: Adequate oxygenation  Description: Adequate oxygenation  5/5/2024 1909 by Hoa Thornton RCP  Outcome: Progressing  Goal: Will be able to breathe spontaneously, without ventilator support  Description: Will be able to breathe spontaneously, without ventilator support  5/5/2024 1909 by Hoa Thornton RCP  Outcome: Progressing

## 2024-05-06 NOTE — OP NOTE
Operative Note    Procedure Note: Bronchoscopy  Joe Mcneil MD     Pre-op Diagnosis: Left lung atelectasis  Post-op Diagnosis: Left lung atelectasis  Bronchoscopist: Joe Mcneil MD  Anesthesia: Versed 2 mg IV push, fentanyl 100 mics IV push, Precedex drip  Procedure: Flexible fiberoptic bronchoscopy    Indications and History  The patient is a 78 y.o. male with left lung atelectasis.  (Please see today's progress notes for the latest issues,  physical exam and lab data)    Consent to Procedure  The risks, benefits, complications, treatment options and expected outcomes were discussed with the patient and his wife on the phone.  The possibilities of reaction to medication, pulmonary aspiration, perforation of a viscus, bleeding, failure to diagnose a condition and creating a complication requiring transfusion or operation were discussed with the patient and his wife on the phone who gave me a telephone consent.  Patient also agreed to proceed with bronchoscopy.    Description of Procedure  The patient was intubated on mechanical ventilation and placed on 100% oxygen. Jorge Luis Banda was monitored by the Critical Nursing and Respiratory therapy staff and the standard ICU monitoring devices. Jorge Luis Mirandason and the procedure were verified as Flexible Fiberoptic Bronchoscopy.  A Time Out was held and the above information confirmed.     The bronchoscope was then passed into the trachea via the ET tube. After careful inspection of the tracheal, the bronchoscope was sequentially passed into all segments of right and left endobronchial trees to the second and/or third divisions.    Endobronchial findings  Distal trachea: relatively normal with no significant lesion.   Mild inflammatory changes were noted with mild tracheomalcia.  Larissa: Sharp and mobile with no mass.  Right endobronchial trees: The endobronchial survey was significant for   No mass  No bleeding  Mild endobronchial inflammation  No significant

## 2024-05-06 NOTE — PROGRESS NOTES
Allergies:  Patient has no known allergies.    Social History:   reports that he quit smoking about 5 years ago. He has never used smokeless tobacco. He reports current alcohol use of about 3.0 standard drinks of alcohol per week. He reports that he does not use drugs.     Family History: family history is not on file.    REVIEW OF SYSTEMS:    Unobtainable  PHYSICAL EXAM:      BP (!) 99/51   Pulse 93   Temp 97.7 °F (36.5 °C) (Temporal)   Resp 24   Ht 1.829 m (6')   Wt 66.5 kg (146 lb 11.2 oz)   SpO2 98%   BMI 19.90 kg/m²    Temp (24hrs), Av.5 °F (36.4 °C), Min:97.1 °F (36.2 °C), Max:98 °F (36.7 °C)    General appearance sedated intubated appears her stated age.  Cachectic  Mental status - alert and cooperative   Eyes - pupils equal and reactive, extraocular eye movements intact   Ears - bilateral TM's and external ear canals normal   Mouth - mucous membranes moist, pharynx normal without lesions   Neck - supple, no significant adenopathy   Lymphatics - no palpable lymphadenopathy, no hepatosplenomegaly   Chest -diminished air entry, bilateral crackles  Heart - normal rate, regular rhythm, normal S1, S2, no murmurs  Abdomen - soft, nontender, nondistended, no masses or organomegaly   Neurological -sedated intubated  Musculoskeletal -right AKA, diffuse aches and pains.  No joint swelling.  He has signs of severe peripheral vascular disease in the left lower extremity  Skin -severe PAD changes and ulcer in his left foot    DATA:    Labs:   CBC:   Recent Labs     24  0505 24  1330 24  0525 24  1223   WBC 7.6  --  5.6  --    HGB 7.4*   < > 7.6* 7.9*   HCT 24.1*   < > 24.6* 25.9*     --  197  --     < > = values in this interval not displayed.     BMP:   Recent Labs     24  0505 24  0525    135   K 3.6* 4.2   CO2 25 27   BUN 23 22   CREATININE 1.2 0.9   LABGLOM 61 86   GLUCOSE 146* 171*     PT/INR:   No results for input(s): \"PROTIME\", \"INR\" in the last 72  diversion.

## 2024-05-06 NOTE — PROGRESS NOTES
Order obtained for extubation.  SpO2 of 98 on 30% FiO2.   Patient extubated and placed on 2 liters/min via nasal cannula.   Post extubation SpO2 is 100% with HR  67 bpm and RR 23 breaths/min.    Patient had mild cough that was non-productive.  Extubation Well tolerated by patient..   Breath Sounds: Diminished    ANNA GILLESPIE RCP   2:08 PM

## 2024-05-06 NOTE — PROGRESS NOTES
Bronchoscopy at bedside with Dr. Mcneil. Patient placed on 100% for procedure and sample walked up to Lab.

## 2024-05-06 NOTE — PROGRESS NOTES
VASCULAR SURGERY  PROGRESS NOTE      5/6/2024 5:54 PM  Subjective:   Admit Date: 4/3/2024  PCP: Lucila Machado APRN - CNP    Chief Complaint   Patient presents with    Leg Pain     Left leg, open wounds and swollen       Interval History: Patient extubated.  Status post bronchoscopy.    Diet: ADULT DIET; Regular; 5 carb choices (75 gm/meal); Low Fat/Low Chol/High Fiber/BRIDGET; No Drinking Straws  ADULT ORAL NUTRITION SUPPLEMENT; Breakfast, Lunch, Dinner; Standard High Calorie/High Protein Oral Supplement    Medications:   Scheduled Meds:   aspirin  81 mg Oral Daily    pantoprazole (PROTONIX) 40 mg in sodium chloride (PF) 0.9 % 10 mL injection  40 mg IntraVENous Daily    senna  1 tablet Per NG tube BID    polyethylene glycol  17 g Per NG tube Daily    metoclopramide  5 mg IntraVENous Q12H    ceFAZolin  2,000 mg IntraVENous Q8H    guaiFENesin  200 mg Per NG tube Q4H    midazolam  2 mg IntraVENous Once    acetylcysteine  400 mg Inhalation BID RT    digoxin  125 mcg Oral Daily    sodium chloride nebulizer  3 mL Nebulization TID RT    droNABinol  2.5 mg Oral BID WC    levalbuterol  1.25 mg Nebulization TID    mirtazapine  30 mg Oral Nightly    midodrine  5 mg Oral TID WC    furosemide  40 mg Oral Daily    sodium chloride  80 mL IntraVENous Once    empagliflozin  10 mg Oral Daily    sodium chloride  80 mL IntraVENous Once    metoprolol tartrate  25 mg Oral BID    sodium chloride  100 mL IntraVENous Once    ferrous sulfate  325 mg Oral Daily with breakfast    atorvastatin  40 mg Oral Nightly    vitamin D3  5,000 Units Oral Daily    vitamin B-12  1,000 mcg Oral Daily    tamsulosin  0.4 mg Oral Daily    sodium chloride flush  5-40 mL IntraVENous 2 times per day     Continuous Infusions:   propofol Stopped (05/04/24 2247)    phenylephrine (HERMILA-SYNEPHRINE) 50 mg in sodium chloride 0.9 % 250 mL infusion 15 mcg/min (05/06/24 1649)    dexmedeTOMIDine (PRECEDEX) 400 mcg in sodium chloride 0.9 % 100 mL infusion Stopped  stenosis with long segment of plaque involving the mid to distal common carotid  Severe peripheral arterial disease with patent right axillobifemoral bypass  GI bleed  Atrial fibrillation  Right AKA  6.5 cm right adrenal mass  Positive HIDA scan    Patient Active Problem List:     Skin eschar     Stenosis of left carotid artery     History of arterial bypass of lower extremity     S/P AKA (above knee amputation) unilateral, right (HCC)     Primary hypertension     Persistent wound pain     Peripheral arterial disease (HCC)     Ulcer of left foot (HCC)     Neuropathy     Malignant neoplasm of lung (HCC)     Intermittent claudication of left lower extremity due to atherosclerosis (HCC)     Frequency of urination     Former smoker     Embolism and thrombosis of artery (HCC)     Dyslipidemia     Closed fracture of left humerus     Closed fracture of surgical neck of humerus     Acute exacerbation of chronic obstructive airways disease (HCC)     Left leg cellulitis     Arteriosclerosis of coronary artery     Age-related osteoporosis without current pathological fracture     Adenocarcinoma of lung (HCC)     Wound infection     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)     Melena     Iron deficiency anemia due to chronic blood loss     Hx of cancer of lung     Adrenal mass (HCC)     Claudication (HCC)     Pleural effusion, bilateral     Longstanding persistent atrial fibrillation (HCC)     Acute ischemic right internal carotid artery (ICA) stroke (HCC)     Internal carotid artery stenosis, bilateral     Anemia     Gastrointestinal hemorrhage     NSTEMI (non-ST elevated myocardial infarction) (HCC)     Hypotension     Atrial fibrillation with rapid ventricular response (HCC)     Unstable angina (HCC)      Plan:   Plans per pulmonary  Placement    Electronically signed by Sonny Alcantar MD on 5/6/2024 at 5:54 PM

## 2024-05-06 NOTE — PLAN OF CARE
Problem: Safety - Adult  Goal: Free from fall injury  5/6/2024 1336 by Hattie Loya RN  Outcome: Progressing  Flowsheets (Taken 5/6/2024 1055)  Free From Fall Injury:   Instruct family/caregiver on patient safety   Based on caregiver fall risk screen, instruct family/caregiver to ask for assistance with transferring infant if caregiver noted to have fall risk factors  5/6/2024 0219 by Jazzy Adrian RN  Outcome: Progressing     Problem: Discharge Planning  Goal: Discharge to home or other facility with appropriate resources  5/6/2024 1336 by Hattie Loya RN  Outcome: Progressing  Flowsheets (Taken 5/6/2024 0800)  Discharge to home or other facility with appropriate resources: Identify barriers to discharge with patient and caregiver  5/6/2024 0219 by Jazzy Adrian RN  Outcome: Progressing     Problem: Pain  Goal: Verbalizes/displays adequate comfort level or baseline comfort level  5/6/2024 1336 by Hattie Loya RN  Outcome: Progressing  Flowsheets (Taken 5/6/2024 0800)  Verbalizes/displays adequate comfort level or baseline comfort level:   Encourage patient to monitor pain and request assistance   Assess pain using appropriate pain scale   Administer analgesics based on type and severity of pain and evaluate response  5/6/2024 0219 by Jazzy Adrian RN  Outcome: Progressing     Problem: Skin/Tissue Integrity - Adult  Goal: Incisions, wounds, or drain sites healing without S/S of infection  5/6/2024 1336 by Hattie Loya RN  Outcome: Progressing  Flowsheets (Taken 5/6/2024 0800)  Incisions, Wounds, or Drain Sites Healing Without Sign and Symptoms of Infection: TWICE DAILY: Assess and document skin integrity  5/6/2024 0219 by Jazzy Adrian RN  Outcome: Progressing  Flowsheets (Taken 5/6/2024 0202)  Incisions, Wounds, or Drain Sites Healing Without Sign and Symptoms of Infection:   ADMISSION and DAILY: Assess and document risk factors for pressure ulcer development   TWICE DAILY: Assess and  restraint application   Inform patient/family regarding the reason for restraint   Every 2 hours: Monitor safety, psychosocial status, comfort, nutrition and hydration

## 2024-05-07 ENCOUNTER — APPOINTMENT (OUTPATIENT)
Dept: GENERAL RADIOLOGY | Age: 79
DRG: 377 | End: 2024-05-07
Payer: MEDICARE

## 2024-05-07 LAB
ANION GAP SERPL CALCULATED.3IONS-SCNC: 11 MMOL/L (ref 9–17)
BASOPHILS # BLD: 0.04 K/UL (ref 0–0.2)
BASOPHILS NFR BLD: 1 %
BODY FLD TYPE: NORMAL
BUN SERPL-MCNC: 19 MG/DL (ref 8–23)
BUN/CREAT SERPL: 21 (ref 9–20)
CALCIUM SERPL-MCNC: 7.5 MG/DL (ref 8.6–10.4)
CHLORIDE SERPL-SCNC: 101 MMOL/L (ref 98–107)
CO2 SERPL-SCNC: 25 MMOL/L (ref 20–31)
CREAT SERPL-MCNC: 0.9 MG/DL (ref 0.7–1.2)
EOSINOPHIL # BLD: 0.34 K/UL (ref 0–0.4)
EOSINOPHILS RELATIVE PERCENT: 8 % (ref 1–4)
ERYTHROCYTE [DISTWIDTH] IN BLOOD BY AUTOMATED COUNT: 18.3 % (ref 11.8–14.4)
GFR, ESTIMATED: 86 ML/MIN/1.73M2
GLUCOSE BLD-MCNC: 103 MG/DL (ref 75–110)
GLUCOSE BLD-MCNC: 104 MG/DL (ref 75–110)
GLUCOSE BLD-MCNC: 80 MG/DL (ref 75–110)
GLUCOSE SERPL-MCNC: 85 MG/DL (ref 70–99)
HCT VFR BLD AUTO: 19.8 % (ref 40.7–50.3)
HCT VFR BLD AUTO: 26.2 % (ref 40.7–50.3)
HCT VFR BLD AUTO: 27.1 % (ref 40.7–50.3)
HGB BLD-MCNC: 6 G/DL (ref 13–17)
HGB BLD-MCNC: 8.3 G/DL (ref 13–17)
HGB BLD-MCNC: 8.6 G/DL (ref 13–17)
IMM GRANULOCYTES # BLD AUTO: 0.04 K/UL (ref 0–0.3)
IMM GRANULOCYTES NFR BLD: 1 %
LYMPHOCYTES NFR BLD: 0.52 K/UL (ref 1–4.8)
LYMPHOCYTES NFR FLD: 8 %
LYMPHOCYTES RELATIVE PERCENT: 12 % (ref 24–44)
MAGNESIUM SERPL-MCNC: 1.6 MG/DL (ref 1.6–2.6)
MCH RBC QN AUTO: 29.1 PG (ref 25.2–33.5)
MCHC RBC AUTO-ENTMCNC: 30.3 G/DL (ref 28.4–34.8)
MCV RBC AUTO: 96.1 FL (ref 82.6–102.9)
MICROORGANISM/AGENT SPEC: NORMAL
MONOCYTES NFR BLD: 0.39 K/UL (ref 0.2–0.8)
MONOCYTES NFR BLD: 9 % (ref 1–7)
NEUTROPHILS NFR BLD: 69 % (ref 36–66)
NEUTROPHILS NFR FLD: 84 %
NEUTS SEG NFR BLD: 2.97 K/UL (ref 1.8–7.7)
NRBC BLD-RTO: 0 PER 100 WBC
PLATELET # BLD AUTO: 162 K/UL (ref 138–453)
PMV BLD AUTO: 10.6 FL (ref 8.1–13.5)
POTASSIUM SERPL-SCNC: 3.7 MMOL/L (ref 3.7–5.3)
RBC # BLD AUTO: 2.06 M/UL (ref 4.21–5.77)
RBC # FLD: 9025 CELLS/UL
SODIUM SERPL-SCNC: 137 MMOL/L (ref 135–144)
SPECIMEN DESCRIPTION: NORMAL
SURGICAL PATHOLOGY REPORT: NORMAL
UNIDENT CELLS NFR FLD: NORMAL %
WBC # FLD: 792 CELLS/UL
WBC OTHER # BLD: 4.3 K/UL (ref 3.5–11.3)

## 2024-05-07 PROCEDURE — 85014 HEMATOCRIT: CPT

## 2024-05-07 PROCEDURE — 83735 ASSAY OF MAGNESIUM: CPT

## 2024-05-07 PROCEDURE — 36430 TRANSFUSION BLD/BLD COMPNT: CPT

## 2024-05-07 PROCEDURE — 2580000003 HC RX 258: Performed by: INTERNAL MEDICINE

## 2024-05-07 PROCEDURE — 6360000002 HC RX W HCPCS: Performed by: INTERNAL MEDICINE

## 2024-05-07 PROCEDURE — 6370000000 HC RX 637 (ALT 250 FOR IP): Performed by: INTERNAL MEDICINE

## 2024-05-07 PROCEDURE — 2000000000 HC ICU R&B

## 2024-05-07 PROCEDURE — 2500000003 HC RX 250 WO HCPCS: Performed by: NURSE PRACTITIONER

## 2024-05-07 PROCEDURE — P9016 RBC LEUKOCYTES REDUCED: HCPCS

## 2024-05-07 PROCEDURE — C9113 INJ PANTOPRAZOLE SODIUM, VIA: HCPCS | Performed by: FAMILY MEDICINE

## 2024-05-07 PROCEDURE — 71045 X-RAY EXAM CHEST 1 VIEW: CPT

## 2024-05-07 PROCEDURE — 82947 ASSAY GLUCOSE BLOOD QUANT: CPT

## 2024-05-07 PROCEDURE — 85018 HEMOGLOBIN: CPT

## 2024-05-07 PROCEDURE — 94669 MECHANICAL CHEST WALL OSCILL: CPT

## 2024-05-07 PROCEDURE — 85025 COMPLETE CBC W/AUTO DIFF WBC: CPT

## 2024-05-07 PROCEDURE — 86900 BLOOD TYPING SEROLOGIC ABO: CPT

## 2024-05-07 PROCEDURE — 36415 COLL VENOUS BLD VENIPUNCTURE: CPT

## 2024-05-07 PROCEDURE — A4216 STERILE WATER/SALINE, 10 ML: HCPCS | Performed by: FAMILY MEDICINE

## 2024-05-07 PROCEDURE — 2700000000 HC OXYGEN THERAPY PER DAY

## 2024-05-07 PROCEDURE — 92610 EVALUATE SWALLOWING FUNCTION: CPT

## 2024-05-07 PROCEDURE — 86920 COMPATIBILITY TEST SPIN: CPT

## 2024-05-07 PROCEDURE — 94761 N-INVAS EAR/PLS OXIMETRY MLT: CPT

## 2024-05-07 PROCEDURE — 6370000000 HC RX 637 (ALT 250 FOR IP): Performed by: FAMILY MEDICINE

## 2024-05-07 PROCEDURE — 94660 CPAP INITIATION&MGMT: CPT

## 2024-05-07 PROCEDURE — 86901 BLOOD TYPING SEROLOGIC RH(D): CPT

## 2024-05-07 PROCEDURE — 99232 SBSQ HOSP IP/OBS MODERATE 35: CPT | Performed by: INTERNAL MEDICINE

## 2024-05-07 PROCEDURE — 80048 BASIC METABOLIC PNL TOTAL CA: CPT

## 2024-05-07 PROCEDURE — 86850 RBC ANTIBODY SCREEN: CPT

## 2024-05-07 PROCEDURE — 94640 AIRWAY INHALATION TREATMENT: CPT

## 2024-05-07 PROCEDURE — 2580000003 HC RX 258: Performed by: FAMILY MEDICINE

## 2024-05-07 PROCEDURE — 6360000002 HC RX W HCPCS: Performed by: FAMILY MEDICINE

## 2024-05-07 RX ORDER — SODIUM CHLORIDE 9 MG/ML
INJECTION, SOLUTION INTRAVENOUS PRN
Status: DISCONTINUED | OUTPATIENT
Start: 2024-05-07 | End: 2024-05-11 | Stop reason: HOSPADM

## 2024-05-07 RX ADMIN — MIDODRINE HYDROCHLORIDE 5 MG: 5 TABLET ORAL at 16:41

## 2024-05-07 RX ADMIN — MAGNESIUM SULFATE HEPTAHYDRATE 1000 MG: 1 INJECTION, SOLUTION INTRAVENOUS at 08:39

## 2024-05-07 RX ADMIN — LEVALBUTEROL 1.25 MG: 1.25 SOLUTION, CONCENTRATE RESPIRATORY (INHALATION) at 08:05

## 2024-05-07 RX ADMIN — OXYCODONE AND ACETAMINOPHEN 1 TABLET: 5; 325 TABLET ORAL at 08:06

## 2024-05-07 RX ADMIN — DIGOXIN 125 MCG: 125 TABLET ORAL at 08:05

## 2024-05-07 RX ADMIN — POLYETHYLENE GLYCOL 3350 17 G: 17 POWDER, FOR SOLUTION ORAL at 08:05

## 2024-05-07 RX ADMIN — Medication 2000 MG: at 16:45

## 2024-05-07 RX ADMIN — METOCLOPRAMIDE HYDROCHLORIDE 5 MG: 5 INJECTION INTRAMUSCULAR; INTRAVENOUS at 20:20

## 2024-05-07 RX ADMIN — LEVALBUTEROL 1.25 MG: 1.25 SOLUTION, CONCENTRATE RESPIRATORY (INHALATION) at 14:34

## 2024-05-07 RX ADMIN — OXYCODONE AND ACETAMINOPHEN 1 TABLET: 5; 325 TABLET ORAL at 12:24

## 2024-05-07 RX ADMIN — EMPAGLIFLOZIN 10 MG: 10 TABLET, FILM COATED ORAL at 08:07

## 2024-05-07 RX ADMIN — APIXABAN 5 MG: 5 TABLET, FILM COATED ORAL at 09:11

## 2024-05-07 RX ADMIN — ASPIRIN 81 MG CHEWABLE TABLET 81 MG: 81 TABLET CHEWABLE at 08:05

## 2024-05-07 RX ADMIN — FERROUS SULFATE TAB EC 325 MG (65 MG FE EQUIVALENT) 325 MG: 325 (65 FE) TABLET DELAYED RESPONSE at 08:07

## 2024-05-07 RX ADMIN — MIDODRINE HYDROCHLORIDE 5 MG: 5 TABLET ORAL at 08:06

## 2024-05-07 RX ADMIN — OXYCODONE AND ACETAMINOPHEN 1 TABLET: 5; 325 TABLET ORAL at 00:22

## 2024-05-07 RX ADMIN — CHOLECALCIFEROL TAB 125 MCG (5000 UNIT) 5000 UNITS: 125 TAB at 08:06

## 2024-05-07 RX ADMIN — METOCLOPRAMIDE HYDROCHLORIDE 5 MG: 5 INJECTION INTRAMUSCULAR; INTRAVENOUS at 08:07

## 2024-05-07 RX ADMIN — MIRTAZAPINE 30 MG: 15 TABLET, ORALLY DISINTEGRATING ORAL at 20:20

## 2024-05-07 RX ADMIN — SENNOSIDES 8.6 MG: 8.6 TABLET, FILM COATED ORAL at 08:07

## 2024-05-07 RX ADMIN — GUAIFENESIN 200 MG: 200 SOLUTION ORAL at 00:31

## 2024-05-07 RX ADMIN — FUROSEMIDE 40 MG: 40 TABLET ORAL at 08:05

## 2024-05-07 RX ADMIN — SENNOSIDES 8.6 MG: 8.6 TABLET, FILM COATED ORAL at 20:20

## 2024-05-07 RX ADMIN — DRONABINOL 2.5 MG: 2.5 CAPSULE ORAL at 16:41

## 2024-05-07 RX ADMIN — METOPROLOL TARTRATE 25 MG: 25 TABLET, FILM COATED ORAL at 08:07

## 2024-05-07 RX ADMIN — DRONABINOL 2.5 MG: 2.5 CAPSULE ORAL at 08:07

## 2024-05-07 RX ADMIN — ISODIUM CHLORIDE 3 ML: 0.03 SOLUTION RESPIRATORY (INHALATION) at 14:34

## 2024-05-07 RX ADMIN — Medication 2000 MG: at 23:45

## 2024-05-07 RX ADMIN — MAGNESIUM SULFATE HEPTAHYDRATE 1000 MG: 1 INJECTION, SOLUTION INTRAVENOUS at 09:47

## 2024-05-07 RX ADMIN — Medication 2000 MG: at 08:03

## 2024-05-07 RX ADMIN — ATORVASTATIN CALCIUM 40 MG: 40 TABLET, FILM COATED ORAL at 20:20

## 2024-05-07 RX ADMIN — PANTOPRAZOLE SODIUM 40 MG: 40 INJECTION, POWDER, FOR SOLUTION INTRAVENOUS at 08:05

## 2024-05-07 RX ADMIN — TAMSULOSIN HYDROCHLORIDE 0.4 MG: 0.4 CAPSULE ORAL at 08:05

## 2024-05-07 RX ADMIN — LEVALBUTEROL 1.25 MG: 1.25 SOLUTION, CONCENTRATE RESPIRATORY (INHALATION) at 19:10

## 2024-05-07 RX ADMIN — ACETYLCYSTEINE 400 MG: 200 SOLUTION ORAL; RESPIRATORY (INHALATION) at 08:06

## 2024-05-07 RX ADMIN — APIXABAN 5 MG: 5 TABLET, FILM COATED ORAL at 20:20

## 2024-05-07 RX ADMIN — SODIUM CHLORIDE, PRESERVATIVE FREE 10 ML: 5 INJECTION INTRAVENOUS at 08:04

## 2024-05-07 RX ADMIN — OXYCODONE AND ACETAMINOPHEN 1 TABLET: 5; 325 TABLET ORAL at 16:41

## 2024-05-07 RX ADMIN — CYANOCOBALAMIN TAB 1000 MCG 1000 MCG: 1000 TAB at 08:07

## 2024-05-07 RX ADMIN — MIDODRINE HYDROCHLORIDE 5 MG: 5 TABLET ORAL at 12:24

## 2024-05-07 RX ADMIN — ACETYLCYSTEINE 400 MG: 200 SOLUTION ORAL; RESPIRATORY (INHALATION) at 19:10

## 2024-05-07 ASSESSMENT — PAIN DESCRIPTION - ORIENTATION
ORIENTATION: RIGHT

## 2024-05-07 ASSESSMENT — PAIN SCALES - GENERAL
PAINLEVEL_OUTOF10: 8
PAINLEVEL_OUTOF10: 8
PAINLEVEL_OUTOF10: 0
PAINLEVEL_OUTOF10: 8
PAINLEVEL_OUTOF10: 7
PAINLEVEL_OUTOF10: 8

## 2024-05-07 ASSESSMENT — PAIN DESCRIPTION - FREQUENCY
FREQUENCY: CONTINUOUS

## 2024-05-07 ASSESSMENT — PAIN DESCRIPTION - LOCATION
LOCATION: FLANK
LOCATION: RIB CAGE
LOCATION: FLANK

## 2024-05-07 ASSESSMENT — PAIN DESCRIPTION - DESCRIPTORS
DESCRIPTORS: ACHING;DISCOMFORT
DESCRIPTORS: ACHING
DESCRIPTORS: ACHING;DISCOMFORT
DESCRIPTORS: ACHING;DISCOMFORT

## 2024-05-07 ASSESSMENT — PAIN DESCRIPTION - PAIN TYPE
TYPE: ACUTE PAIN

## 2024-05-07 ASSESSMENT — PAIN DESCRIPTION - ONSET
ONSET: ON-GOING

## 2024-05-07 ASSESSMENT — PAIN SCALES - WONG BAKER
WONGBAKER_NUMERICALRESPONSE: NO HURT

## 2024-05-07 ASSESSMENT — PULMONARY FUNCTION TESTS
PIF_VALUE: 1
PIF_VALUE: 17

## 2024-05-07 NOTE — PROGRESS NOTES
Patient Name: Jorge Luis Banda  Date of admission: 4/3/2024  3:27 PM  Patient's age: 78 y.o., 1945  Admission Dx: Melena [K92.1]  GI bleed [K92.2]  UMM (acute kidney injury) (HCC) [N17.9]  Left leg cellulitis [L03.116]  Anemia, unspecified type [D64.9]    Reason for Consult: management recommendations  Requesting Physician: No admitting provider for patient encounter.    CHIEF COMPLAINT: GI bleeding    History Obtained From:  patient  INTERVAL HISTORY:    Patient seen and examined.    Intubated sedated after bronchoscopy  Hypotension on Mat-Synephrine  No biopsy done  Cytology from thoracentesis on bronchial washing negative.  Labs reviewed.  Hemoglobin down to 6.  Received blood transfusion.  Posttransfusion hemoglobin 8.6.    HISTORY OF PRESENT ILLNESS:    The patient is a 78 y.o.   male who is admitted to the hospital for anemia and suspicion for GI bleeding.  His hemoglobin was under 7 and received blood transfusion.  He has severe peripheral vascular disease on Plavix and Coumadin.  He underwent an EGD that showed gastritis and he refused colonoscopy.  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  From oncology perspective, the patient was diagnosed with stage I lung cancer in 2019 and underwent lung resection.  Never received any chemotherapy or radiation.  He has been in remission since then.  CT scan of the chest showed concerning changes in the liver and adrenal gland for metastatic disease.  Dedicated CT of the abdomen and pelvis is ordered and pending.    Past Medical History:   has a past medical history of Paroxysmal atrial fibrillation (HCC), Peripheral artery disease (HCC), and Primary hypertension.    Past Surgical History:   has a past surgical history that includes above knee amputation (Right, 01/01/2020); Upper gastrointestinal endoscopy (N/A, 4/8/2024); IR CHOLECYSTOSTOMY  pT1c   Regional Lymph Nodes (pN): pN0   IMAGING DATA:  CT chest   IMPRESSION:  1. Mild-to-moderate bilateral pleural effusions greater on the right with  areas of loculation. Underlying bibasilar atelectasis.  2. Biapical and bibasal fibrosis.  3. Partially visualize low-density mass seen involving the right adrenal  gland and portion of the right hepatic lobe most likely representing  malignancy.    Primary Problem  Melena    Active Hospital Problems    Diagnosis Date Noted    UMM (acute kidney injury) (HCC) [N17.9] 05/04/2024    Severe malnutrition (HCC) [E43] 04/29/2024    Lung mass [R91.8] 04/27/2024    Liver lesion [K76.9] 04/27/2024    Acute cholecystitis [K81.0] 04/18/2024    Right upper quadrant abdominal pain [R10.11] 04/17/2024    ACP (advance care planning) [Z71.89] 04/17/2024    Palliative care encounter [Z51.5] 04/17/2024    NSTEMI (non-ST elevated myocardial infarction) (HCC) [I21.4] 04/14/2024    Hypotension [I95.9] 04/14/2024    Atrial fibrillation with rapid ventricular response (HCC) [I48.91] 04/14/2024    Unstable angina (HCC) [I20.0] 04/14/2024    Acute ischemic right internal carotid artery (ICA) stroke (HCC) [I63.231] 04/13/2024    Internal carotid artery stenosis, bilateral [I65.23] 04/13/2024    Anemia [D64.9] 04/13/2024    Gastrointestinal hemorrhage [K92.2] 04/13/2024    Pleural effusion, bilateral [J90] 04/11/2024    Longstanding persistent atrial fibrillation (HCC) [I48.11] 04/11/2024    Iron deficiency anemia due to chronic blood loss [D50.0] 04/10/2024    Hx of cancer of lung [Z85.118] 04/10/2024    Adrenal mass (HCC) [E27.8] 04/10/2024    Claudication (HCC) [I73.9] 04/10/2024    Melena [K92.1] 04/03/2024    Peripheral arterial disease (HCC) [I73.9] 04/03/2024    S/P AKA (above knee amputation) unilateral, right ([facility]) [Z89.611] 03/05/2024    Embolism and thrombosis of artery ([facility]) [I74.9] 03/05/2024    Left leg cellulitis [L03.116] 03/05/2024    History of arterial bypass of lower

## 2024-05-07 NOTE — PROGRESS NOTES
Nutrition Assessment     Type and Reason for Visit: Reassess    Nutrition Recommendations/Plan:   Provide diet rx as ordered   Provide supplements as ordered   Monitor labs as they become available   Monitor skin integrity/weights     Malnutrition Assessment:  Malnutrition Status: Severe malnutrition    Nutrition Assessment:  Patient was extubated 5/6. on 5/7 noted he patient had an uneventful night though he did  not sleep much during the night. He did c/o pain and was given PRN Oxy and pain was under control. The patients H&H was drawn and his HgB was 6.0 and provider notified and orders were placed for blood transfusion. Seen by this writer for follow up assessment. Seen patient in room he states fair appetite he does not really care for breakfast, also does not drink the ensure stating is tastes like chalk. Patient agreed to try magic cups three times a day instead providing 870 kcals and 27 grams of protein if all three are 100% consumed. weight on 5/7 was 146#, weight history 5/4 was 140#, 4/30 was 150#, 4/14 was 154#,  severe malnutrition previously noted. Discharge planning is to a SNF. Monitor po intakes, supplement acceptance, weights, labs, skin integrity.    Estimated Daily Nutrient Needs:  Energy (kcal):  5362-9043 kcals per day using 25-30 g/kg of actual body weight Weight Used for Energy Requirements: Current     Protein (g):  93-99 grams per day using 1.4-1.5 g/kg of actual body weight Weight Used for Protein Requirements: Current        Fluid (ml/day):  2945-9797 ml per day using 1ml/kcal Method Used for Fluid Requirements: 1 ml/kcal    Nutrition Related Findings:   BM 4/28, hypoactive sounds, Sacral edema +2, LLE +1 non-pitting, LUE +1, general edema +1. extubated 5/6. Wound Type: Venous Stasis (left lateral)    Current Nutrition Therapies:    ADULT DIET; Regular; 5 carb choices (75 gm/meal); Low Fat/Low Chol/High Fiber/BRIDGET; No Drinking Straws  ADULT ORAL NUTRITION SUPPLEMENT; Breakfast, Lunch,

## 2024-05-07 NOTE — PLAN OF CARE
Problem: Safety - Adult  Goal: Free from fall injury  5/6/2024 2236 by Tawana Baird RN  Outcome: Progressing  Flowsheets (Taken 5/6/2024 2105)  Free From Fall Injury: Instruct family/caregiver on patient safety  5/6/2024 1336 by Hattie Loya RN  Outcome: Progressing  Flowsheets (Taken 5/6/2024 1055)  Free From Fall Injury:   Instruct family/caregiver on patient safety   Based on caregiver fall risk screen, instruct family/caregiver to ask for assistance with transferring infant if caregiver noted to have fall risk factors     Problem: Discharge Planning  Goal: Discharge to home or other facility with appropriate resources  5/6/2024 2236 by Tawana Baird RN  Outcome: Progressing  5/6/2024 1336 by Hattie Loya RN  Outcome: Progressing  Flowsheets (Taken 5/6/2024 0800)  Discharge to home or other facility with appropriate resources: Identify barriers to discharge with patient and caregiver     Problem: Pain  Goal: Verbalizes/displays adequate comfort level or baseline comfort level  5/6/2024 2236 by Tawana Baird RN  Outcome: Progressing  5/6/2024 1336 by Hattie Loya RN  Outcome: Progressing  Flowsheets (Taken 5/6/2024 0800)  Verbalizes/displays adequate comfort level or baseline comfort level:   Encourage patient to monitor pain and request assistance   Assess pain using appropriate pain scale   Administer analgesics based on type and severity of pain and evaluate response     Problem: Skin/Tissue Integrity - Adult  Goal: Incisions, wounds, or drain sites healing without S/S of infection  5/6/2024 2236 by Tawana Baird RN  Outcome: Progressing  Flowsheets  Taken 5/6/2024 2105  Incisions, Wounds, or Drain Sites Healing Without Sign and Symptoms of Infection: TWICE DAILY: Assess and document skin integrity  Taken 5/6/2024 2000  Incisions, Wounds, or Drain Sites Healing Without Sign and Symptoms of Infection: TWICE DAILY: Assess and document skin integrity  5/6/2024 1336 by Hattie Loya  Progressing     Problem: ABCDS Injury Assessment  Goal: Absence of physical injury  5/6/2024 2236 by Tawana Baird RN  Outcome: Progressing  5/6/2024 1336 by Hattie Loya RN  Outcome: Progressing     Problem: Nutrition Deficit:  Goal: Optimize nutritional status  5/6/2024 2236 by Tawana Baird RN  Outcome: Progressing  5/6/2024 1336 by Hattie Loya RN  Outcome: Progressing     Problem: Chronic Conditions and Co-morbidities  Goal: Patient's chronic conditions and co-morbidity symptoms are monitored and maintained or improved  5/6/2024 2236 by Tawana Baird RN  Outcome: Progressing  5/6/2024 1336 by Hattie Loya RN  Outcome: Progressing  Flowsheets (Taken 5/6/2024 0800)  Care Plan - Patient's Chronic Conditions and Co-Morbidity Symptoms are Monitored and Maintained or Improved: Monitor and assess patient's chronic conditions and comorbid symptoms for stability, deterioration, or improvement     Problem: Safety - Medical Restraint  Goal: Remains free of injury from restraints (Restraint for Interference with Medical Device)  Description: INTERVENTIONS:  1. Determine that other, less restrictive measures have been tried or would not be effective before applying the restraint  2. Evaluate the patient's condition at the time of restraint application  3. Inform patient/family regarding the reason for restraint  4. Q2H: Monitor safety, psychosocial status, comfort, nutrition and hydration  5/6/2024 2236 by Tawana Baird RN  Outcome: Progressing  5/6/2024 1336 by Hattie Loya RN  Outcome: Progressing  Flowsheets  Taken 5/6/2024 0800 by Hattie Loya RN  Remains free of injury from restraints (restraint for interference with medical device):   Determine that other, less restrictive measures have been tried or would not be effective before applying the restraint   Inform patient/family regarding the reason for restraint   Evaluate the patient's condition at the time of restraint application   Every

## 2024-05-07 NOTE — PLAN OF CARE
Problem: Safety - Adult  Goal: Free from fall injury  5/7/2024 1158 by Hattie Loya RN  Outcome: Progressing  Flowsheets (Taken 5/7/2024 1157)  Free From Fall Injury:   Instruct family/caregiver on patient safety   Based on caregiver fall risk screen, instruct family/caregiver to ask for assistance with transferring infant if caregiver noted to have fall risk factors  5/6/2024 2236 by Tawana Baird RN  Outcome: Progressing  Flowsheets (Taken 5/6/2024 2105)  Free From Fall Injury: Instruct family/caregiver on patient safety     Problem: Discharge Planning  Goal: Discharge to home or other facility with appropriate resources  5/7/2024 1158 by Hattie Loya RN  Outcome: Progressing  Flowsheets (Taken 5/7/2024 0800)  Discharge to home or other facility with appropriate resources:   Identify barriers to discharge with patient and caregiver   Arrange for needed discharge resources and transportation as appropriate  5/6/2024 2236 by Tawana Baird RN  Outcome: Progressing     Problem: Pain  Goal: Verbalizes/displays adequate comfort level or baseline comfort level  5/7/2024 1158 by Hattie Loya RN  Outcome: Progressing  5/6/2024 2236 by Tawana Baird RN  Outcome: Progressing     Problem: Skin/Tissue Integrity - Adult  Goal: Incisions, wounds, or drain sites healing without S/S of infection  5/7/2024 1158 by Hattie Loya RN  Outcome: Progressing  Flowsheets  Taken 5/7/2024 1157  Incisions, Wounds, or Drain Sites Healing Without Sign and Symptoms of Infection:   ADMISSION and DAILY: Assess and document risk factors for pressure ulcer development   TWICE DAILY: Assess and document skin integrity   TWICE DAILY: Assess and document dressing/incision, wound bed, drain sites and surrounding tissue  Taken 5/7/2024 0800  Incisions, Wounds, or Drain Sites Healing Without Sign and Symptoms of Infection: TWICE DAILY: Assess and document skin integrity  5/6/2024 2236 by Tawana Baird RN  Outcome:  Tawana CORONA RN  Outcome: Progressing

## 2024-05-07 NOTE — PROGRESS NOTES
End Of Shift Note  [facility]  Summary of shift: The patient had an uneventful night though he did  not sleep much during the night. He did c/o pain and was given PRN Oxy and pain was under control. The patients H&H was drawn and his HgB was 6.0 and provider notified and orders were placed for blood transfusion. Type and screen was completed and blood bank contacted for PRBC. Patient was notified and understood importance of transfusion. Patient did have 50ml of secretions from suction. Patient was weaned off Hermila around 0030 and BP was stable with MAP above 65. At the time of the note the patient was in bed awake with call light within reach. All needs have been met.     Vitals:    Vitals:    05/07/24 0530 05/07/24 0545 05/07/24 0600 05/07/24 0615   BP: 112/70 111/63 (!) 109/59    Pulse: (!) 108 (!) 111 (!) 108 (!) 112   Resp: 22 23 22 23   Temp: 97.5 °F (36.4 °C)      TempSrc: Temporal      SpO2: 97% 96% 96% 97%   Weight:       Height:            I&O:   Intake/Output Summary (Last 24 hours) at 5/7/2024 0616  Last data filed at 5/7/2024 0616  Gross per 24 hour   Intake 938.73 ml   Output 2050 ml   Net -1111.27 ml       Resp Status: 2L NC/BiPaP at night    Ventilator Settings:  Vent Mode: AC/PRVC Resp Rate (Set): 10 bpm/Vt (Set, mL): 500 mL/ /FiO2 : 30 %    Critical Care IV infusions:   sodium chloride      propofol Stopped (05/04/24 2247)    phenylephrine (HERMILA-SYNEPHRINE) 50 mg in sodium chloride 0.9 % 250 mL infusion Stopped (05/06/24 2322)    dexmedeTOMIDine (PRECEDEX) 400 mcg in sodium chloride 0.9 % 100 mL infusion Stopped (05/06/24 1348)    sodium chloride      sodium chloride 10 mL/hr at 05/07/24 0616    dextrose          LDA:   PICC 05/03/24 Right Cephalic (Active)   Number of days: 3       Peripheral IV 04/24/24 Distal;Left Forearm (Active)   Number of days: 12       Peripheral IV 04/25/24 Right;Anterior Cephalic (Active)   Number of days: 11       Biliary Tube 04/19/24 T-tube 8 fr RUQ (Active)   Number

## 2024-05-07 NOTE — CARE COORDINATION
Discharge planning    Plan to discuss trach and POC with family today. Extubated. No NG. Speech to eval for bedside swallow study. [facility] can accept. SW to start precert when patient is stable.

## 2024-05-07 NOTE — PROGRESS NOTES
Hgb dropped this am to 6.  Writer verified anticoag is appropriate.  Oncology wants apixaban to restart if no active bleeding.  Writer is in full agreement due to recent stroke and risk vs. benefit considerations.  Continue to monitor for any signs or symptoms of bleeding.

## 2024-05-07 NOTE — PROGRESS NOTES
concerns regarding aspiration and dysphagia.     Pain:  Pain Assessment  Pain Assessment: None - Denies Pain  Pain Level: 0  [place] Pain Rating: No hurt  Patient's Stated Pain Goal: 0 - No pain  Pain Location: Flank  Pain Orientation: Right  Pain Descriptors: Aching, Discomfort  Functional Pain Assessment: Prevents or interferes some active activities and ADLs  Pain Type: Acute pain  Pain Radiating Towards: abdomen  Pain Frequency: Continuous  Pain Onset: On-going  Non-Pharmaceutical Pain Intervention(s): Repositioned  Response to Pain Intervention: Patient satisfied  Side Effects: Constipation, Nausea  Multiple Pain Sites: No  Pain Assessment in Advanced Dementia (PAINAD)  Breathing Independent of Vocalization: normal  Negative Vocalization: none  Facial Expression: smiling or inexpressive  Body Language: relaxed  Consolability: no need to console  PAINAD Score: 0  Faces, Legs, Activity, Cry, and Consolability (FLACC)  Face (F): no particular expression or smile  Legs (L): normal position or relaxed  Activity (A): lying quietly, normal position, moves easily  Cry (C): no cry (awake or asleep)  Consolability (C): content, relaxed  FLACC Score : 0    Reason for Referral  Jorge Luis Banda was referred for a bedside swallow evaluation to assess the efficiency of his swallow function, identify signs and symptoms of aspiration and make recommendations regarding safe dietary consistencies, effective compensatory strategies, and safe eating environment.    Impression   Patient is known to ST and VFSS completed 4/19 indicating mild oropharyngeal dysphagia. Pt was eventually advanced to a regular texture diet and thin liquids and discharged from SLP caseload. ST re-consulted for re-assessment of swallowing function as the patient was recently extubated.     Pt is alert and cooperative at bedside with family member present. Pt tolerates thin liquids per cup and regular texture solids without significant difficulties.  Able to self feed. ST does not suspect any significant changes since last MBS and is suspected to be at baseline. Note that pt does have chronic dysphagia since 2019. ST has more concerns regarding esophageal pathology considering pmhx as well as previous MBS indicating possible esophageal dysmotility. There is potential for aspiration with PO intake secondary to decreased bolus motility through the proximal esophagus. MBS revealed stasis after the swallow spilling into the laryngeal vestibule, although no aspiration was reported- varying degrees of penetration.     Patient continues to report poor appetite. His status has not changed. MBS not warranted at this time however ST will plan to follow up prior to discharge.     Education provided regarding reflux and aspiration precautions. Pt and pt's family member are receptive.         Treatment Plan  Requires SLP Intervention: Yes  Duration of Treatment: until discharge  D/C Recommendations: To be determined  Referral To: GI    Recommended Diet and Intervention  Diet Solids Recommendation: Regular  Liquid Consistency Recommendation: Thin  Recommended Form of Meds: PO  Recommendations: Assistance with meals  Therapeutic Interventions: Oral care;Patient/Family education    Compensatory Swallowing Strategies  Compensatory Swallowing Strategies : Alternate solids and liquids;Eat/Feed slowly;No straws    Treatment/Goals  Dysphagia Goals: The patient will tolerate recommended diet without observed clinical signs of aspiration    General  Chart Reviewed: Yes  Subjective  Subjective: Pt pleasant and cooperative.  Behavior/Cognition: Alert;Cooperative  Respiratory Status: O2 via nasual cannula  Follows Directions: Simple  Dentition: Edentulous  Patient Positioning: Upright in bed  Baseline Vocal Quality: Normal  Volitional Cough: Weak;Wet;Congested  Consistencies Administered: Regular;Thin - cup       Oral Motor Deficits  Labial: No impairment  Dentition: Edentulous  Oral

## 2024-05-07 NOTE — PROGRESS NOTES
RT in to check on patient wearing bipap, patient was found to be off his bipap and on 2L NC. RN states patient was coughing stuff up and it was difficult for hm to remove the mask. Patient has been coughing and using oral suction catheter to remove secretions.

## 2024-05-07 NOTE — PLAN OF CARE
Problem: Respiratory - Adult  Goal: Able to breathe comfortably  Description: Able to breathe comfortably  5/7/2024 0822 by Clifton Thomas RCP  Outcome: Progressing     Problem: Respiratory - Adult  Goal: Clear lung sounds  5/7/2024 0822 by Clifton Thomas RCP  Outcome: Progressing     Problem: Respiratory - Adult  Goal: Adequate oxygenation  Description: Adequate oxygenation  5/7/2024 0822 by Clifton Thomas RCP  Outcome: Progressing

## 2024-05-07 NOTE — PLAN OF CARE
Problem: Respiratory - Adult  Goal: Able to breathe comfortably  Description: Able to breathe comfortably  5/7/2024 1916 by Madhav Waite RCP  Outcome: Progressing  5/7/2024 0822 by Clifton Thomas RCP  Outcome: Progressing     Problem: Respiratory - Adult  Goal: Clear lung sounds  5/7/2024 1916 by Madhav Waite RCP  Outcome: Progressing  5/7/2024 0822 by Clifton Thomas RCP  Outcome: Progressing     Problem: Respiratory - Adult  Goal: Adequate oxygenation  Description: Adequate oxygenation  5/7/2024 1916 by Madhav Waite RCP  Outcome: Progressing  5/7/2024 0822 by Clifton Tohmas RCP  Outcome: Progressing

## 2024-05-07 NOTE — FLOWSHEET NOTE
05/07/24 0508   Treatment Team Notification   Reason for Communication Critical results   Type of Critical Result Laboratory   Critical Lab Information Hgb 6.0   Person Result Received From lab   Critical Lab Result Type Hemoglobin and hematocrit   Critical Cardiology Result Type Other (comment)   Name of Team Member Notified Sherrell Mendoza NP   Treatment Team Role Consulting Provider   Method of Communication Secure Message   Response See orders   Notification Time 0508     MARTA notified lab result. See order for interventions.

## 2024-05-07 NOTE — PROGRESS NOTES
.PALLIATIVE CARE NURSING ASSESSMENT    Patient: Jorge Luis Banda  Room: 2027/2027-01    Reason For Consult   Goals of care evaluation  Distress management  Guidance and support  Facilitate communications  Assistance in coordinating care    Code Status: Full Code    PLAN:  -Pt extubated on nasal cannula. He denies pain or SOB  -He is alert and oriented. He states he is still thinking about if he wants a trach or not. He is still discussing with his Significant other.   -Support offered.  -Will continue to follow to assist with decision making if needed  -Plan for SNF at discharge.       Impression: Jorge Luis aBnda is a 78 y.o. year old male  has a past medical history of Paroxysmal atrial fibrillation (HCC), Peripheral artery disease (HCC), and Primary hypertension..  Currently hospitalized for the management of melena.  The Palliative Care Team is following to assist with goals of care/support.     Vital Signs  Blood pressure (!) 96/55, pulse (!) 109, temperature 97.1 °F (36.2 °C), temperature source Temporal, resp. rate 21, height 1.829 m (6'), weight 66.3 kg (146 lb 1.6 oz), SpO2 97 %.    Patient Active Problem List   Diagnosis    Skin eschar    Stenosis of left carotid artery    History of arterial bypass of lower extremity    S/P AKA (above knee amputation) unilateral, right (HCC)    Primary hypertension    Persistent wound pain    Peripheral arterial disease (HCC)    Ulcer of left foot (HCC)    Neuropathy    Malignant neoplasm of lung (HCC)    Intermittent claudication of left lower extremity due to atherosclerosis (HCC)    Frequency of urination    Former smoker    Embolism and thrombosis of artery (HCC)    Dyslipidemia    Closed fracture of left humerus    Closed fracture of surgical neck of humerus    Acute exacerbation of chronic obstructive airways disease (HCC)    Left leg cellulitis    Arteriosclerosis of coronary artery    Age-related osteoporosis without current pathological fracture

## 2024-05-07 NOTE — PROGRESS NOTES
Pulmonary Critical Care Progress Note       Patient seen for the follow up of bilateral pleural effusions, hemoptysis     Subjective:  Patient was extubated 5/6/24 after a third bronchoscopy. He is sitting in bed, alert and oriented. He has no complaints. No events overnight.    Examination:  Vitals: /65   Pulse (!) 111   Temp 96.9 °F (36.1 °C) (Temporal)   Resp 26   Ht 1.829 m (6')   Wt 66.3 kg (146 lb 1.6 oz)   SpO2 99%   BMI 19.81 kg/m²   General appearance: In no acute distress, alert and cooperative with exam cachectic  Neck: No JVD  Lungs: Decreased breath sound bilateral rhonchi  Heart: irregular rate and rhythm, S1, S2 normal, no gallop  Abdomen: Soft, non tender, + BS  Cholecystotomy tube in place bloody output  Extremities: no cyanosis or clubbing. No significant edema, left upper and lower extremity weakness right above-knee amputation left foot dorsal ulcer    LABs:  CBC:   Recent Labs     05/05/24  0505 05/05/24  1330 05/05/24  2112 05/06/24  0525 05/06/24  1223 05/06/24  2043 05/07/24  0443   WBC 7.6  --   --  5.6  --   --  4.3   HGB 7.4* 7.1* 7.5* 7.6* 7.9* 7.5* 6.0*   HCT 24.1* 23.2* 24.6* 24.6* 25.9* 24.0* 19.8*     --   --  197  --   --  162       BMP:   Recent Labs     05/05/24  0505 05/06/24  0525 05/07/24  0443    135 137   K 3.6* 4.2 3.7   CO2 25 27 25   BUN 23 22 19   CREATININE 1.2 0.9 0.9   LABGLOM 61 86 86   GLUCOSE 146* 171* 85       BAL culture 5/2      Specimen Description .BRONCHIAL WASHINGS LLL   Direct Exam FEW NEUTROPHILS    FEW GRAM POSITIVE COCCI IN PAIRS Abnormal    Culture  Abnormal   STAPHYLOCOCCUS SPECIES, COAGULASE NEGATIVE MODERATE GROWTH Susceptibilities have not been performed.  Notify the laboratory within 7 days for further workup if clinically indicated.   LEFT MAIN STEM WASHINGS:         NEGATIVE FOR MALIGNANCY.    THORACENTESIS, RIGHT FLUID:         NEGATIVE FOR MALIGNANCY.     Radiology:  X-ray chest 5/7/24      Chest x-ray 5/4      Chest

## 2024-05-07 NOTE — PROGRESS NOTES
End Of Shift Note  [facility]  Summary of shift: Patient had an uneventful day. No BM, Eating very little. 1 U PRBC given.    Vitals:    Vitals:    05/07/24 1700 05/07/24 1800 05/07/24 1900 05/07/24 1915   BP: (!) 96/55 (!) 101/59 (!) 88/46    Pulse: (!) 109 (!) 107 (!) 104 97   Resp: 21 20 16 20   Temp:       TempSrc:       SpO2: 97% 97% 98% 100%   Weight:       Height:            I&O:   Intake/Output Summary (Last 24 hours) at 5/7/2024 1932  Last data filed at 5/7/2024 1633  Gross per 24 hour   Intake 827.33 ml   Output 2050 ml   Net -1222.67 ml       Resp Status: 2L NC, loose cough, with blood, CPT TID    Ventilator Settings:  Vent Mode: AC/PRVC Resp Rate (Set): 10 bpm/Vt (Set, mL): 500 mL/ /FiO2 : 30 %    Critical Care IV infusions:   sodium chloride      propofol Stopped (05/04/24 2247)    phenylephrine (HERMILA-SYNEPHRINE) 50 mg in sodium chloride 0.9 % 250 mL infusion Stopped (05/06/24 2322)    dexmedeTOMIDine (PRECEDEX) 400 mcg in sodium chloride 0.9 % 100 mL infusion Stopped (05/06/24 1348)    sodium chloride      sodium chloride 10 mL/hr at 05/07/24 0616    dextrose          LDA:   PICC 05/03/24 Right Cephalic (Active)   Number of days: 3       Peripheral IV 04/24/24 Distal;Left Forearm (Active)   Number of days: 13       Peripheral IV 04/25/24 Right;Anterior Cephalic (Active)   Number of days: 12       Biliary Tube 04/19/24 T-tube 8 fr RUQ (Active)   Number of days: 18       Urinary Catheter 05/02/24 Dotson (Active)   Number of days: 4       Wound 04/04/24 Foot Left;Dorsal dry, scabbed (Active)   Number of days: 33       Wound Foot Left;Lateral (Active)   Number of days:

## 2024-05-07 NOTE — PROGRESS NOTES
Physical Therapy  DATE: 2024    NAME: Jorge Luis Banda  MRN: [id number]   : 1945    Patient not seen this date for Physical Therapy due to:      [] Cancel by RN or physician due to:    [] Hemodialysis    [] Critical Lab Value Level     [x] Blood transfusion in progress     [] Acute or unstable cardiovascular status   _MAP < 55 or more than >115  _HR < 40 or > 130    [] Acute or unstable pulmonary status   -FiO2 > 60%   _RR < 5 or >40    _O2 sats < 85%    [] Strict Bedrest    [] Off Unit for surgery or procedure    [] Off Unit for testing       [] Pending imaging to R/O fracture    [] Refusal by Patient      [x] Other Hgb 6.0.   Patient undergoing blood transfusion this morning. PT Continue to follow.  Will check back as able.  ANIL Kuhn notified.    [] PT being discontinued at this time. Patient independent. No further needs.     [] PT being discontinued at this time as the patient has been transferred to hospice care. No further needs.      Giselle Barrios, PT

## 2024-05-07 NOTE — PROGRESS NOTES
[facility]  Office: [phone]  Chinmay Ramos DO, Jose Atkinson DO, Guevara Ramirez DO, Pranay Cooper DO, Sumeet Eller MD, Karen Carvajal MD, Sony Crawford MD, Poonam Platt MD,  Stephan San MD, Marcello Daniels MD, Theodore Pascual MD,  Juve Mahmood DO, Matias Mckeon MD, Hero Tavarez MD, Valentin Ramos DO, Jazzy Spence MD,  Trino Handley DO, Flavia Leonardo MD, Vanessa Soriano MD, Pricilla Akers MD, Casimiro Knight MD,  Zeke Maciel MD, Guera Cervantes MD, Kianna Martin MD, Mirlande Worrell MD, Ney Garcia MD, Savannah Dominguez MD, Ed Emerson DO, Jaylen West DO, Comfort Carcamo MD,  Siva Ashley MD, Shirley Waterhouse, CNP,  Carolyn Pelaez CNP, Scotty Ge, CNP,  Wandy Trivedi, DNP, Malissa Gaston, CNP, Lana Penaloza, CNP, Susan Haro CNP, Sherrell Mendoza, CNP, Suzy Morales, PA-C, Dalila Lange PA-C, Sada Matt, CNP, Crystal Womack, CNP, Sheng Reddy, CNP, Arielle Guy, CNP, Linsey Perez, CNP, Anju Cho, CNS, [place], CNP, Cassidy Ferrer CNP, Tracy Schwab, CNP         [facility]   IN-PATIENT SERVICE   [facility]    Progress Note    5/7/2024    3:39 PM    Name:   Jorge Luis Banda  MRN:     [id number]     Acct:      [id number]   Room:   2027/2027-01  IP Day:  34  Admit Date:  4/3/2024  3:27 PM    PCP:   Lucila Machado APRN - CNP  Code Status:  Full Code    Subjective:     C/C:   Chief Complaint   Patient presents with    Leg Pain     Left leg, open wounds and swollen       Interval History Status: .   Patient returned bronchoscopy yesterday on 5/6/2024, and he was extubated later,  Bronchoscopy showed upper bronchus takeoff has obstruction with masslike lesion possible stump versus mass, there was clotted blood on the stump.  He had bedside swallow evaluation today again s/p extubation, it is history of esophageal dysphagia, GI consult is recommended, regular diet with thin liquids also has been recommended  Pulmonary

## 2024-05-07 NOTE — PROGRESS NOTES
VASCULAR SURGERY  PROGRESS NOTE      5/7/2024 5:42 PM  Subjective:   Admit Date: 4/3/2024  PCP: Lucila Machado APRN - CNP    Chief Complaint   Patient presents with    Leg Pain     Left leg, open wounds and swollen       Interval History: No complaints.  Feeling better.  Still with productive cough.  Left arm continues to improve.    Diet: ADULT DIET; Regular; 5 carb choices (75 gm/meal); Low Fat/Low Chol/High Fiber/BRIDGET; No Drinking Straws  ADULT ORAL NUTRITION SUPPLEMENT; Breakfast, Lunch, Dinner; Frozen Oral Supplement    Medications:   Scheduled Meds:   apixaban  5 mg Per NG tube BID    aspirin  81 mg Oral Daily    pantoprazole (PROTONIX) 40 mg in sodium chloride (PF) 0.9 % 10 mL injection  40 mg IntraVENous Daily    senna  1 tablet Per NG tube BID    polyethylene glycol  17 g Per NG tube Daily    metoclopramide  5 mg IntraVENous Q12H    ceFAZolin  2,000 mg IntraVENous Q8H    guaiFENesin  200 mg Per NG tube Q4H    midazolam  2 mg IntraVENous Once    acetylcysteine  400 mg Inhalation BID RT    digoxin  125 mcg Oral Daily    sodium chloride nebulizer  3 mL Nebulization TID RT    droNABinol  2.5 mg Oral BID WC    levalbuterol  1.25 mg Nebulization TID    mirtazapine  30 mg Oral Nightly    midodrine  5 mg Oral TID WC    furosemide  40 mg Oral Daily    sodium chloride  80 mL IntraVENous Once    empagliflozin  10 mg Oral Daily    sodium chloride  80 mL IntraVENous Once    metoprolol tartrate  25 mg Oral BID    sodium chloride  100 mL IntraVENous Once    ferrous sulfate  325 mg Oral Daily with breakfast    atorvastatin  40 mg Oral Nightly    vitamin D3  5,000 Units Oral Daily    vitamin B-12  1,000 mcg Oral Daily    tamsulosin  0.4 mg Oral Daily    sodium chloride flush  5-40 mL IntraVENous 2 times per day     Continuous Infusions:   sodium chloride      propofol Stopped (05/04/24 4167)    phenylephrine (HERMILA-SYNEPHRINE) 50 mg in sodium chloride 0.9 % 250 mL infusion Stopped (05/06/24 2607)    dexmedeTOMIDine  in 4-6 weeks for re-evaluation following discharge    Electronically signed by Sonny Alcantar MD on 5/7/2024 at 5:42 PM

## 2024-05-07 NOTE — PROGRESS NOTES
SPIRITUAL CARE DEPARTMENT - [facility]  PROGRESS NOTE    Room # 2027/2027-01   Name: Jorge Luis Banda               Reason for visit:  Pall Care    I visited the patient.    Admit Date & Time: 4/3/2024  3:27 PM    Assessment:  Jorge Luis Banda is a 78 y.o. male in the hospital because of Melena. Upon entering the room the patient was sitting up in bed alert and oriented x4 (brother standing at bedside).      Intervention:  I introduced myself and my title as chaplain Loyd from the spiritual care department. The writer and patient engaged in a brief conversation as the patient shares that he feeling much better.    The patient has a peaceful, and calm attitude and spirit. The patient is grateful to be alive as he's looking at pictures his brother took while he was intubated. The writer provided words of encouragement and comfort and stated he will pray for continued healing.    At this time the patient has no immediate needs or request.    Outcome:  The patient is thankful for the prayers and supportive staff.    Plan:  Chaplains will remain available to offer spiritual and emotional support as needed.    Electronically signed by Chaplain Carey Jr, on 5/7/2024 at 2:30 PM.  Spiritual Care Department  [facility]     05/07/24 1426   Encounter Summary   Service Provided For Patient   Referral/Consult From Palliative Care   Support System Family members   Last Encounter  05/07/24   Complexity of Encounter Moderate   Begin Time 1300   End Time  1310   Total Time Calculated 10 min   Spiritual/Emotional needs   Type Spiritual Support   Palliative Care   Type Palliative Care, Follow-up   Assessment/Intervention/Outcome   Assessment Calm;Coping;Peaceful   Intervention Nurtured Hope;Sustaining Presence/Ministry of presence   Outcome Engaged in conversation;Expressed Gratitude   Plan and Referrals   Plan/Referrals Continue Support (comment);Continue to visit, (comment)

## 2024-05-08 LAB
ABO/RH: NORMAL
ANION GAP SERPL CALCULATED.3IONS-SCNC: 11 MMOL/L (ref 9–17)
ANTIBODY SCREEN: NEGATIVE
ARM BAND NUMBER: NORMAL
BASOPHILS # BLD: 0.06 K/UL (ref 0–0.2)
BASOPHILS NFR BLD: 1 % (ref 0–2)
BLOOD BANK DISPENSE STATUS: NORMAL
BLOOD BANK SAMPLE EXPIRATION: NORMAL
BPU ID: NORMAL
BUN SERPL-MCNC: 18 MG/DL (ref 8–23)
BUN/CREAT SERPL: 16 (ref 9–20)
CALCIUM SERPL-MCNC: 8.7 MG/DL (ref 8.6–10.4)
CHLORIDE SERPL-SCNC: 95 MMOL/L (ref 98–107)
CO2 SERPL-SCNC: 30 MMOL/L (ref 20–31)
COMPONENT: NORMAL
CREAT SERPL-MCNC: 1.1 MG/DL (ref 0.7–1.2)
CROSSMATCH RESULT: NORMAL
EOSINOPHIL # BLD: 0.26 K/UL (ref 0–0.44)
EOSINOPHILS RELATIVE PERCENT: 4 % (ref 1–4)
ERYTHROCYTE [DISTWIDTH] IN BLOOD BY AUTOMATED COUNT: 17.8 % (ref 11.8–14.4)
GFR, ESTIMATED: 68 ML/MIN/1.73M2
GLUCOSE BLD-MCNC: 103 MG/DL (ref 75–110)
GLUCOSE BLD-MCNC: 95 MG/DL (ref 75–110)
GLUCOSE SERPL-MCNC: 100 MG/DL (ref 70–99)
HCT VFR BLD AUTO: 27.5 % (ref 40.7–50.3)
HCT VFR BLD AUTO: 30.2 % (ref 40.7–50.3)
HGB BLD-MCNC: 8.7 G/DL (ref 13–17)
HGB BLD-MCNC: 9.2 G/DL (ref 13–17)
IMM GRANULOCYTES # BLD AUTO: 0.06 K/UL (ref 0–0.3)
IMM GRANULOCYTES NFR BLD: 1 %
LYMPHOCYTES NFR BLD: 0.51 K/UL (ref 1.1–3.7)
LYMPHOCYTES RELATIVE PERCENT: 8 % (ref 24–43)
MAGNESIUM SERPL-MCNC: 2.3 MG/DL (ref 1.6–2.6)
MCH RBC QN AUTO: 29.3 PG (ref 25.2–33.5)
MCHC RBC AUTO-ENTMCNC: 30.5 G/DL (ref 28.4–34.8)
MCV RBC AUTO: 96.2 FL (ref 82.6–102.9)
MICROORGANISM SPEC CULT: ABNORMAL
MICROORGANISM SPEC CULT: ABNORMAL
MICROORGANISM/AGENT SPEC: ABNORMAL
MICROORGANISM/AGENT SPEC: ABNORMAL
MONOCYTES NFR BLD: 0.64 K/UL (ref 0.1–1.2)
MONOCYTES NFR BLD: 10 % (ref 3–12)
NEUTROPHILS NFR BLD: 76 % (ref 36–65)
NEUTS SEG NFR BLD: 4.87 K/UL (ref 1.5–8.1)
NRBC BLD-RTO: 0 PER 100 WBC
PLATELET # BLD AUTO: 225 K/UL (ref 138–453)
PMV BLD AUTO: 10.6 FL (ref 8.1–13.5)
POTASSIUM SERPL-SCNC: 4 MMOL/L (ref 3.7–5.3)
RBC # BLD AUTO: 3.14 M/UL (ref 4.21–5.77)
SODIUM SERPL-SCNC: 136 MMOL/L (ref 135–144)
SPECIMEN DESCRIPTION: ABNORMAL
TRANSFUSION STATUS: NORMAL
UNIT DIVISION: 0
WBC OTHER # BLD: 6.4 K/UL (ref 3.5–11.3)

## 2024-05-08 PROCEDURE — 6370000000 HC RX 637 (ALT 250 FOR IP): Performed by: INTERNAL MEDICINE

## 2024-05-08 PROCEDURE — 80048 BASIC METABOLIC PNL TOTAL CA: CPT

## 2024-05-08 PROCEDURE — 97112 NEUROMUSCULAR REEDUCATION: CPT

## 2024-05-08 PROCEDURE — 94640 AIRWAY INHALATION TREATMENT: CPT

## 2024-05-08 PROCEDURE — 97530 THERAPEUTIC ACTIVITIES: CPT

## 2024-05-08 PROCEDURE — 97164 PT RE-EVAL EST PLAN CARE: CPT

## 2024-05-08 PROCEDURE — 83735 ASSAY OF MAGNESIUM: CPT

## 2024-05-08 PROCEDURE — 6370000000 HC RX 637 (ALT 250 FOR IP): Performed by: FAMILY MEDICINE

## 2024-05-08 PROCEDURE — 99232 SBSQ HOSP IP/OBS MODERATE 35: CPT | Performed by: INTERNAL MEDICINE

## 2024-05-08 PROCEDURE — 2580000003 HC RX 258: Performed by: INTERNAL MEDICINE

## 2024-05-08 PROCEDURE — 6360000002 HC RX W HCPCS: Performed by: INTERNAL MEDICINE

## 2024-05-08 PROCEDURE — 97535 SELF CARE MNGMENT TRAINING: CPT

## 2024-05-08 PROCEDURE — 85025 COMPLETE CBC W/AUTO DIFF WBC: CPT

## 2024-05-08 PROCEDURE — 36415 COLL VENOUS BLD VENIPUNCTURE: CPT

## 2024-05-08 PROCEDURE — 6360000002 HC RX W HCPCS: Performed by: FAMILY MEDICINE

## 2024-05-08 PROCEDURE — 97168 OT RE-EVAL EST PLAN CARE: CPT

## 2024-05-08 PROCEDURE — A4216 STERILE WATER/SALINE, 10 ML: HCPCS | Performed by: FAMILY MEDICINE

## 2024-05-08 PROCEDURE — 94761 N-INVAS EAR/PLS OXIMETRY MLT: CPT

## 2024-05-08 PROCEDURE — 94669 MECHANICAL CHEST WALL OSCILL: CPT

## 2024-05-08 PROCEDURE — 2700000000 HC OXYGEN THERAPY PER DAY

## 2024-05-08 PROCEDURE — 2000000000 HC ICU R&B

## 2024-05-08 PROCEDURE — 97110 THERAPEUTIC EXERCISES: CPT

## 2024-05-08 PROCEDURE — 82947 ASSAY GLUCOSE BLOOD QUANT: CPT

## 2024-05-08 PROCEDURE — C9113 INJ PANTOPRAZOLE SODIUM, VIA: HCPCS | Performed by: FAMILY MEDICINE

## 2024-05-08 PROCEDURE — 99233 SBSQ HOSP IP/OBS HIGH 50: CPT | Performed by: NURSE PRACTITIONER

## 2024-05-08 PROCEDURE — 85014 HEMATOCRIT: CPT

## 2024-05-08 PROCEDURE — 85018 HEMOGLOBIN: CPT

## 2024-05-08 PROCEDURE — 2580000003 HC RX 258: Performed by: FAMILY MEDICINE

## 2024-05-08 RX ORDER — GUAIFENESIN 200 MG/10ML
200 LIQUID ORAL EVERY 4 HOURS PRN
Status: DISCONTINUED | OUTPATIENT
Start: 2024-05-08 | End: 2024-05-11 | Stop reason: HOSPADM

## 2024-05-08 RX ADMIN — CYANOCOBALAMIN TAB 1000 MCG 1000 MCG: 1000 TAB at 09:56

## 2024-05-08 RX ADMIN — LEVALBUTEROL 1.25 MG: 1.25 SOLUTION, CONCENTRATE RESPIRATORY (INHALATION) at 14:31

## 2024-05-08 RX ADMIN — DRONABINOL 2.5 MG: 2.5 CAPSULE ORAL at 16:25

## 2024-05-08 RX ADMIN — LEVALBUTEROL 1.25 MG: 1.25 SOLUTION, CONCENTRATE RESPIRATORY (INHALATION) at 09:22

## 2024-05-08 RX ADMIN — MIDODRINE HYDROCHLORIDE 5 MG: 5 TABLET ORAL at 16:25

## 2024-05-08 RX ADMIN — MORPHINE SULFATE 1 MG: 2 INJECTION, SOLUTION INTRAMUSCULAR; INTRAVENOUS at 10:29

## 2024-05-08 RX ADMIN — ACETYLCYSTEINE 400 MG: 200 SOLUTION ORAL; RESPIRATORY (INHALATION) at 09:22

## 2024-05-08 RX ADMIN — SENNOSIDES 8.6 MG: 8.6 TABLET, FILM COATED ORAL at 21:36

## 2024-05-08 RX ADMIN — MIDODRINE HYDROCHLORIDE 5 MG: 5 TABLET ORAL at 12:21

## 2024-05-08 RX ADMIN — ASPIRIN 81 MG CHEWABLE TABLET 81 MG: 81 TABLET CHEWABLE at 09:57

## 2024-05-08 RX ADMIN — OXYCODONE AND ACETAMINOPHEN 1 TABLET: 5; 325 TABLET ORAL at 12:26

## 2024-05-08 RX ADMIN — Medication 2000 MG: at 16:22

## 2024-05-08 RX ADMIN — CHOLECALCIFEROL TAB 125 MCG (5000 UNIT) 5000 UNITS: 125 TAB at 09:56

## 2024-05-08 RX ADMIN — APIXABAN 5 MG: 5 TABLET, FILM COATED ORAL at 09:57

## 2024-05-08 RX ADMIN — METOPROLOL TARTRATE 25 MG: 25 TABLET, FILM COATED ORAL at 00:57

## 2024-05-08 RX ADMIN — FERROUS SULFATE TAB EC 325 MG (65 MG FE EQUIVALENT) 325 MG: 325 (65 FE) TABLET DELAYED RESPONSE at 09:57

## 2024-05-08 RX ADMIN — SODIUM CHLORIDE, PRESERVATIVE FREE 10 ML: 5 INJECTION INTRAVENOUS at 21:36

## 2024-05-08 RX ADMIN — ATORVASTATIN CALCIUM 40 MG: 40 TABLET, FILM COATED ORAL at 21:36

## 2024-05-08 RX ADMIN — DIGOXIN 125 MCG: 125 TABLET ORAL at 09:57

## 2024-05-08 RX ADMIN — Medication 2000 MG: at 10:14

## 2024-05-08 RX ADMIN — ISODIUM CHLORIDE 3 ML: 0.03 SOLUTION RESPIRATORY (INHALATION) at 14:31

## 2024-05-08 RX ADMIN — PANTOPRAZOLE SODIUM 40 MG: 40 INJECTION, POWDER, FOR SOLUTION INTRAVENOUS at 09:57

## 2024-05-08 RX ADMIN — MIRTAZAPINE 30 MG: 15 TABLET, ORALLY DISINTEGRATING ORAL at 21:36

## 2024-05-08 RX ADMIN — LEVALBUTEROL 1.25 MG: 1.25 SOLUTION, CONCENTRATE RESPIRATORY (INHALATION) at 19:48

## 2024-05-08 RX ADMIN — MIDODRINE HYDROCHLORIDE 5 MG: 5 TABLET ORAL at 09:57

## 2024-05-08 RX ADMIN — METOCLOPRAMIDE HYDROCHLORIDE 5 MG: 5 INJECTION INTRAMUSCULAR; INTRAVENOUS at 09:57

## 2024-05-08 RX ADMIN — METOPROLOL TARTRATE 25 MG: 25 TABLET, FILM COATED ORAL at 09:57

## 2024-05-08 RX ADMIN — EMPAGLIFLOZIN 10 MG: 10 TABLET, FILM COATED ORAL at 09:56

## 2024-05-08 RX ADMIN — METOCLOPRAMIDE HYDROCHLORIDE 5 MG: 5 INJECTION INTRAMUSCULAR; INTRAVENOUS at 21:36

## 2024-05-08 RX ADMIN — SENNOSIDES 8.6 MG: 8.6 TABLET, FILM COATED ORAL at 09:57

## 2024-05-08 RX ADMIN — APIXABAN 5 MG: 5 TABLET, FILM COATED ORAL at 21:36

## 2024-05-08 RX ADMIN — DRONABINOL 2.5 MG: 2.5 CAPSULE ORAL at 09:57

## 2024-05-08 RX ADMIN — TAMSULOSIN HYDROCHLORIDE 0.4 MG: 0.4 CAPSULE ORAL at 09:57

## 2024-05-08 RX ADMIN — FUROSEMIDE 40 MG: 40 TABLET ORAL at 09:56

## 2024-05-08 RX ADMIN — SODIUM CHLORIDE, PRESERVATIVE FREE 10 ML: 5 INJECTION INTRAVENOUS at 09:58

## 2024-05-08 RX ADMIN — ACETYLCYSTEINE 400 MG: 200 SOLUTION ORAL; RESPIRATORY (INHALATION) at 19:48

## 2024-05-08 ASSESSMENT — PAIN DESCRIPTION - DESCRIPTORS
DESCRIPTORS: CRAMPING;ACHING;DISCOMFORT
DESCRIPTORS: ACHING;DISCOMFORT

## 2024-05-08 ASSESSMENT — PAIN DESCRIPTION - LOCATION
LOCATION: ABDOMEN
LOCATION: ABDOMEN

## 2024-05-08 ASSESSMENT — PAIN SCALES - GENERAL
PAINLEVEL_OUTOF10: 8
PAINLEVEL_OUTOF10: 0
PAINLEVEL_OUTOF10: 0
PAINLEVEL_OUTOF10: 10

## 2024-05-08 ASSESSMENT — PAIN DESCRIPTION - ONSET: ONSET: ON-GOING

## 2024-05-08 ASSESSMENT — PAIN DESCRIPTION - FREQUENCY: FREQUENCY: INTERMITTENT

## 2024-05-08 ASSESSMENT — PAIN DESCRIPTION - PAIN TYPE: TYPE: ACUTE PAIN

## 2024-05-08 ASSESSMENT — PAIN - FUNCTIONAL ASSESSMENT: PAIN_FUNCTIONAL_ASSESSMENT: ACTIVITIES ARE NOT PREVENTED

## 2024-05-08 ASSESSMENT — PAIN DESCRIPTION - ORIENTATION
ORIENTATION: RIGHT;LOWER
ORIENTATION: RIGHT

## 2024-05-08 NOTE — FLOWSHEET NOTE
05/08/24 1826   Treatment Team Notification   Reason for Communication Review case   Name of Team Member Notified Dr Traylor   Treatment Team Role Consulting Provider   Method of Communication Call   Response Waiting for response   Notification Time 1826     Patient notified RN that his maldonado catheter has bright red blood following phytsical therapy, a clot was noted as well.  RN notified Dr Traylor.   Orders to re-consult urology and to perform bladder irrigation with 50mL flush and repeat PRN.

## 2024-05-08 NOTE — PLAN OF CARE
Problem: Safety - Adult  Goal: Free from fall injury  Outcome: Progressing  Flowsheets (Taken 5/8/2024 0800)  Free From Fall Injury:   Instruct family/caregiver on patient safety   Based on caregiver fall risk screen, instruct family/caregiver to ask for assistance with transferring infant if caregiver noted to have fall risk factors     Problem: Discharge Planning  Goal: Discharge to home or other facility with appropriate resources  Outcome: Progressing  Flowsheets (Taken 5/8/2024 0800)  Discharge to home or other facility with appropriate resources:   Identify barriers to discharge with patient and caregiver   Arrange for needed discharge resources and transportation as appropriate   Identify discharge learning needs (meds, wound care, etc)   Refer to discharge planning if patient needs post-hospital services based on physician order or complex needs related to functional status, cognitive ability or social support system     Problem: Pain  Goal: Verbalizes/displays adequate comfort level or baseline comfort level  Outcome: Progressing     Problem: Skin/Tissue Integrity - Adult  Goal: Incisions, wounds, or drain sites healing without S/S of infection  Outcome: Progressing  Flowsheets (Taken 5/8/2024 0800)  Incisions, Wounds, or Drain Sites Healing Without Sign and Symptoms of Infection:   ADMISSION and DAILY: Assess and document risk factors for pressure ulcer development   TWICE DAILY: Assess and document skin integrity   TWICE DAILY: Assess and document dressing/incision, wound bed, drain sites and surrounding tissue     Problem: Infection - Adult  Goal: Absence of infection at discharge  Outcome: Progressing  Flowsheets (Taken 5/8/2024 0800)  Absence of infection at discharge:   Assess and monitor for signs and symptoms of infection   Monitor lab/diagnostic results   Monitor all insertion sites i.e., indwelling lines, tubes and drains     Problem: Hematologic - Adult  Goal: Maintains hematologic  improvement and discharge     Problem: Safety - Medical Restraint  Goal: Remains free of injury from restraints (Restraint for Interference with Medical Device)  Description: INTERVENTIONS:  1. Determine that other, less restrictive measures have been tried or would not be effective before applying the restraint  2. Evaluate the patient's condition at the time of restraint application  3. Inform patient/family regarding the reason for restraint  4. Q2H: Monitor safety, psychosocial status, comfort, nutrition and hydration  Outcome: Progressing     Problem: Coping  Goal: Pt/Family able to verbalize concerns and demonstrate effective coping strategies  Description: INTERVENTIONS:  1. Assist patient/family to identify coping skills, available support systems and cultural and spiritual values  2. Provide emotional support, including active listening and acknowledgement of concerns of patient and caregivers  3. Reduce environmental stimuli, as able  4. Instruct patient/family in relaxation techniques, as appropriate  5. Assess for spiritual pain/suffering and initiate Spiritual Care, Psychosocial Clinical Specialist consults as needed  Outcome: Progressing

## 2024-05-08 NOTE — PROGRESS NOTES
PALLIATIVE CARE PROGRESS NOTE     NAME:  Jorge Luis Banda  MEDICAL RECORD NUMBER:  [id number]  AGE: 78 y.o.   GENDER: male  : 1945  TODAY'S DATE:  2024  Room:     Reason For Consult:  Goals of care evaluation  Distress management  Symptom Management  Guidance and support  Facilitate communications  Assistance in coordinating care  Recommendations for the above    Plan      Palliative Interaction:    I met with patient this morning at the bedside.  He tells me that things have been going okay.  He was extubated 2 days ago.  He states that pulmonary has talked to him about having tracheostomy placed due to secretions.    We had a long discussion about his options.  He is aware of mass in the lung.  He has previously told me that he is pretty sure that he has cancer.  We discussed what he would want long-term if biopsies were obtained and he had cancer.    He has told me multiple times that staying in the bed in a nursing facility is not aware that he would want to live.  He verbalizes concern for Rita and that she would not want to care for him the rest of her life.    He stated that he wants to continue to live and is open to the tracheostomy but then he also verbalizes fears regarding dependency.  I did talk to him about the option of proceeding with tracheostomy if that is his wish, but if he decided that he no longer wanted aggressive treatment, can be changed to comfort measures.  He states he is aware of this.  He continues to verbalize a lot of doubt regarding proceeding with tracheostomy and continues to state he is unsure.    He tells me that ultimately he knows it is his decision and would never want to put this on Rita.  He also does not want to just give up.  He states that he is thankful for the nursing staff since he has been here as he has been able to joke with him.    He does tell me that he is aware that he needs to put on more weight.  He states that he still continues to

## 2024-05-08 NOTE — PROGRESS NOTES
family history on file.    Vitals:  BP 95/64   Pulse 94   Temp 97.5 °F (36.4 °C) (Temporal)   Resp (!) 31   Ht 1.829 m (6')   Wt 66.6 kg (146 lb 14.4 oz)   SpO2 97%   BMI 19.92 kg/m²   Temp (24hrs), Av.4 °F (36.3 °C), Min:97.1 °F (36.2 °C), Max:97.7 °F (36.5 °C)    Recent Labs     24  0048 24  1135 24  1812 24  1145   POCGLU 80 103 104 95         I/O (24Hr):    Intake/Output Summary (Last 24 hours) at 2024 1513  Last data filed at 2024 0800  Gross per 24 hour   Intake 416.24 ml   Output 1410 ml   Net -993.76 ml         Labs:  Hematology:  Recent Labs     24  0525 24  1223 24  0443 24  1114 24  2221 24  0550 24  1426   WBC 5.6  --  4.3  --   --  6.4  --    RBC 2.56*  --  2.06*  --   --  3.14*  --    HGB 7.6*   < > 6.0*   < > 8.3* 9.2* 8.7*   HCT 24.6*   < > 19.8*   < > 26.2* 30.2* 27.5*   MCV 96.1  --  96.1  --   --  96.2  --    MCH 29.7  --  29.1  --   --  29.3  --    MCHC 30.9  --  30.3  --   --  30.5  --    RDW 18.6*  --  18.3*  --   --  17.8*  --      --  162  --   --  225  --    MPV 10.4  --  10.6  --   --  10.6  --     < > = values in this interval not displayed.       Chemistry:  Recent Labs     24  0525 24  0443 24  0550    137 136   K 4.2 3.7 4.0    101 95*   CO2 27 25 30   GLUCOSE 171* 85 100*   BUN 22 19 18   CREATININE 0.9 0.9 1.1   MG  --  1.6 2.3   ANIONGAP 8* 11 11   LABGLOM 86 86 68   CALCIUM 8.5* 7.5* 8.7   DIGOXIN 0.7  --   --        Recent Labs     24  1222 24  1732 24  0048 24  1135 24  1812 24  1145   POCGLU 105 93 80 103 104 95       ABG:  Lab Results   Component Value Date/Time    POCPH 7.439 2024 06:09 AM    POCPCO2 37.1 2024 06:09 AM    POCPO2 80.8 2024 06:09 AM    POCHCO3 25.1 2024 06:09 AM    NBEA 0.6 2024 04:00 AM    PBEA 0.9 2024 06:09 AM    AHBJ1OPQ 96.3 2024 06:09 AM    FIO2 30.0 2024  cefdinir.  ID following.  Primary Hypertension  - well-controlled.  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  Continue  aspirin 81 mg daily.  Evaluated by neurology.   Treated with Mat-Synephrine for permissive hypertension,continue midodrine.    Former Smoker   Quit in 2019.  Acute on chronic combined systolic and diastolic CHF  Oral Lasix -   Monitor kidney function.  Moderate pulmonary hypertension   O2   Lasix  -   Severe ICA stenosis. 90 % R and 80 % Left .   No plan for inpatient endarterectomy.  Outpatient follow-up with primary vascular surgery..  Acute acalculous cholecystitis  Poor surgical candidate  S/P cholecystotomy tube placement on 4/19/2024  Treated with  IV Zosyn.  Stop date 4/26/2024. Now on Ancef since 5/4/24 - 5/11/24.  Atelectasis left lung  Underwent Bronch on 4/26/24 and repeated on 5/2/2024.  Urinary retention   Straight cath as needed , Flomax. Refuses Dotson Catheter  Acute respiratory failure with hypoxia secondary to loculated pleural effusion and left lung atelectasis likely secondary to endobronchial lesion.  Pulmonary following.   bronched on 4/26/2024,  5/2/2024 and 5/6/2024.   Gastroparesis  Continue  Reglan.  Severe malnutrition-  Continue supplements.    Sumeet Eller MD  5/8/2024  3:13 PM

## 2024-05-08 NOTE — PLAN OF CARE
Problem: Respiratory - Adult  Goal: Able to breathe comfortably  Description: Able to breathe comfortably  5/8/2024 1957 by Hoa Thornton RCP  Outcome: Progressing     Problem: Respiratory - Adult  Goal: Clear lung sounds  5/8/2024 1957 by Hoa Thornton RCP  Outcome: Progressing     Problem: Respiratory - Adult  Goal: Adequate oxygenation  Description: Adequate oxygenation  5/8/2024 1957 by Hoa Thornton RCP  Outcome: Progressing           BRONCHOSPASM/BRONCHOCONSTRICTION    IMPROVE  AERATION/BREATHSOUNDS  ADMINISTER BRONCHODILATOR THERAPY AS APPROPRIATE  ASSESS BREATH SOUNDS  INITIATE AEROSOL PROTOCOL IF ORDERED TO DO SO  PATIENT EDUCATION AS NEEDED      PROVIDE ADEQUATE OXYGENATION WITH ACCEPTABLE SP02/ABG'S    [x]  IDENTIFY APPROPRIATE OXYGEN THERAPY  [x]   MONITOR SP02/ABG'S AS NEEDED   [x]   PATIENT EDUCATION AS NEEDED        NONINVASIVE VENTILATION    PROVIDE OPTIMAL VENTILATION/ACCEPTABLE SPO2   IMPLEMENT NONINVASIVE VENTILATION PROTOCOL   MAINTAIN ACCEPTABLE SPO2   ASSESS SKIN INTEGRITY/BREAKDOWN SCORE   PATIENT EDUCATION AS NEEDED   BIPAP AS NEEDED

## 2024-05-08 NOTE — PROGRESS NOTES
[facility]  Speech Language Pathology    Date: 5/8/2024  Patient Name: Jorge Luis Banda  YOB: 1945   AGE: 78 y.o.  MRN: [id number]        Patient Not Available for Speech Therapy     Due to:  [] Testing  [] Hemodialysis  [] Cancelled by RN  [] Surgery   [] Intubation/Sedation/Pain Medication  [] Medical instability  [] Other:Pt decline PO intake for diet tolerance monitoring     Next scheduled treatment: 5/9/24  Completed by: Mireya Gant, SLP

## 2024-05-08 NOTE — PROGRESS NOTES
VASCULAR SURGERY  PROGRESS NOTE      5/8/2024 3:22 PM  Subjective:   Admit Date: 4/3/2024  PCP: Lucila Machado APRN - CNP    Chief Complaint   Patient presents with    Leg Pain     Left leg, open wounds and swollen       Interval History: No complaints.  Feeling better.  Oncology input noted.    Diet: ADULT DIET; Regular; 5 carb choices (75 gm/meal); Low Fat/Low Chol/High Fiber/BRIDGET; No Drinking Straws  ADULT ORAL NUTRITION SUPPLEMENT; Breakfast, Lunch, Dinner; Frozen Oral Supplement    Medications:   Scheduled Meds:   apixaban  5 mg Per NG tube BID    aspirin  81 mg Oral Daily    pantoprazole (PROTONIX) 40 mg in sodium chloride (PF) 0.9 % 10 mL injection  40 mg IntraVENous Daily    senna  1 tablet Per NG tube BID    polyethylene glycol  17 g Per NG tube Daily    metoclopramide  5 mg IntraVENous Q12H    ceFAZolin  2,000 mg IntraVENous Q8H    midazolam  2 mg IntraVENous Once    acetylcysteine  400 mg Inhalation BID RT    digoxin  125 mcg Oral Daily    sodium chloride nebulizer  3 mL Nebulization TID RT    droNABinol  2.5 mg Oral BID WC    levalbuterol  1.25 mg Nebulization TID    mirtazapine  30 mg Oral Nightly    midodrine  5 mg Oral TID WC    furosemide  40 mg Oral Daily    sodium chloride  80 mL IntraVENous Once    empagliflozin  10 mg Oral Daily    sodium chloride  80 mL IntraVENous Once    metoprolol tartrate  25 mg Oral BID    sodium chloride  100 mL IntraVENous Once    ferrous sulfate  325 mg Oral Daily with breakfast    atorvastatin  40 mg Oral Nightly    vitamin D3  5,000 Units Oral Daily    vitamin B-12  1,000 mcg Oral Daily    tamsulosin  0.4 mg Oral Daily    sodium chloride flush  5-40 mL IntraVENous 2 times per day     Continuous Infusions:   sodium chloride      propofol Stopped (05/04/24 2247)    phenylephrine (HERMILA-SYNEPHRINE) 50 mg in sodium chloride 0.9 % 250 mL infusion Stopped (05/06/24 2322)    dexmedeTOMIDine (PRECEDEX) 400 mcg in sodium chloride 0.9 % 100 mL infusion Stopped (05/06/24

## 2024-05-08 NOTE — PROGRESS NOTES
SPIRITUAL CARE DEPARTMENT [facility]  PROGRESS NOTE    Room # 2027/2027-01   Name: Jorge Luis Banda            Baptism: Holiness      Reason for visit:  Pt. High on End of Life Scale    I visited the patient.    Admit Date & Time: 4/3/2024  3:27 PM    Assessment:  Jorge Luis Banda is a 78 y.o. male in the hospital because Melena. Upon entering the room Pt. Was not receptive to Writer visit.  Writer asked for opportunity to [place] over Pt., which was given.      Intervention:  I introduced myself and my title as  I offered space for Pt.  to express feelings, needs, and concerns and provided a ministry presence. Pt. Reluctantly engaged with Writer.    Outcome:  Writer prayed over Pt.     05/08/24 1752   Encounter Summary   Encounter Overview/Reason Spiritual/Emotional Needs   Service Provided For Patient   Referral/Consult From Rounding   Support System Unknown   Last Encounter  05/08/24   Complexity of Encounter Low   Begin Time 1750   End Time  1755   Total Time Calculated 5 min   Spiritual/Emotional needs   Type Spiritual Support   Assessment/Intervention/Outcome   Assessment Unable to assess   Intervention Prayer (assurance of)/[place]   Outcome Other (comment)  (Reluctantly allowed Writer to [place] Over Him)   Plan and Referrals   Plan/Referrals No future visits requested       Plan:  Chaplains will remain available to offer spiritual and emotional support as needed.    Electronically signed by Chaplain Samantha, on 5/8/2024 at 6:02 PM.  Spiritual Care Department  [facility]

## 2024-05-08 NOTE — PROGRESS NOTES
5/3    Chest x-ray 5/1  Unchanged left lung atelectasis      Chest x-ray 4/30      Chest x-ray 4/29    X-ray 4/27/24      CT chest abdomen pelvis 4/16/24  1.  CT CHEST: Unchanged presumed bilateral pneumonia: Moderate volume right  pleural effusion and small volume left pleural effusion with areas of  consolidation posteriorly mid and lower lungs bilaterally.  2. Left basilar scarring.  3. 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  4.  Pulmonary sequela typical of that seen with smoking, including COPD.  5. Acute esophagitis.  6. Prominent esophageal ampulla versus small hiatal hernia.  7. Gastroesophageal reflux versus incomplete esophageal emptying.  8. Extensive calcific atherosclerosis coronary arteries.  9. CT ABDOMEN/PELVIS: Unchanged gallbladder hydrops and cholelithiasis with  findings of acute cholecystitis.  10. Stable right adrenal mass infiltrating into adjacent structures including  the right kidney, liver and inferior vena cava.  11. Probable reactive colitis at the hepatic flexure.      Impression & Recommendations:  Acute hypoxic respiratory insufficiency  Supplemental oxygen as needed to maintain oxygen saturation >90%]  Incentive spirometer q1h while awake      Bilateral loculated pleural effusions/atelectasis status post thoracentesis/left lung collapse Staph growing on BAL  MetaNeb  Xopenex  Mucomyst aerosol treatment   Mucinex  Status post right thoracentesis 4/19/24 with 750 mL removed with negative cultures and cytology  Lasix 40 mg daily  Status post bronchoscopy 4/26/24 and repeat on 5/2/2024 & 5/6/24.  Bronchoscopy shows left upper bronchus takeoff has obstruction with mass like lesion.?Stump versus mass.  Cytology shows atypical cells but negative on stains.  Patient may need reevaluation with a bronchoscopy if PET scan is positive in left upper lobe bronchus takeoff.    BAL 4/26/2024 culture showed Candida albicans/dublininesis  BAL 5/2/2024 culture shows  Staphylococcus, fungal elements  Continue cefazolin 2 g IV every 8  Repeat x-ray chest in a.m.  Manual chest percussion BID    Hypotension requiring pressor/increased cortisol level suspect following hydrocortisone dose  Off laine-synephrine  Midodrine  Off hydrocortisone    Acute cholecystitis/acute anemia/Hemoptysis  Hemoptysis persists on suction  Monitor hemoglobin hematocrit  Transfuse 1 unit PRBC if hemoglobin less than 7  Status post EGD with negative findings  Plan for elective colonoscopy outpatient  Cholecystotomy tube; monitor output   Completed Zosyn per ID 4/26  Hematology oncology following    Paroxysmal A-fib   Cardiology signed off  Not able to resume anticoagulation given bleeding issues despite CVA risk  Hold heparin drip off Eliquis given decreased hemoglobin    History of stage 1 lung cancer 2019 s/p lung resection  No adjuvant therapy     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 90 % R and 80 % Left .   Off plavix  Status post R AKA  Vascular consult  CEA electively    Patient is agreeable to tracheostomy if necessary to manage recurrent secretions in left lung  peptic ulcer disease prophylaxis   DVT prophylaxis EPC for now    Electronically signed by     Joe Mcneil MD on 5/8/2024 at 3:09 PM  Pulmonary Critical Care and Sleep Medicine,  [facility]  Office: [phone]

## 2024-05-08 NOTE — CARE COORDINATION
[facility] Quality Flow/Interdisciplinary Rounds Progress Note    Quality Flow Rounds held on May 8, 2024 at 0930    Disciplines Attending:  Bedside Nurse, , and Nursing Unit Leadership    Barriers to Discharge: clinical status    Anticipated Discharge Date:   5/9/24    Anticipated Discharge Disposition: SNF    Readmission Risk              Risk of Unplanned Readmission:  36           Discussed patient goal for the day, patient clinical progression, and barriers to discharge.  Patient on 2L NC and regular diet. Pulmonology recommending tracheostomy to manage continued secretions- patient is still considering. Patient accepted to [facility] at discharge. Updated PT/OT notes needed for precert.       Nida Diamond RN  May 8, 2024

## 2024-05-08 NOTE — PROGRESS NOTES
Occupational Therapy  Facility/Department: [facility]  Occupational Therapy Re-Assessment    Name: Jorge Luis Banda  : 1945  MRN: [id number]  Date of Service: 2024    RN reports patient is medically stable for therapy treatment this date.    Chart reviewed prior to treatment and patient is agreeable for therapy.  All lines intact and patient positioned comfortably at end of treatment.  All patient needs addressed prior to ending therapy session.       Discharge Recommendations:  Patient would benefit from continued therapy after discharge  OT Equipment Recommendations  Equipment Needed:  (CTA)       HISTORY OF PRESENT ILLNESS:   The patient is a 78 y.o. male with a history of peripheral vascular disease with right AKA, A-fib, stage I lung cancer.  Patient has had multiple vascular surgeries for PAD.  Patient presented to [facility] emergency department on 4/3/2024 for complaints of melena.  Patient was found to have low hemoglobin and concern for GI bleeding.  Patient received multiple PRBCs in the emergency room.  Patient is on long-term anticoagulation-Coumadin and Plavix.  Patient additionally has had history of peptic ulcer disease with surgical intervention.  EGD on 2024 did not show source of bleeding but if small gastric polyp was removed.  Patient was recommended colonoscopy but was unable to complete bowel prep.  MRI brain on 2024 due to concerns of left arm and leg weakness which showed hypoperfusion.  Heparin drip was restarted on  and neurology was consulted for stroke.  CTA head and neck showed critical right ICA flow.  Patient had another drop in hemoglobin on  and received additional PRBC.  Patient's anticoagulation on hold due to bleeding.  Urology was consulted for urinary retention and Dotson catheter was inserted.  Patient does have a history of lung cancer with resection and did not receive chemotherapy.  Patient had imaging which showed a mass in the right adrenal and  ADL T-Scale Score : 29.04  ADL Inpatient CMS 0-100% Score: 70.42  ADL Inpatient CMS G-Code Modifier : CL        Goals  Short Term Goals  Time Frame for Short Term Goals: By discharge, pt to demo:  Short Term Goal 1: bed mobility tasks to MIN A with Good safety and use of bedrails as needed.  Short Term Goal 2: increased tolerance for sitting at EOB w/ SUP to > 10 minutes to promote functional activity tolerance required for increased safety/participation in ADLs and in prep for transfers.  Short Term Goal 3: tolerance for reassessment of ADL transfers in order to add goal to OT POC as appropriate.  Short Term Goal 4: UB ADLs to SBA and LB ADLs to ModAx1 with Good safety and use of AD/AE/appropriate safety device as needed.  Short Term Goal 5: tolerance for BUE HEP with PROM/AAROM & proprioceptive input in LUE, with use of handouts, to promote functional strength/ROM required for increased safety/IND with self care tasks.  Additional Goals?: No  Long Term Goals  Long Term Goal 1: Pt/family to be IND with pressure relief tech, fall prevention strategies, EC/WS tech, CVA/disease specific education and potential equipment/discharge recommendations with use of handouts as needed.  Patient Goals   Patient goals : No goals stated.       Therapy Time   Individual Concurrent Group Co-treatment   Time In 1636         Time Out 1731         Minutes 55         Tx Time: 40 minutes    Co-treatment with PT warranted secondary to decreased safety and independence requiring 2 skilled therapy professionals to address individual discipline's goals. OT addressing preparation for ADL transfer, sitting balance for increased ADL performance, sitting/activity tolerance, functional reaching, and bed mobility tech.        Abbi Mathur, OT

## 2024-05-08 NOTE — PROGRESS NOTES
RT in to check on patient wearing bipap, found to be off the bipap and on 2L NC. Patient was resting comfortably with SpO2 100%.

## 2024-05-08 NOTE — PROGRESS NOTES
End Of Shift Note  [facility]  Summary of shift: Patient had uneventful day. Patient bathed and worked with PT/OT. Patient spoke with wife Rita on the phone. Dotson in place with good urine output. Blood pressure slightly soft, midodrine given as ordered. Patient receiving IV antibiotics as ordered via PICC line. Patient cholecystotomy tube in place draining well with brown/red drainage.     Plans for repeat chest xray tomorrow to determine if patient needs a trach. Patient spoke with palliative and pulmonology - patient is agreeable to trach if needed. PET/CT as outpatient and seen with vascular in 4-6 weeks for re-evaluation. Precert is in for [facility] at discharge.     Vitals:    Vitals:    05/08/24 1256 05/08/24 1431 05/08/24 1435 05/08/24 1600   BP:    (!) 93/56   Pulse:  (!) 105 94 (!) 107   Resp: 21 20 (!) 31 22   Temp:    97.1 °F (36.2 °C)   TempSrc:    Temporal   SpO2:  98% 97% 94%   Weight:       Height:            I&O:   Intake/Output Summary (Last 24 hours) at 5/8/2024 1728  Last data filed at 5/8/2024 1600  Gross per 24 hour   Intake 176.24 ml   Output 1910 ml   Net -1733.76 ml       Resp Status: Patient remains on 2L NC and tolerating well. Lung sounds are clear and diminished upon assessment. Patient having occasional productive cough.    Ventilator Settings:  Vent Mode: AC/PRVC Resp Rate (Set): 10 bpm/Vt (Set, mL): 500 mL/ /FiO2 : 30 %    Critical Care IV infusions:   sodium chloride      propofol Stopped (05/04/24 2247)    phenylephrine (HERMILA-SYNEPHRINE) 50 mg in sodium chloride 0.9 % 250 mL infusion Stopped (05/06/24 2322)    dexmedeTOMIDine (PRECEDEX) 400 mcg in sodium chloride 0.9 % 100 mL infusion Stopped (05/06/24 1348)    sodium chloride      sodium chloride Stopped (05/07/24 1732)    dextrose          LDA:   PICC 05/03/24 Right Cephalic (Active)   Number of days: 4       Peripheral IV 04/24/24 Distal;Left Forearm (Active)   Number of days: 14       Peripheral IV 04/25/24

## 2024-05-08 NOTE — PROGRESS NOTES
resection in 2019   Patient Name: ADRIANA CAMPOS : 1945 (Age: 73) Gender: M Taken: 2019 Reported: 5/3/2019 Physician(s): Leonardo Ryan MD ([phone]) Copy To:  Accession #: [id number] [facility]. Rec. #: [id number] Acct: # [id number]   Final Pathologic Diagnosis   1. Left lung, lower lobe, lobectomy:        Moderately differentiated adenocarcinoma, acinar predominant (acinar 50%, micropapillary 30% and         lepidic 20%), 2.8 cm in greatest dimension, limited to the lung        Surgical margins are negative for tumor.        No visceral pleural invasion identified.        Sections of 24 lymph nodes, negative for tumor (0/24) including 4 hilar, 17 parabronchial and 3          intraparenchymal lymph nodes.     2. #9 lymph node, excision:        Sections of 1 lymph node, negative for tumor (0/1).     3. #7 nodes, excision:        Sections of 2 lymph nodes, negative for tumor (0/2).     4. Parabronchial lymph node, excision:        Sections of 2 lymph nodes, negative for tumor (0/2).     CANCER CASE SUMMARY         Procedure: Lobectomy   Specimen laterality: Left   Tumor site: Lower lobe   Tumor size: 2.8 cm   Tumor focality: Unifocal   Histologic type: Adenocarcinoma   Histologic grade: Moderately differentiated   Visceral pleura invasion: Not identified   Lymphovascular invasion: Not identified   Direct invasion of adjacent structures: No adjacent structures present   Margins: All margins are uninvolved by tumor. Margins examining include bronchial, vascular and parenchymal   Treatment effect: No known presurgical therapy   Regional Lymph Nodes --     Number of lymph nodes involved: 0      Specify anitra stations involved: NA     Number of lymph nodes examined: 29      Specify anitra stations examined: Stations 10, 9 and 7     TNM descriptors:   Primary Tumor (pT): pT1c   Regional Lymph Nodes (pN): pN0   IMAGING DATA:  CT chest   IMPRESSION:  1. Mild-to-moderate bilateral pleural effusions greater on  the right with  areas of loculation. Underlying bibasilar atelectasis.  2. Biapical and bibasal fibrosis.  3. Partially visualize low-density mass seen involving the right adrenal  gland and portion of the right hepatic lobe most likely representing  malignancy.    Primary Problem  Melena    Active Hospital Problems    Diagnosis Date Noted    UMM (acute kidney injury) (HCC) [N17.9] 05/04/2024    Severe malnutrition (HCC) [E43] 04/29/2024    Lung mass [R91.8] 04/27/2024    Liver lesion [K76.9] 04/27/2024    Acute cholecystitis [K81.0] 04/18/2024    Right upper quadrant abdominal pain [R10.11] 04/17/2024    ACP (advance care planning) [Z71.89] 04/17/2024    Palliative care encounter [Z51.5] 04/17/2024    NSTEMI (non-ST elevated myocardial infarction) (HCC) [I21.4] 04/14/2024    Hypotension [I95.9] 04/14/2024    Atrial fibrillation with rapid ventricular response (HCC) [I48.91] 04/14/2024    Unstable angina (HCC) [I20.0] 04/14/2024    Acute ischemic right internal carotid artery (ICA) stroke (HCC) [I63.231] 04/13/2024    Internal carotid artery stenosis, bilateral [I65.23] 04/13/2024    Anemia [D64.9] 04/13/2024    Gastrointestinal hemorrhage [K92.2] 04/13/2024    Pleural effusion, bilateral [J90] 04/11/2024    Longstanding persistent atrial fibrillation (HCC) [I48.11] 04/11/2024    Iron deficiency anemia due to chronic blood loss [D50.0] 04/10/2024    Hx of cancer of lung [Z85.118] 04/10/2024    Adrenal mass (HCC) [E27.8] 04/10/2024    Claudication (HCC) [I73.9] 04/10/2024    Melena [K92.1] 04/03/2024    Peripheral arterial disease (HCC) [I73.9] 04/03/2024    S/P AKA (above knee amputation) unilateral, right (HCC) [Z89.611] 03/05/2024    Embolism and thrombosis of artery (HCC) [I74.9] 03/05/2024    Left leg cellulitis [L03.116] 03/05/2024    History of arterial bypass of lower extremity [Z95.828] 11/08/2019    Malignant neoplasm of lung (HCC) [C34.90] 04/24/2019    Primary hypertension [I10] 02/14/2019    Intermittent

## 2024-05-09 ENCOUNTER — APPOINTMENT (OUTPATIENT)
Dept: GENERAL RADIOLOGY | Age: 79
DRG: 377 | End: 2024-05-09
Payer: MEDICARE

## 2024-05-09 LAB
DIGOXIN SERPL-MCNC: 0.6 NG/ML (ref 0.5–2)
ERYTHROCYTE [DISTWIDTH] IN BLOOD BY AUTOMATED COUNT: 17.4 % (ref 11.8–14.4)
GLUCOSE BLD-MCNC: 104 MG/DL (ref 75–110)
GLUCOSE BLD-MCNC: 106 MG/DL (ref 75–110)
GLUCOSE BLD-MCNC: 121 MG/DL (ref 75–110)
GLUCOSE BLD-MCNC: 126 MG/DL (ref 75–110)
HCT VFR BLD AUTO: 28.4 % (ref 40.7–50.3)
HGB BLD-MCNC: 8.9 G/DL (ref 13–17)
MCH RBC QN AUTO: 29.8 PG (ref 25.2–33.5)
MCHC RBC AUTO-ENTMCNC: 31.3 G/DL (ref 28.4–34.8)
MCV RBC AUTO: 95 FL (ref 82.6–102.9)
NRBC BLD-RTO: 0 PER 100 WBC
PLATELET # BLD AUTO: 227 K/UL (ref 138–453)
PMV BLD AUTO: 10.1 FL (ref 8.1–13.5)
PROCALCITONIN SERPL-MCNC: 0.25 NG/ML (ref 0–0.09)
RBC # BLD AUTO: 2.99 M/UL (ref 4.21–5.77)
WBC OTHER # BLD: 6.6 K/UL (ref 3.5–11.3)

## 2024-05-09 PROCEDURE — 6370000000 HC RX 637 (ALT 250 FOR IP): Performed by: FAMILY MEDICINE

## 2024-05-09 PROCEDURE — 99233 SBSQ HOSP IP/OBS HIGH 50: CPT | Performed by: NURSE PRACTITIONER

## 2024-05-09 PROCEDURE — 85027 COMPLETE CBC AUTOMATED: CPT

## 2024-05-09 PROCEDURE — 6370000000 HC RX 637 (ALT 250 FOR IP): Performed by: INTERNAL MEDICINE

## 2024-05-09 PROCEDURE — 6360000002 HC RX W HCPCS: Performed by: INTERNAL MEDICINE

## 2024-05-09 PROCEDURE — 2580000003 HC RX 258: Performed by: FAMILY MEDICINE

## 2024-05-09 PROCEDURE — 2700000000 HC OXYGEN THERAPY PER DAY

## 2024-05-09 PROCEDURE — A4216 STERILE WATER/SALINE, 10 ML: HCPCS | Performed by: FAMILY MEDICINE

## 2024-05-09 PROCEDURE — 2580000003 HC RX 258: Performed by: INTERNAL MEDICINE

## 2024-05-09 PROCEDURE — 84145 PROCALCITONIN (PCT): CPT

## 2024-05-09 PROCEDURE — 94640 AIRWAY INHALATION TREATMENT: CPT

## 2024-05-09 PROCEDURE — 71045 X-RAY EXAM CHEST 1 VIEW: CPT

## 2024-05-09 PROCEDURE — 94669 MECHANICAL CHEST WALL OSCILL: CPT

## 2024-05-09 PROCEDURE — 6360000002 HC RX W HCPCS: Performed by: FAMILY MEDICINE

## 2024-05-09 PROCEDURE — 80162 ASSAY OF DIGOXIN TOTAL: CPT

## 2024-05-09 PROCEDURE — 99232 SBSQ HOSP IP/OBS MODERATE 35: CPT | Performed by: INTERNAL MEDICINE

## 2024-05-09 PROCEDURE — 36415 COLL VENOUS BLD VENIPUNCTURE: CPT

## 2024-05-09 PROCEDURE — C9113 INJ PANTOPRAZOLE SODIUM, VIA: HCPCS | Performed by: FAMILY MEDICINE

## 2024-05-09 PROCEDURE — 82947 ASSAY GLUCOSE BLOOD QUANT: CPT

## 2024-05-09 PROCEDURE — 94761 N-INVAS EAR/PLS OXIMETRY MLT: CPT

## 2024-05-09 PROCEDURE — 2000000000 HC ICU R&B

## 2024-05-09 RX ORDER — METOCLOPRAMIDE 10 MG/1
10 TABLET ORAL
Status: DISCONTINUED | OUTPATIENT
Start: 2024-05-09 | End: 2024-05-11 | Stop reason: HOSPADM

## 2024-05-09 RX ADMIN — ACETYLCYSTEINE 400 MG: 200 SOLUTION ORAL; RESPIRATORY (INHALATION) at 19:15

## 2024-05-09 RX ADMIN — SODIUM CHLORIDE, PRESERVATIVE FREE 10 ML: 5 INJECTION INTRAVENOUS at 10:27

## 2024-05-09 RX ADMIN — Medication 2000 MG: at 16:58

## 2024-05-09 RX ADMIN — OXYCODONE AND ACETAMINOPHEN 1 TABLET: 5; 325 TABLET ORAL at 11:06

## 2024-05-09 RX ADMIN — ATORVASTATIN CALCIUM 40 MG: 40 TABLET, FILM COATED ORAL at 21:45

## 2024-05-09 RX ADMIN — ACETYLCYSTEINE 400 MG: 200 SOLUTION ORAL; RESPIRATORY (INHALATION) at 08:09

## 2024-05-09 RX ADMIN — Medication 2000 MG: at 10:25

## 2024-05-09 RX ADMIN — CHOLECALCIFEROL TAB 125 MCG (5000 UNIT) 5000 UNITS: 125 TAB at 10:26

## 2024-05-09 RX ADMIN — DRONABINOL 2.5 MG: 2.5 CAPSULE ORAL at 10:29

## 2024-05-09 RX ADMIN — MIDODRINE HYDROCHLORIDE 5 MG: 5 TABLET ORAL at 10:26

## 2024-05-09 RX ADMIN — LEVALBUTEROL 1.25 MG: 1.25 SOLUTION, CONCENTRATE RESPIRATORY (INHALATION) at 08:09

## 2024-05-09 RX ADMIN — SENNOSIDES 8.6 MG: 8.6 TABLET, FILM COATED ORAL at 10:26

## 2024-05-09 RX ADMIN — LEVALBUTEROL 1.25 MG: 1.25 SOLUTION, CONCENTRATE RESPIRATORY (INHALATION) at 14:05

## 2024-05-09 RX ADMIN — DIGOXIN 125 MCG: 125 TABLET ORAL at 10:26

## 2024-05-09 RX ADMIN — METOPROLOL TARTRATE 25 MG: 25 TABLET, FILM COATED ORAL at 21:45

## 2024-05-09 RX ADMIN — OXYCODONE AND ACETAMINOPHEN 1 TABLET: 5; 325 TABLET ORAL at 18:17

## 2024-05-09 RX ADMIN — MIDODRINE HYDROCHLORIDE 5 MG: 5 TABLET ORAL at 13:13

## 2024-05-09 RX ADMIN — CYANOCOBALAMIN TAB 1000 MCG 1000 MCG: 1000 TAB at 10:26

## 2024-05-09 RX ADMIN — CEFEPIME 2000 MG: 2 INJECTION, POWDER, FOR SOLUTION INTRAVENOUS at 18:17

## 2024-05-09 RX ADMIN — METOPROLOL TARTRATE 25 MG: 25 TABLET, FILM COATED ORAL at 10:26

## 2024-05-09 RX ADMIN — EMPAGLIFLOZIN 10 MG: 10 TABLET, FILM COATED ORAL at 10:26

## 2024-05-09 RX ADMIN — FERROUS SULFATE TAB EC 325 MG (65 MG FE EQUIVALENT) 325 MG: 325 (65 FE) TABLET DELAYED RESPONSE at 10:29

## 2024-05-09 RX ADMIN — PANTOPRAZOLE SODIUM 40 MG: 40 INJECTION, POWDER, FOR SOLUTION INTRAVENOUS at 10:26

## 2024-05-09 RX ADMIN — Medication 2000 MG: at 00:34

## 2024-05-09 RX ADMIN — MIDODRINE HYDROCHLORIDE 5 MG: 5 TABLET ORAL at 16:58

## 2024-05-09 RX ADMIN — SODIUM CHLORIDE, PRESERVATIVE FREE 10 ML: 5 INJECTION INTRAVENOUS at 20:47

## 2024-05-09 RX ADMIN — METOCLOPRAMIDE 10 MG: 10 TABLET ORAL at 21:45

## 2024-05-09 RX ADMIN — SENNOSIDES 8.6 MG: 8.6 TABLET, FILM COATED ORAL at 21:45

## 2024-05-09 RX ADMIN — DRONABINOL 2.5 MG: 2.5 CAPSULE ORAL at 16:58

## 2024-05-09 RX ADMIN — TAMSULOSIN HYDROCHLORIDE 0.4 MG: 0.4 CAPSULE ORAL at 10:26

## 2024-05-09 RX ADMIN — METOCLOPRAMIDE HYDROCHLORIDE 5 MG: 5 INJECTION INTRAMUSCULAR; INTRAVENOUS at 10:26

## 2024-05-09 RX ADMIN — METOCLOPRAMIDE 10 MG: 10 TABLET ORAL at 16:58

## 2024-05-09 RX ADMIN — FUROSEMIDE 40 MG: 40 TABLET ORAL at 10:26

## 2024-05-09 RX ADMIN — ASPIRIN 81 MG CHEWABLE TABLET 81 MG: 81 TABLET CHEWABLE at 10:26

## 2024-05-09 RX ADMIN — MIRTAZAPINE 30 MG: 15 TABLET, ORALLY DISINTEGRATING ORAL at 21:45

## 2024-05-09 RX ADMIN — LEVALBUTEROL 1.25 MG: 1.25 SOLUTION, CONCENTRATE RESPIRATORY (INHALATION) at 19:15

## 2024-05-09 RX ADMIN — ISODIUM CHLORIDE 3 ML: 0.03 SOLUTION RESPIRATORY (INHALATION) at 14:05

## 2024-05-09 ASSESSMENT — PAIN DESCRIPTION - ORIENTATION
ORIENTATION: RIGHT
ORIENTATION: RIGHT

## 2024-05-09 ASSESSMENT — PAIN DESCRIPTION - LOCATION
LOCATION: ABDOMEN
LOCATION: ABDOMEN

## 2024-05-09 ASSESSMENT — PAIN DESCRIPTION - FREQUENCY: FREQUENCY: INTERMITTENT

## 2024-05-09 ASSESSMENT — PAIN SCALES - GENERAL
PAINLEVEL_OUTOF10: 0
PAINLEVEL_OUTOF10: 3
PAINLEVEL_OUTOF10: 7
PAINLEVEL_OUTOF10: 8

## 2024-05-09 ASSESSMENT — PAIN - FUNCTIONAL ASSESSMENT: PAIN_FUNCTIONAL_ASSESSMENT: ACTIVITIES ARE NOT PREVENTED

## 2024-05-09 ASSESSMENT — PAIN DESCRIPTION - ONSET: ONSET: ON-GOING

## 2024-05-09 ASSESSMENT — PAIN DESCRIPTION - DESCRIPTORS
DESCRIPTORS: ACHING;DISCOMFORT;CRAMPING
DESCRIPTORS: ACHING;CRAMPING

## 2024-05-09 ASSESSMENT — PAIN DESCRIPTION - PAIN TYPE: TYPE: CHRONIC PAIN

## 2024-05-09 NOTE — CARE COORDINATION
DC Planning    Ref sent to [facility], spoke with [facility], they will set up meeting with family.

## 2024-05-09 NOTE — PROGRESS NOTES
PALLIATIVE CARE PROGRESS NOTE     NAME:  Jorge Luis Banda  MEDICAL RECORD NUMBER:  [id number]  AGE: 78 y.o.   GENDER: male  : 1945  TODAY'S DATE:  2024  Room:     Reason For Consult:  Goals of care evaluation  Distress management  Symptom Management  Guidance and support  Facilitate communications  Assistance in coordinating care  Recommendations for the above    Plan      Palliative Interaction:    I met with patient this morning on rounds.  He spoke with pulmonary this morning.  Continue to recommend tracheostomy.  He states that he does not know if it is the right decision for him.  He worries about his overall quality of life.    We discussed what his long-term goals were.  He states he does not think that he wants to tracheostomy so that he can spend time with Rita.  He verbalizes being worried about Rita when he passes.  He states that ultimately he just wants to remain comfortable.  He has told me multiple times being in a hospital bed for the rest of his life is not what he would consider a good life.  He tells me that he does not want intubated again and would not want to proceed with tracheostomy.  He states \"I know that I am too high risk for any big surgeries and that I need multiple.  I just want to be comfortable.\"    We had a discussion regarding CODE STATUS.  Explained differences between full code and DNR.  I explained DNR CCA and DNRCC.  Hospice was discussed and the potential for informational meeting to consider his options moving forward.  Patient states his brother passed away in hospice care.  He is very adamant that he would not want CPR or intubation.  Explained to him that we could change to DNR CCA with no intubation and continue our current plan of care.  During this time, will place consult for hospice to discuss options.  I told him that if he decides to proceed with hospice care, will change to DNR CC at that time.  He states he wants all the cardiac monitors

## 2024-05-09 NOTE — PROGRESS NOTES
End Of Shift Note  [facility]  Summary of shift: Patient had uneventful day. Code status was changed to DNR-CCA with no intubation. Patient spoke with pulmonologist regarding CXR results and that patient will need to be transferred to another hospital for trach placement. Patient spoke with palliative and has decided to not go forward with trach placement. Hospice consult was made and a meeting will be set up tomorrow with wife Rita per palliative. Patient expressing no needs at this current time.     Vitals:    Vitals:    05/09/24 1200 05/09/24 1405 05/09/24 1407 05/09/24 1600   BP: 129/65   (!) 124/59   Pulse: 93 93 96 95   Resp: 19 19 21 22   Temp: 97.5 °F (36.4 °C)   97.1 °F (36.2 °C)   TempSrc: Temporal   Temporal   SpO2: 100%   98%   Weight:       Height:            I&O:   Intake/Output Summary (Last 24 hours) at 5/9/2024 1753  Last data filed at 5/9/2024 1600  Gross per 24 hour   Intake 145.97 ml   Output 1265 ml   Net -1119.03 ml       Resp Status: Patient remains on 2L NC and tolerating it well.     Ventilator Settings:  Vent Mode: AC/PRVC Resp Rate (Set): 10 bpm/Vt (Set, mL): 500 mL/ /FiO2 : 30 %    Critical Care IV infusions:   sodium chloride      propofol Stopped (05/04/24 2247)    phenylephrine (HERMILA-SYNEPHRINE) 50 mg in sodium chloride 0.9 % 250 mL infusion Stopped (05/06/24 2322)    dexmedeTOMIDine (PRECEDEX) 400 mcg in sodium chloride 0.9 % 100 mL infusion Stopped (05/06/24 1348)    sodium chloride      sodium chloride Stopped (05/07/24 1732)    dextrose          LDA:   PICC 05/03/24 Left Cephalic (Active)   Number of days: 5       Biliary Tube 04/19/24 T-tube 8 fr RUQ (Active)   Number of days: 20       Urinary Catheter 05/02/24 Dotson (Active)   Number of days: 6       Wound 04/04/24 Foot Left;Dorsal dry, scabbed (Active)   Number of days: 35       Wound Foot Left;Lateral (Active)   Number of days:

## 2024-05-09 NOTE — PROGRESS NOTES
Ry Traylor MD   Urology Progress Note            Subjective: We were consulted again on this patient yesterday because of recent onset of hematuria    Patient Vitals for the past 24 hrs:   BP Temp Temp src Pulse Resp SpO2   05/09/24 0412 -- -- -- (!) 110 21 97 %   05/09/24 0400 120/67 -- -- (!) 105 24 --   05/09/24 0300 115/65 -- -- 100 24 --   05/09/24 0200 (!) 103/55 -- -- (!) 105 19 --   05/09/24 0100 (!) 85/58 -- -- (!) 108 22 --   05/09/24 0058 -- -- -- (!) 108 27 93 %   05/09/24 0000 (!) 114/53 97.7 °F (36.5 °C) Temporal (!) 108 20 --   05/08/24 2319 -- -- -- 91 23 98 %   05/08/24 2300 (!) 104/50 -- -- 97 26 --   05/08/24 2200 (!) 95/46 -- -- (!) 107 19 --   05/08/24 2100 (!) 95/51 -- -- (!) 102 20 --   05/08/24 2000 101/61 97.9 °F (36.6 °C) Temporal 96 24 --   05/08/24 1948 -- -- -- 97 19 97 %   05/08/24 1900 (!) 93/50 -- -- 99 20 --   05/08/24 1600 (!) 93/56 97.1 °F (36.2 °C) Temporal (!) 107 22 94 %   05/08/24 1435 -- -- -- 94 (!) 31 97 %   05/08/24 1431 -- -- -- (!) 105 20 98 %   05/08/24 1256 -- -- -- -- 21 --   05/08/24 1200 95/64 97.5 °F (36.4 °C) Temporal (!) 107 20 99 %   05/08/24 1059 -- -- -- -- 21 --   05/08/24 1000 103/63 -- -- (!) 123 24 94 %   05/08/24 0956 111/69 -- -- (!) 123 -- --   05/08/24 0926 -- -- -- (!) 110 25 96 %   05/08/24 0925 -- -- -- (!) 121 25 97 %   05/08/24 0800 95/64 97.7 °F (36.5 °C) Temporal (!) 116 22 99 %   05/08/24 0600 (!) 105/59 -- -- (!) 101 22 95 %   05/08/24 0500 112/63 -- -- (!) 109 19 98 %       Intake/Output Summary (Last 24 hours) at 5/9/2024 0420  Last data filed at 5/8/2024 1908  Gross per 24 hour   Intake 96.81 ml   Output 1000 ml   Net -903.19 ml       Recent Labs     05/06/24  0525 05/06/24  1223 05/07/24  0443 05/07/24  1114 05/07/24  2221 05/08/24  0550 05/08/24  1426   WBC 5.6  --  4.3  --   --  6.4  --    HGB 7.6*   < > 6.0*   < > 8.3* 9.2* 8.7*   HCT 24.6*   < > 19.8*   < > 26.2* 30.2* 27.5*   MCV 96.1  --  96.1  --

## 2024-05-09 NOTE — PROGRESS NOTES
Patient Name: Jorge Luis Banda  Date of admission: 4/3/2024  3:27 PM  Patient's age: 78 y.o., 1945  Admission Dx: Melena [K92.1]  GI bleed [K92.2]  UMM (acute kidney injury) (HCC) [N17.9]  Left leg cellulitis [L03.116]  Anemia, unspecified type [D64.9]    Reason for Consult: management recommendations  Requesting Physician: Sumeet Eller MD    CHIEF COMPLAINT: GI bleeding    History Obtained From:  patient  INTERVAL HISTORY:    Patient seen and examined.  Awake and alert.  Feels much better after blood transfusion.  No active bleeding.  Repeated hemoglobin is 9.2.  Less respiratory distress.  No hemoptysis.      HISTORY OF PRESENT ILLNESS:    The patient is a 78 y.o.   male who is admitted to the hospital for anemia and suspicion for GI bleeding.  His hemoglobin was under 7 and received blood transfusion.  He has severe peripheral vascular disease on Plavix and Coumadin.  He underwent an EGD that showed gastritis and he refused colonoscopy.  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  From oncology perspective, the patient was diagnosed with stage I lung cancer in 2019 and underwent lung resection.  Never received any chemotherapy or radiation.  He has been in remission since then.  CT scan of the chest showed concerning changes in the liver and adrenal gland for metastatic disease.  Dedicated CT of the abdomen and pelvis is ordered and pending.    Past Medical History:   has a past medical history of Paroxysmal atrial fibrillation (HCC), Peripheral artery disease (HCC), and Primary hypertension.    Past Surgical History:   has a past surgical history that includes above knee amputation (Right, 01/01/2020); Upper gastrointestinal endoscopy (N/A, 4/8/2024); IR CHOLECYSTOSTOMY PERCUTANEOUS COMPLETE (4/19/2024); and bronchoscopy (N/A, 4/26/2024).     Medications:    Reviewed in Epic     Allergies:  resection in 2019   Patient Name: ADRIANA CAMPOS : 1945 (Age: 73) Gender: M Taken: 2019 Reported: 5/3/2019 Physician(s): Leonardo Ryan MD [phone]) Copy To:  Accession #: [id number] [facility]. Rec. #: [id number] Acct: # [id number]   Final Pathologic Diagnosis   1. Left lung, lower lobe, lobectomy:        Moderately differentiated adenocarcinoma, acinar predominant (acinar 50%, micropapillary 30% and         lepidic 20%), 2.8 cm in greatest dimension, limited to the lung        Surgical margins are negative for tumor.        No visceral pleural invasion identified.        Sections of 24 lymph nodes, negative for tumor (0/24) including 4 hilar, 17 parabronchial and 3          intraparenchymal lymph nodes.     2. #9 lymph node, excision:        Sections of 1 lymph node, negative for tumor (0/1).     3. #7 nodes, excision:        Sections of 2 lymph nodes, negative for tumor (0/2).     4. Parabronchial lymph node, excision:        Sections of 2 lymph nodes, negative for tumor (0/2).     CANCER CASE SUMMARY         Procedure: Lobectomy   Specimen laterality: Left   Tumor site: Lower lobe   Tumor size: 2.8 cm   Tumor focality: Unifocal   Histologic type: Adenocarcinoma   Histologic grade: Moderately differentiated   Visceral pleura invasion: Not identified   Lymphovascular invasion: Not identified   Direct invasion of adjacent structures: No adjacent structures present   Margins: All margins are uninvolved by tumor. Margins examining include bronchial, vascular and parenchymal   Treatment effect: No known presurgical therapy   Regional Lymph Nodes --     Number of lymph nodes involved: 0      Specify anitra stations involved: NA     Number of lymph nodes examined: 29      Specify anitra stations examined: Stations 10, 9 and 7     TNM descriptors:   Primary Tumor (pT): pT1c   Regional Lymph Nodes (pN): pN0   IMAGING DATA:  CT chest   IMPRESSION:  1. Mild-to-moderate bilateral pleural effusions greater on

## 2024-05-09 NOTE — PROGRESS NOTES
[facility]  Office: [phone]  Chinmay Ramos DO, Jose Atkinson DO, Guevara Ramirez DO, Pranay Cooper, DO, Sumeet Eller MD, Karen Carvajal MD, Sony Crawford MD, Poonam Platt MD,  Stephan San MD, Marcello Daniels MD, Theodore Pascual MD,  Juve Mahmood DO, Matias Mckeon MD, Hero Tavarez MD, Valentin Ramos DO, Jazzy Spence MD,  Trino Handley DO, Flavia Leonardo MD, Vanessa Soriano MD, Pricilla Akers MD, Casimiro Knight MD,  Zeke Maciel MD, Guera Cervantes MD, Kianna Martin MD, Mirlande Worrell MD, Ney Garcia MD, Savannah Dominguez MD, Ed Emerson DO, Jaylen West DO, Comfort Carcamo MD,  Siva Ashley MD, Shirley Waterhouse, CNP,  Carolyn Pelaez CNP, Scotty Ge, CNP,  Wandy Trivedi, DNP, Malissa Gaston, CNP, Lana Penaloza, CNP, Susan Haro, CNP, Sherrell Mendoza, CNP, Suzy Morales, PA-C, Dalila Lange PA-C, Sada Matt, CNP, Crystal Womack, CNP, Sheng Reddy, CNP, Arielle Guy, CNP, Linsey Perez, CNP, Anju Cho, CNS, Hoa Donahue, CNP, Cassidy Ferrer CNP, Tracy Schwab, CNP         [facility]   IN-PATIENT SERVICE   [facility]    Progress Note    5/9/2024    1:52 PM    Name:   Jorge Luis Banda  MRN:     [id number]     Acct:      [id number]   Room:   2027/2027-01  IP Day:  36  Admit Date:  4/3/2024  3:27 PM    PCP:   Lucila Machado APRN - CNP  Code Status:  DNR-CCA    Subjective:     C/C:   Chief Complaint   Patient presents with    Leg Pain     Left leg, open wounds and swollen       Interval History Status: .   Patient had repeat bronchoscopy  on 5/6/2024, and he was extubated later,  Bronchoscopy showed upper bronchus takeoff has obstruction with masslike lesion possible stump versus mass, there was clotted blood on the stump.  He had bedside swallow evaluation  again s/p extubation due to history of esophageal dysphagia, GI consult was recommended, regular diet with thin liquids also has been recommended  Pulmonary discussed with

## 2024-05-09 NOTE — PLAN OF CARE
Problem: Respiratory - Adult  Goal: Able to breathe comfortably  Description: Able to breathe comfortably  5/9/2024 0815 by Makayla Bonilla RCP  Outcome: Progressing  5/8/2024 1957 by Hoa Thornton RCP  Outcome: Progressing  Goal: Clear lung sounds  5/9/2024 0815 by Makayla Bonilla RCP  Outcome: Progressing  5/8/2024 1957 by Hoa Thornton RCP  Outcome: Progressing  Goal: Adequate oxygenation  Description: Adequate oxygenation  5/9/2024 0815 by Makayla Bonilla RCP  Outcome: Progressing  5/8/2024 1957 by Hoa Thornton RCP  Outcome: Progressing

## 2024-05-09 NOTE — PLAN OF CARE
comfort level or baseline comfort level: Encourage patient to monitor pain and request assistance  5/8/2024 2302 by Rola Myers RN  Outcome: Progressing  5/8/2024 2300 by Rola Myers RN  Outcome: Progressing     Problem: Skin/Tissue Integrity - Adult  Goal: Incisions, wounds, or drain sites healing without S/S of infection  5/9/2024 1131 by Shira Watkins RN  Outcome: Progressing  Flowsheets (Taken 5/9/2024 0800)  Incisions, Wounds, or Drain Sites Healing Without Sign and Symptoms of Infection: ADMISSION and DAILY: Assess and document risk factors for pressure ulcer development  5/8/2024 2302 by Rola Myers RN  Outcome: Progressing  5/8/2024 2300 by Rola Myers RN  Outcome: Progressing     Problem: Infection - Adult  Goal: Absence of infection at discharge  5/9/2024 1131 by Shira Watkins RN  Outcome: Progressing  Flowsheets (Taken 5/9/2024 0800)  Absence of infection at discharge: Assess and monitor for signs and symptoms of infection  5/8/2024 2302 by Rola Myers RN  Outcome: Progressing  5/8/2024 2300 by Rola Myers RN  Outcome: Progressing  Flowsheets (Taken 5/8/2024 2000)  Absence of infection at discharge:   Assess and monitor for signs and symptoms of infection   Monitor lab/diagnostic results   Monitor all insertion sites i.e., indwelling lines, tubes and drains   Monitor endotracheal (as able) and nasal secretions for changes in amount and color   Administer medications as ordered   Instruct and encourage patient and family to use good hand hygiene technique   Identify and instruct in appropriate isolation precautions for identified infection/condition     Problem: Hematologic - Adult  Goal: Maintains hematologic stability  5/9/2024 1131 by Shira Watkins RN  Outcome: Progressing  Flowsheets (Taken 5/9/2024 0800)  Maintains hematologic stability: Assess for signs and symptoms of bleeding or hemorrhage  5/8/2024 2302 by Rola Myers RN  Outcome: Progressing  5/8/2024 2300 by  effective coping strategies  Description: INTERVENTIONS:  1. Assist patient/family to identify coping skills, available support systems and cultural and spiritual values  2. Provide emotional support, including active listening and acknowledgement of concerns of patient and caregivers  3. Reduce environmental stimuli, as able  4. Instruct patient/family in relaxation techniques, as appropriate  5. Assess for spiritual pain/suffering and initiate Spiritual Care, Psychosocial Clinical Specialist consults as needed  5/9/2024 1131 by Shira Watkins, RN  Outcome: Progressing  Flowsheets (Taken 5/9/2024 0800)  Patient/family able to verbalize anxieties, fears, and concerns, and demonstrate effective coping:   Provide emotional support, including active listening and acknowledgement of concerns of patient and caregivers   Assist patient/family to identify coping skills, available support systems and cultural and spiritual values  5/8/2024 2302 by Rola Myers, RN  Outcome: Progressing  5/8/2024 2300 by Rola Myers, RN  Outcome: Progressing  Flowsheets (Taken 5/8/2024 2000)  Patient/family able to verbalize anxieties, fears, and concerns, and demonstrate effective coping:   Assist patient/family to identify coping skills, available support systems and cultural and spiritual values   Provide emotional support, including active listening and acknowledgement of concerns of patient and caregivers

## 2024-05-09 NOTE — PLAN OF CARE
Problem: Respiratory - Adult  Goal: Able to breathe comfortably  Description: Able to breathe comfortably  5/9/2024 1913 by Hoa Thornton RCP  Outcome: Progressing     Problem: Respiratory - Adult  Goal: Clear lung sounds  5/9/2024 1913 by Hoa Thornton RCP  Outcome: Progressing     Problem: Respiratory - Adult  Goal: Adequate oxygenation  Description: Adequate oxygenation  5/9/2024 1913 by Hoa Thornton RCP  Outcome: Progressing           BRONCHOSPASM/BRONCHOCONSTRICTION    IMPROVE  AERATION/BREATHSOUNDS  ADMINISTER BRONCHODILATOR THERAPY AS APPROPRIATE  ASSESS BREATH SOUNDS  INITIATE AEROSOL PROTOCOL IF ORDERED TO DO SO  PATIENT EDUCATION AS NEEDED      PROVIDE ADEQUATE OXYGENATION WITH ACCEPTABLE SP02/ABG'S    [x]  IDENTIFY APPROPRIATE OXYGEN THERAPY  [x]   MONITOR SP02/ABG'S AS NEEDED   [x]   PATIENT EDUCATION AS NEEDED      NONINVASIVE VENTILATION    PROVIDE OPTIMAL VENTILATION/ACCEPTABLE SPO2   IMPLEMENT NONINVASIVE VENTILATION PROTOCOL   MAINTAIN ACCEPTABLE SPO2   ASSESS SKIN INTEGRITY/BREAKDOWN SCORE   PATIENT EDUCATION AS NEEDED   BIPAP AS NEEDED

## 2024-05-09 NOTE — PROGRESS NOTES
Pulmonary Critical Care Progress Note       Patient seen for the follow up of bilateral pleural effusions, hemoptysis     Subjective:  He is sitting in bed, alert and oriented. He has no complaints. No events overnight. Patient was extubated 5/6/24 after a third bronchoscopy.     Examination:  Vitals: /63   Pulse (!) 101   Temp 97.3 °F (36.3 °C) (Temporal)   Resp 20   Ht 1.829 m (6')   Wt 61.7 kg (136 lb 1.6 oz)   SpO2 97%   BMI 18.46 kg/m²   General appearance: In no acute distress, alert and cooperative with exam cachectic  Neck: No JVD  Lungs: Decreased breath sound bilateral rhonchi  Heart: irregular rate and rhythm, S1, S2 normal, no gallop  Abdomen: Soft, non tender, + BS  Cholecystotomy tube in place bloody output  Extremities: no cyanosis or clubbing. No significant edema, left upper and lower extremity weakness right above-knee amputation left foot dorsal ulcer    LABs:  CBC:   Recent Labs     05/06/24  1223 05/06/24  2043 05/07/24  0443 05/07/24  1114 05/07/24  2221 05/08/24  0550 05/08/24  1426   WBC  --   --  4.3  --   --  6.4  --    HGB 7.9* 7.5* 6.0* 8.6* 8.3* 9.2* 8.7*   HCT 25.9* 24.0* 19.8* 27.1* 26.2* 30.2* 27.5*   PLT  --   --  162  --   --  225  --        BMP:   Recent Labs     05/07/24  0443 05/08/24  0550    136   K 3.7 4.0   CO2 25 30   BUN 19 18   CREATININE 0.9 1.1   LABGLOM 86 68   GLUCOSE 85 100*       BAL culture 5/2      Specimen Description .BRONCHIAL WASHINGS LLL   Direct Exam FEW NEUTROPHILS    FEW GRAM POSITIVE COCCI IN PAIRS Abnormal    Culture  Abnormal   STAPHYLOCOCCUS SPECIES, COAGULASE NEGATIVE MODERATE GROWTH Susceptibilities have not been performed.  Notify the laboratory within 7 days for further workup if clinically indicated.   LEFT MAIN STEM WASHINGS:         NEGATIVE FOR MALIGNANCY.    THORACENTESIS, RIGHT FLUID:         NEGATIVE FOR MALIGNANCY.     Radiology:  X-ray chest 5/9/24    X-ray chest 5/7/24      Chest x-ray 5/4      Chest x-ray 5/3    Chest  fungal elements  Continue cefazolin 2 g IV every 8  Repeat x-ray chest in a.m.    Hypotension requiring pressor/increased cortisol level suspect following hydrocortisone dose  Off laine-synephrine  Midodrine  Off hydrocortisone    Acute cholecystitis/acute anemia/Hemoptysis  Hemoptysis persists on suction  Monitor hemoglobin hematocrit  Transfuse 1 unit PRBC if hemoglobin less than 7  Status post EGD with negative findings  Plan for elective colonoscopy outpatient  Cholecystotomy tube; monitor output   Completed Zosyn per ID 4/26  Hematology oncology following    Paroxysmal A-fib   Cardiology signed off  Not able to resume anticoagulation given bleeding issues despite CVA risk  Hold heparin drip off Eliquis given decreased hemoglobin    History of stage 1 lung cancer 2019 s/p lung resection  No adjuvant therapy     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 90 % R and 80 % Left .   Off plavix  Status post R AKA  Vascular consult  CEA electively    Patient is agreeable to tracheostomy if necessary to manage recurrent secretions in left lung.  Discussed with patient again today.  I told him to have a good discussion with his wife and if needed with palliative care before proceeding with tracheostomy.  He will let me know tomorrow morning how he wants to proceed regarding tracheostomy.  Peptic ulcer disease prophylaxis   DVT prophylaxis EPC for now    Electronically signed by     Joe Mcneil MD on 5/9/2024 at 11:16 AM  Pulmonary Critical Care and Sleep Medicine,  [facility]  Office: [phone]

## 2024-05-09 NOTE — PROGRESS NOTES
VASCULAR SURGERY  PROGRESS NOTE      5/9/2024 4:23 PM  Subjective:   Admit Date: 4/3/2024  PCP: Lucila Machado APRN - CNP    Chief Complaint   Patient presents with    Leg Pain     Left leg, open wounds and swollen       Interval History: Above noted.  Plan to meet with hospice tomorrow.    Diet: ADULT DIET; Regular; 5 carb choices (75 gm/meal); Low Fat/Low Chol/High Fiber/BRIDGET; No Drinking Straws  ADULT ORAL NUTRITION SUPPLEMENT; Breakfast, Lunch, Dinner; Frozen Oral Supplement    Medications:   Scheduled Meds:   metoclopramide  10 mg Oral 4x Daily AC & HS    [Held by provider] apixaban  5 mg Per NG tube BID    aspirin  81 mg Oral Daily    pantoprazole (PROTONIX) 40 mg in sodium chloride (PF) 0.9 % 10 mL injection  40 mg IntraVENous Daily    senna  1 tablet Per NG tube BID    polyethylene glycol  17 g Per NG tube Daily    ceFAZolin  2,000 mg IntraVENous Q8H    midazolam  2 mg IntraVENous Once    acetylcysteine  400 mg Inhalation BID RT    digoxin  125 mcg Oral Daily    sodium chloride nebulizer  3 mL Nebulization TID RT    droNABinol  2.5 mg Oral BID WC    levalbuterol  1.25 mg Nebulization TID    mirtazapine  30 mg Oral Nightly    midodrine  5 mg Oral TID WC    furosemide  40 mg Oral Daily    sodium chloride  80 mL IntraVENous Once    empagliflozin  10 mg Oral Daily    sodium chloride  80 mL IntraVENous Once    metoprolol tartrate  25 mg Oral BID    sodium chloride  100 mL IntraVENous Once    ferrous sulfate  325 mg Oral Daily with breakfast    atorvastatin  40 mg Oral Nightly    vitamin D3  5,000 Units Oral Daily    vitamin B-12  1,000 mcg Oral Daily    tamsulosin  0.4 mg Oral Daily    sodium chloride flush  5-40 mL IntraVENous 2 times per day     Continuous Infusions:   sodium chloride      propofol Stopped (05/04/24 2247)    phenylephrine (HERMILA-SYNEPHRINE) 50 mg in sodium chloride 0.9 % 250 mL infusion Stopped (05/06/24 2322)    dexmedeTOMIDine (PRECEDEX) 400 mcg in sodium chloride 0.9 % 100 mL infusion

## 2024-05-09 NOTE — ACP (ADVANCE CARE PLANNING)
Advance Care Planning      Palliative Medicine Provider (MD/NP)  Advance Care Planning (ACP) Conversation      Date of Conversation: 05/09/24  The patient and/or authorized decision maker consented to a voluntary Advance Care Planning conversation.   Individuals present for the conversation:   Patient with decision making capacity    Legal Healthcare Agent(s):    Primary Decision Maker: Rita Villagran - [facility] - [phone]    ACP documents available in EMR prior to discussion:  -Power of  for Healthcare    Primary Palliative Diagnosis(es):  Left lung mass     Conversation Summary:  Discussed possible tracheostomy for continued need for bronchoscopies.  Patient does not want to pursue trach and verbalizes that he would not want to be on a ventilator again.  Patient also verbalizes he does not want CPR.  Discussed DNR CCA versus DNR CC.  Patient open to informational meeting with hospice.  Agreeable to change CODE STATUS to DNR CCA with no intubation.  Form completed and patient signed.    Resuscitation Status:    Code Status: DNR-CCA    Outcomes / Completed Documentation:  An explanation of advance directives and their importance was provided and the following forms completed:    -Portable DNR    If new document completed, original was provided to patient and/or family member.    Copy was placed for scanning into the [facility] EMR.      I spent 15 minutes providing separately identifiable ACP services with the patient and/or surrogate decision maker in a voluntary, in-person conversation discussing the patient's wishes and goals as detailed in the above note.       Christy Correa, APRN - CNP

## 2024-05-09 NOTE — PLAN OF CARE
infection  5/8/2024 2300 by Rola Myers, RN  Outcome: Progressing  5/8/2024 1418 by Shira Watkins RN  Outcome: Progressing  Flowsheets (Taken 5/8/2024 0800)  Incisions, Wounds, or Drain Sites Healing Without Sign and Symptoms of Infection:   ADMISSION and DAILY: Assess and document risk factors for pressure ulcer development   TWICE DAILY: Assess and document skin integrity   TWICE DAILY: Assess and document dressing/incision, wound bed, drain sites and surrounding tissue     Problem: Infection - Adult  Goal: Absence of infection at discharge  5/8/2024 2300 by Rola Myers, RN  Outcome: Progressing  Flowsheets (Taken 5/8/2024 2000)  Absence of infection at discharge:   Assess and monitor for signs and symptoms of infection   Monitor lab/diagnostic results   Monitor all insertion sites i.e., indwelling lines, tubes and drains   Monitor endotracheal (as able) and nasal secretions for changes in amount and color   Administer medications as ordered   Instruct and encourage patient and family to use good hand hygiene technique   Identify and instruct in appropriate isolation precautions for identified infection/condition  5/8/2024 1418 by Shira Watkins RN  Outcome: Progressing  Flowsheets (Taken 5/8/2024 0800)  Absence of infection at discharge:   Assess and monitor for signs and symptoms of infection   Monitor lab/diagnostic results   Monitor all insertion sites i.e., indwelling lines, tubes and drains     Problem: Hematologic - Adult  Goal: Maintains hematologic stability  5/8/2024 2300 by Rola Myers RN  Outcome: Progressing  Flowsheets (Taken 5/8/2024 2000)  Maintains hematologic stability:   Assess for signs and symptoms of bleeding or hemorrhage   Monitor labs for bleeding or clotting disorders   Administer blood products/factors as ordered  5/8/2024 1418 by Shira Watkins RN  Outcome: Progressing  Flowsheets (Taken 5/8/2024 0800)  Maintains hematologic stability:   Assess for signs and symptoms of

## 2024-05-09 NOTE — PROGRESS NOTES
End Of Shift Note  [facility]    Summary of shift: Patient had an uneventful shift. He remained hemodynamically stable. Patient tolerated BiPAP for about 2-3 hours tonight and refused for the rest of the night.    Vitals:    Vitals:    05/09/24 0200 05/09/24 0300 05/09/24 0400 05/09/24 0412   BP: (!) 103/55 115/65 120/67    Pulse: (!) 105 100 (!) 105 (!) 110   Resp: 19 24 24 21   Temp:   97.7 °F (36.5 °C)    TempSrc:   Temporal    SpO2:    97%   Weight:   61.7 kg (136 lb 1.6 oz)    Height:            I&O:   Intake/Output Summary (Last 24 hours) at 5/9/2024 0619  Last data filed at 5/9/2024 0535  Gross per 24 hour   Intake 145.97 ml   Output 1350 ml   Net -1204.03 ml       Resp Status: 2L NC    Ventilator Settings:  Vent Mode: AC/PRVC Resp Rate (Set): 10 bpm/Vt (Set, mL): 500 mL/ /FiO2 : 30 %    Critical Care IV infusions:   sodium chloride      propofol Stopped (05/04/24 2247)    phenylephrine (HERMILA-SYNEPHRINE) 50 mg in sodium chloride 0.9 % 250 mL infusion Stopped (05/06/24 2322)    dexmedeTOMIDine (PRECEDEX) 400 mcg in sodium chloride 0.9 % 100 mL infusion Stopped (05/06/24 1348)    sodium chloride      sodium chloride Stopped (05/07/24 1732)    dextrose          LDA:   PICC 05/03/24 Left Cephalic (Active)   Number of days: 5       Peripheral IV 04/24/24 Distal;Left Forearm (Active)   Number of days: 14       Peripheral IV 04/25/24 Right;Anterior Cephalic (Active)   Number of days: 13       Biliary Tube 04/19/24 T-tube 8 fr RUQ (Active)   Number of days: 19       Urinary Catheter 05/02/24 Dotson (Active)   Number of days: 6       Wound 04/04/24 Foot Left;Dorsal dry, scabbed (Active)   Number of days: 34       Wound Foot Left;Lateral (Active)   Number of days:

## 2024-05-09 NOTE — PLAN OF CARE
Problem: Safety - Adult  Goal: Free from fall injury  5/8/2024 2302 by Rola Myers, RN  Outcome: Progressing  5/8/2024 2300 by Rola Myers RN  Outcome: Progressing  5/8/2024 1418 by Shira Watkins RN  Outcome: Progressing  Flowsheets (Taken 5/8/2024 0800)  Free From Fall Injury:   Instruct family/caregiver on patient safety   Based on caregiver fall risk screen, instruct family/caregiver to ask for assistance with transferring infant if caregiver noted to have fall risk factors     Problem: Discharge Planning  Goal: Discharge to home or other facility with appropriate resources  5/8/2024 2302 by Rola Myers RN  Outcome: Progressing  5/8/2024 2300 by Rola Myers RN  Outcome: Progressing  Flowsheets (Taken 5/8/2024 2000)  Discharge to home or other facility with appropriate resources:   Identify barriers to discharge with patient and caregiver   Arrange for needed discharge resources and transportation as appropriate   Identify discharge learning needs (meds, wound care, etc)   Refer to discharge planning if patient needs post-hospital services based on physician order or complex needs related to functional status, cognitive ability or social support system  5/8/2024 1418 by Shira Watkins RN  Outcome: Progressing  Flowsheets (Taken 5/8/2024 0800)  Discharge to home or other facility with appropriate resources:   Identify barriers to discharge with patient and caregiver   Arrange for needed discharge resources and transportation as appropriate   Identify discharge learning needs (meds, wound care, etc)   Refer to discharge planning if patient needs post-hospital services based on physician order or complex needs related to functional status, cognitive ability or social support system     Problem: Pain  Goal: Verbalizes/displays adequate comfort level or baseline comfort level  5/8/2024 2302 by Rola Myers, RN  Outcome: Progressing  5/8/2024 2300 by Rola Myers RN  Outcome: Progressing  5/8/2024  2302 by Aossey, Rola, RN  Outcome: Progressing  5/8/2024 2300 by Rola Myers RN  Outcome: Progressing  5/8/2024 1418 by Shira Watkins RN  Outcome: Progressing     Problem: Chronic Conditions and Co-morbidities  Goal: Patient's chronic conditions and co-morbidity symptoms are monitored and maintained or improved  5/8/2024 2302 by Rola Myers RN  Outcome: Progressing  5/8/2024 2300 by Rola Myers RN  Outcome: Progressing  Flowsheets (Taken 5/8/2024 2000)  Care Plan - Patient's Chronic Conditions and Co-Morbidity Symptoms are Monitored and Maintained or Improved:   Monitor and assess patient's chronic conditions and comorbid symptoms for stability, deterioration, or improvement   Collaborate with multidisciplinary team to address chronic and comorbid conditions and prevent exacerbation or deterioration   Update acute care plan with appropriate goals if chronic or comorbid symptoms are exacerbated and prevent overall improvement and discharge  5/8/2024 1418 by Shira Watkins RN  Outcome: Progressing  Flowsheets (Taken 5/8/2024 0800)  Care Plan - Patient's Chronic Conditions and Co-Morbidity Symptoms are Monitored and Maintained or Improved:   Monitor and assess patient's chronic conditions and comorbid symptoms for stability, deterioration, or improvement   Collaborate with multidisciplinary team to address chronic and comorbid conditions and prevent exacerbation or deterioration   Update acute care plan with appropriate goals if chronic or comorbid symptoms are exacerbated and prevent overall improvement and discharge     Problem: Safety - Medical Restraint  Goal: Remains free of injury from restraints (Restraint for Interference with Medical Device)  Description: INTERVENTIONS:  1. Determine that other, less restrictive measures have been tried or would not be effective before applying the restraint  2. Evaluate the patient's condition at the time of restraint application  3. Inform patient/family

## 2024-05-09 NOTE — PROGRESS NOTES
[facility]  Speech Language Pathology    Date: 5/9/2024  Patient Name: Jorge Luis Banda  YOB: 1945   AGE: 78 y.o.  MRN: [id number]        Patient Not Available for Speech Therapy     Due to:  [] Testing  [] Hemodialysis  [] Cancelled by RN  [] Surgery   [] Intubation/Sedation/Pain Medication  [] Medical instability  [x] Other: Hold per RN- hospice is consulted and meeting with pt tomorrow. Will continue to follow and modify POC as appropriate.     Next scheduled treatment: 5/10/24 or as able     Kinga Lakhani CCC-SLP   Speech-Language Pathologist

## 2024-05-09 NOTE — PROGRESS NOTES
Physical Therapy  Facility/Department: [facility]  Physical Therapy Reassessment    Name: Jorge Luis Banda  : 1945  MRN: [id number]  Date of Service:     Discharge Recommendations:  Pt currently functioning below baseline.  Recommend daily inpatient skilled therapy at time of discharge to maximize long term outcomes and prevent re-admission. Please refer to AM-PAC score for current level of function.              Patient Diagnosis(es): The primary encounter diagnosis was Left leg cellulitis. Diagnoses of Anemia, unspecified type, Melena, UMM (acute kidney injury) (HCC), Gastrointestinal hemorrhage, unspecified gastrointestinal hemorrhage type, Atrial fib/flutter, transient (HCC), Embolism and thrombosis of artery (HCC), Claudication (HCC), Right upper quadrant abdominal pain, Acute cholecystitis, Atelectasis of left lung, and Atelectasis were also pertinent to this visit.  Past Medical History:  has a past medical history of Paroxysmal atrial fibrillation (HCC), Peripheral artery disease (HCC), and Primary hypertension.  Past Surgical History:  has a past surgical history that includes above knee amputation (Right, 2020); Upper gastrointestinal endoscopy (N/A, 2024); IR CHOLECYSTOSTOMY PERCUTANEOUS COMPLETE (2024); and bronchoscopy (N/A, 2024).    Assessment   Body Structures, Functions, Activity Limitations Requiring Skilled Therapeutic Intervention: Decreased functional mobility ;Decreased strength;Decreased balance;Decreased posture;Decreased ROM  Assessment: Pt. intubated for broncoscopy and extubated .  Therapy Prognosis: Good  Decision Making: High Complexity  Requires PT Follow-Up: Yes  Activity Tolerance  Activity Tolerance: Patient limited by fatigue     Plan   Physical Therapy Plan  General Plan: 5-7 times per week  Specific Instructions for Next Treatment: transfers, HEP  Current Treatment Recommendations: Strengthening, Balance training, ROM, Functional mobility  training, Endurance training, Neuromuscular re-education, Home exercise program, Safety education & training, Patient/Caregiver education & training, Equipment evaluation, education, & procurement, Therapeutic activities  Safety Devices  Type of Devices: Call light within reach, Nurse notified, Patient at risk for falls, Left in bed, Bed alarm in place, All medhat prominences offloaded, All fall risk precautions in place, Heels elevated for pressure relief  Restraints  Restraints Initially in Place: No     Restrictions  Restrictions/Precautions  Restrictions/Precautions: Fall Risk, General Precautions, Bedrest with Bathroom Privileges  Required Braces or Orthoses?: No  Position Activity Restriction  Other position/activity restrictions: Telemetry, LUE IV, L sided weakness (s/p R CVA); R AKA;  R UE IV; LUE PICC Line; O2 via NC; Dotson catheter; drain on R side of abdomen     Subjective   General  Chart Reviewed: Yes  Patient assessed for rehabilitation services?: Yes  Additional Pertinent Hx: recent intubation for bronchoscopy; extubated 5/6  Family / Caregiver Present: No  Subjective  Subjective: Pt initially declining to get up as he had just \"moved around\" for a bed bath. Agreed with gentle encouragement.         Social/Functional History  Social/Functional History  Lives With: Spouse  Type of Home: Apartment  Home Layout: One level  Home Access:  (No stairs. Patient reports there is a hill to get to apartment.)  Bathroom Shower/Tub: Tub/Shower unit (Patient performs sponge bath.)  Bathroom Toilet: Standard  Bathroom Equipment: Tub transfer bench  Home Equipment: Wheelchair-manual, Walker, standard, Rollator (RLE Prosthetic)  Has the patient had two or more falls in the past year or any fall with injury in the past year?: Yes (Wife reports patient got up while home alone and fell off of bed. He then crawled out to the living room and called wife for help. Needed assist to get off floor.)  ADL Assistance: Independent

## 2024-05-10 LAB
BACTERIA URNS QL MICRO: ABNORMAL
BILIRUB UR QL STRIP: NEGATIVE
CLARITY UR: ABNORMAL
COLOR UR: YELLOW
EPI CELLS #/AREA URNS HPF: ABNORMAL /HPF (ref 0–5)
ERYTHROCYTE [DISTWIDTH] IN BLOOD BY AUTOMATED COUNT: 17.2 % (ref 11.8–14.4)
GLUCOSE BLD-MCNC: 124 MG/DL (ref 75–110)
GLUCOSE BLD-MCNC: 136 MG/DL (ref 75–110)
GLUCOSE UR STRIP-MCNC: ABNORMAL MG/DL
HCT VFR BLD AUTO: 26.1 % (ref 40.7–50.3)
HGB BLD-MCNC: 8.2 G/DL (ref 13–17)
HGB UR QL STRIP.AUTO: ABNORMAL
KETONES UR STRIP-MCNC: NEGATIVE MG/DL
LEUKOCYTE ESTERASE UR QL STRIP: ABNORMAL
MCH RBC QN AUTO: 29.5 PG (ref 25.2–33.5)
MCHC RBC AUTO-ENTMCNC: 31.4 G/DL (ref 28.4–34.8)
MCV RBC AUTO: 93.9 FL (ref 82.6–102.9)
NITRITE UR QL STRIP: NEGATIVE
NRBC BLD-RTO: 0 PER 100 WBC
PH UR STRIP: 6.5 [PH] (ref 5–8)
PLATELET # BLD AUTO: 243 K/UL (ref 138–453)
PMV BLD AUTO: 10 FL (ref 8.1–13.5)
PROT UR STRIP-MCNC: NEGATIVE MG/DL
RBC # BLD AUTO: 2.78 M/UL (ref 4.21–5.77)
RBC #/AREA URNS HPF: ABNORMAL /HPF (ref 0–2)
SP GR UR STRIP: 1 (ref 1–1.03)
UROBILINOGEN UR STRIP-ACNC: NORMAL EU/DL (ref 0–1)
WBC #/AREA URNS HPF: ABNORMAL /HPF (ref 0–5)
WBC OTHER # BLD: 6.2 K/UL (ref 3.5–11.3)
YEAST URNS QL MICRO: ABNORMAL

## 2024-05-10 PROCEDURE — 36415 COLL VENOUS BLD VENIPUNCTURE: CPT

## 2024-05-10 PROCEDURE — 6370000000 HC RX 637 (ALT 250 FOR IP): Performed by: FAMILY MEDICINE

## 2024-05-10 PROCEDURE — 85027 COMPLETE CBC AUTOMATED: CPT

## 2024-05-10 PROCEDURE — 6370000000 HC RX 637 (ALT 250 FOR IP): Performed by: INTERNAL MEDICINE

## 2024-05-10 PROCEDURE — 6360000002 HC RX W HCPCS: Performed by: INTERNAL MEDICINE

## 2024-05-10 PROCEDURE — 2060000000 HC ICU INTERMEDIATE R&B

## 2024-05-10 PROCEDURE — 3600000002 HC SURGERY LEVEL 2 BASE: Performed by: UROLOGY

## 2024-05-10 PROCEDURE — 6360000002 HC RX W HCPCS: Performed by: FAMILY MEDICINE

## 2024-05-10 PROCEDURE — 94640 AIRWAY INHALATION TREATMENT: CPT

## 2024-05-10 PROCEDURE — 82947 ASSAY GLUCOSE BLOOD QUANT: CPT

## 2024-05-10 PROCEDURE — 99232 SBSQ HOSP IP/OBS MODERATE 35: CPT | Performed by: INTERNAL MEDICINE

## 2024-05-10 PROCEDURE — 3600000012 HC SURGERY LEVEL 2 ADDTL 15MIN: Performed by: UROLOGY

## 2024-05-10 PROCEDURE — 2580000003 HC RX 258: Performed by: FAMILY MEDICINE

## 2024-05-10 PROCEDURE — 2709999900 HC NON-CHARGEABLE SUPPLY: Performed by: UROLOGY

## 2024-05-10 PROCEDURE — C9113 INJ PANTOPRAZOLE SODIUM, VIA: HCPCS | Performed by: FAMILY MEDICINE

## 2024-05-10 PROCEDURE — 6370000000 HC RX 637 (ALT 250 FOR IP): Performed by: UROLOGY

## 2024-05-10 PROCEDURE — A4216 STERILE WATER/SALINE, 10 ML: HCPCS | Performed by: FAMILY MEDICINE

## 2024-05-10 PROCEDURE — 87086 URINE CULTURE/COLONY COUNT: CPT

## 2024-05-10 PROCEDURE — 92526 ORAL FUNCTION THERAPY: CPT

## 2024-05-10 PROCEDURE — 81001 URINALYSIS AUTO W/SCOPE: CPT

## 2024-05-10 PROCEDURE — 94761 N-INVAS EAR/PLS OXIMETRY MLT: CPT

## 2024-05-10 PROCEDURE — 2580000003 HC RX 258: Performed by: INTERNAL MEDICINE

## 2024-05-10 PROCEDURE — 0TJB8ZZ INSPECTION OF BLADDER, VIA NATURAL OR ARTIFICIAL OPENING ENDOSCOPIC: ICD-10-PCS | Performed by: UROLOGY

## 2024-05-10 PROCEDURE — 2700000000 HC OXYGEN THERAPY PER DAY

## 2024-05-10 RX ORDER — LANOLIN ALCOHOL/MO/W.PET/CERES
3 CREAM (GRAM) TOPICAL NIGHTLY PRN
Status: CANCELLED | OUTPATIENT
Start: 2024-05-10

## 2024-05-10 RX ORDER — ACETAMINOPHEN 325 MG/1
650 TABLET ORAL EVERY 6 HOURS PRN
Status: CANCELLED | OUTPATIENT
Start: 2024-05-10

## 2024-05-10 RX ORDER — ONDANSETRON 2 MG/ML
4 INJECTION INTRAMUSCULAR; INTRAVENOUS EVERY 6 HOURS PRN
Status: CANCELLED | OUTPATIENT
Start: 2024-05-10

## 2024-05-10 RX ORDER — FUROSEMIDE 40 MG/1
40 TABLET ORAL DAILY
Status: CANCELLED | OUTPATIENT
Start: 2024-05-11

## 2024-05-10 RX ORDER — LEVALBUTEROL 1.25 MG/.5ML
1.25 SOLUTION, CONCENTRATE RESPIRATORY (INHALATION) 3 TIMES DAILY
Status: CANCELLED | OUTPATIENT
Start: 2024-05-10

## 2024-05-10 RX ORDER — SODIUM CHLORIDE 0.9 % (FLUSH) 0.9 %
5-40 SYRINGE (ML) INJECTION EVERY 12 HOURS SCHEDULED
Status: CANCELLED | OUTPATIENT
Start: 2024-05-10

## 2024-05-10 RX ORDER — ATORVASTATIN CALCIUM 40 MG/1
40 TABLET, FILM COATED ORAL NIGHTLY
Status: CANCELLED | OUTPATIENT
Start: 2024-05-10

## 2024-05-10 RX ORDER — GUAIFENESIN 200 MG/10ML
200 LIQUID ORAL EVERY 4 HOURS PRN
Status: CANCELLED | OUTPATIENT
Start: 2024-05-10

## 2024-05-10 RX ORDER — ONDANSETRON 4 MG/1
4 TABLET, ORALLY DISINTEGRATING ORAL EVERY 8 HOURS PRN
Status: CANCELLED | OUTPATIENT
Start: 2024-05-10

## 2024-05-10 RX ORDER — LANOLIN ALCOHOL/MO/W.PET/CERES
1000 CREAM (GRAM) TOPICAL DAILY
Status: CANCELLED | OUTPATIENT
Start: 2024-05-11

## 2024-05-10 RX ORDER — DIGOXIN 125 MCG
125 TABLET ORAL DAILY
Status: CANCELLED | OUTPATIENT
Start: 2024-05-11

## 2024-05-10 RX ORDER — SODIUM CHLORIDE 0.9 % (FLUSH) 0.9 %
5-40 SYRINGE (ML) INJECTION PRN
Status: CANCELLED | OUTPATIENT
Start: 2024-05-10

## 2024-05-10 RX ORDER — MIDODRINE HYDROCHLORIDE 5 MG/1
5 TABLET ORAL
Status: CANCELLED | OUTPATIENT
Start: 2024-05-11

## 2024-05-10 RX ORDER — MORPHINE SULFATE 2 MG/ML
1 INJECTION, SOLUTION INTRAMUSCULAR; INTRAVENOUS EVERY 4 HOURS PRN
Status: CANCELLED | OUTPATIENT
Start: 2024-05-10

## 2024-05-10 RX ORDER — DRONABINOL 2.5 MG/1
2.5 CAPSULE ORAL 2 TIMES DAILY WITH MEALS
Status: CANCELLED | OUTPATIENT
Start: 2024-05-11

## 2024-05-10 RX ORDER — ASPIRIN 81 MG/1
81 TABLET, CHEWABLE ORAL DAILY
Status: CANCELLED | OUTPATIENT
Start: 2024-05-11

## 2024-05-10 RX ORDER — SODIUM CHLORIDE 9 MG/ML
INJECTION, SOLUTION INTRAVENOUS PRN
Status: CANCELLED | OUTPATIENT
Start: 2024-05-10

## 2024-05-10 RX ORDER — ACETYLCYSTEINE 200 MG/ML
400 SOLUTION ORAL; RESPIRATORY (INHALATION)
Status: CANCELLED | OUTPATIENT
Start: 2024-05-10

## 2024-05-10 RX ORDER — ACETAMINOPHEN 650 MG/1
650 SUPPOSITORY RECTAL EVERY 6 HOURS PRN
Status: CANCELLED | OUTPATIENT
Start: 2024-05-10

## 2024-05-10 RX ORDER — POTASSIUM CHLORIDE 20 MEQ/1
40 TABLET, EXTENDED RELEASE ORAL PRN
Status: CANCELLED | OUTPATIENT
Start: 2024-05-10

## 2024-05-10 RX ORDER — UREA 10 %
325 LOTION (ML) TOPICAL
Status: CANCELLED | OUTPATIENT
Start: 2024-05-11

## 2024-05-10 RX ORDER — TAMSULOSIN HYDROCHLORIDE 0.4 MG/1
0.4 CAPSULE ORAL DAILY
Status: CANCELLED | OUTPATIENT
Start: 2024-05-11

## 2024-05-10 RX ORDER — SODIUM CHLORIDE FOR INHALATION 0.9 %
3 VIAL, NEBULIZER (ML) INHALATION
Status: CANCELLED | OUTPATIENT
Start: 2024-05-10

## 2024-05-10 RX ORDER — POLYETHYLENE GLYCOL 3350 17 G/17G
17 POWDER, FOR SOLUTION ORAL DAILY
Status: CANCELLED | OUTPATIENT
Start: 2024-05-11

## 2024-05-10 RX ORDER — SENNOSIDES A AND B 8.6 MG/1
1 TABLET, FILM COATED ORAL 2 TIMES DAILY
Status: CANCELLED | OUTPATIENT
Start: 2024-05-10

## 2024-05-10 RX ORDER — DEXTROSE MONOHYDRATE 100 MG/ML
INJECTION, SOLUTION INTRAVENOUS CONTINUOUS PRN
Status: CANCELLED | OUTPATIENT
Start: 2024-05-10

## 2024-05-10 RX ORDER — POTASSIUM CHLORIDE 7.45 MG/ML
10 INJECTION INTRAVENOUS PRN
Status: CANCELLED | OUTPATIENT
Start: 2024-05-10

## 2024-05-10 RX ORDER — MIDAZOLAM HYDROCHLORIDE 1 MG/ML
2 INJECTION INTRAMUSCULAR; INTRAVENOUS
Status: CANCELLED | OUTPATIENT
Start: 2024-05-10

## 2024-05-10 RX ORDER — MAGNESIUM SULFATE 1 G/100ML
1000 INJECTION INTRAVENOUS PRN
Status: CANCELLED | OUTPATIENT
Start: 2024-05-10

## 2024-05-10 RX ORDER — METOPROLOL TARTRATE 1 MG/ML
5 INJECTION, SOLUTION INTRAVENOUS EVERY 6 HOURS PRN
Status: CANCELLED | OUTPATIENT
Start: 2024-05-10

## 2024-05-10 RX ORDER — METOCLOPRAMIDE 10 MG/1
10 TABLET ORAL
Status: CANCELLED | OUTPATIENT
Start: 2024-05-10

## 2024-05-10 RX ORDER — OXYCODONE HYDROCHLORIDE AND ACETAMINOPHEN 5; 325 MG/1; MG/1
1 TABLET ORAL EVERY 4 HOURS PRN
Status: CANCELLED | OUTPATIENT
Start: 2024-05-10

## 2024-05-10 RX ORDER — LIDOCAINE HYDROCHLORIDE 20 MG/ML
JELLY TOPICAL PRN
Status: DISCONTINUED | OUTPATIENT
Start: 2024-05-10 | End: 2024-05-10 | Stop reason: ALTCHOICE

## 2024-05-10 RX ORDER — MIRTAZAPINE 15 MG/1
30 TABLET, ORALLY DISINTEGRATING ORAL NIGHTLY
Status: CANCELLED | OUTPATIENT
Start: 2024-05-10

## 2024-05-10 RX ADMIN — CEFEPIME 2000 MG: 2 INJECTION, POWDER, FOR SOLUTION INTRAVENOUS at 22:51

## 2024-05-10 RX ADMIN — CEFEPIME 2000 MG: 2 INJECTION, POWDER, FOR SOLUTION INTRAVENOUS at 06:04

## 2024-05-10 RX ADMIN — LEVALBUTEROL 1.25 MG: 1.25 SOLUTION, CONCENTRATE RESPIRATORY (INHALATION) at 07:24

## 2024-05-10 RX ADMIN — CYANOCOBALAMIN TAB 1000 MCG 1000 MCG: 1000 TAB at 08:43

## 2024-05-10 RX ADMIN — MIDODRINE HYDROCHLORIDE 5 MG: 5 TABLET ORAL at 11:34

## 2024-05-10 RX ADMIN — ALTEPLASE 1 MG: 2.2 INJECTION, POWDER, LYOPHILIZED, FOR SOLUTION INTRAVENOUS at 20:40

## 2024-05-10 RX ADMIN — ACETYLCYSTEINE 400 MG: 200 SOLUTION ORAL; RESPIRATORY (INHALATION) at 07:25

## 2024-05-10 RX ADMIN — ATORVASTATIN CALCIUM 40 MG: 40 TABLET, FILM COATED ORAL at 22:36

## 2024-05-10 RX ADMIN — FERROUS SULFATE TAB EC 325 MG (65 MG FE EQUIVALENT) 325 MG: 325 (65 FE) TABLET DELAYED RESPONSE at 08:43

## 2024-05-10 RX ADMIN — METOPROLOL TARTRATE 25 MG: 25 TABLET, FILM COATED ORAL at 22:36

## 2024-05-10 RX ADMIN — DIGOXIN 125 MCG: 125 TABLET ORAL at 08:44

## 2024-05-10 RX ADMIN — SODIUM CHLORIDE, PRESERVATIVE FREE 10 ML: 5 INJECTION INTRAVENOUS at 08:46

## 2024-05-10 RX ADMIN — EMPAGLIFLOZIN 10 MG: 10 TABLET, FILM COATED ORAL at 08:44

## 2024-05-10 RX ADMIN — METOCLOPRAMIDE 10 MG: 10 TABLET ORAL at 08:43

## 2024-05-10 RX ADMIN — MIDODRINE HYDROCHLORIDE 5 MG: 5 TABLET ORAL at 08:43

## 2024-05-10 RX ADMIN — ACETYLCYSTEINE 400 MG: 200 SOLUTION ORAL; RESPIRATORY (INHALATION) at 19:50

## 2024-05-10 RX ADMIN — ISODIUM CHLORIDE 3 ML: 0.03 SOLUTION RESPIRATORY (INHALATION) at 07:24

## 2024-05-10 RX ADMIN — DRONABINOL 2.5 MG: 2.5 CAPSULE ORAL at 08:44

## 2024-05-10 RX ADMIN — CHOLECALCIFEROL TAB 125 MCG (5000 UNIT) 5000 UNITS: 125 TAB at 08:44

## 2024-05-10 RX ADMIN — SODIUM CHLORIDE, PRESERVATIVE FREE 10 ML: 5 INJECTION INTRAVENOUS at 20:58

## 2024-05-10 RX ADMIN — SENNOSIDES 8.6 MG: 8.6 TABLET, FILM COATED ORAL at 22:37

## 2024-05-10 RX ADMIN — OXYCODONE AND ACETAMINOPHEN 1 TABLET: 5; 325 TABLET ORAL at 09:03

## 2024-05-10 RX ADMIN — PANTOPRAZOLE SODIUM 40 MG: 40 INJECTION, POWDER, FOR SOLUTION INTRAVENOUS at 08:44

## 2024-05-10 RX ADMIN — MIRTAZAPINE 30 MG: 15 TABLET, ORALLY DISINTEGRATING ORAL at 22:35

## 2024-05-10 RX ADMIN — LEVALBUTEROL 1.25 MG: 1.25 SOLUTION, CONCENTRATE RESPIRATORY (INHALATION) at 16:23

## 2024-05-10 RX ADMIN — METOCLOPRAMIDE 10 MG: 10 TABLET ORAL at 22:35

## 2024-05-10 RX ADMIN — LEVALBUTEROL 1.25 MG: 1.25 SOLUTION, CONCENTRATE RESPIRATORY (INHALATION) at 19:50

## 2024-05-10 RX ADMIN — METOPROLOL TARTRATE 25 MG: 25 TABLET, FILM COATED ORAL at 08:44

## 2024-05-10 RX ADMIN — TAMSULOSIN HYDROCHLORIDE 0.4 MG: 0.4 CAPSULE ORAL at 08:43

## 2024-05-10 RX ADMIN — FUROSEMIDE 40 MG: 40 TABLET ORAL at 08:43

## 2024-05-10 RX ADMIN — SENNOSIDES 8.6 MG: 8.6 TABLET, FILM COATED ORAL at 08:43

## 2024-05-10 RX ADMIN — ASPIRIN 81 MG CHEWABLE TABLET 81 MG: 81 TABLET CHEWABLE at 08:45

## 2024-05-10 RX ADMIN — ISODIUM CHLORIDE 3 ML: 0.03 SOLUTION RESPIRATORY (INHALATION) at 16:23

## 2024-05-10 RX ADMIN — POLYETHYLENE GLYCOL 3350 17 G: 17 POWDER, FOR SOLUTION ORAL at 08:44

## 2024-05-10 RX ADMIN — METOCLOPRAMIDE 10 MG: 10 TABLET ORAL at 11:34

## 2024-05-10 ASSESSMENT — PAIN DESCRIPTION - DESCRIPTORS: DESCRIPTORS: SORE

## 2024-05-10 ASSESSMENT — PAIN SCALES - GENERAL
PAINLEVEL_OUTOF10: 1
PAINLEVEL_OUTOF10: 8

## 2024-05-10 ASSESSMENT — PAIN - FUNCTIONAL ASSESSMENT: PAIN_FUNCTIONAL_ASSESSMENT: PREVENTS OR INTERFERES SOME ACTIVE ACTIVITIES AND ADLS

## 2024-05-10 ASSESSMENT — PAIN DESCRIPTION - LOCATION: LOCATION: INCISION

## 2024-05-10 ASSESSMENT — PAIN DESCRIPTION - ORIENTATION: ORIENTATION: RIGHT

## 2024-05-10 NOTE — PLAN OF CARE
Problem: Respiratory - Adult  Goal: Able to breathe comfortably  Description: Able to breathe comfortably  5/10/2024 0733 by Shana Motley RCP  Outcome: Progressing  5/9/2024 1913 by Hoa Thornton RCP  Outcome: Progressing  Goal: Clear lung sounds  5/10/2024 0733 by Shana Motley RCP  Outcome: Progressing  5/9/2024 1913 by Hoa Thornton RCP  Outcome: Progressing  Goal: Adequate oxygenation  Description: Adequate oxygenation  5/10/2024 0733 by Shana Motley RCP  Outcome: Progressing  5/9/2024 1913 by Hoa Thornton RCP  Outcome: Progressing

## 2024-05-10 NOTE — PLAN OF CARE
Problem: Respiratory - Adult  Goal: Able to breathe comfortably  Description: Able to breathe comfortably  5/10/2024 1959 by Giselle Salas RCP  Outcome: Progressing     Problem: Respiratory - Adult  Goal: Clear lung sounds  5/10/2024 1959 by Giselle Salas RCP  Outcome: Progressing     Problem: Respiratory - Adult  Goal: Adequate oxygenation  Description: Adequate oxygenation  5/10/2024 1959 by Giselle Salas RCP  Outcome: Progressing

## 2024-05-10 NOTE — PROGRESS NOTES
current alcohol use of about 3.0 standard drinks of alcohol per week. He reports that he does not use drugs.     Family History: No family history on file.    Vitals:  BP 93/70   Pulse (!) 103   Temp 96.9 °F (36.1 °C) (Temporal)   Resp 18   Ht 1.829 m (6')   Wt 61.7 kg (136 lb 1.6 oz)   SpO2 100%   BMI 18.46 kg/m²   Temp (24hrs), Av.2 °F (36.2 °C), Min:96.9 °F (36.1 °C), Max:97.9 °F (36.6 °C)    Recent Labs     24  0611 24  1155 24  1808 24  2355   POCGLU 104 121* 106 136*         I/O (24Hr):    Intake/Output Summary (Last 24 hours) at 5/10/2024 1252  Last data filed at 5/10/2024 0718  Gross per 24 hour   Intake --   Output 1165 ml   Net -1165 ml         Labs:  Hematology:  Recent Labs     24  0550 24  1426 24  1011 05/10/24  0256   WBC 6.4  --  6.6 6.2   RBC 3.14*  --  2.99* 2.78*   HGB 9.2* 8.7* 8.9* 8.2*   HCT 30.2* 27.5* 28.4* 26.1*   MCV 96.2  --  95.0 93.9   MCH 29.3  --  29.8 29.5   MCHC 30.5  --  31.3 31.4   RDW 17.8*  --  17.4* 17.2*     --  227 243   MPV 10.6  --  10.1 10.0       Chemistry:  Recent Labs     24  0550 24  0256     --    K 4.0  --    CL 95*  --    CO2 30  --    GLUCOSE 100*  --    BUN 18  --    CREATININE 1.1  --    MG 2.3  --    ANIONGAP 11  --    LABGLOM 68  --    CALCIUM 8.7  --    DIGOXIN  --  0.6       Recent Labs     24  1827 24  2354 24  0611 24  1155 24  1808 24  2355   POCGLU 103 126* 104 121* 106 136*       ABG:  Lab Results   Component Value Date/Time    POCPH 7.439 2024 06:09 AM    POCPCO2 37.1 2024 06:09 AM    POCPO2 80.8 2024 06:09 AM    POCHCO3 25.1 2024 06:09 AM    NBEA 0.6 2024 04:00 AM    PBEA 0.9 2024 06:09 AM    PZVB2SAD 96.3 2024 06:09 AM    FIO2 30.0 2024 06:09 AM     Lab Results   Component Value Date/Time    SPECIAL LAC 10 ML 2024 04:09 PM     Lab Results   Component Value Date/Time    CULTURE (A)  bleeding identified on EGD.  Unable to tolerate bowel prep for colonoscopy.  Given hemoglobin has been stable and patient has multiple comorbidities, will recommend deferring colonoscopy to outpatient.  Heparin infusion stopped and patient started on Eliquis and monitor hemoglobin close.  Started back on on heparin bridge on 5/2/2024 and  Eliquis held for bronchoscopy on 5/6/24.  Resumed after procedure.   Vascular surgery on board for carotid stenosis and planning endarterectomy after 4 to 6 weeks..  Gastroenterology on board (following distantly) .  Outpatient colonoscopy recommended.  Severe peripheral vascular disease  S/p multiple bypass surgeries.  Follows Dr. Ledbetter at [facility].  Anticoagulation and antiplatelet medication were held due to severe anemia.  Outpatient follow with primary vascular surgery for carotid stenosis and endarterectomy.  Right adrenal and liver lesions  Concerning for metastasis.  Known history of stage I lung cancer in 2019  s/p resection.  Defer biopsy due to multiple comorbidities at this time.  Outpatient follow-up with oncology..  PAF -   Was restarted Eliquis.  Earlier got heparin for bronchoscopy and biopsy on 5/2/2024 on Monday 5/6/2024  No ischemic work up at this time due to critical illness . Elevated troponin due to tachycardia and demand ischemia.  Unlikely ACS.  Afib with RVR - Continue Lopressor 25 mg BID and Digoxin due to persistent tachycardia  Left loculated pleural effusion and bilateral pneumonia   Treated with IV antibiotics and pulmonary following.  Sputum culture negative.  Underwent thoracentesis on 4/19/2024 by IR. Underwent  Bronch  on 4/26/24 and repeated  on 5/2/2024. Fungal element.   Patient received Unasyn on admission for 5 days, later Levaquin was added.  He also received Zosyn 10 days course from 4/11 to 4/21 followed by Flagyl and cefdinir.  ID following.  Primary Hypertension  - well-controlled.  Acute R watershed MCA/CLARKE infarct with

## 2024-05-10 NOTE — PROGRESS NOTES
a.m.    Hypotension requiring pressor/increased cortisol level suspect following hydrocortisone dose  Off laine-synephrine  Midodrine  Off hydrocortisone    Acute cholecystitis/acute anemia/Hemoptysis  Hemoptysis persists on suction  Monitor hemoglobin hematocrit  Transfuse 1 unit PRBC if hemoglobin less than 7  Status post EGD with negative findings  Plan for elective colonoscopy outpatient  Cholecystotomy tube; monitor output   Completed Zosyn per ID 4/26  Hematology oncology following    Paroxysmal A-fib   Cardiology signed off  Not able to resume anticoagulation given bleeding issues despite CVA risk  Hold heparin drip off Eliquis given decreased hemoglobin    History of stage 1 lung cancer 2019 s/p lung resection  No adjuvant therapy     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 90 % R and 80 % Left .   Off plavix  Status post R AKA  Vascular consult  CEA electively    Patient is agreeable to tracheostomy if necessary to manage recurrent secretions in left lung.  Discussed with patient again today. He has not discussed with his wife, he was again agreeable when we discussed tracheostomy, however, he has a hospice meeting today as well.  Peptic ulcer disease prophylaxis   DVT prophylaxis EPC for now    Electronically signed by   Joe Mcneil MD on 5/10/2024 at 1:13 PM  Pulmonary Critical Care and Sleep Medicine,  [facility]  Office: [phone]

## 2024-05-10 NOTE — CARE COORDINATION
EM Planning    Spoke with Angela from [facility]  had meeting w pt/and Rita. After a thorough discussion-pt is opting for a trach. [facility] has approved pt  if pt changes his mind or can follow after trach placement if pt chooses to pursue Hospice.

## 2024-05-10 NOTE — DISCHARGE SUMMARY
[facility]  Office: [phone]  Chinmay Ramos DO, Jose Atkinson DO, Guevara Ramirez DO, Pranay Cooper DO, Sumeet Eller MD, Karen Carvajal MD, Sony Crawford MD, Poonam Platt MD,  Stephan San MD, Marcello Daniels MD, Theodore Pascual MD,  Juve Mahmood DO, Matias Mckeon MD, Hero Tavarez MD, Valentin Ramos DO, Jazzy Spence MD,  Trino Handley DO, Flavia Leonardo MD, Vanessa Soriano MD, Pricilla Akers MD, Casimiro Knight MD,  Zeke Maciel MD, Guera Cervantes MD, Kianna Martin MD, Mirlande Worrell MD, Ney Garcia MD, Savannah Dominguez MD, Ed Emerson DO, Jaylen West DO, Comfort Carcamo MD,  Siva Ashley MD, Shirley Waterhouse, CNP,  Carolyn Pelaez CNP, Scotty Ge, CNP,  Wandy Trivedi, DNP, Malissa Gaston, CNP, Lana Penaloza, CNP, Susan Haro CNP, Sherrell Mendoza, CNP, Suzy Morales, PA-C, Dalila Lange PA-C, Sada Matt, CNP, Crystal Womack, CNP, Sheng Reddy, CNP, Arielle Guy, CNP, Linsey Perez, CNP, Anju Cho, CNS, Hoa Donaheu, CNP, Cassidy Ferrer CNP, Tracy Schwab, CNP         [facility]   IN-PATIENT SERVICE   [facility]    Discharge Summary     Patient ID: Jorge Luis Banda  :  1945   MRN: [id number]     ACCOUNT:  [id number]   Patient's PCP: Lucila Machado APRN - CNP  Admit Date: 4/3/2024   Discharge Date: 5/10/2024     Length of Stay: 37  Code Status:  DNR-CCA  Admitting Physician: Sumeet Eller MD  Discharge Physician: Sumeet Eller MD     Active Discharge Diagnoses:     Hospital Problem Lists:  Principal Problem:    Melena  Active Problems:    History of arterial bypass of lower extremity    S/P AKA (above knee amputation) unilateral, right (HCC)    Primary hypertension    Peripheral arterial disease (HCC)    Neuropathy    Malignant neoplasm of lung (HCC)    Intermittent claudication of left lower extremity due to atherosclerosis (HCC)    Embolism and thrombosis of artery (HCC)    Left leg cellulitis     to Get Your Medications        These medications were sent to [facility] [id number] - [place] - [address] [place] [phone] - F [phone] [address] [place] [address]      Phone: [phone]   apixaban 5 MG Tabs tablet  cefdinir 300 MG capsule  cyanocobalamin 1000 MCG tablet  empagliflozin 10 MG tablet  ferrous sulfate 325 (65 Fe) MG EC tablet  furosemide 40 MG tablet  guaiFENesin 600 MG extended release tablet  melatonin 3 MG Tabs tablet  metoprolol succinate 25 MG extended release tablet  metroNIDAZOLE 500 MG tablet  midodrine 2.5 MG tablet  mirtazapine 30 MG disintegrating tablet  pantoprazole 40 MG tablet  tamsulosin 0.4 MG capsule  vitamin D3 125 MCG (5000 UT) Tabs tablet       You can get these medications from any pharmacy    Bring a paper prescription for each of these medications  oxyCODONE-acetaminophen 5-325 MG per tablet         No discharge procedures on file.    Time Spent on discharge is  35 mins in patient examination, evaluation, counseling as well as medication reconciliation, prescriptions for required medications, discharge plan and follow up.    Electronically signed by   Sumeet Eller MD  5/10/2024  6:40 PM      Thank you Lucila Montenegro, APRN - CNP for the opportunity to be involved in this patient's care.

## 2024-05-10 NOTE — PROGRESS NOTES
Physical Therapy  DATE: 5/10/2024    NAME: Jorge Luis Banda  MRN: [id number]   : 1945    Patient not seen this date for Physical Therapy due to:      [] Cancel by RN or physician due to:    [] Hemodialysis    [] Critical Lab Value Level     [] Blood transfusion in progress    [] Acute or unstable cardiovascular status   _MAP < 55 or more than >115  _HR < 40 or > 130    [] Acute or unstable pulmonary status   -FiO2 > 60%   _RR < 5 or >40    _O2 sats < 85%    [] Strict Bedrest    [] Off Unit for surgery or procedure    [] Off Unit for testing       [] Pending imaging to R/O fracture    [] Refusal by Patient      [x] Other (Pt to have hospice meeting at 1:30 pm. PT will continue to follow).                     [] PT being discontinued at this time. Patient independent. No further needs.     [] PT being discontinued at this time as the patient has been transferred to hospice care. No further needs.      Brianna Briceno, PTA

## 2024-05-10 NOTE — PROGRESS NOTES
End Of Shift Note  [facility]  Summary of shift: Pt had uneventful night. Remained hemodynamically stable this shift. Plan today is hospice meeting to determine POC    Vitals:    Vitals:    05/10/24 0300 05/10/24 0400 05/10/24 0500 05/10/24 0600   BP: (!) 89/55 (!) 112/55 119/60 (!) 118/57   Pulse: 85 83 85 87   Resp: 15 16 17 17   Temp:  97.1 °F (36.2 °C)     TempSrc:  Temporal     SpO2:  96%     Weight:  61.7 kg (136 lb 1.6 oz)     Height:            I&O:   Intake/Output Summary (Last 24 hours) at 5/10/2024 0611  Last data filed at 5/9/2024 1600  Gross per 24 hour   Intake --   Output 665 ml   Net -665 ml       Resp Status: 2L nasal cannula    Ventilator Settings:  Vent Mode: AC/PRVC Resp Rate (Set): 10 bpm/Vt (Set, mL): 500 mL/ /FiO2 : 30 %    Critical Care IV infusions:   sodium chloride      propofol Stopped (05/04/24 2247)    phenylephrine (HERMILA-SYNEPHRINE) 50 mg in sodium chloride 0.9 % 250 mL infusion Stopped (05/06/24 2322)    dexmedeTOMIDine (PRECEDEX) 400 mcg in sodium chloride 0.9 % 100 mL infusion Stopped (05/06/24 1348)    sodium chloride      sodium chloride Stopped (05/07/24 1732)    dextrose          LDA:   PICC 05/03/24 Left Cephalic (Active)   Number of days: 6       Biliary Tube 04/19/24 T-tube 8 fr RUQ (Active)   Number of days: 20       Urinary Catheter 05/02/24 Dotson (Active)   Number of days: 7       Wound 04/04/24 Foot Left;Dorsal dry, scabbed (Active)   Number of days: 35       Wound Foot Left;Lateral (Active)   Number of days:

## 2024-05-10 NOTE — PLAN OF CARE
Problem: Safety - Adult  Goal: Free from fall injury  5/10/2024 0705 by Carlito Rao RN  Outcome: Progressing  5/10/2024 0610 by Carlito Rao RN  Outcome: Progressing     Problem: Discharge Planning  Goal: Discharge to home or other facility with appropriate resources  5/10/2024 0705 by Carlito Rao RN  Outcome: Progressing  5/10/2024 0610 by Carlito Rao RN  Outcome: Progressing  Flowsheets (Taken 5/9/2024 2000)  Discharge to home or other facility with appropriate resources:   Identify barriers to discharge with patient and caregiver   Arrange for needed discharge resources and transportation as appropriate   Identify discharge learning needs (meds, wound care, etc)     Problem: Pain  Goal: Verbalizes/displays adequate comfort level or baseline comfort level  5/10/2024 0705 by Carlito Rao RN  Outcome: Progressing  5/10/2024 0610 by Carlito Rao RN  Outcome: Progressing     Problem: Skin/Tissue Integrity - Adult  Goal: Incisions, wounds, or drain sites healing without S/S of infection  5/10/2024 0705 by Carlito Rao RN  Outcome: Progressing  5/10/2024 0610 by Carlito Rao RN  Outcome: Progressing  Flowsheets (Taken 5/9/2024 2000)  Incisions, Wounds, or Drain Sites Healing Without Sign and Symptoms of Infection: ADMISSION and DAILY: Assess and document risk factors for pressure ulcer development     Problem: Infection - Adult  Goal: Absence of infection at discharge  5/10/2024 0705 by Carlito Rao RN  Outcome: Progressing  5/10/2024 0610 by Carlito Rao RN  Outcome: Progressing  Flowsheets (Taken 5/9/2024 2000)  Absence of infection at discharge:   Assess and monitor for signs and symptoms of infection   Monitor lab/diagnostic results   Monitor all insertion sites i.e., indwelling lines, tubes and drains     Problem: Hematologic - Adult  Goal: Maintains hematologic stability  5/10/2024 0705 by Carlito Rao RN  Outcome: Progressing  5/10/2024 0610 by Carlito Rao RN  Outcome:

## 2024-05-10 NOTE — PROGRESS NOTES
Nutrition Assessment     Type and Reason for Visit: Reassess    Nutrition Recommendations/Plan:   Continue Adult Diet; Regular; 5 carb choices; Low Fat/Low chol/High fiber/BRIDGET; no drinking straws; thin liquids  Fortified Jello Oral Supplement  Monitor weight, labs, and p/o intake.      Malnutrition Assessment:  Malnutrition Status: Severe malnutrition    Nutrition Assessment:  Patients current diet is Regular; 5 carb choices; Low Fat/Low Chol/High Fiber/BRIDGET; No drinking straws and ONS; Frozen. Patient had bedside swallow study; recommendation for Regular; thin liquids. Patient has chosen to get a tracheostomy instead of pursuing hospice care. Upon visit, patient reported being unable to eat breakfast, but has been able to eat 26-50% the last few days. Patient reported strongly disliking the Magic Cup ONS. Patient stated he hasn't been able to taste or smell for a few years and rarely feels hungry. Pt. preferes snack foods such as chips and cold sandwiches. Patient has had -9.3% weight loss in 1 month (severe). Recommended patient to try Fortified Jello Oral supplement 2x per day. Continue current diet. Monitor trach status, weight, labs, supplement acceptance, and p/o intake.    Estimated Daily Nutrient Needs:  Energy (kcal):  1,543-1851kcal/day (25-30g/kg) Weight Used for Energy Requirements: Current     Protein (g):  86-93 g/day (1.4-1.5g/kg) Weight Used for Protein Requirements: Current        Fluid (ml/day):  1,543-1851 ml/day (1ml/kcal) Method Used for Fluid Requirements: 1 ml/kcal    Nutrition Related Findings:   Hypoactive bowel sounds.  Edema: trace generalized, trace LUE and LLE , +1 sacral. Constipation (5/9). Wound Type: Venous Stasis    Current Nutrition Therapies:    ADULT DIET; Regular; 5 carb choices (75 gm/meal); Low Fat/Low Chol/High Fiber/BRIDGET; No Drinking Straws  ADULT ORAL NUTRITION SUPPLEMENT; Breakfast, Lunch, Dinner; Frozen Oral Supplement    Anthropometric Measures:  Height: 182.9 cm  (6')  Current Body Wt: 61.7 kg (136 lb 2 oz)   BMI: 18.5    Nutrition Diagnosis:   Severe malnutrition related to inadequate protein-energy intake as evidenced by intake 0-25%, severe loss of subcutaneous fat, severe muscle loss    Nutrition Interventions:   Food and/or Nutrient Delivery: Continue Current Diet, Modify Oral Nutrition Supplement  Nutrition Education/Counseling: Education not indicated  Coordination of Nutrition Care: Continue to monitor while inpatient  Plan of Care discussed with: Patient/wife    Goals:  Previous Goal Met: No Progress toward Goal(s)  Goals: PO intake 75% or greater       Nutrition Monitoring and Evaluation:   Behavioral-Environmental Outcomes: None Identified  Food/Nutrient Intake Outcomes: Food and Nutrient Intake, Supplement Intake  Physical Signs/Symptoms Outcomes: Biochemical Data, Chewing or Swallowing, Fluid Status or Edema, Skin, Weight, Hemodynamic Status    Discharge Planning:    Continue Oral Nutrition Supplement, Continue current diet     Jenise Adams  Dietetic Intern

## 2024-05-10 NOTE — PROGRESS NOTES
End Of Shift Note  [facility]  Summary of shift: Patient had an eventful shift meeting with both pulmonary and hospice to discuss plan of care. While consulting with his significant other is was determined that he would like to go to [facility]'s to have the trach placed and will then re-evaluate how he wants to proceed. Patient also had a cystoscopy performed today with his maldonado replaced due to retention. 2 of the 3 ports on PICC did not have blood return so alteplase was ordered however he had to go to cystoscopy before I was able to instil it. Alteplase was given to oncoming nurse and bedside shift report given.     Vitals:    Vitals:    05/10/24 1600 05/10/24 1626 05/10/24 1700 05/10/24 1800   BP: 104/78      Pulse: 92 97 95 (!) 109   Resp: 18 17 18 22   Temp:       TempSrc:       SpO2:   95%    Weight:       Height:            I&O:   Intake/Output Summary (Last 24 hours) at 5/10/2024 1947  Last data filed at 5/10/2024 1946  Gross per 24 hour   Intake 390 ml   Output 1250 ml   Net -860 ml       Resp Status: 2L NC    Ventilator Settings:  Vent Mode: AC/PRVC Resp Rate (Set): 10 bpm/Vt (Set, mL): 500 mL/ /FiO2 : 30 %    Critical Care IV infusions:   sodium chloride      sodium chloride      sodium chloride Stopped (05/07/24 1732)    dextrose          LDA:   PICC 05/03/24 Left Cephalic (Active)   Number of days: 6       Biliary Tube 04/19/24 T-tube 8 fr RUQ (Active)   Number of days: 21       Urinary Catheter 05/10/24 2 Way;Coude;[place]aldonado (Active)   Number of days: 0       Wound 04/04/24 Foot Left;Dorsal dry, scabbed (Active)   Number of days: 36       Wound Foot Left;Lateral (Active)   Number of days:

## 2024-05-10 NOTE — PROGRESS NOTES
Hospice staff, Patient and significant other had meeting about plan of care and it was determined that the patient would like to go to [facility] for the trach and then re-evaluate how he feels about hospice. He has been accepted by hospice if he decides that is the route he wants to pursue.

## 2024-05-10 NOTE — PLAN OF CARE
[facility]  Office: [phone]  Chinmay Ramos DO, Jose Atkinson DO, Guevara Ramirez DO, Pranay Cooper DO, Sumeet Eller MD, Karen Carvajal MD, Sony Crawford MD, Poonam Platt MD,  Stephan San MD, Marcello Daniels MD, Theodore Pascual MD,  Juve Mahmood DO, Matias Mckeon MD, Hero Tavarez MD, Valentin Ramos DO, Jazzy Spence MD,  Trino Handley DO, Flavia Leonardo MD, Vanessa Soriano MD, Pricilla Akers MD, Casimiro Kngiht MD,  Zeke Maciel MD, Guera Cervantes MD, Kianna Martin MD, Mirlande Worrell MD, Ney Garcia MD, Savannah Dominguez MD, Ed Emerson DO, Jaylen West DO, Comfort Carcamo MD,  Siva Ashley MD, Shirley Waterhouse, CNP,  Carolyn Pelaez CNP, Scotty Ge, CNP,  Wandy Trivedi, DNP, Malissa Gaston, CNP, Lana Penaloza, CNP, Susan Haro, CNP, Sherrell Mendoza, CNP, Suzy Morales, PA-C, Dalila Lange PA-C, Sada Matt, CNP, Crystal Womack, CNP, Sheng Reddy, CNP, Arielle Guy, CNP, Linsey Perez, CNP, Anju Cho, CNS, Hoa Donahue, CNP, Cassidy Ferrer CNP, Tracy Schwab, CNP         [facility]   IN-PATIENT SERVICE   [facility]    Second Visit Note  For more detailed information please refer to the progress note of the day      5/10/2024    5:09 PM    Name:   Jorge Luis Banda  MRN:     [id number]     Acct:      [id number]   Room:   2027/2027-01  IP Day:  37  Admit Date:  4/3/2024  3:27 PM    PCP:   Lucila Machado APRN - CNP  Code Status:  DNR-CCA      Pt vitals were reviewed   New labs were reviewed   Patient was seen    Updated plan :     [facility] has seen the patient today, patient change his mind again and wants tracheostomy done for which he needs to be transferred to [facility] as we do not have any ENT specialist here  [facility] hospice will accept and place the patient if he changes his mind again all if he chooses hospice after tracheostomy whether he is in this hospital or at [facility].  Case

## 2024-05-10 NOTE — PROGRESS NOTES
Speech Language Pathology  [facility]    Dysphagia Treatment Note    Date: 5/10/2024  Patient’s Name: Jorge Luis Banda  MRN: [id number]  Diagnosis: dysphagia  Patient Active Problem List   Diagnosis Code    Skin eschar R23.4    Stenosis of left carotid artery I65.22    History of arterial bypass of lower extremity Z95.828    S/P AKA (above knee amputation) unilateral, right ([facility]) Z89.611    Primary hypertension I10    Persistent wound pain R52    Peripheral arterial disease ([facility]) I73.9    Ulcer of left foot ([facility]) L97.529    Neuropathy G62.9    Malignant neoplasm of lung ([facility]) C34.90    Intermittent claudication of left lower extremity due to atherosclerosis ([facility]) I70.212    Frequency of urination R35.0    Former smoker Z87.891    Embolism and thrombosis of artery ([facility]) I74.9    Dyslipidemia E78.5    Closed fracture of left humerus S42.302A    Closed fracture of surgical neck of humerus S42.213A    Acute exacerbation of chronic obstructive airways disease ([facility]) J44.1    Left leg cellulitis L03.116    Arteriosclerosis of coronary artery I25.10    Age-related osteoporosis without current pathological fracture M81.0    Adenocarcinoma of lung ([facility]) C34.90    Wound infection T14.8XXA, L08.9    History of above-knee amputation of both lower extremities ([facility]) Z89.611, Z89.612    Melena K92.1    Iron deficiency anemia due to chronic blood loss D50.0    Hx of cancer of lung Z85.118    Adrenal mass ([facility]) E27.8    Claudication ([facility]) I73.9    Pleural effusion, bilateral J90    Longstanding persistent atrial fibrillation ([facility]) I48.11    Acute ischemic right internal carotid artery (ICA) stroke ([facility]) I63.231    Internal carotid artery stenosis, bilateral I65.23    Anemia D64.9    Gastrointestinal hemorrhage K92.2    NSTEMI (non-ST elevated myocardial infarction) ([facility]) I21.4    Hypotension I95.9    Atrial fibrillation with rapid ventricular response ([facility]) I48.91    Unstable angina ([facility]) I20.0    Right upper

## 2024-05-10 NOTE — CARE COORDINATION
EM Sawyer    Spoke with Bharti from [facility]-they spoke with pt s.o. meeting arranged for 1:30.

## 2024-05-10 NOTE — PROGRESS NOTES
Spoke with pt regarding Bipap use tonight. Pt only tolerated Bipap last night for about 1-2 hours.  Pt requesting to sleep comfortably tonight with Bipap off. Pt will call if Bipap needed during the night. Pt states he would prefer to wear the Bipap during the day for periods rather than disturb his sleep at night. Pt changed his code status today to DNRCC-A with no intubation and is refusing a trach. Pt has a Hospice consult tomorrow.

## 2024-05-10 NOTE — PROGRESS NOTES
Occupational Therapy  [facility]  Occupational Therapy Not Seen    DATE: 5/10/2024    NAME: Jorge Luis Banda  MRN: [id number]   : 1945    Patient not seen this date for Occupational Therapy due to:      [] Cancel by RN or physician due to:    [] Hemodialysis    [] Critical Lab Value Level     [] Blood transfusion in progress    [] Acute or unstable cardiovascular status   _MAP < 55 or more than >115  _HR < 40 or > 130    [] Acute or unstable pulmonary status   -FiO2 > 60%   _RR < 5 or >40    _O2 sats < 85%    [] Strict Bedrest    [] Off Unit for surgery or procedure    [] Off Unit for testing       [] Pending imaging to R/O fracture    [] Refusal by Patient      [x] Other (Pt to have hospice meeting at 1:30 pm. Will continue to follow).       [] OT being discontinued at this time. Patient independent. No further needs.     [] OT being discontinued at this time as the patient has been transferred to hospice care. No further needs.      MAU KULKARNI ZINA

## 2024-05-10 NOTE — OP NOTE
Operative Note      Patient: Jorge Luis Banda  YOB: 1945  MRN: [id number]    Date of Procedure: 5/10/2024    Pre-Op Diagnosis Codes:     * Gross hematuria [R31.0]    Post-Op Diagnosis: Same and hemorrhagic cystitis no evidence of bladder tumors       Procedure(s):  CYSTOSCOPY    Surgeon(s):  Ry Traylor MD    Assistant:   * No surgical staff found *    Anesthesia: Local    Estimated Blood Loss (mL): Minimal    Complications: None    Specimens:   ID Type Source Tests Collected by Time Destination   1 : CYSTO URINE Urine Urine, Cystoscopic URINALYSIS WITH REFLEX TO CULTURE Ry Traylor MD 5/10/2024 1852        Implants:  * No implants in log *      Drains:   Biliary Tube 04/19/24 T-tube 8 fr RUQ (Active)   Site Description Clean, dry & intact 05/10/24 0400   Dressing Status Clean, dry & intact 05/10/24 0400   Dressing Type Special type 05/10/24 0400   Drainage Color Sanguinous 05/10/24 0400   Drain Status Open to gravity drainage 05/10/24 0400   Output (mL) 15 ml 05/09/24 1600       Urinary Catheter 05/10/24 2 Way;Coude;[place] (Active)       [REMOVED] NG/OG/NJ/NE Tube Nasogastric 14 fr Left nostril (Removed)   Surrounding Skin Clean, dry & intact 05/06/24 1200   Securement device Adhesive based sosa 05/06/24 1200   Status Clamped 05/06/24 1200   Placement Verified X-Ray (repeat) 05/06/24 1200   NG/OG/NJ/NE External Measurement (cm) 60 cm 05/06/24 1200   Drainage Appearance Brown 05/05/24 1700   Tube Feeding Standard without Fiber 05/06/24 1200   Tube feeding/verify rate (mL/hr) 55 mL/hr 05/06/24 0400   Tube Feeding Intake (mL) 248 ml 05/06/24 0400   Free Water/Flush (mL) 90 mL 05/06/24 0800   Output (mL) 150 ml 05/05/24 0000   Action Taken Placement verified (comment) 05/06/24 0000   Residual Volume (ml) 0 ml 05/06/24 0100       [REMOVED] Urinary Catheter 04/06/24 (Removed)   $ Urethral catheter insertion $ Not inserted for procedure 04/06/24 1759   Catheter Indications Urinary retention (acute

## 2024-05-10 NOTE — PROGRESS NOTES
VASCULAR SURGERY  PROGRESS NOTE      5/10/2024 3:27 PM  Subjective:   Admit Date: 4/3/2024  PCP: Lucila Machado APRN - CNP    Chief Complaint   Patient presents with    Leg Pain     Left leg, open wounds and swollen       Interval History: Above noted.  Plans for transfer to [place] for tracheostomy noted.    Diet: ADULT DIET; Regular; 5 carb choices (75 gm/meal); Low Fat/Low Chol/High Fiber/BRIDGET; No Drinking Straws  ADULT ORAL NUTRITION SUPPLEMENT; Breakfast, Lunch, Dinner; Frozen Oral Supplement    Medications:   Scheduled Meds:   metoclopramide  10 mg Oral 4x Daily AC & HS    cefepime  2,000 mg IntraVENous Q12H    [Held by provider] apixaban  5 mg Per NG tube BID    aspirin  81 mg Oral Daily    pantoprazole (PROTONIX) 40 mg in sodium chloride (PF) 0.9 % 10 mL injection  40 mg IntraVENous Daily    senna  1 tablet Per NG tube BID    polyethylene glycol  17 g Per NG tube Daily    midazolam  2 mg IntraVENous Once    acetylcysteine  400 mg Inhalation BID RT    digoxin  125 mcg Oral Daily    sodium chloride nebulizer  3 mL Nebulization TID RT    droNABinol  2.5 mg Oral BID WC    levalbuterol  1.25 mg Nebulization TID    mirtazapine  30 mg Oral Nightly    midodrine  5 mg Oral TID WC    furosemide  40 mg Oral Daily    sodium chloride  80 mL IntraVENous Once    empagliflozin  10 mg Oral Daily    sodium chloride  80 mL IntraVENous Once    metoprolol tartrate  25 mg Oral BID    sodium chloride  100 mL IntraVENous Once    ferrous sulfate  325 mg Oral Daily with breakfast    atorvastatin  40 mg Oral Nightly    vitamin D3  5,000 Units Oral Daily    vitamin B-12  1,000 mcg Oral Daily    tamsulosin  0.4 mg Oral Daily    sodium chloride flush  5-40 mL IntraVENous 2 times per day     Continuous Infusions:   sodium chloride      sodium chloride      sodium chloride Stopped (05/07/24 1732)    dextrose           Labs:   CBC:   Recent Labs     05/08/24  0550 05/08/24  1426 05/09/24  1011 05/10/24  0256   WBC 6.4  --  6.6

## 2024-05-10 NOTE — CARE COORDINATION
Prudence from [facility] called-she tried to reach out to pt spouse last night and this am with no answer and voicemail is full. They do have openings this morning for meetings.

## 2024-05-10 NOTE — PLAN OF CARE
Problem: Safety - Adult  Goal: Free from fall injury  Outcome: Progressing     Problem: Discharge Planning  Goal: Discharge to home or other facility with appropriate resources  Outcome: Progressing  Flowsheets (Taken 5/9/2024 2000)  Discharge to home or other facility with appropriate resources:   Identify barriers to discharge with patient and caregiver   Arrange for needed discharge resources and transportation as appropriate   Identify discharge learning needs (meds, wound care, etc)     Problem: Pain  Goal: Verbalizes/displays adequate comfort level or baseline comfort level  Outcome: Progressing     Problem: Skin/Tissue Integrity - Adult  Goal: Incisions, wounds, or drain sites healing without S/S of infection  Outcome: Progressing  Flowsheets (Taken 5/9/2024 2000)  Incisions, Wounds, or Drain Sites Healing Without Sign and Symptoms of Infection: ADMISSION and DAILY: Assess and document risk factors for pressure ulcer development     Problem: Infection - Adult  Goal: Absence of infection at discharge  Outcome: Progressing  Flowsheets (Taken 5/9/2024 2000)  Absence of infection at discharge:   Assess and monitor for signs and symptoms of infection   Monitor lab/diagnostic results   Monitor all insertion sites i.e., indwelling lines, tubes and drains     Problem: Hematologic - Adult  Goal: Maintains hematologic stability  Outcome: Progressing  Flowsheets (Taken 5/9/2024 2000)  Maintains hematologic stability:   Assess for signs and symptoms of bleeding or hemorrhage   Monitor labs for bleeding or clotting disorders   Administer blood products/factors as ordered     Problem: Respiratory - Adult  Goal: Able to breathe comfortably  Description: Able to breathe comfortably  5/9/2024 1913 by Hoa Thornton RCP  Outcome: Progressing  Goal: Clear lung sounds  5/9/2024 1913 by Hoa Thornton RCP  Outcome: Progressing  Goal: Adequate oxygenation  Description: Adequate oxygenation  5/9/2024 1913 by Hoa Thornton

## 2024-05-11 ENCOUNTER — HOSPITAL ENCOUNTER (INPATIENT)
Age: 79
LOS: 2 days | Discharge: HOSPICE | DRG: 180 | End: 2024-05-13
Attending: FAMILY MEDICINE | Admitting: STUDENT IN AN ORGANIZED HEALTH CARE EDUCATION/TRAINING PROGRAM
Payer: MEDICARE

## 2024-05-11 VITALS
TEMPERATURE: 96.8 F | DIASTOLIC BLOOD PRESSURE: 54 MMHG | RESPIRATION RATE: 24 BRPM | HEIGHT: 72 IN | HEART RATE: 108 BPM | WEIGHT: 136.1 LBS | SYSTOLIC BLOOD PRESSURE: 105 MMHG | BODY MASS INDEX: 18.43 KG/M2 | OXYGEN SATURATION: 95 %

## 2024-05-11 PROBLEM — J98.09 LESION OF BRONCHUS: Status: ACTIVE | Noted: 2024-05-11

## 2024-05-11 LAB
ANION GAP SERPL CALCULATED.3IONS-SCNC: 12 MMOL/L (ref 9–16)
BUN SERPL-MCNC: 15 MG/DL (ref 8–23)
CALCIUM SERPL-MCNC: 8.5 MG/DL (ref 8.6–10.4)
CHLORIDE SERPL-SCNC: 98 MMOL/L (ref 98–107)
CO2 SERPL-SCNC: 27 MMOL/L (ref 20–31)
CREAT SERPL-MCNC: 1 MG/DL (ref 0.7–1.2)
ERYTHROCYTE [DISTWIDTH] IN BLOOD BY AUTOMATED COUNT: 17.7 % (ref 11.8–14.4)
GFR, ESTIMATED: 80 ML/MIN/1.73M2
GLUCOSE BLD-MCNC: 107 MG/DL (ref 75–110)
GLUCOSE BLD-MCNC: 108 MG/DL (ref 75–110)
GLUCOSE BLD-MCNC: 98 MG/DL (ref 75–110)
GLUCOSE BLD-MCNC: 99 MG/DL (ref 75–110)
GLUCOSE SERPL-MCNC: 100 MG/DL (ref 74–99)
HCT VFR BLD AUTO: 30.1 % (ref 40.7–50.3)
HGB BLD-MCNC: 9.1 G/DL (ref 13–17)
MCH RBC QN AUTO: 29.8 PG (ref 25.2–33.5)
MCHC RBC AUTO-ENTMCNC: 30.2 G/DL (ref 28.4–34.8)
MCV RBC AUTO: 98.7 FL (ref 82.6–102.9)
MICROORGANISM SPEC CULT: ABNORMAL
NRBC BLD-RTO: 0 PER 100 WBC
PLATELET # BLD AUTO: 295 K/UL (ref 138–453)
PMV BLD AUTO: 10.4 FL (ref 8.1–13.5)
POTASSIUM SERPL-SCNC: 3.7 MMOL/L (ref 3.7–5.3)
RBC # BLD AUTO: 3.05 M/UL (ref 4.21–5.77)
SERVICE CMNT-IMP: ABNORMAL
SODIUM SERPL-SCNC: 137 MMOL/L (ref 136–145)
SPECIMEN DESCRIPTION: ABNORMAL
WBC OTHER # BLD: 9.5 K/UL (ref 3.5–11.3)

## 2024-05-11 PROCEDURE — 99223 1ST HOSP IP/OBS HIGH 75: CPT | Performed by: NURSE PRACTITIONER

## 2024-05-11 PROCEDURE — 2700000000 HC OXYGEN THERAPY PER DAY

## 2024-05-11 PROCEDURE — A4216 STERILE WATER/SALINE, 10 ML: HCPCS | Performed by: INTERNAL MEDICINE

## 2024-05-11 PROCEDURE — 6360000002 HC RX W HCPCS: Performed by: INTERNAL MEDICINE

## 2024-05-11 PROCEDURE — 82947 ASSAY GLUCOSE BLOOD QUANT: CPT

## 2024-05-11 PROCEDURE — 94761 N-INVAS EAR/PLS OXIMETRY MLT: CPT

## 2024-05-11 PROCEDURE — 94667 MNPJ CHEST WALL 1ST: CPT

## 2024-05-11 PROCEDURE — 85027 COMPLETE CBC AUTOMATED: CPT

## 2024-05-11 PROCEDURE — 6370000000 HC RX 637 (ALT 250 FOR IP): Performed by: INTERNAL MEDICINE

## 2024-05-11 PROCEDURE — 2060000000 HC ICU INTERMEDIATE R&B

## 2024-05-11 PROCEDURE — 94668 MNPJ CHEST WALL SBSQ: CPT

## 2024-05-11 PROCEDURE — 2580000003 HC RX 258: Performed by: INTERNAL MEDICINE

## 2024-05-11 PROCEDURE — 94640 AIRWAY INHALATION TREATMENT: CPT

## 2024-05-11 PROCEDURE — 80048 BASIC METABOLIC PNL TOTAL CA: CPT

## 2024-05-11 PROCEDURE — 36415 COLL VENOUS BLD VENIPUNCTURE: CPT

## 2024-05-11 PROCEDURE — C9113 INJ PANTOPRAZOLE SODIUM, VIA: HCPCS | Performed by: INTERNAL MEDICINE

## 2024-05-11 RX ORDER — POTASSIUM CHLORIDE 20 MEQ/1
40 TABLET, EXTENDED RELEASE ORAL PRN
Status: DISCONTINUED | OUTPATIENT
Start: 2024-05-11 | End: 2024-05-13 | Stop reason: HOSPADM

## 2024-05-11 RX ORDER — ONDANSETRON 4 MG/1
4 TABLET, ORALLY DISINTEGRATING ORAL EVERY 8 HOURS PRN
Status: DISCONTINUED | OUTPATIENT
Start: 2024-05-11 | End: 2024-05-13 | Stop reason: HOSPADM

## 2024-05-11 RX ORDER — SODIUM CHLORIDE 0.9 % (FLUSH) 0.9 %
5-40 SYRINGE (ML) INJECTION EVERY 12 HOURS SCHEDULED
Status: DISCONTINUED | OUTPATIENT
Start: 2024-05-11 | End: 2024-05-13 | Stop reason: HOSPADM

## 2024-05-11 RX ORDER — ASPIRIN 81 MG/1
81 TABLET, CHEWABLE ORAL DAILY
Status: DISCONTINUED | OUTPATIENT
Start: 2024-05-11 | End: 2024-05-13 | Stop reason: HOSPADM

## 2024-05-11 RX ORDER — DEXTROSE MONOHYDRATE 100 MG/ML
INJECTION, SOLUTION INTRAVENOUS CONTINUOUS PRN
Status: DISCONTINUED | OUTPATIENT
Start: 2024-05-11 | End: 2024-05-13 | Stop reason: HOSPADM

## 2024-05-11 RX ORDER — DIGOXIN 125 MCG
125 TABLET ORAL DAILY
Status: DISCONTINUED | OUTPATIENT
Start: 2024-05-11 | End: 2024-05-13 | Stop reason: HOSPADM

## 2024-05-11 RX ORDER — SODIUM CHLORIDE FOR INHALATION 0.9 %
3 VIAL, NEBULIZER (ML) INHALATION
Status: DISCONTINUED | OUTPATIENT
Start: 2024-05-11 | End: 2024-05-13 | Stop reason: HOSPADM

## 2024-05-11 RX ORDER — LANOLIN ALCOHOL/MO/W.PET/CERES
3 CREAM (GRAM) TOPICAL NIGHTLY PRN
Status: DISCONTINUED | OUTPATIENT
Start: 2024-05-11 | End: 2024-05-13 | Stop reason: HOSPADM

## 2024-05-11 RX ORDER — VITAMIN B COMPLEX
5000 TABLET ORAL DAILY
Status: DISCONTINUED | OUTPATIENT
Start: 2024-05-11 | End: 2024-05-13 | Stop reason: HOSPADM

## 2024-05-11 RX ORDER — POLYETHYLENE GLYCOL 3350 17 G/17G
17 POWDER, FOR SOLUTION ORAL DAILY
Status: DISCONTINUED | OUTPATIENT
Start: 2024-05-11 | End: 2024-05-13 | Stop reason: HOSPADM

## 2024-05-11 RX ORDER — MAGNESIUM SULFATE 1 G/100ML
1000 INJECTION INTRAVENOUS PRN
Status: DISCONTINUED | OUTPATIENT
Start: 2024-05-11 | End: 2024-05-13 | Stop reason: HOSPADM

## 2024-05-11 RX ORDER — TAMSULOSIN HYDROCHLORIDE 0.4 MG/1
0.4 CAPSULE ORAL DAILY
Status: DISCONTINUED | OUTPATIENT
Start: 2024-05-11 | End: 2024-05-13 | Stop reason: HOSPADM

## 2024-05-11 RX ORDER — MIDODRINE HYDROCHLORIDE 5 MG/1
5 TABLET ORAL
Status: DISCONTINUED | OUTPATIENT
Start: 2024-05-11 | End: 2024-05-13 | Stop reason: HOSPADM

## 2024-05-11 RX ORDER — LEVALBUTEROL INHALATION SOLUTION 1.25 MG/3ML
1.25 SOLUTION RESPIRATORY (INHALATION) 3 TIMES DAILY
Status: DISCONTINUED | OUTPATIENT
Start: 2024-05-11 | End: 2024-05-13 | Stop reason: HOSPADM

## 2024-05-11 RX ORDER — ACETAMINOPHEN 650 MG/1
650 SUPPOSITORY RECTAL EVERY 6 HOURS PRN
Status: DISCONTINUED | OUTPATIENT
Start: 2024-05-11 | End: 2024-05-13 | Stop reason: HOSPADM

## 2024-05-11 RX ORDER — METOCLOPRAMIDE 10 MG/1
10 TABLET ORAL
Status: DISCONTINUED | OUTPATIENT
Start: 2024-05-11 | End: 2024-05-13 | Stop reason: HOSPADM

## 2024-05-11 RX ORDER — SODIUM CHLORIDE 9 MG/ML
INJECTION, SOLUTION INTRAVENOUS PRN
Status: DISCONTINUED | OUTPATIENT
Start: 2024-05-11 | End: 2024-05-13 | Stop reason: HOSPADM

## 2024-05-11 RX ORDER — GUAIFENESIN 200 MG/10ML
200 LIQUID ORAL EVERY 4 HOURS PRN
Status: DISCONTINUED | OUTPATIENT
Start: 2024-05-11 | End: 2024-05-13 | Stop reason: HOSPADM

## 2024-05-11 RX ORDER — CHOLECALCIFEROL (VITAMIN D3) 125 MCG
1000 CAPSULE ORAL DAILY
Status: DISCONTINUED | OUTPATIENT
Start: 2024-05-11 | End: 2024-05-13 | Stop reason: HOSPADM

## 2024-05-11 RX ORDER — ATORVASTATIN CALCIUM 40 MG/1
40 TABLET, FILM COATED ORAL NIGHTLY
Status: DISCONTINUED | OUTPATIENT
Start: 2024-05-11 | End: 2024-05-13 | Stop reason: HOSPADM

## 2024-05-11 RX ORDER — SODIUM CHLORIDE 0.9 % (FLUSH) 0.9 %
5-40 SYRINGE (ML) INJECTION PRN
Status: DISCONTINUED | OUTPATIENT
Start: 2024-05-11 | End: 2024-05-13 | Stop reason: HOSPADM

## 2024-05-11 RX ORDER — GLUCAGON 1 MG/ML
1 KIT INJECTION PRN
Status: DISCONTINUED | OUTPATIENT
Start: 2024-05-11 | End: 2024-05-13 | Stop reason: HOSPADM

## 2024-05-11 RX ORDER — MIRTAZAPINE 30 MG/1
30 TABLET, FILM COATED ORAL NIGHTLY
Status: DISCONTINUED | OUTPATIENT
Start: 2024-05-11 | End: 2024-05-13 | Stop reason: HOSPADM

## 2024-05-11 RX ORDER — FUROSEMIDE 40 MG/1
40 TABLET ORAL DAILY
Status: DISCONTINUED | OUTPATIENT
Start: 2024-05-11 | End: 2024-05-13 | Stop reason: HOSPADM

## 2024-05-11 RX ORDER — OXYCODONE HYDROCHLORIDE AND ACETAMINOPHEN 5; 325 MG/1; MG/1
1 TABLET ORAL EVERY 4 HOURS PRN
Status: DISCONTINUED | OUTPATIENT
Start: 2024-05-11 | End: 2024-05-13 | Stop reason: HOSPADM

## 2024-05-11 RX ORDER — UREA 10 %
325 LOTION (ML) TOPICAL
Status: DISCONTINUED | OUTPATIENT
Start: 2024-05-11 | End: 2024-05-13 | Stop reason: HOSPADM

## 2024-05-11 RX ORDER — ONDANSETRON 2 MG/ML
4 INJECTION INTRAMUSCULAR; INTRAVENOUS EVERY 6 HOURS PRN
Status: DISCONTINUED | OUTPATIENT
Start: 2024-05-11 | End: 2024-05-13 | Stop reason: HOSPADM

## 2024-05-11 RX ORDER — SENNOSIDES A AND B 8.6 MG/1
1 TABLET, FILM COATED ORAL 2 TIMES DAILY
Status: DISCONTINUED | OUTPATIENT
Start: 2024-05-11 | End: 2024-05-13 | Stop reason: HOSPADM

## 2024-05-11 RX ORDER — ACETAMINOPHEN 325 MG/1
650 TABLET ORAL EVERY 6 HOURS PRN
Status: DISCONTINUED | OUTPATIENT
Start: 2024-05-11 | End: 2024-05-13 | Stop reason: HOSPADM

## 2024-05-11 RX ORDER — MORPHINE SULFATE 2 MG/ML
1 INJECTION, SOLUTION INTRAMUSCULAR; INTRAVENOUS EVERY 4 HOURS PRN
Status: DISCONTINUED | OUTPATIENT
Start: 2024-05-11 | End: 2024-05-13 | Stop reason: HOSPADM

## 2024-05-11 RX ORDER — POTASSIUM CHLORIDE 7.45 MG/ML
10 INJECTION INTRAVENOUS PRN
Status: DISCONTINUED | OUTPATIENT
Start: 2024-05-11 | End: 2024-05-13 | Stop reason: HOSPADM

## 2024-05-11 RX ORDER — SODIUM CHLORIDE 9 MG/ML
INJECTION, SOLUTION INTRAVENOUS PRN
Status: DISCONTINUED | OUTPATIENT
Start: 2024-05-11 | End: 2024-05-11

## 2024-05-11 RX ORDER — ACETYLCYSTEINE 200 MG/ML
400 SOLUTION ORAL; RESPIRATORY (INHALATION)
Status: DISCONTINUED | OUTPATIENT
Start: 2024-05-11 | End: 2024-05-13 | Stop reason: HOSPADM

## 2024-05-11 RX ADMIN — SODIUM CHLORIDE, PRESERVATIVE FREE 10 ML: 5 INJECTION INTRAVENOUS at 08:29

## 2024-05-11 RX ADMIN — FUROSEMIDE 40 MG: 40 TABLET ORAL at 08:13

## 2024-05-11 RX ADMIN — PANTOPRAZOLE SODIUM 40 MG: 40 INJECTION, POWDER, FOR SOLUTION INTRAVENOUS at 08:14

## 2024-05-11 RX ADMIN — LEVALBUTEROL HYDROCHLORIDE 1.25 MG: 1.25 SOLUTION RESPIRATORY (INHALATION) at 20:41

## 2024-05-11 RX ADMIN — MIRTAZAPINE 30 MG: 30 TABLET, ORALLY DISINTEGRATING ORAL at 21:53

## 2024-05-11 RX ADMIN — LEVALBUTEROL HYDROCHLORIDE 1.25 MG: 1.25 SOLUTION RESPIRATORY (INHALATION) at 07:58

## 2024-05-11 RX ADMIN — ACETYLCYSTEINE 400 MG: 200 INHALANT RESPIRATORY (INHALATION) at 07:58

## 2024-05-11 RX ADMIN — ATORVASTATIN CALCIUM 40 MG: 40 TABLET, FILM COATED ORAL at 20:25

## 2024-05-11 RX ADMIN — METOCLOPRAMIDE 10 MG: 10 TABLET ORAL at 08:14

## 2024-05-11 RX ADMIN — TAMSULOSIN HYDROCHLORIDE 0.4 MG: 0.4 CAPSULE ORAL at 08:13

## 2024-05-11 RX ADMIN — SODIUM CHLORIDE, PRESERVATIVE FREE 10 ML: 5 INJECTION INTRAVENOUS at 20:25

## 2024-05-11 RX ADMIN — OXYCODONE HYDROCHLORIDE AND ACETAMINOPHEN 1 TABLET: 5; 325 TABLET ORAL at 02:07

## 2024-05-11 RX ADMIN — DIGOXIN 125 MCG: 125 TABLET ORAL at 08:14

## 2024-05-11 RX ADMIN — METOCLOPRAMIDE 10 MG: 10 TABLET ORAL at 11:11

## 2024-05-11 RX ADMIN — ACETYLCYSTEINE 400 MG: 200 INHALANT RESPIRATORY (INHALATION) at 20:41

## 2024-05-11 RX ADMIN — CEFEPIME 2000 MG: 2 INJECTION, POWDER, FOR SOLUTION INTRAVENOUS at 11:16

## 2024-05-11 RX ADMIN — SENNOSIDES 8.6 MG: 8.6 TABLET, FILM COATED ORAL at 20:25

## 2024-05-11 RX ADMIN — Medication 1000 MCG: at 08:13

## 2024-05-11 RX ADMIN — METOPROLOL TARTRATE 25 MG: 25 TABLET, FILM COATED ORAL at 08:12

## 2024-05-11 RX ADMIN — SENNOSIDES 8.6 MG: 8.6 TABLET, FILM COATED ORAL at 08:14

## 2024-05-11 RX ADMIN — METOCLOPRAMIDE 10 MG: 10 TABLET ORAL at 17:44

## 2024-05-11 RX ADMIN — MIDODRINE HYDROCHLORIDE 5 MG: 5 TABLET ORAL at 11:11

## 2024-05-11 RX ADMIN — Medication 5000 UNITS: at 08:13

## 2024-05-11 RX ADMIN — FERROUS SULFATE TAB EC 325 MG (65 MG FE EQUIVALENT) 325 MG: 325 (65 FE) TABLET DELAYED RESPONSE at 08:12

## 2024-05-11 RX ADMIN — EMPAGLIFLOZIN 10 MG: 10 TABLET, FILM COATED ORAL at 08:14

## 2024-05-11 RX ADMIN — METOCLOPRAMIDE 10 MG: 10 TABLET ORAL at 20:25

## 2024-05-11 RX ADMIN — METOPROLOL TARTRATE 25 MG: 25 TABLET, FILM COATED ORAL at 20:25

## 2024-05-11 RX ADMIN — CEFEPIME 2000 MG: 2 INJECTION, POWDER, FOR SOLUTION INTRAVENOUS at 23:28

## 2024-05-11 RX ADMIN — Medication 3 ML: at 07:58

## 2024-05-11 RX ADMIN — MIDODRINE HYDROCHLORIDE 5 MG: 5 TABLET ORAL at 08:14

## 2024-05-11 RX ADMIN — MIDODRINE HYDROCHLORIDE 5 MG: 5 TABLET ORAL at 17:45

## 2024-05-11 ASSESSMENT — PAIN SCALES - GENERAL
PAINLEVEL_OUTOF10: 8
PAINLEVEL_OUTOF10: 8
PAINLEVEL_OUTOF10: 7
PAINLEVEL_OUTOF10: 2

## 2024-05-11 ASSESSMENT — PAIN DESCRIPTION - DESCRIPTORS
DESCRIPTORS: DISCOMFORT;SORE
DESCRIPTORS: DISCOMFORT;SORE;TENDER

## 2024-05-11 ASSESSMENT — PAIN DESCRIPTION - LOCATION
LOCATION: ABDOMEN

## 2024-05-11 ASSESSMENT — PAIN DESCRIPTION - ORIENTATION
ORIENTATION: RIGHT

## 2024-05-11 NOTE — H&P
RBC, UA 5 TO 10 0 - 2 /HPF    Epithelial Cells UA 2 TO 5 0 - 5 /HPF    Bacteria, UA MANY (A) None    Yeast, UA MANY (A) None   POC Glucose Fingerstick    Collection Time: 05/10/24 11:35 PM   Result Value Ref Range    POC Glucose 124 (H) 75 - 110 mg/dL   Basic Metabolic Panel w/ Reflex to MG    Collection Time: 05/11/24  5:51 AM   Result Value Ref Range    Sodium 137 136 - 145 mmol/L    Potassium 3.7 3.7 - 5.3 mmol/L    Chloride 98 98 - 107 mmol/L    CO2 27 20 - 31 mmol/L    Anion Gap 12 9 - 16 mmol/L    Glucose 100 (H) 74 - 99 mg/dL    BUN 15 8 - 23 mg/dL    Creatinine 1.0 0.70 - 1.20 mg/dL    Est, Glom Filt Rate 80 >60 mL/min/1.73m2    Calcium 8.5 (L) 8.6 - 10.4 mg/dL   CBC    Collection Time: 05/11/24  5:51 AM   Result Value Ref Range    WBC 9.5 3.5 - 11.3 k/uL    RBC 3.05 (L) 4.21 - 5.77 m/uL    Hemoglobin 9.1 (L) 13.0 - 17.0 g/dL    Hematocrit 30.1 (L) 40.7 - 50.3 %    MCV 98.7 82.6 - 102.9 fL    MCH 29.8 25.2 - 33.5 pg    MCHC 30.2 28.4 - 34.8 g/dL    RDW 17.7 (H) 11.8 - 14.4 %    Platelets 295 138 - 453 k/uL    MPV 10.4 8.1 - 13.5 fL    NRBC Automated 0.0 0.0 per 100 WBC   POC Glucose Fingerstick    Collection Time: 05/11/24  6:40 AM   Result Value Ref Range    POC Glucose 98 75 - 110 mg/dL       Imaging/Diagnostics:  XR CHEST PORTABLE    Result Date: 5/9/2024  Patient is rotated.  New reticular and homogeneous airspace opacification left mid and lower lung.  Possible small left pleural effusion.     XR CHEST PORTABLE    Result Date: 5/7/2024  1. Interval removal of the endotracheal and OG tubes. 2. Mild interstitial prominence most pronounced at the mid and lower lung zones on the left improved since yesterday's exam.     XR CHEST PORTABLE    Result Date: 5/6/2024  1. No significant change since yesterday's exam given differences in radiographic technique and patient positioning. 2. Patchy airspace opacity at the left mid and lower lung zones. 3. Small left pleural effusion. 4. Mild right basilar atelectasis.

## 2024-05-11 NOTE — PROGRESS NOTES
Patient Name: Jorge Luis Banda  Date of admission: 4/3/2024  3:27 PM  Patient's age: 78 y.o., 1945  Admission Dx: Melena [K92.1]  GI bleed [K92.2]  UMM (acute kidney injury) (HCC) [N17.9]  Left leg cellulitis [L03.116]  Anemia, unspecified type [D64.9]    Reason for Consult: management recommendations  Requesting Physician: Sumeet Eller MD    CHIEF COMPLAINT: GI bleeding    History Obtained From:  patient  INTERVAL HISTORY:    Patient seen and examined.  Awake and alert.  Feels much better after blood transfusion.  No active bleeding.  CBC today showed white blood cells of 6.2 hemoglobin 8.2 platelets 243.  Less respiratory distress.  No hemoptysis.      HISTORY OF PRESENT ILLNESS:    The patient is a 78 y.o.   male who is admitted to the hospital for anemia and suspicion for GI bleeding.  His hemoglobin was under 7 and received blood transfusion.  He has severe peripheral vascular disease on Plavix and Coumadin.  He underwent an EGD that showed gastritis and he refused colonoscopy.  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  From oncology perspective, the patient was diagnosed with stage I lung cancer in 2019 and underwent lung resection.  Never received any chemotherapy or radiation.  He has been in remission since then.  CT scan of the chest showed concerning changes in the liver and adrenal gland for metastatic disease.  Dedicated CT of the abdomen and pelvis is ordered and pending.    Past Medical History:   has a past medical history of Paroxysmal atrial fibrillation (HCC), Peripheral artery disease (HCC), and Primary hypertension.    Past Surgical History:   has a past surgical history that includes above knee amputation (Right, 01/01/2020); Upper gastrointestinal endoscopy (N/A, 4/8/2024); IR CHOLECYSTOSTOMY PERCUTANEOUS COMPLETE (4/19/2024); and bronchoscopy (N/A, 4/26/2024).

## 2024-05-11 NOTE — CARE COORDINATION
Case Management Assessment  Initial Evaluation    Date/Time of Evaluation: 5/11/2024 12:51 PM  Assessment Completed by: Saima Escalona RN    If patient is discharged prior to next notation, then this note serves as note for discharge by case management.    Patient Name: Jorge Luis Banda                   YOB: 1945  Diagnosis: Lesion of bronchus [J98.09]                   Date / Time: 5/11/2024  1:10 AM    Patient Admission Status: Inpatient   Readmission Risk (Low < 19, Mod (19-27), High > 27): Readmission Risk Score: 23.2    Current PCP: Lucila Machado APRN - CNP  PCP verified by CM? (P) Yes (pt stated he has none and does not want list, Wife Rita will take care of it)    Chart Reviewed: Yes      History Provided by: (P) Patient  Patient Orientation: (P) Alert and Oriented    Patient Cognition: (P) Alert    Hospitalization in the last 30 days (Readmission):  No    If yes, Readmission Assessment in  Navigator will be completed.    Advance Directives:      Code Status: DNR-CCA   Patient's Primary Decision Maker is: (P) Legal Next of Kin    Primary Decision Maker: Rita Villagran - Other - [phone]    Discharge Planning:    Patient lives with: (P) Spouse/Significant Other Type of Home: (P) Apartment  Primary Care Giver: (P) Spouse  Patient Support Systems include: (P) Spouse/Significant Other   Current Financial resources: (P) Medicare  Current community resources: (P) None  Current services prior to admission: (P) None            Current DME: (P) Wheelchair, Shower Chair            Type of Home Care services:  (P) None    ADLS  Prior functional level: (P) Assistance with the following:, Dressing, Cooking, Housework, Shopping  Current functional level: (P) Assistance with the following:, Dressing, Cooking, Housework, Shopping    PT AM-PAC:   /24  OT AM-PAC:   /24    Family can provide assistance at DC: (P) Yes  Would you like Case Management to discuss the discharge plan with any other family

## 2024-05-11 NOTE — PROGRESS NOTES
Alteplase given at 2030, Transportation in to transfer pt to [facility]. Called to inform their nurse of incoming patient

## 2024-05-11 NOTE — DISCHARGE INSTR - DIET

## 2024-05-11 NOTE — CARE COORDINATION
05/11/24 1255   Readmission Assessment   Number of Days since last admission? 1-7 days  ([place] from [facility])   Previous Disposition Other (comment)   Who is being Interviewed Patient   What was the patient's/caregiver's perception as to why they think they needed to return back to the hospital? Other (Comment)   Did you visit your Primary Care Physician after you left the hospital, before you returned this time? No   Why weren't you able to visit your PCP? Other (Comment)   Did you see a specialist, such as Cardiac, Pulmonary, Orthopedic Physician, etc. after you left the hospital? Other (Comment)   Who advised the patient to return to the hospital? Other (Comment)   Does the patient report anything that got in the way of taking their medications? No   In our efforts to provide the best possible care to you and others like you, can you think of anything that we could have done to help you after you left the hospital the first time, so that you might not have needed to return so soon? Other (Comment)

## 2024-05-12 LAB
ANION GAP SERPL CALCULATED.3IONS-SCNC: 11 MMOL/L (ref 9–16)
BUN SERPL-MCNC: 18 MG/DL (ref 8–23)
CALCIUM SERPL-MCNC: 8.7 MG/DL (ref 8.6–10.4)
CHLORIDE SERPL-SCNC: 100 MMOL/L (ref 98–107)
CO2 SERPL-SCNC: 24 MMOL/L (ref 20–31)
CREAT SERPL-MCNC: 1 MG/DL (ref 0.7–1.2)
ERYTHROCYTE [DISTWIDTH] IN BLOOD BY AUTOMATED COUNT: 17.9 % (ref 11.8–14.4)
GFR, ESTIMATED: 77 ML/MIN/1.73M2
GLUCOSE BLD-MCNC: 110 MG/DL (ref 75–110)
GLUCOSE BLD-MCNC: 89 MG/DL (ref 75–110)
GLUCOSE BLD-MCNC: 95 MG/DL (ref 75–110)
GLUCOSE BLD-MCNC: 98 MG/DL (ref 75–110)
GLUCOSE SERPL-MCNC: 97 MG/DL (ref 74–99)
HCT VFR BLD AUTO: 30.9 % (ref 40.7–50.3)
HGB BLD-MCNC: 8.9 G/DL (ref 13–17)
MCH RBC QN AUTO: 29.5 PG (ref 25.2–33.5)
MCHC RBC AUTO-ENTMCNC: 28.8 G/DL (ref 28.4–34.8)
MCV RBC AUTO: 102.3 FL (ref 82.6–102.9)
NRBC BLD-RTO: 0 PER 100 WBC
PLATELET # BLD AUTO: 293 K/UL (ref 138–453)
PMV BLD AUTO: 10.2 FL (ref 8.1–13.5)
POTASSIUM SERPL-SCNC: 4.6 MMOL/L (ref 3.7–5.3)
RBC # BLD AUTO: 3.02 M/UL (ref 4.21–5.77)
SODIUM SERPL-SCNC: 135 MMOL/L (ref 136–145)
WBC OTHER # BLD: 8.2 K/UL (ref 3.5–11.3)

## 2024-05-12 PROCEDURE — 2700000000 HC OXYGEN THERAPY PER DAY

## 2024-05-12 PROCEDURE — 2060000000 HC ICU INTERMEDIATE R&B

## 2024-05-12 PROCEDURE — 82947 ASSAY GLUCOSE BLOOD QUANT: CPT

## 2024-05-12 PROCEDURE — 6370000000 HC RX 637 (ALT 250 FOR IP): Performed by: NURSE PRACTITIONER

## 2024-05-12 PROCEDURE — 36415 COLL VENOUS BLD VENIPUNCTURE: CPT

## 2024-05-12 PROCEDURE — 99232 SBSQ HOSP IP/OBS MODERATE 35: CPT | Performed by: NURSE PRACTITIONER

## 2024-05-12 PROCEDURE — 94640 AIRWAY INHALATION TREATMENT: CPT

## 2024-05-12 PROCEDURE — 80048 BASIC METABOLIC PNL TOTAL CA: CPT

## 2024-05-12 PROCEDURE — A4216 STERILE WATER/SALINE, 10 ML: HCPCS | Performed by: INTERNAL MEDICINE

## 2024-05-12 PROCEDURE — 6370000000 HC RX 637 (ALT 250 FOR IP): Performed by: INTERNAL MEDICINE

## 2024-05-12 PROCEDURE — 2580000003 HC RX 258: Performed by: INTERNAL MEDICINE

## 2024-05-12 PROCEDURE — C9113 INJ PANTOPRAZOLE SODIUM, VIA: HCPCS | Performed by: INTERNAL MEDICINE

## 2024-05-12 PROCEDURE — 94668 MNPJ CHEST WALL SBSQ: CPT

## 2024-05-12 PROCEDURE — 6360000002 HC RX W HCPCS: Performed by: INTERNAL MEDICINE

## 2024-05-12 PROCEDURE — 85027 COMPLETE CBC AUTOMATED: CPT

## 2024-05-12 PROCEDURE — 94761 N-INVAS EAR/PLS OXIMETRY MLT: CPT

## 2024-05-12 RX ORDER — FLUCONAZOLE 100 MG/1
100 TABLET ORAL DAILY
Status: DISCONTINUED | OUTPATIENT
Start: 2024-05-12 | End: 2024-05-13 | Stop reason: HOSPADM

## 2024-05-12 RX ADMIN — DIGOXIN 125 MCG: 125 TABLET ORAL at 08:13

## 2024-05-12 RX ADMIN — FUROSEMIDE 40 MG: 40 TABLET ORAL at 08:13

## 2024-05-12 RX ADMIN — METOPROLOL TARTRATE 25 MG: 25 TABLET, FILM COATED ORAL at 20:15

## 2024-05-12 RX ADMIN — LEVALBUTEROL HYDROCHLORIDE 1.25 MG: 1.25 SOLUTION RESPIRATORY (INHALATION) at 14:45

## 2024-05-12 RX ADMIN — LEVALBUTEROL HYDROCHLORIDE 1.25 MG: 1.25 SOLUTION RESPIRATORY (INHALATION) at 19:51

## 2024-05-12 RX ADMIN — LEVALBUTEROL HYDROCHLORIDE 1.25 MG: 1.25 SOLUTION RESPIRATORY (INHALATION) at 07:51

## 2024-05-12 RX ADMIN — METOCLOPRAMIDE 10 MG: 10 TABLET ORAL at 17:15

## 2024-05-12 RX ADMIN — APIXABAN 5 MG: 5 TABLET, FILM COATED ORAL at 20:15

## 2024-05-12 RX ADMIN — ATORVASTATIN CALCIUM 40 MG: 40 TABLET, FILM COATED ORAL at 20:15

## 2024-05-12 RX ADMIN — SODIUM CHLORIDE, PRESERVATIVE FREE 10 ML: 5 INJECTION INTRAVENOUS at 20:16

## 2024-05-12 RX ADMIN — EMPAGLIFLOZIN 10 MG: 10 TABLET, FILM COATED ORAL at 08:13

## 2024-05-12 RX ADMIN — SODIUM CHLORIDE, PRESERVATIVE FREE 10 ML: 5 INJECTION INTRAVENOUS at 08:15

## 2024-05-12 RX ADMIN — ACETYLCYSTEINE 400 MG: 200 INHALANT RESPIRATORY (INHALATION) at 19:51

## 2024-05-12 RX ADMIN — MIDODRINE HYDROCHLORIDE 5 MG: 5 TABLET ORAL at 17:16

## 2024-05-12 RX ADMIN — TAMSULOSIN HYDROCHLORIDE 0.4 MG: 0.4 CAPSULE ORAL at 08:12

## 2024-05-12 RX ADMIN — SENNOSIDES 8.6 MG: 8.6 TABLET, FILM COATED ORAL at 08:13

## 2024-05-12 RX ADMIN — SENNOSIDES 8.6 MG: 8.6 TABLET, FILM COATED ORAL at 20:16

## 2024-05-12 RX ADMIN — CEFEPIME 2000 MG: 2 INJECTION, POWDER, FOR SOLUTION INTRAVENOUS at 22:52

## 2024-05-12 RX ADMIN — ACETYLCYSTEINE 400 MG: 200 INHALANT RESPIRATORY (INHALATION) at 07:51

## 2024-05-12 RX ADMIN — MIDODRINE HYDROCHLORIDE 5 MG: 5 TABLET ORAL at 08:12

## 2024-05-12 RX ADMIN — MIDODRINE HYDROCHLORIDE 5 MG: 5 TABLET ORAL at 11:04

## 2024-05-12 RX ADMIN — FLUCONAZOLE 100 MG: 100 TABLET ORAL at 15:56

## 2024-05-12 RX ADMIN — Medication 5000 UNITS: at 11:04

## 2024-05-12 RX ADMIN — METOCLOPRAMIDE 10 MG: 10 TABLET ORAL at 08:13

## 2024-05-12 RX ADMIN — MIRTAZAPINE 30 MG: 30 TABLET, ORALLY DISINTEGRATING ORAL at 20:16

## 2024-05-12 RX ADMIN — Medication 1000 MCG: at 08:12

## 2024-05-12 RX ADMIN — PANTOPRAZOLE SODIUM 40 MG: 40 INJECTION, POWDER, FOR SOLUTION INTRAVENOUS at 08:14

## 2024-05-12 RX ADMIN — CEFEPIME 2000 MG: 2 INJECTION, POWDER, FOR SOLUTION INTRAVENOUS at 12:05

## 2024-05-12 RX ADMIN — METOCLOPRAMIDE 10 MG: 10 TABLET ORAL at 20:15

## 2024-05-12 RX ADMIN — METOPROLOL TARTRATE 25 MG: 25 TABLET, FILM COATED ORAL at 08:12

## 2024-05-12 RX ADMIN — METOCLOPRAMIDE 10 MG: 10 TABLET ORAL at 11:04

## 2024-05-12 RX ADMIN — FERROUS SULFATE TAB EC 325 MG (65 MG FE EQUIVALENT) 325 MG: 325 (65 FE) TABLET DELAYED RESPONSE at 08:13

## 2024-05-12 NOTE — CARE COORDINATION
TRANSITIONAL CARE/DISCHARGE PLANNING ONGOING EVALUATION      Reason for Admission: Lesion of bronchus [J98.09]         Patient goals/Transitional Plan:    Spoke with patient about transition and discharge planning, stated he has spoken with [facility] NP and family, no longer wants Trach- pt now wants to DC home with hospice pt requested I speak with his wife Rita for choice.    1155- spoke with Rita, she stated she would like hospice of [facility]-     Spoke with Nuvia from [facility]. They will meet with family Monday 5/13 @1pm

## 2024-05-13 VITALS
BODY MASS INDEX: 16.78 KG/M2 | OXYGEN SATURATION: 98 % | TEMPERATURE: 98 F | RESPIRATION RATE: 23 BRPM | HEART RATE: 88 BPM | HEIGHT: 72 IN | WEIGHT: 123.9 LBS | SYSTOLIC BLOOD PRESSURE: 113 MMHG | DIASTOLIC BLOOD PRESSURE: 62 MMHG

## 2024-05-13 LAB
ANION GAP SERPL CALCULATED.3IONS-SCNC: 12 MMOL/L (ref 9–16)
BUN SERPL-MCNC: 17 MG/DL (ref 8–23)
CALCIUM SERPL-MCNC: 8.8 MG/DL (ref 8.6–10.4)
CHLORIDE SERPL-SCNC: 97 MMOL/L (ref 98–107)
CO2 SERPL-SCNC: 26 MMOL/L (ref 20–31)
CREAT SERPL-MCNC: 1 MG/DL (ref 0.7–1.2)
ERYTHROCYTE [DISTWIDTH] IN BLOOD BY AUTOMATED COUNT: 17.8 % (ref 11.8–14.4)
GFR, ESTIMATED: 77 ML/MIN/1.73M2
GLUCOSE BLD-MCNC: 84 MG/DL (ref 75–110)
GLUCOSE BLD-MCNC: 96 MG/DL (ref 75–110)
GLUCOSE BLD-MCNC: 96 MG/DL (ref 75–110)
GLUCOSE SERPL-MCNC: 89 MG/DL (ref 74–99)
HCT VFR BLD AUTO: 29.8 % (ref 40.7–50.3)
HGB BLD-MCNC: 9 G/DL (ref 13–17)
MAGNESIUM SERPL-MCNC: 2.2 MG/DL (ref 1.6–2.4)
MCH RBC QN AUTO: 29.6 PG (ref 25.2–33.5)
MCHC RBC AUTO-ENTMCNC: 30.2 G/DL (ref 28.4–34.8)
MCV RBC AUTO: 98 FL (ref 82.6–102.9)
MICROORGANISM SPEC CULT: NORMAL
MICROORGANISM/AGENT SPEC: NORMAL
NRBC BLD-RTO: 0 PER 100 WBC
PLATELET # BLD AUTO: 336 K/UL (ref 138–453)
PMV BLD AUTO: 10.3 FL (ref 8.1–13.5)
POTASSIUM SERPL-SCNC: 3.3 MMOL/L (ref 3.7–5.3)
RBC # BLD AUTO: 3.04 M/UL (ref 4.21–5.77)
SODIUM SERPL-SCNC: 135 MMOL/L (ref 136–145)
SPECIMEN DESCRIPTION: NORMAL
WBC OTHER # BLD: 9.4 K/UL (ref 3.5–11.3)

## 2024-05-13 PROCEDURE — 94640 AIRWAY INHALATION TREATMENT: CPT

## 2024-05-13 PROCEDURE — 2700000000 HC OXYGEN THERAPY PER DAY

## 2024-05-13 PROCEDURE — 82947 ASSAY GLUCOSE BLOOD QUANT: CPT

## 2024-05-13 PROCEDURE — 2580000003 HC RX 258: Performed by: INTERNAL MEDICINE

## 2024-05-13 PROCEDURE — C9113 INJ PANTOPRAZOLE SODIUM, VIA: HCPCS | Performed by: INTERNAL MEDICINE

## 2024-05-13 PROCEDURE — 99232 SBSQ HOSP IP/OBS MODERATE 35: CPT | Performed by: NURSE PRACTITIONER

## 2024-05-13 PROCEDURE — 85027 COMPLETE CBC AUTOMATED: CPT

## 2024-05-13 PROCEDURE — 6360000002 HC RX W HCPCS: Performed by: INTERNAL MEDICINE

## 2024-05-13 PROCEDURE — A4216 STERILE WATER/SALINE, 10 ML: HCPCS | Performed by: INTERNAL MEDICINE

## 2024-05-13 PROCEDURE — 80048 BASIC METABOLIC PNL TOTAL CA: CPT

## 2024-05-13 PROCEDURE — 94761 N-INVAS EAR/PLS OXIMETRY MLT: CPT

## 2024-05-13 PROCEDURE — 6370000000 HC RX 637 (ALT 250 FOR IP): Performed by: NURSE PRACTITIONER

## 2024-05-13 PROCEDURE — 83735 ASSAY OF MAGNESIUM: CPT

## 2024-05-13 PROCEDURE — 36415 COLL VENOUS BLD VENIPUNCTURE: CPT

## 2024-05-13 PROCEDURE — 6370000000 HC RX 637 (ALT 250 FOR IP): Performed by: INTERNAL MEDICINE

## 2024-05-13 PROCEDURE — 94669 MECHANICAL CHEST WALL OSCILL: CPT

## 2024-05-13 RX ORDER — TAMSULOSIN HYDROCHLORIDE 0.4 MG/1
0.4 CAPSULE ORAL DAILY
Qty: 30 CAPSULE | Refills: 3 | DISCHARGE
Start: 2024-05-14

## 2024-05-13 RX ORDER — LEVALBUTEROL INHALATION SOLUTION 1.25 MG/3ML
1.25 SOLUTION RESPIRATORY (INHALATION) 3 TIMES DAILY
DISCHARGE
Start: 2024-05-13

## 2024-05-13 RX ORDER — DIGOXIN 125 MCG
125 TABLET ORAL DAILY
Qty: 30 TABLET | Refills: 3 | DISCHARGE
Start: 2024-05-14

## 2024-05-13 RX ORDER — FLUCONAZOLE 100 MG/1
100 TABLET ORAL DAILY
Qty: 5 TABLET | Refills: 0 | DISCHARGE
Start: 2024-05-14 | End: 2024-05-19

## 2024-05-13 RX ADMIN — MIDODRINE HYDROCHLORIDE 5 MG: 5 TABLET ORAL at 08:37

## 2024-05-13 RX ADMIN — PANTOPRAZOLE SODIUM 40 MG: 40 INJECTION, POWDER, FOR SOLUTION INTRAVENOUS at 08:37

## 2024-05-13 RX ADMIN — POTASSIUM BICARBONATE 40 MEQ: 782 TABLET, EFFERVESCENT ORAL at 08:33

## 2024-05-13 RX ADMIN — SODIUM CHLORIDE, PRESERVATIVE FREE 5 ML: 5 INJECTION INTRAVENOUS at 08:34

## 2024-05-13 RX ADMIN — FLUCONAZOLE 100 MG: 100 TABLET ORAL at 08:35

## 2024-05-13 RX ADMIN — APIXABAN 5 MG: 5 TABLET, FILM COATED ORAL at 08:36

## 2024-05-13 RX ADMIN — TAMSULOSIN HYDROCHLORIDE 0.4 MG: 0.4 CAPSULE ORAL at 08:38

## 2024-05-13 RX ADMIN — LEVALBUTEROL HYDROCHLORIDE 1.25 MG: 1.25 SOLUTION RESPIRATORY (INHALATION) at 15:35

## 2024-05-13 RX ADMIN — ACETYLCYSTEINE 400 MG: 200 INHALANT RESPIRATORY (INHALATION) at 07:58

## 2024-05-13 RX ADMIN — METOCLOPRAMIDE 10 MG: 10 TABLET ORAL at 11:59

## 2024-05-13 RX ADMIN — METOCLOPRAMIDE 10 MG: 10 TABLET ORAL at 17:01

## 2024-05-13 RX ADMIN — MIDODRINE HYDROCHLORIDE 5 MG: 5 TABLET ORAL at 12:01

## 2024-05-13 RX ADMIN — LEVALBUTEROL HYDROCHLORIDE 1.25 MG: 1.25 SOLUTION RESPIRATORY (INHALATION) at 07:57

## 2024-05-13 RX ADMIN — Medication 3 ML: at 15:36

## 2024-05-13 RX ADMIN — DIGOXIN 125 MCG: 125 TABLET ORAL at 08:36

## 2024-05-13 RX ADMIN — FERROUS SULFATE TAB EC 325 MG (65 MG FE EQUIVALENT) 325 MG: 325 (65 FE) TABLET DELAYED RESPONSE at 08:38

## 2024-05-13 RX ADMIN — EMPAGLIFLOZIN 10 MG: 10 TABLET, FILM COATED ORAL at 08:35

## 2024-05-13 RX ADMIN — METOCLOPRAMIDE 10 MG: 10 TABLET ORAL at 08:35

## 2024-05-13 RX ADMIN — Medication 1000 MCG: at 08:36

## 2024-05-13 RX ADMIN — MIDODRINE HYDROCHLORIDE 5 MG: 5 TABLET ORAL at 17:01

## 2024-05-13 RX ADMIN — METOPROLOL TARTRATE 25 MG: 25 TABLET, FILM COATED ORAL at 08:37

## 2024-05-13 RX ADMIN — Medication 5000 UNITS: at 08:35

## 2024-05-13 RX ADMIN — CEFEPIME 2000 MG: 2 INJECTION, POWDER, FOR SOLUTION INTRAVENOUS at 11:59

## 2024-05-13 NOTE — DISCHARGE SUMMARY
also history of BPH with urinary retention and urology was consulted.  Patient was started on Flomax and urinary catheter was inserted.  Patient has prior history of stage I lung cancer s/p resection and did not receive any chemoradiation.  Patient was noted to have lobulated mass in right adrenal 6.5 cm, liver mass concerning for metastasis.  CT chest was also suggestive of loculated pleural effusion.  Oncology was consulted and recommended tissue diagnosis with concern for secondary malignancy however with acute stroke and infection plan was made to post pone biopsy as outpatient .  Patient continued to have persistent leukocytosis.  CT chest abdomen pelvis was repeated and showed bilateral pneumonia with moderate right pleural effusion, acute esophagitis, prominent esophageal ampulla, extensive calcific atherosclerosis, stable right renal mass,.  He continued to have right upper quadrant pain.  HIDA scan was performed and gallbladder was not visualized consistent with acute cholecystitis.  GI and general surgery was consulted and recommended cholecystostomy tube placement.  Patient had cholecystomy drain placed.  Zosyn was continued.  ID was consulted recommendation antibiotic and recommended Omnicef and Flagyl. He had severe anemia and drop in HGB on 4/25/24 and was given transfusion. He continued to have worsening atelectasis and productive sputum.  Bronch was repeated second time on 5/2/24. Patient had large bloody mucous plug in left main bronchus  that was suctioned. Patient was kept intubated to open up his lungs.  Patient was started on heparin infusion and Eliquis was held with plan to repeat bronchoscopy for biopsy of endobronchial lesion 5/6/24.  Cystoscopy completed on 5/10/2024     Transferred to our facility for trach placement secondary to inadequate secretion control, requiring frequent broncs.       Did discuss with patient that he may require a feeding tube after the trach is placed.  Patient is

## 2024-05-13 NOTE — DISCHARGE INSTR - COC
Schedule:  Phone:  Fax:    / signature: {Esignature:[id number]}    PHYSICIAN SECTION    Prognosis: Poor    Condition at Discharge: Stable    Rehab Potential (if transferring to Rehab): Poor    Recommended Labs or Other Treatments After Discharge: Per Hospice care team    Physician Certification: I certify the above information and transfer of Jorge Luis Banda  is necessary for the continuing treatment of the diagnosis listed and that he requires Hospice for less 30 days.     Update Admission H&P: No change in H&P    PHYSICIAN SIGNATURE:  Electronically signed by POONAM PRIEST NP / Casimiro Knight MD on 5/13/24 at 3:13 PM EDT

## 2024-05-13 NOTE — ACP (ADVANCE CARE PLANNING)
visited to assist with HCPOA paperwork per physician request. Pt's significant other called soon after  entered room and stated that the paperwork is already complete.  asked her to bring in copy when she comes today and instructed her to give to nurse.  notified nurse that SO will provide paperwork today.     Electronically signed by Chaplain Gui, on 5/13/2024 at 11:02 AM.  [facility]  [phone]

## 2024-05-13 NOTE — PROGRESS NOTES
Direct oral anticoagulant (DOAC) - Daily Pharmacy Review    -Patient's labs and new/discontinued medications reviewed  -Eliquis for A. fib   -Dose modified to 2.5 mg BID (Age >/= 80, Wt < 60 kg)    Peter Dupont, PharmD     
[facility] All belongings were collected and given to transport team. Patient was transferred to [facility] and taken off unit with [facility]. Report called to ANIL Melgar. All questions answered at this time.  
Oral TID WC    furosemide  40 mg Oral Daily    mirtazapine  30 mg Oral Nightly    levalbuterol  1.25 mg Nebulization TID    digoxin  125 mcg Oral Daily    sodium chloride nebulizer  3 mL Nebulization TID RT    acetylcysteine  400 mg Inhalation BID RT    pantoprazole (PROTONIX) 40 mg in sodium chloride (PF) 0.9 % 10 mL injection  40 mg IntraVENous Daily    senna  1 tablet Per NG tube BID    polyethylene glycol  17 g Per NG tube Daily    [Held by provider] aspirin  81 mg Oral Daily    [Held by provider] apixaban  5 mg Per NG tube BID    metoclopramide  10 mg Oral 4x Daily AC & HS    cefepime  2,000 mg IntraVENous Q12H     Continuous Infusions:    sodium chloride      dextrose      sodium chloride       PRN Meds: sodium chloride flush, sodium chloride, ondansetron **OR** ondansetron, acetaminophen **OR** acetaminophen, glucose, dextrose bolus **OR** dextrose bolus, glucagon (rDNA), dextrose, potassium chloride **OR** potassium alternative oral replacement **OR** potassium chloride, melatonin, magnesium sulfate, oxyCODONE-acetaminophen, sodium chloride, morphine, guaiFENesin    Data:     Past Medical History:   has a past medical history of Paroxysmal atrial fibrillation (HCC), Peripheral artery disease (HCC), and Primary hypertension.    Social History:   reports that he quit smoking about 5 years ago. He has never used smokeless tobacco. He reports current alcohol use of about 3.0 standard drinks of alcohol per week. He reports that he does not use drugs.     Family History: No family history on file.    Vitals:  /63   Pulse (!) 118   Temp 99 °F (37.2 °C) (Axillary)   Resp 24   Ht 1.829 m (6')   Wt 57.5 kg (126 lb 12.8 oz)   SpO2 94%   BMI 17.20 kg/m²   Temp (24hrs), Av.2 °F (36.8 °C), Min:97.4 °F (36.3 °C), Max:99 °F (37.2 °C)    Recent Labs     24  1200 24  1634 24  1936 24  0718   POCGLU 107 108 99 95       I/O (24Hr):    Intake/Output Summary (Last 24 hours) at 2024 
breakfast    metoprolol tartrate  25 mg Oral BID    empagliflozin  10 mg Oral Daily    midodrine  5 mg Oral TID WC    furosemide  40 mg Oral Daily    mirtazapine  30 mg Oral Nightly    levalbuterol  1.25 mg Nebulization TID    digoxin  125 mcg Oral Daily    sodium chloride nebulizer  3 mL Nebulization TID RT    acetylcysteine  400 mg Inhalation BID RT    pantoprazole (PROTONIX) 40 mg in sodium chloride (PF) 0.9 % 10 mL injection  40 mg IntraVENous Daily    senna  1 tablet Per NG tube BID    polyethylene glycol  17 g Per NG tube Daily    [Held by provider] aspirin  81 mg Oral Daily    apixaban  5 mg Per NG tube BID    metoclopramide  10 mg Oral 4x Daily AC & HS    cefepime  2,000 mg IntraVENous Q12H     Continuous Infusions:    sodium chloride      dextrose      sodium chloride       PRN Meds: sodium chloride flush, sodium chloride, ondansetron **OR** ondansetron, acetaminophen **OR** acetaminophen, glucose, dextrose bolus **OR** dextrose bolus, glucagon (rDNA), dextrose, potassium chloride **OR** potassium alternative oral replacement **OR** potassium chloride, melatonin, magnesium sulfate, oxyCODONE-acetaminophen, sodium chloride, morphine, guaiFENesin    Data:     Past Medical History:   has a past medical history of Paroxysmal atrial fibrillation (HCC), Peripheral artery disease (HCC), and Primary hypertension.    Social History:   reports that he quit smoking about 5 years ago. He has never used smokeless tobacco. He reports current alcohol use of about 3.0 standard drinks of alcohol per week. He reports that he does not use drugs.     Family History: No family history on file.    Vitals:  /76   Pulse (!) 104   Temp 97.8 °F (36.6 °C) (Axillary)   Resp 23   Ht 1.829 m (6')   Wt 56.2 kg (123 lb 14.4 oz)   SpO2 97%   BMI 16.80 kg/m²   Temp (24hrs), Av °F (36.7 °C), Min:97.5 °F (36.4 °C), Max:98.4 °F (36.9 °C)    Recent Labs     24  1131 24  1625 24  1927 24  0719   POCGLU 89

## 2024-05-13 NOTE — PLAN OF CARE
Problem: Respiratory - Adult  Goal: Achieves optimal ventilation and oxygenation  5/12/2024 0800 by Arlene Young RCP  Outcome: Progressing     
  Problem: Respiratory - Adult  Goal: Achieves optimal ventilation and oxygenation  5/12/2024 1955 by João De Luna RCP  Outcome: Progressing  Flowsheets (Taken 5/11/2024 2045 by Melissa Frankel RCP)  Achieves optimal ventilation and oxygenation:   Respiratory therapy support as indicated   Assess for changes in respiratory status   Assess for changes in mentation and behavior   Oxygen supplementation based on oxygen saturation or arterial blood gases   Assess and instruct to report shortness of breath or any respiratory difficulty   Encourage broncho-pulmonary hygiene including cough, deep breathe, incentive spirometry     
  Problem: Respiratory - Adult  Goal: Achieves optimal ventilation and oxygenation  5/13/2024 0803 by Nayana Francisco RCDAWN  Outcome: Progressing  5/13/2024 0335 by Kristi Irizarry RN  Outcome: Progressing  5/12/2024 1955 by João De Luna RCDAWN  Outcome: Progressing  Flowsheets (Taken 5/11/2024 2045 by Melissa Frankel RCDAWN)  Achieves optimal ventilation and oxygenation:   Respiratory therapy support as indicated   Assess for changes in respiratory status   Assess for changes in mentation and behavior   Oxygen supplementation based on oxygen saturation or arterial blood gases   Assess and instruct to report shortness of breath or any respiratory difficulty   Encourage broncho-pulmonary hygiene including cough, deep breathe, incentive spirometry     
  Problem: Respiratory - Adult  Goal: Achieves optimal ventilation and oxygenation  Flowsheets (Taken 5/11/2024 2045)  Achieves optimal ventilation and oxygenation:   Respiratory therapy support as indicated   Assess for changes in respiratory status   Assess for changes in mentation and behavior   Oxygen supplementation based on oxygen saturation or arterial blood gases   Assess and instruct to report shortness of breath or any respiratory difficulty   Encourage broncho-pulmonary hygiene including cough, deep breathe, incentive spirometry     
  Problem: Safety - Adult  Goal: Free from fall injury  5/11/2024 0453 by Kristi Irizarry RN  Outcome: Progressing     Problem: Chronic Conditions and Co-morbidities  Goal: Patient's chronic conditions and co-morbidity symptoms are monitored and maintained or improved  5/11/2024 0453 by Kristi Irizarry RN  Outcome: Progressing     Problem: Discharge Planning  Goal: Discharge to home or other facility with appropriate resources  5/11/2024 0453 by Kristi Irizarry RN  Outcome: Progressing     Problem: Skin/Tissue Integrity  Goal: Absence of new skin breakdown  Description: 1.  Monitor for areas of redness and/or skin breakdown  2.  Assess vascular access sites hourly  3.  Every 4-6 hours minimum:  Change oxygen saturation probe site  4.  Every 4-6 hours:  If on nasal continuous positive airway pressure, respiratory therapy assess nares and determine need for appliance change or resting period.  5/11/2024 0453 by Kristi Irizarry RN  Outcome: Progressing     Problem: ABCDS Injury Assessment  Goal: Absence of physical injury  5/11/2024 0453 by Kristi Irizarry RN  Outcome: Progressing     Problem: Pain  Goal: Verbalizes/displays adequate comfort level or baseline comfort level  5/11/2024 0453 by Kristi Irizarry RN  Outcome: Progressing     
  Problem: Safety - Adult  Goal: Free from fall injury  5/13/2024 0335 by Kristi Irizarry RN  Outcome: Progressing  5/12/2024 1623 by Marquita Mendez RN  Outcome: Progressing     Problem: Chronic Conditions and Co-morbidities  Goal: Patient's chronic conditions and co-morbidity symptoms are monitored and maintained or improved  5/13/2024 0335 by Kristi Irizarry RN  Outcome: Progressing  5/12/2024 1623 by Marquita Mendez RN  Outcome: Progressing     Problem: Discharge Planning  Goal: Discharge to home or other facility with appropriate resources  5/13/2024 0335 by Kristi Irizarry RN  Outcome: Progressing  5/12/2024 1623 by Marquita Mendez RN  Outcome: Progressing     Problem: Skin/Tissue Integrity  Goal: Absence of new skin breakdown  Description: 1.  Monitor for areas of redness and/or skin breakdown  2.  Assess vascular access sites hourly  3.  Every 4-6 hours minimum:  Change oxygen saturation probe site  4.  Every 4-6 hours:  If on nasal continuous positive airway pressure, respiratory therapy assess nares and determine need for appliance change or resting period.  5/13/2024 0335 by Kristi Irizarry RN  Outcome: Progressing  5/12/2024 1623 by Marquita Mendez RN  Outcome: Progressing     Problem: ABCDS Injury Assessment  Goal: Absence of physical injury  5/13/2024 0335 by Kristi Irizarry RN  Outcome: Progressing  5/12/2024 1623 by Marquita Mendez RN  Outcome: Progressing     Problem: Pain  Goal: Verbalizes/displays adequate comfort level or baseline comfort level  5/13/2024 0335 by Kristi Irizarry RN  Outcome: Progressing  5/12/2024 1623 by Marquita Mendez RN  Outcome: Progressing     Problem: Respiratory - Adult  Goal: Achieves optimal ventilation and oxygenation  5/13/2024 0335 by Kristi Irizarry RN  Outcome: Progressing  5/12/2024 1955 by João De Luna RCP  Outcome: Progressing  Flowsheets (Taken 5/11/2024 2045 by Melissa Frankel RCP)  Achieves optimal ventilation and oxygenation:   Respiratory therapy support as 
  Problem: Safety - Adult  Goal: Free from fall injury  5/13/2024 0916 by Izzy Saravia RN  Outcome: Progressing  5/13/2024 0335 by Kristi Irizarry RN  Outcome: Progressing     Problem: Chronic Conditions and Co-morbidities  Goal: Patient's chronic conditions and co-morbidity symptoms are monitored and maintained or improved  5/13/2024 0916 by Izzy Saravia RN  Outcome: Progressing  5/13/2024 0335 by Kristi Irizarry RN  Outcome: Progressing     Problem: Discharge Planning  Goal: Discharge to home or other facility with appropriate resources  5/13/2024 0916 by Izzy Saravia RN  Outcome: Progressing  5/13/2024 0335 by Kristi Irizarry RN  Outcome: Progressing     Problem: Skin/Tissue Integrity  Goal: Absence of new skin breakdown  Description: 1.  Monitor for areas of redness and/or skin breakdown  2.  Assess vascular access sites hourly  3.  Every 4-6 hours minimum:  Change oxygen saturation probe site  4.  Every 4-6 hours:  If on nasal continuous positive airway pressure, respiratory therapy assess nares and determine need for appliance change or resting period.  5/13/2024 0916 by Izzy Saravia RN  Outcome: Progressing  5/13/2024 0335 by Kristi Irizarry RN  Outcome: Progressing     Problem: ABCDS Injury Assessment  Goal: Absence of physical injury  5/13/2024 0916 by Izzy Saravia RN  Outcome: Progressing  5/13/2024 0335 by Kristi Irizarry RN  Outcome: Progressing     Problem: Pain  Goal: Verbalizes/displays adequate comfort level or baseline comfort level  5/13/2024 0916 by Izzy Saravia RN  Outcome: Progressing  5/13/2024 0335 by Kristi Irizarry RN  Outcome: Progressing     Problem: Respiratory - Adult  Goal: Achieves optimal ventilation and oxygenation  5/13/2024 0916 by Izzy Saravia RN  Outcome: Progressing  5/13/2024 0803 by Nayana Frnacisco RCP  Outcome: Progressing  5/13/2024 0335 by Kristi Irizarry RN  Outcome: Progressing  5/12/2024 1955 by João De Luna RCP  Outcome: Progressing  Flowsheets 
  Problem: Safety - Adult  Goal: Free from fall injury  5/13/2024 1753 by Judit Alexander RN  Outcome: Adequate for Discharge     Problem: Chronic Conditions and Co-morbidities  Goal: Patient's chronic conditions and co-morbidity symptoms are monitored and maintained or improved  5/13/2024 1753 by Judit Alexander RN  Outcome: Adequate for Discharge     Problem: Discharge Planning  Goal: Discharge to home or other facility with appropriate resources  5/13/2024 1753 by Judit Alexander RN  Outcome: Adequate for Discharge     Problem: Skin/Tissue Integrity  Goal: Absence of new skin breakdown  Description: 1.  Monitor for areas of redness and/or skin breakdown  2.  Assess vascular access sites hourly  3.  Every 4-6 hours minimum:  Change oxygen saturation probe site  4.  Every 4-6 hours:  If on nasal continuous positive airway pressure, respiratory therapy assess nares and determine need for appliance change or resting period.  5/13/2024 1753 by Judit Alexander RN  Outcome: Adequate for Discharge     Problem: ABCDS Injury Assessment  Goal: Absence of physical injury  5/13/2024 1753 by Judit Alexander RN  Outcome: Adequate for Discharge     Problem: Pain  Goal: Verbalizes/displays adequate comfort level or baseline comfort level  5/13/2024 1753 by Judit Alexander RN  Outcome: Adequate for Discharge     Problem: Respiratory - Adult  Goal: Achieves optimal ventilation and oxygenation  5/13/2024 1753 by Judit Alexander RN  Outcome: Adequate for Discharge     
  Problem: Safety - Adult  Goal: Free from fall injury  Outcome: Progressing     Problem: Chronic Conditions and Co-morbidities  Goal: Patient's chronic conditions and co-morbidity symptoms are monitored and maintained or improved  Outcome: Progressing     Problem: Discharge Planning  Goal: Discharge to home or other facility with appropriate resources  Outcome: Progressing     Problem: Skin/Tissue Integrity  Goal: Absence of new skin breakdown  Description: 1.  Monitor for areas of redness and/or skin breakdown  2.  Assess vascular access sites hourly  3.  Every 4-6 hours minimum:  Change oxygen saturation probe site  4.  Every 4-6 hours:  If on nasal continuous positive airway pressure, respiratory therapy assess nares and determine need for appliance change or resting period.  Outcome: Progressing     Problem: ABCDS Injury Assessment  Goal: Absence of physical injury  Outcome: Progressing     Problem: Pain  Goal: Verbalizes/displays adequate comfort level or baseline comfort level  Outcome: Progressing     
  Problem: Safety - Adult  Goal: Free from fall injury  Outcome: Progressing     Problem: Chronic Conditions and Co-morbidities  Goal: Patient's chronic conditions and co-morbidity symptoms are monitored and maintained or improved  Outcome: Progressing     Problem: Discharge Planning  Goal: Discharge to home or other facility with appropriate resources  Outcome: Progressing     Problem: Skin/Tissue Integrity  Goal: Absence of new skin breakdown  Description: 1.  Monitor for areas of redness and/or skin breakdown  2.  Assess vascular access sites hourly  3.  Every 4-6 hours minimum:  Change oxygen saturation probe site  4.  Every 4-6 hours:  If on nasal continuous positive airway pressure, respiratory therapy assess nares and determine need for appliance change or resting period.  Outcome: Progressing     Problem: ABCDS Injury Assessment  Goal: Absence of physical injury  Outcome: Progressing     Problem: Pain  Goal: Verbalizes/displays adequate comfort level or baseline comfort level  Outcome: Progressing     Problem: Respiratory - Adult  Goal: Achieves optimal ventilation and oxygenation  5/12/2024 0157 by Kristi Irizarry RN  Outcome: Progressing  5/11/2024 2045 by Melissa Frankel RCP  Flowsheets (Taken 5/11/2024 2045)  Achieves optimal ventilation and oxygenation:   Respiratory therapy support as indicated   Assess for changes in respiratory status   Assess for changes in mentation and behavior   Oxygen supplementation based on oxygen saturation or arterial blood gases   Assess and instruct to report shortness of breath or any respiratory difficulty   Encourage broncho-pulmonary hygiene including cough, deep breathe, incentive spirometry     
  Problem: Safety - Adult  Goal: Free from fall injury  Outcome: Progressing     Problem: Chronic Conditions and Co-morbidities  Goal: Patient's chronic conditions and co-morbidity symptoms are monitored and maintained or improved  Outcome: Progressing     Problem: Discharge Planning  Goal: Discharge to home or other facility with appropriate resources  Outcome: Progressing     Problem: Skin/Tissue Integrity  Goal: Absence of new skin breakdown  Description: 1.  Monitor for areas of redness and/or skin breakdown  2.  Assess vascular access sites hourly  3.  Every 4-6 hours minimum:  Change oxygen saturation probe site  4.  Every 4-6 hours:  If on nasal continuous positive airway pressure, respiratory therapy assess nares and determine need for appliance change or resting period.  Outcome: Progressing     Problem: ABCDS Injury Assessment  Goal: Absence of physical injury  Outcome: Progressing     Problem: Pain  Goal: Verbalizes/displays adequate comfort level or baseline comfort level  Outcome: Progressing     Problem: Respiratory - Adult  Goal: Achieves optimal ventilation and oxygenation  5/12/2024 1623 by Marquita Mendez RN  Outcome: Progressing  5/12/2024 0800 by Arlene Young RCP  Outcome: Progressing     
No

## 2024-05-14 ENCOUNTER — TELEPHONE (OUTPATIENT)
Dept: PHARMACY | Age: 79
End: 2024-05-14

## 2024-05-14 NOTE — TELEPHONE ENCOUNTER
[facility] Medication Management Clinic Note  This clinic had received referral for warfarin management from patient's former PCP 3/14/24. Patient had been attempted to be contacted multiple times without success and then patient was admitted to [facility]. From chart review, during this time patient was transitioned from warfarin to Eliquis. Patient discharged from [facility] to inpatient at [facility]. Referral for warfarin management will be closed at this time.    Terese Duarte PharmD  [facility]  5/14/2024 9:05 AM

## 2024-05-20 LAB
MICROORGANISM SPEC CULT: NORMAL
MICROORGANISM/AGENT SPEC: NORMAL
SPECIMEN DESCRIPTION: NORMAL

## 2024-05-28 LAB
MICROORGANISM SPEC CULT: NORMAL
MICROORGANISM SPEC CULT: NORMAL
MICROORGANISM/AGENT SPEC: NORMAL
MICROORGANISM/AGENT SPEC: NORMAL
SPECIMEN DESCRIPTION: NORMAL
SPECIMEN DESCRIPTION: NORMAL

## 2024-06-10 LAB
MICROORGANISM SPEC CULT: NORMAL
MICROORGANISM/AGENT SPEC: NORMAL
SPECIMEN DESCRIPTION: NORMAL

## 2024-07-11 ENCOUNTER — HOSPITAL ENCOUNTER (OUTPATIENT)
Dept: INTERVENTIONAL RADIOLOGY/VASCULAR | Age: 79
Discharge: HOME | End: 2024-07-13
Payer: MEDICARE

## 2024-07-11 VITALS
DIASTOLIC BLOOD PRESSURE: 87 MMHG | TEMPERATURE: 97.5 F | SYSTOLIC BLOOD PRESSURE: 158 MMHG | RESPIRATION RATE: 14 BRPM | OXYGEN SATURATION: 95 % | HEART RATE: 93 BPM

## 2024-07-11 DIAGNOSIS — K80.20 CHOLELITHIASIS: ICD-10-CM

## 2024-07-11 LAB
INR PPP: 1.1
PARTIAL THROMBOPLASTIN TIME: 35.1 SEC (ref 23–36.5)
PLATELET # BLD AUTO: 189 K/UL (ref 138–453)
PROTHROMBIN TIME: 14.3 SEC (ref 11.7–14.9)

## 2024-07-11 PROCEDURE — 6360000004 HC RX CONTRAST MEDICATION: Performed by: RADIOLOGY

## 2024-07-11 PROCEDURE — 6360000002 HC RX W HCPCS: Performed by: PHYSICIAN ASSISTANT

## 2024-07-11 PROCEDURE — 85049 AUTOMATED PLATELET COUNT: CPT

## 2024-07-11 PROCEDURE — C1769 GUIDE WIRE: HCPCS

## 2024-07-11 PROCEDURE — 47531 INJECTION FOR CHOLANGIOGRAM: CPT

## 2024-07-11 PROCEDURE — 6360000002 HC RX W HCPCS: Performed by: RADIOLOGY

## 2024-07-11 PROCEDURE — 85610 PROTHROMBIN TIME: CPT

## 2024-07-11 PROCEDURE — 85730 THROMBOPLASTIN TIME PARTIAL: CPT

## 2024-07-11 PROCEDURE — 2580000003 HC RX 258: Performed by: PHYSICIAN ASSISTANT

## 2024-07-11 RX ORDER — SODIUM CHLORIDE 9 MG/ML
INJECTION, SOLUTION INTRAVENOUS CONTINUOUS
Status: DISCONTINUED | OUTPATIENT
Start: 2024-07-11 | End: 2024-07-14 | Stop reason: HOSPADM

## 2024-07-11 RX ORDER — FENTANYL CITRATE 50 UG/ML
INJECTION, SOLUTION INTRAMUSCULAR; INTRAVENOUS PRN
Status: COMPLETED | OUTPATIENT
Start: 2024-07-11 | End: 2024-07-11

## 2024-07-11 RX ADMIN — FENTANYL CITRATE 50 MCG: 50 INJECTION, SOLUTION INTRAMUSCULAR; INTRAVENOUS at 12:05

## 2024-07-11 RX ADMIN — Medication 1000 MG: at 11:16

## 2024-07-11 RX ADMIN — SODIUM CHLORIDE: 9 INJECTION, SOLUTION INTRAVENOUS at 11:16

## 2024-07-11 RX ADMIN — IOHEXOL 15 ML: 240 INJECTION, SOLUTION INTRATHECAL; INTRAVASCULAR; INTRAVENOUS; ORAL at 12:28

## 2024-07-11 NOTE — BRIEF OP NOTE
Brief Postoperative Note    Jorge Luis Banda  YOB: 1945  [id number]    Pre-operative Diagnosis: Cholecystostomy tube retracted    Post-operative Diagnosis: Same    Procedure: Percutaneous cholecystostomy tube exchange    Anesthesia: Local and Fentanyl    Surgeons/Assistants: MD Claudy and DEBI Maloney    Estimated Blood Loss: less than 50     Complications: None    Specimens: Was Not Obtained    Findings: Cholangiogram showed partially within cholecystostomy tube.  Cholangiogram showed patent cystic duct.  An attempt to recannulate the previous tract was unsuccessful.  The decision was made to remove cholecystostomy tube completely since cystic duct was patent.  IR attending discussed with Dr. Gamez.     Electronically signed by DEBI Maloney on 7/11/2024 at 12:22 PM

## 2024-07-11 NOTE — H&P
History and Physical    Pt Name: Jorge Luis Banda  MRN: [id number]  YOB: 1945  Date of evaluation: 7/11/2024    SUBJECTIVE:   History of Chief Complaint:    Patient presents preprocedure for IR TUBE/CATH CHANGE W CONTRAST, cholecystostomy.  He was admitted a few months ago with melena, abdominal pain.  He says that he was diagnosed with cholecystitis, but was not able to have surgery due to his other health problems.  He had a cholecystostomy tube placed.  He says that he continues with abdominal pain, RUQ.  He has been scheduled for procedure today.  Patient is in home hospice.  Past Medical History    has a past medical history of Acute respiratory failure (HCC), Adrenal mass (HCC), BPH (benign prostatic hyperplasia), Cholecystitis, CVA (cerebral vascular accident) (HCC), Lung cancer (HCC), No natural teeth, On supplemental oxygen by nasal cannula, Paroxysmal atrial fibrillation (HCC), Peripheral artery disease (HCC), Primary hypertension, and Urinary retention.  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  Medications  Prior to Admission medications    Medication Sig Start Date End Date Taking? Authorizing Provider   apixaban (ELIQUIS) 2.5 MG TABS tablet 1 tablet by Per NG tube route 2 times daily 5/13/24   Penaloza, Lana R APRN - NP   levalbuterol (XOPENEX) 1.25 MG/3ML nebulizer solution Take 3 mLs by nebulization in the morning, at noon, and at bedtime 5/13/24   Penaloza, Lana R APRN - NP   digoxin (LANOXIN) 125 MCG tablet Take 1 tablet by mouth daily 5/14/24   Penaloza, Lana LEWIS APRN - NP   tamsulosin (FLOMAX) 0.4 MG capsule Take 1 capsule by mouth daily 5/14/24   Penaloza, Lana LEWIS,

## 2025-04-11 NOTE — PLAN OF CARE
Problem: Respiratory - Adult  Goal: Achieves optimal ventilation and oxygenation  4/5/2024 1440 by Katharine Salazar RCP  Outcome: Progressing  4/5/2024 1029 by Anita Cheatham RN  Outcome: Progressing  4/5/2024 0537 by Julia Go RN  Outcome: Progressing  Goal: Able to breathe comfortably  Description: Able to breathe comfortably  Outcome: Progressing  Goal: Clear lung sounds  Outcome: Progressing      Follow-up with your primary care provider in 1 week to further evaluate your abnormal chest x-ray    Use Aleve D to help with headache and sinus pressure    Use albuterol as needed for wheezing    Start oral antibiotics today with food    Go to the emergency room for any worse symptoms, fever or difficulty breathing   yes
